# Patient Record
Sex: MALE | Race: WHITE | NOT HISPANIC OR LATINO | Employment: OTHER | URBAN - METROPOLITAN AREA
[De-identification: names, ages, dates, MRNs, and addresses within clinical notes are randomized per-mention and may not be internally consistent; named-entity substitution may affect disease eponyms.]

---

## 2017-06-02 ENCOUNTER — TELEPHONE (OUTPATIENT)
Dept: FAMILY MEDICINE | Facility: CLINIC | Age: 72
End: 2017-06-02

## 2017-06-02 NOTE — TELEPHONE ENCOUNTER
----- Message from Beatriz Tee sent at 6/2/2017 10:32 AM CDT -----  Contact: Tono Saez  Please call patient with lab results.

## 2017-06-27 RX ORDER — METOPROLOL TARTRATE 50 MG/1
TABLET ORAL
Refills: 11 | COMMUNITY
Start: 2017-04-13 | End: 2017-07-08 | Stop reason: SDUPTHER

## 2017-06-27 RX ORDER — PIOGLITAZONEHYDROCHLORIDE 15 MG/1
TABLET ORAL
Refills: 11 | COMMUNITY
Start: 2017-05-22 | End: 2017-06-28

## 2017-06-27 RX ORDER — METFORMIN HYDROCHLORIDE 750 MG/1
TABLET, EXTENDED RELEASE ORAL
Refills: 4 | COMMUNITY
Start: 2017-05-22 | End: 2017-07-08 | Stop reason: SDUPTHER

## 2017-06-27 RX ORDER — PIOGLITAZONEHYDROCHLORIDE 30 MG/1
TABLET ORAL
Refills: 3 | COMMUNITY
Start: 2017-05-10 | End: 2018-05-15 | Stop reason: SDUPTHER

## 2017-06-27 RX ORDER — SERTRALINE HYDROCHLORIDE 50 MG/1
TABLET, FILM COATED ORAL
Refills: 11 | COMMUNITY
Start: 2017-05-08 | End: 2018-04-26 | Stop reason: SDUPTHER

## 2017-06-27 RX ORDER — CLOPIDOGREL BISULFATE 75 MG/1
TABLET ORAL
Refills: 3 | COMMUNITY
Start: 2017-04-13 | End: 2018-01-18 | Stop reason: ALTCHOICE

## 2017-06-28 ENCOUNTER — OFFICE VISIT (OUTPATIENT)
Dept: FAMILY MEDICINE | Facility: CLINIC | Age: 72
End: 2017-06-28
Payer: MEDICARE

## 2017-06-28 VITALS
RESPIRATION RATE: 16 BRPM | HEIGHT: 70 IN | SYSTOLIC BLOOD PRESSURE: 120 MMHG | DIASTOLIC BLOOD PRESSURE: 68 MMHG | TEMPERATURE: 99 F | HEART RATE: 72 BPM | WEIGHT: 189 LBS | BODY MASS INDEX: 27.06 KG/M2

## 2017-06-28 DIAGNOSIS — R19.7 DIARRHEA OF PRESUMED INFECTIOUS ORIGIN: ICD-10-CM

## 2017-06-28 DIAGNOSIS — I10 ESSENTIAL HYPERTENSION: ICD-10-CM

## 2017-06-28 DIAGNOSIS — E11.9 TYPE 2 DIABETES MELLITUS WITHOUT COMPLICATION, WITHOUT LONG-TERM CURRENT USE OF INSULIN: Primary | ICD-10-CM

## 2017-06-28 PROCEDURE — 4010F ACE/ARB THERAPY RXD/TAKEN: CPT | Mod: ,,, | Performed by: FAMILY MEDICINE

## 2017-06-28 PROCEDURE — 1159F MED LIST DOCD IN RCRD: CPT | Mod: ,,, | Performed by: FAMILY MEDICINE

## 2017-06-28 PROCEDURE — 99213 OFFICE O/P EST LOW 20 MIN: CPT | Mod: ,,, | Performed by: FAMILY MEDICINE

## 2017-06-28 RX ORDER — RIVAROXABAN 20 MG/1
20 TABLET, FILM COATED ORAL DAILY
COMMUNITY
Start: 2017-04-21 | End: 2018-07-05 | Stop reason: DRUGHIGH

## 2017-06-28 RX ORDER — METRONIDAZOLE 500 MG/1
500 TABLET ORAL 3 TIMES DAILY
Qty: 30 TABLET | Refills: 0 | Status: SHIPPED | OUTPATIENT
Start: 2017-06-28 | End: 2017-07-08

## 2017-06-28 RX ORDER — GENTAMICIN SULFATE 1 MG/G
OINTMENT TOPICAL
COMMUNITY
Start: 2017-06-01 | End: 2017-08-07 | Stop reason: ALTCHOICE

## 2017-06-28 RX ORDER — HYDROCODONE BITARTRATE AND ACETAMINOPHEN 5; 325 MG/1; MG/1
TABLET ORAL
COMMUNITY
Start: 2017-06-01 | End: 2018-01-29 | Stop reason: ALTCHOICE

## 2017-06-28 NOTE — PATIENT INSTRUCTIONS
Bacterial Gastroenteritis (Adult)    You have gastroenteritis. It is an infection in your intestinal tract caused by bacteria (dysentery). Viruses, parasites, and toxins may also cause gastroenteritis. This infection may cause fever, vomiting, stomach cramping, and diarrhea. You may have blood or mucus in your stool. Some of the more common causes of bacterial gastroenteritis include E. coli, salmonella, shigella, campylobacter, and clostridium difficile.  Antibiotics are often used to treat this type of infection. Sometimes you may need to wait until a stool culture is done before your healthcare provider gives you an antibiotic. In the meantime, follow the advice below. Do not take antibiotics without your provider's recommendation. This can lead to other complications in certain types of bacterial gastroenteritis.  Home care  Medications  · If your healthcare provider prescribed antibiotics, be sure you take them until they are finished.  · You may use acetaminophen or NSAID medicines like ibuprofen or naproxen to control fever unless another medicine was prescribed. If you have chronic liver or kidney disease, talk with your provider before using these medicines. Also talk with your provider if you've had a stomach ulcer or gastrointestinal bleeding. Don't give aspirin to anyone under 18 years of age who is ill with a fever. It may cause severe liver damage. Don't use NSAID medicines if you are already taking one for another condition (like arthritis) or are on aspirin such as for heart disease or after a stroke.  · Don't take or give over-the-counter antidiarrhea medicines, unless your healthcare provider prescribed them. Antidiarrhea medicine may make your symptoms last longer if the cause is an infectious diarrhea.  · You may be given medicine for nausea and vomiting to help you keep down fluids. Take these medicines as prescribed.  · Gastroenteritis is transmitted by contact with the stool or vomit of an  infected person. This can occur directly from person to person or indirectly from contact with a contaminated surface.  General care  · If symptoms are severe, rest at home for the next 24 hours, or until you feel better.  · Washing your hands with soap and water and using alcohol-bases  is the best way to stop the spread of infection. Wash your hands after touching anyone who is sick.  · Wash your hands after using the toilet and before meals. Clean the toilet after each use.  · Avoid tobacco, caffeine, and alcohol. These can make your symptoms worse.  Diet  Liquids are the first step:  · Water and clear liquids are important so you don't get dehydrated. Drink a small amount at a time or suck on ice chips.  Food  · People with diarrhea should not make or serve food for others. When making food for yourself, wash your hands before and after.  · Wash your hands after using cutting boards, countertops, and knives that have touched raw food.  · Keep uncooked meats away from cooked and ready-to-eat foods.  During the first 24 hours, follow the diet below:  · Beverages: sport drinks, soft drinks without caffeine, mineral water (plain or flavored), decaffeinated tea and coffee. If you are very dehydrated, sports drinks are not a good choice. They have too much sugar and not enough electrolytes. In this case, commercially available products called oral rehydration solutions, are best.  · Soups: clear broth, consommé, and bouillon  · Desserts: plain gelatin, popsicles, and fruit juice bars  During the next 24 hours (the second day), you may add these foods to the above list if you are feeling better. If not, continue what you did the first day:  · Hot cereal, plain toast, bread, rolls, crackers  · Plain noodles, rice, mashed potatoes, chicken noodle or rice soup  · Unsweetened canned fruit (avoid pineapple), bananas  · Limit fat to less than 15 grams per day. Avoid margarine, butter, oils, mayonnaise, sauces,  gravies, fried foods, peanut butter, meat, poultry, and fish.  · Limit fiber. Avoid raw or cooked vegetables, fresh fruits (except bananas), and bran cereals.  · Limit dairy  · Continue to avoid alcohol  · Limit caffeine and chocolate. No spices or seasonings except salt.  During the next 24 hours:  · Gradually resume a normal diet, as you feel better and your symptoms improve.  · If at any time you start feeling worse again, go back to clear liquids until you feel better.  Follow-up care  Follow up with your healthcare provider, or as advised. If you don't get better in 24 hours, or if your diarrhea lasts more than 1 week. Also follow up if you are unable to keep liquids down and stay hydrated.  If a stool (diarrhea) sample was taken, you can call for the results as directed.  Call 911  Call 911 if any of these occur:  · Trouble breathing  · Confused  · Very drowsy or trouble awakening  · Fainting or loss of consciousness  · Rapid heart rate  · Chest pain  · Seizure  · Stiff neck  When to seek medical advice  Call your healthcare provider right away if any of these occur:  · Increasing abdominal pain or constant lower right abdominal pain  · Continued vomiting (unable to keep liquids down)  · Diarrhea for more than 2 days in adults and 24 hours in children  · Stools containing blood or pus or black tarry stools  · Dark urine, reduced urine output  · Weakness, dizziness  · Drowsiness  · Fever of 100.4°F (38.0°C), oral, or higher; or not better with fever medicine  · New rash  · If you are experiencing muscle weakness or arthritis symptoms during or after your gastroenteritis is gone  Date Last Reviewed: 1/3/2016  © 1364-2101 [employer]. [address] [place] [address], [place] [address]. All rights reserved. This information is not intended as a substitute for professional medical care. Always follow your healthcare professional's instructions.

## 2017-06-28 NOTE — PROGRESS NOTES
Subjective:       Patient ID: Tono Seaz is a 71 y.o. male.    Chief Complaint: Diarrhea (x 4 weeks  )    Diarrhea    This is a new (Diarrhea off and on x 4 weeks) problem. The current episode started more than 1 month ago. The problem occurs 2 to 4 times per day. The problem has been unchanged. The stool consistency is described as watery and mucous. The patient states that diarrhea does not awaken him from sleep. Associated symptoms include weight loss (7 lb). Pertinent negatives include no abdominal pain, bloating or fever. Nothing aggravates the symptoms. There are no known risk factors. The treatment provided no relief (Told by PA avoid imodium ). There is no history of bowel resection, inflammatory bowel disease, irritable bowel syndrome, malabsorption, a recent abdominal surgery or short gut syndrome.     Review of Systems   Constitutional: Positive for weight loss (7 lb). Negative for fever.   Gastrointestinal: Positive for diarrhea. Negative for abdominal pain and bloating.       Objective:      Physical Exam   Constitutional: He appears well-developed and well-nourished.   HENT:   Head: Normocephalic and atraumatic.   Cardiovascular: Normal rate, regular rhythm, normal heart sounds and intact distal pulses.  Exam reveals no gallop and no friction rub.    No murmur heard.  Pulmonary/Chest: Effort normal and breath sounds normal. He has no wheezes. He has no rales. He exhibits no tenderness.   Abdominal: Soft. Bowel sounds are normal. He exhibits no distension (increased bs) and no mass. There is no tenderness. There is no rebound and no guarding.       Assessment:       1. Type 2 diabetes mellitus without complication, without long-term current use of insulin    2. Diarrhea of presumed infectious origin    3. Essential hypertension        Plan:       Tono was seen today for diarrhea.    Diagnoses and all orders for this visit:    Type 2 diabetes mellitus without complication, without long-term  current use of insulin    Diarrhea of presumed infectious origin  -     WBC, Stool; Future  -     Calprotectin; Future  -     Giardia / Cryptosporidum, EIA; Future  -     Stool Exam-Ova,Cysts,Parasites; Future  -     Clostridium difficile EIA; Future  -     Stool culture; Future  -     metronidazole (FLAGYL) 500 MG tablet; Take 1 tablet (500 mg total) by mouth 3 (three) times daily.  -     WBC, Stool  -     Calprotectin  -     Giardia / Cryptosporidum, EIA  -     Stool Exam-Ova,Cysts,Parasites  -     Clostridium difficile EIA  -     Stool culture    Essential hypertension         Return in about 2 weeks (around 7/12/2017).

## 2017-07-03 ENCOUNTER — TELEPHONE (OUTPATIENT)
Dept: FAMILY MEDICINE | Facility: CLINIC | Age: 72
End: 2017-07-03

## 2017-07-03 NOTE — TELEPHONE ENCOUNTER
----- Message from Scott Staley MD sent at 7/3/2017  1:21 PM CDT -----  Stool test negative for culture, C. difficile

## 2017-07-07 LAB
C DIFF GDH STL QL: NORMAL
CALPROTECTIN STL-MCNT: <15.6 MCG/G
CAMPYLOBACTER STL CULT: NORMAL
E COLI SXT STL QL IA: NORMAL
G LAMBLIA AG STL QL IA: NORMAL
O+P STL TRI STN: NORMAL
SALM + SHIG STL CULT: NORMAL
WBC STL QL MICRO: NORMAL

## 2017-07-10 RX ORDER — METFORMIN HYDROCHLORIDE 750 MG/1
TABLET, EXTENDED RELEASE ORAL
Qty: 180 TABLET | Refills: 4 | Status: SHIPPED | OUTPATIENT
Start: 2017-07-10 | End: 2017-08-25

## 2017-07-10 RX ORDER — AMLODIPINE BESYLATE 5 MG/1
TABLET ORAL
Qty: 90 TABLET | Refills: 11 | Status: SHIPPED | OUTPATIENT
Start: 2017-07-10 | End: 2018-09-26 | Stop reason: SDUPTHER

## 2017-07-10 RX ORDER — SIMVASTATIN 40 MG/1
TABLET, FILM COATED ORAL
Qty: 90 TABLET | Refills: 11 | Status: SHIPPED | OUTPATIENT
Start: 2017-07-10 | End: 2018-09-26 | Stop reason: SDUPTHER

## 2017-07-10 RX ORDER — METOPROLOL TARTRATE 50 MG/1
TABLET ORAL
Qty: 180 TABLET | Refills: 11 | Status: SHIPPED | OUTPATIENT
Start: 2017-07-10 | End: 2018-09-26 | Stop reason: SDUPTHER

## 2017-07-10 RX ORDER — QUINAPRIL 20 MG/1
TABLET ORAL
Qty: 90 TABLET | Refills: 11 | Status: SHIPPED | OUTPATIENT
Start: 2017-07-10 | End: 2018-01-18 | Stop reason: ALTCHOICE

## 2017-07-24 ENCOUNTER — OFFICE VISIT (OUTPATIENT)
Dept: FAMILY MEDICINE | Facility: CLINIC | Age: 72
End: 2017-07-24
Payer: MEDICARE

## 2017-07-24 VITALS
HEIGHT: 70 IN | HEART RATE: 72 BPM | DIASTOLIC BLOOD PRESSURE: 74 MMHG | BODY MASS INDEX: 26.63 KG/M2 | WEIGHT: 186 LBS | SYSTOLIC BLOOD PRESSURE: 140 MMHG | RESPIRATION RATE: 16 BRPM | TEMPERATURE: 98 F

## 2017-07-24 DIAGNOSIS — Z11.4 ENCOUNTER FOR SCREENING FOR HIV: ICD-10-CM

## 2017-07-24 DIAGNOSIS — E11.9 TYPE 2 DIABETES MELLITUS WITHOUT COMPLICATION, WITHOUT LONG-TERM CURRENT USE OF INSULIN: Primary | ICD-10-CM

## 2017-07-24 DIAGNOSIS — Z87.898: ICD-10-CM

## 2017-07-24 DIAGNOSIS — K52.9 CHRONIC DIARRHEA: ICD-10-CM

## 2017-07-24 DIAGNOSIS — R63.4 ABNORMAL WEIGHT LOSS: ICD-10-CM

## 2017-07-24 DIAGNOSIS — R63.4 WEIGHT LOSS, ABNORMAL: ICD-10-CM

## 2017-07-24 PROCEDURE — 4010F ACE/ARB THERAPY RXD/TAKEN: CPT | Mod: ,,, | Performed by: FAMILY MEDICINE

## 2017-07-24 PROCEDURE — 99214 OFFICE O/P EST MOD 30 MIN: CPT | Mod: 25,,, | Performed by: FAMILY MEDICINE

## 2017-07-24 PROCEDURE — 1159F MED LIST DOCD IN RCRD: CPT | Mod: ,,, | Performed by: FAMILY MEDICINE

## 2017-07-24 RX ORDER — KETOCONAZOLE 20 MG/ML
SHAMPOO, SUSPENSION TOPICAL
COMMUNITY
Start: 2017-07-06 | End: 2019-03-26

## 2017-07-24 NOTE — PROGRESS NOTES
Subjective:       Patient ID: Tono Saez is a 72 y.o. male.    Chief Complaint: Night Sweats; Fatigue (weight loss); and Diarrhea    Patient reports continues to not feel well sluggish fatigue and continuing to have weight loss has lost 10 pounds since May. Complaining of very weak legs and fell last Thursday.      Fatigue   This is a chronic problem. The problem occurs constantly. The problem has been gradually worsening. Associated symptoms include anorexia, congestion, coughing, diaphoresis (night sweats), fatigue, headaches (pressure on occiput), joint swelling and myalgias. Pertinent negatives include no abdominal pain, arthralgias, chest pain, chills, fever, nausea, neck pain, numbness, rash, sore throat, swollen glands, vomiting or weakness. Associated symptoms comments: Diarrhea 3-5/ day on average. Nothing aggravates the symptoms. He has tried nothing for the symptoms. The treatment provided no relief.   Diarrhea    Associated symptoms include coughing, headaches (pressure on occiput) and myalgias. Pertinent negatives include no abdominal pain, arthralgias, chills, fever or vomiting.     Review of Systems   Constitutional: Positive for appetite change (decrease), diaphoresis (night sweats) and fatigue. Negative for activity change, chills, fever and unexpected weight change.   HENT: Positive for congestion. Negative for dental problem, drooling, ear discharge, ear pain, facial swelling, hearing loss, mouth sores, nosebleeds, postnasal drip, rhinorrhea, sinus pressure, sneezing, sore throat, tinnitus, trouble swallowing and voice change.    Eyes: Negative for photophobia, pain, discharge, redness, itching and visual disturbance.   Respiratory: Positive for cough. Negative for apnea, choking, chest tightness, shortness of breath, wheezing and stridor.    Cardiovascular: Negative for chest pain, palpitations and leg swelling.   Gastrointestinal: Positive for anorexia and diarrhea. Negative for abdominal  distention, abdominal pain, anal bleeding, blood in stool, constipation, nausea, rectal pain and vomiting.   Endocrine: Negative for cold intolerance, heat intolerance, polydipsia, polyphagia and polyuria.   Genitourinary: Negative for decreased urine volume, difficulty urinating, discharge, dysuria, enuresis, flank pain, frequency, genital sores, hematuria, penile pain, penile swelling, scrotal swelling, testicular pain and urgency.   Musculoskeletal: Positive for joint swelling and myalgias. Negative for arthralgias, back pain, gait problem, neck pain and neck stiffness.   Skin: Negative for color change, pallor, rash and wound.   Allergic/Immunologic: Negative for environmental allergies, food allergies and immunocompromised state.   Neurological: Positive for headaches (pressure on occiput). Negative for dizziness, tremors, seizures, syncope, facial asymmetry, speech difficulty, weakness, light-headedness and numbness.   Hematological: Negative for adenopathy. Does not bruise/bleed easily.   Psychiatric/Behavioral: Positive for dysphoric mood and sleep disturbance (can't fall back to sleep). Negative for agitation, behavioral problems, confusion, decreased concentration, hallucinations, self-injury and suicidal ideas. The patient is not nervous/anxious and is not hyperactive.        Objective:      Physical Exam   Constitutional: He is oriented to person, place, and time. He appears well-developed and well-nourished.  Non-toxic appearance. He does not have a sickly appearance. He does not appear ill. No distress.   HENT:   Head: Normocephalic and atraumatic.   Right Ear: External ear normal.   Left Ear: External ear normal.   Nose: Nose normal.   Mouth/Throat: Oropharynx is clear and moist. No oropharyngeal exudate.   Eyes: Conjunctivae and EOM are normal. Pupils are equal, round, and reactive to light. Right eye exhibits no discharge. Left eye exhibits no discharge. No scleral icterus.   Neck: Normal range of  motion. Neck supple. No JVD present. No tracheal deviation present. No thyromegaly present.   Cardiovascular: Normal rate, regular rhythm, normal heart sounds and intact distal pulses.  Exam reveals no gallop and no friction rub.    No murmur heard.  Pulmonary/Chest: Effort normal and breath sounds normal. No stridor. No respiratory distress. He has no wheezes. He has no rales. He exhibits no tenderness.   Abdominal: Soft. Bowel sounds are normal. He exhibits no distension and no mass. There is no tenderness. There is no rebound and no guarding. No hernia.   Genitourinary: Testes normal and penis normal. Rectal exam shows guaiac negative stool. Cremasteric reflex is present. Right testis shows no mass, no swelling and no tenderness. Right testis is descended. Cremasteric reflex is not absent on the right side. Left testis shows no mass, no swelling and no tenderness. Left testis is descended. Cremasteric reflex is not absent on the left side. Circumcised. No phimosis, paraphimosis, hypospadias, penile erythema or penile tenderness. No discharge found.   Musculoskeletal: Normal range of motion. He exhibits no edema, tenderness or deformity.   Lymphadenopathy:     He has no cervical adenopathy.   Neurological: He is alert and oriented to person, place, and time. He has normal reflexes. He displays normal reflexes. No cranial nerve deficit. He exhibits normal muscle tone. Coordination normal.   Skin: Skin is warm and dry. Capillary refill takes less than 2 seconds. No rash noted. He is not diaphoretic. No erythema. No pallor.   Psychiatric: He has a normal mood and affect. His behavior is normal. Judgment and thought content normal.   Nursing note and vitals reviewed.      Assessment:       1. Type 2 diabetes mellitus without complication, without long-term current use of insulin    2. H/O cachexia    3. Weight loss, abnormal    4. Chronic diarrhea    5. Encounter for screening for HIV     6. Abnormal weight loss          Plan:       Tono was seen today for night sweats, fatigue and diarrhea.    Diagnoses and all orders for this visit:    Type 2 diabetes mellitus without complication, without long-term current use of insulin  -     Hemoglobin A1c; Future  -     HIV-1 and HIV-2 antibodies; Future  -     Hemoglobin A1c  -     HIV-1 and HIV-2 antibodies    H/O cachexia  -     Hemoglobin A1c; Future  -     Hepatitis C antibody; Future  -     HIV-1 and HIV-2 antibodies; Future  -     CBC auto differential; Future  -     Rheumatoid factor; Future  -     RPR; Future  -     TSH; Future  -     Comprehensive metabolic panel; Future  -     DAYANARA; Future  -     tuberculin injection 5 Units; Inject 0.1 mLs (5 Units total) into the skin one time.  -     CT Abdomen Pelvis W Wo Contrast; Future  -     Ambulatory referral to Gastroenterology  -     POCT TB Skin Test Read; Future  -     Hemoglobin A1c  -     Hepatitis C antibody  -     HIV-1 and HIV-2 antibodies  -     CBC auto differential  -     Rheumatoid factor  -     RPR  -     TSH  -     Comprehensive metabolic panel  -     DAYANARA    Weight loss, abnormal  -     HIV-1 and HIV-2 antibodies; Future  -     DAYANARA; Future  -     CT Abdomen Pelvis W Wo Contrast; Future  -     Ambulatory referral to Gastroenterology  -     POCT TB Skin Test Read; Future  -     HIV-1 and HIV-2 antibodies  -     DAYANARA    Chronic diarrhea  -     CT Abdomen Pelvis W Wo Contrast; Future  -     Ambulatory referral to Gastroenterology    Encounter for screening for HIV   -     HIV-1 and HIV-2 antibodies; Future  -     HIV-1 and HIV-2 antibodies    Abnormal weight loss   -     TSH; Future  -     TSH         Return in about 2 weeks (around 8/7/2017).

## 2017-07-31 ENCOUNTER — TELEPHONE (OUTPATIENT)
Dept: FAMILY MEDICINE | Facility: CLINIC | Age: 72
End: 2017-07-31

## 2017-08-07 ENCOUNTER — OFFICE VISIT (OUTPATIENT)
Dept: FAMILY MEDICINE | Facility: CLINIC | Age: 72
End: 2017-08-07
Payer: MEDICARE

## 2017-08-07 VITALS
RESPIRATION RATE: 16 BRPM | SYSTOLIC BLOOD PRESSURE: 132 MMHG | HEIGHT: 70 IN | HEART RATE: 76 BPM | WEIGHT: 184 LBS | TEMPERATURE: 98 F | DIASTOLIC BLOOD PRESSURE: 74 MMHG | BODY MASS INDEX: 26.34 KG/M2

## 2017-08-07 DIAGNOSIS — Z87.898: Primary | ICD-10-CM

## 2017-08-07 DIAGNOSIS — R19.7 DIARRHEA OF PRESUMED INFECTIOUS ORIGIN: ICD-10-CM

## 2017-08-07 PROCEDURE — 99212 OFFICE O/P EST SF 10 MIN: CPT | Mod: ,,, | Performed by: FAMILY MEDICINE

## 2017-08-07 PROCEDURE — 3008F BODY MASS INDEX DOCD: CPT | Mod: ,,, | Performed by: FAMILY MEDICINE

## 2017-08-07 PROCEDURE — 1159F MED LIST DOCD IN RCRD: CPT | Mod: ,,, | Performed by: FAMILY MEDICINE

## 2017-08-07 NOTE — PATIENT INSTRUCTIONS
Call Dr. Mayer's office discuss with his clinic and have moved his appointment forward to Friday 8/11/17 at 10:30 AM.

## 2017-08-07 NOTE — PROGRESS NOTES
Subjective:       Patient ID: Tono Saez is a 72 y.o. male.    Chief Complaint: Follow-up (2 week follow up, ) and Diarrhea    Patient is here as a follow-up from last visit has had weight loss fatigue and chronic diarrhea.  Wt Readings from Last 3 Encounters:  08/07/17 : 83.5 kg (184 lb)  07/24/17 : 84.4 kg (186 lb)  06/28/17 : 85.7 kg (189 lb)  Has lost 20 pounds total since this onset of symptoms in May of this year.  Temp Readings from Last 3 Encounters:  08/07/17 : 97.9 °F (36.6 °C) (Tympanic)  07/24/17 : 98.3 °F (36.8 °C) (Tympanic)  06/28/17 : 98.7 °F (37.1 °C) (Tympanic)    BP Readings from Last 3 Encounters:  08/07/17 : 132/74  07/24/17 : (!) 140/74  06/28/17 : 120/68    Pulse Readings from Last 3 Encounters:  08/07/17 : 76  07/24/17 : 72  06/28/17 : 72      Diarrhea    This is a chronic problem. Pertinent negatives include no arthralgias or myalgias.     Review of Systems   Constitutional: Positive for fatigue.   Gastrointestinal: Positive for diarrhea.   Musculoskeletal: Negative for arthralgias, back pain, gait problem, joint swelling, myalgias, neck pain and neck stiffness.   Neurological: Negative for tremors.   Hematological: Negative for adenopathy. Does not bruise/bleed easily.       Objective:      Physical Exam   Neck: No thyromegaly present.   Abdominal: Soft. Bowel sounds are normal. He exhibits no distension and no mass. There is no tenderness. There is no rebound and no guarding. No hernia.   Musculoskeletal: He exhibits no edema, tenderness or deformity.   Lymphadenopathy:     He has no cervical adenopathy.     He has no axillary adenopathy.        Right: No inguinal and no supraclavicular adenopathy present.        Left: No inguinal and no supraclavicular adenopathy present.       Assessment:       1. H/O cachexia    2. Diarrhea of presumed infectious origin -I doubt this is viral at this point in may indicate more of a chronic colitis or inflammatory picture mild leukopenia suggested is  not ainfectious etiology.       Plan:       Tono was seen today for follow-up and diarrhea.    Diagnoses and all orders for this visit:    H/O cachexia    Diarrhea of presumed infectious origin         Case was discussed with his clinic nurse in his appointment has been moved up to this Friday, 08/11/2017 at 10:30 AM

## 2017-08-18 ENCOUNTER — TELEPHONE (OUTPATIENT)
Dept: FAMILY MEDICINE | Facility: CLINIC | Age: 72
End: 2017-08-18

## 2017-08-18 NOTE — TELEPHONE ENCOUNTER
----- Message from Stephenie Valencia sent at 8/18/2017  9:27 AM CDT -----  Contact: patient  Went to [facility] is fine he suggested that Steven should try getting off the Metformin.  He has not taken Metformin since 08/11 his blood sugar has not been over 100 since.  No ruddy  Made appt for next Friday what do you want to do in the meantime?

## 2017-08-25 ENCOUNTER — OFFICE VISIT (OUTPATIENT)
Dept: FAMILY MEDICINE | Facility: CLINIC | Age: 72
End: 2017-08-25
Payer: MEDICARE

## 2017-08-25 VITALS
RESPIRATION RATE: 16 BRPM | WEIGHT: 185 LBS | BODY MASS INDEX: 26.48 KG/M2 | SYSTOLIC BLOOD PRESSURE: 150 MMHG | DIASTOLIC BLOOD PRESSURE: 78 MMHG | TEMPERATURE: 97 F | HEIGHT: 70 IN | HEART RATE: 64 BPM

## 2017-08-25 DIAGNOSIS — R19.7 DIARRHEA OF PRESUMED INFECTIOUS ORIGIN: Primary | ICD-10-CM

## 2017-08-25 DIAGNOSIS — E11.9 TYPE 2 DIABETES MELLITUS WITHOUT COMPLICATION, WITHOUT LONG-TERM CURRENT USE OF INSULIN: ICD-10-CM

## 2017-08-25 PROCEDURE — 4010F ACE/ARB THERAPY RXD/TAKEN: CPT | Mod: ,,, | Performed by: FAMILY MEDICINE

## 2017-08-25 PROCEDURE — 3077F SYST BP >= 140 MM HG: CPT | Mod: ,,, | Performed by: FAMILY MEDICINE

## 2017-08-25 PROCEDURE — 1159F MED LIST DOCD IN RCRD: CPT | Mod: ,,, | Performed by: FAMILY MEDICINE

## 2017-08-25 PROCEDURE — 99213 OFFICE O/P EST LOW 20 MIN: CPT | Mod: ,,, | Performed by: FAMILY MEDICINE

## 2017-08-25 PROCEDURE — 3078F DIAST BP <80 MM HG: CPT | Mod: ,,, | Performed by: FAMILY MEDICINE

## 2017-08-25 NOTE — PROGRESS NOTES
Follow-up from last visit patient was seen for weight loss cachexia and anorexia since that time he has stopped metformin and is doing much better weight loss is stabilized. Diarrhea resolved.

## 2017-08-25 NOTE — PROGRESS NOTES
Subjective:       Patient ID: Tono Saez is a 72 y.o. male.    Chief Complaint: Follow-up (diabetes)    Follow-up from last visit patient was seen for weight loss cachexia and anorexia since that time he has stopped metformin and is doing much better weight loss is stabilized. Diarrhea resolved.      Review of Systems    Objective:      Physical Exam   Pulmonary/Chest: Effort normal and breath sounds normal. No respiratory distress. He has no wheezes. He has no rales. He exhibits no tenderness.   Abdominal: Soft. Bowel sounds are normal. He exhibits no distension. There is no tenderness. There is no guarding.       Assessment:       1. Diarrhea of presumed infectious origin    2. Type 2 diabetes mellitus without complication, without long-term current use of insulin        Plan:       Tono was seen today for follow-up.    Diagnoses and all orders for this visit:    Diarrhea of presumed infectious origin  -     Basic metabolic panel; Future  -     Magnesium; Future  -     Hepatic function panel; Future  -     Basic metabolic panel  -     Magnesium  -     Hepatic function panel    Type 2 diabetes mellitus without complication, without long-term current use of insulin  -     Hepatic function panel; Future  -     Hepatic function panel         Return in about 3 months (around 11/25/2017).

## 2017-09-01 LAB
ALBUMIN SERPL-MCNC: 4.2 G/DL (ref 3.1–4.7)
ALP SERPL-CCNC: 36 IU/L (ref 40–104)
ALT (SGPT): 16 IU/L (ref 3–33)
AST SERPL-CCNC: 21 IU/L (ref 10–40)
BILIRUB SERPL-MCNC: 0.9 MG/DL (ref 0.3–1)
BILIRUBIN DIRECT+TOT PNL SERPL-MCNC: 0.2 MG/DL (ref 0–0.2)
BUN SERPL-MCNC: 30 MG/DL (ref 8–20)
CALCIUM SERPL-MCNC: 9.4 MG/DL (ref 7.7–10.4)
CHLORIDE: 106 MMOL/L (ref 98–110)
CO2 SERPL-SCNC: 27.5 MMOL/L (ref 22.8–31.6)
CREATININE: 1.49 MG/DL (ref 0.6–1.4)
GLUCOSE: 127 MG/DL (ref 70–99)
MAGNESIUM SERPL-MCNC: 1.5 MG/DL (ref 1.5–2.6)
POTASSIUM SERPL-SCNC: 4 MMOL/L (ref 3.5–5)
PROT SERPL-MCNC: 6.6 G/DL (ref 6–8.2)
SODIUM: 140 MMOL/L (ref 134–144)

## 2018-01-18 ENCOUNTER — TELEPHONE (OUTPATIENT)
Dept: FAMILY MEDICINE | Facility: CLINIC | Age: 73
End: 2018-01-18

## 2018-01-18 ENCOUNTER — OFFICE VISIT (OUTPATIENT)
Dept: FAMILY MEDICINE | Facility: CLINIC | Age: 73
End: 2018-01-18
Payer: MEDICARE

## 2018-01-18 VITALS
DIASTOLIC BLOOD PRESSURE: 60 MMHG | HEIGHT: 71 IN | WEIGHT: 200 LBS | RESPIRATION RATE: 20 BRPM | OXYGEN SATURATION: 100 % | TEMPERATURE: 99 F | BODY MASS INDEX: 28 KG/M2 | HEART RATE: 64 BPM | SYSTOLIC BLOOD PRESSURE: 100 MMHG

## 2018-01-18 DIAGNOSIS — R09.1 PLEURISY: ICD-10-CM

## 2018-01-18 DIAGNOSIS — I10 ESSENTIAL HYPERTENSION: Primary | ICD-10-CM

## 2018-01-18 DIAGNOSIS — J40 BRONCHITIS: ICD-10-CM

## 2018-01-18 PROCEDURE — 99214 OFFICE O/P EST MOD 30 MIN: CPT | Mod: 25,,, | Performed by: FAMILY MEDICINE

## 2018-01-18 RX ORDER — AZITHROMYCIN 250 MG/1
250 TABLET, FILM COATED ORAL DAILY
Qty: 6 TABLET | Refills: 0 | Status: SHIPPED | OUTPATIENT
Start: 2018-01-18 | End: 2018-01-22 | Stop reason: ALTCHOICE

## 2018-01-18 RX ORDER — TRAMADOL HYDROCHLORIDE 50 MG/1
50 TABLET ORAL EVERY 6 HOURS PRN
Qty: 30 TABLET | Refills: 0 | Status: SHIPPED | OUTPATIENT
Start: 2018-01-18 | End: 2019-03-26 | Stop reason: ALTCHOICE

## 2018-01-18 RX ORDER — PROMETHAZINE HYDROCHLORIDE AND DEXTROMETHORPHAN HYDROBROMIDE 6.25; 15 MG/5ML; MG/5ML
5 SYRUP ORAL 4 TIMES DAILY
Qty: 180 ML | Refills: 2 | Status: SHIPPED | OUTPATIENT
Start: 2018-01-18 | End: 2018-01-22

## 2018-01-18 RX ORDER — QUINAPRIL 40 MG/1
20 TABLET ORAL DAILY
Refills: 4 | COMMUNITY
Start: 2017-12-26 | End: 2021-02-08

## 2018-01-18 RX ORDER — LANSOPRAZOLE 30 MG/1
CAPSULE, DELAYED RELEASE ORAL
COMMUNITY
Start: 2017-11-25 | End: 2018-02-05 | Stop reason: SDUPTHER

## 2018-01-18 NOTE — PROGRESS NOTES
Subjective:       Patient ID: Tono Saez is a 72 y.o. male.    Chief Complaint: Cough; URI; and Nasal Congestion      Cough   This is a new problem. The current episode started yesterday. The problem has been waxing and waning. The problem occurs constantly. The cough is productive of sputum (clear). Associated symptoms include chest pain, chills, nasal congestion and rhinorrhea. Pertinent negatives include no ear congestion, ear pain, fever or shortness of breath. Nothing aggravates the symptoms. He has tried nothing for the symptoms. The treatment provided mild relief.   URI    Associated symptoms include chest pain, coughing and rhinorrhea. Pertinent negatives include no ear pain.       Allergies and Medications:   Review of patient's allergies indicates:   Allergen Reactions    Adhesive Other (See Comments)     SILK TAPE PULL SKIN OFF    Metformin Other (See Comments)     Weight loss cachexia anorexia,diarrhea.    Pcn [penicillins]      Current Outpatient Prescriptions   Medication Sig Dispense Refill    amlodipine (NORVASC) 5 MG tablet TAKE ONE TABLET EVERY DAY 90 tablet 11    fenofibrate micronized (LOFIBRA) 134 MG capsule Take 134 mg by mouth before breakfast.       fish oil-omega-3 fatty acids 300-1,000 mg capsule Take 2 g by mouth 2 (two) times daily.       hydrocodone-acetaminophen 5-325mg (NORCO) 5-325 mg per tablet       ketoconazole (NIZORAL) 2 % shampoo       lansoprazole (PREVACID) 30 MG capsule       metoprolol tartrate (LOPRESSOR) 50 MG tablet TAKE ONE TABLET BY MOUTH TWO TIMES A  tablet 11    multivitamin capsule Take 1 capsule by mouth once daily.       NITROSTAT 0.4 mg SL tablet       pioglitazone (ACTOS) 30 MG tablet   3    quinapril (ACCUPRIL) 40 MG tablet   4    sertraline (ZOLOFT) 50 MG tablet   11    simvastatin (ZOCOR) 40 MG tablet TAKE ONE TABLET BY MOUTH EVERY EVENING FOR CHOLESTEROL 90 tablet 11    traMADol (ULTRAM) 50 mg tablet Take 1 tablet (50 mg total) by  mouth every 6 (six) hours as needed. 30 tablet 0    XARELTO 20 mg Tab       azithromycin (Z-GABRIELA) 250 MG tablet Take 1 tablet (250 mg total) by mouth once daily. po on day 1 then 1 tab po on days 2-5 6 tablet 0    promethazine-dextromethorphan (PROMETHAZINE-DM) 6.25-15 mg/5 mL Syrp Take 5 mLs by mouth 4 (four) times daily. 180 mL 2     No current facility-administered medications for this visit.        Family History:   Family History   Problem Relation Age of Onset    Heart disease Mother     Heart disease Father     Hyperlipidemia Sister     Hypertension Sister     Heart disease Brother     Heart disease Brother     Heart disease Brother     Heart disease Brother     Heart disease Brother        Social History:   Social History     Social History    Marital status:      Spouse name: N/A    Number of children: N/A    Years of education: N/A     Occupational History    Not on file.     Social History Main Topics    Smoking status: Never Smoker    Smokeless tobacco: Never Used    Alcohol use No    Drug use: No    Sexual activity: Not on file     Other Topics Concern    Not on file     Social History Narrative    No narrative on file       Review of Systems   Constitutional: Positive for chills. Negative for fever.   HENT: Positive for rhinorrhea. Negative for ear pain.    Respiratory: Positive for cough. Negative for shortness of breath.    Cardiovascular: Positive for chest pain.       Objective:     Vitals:    01/18/18 1501   BP: 100/60   Pulse: 64   Resp: 20   Temp: 98.5 °F (36.9 °C)        Physical Exam   HENT:   Head: Normocephalic.   Eyes: Conjunctivae and EOM are normal. Pupils are equal, round, and reactive to light.   Cardiovascular: Normal rate, regular rhythm, normal heart sounds and intact distal pulses.  Exam reveals no gallop and no friction rub.    No murmur heard.  Pulmonary/Chest: Effort normal and breath sounds normal. No respiratory distress. He has no wheezes. He has  no rales. He exhibits no tenderness.       Assessment:       1. Essential hypertension    2. Bronchitis    3. Pleurisy        Plan:       Tono was seen today for cough, uri and nasal congestion.    Diagnoses and all orders for this visit:    Essential hypertension    Bronchitis  -     Pulse Oximetry  -     X-Ray Chest PA And Lateral; Future    Pleurisy  -     traMADol (ULTRAM) 50 mg tablet; Take 1 tablet (50 mg total) by mouth every 6 (six) hours as needed.    Other orders  -     azithromycin (Z-GABRIELA) 250 MG tablet; Take 1 tablet (250 mg total) by mouth once daily. po on day 1 then 1 tab po on days 2-5  -     promethazine-dextromethorphan (PROMETHAZINE-DM) 6.25-15 mg/5 mL Syrp; Take 5 mLs by mouth 4 (four) times daily.         Follow-up if symptoms worsen or fail to improve.

## 2018-01-18 NOTE — PATIENT INSTRUCTIONS
Pocket Rx for Antibiotics:    Start Antibiotics  if :  1) infection lasting longer than 5 days  2) unilateral symptoms in the sinuses or unilateral nosebleed  3) fever greater than 100.4  4) cough or nasal discharge productive of blood

## 2018-01-19 ENCOUNTER — TELEPHONE (OUTPATIENT)
Dept: FAMILY MEDICINE | Facility: CLINIC | Age: 73
End: 2018-01-19

## 2018-01-19 NOTE — TELEPHONE ENCOUNTER
----- Message from Beatriz Tee sent at 1/19/2018  1:55 PM CST -----  Contact: Pili Saez: [phone]  Asking if Dr Staley called in new cough sryup?

## 2018-01-19 NOTE — TELEPHONE ENCOUNTER
"----- Message from Beatriz Tee sent at 1/19/2018 10:38 AM CST -----  Contact: Wife Pili  Pt says cough medicine was too strong - he was talking "out of his head" in his sleep all night and when he went to get up to use the bathroom he fell and bumped his head & long story short he had to have 8 stiches.  So please change cough medicine.  "

## 2018-01-22 ENCOUNTER — OFFICE VISIT (OUTPATIENT)
Dept: FAMILY MEDICINE | Facility: CLINIC | Age: 73
End: 2018-01-22
Payer: MEDICARE

## 2018-01-22 VITALS
HEART RATE: 68 BPM | WEIGHT: 198 LBS | DIASTOLIC BLOOD PRESSURE: 64 MMHG | HEIGHT: 71 IN | TEMPERATURE: 99 F | BODY MASS INDEX: 27.72 KG/M2 | SYSTOLIC BLOOD PRESSURE: 138 MMHG

## 2018-01-22 DIAGNOSIS — S01.01XD LACERATION OF OCCIPITAL REGION OF SCALP, SUBSEQUENT ENCOUNTER: ICD-10-CM

## 2018-01-22 PROBLEM — S01.01XA LACERATION OF OCCIPITAL REGION OF SCALP: Status: ACTIVE | Noted: 2018-01-22

## 2018-01-22 PROCEDURE — 99212 OFFICE O/P EST SF 10 MIN: CPT | Mod: 25,,, | Performed by: FAMILY MEDICINE

## 2018-01-22 PROCEDURE — 16030 DRESS/DEBRID P-THICK BURN L: CPT | Mod: ,,, | Performed by: FAMILY MEDICINE

## 2018-01-22 NOTE — PROGRESS NOTES
Subjective:       Patient ID: Tono Saez is a 72 y.o. male.    Chief Complaint: Facial Swelling and Follow-up (er laceration to forehead)      Patient was being treated for sinus infection with some Zithromax and prescription cough syrup had taken a teaspoon the night before got up at 5:30 Friday a.m. January 19 and got up to use the bathroom on his way back he became dizzy, lost balance and fell landing on head. No loss of consciousness. Blood sugars have been good he was instructed to follow-up with me this morning.      Head Injury    The incident occurred 3 to 5 days ago. The injury mechanism was a fall. There was no loss of consciousness. The pain is at a severity of 0/10. The patient is experiencing no pain. Pertinent negatives include no blurred vision, disorientation, headaches, memory loss, numbness, tinnitus, vomiting or weakness. Associated symptoms comments: Sl nausea. He has tried nothing for the symptoms. The treatment provided mild relief.     Tetanus booster was given in the ED 01/19/2018   Allergies and Medications:   Review of patient's allergies indicates:   Allergen Reactions    Adhesive Other (See Comments)     SILK TAPE PULL SKIN OFF    Metformin Other (See Comments)     Weight loss cachexia anorexia,diarrhea.    Pcn [penicillins]      Current Outpatient Prescriptions   Medication Sig Dispense Refill    amlodipine (NORVASC) 5 MG tablet TAKE ONE TABLET EVERY DAY 90 tablet 11    fenofibrate micronized (LOFIBRA) 134 MG capsule Take 134 mg by mouth before breakfast.       fish oil-omega-3 fatty acids 300-1,000 mg capsule Take 2 g by mouth 2 (two) times daily.       hydrocodone-acetaminophen 5-325mg (NORCO) 5-325 mg per tablet       ketoconazole (NIZORAL) 2 % shampoo       lansoprazole (PREVACID) 30 MG capsule       metoprolol tartrate (LOPRESSOR) 50 MG tablet TAKE ONE TABLET BY MOUTH TWO TIMES A  tablet 11    multivitamin capsule Take 1 capsule by mouth once daily.        NITROSTAT 0.4 mg SL tablet       pioglitazone (ACTOS) 30 MG tablet   3    quinapril (ACCUPRIL) 40 MG tablet   4    sertraline (ZOLOFT) 50 MG tablet   11    simvastatin (ZOCOR) 40 MG tablet TAKE ONE TABLET BY MOUTH EVERY EVENING FOR CHOLESTEROL 90 tablet 11    traMADol (ULTRAM) 50 mg tablet Take 1 tablet (50 mg total) by mouth every 6 (six) hours as needed. 30 tablet 0    XARELTO 20 mg Tab        No current facility-administered medications for this visit.        Family History:   Family History   Problem Relation Age of Onset    Heart disease Mother     Heart disease Father     Hyperlipidemia Sister     Hypertension Sister     Heart disease Brother     Heart disease Brother     Heart disease Brother     Heart disease Brother     Heart disease Brother        Social History:   Social History     Social History    Marital status:      Spouse name: N/A    Number of children: N/A    Years of education: N/A     Occupational History    Not on file.     Social History Main Topics    Smoking status: Never Smoker    Smokeless tobacco: Never Used    Alcohol use No    Drug use: No    Sexual activity: Not on file     Other Topics Concern    Not on file     Social History Narrative    No narrative on file       Review of Systems   HENT: Negative for tinnitus.    Eyes: Negative for blurred vision.   Gastrointestinal: Negative for vomiting.   Neurological: Negative for weakness, numbness and headaches.   Psychiatric/Behavioral: Negative for memory loss.       Objective:     Vitals:    01/22/18 1318   BP: 138/64   Pulse: 68   Temp: 99 °F (37.2 °C)        Physical Exam   HENT:   Head:       Eyes:   Tubular equal and round reactive to light and accommodation bilaterally bilateral lens implants evident extraocular movements are intact.       Assessment:       1. Laceration of occipital region of scalp, subsequent encounter        Plan:       Tono was seen today for facial swelling and  follow-up.    Diagnoses and all orders for this visit:    Laceration of occipital region of scalp, subsequent encounter         Follow-up in about 1 week (around 1/29/2018).

## 2018-01-29 ENCOUNTER — OFFICE VISIT (OUTPATIENT)
Dept: FAMILY MEDICINE | Facility: CLINIC | Age: 73
End: 2018-01-29
Payer: MEDICARE

## 2018-01-29 VITALS
HEIGHT: 71 IN | BODY MASS INDEX: 26.46 KG/M2 | TEMPERATURE: 98 F | WEIGHT: 189 LBS | DIASTOLIC BLOOD PRESSURE: 78 MMHG | SYSTOLIC BLOOD PRESSURE: 124 MMHG

## 2018-01-29 DIAGNOSIS — S01.81XD: ICD-10-CM

## 2018-01-29 DIAGNOSIS — G44.309 POST-CONCUSSION HEADACHE: Primary | ICD-10-CM

## 2018-01-29 PROBLEM — S01.81XA: Status: ACTIVE | Noted: 2018-01-29

## 2018-01-29 PROCEDURE — 99024 POSTOP FOLLOW-UP VISIT: CPT | Mod: POP,,, | Performed by: FAMILY MEDICINE

## 2018-01-29 RX ORDER — LEVOFLOXACIN 250 MG/1
250 TABLET ORAL DAILY
Qty: 10 TABLET | Refills: 0 | Status: SHIPPED | OUTPATIENT
Start: 2018-01-29 | End: 2018-02-09 | Stop reason: ALTCHOICE

## 2018-01-29 NOTE — PROGRESS NOTES
Subjective:       Patient ID: Tono Saez is a 72 y.o. male.    Chief Complaint: Bronchitis and Suture / Staple Removal      Patient is here today because his cough has persisted he was seen for bronchitis on January 18 was prescribed a antibiotic and cough medicine cough medicine which made him have a fall causing a a scalp hematoma laceration. He is here for follow-up for that as well      Suture / Staple Removal   The sutures were placed 11 to 14 days ago. He tried nothing since the wound repair. His temperature was unmeasured prior to arrival. There has been no drainage from the wound. The swelling has worsened (slightly larger).   Cough   This is a chronic problem. Episode onset: 2 weeks. The problem has been unchanged. The problem occurs constantly. Cough characteristics: white sputum. Associated symptoms include postnasal drip and rhinorrhea. Pertinent negatives include no chest pain or chills. Nothing aggravates the symptoms. He has tried nothing for the symptoms. The treatment provided no relief.       Allergies and Medications:   Review of patient's allergies indicates:   Allergen Reactions    Adhesive Other (See Comments)     SILK TAPE PULL SKIN OFF    Metformin Other (See Comments)     Weight loss cachexia anorexia,diarrhea.    Pcn [penicillins]      Current Outpatient Prescriptions   Medication Sig Dispense Refill    amlodipine (NORVASC) 5 MG tablet TAKE ONE TABLET EVERY DAY 90 tablet 11    fenofibrate micronized (LOFIBRA) 134 MG capsule Take 134 mg by mouth before breakfast.       fish oil-omega-3 fatty acids 300-1,000 mg capsule Take 2 g by mouth 2 (two) times daily.       ketoconazole (NIZORAL) 2 % shampoo       lansoprazole (PREVACID) 30 MG capsule       metoprolol tartrate (LOPRESSOR) 50 MG tablet TAKE ONE TABLET BY MOUTH TWO TIMES A  tablet 11    multivitamin capsule Take 1 capsule by mouth once daily.       NITROSTAT 0.4 mg SL tablet       pioglitazone (ACTOS) 30 MG tablet    3    quinapril (ACCUPRIL) 40 MG tablet   4    sertraline (ZOLOFT) 50 MG tablet   11    simvastatin (ZOCOR) 40 MG tablet TAKE ONE TABLET BY MOUTH EVERY EVENING FOR CHOLESTEROL 90 tablet 11    traMADol (ULTRAM) 50 mg tablet Take 1 tablet (50 mg total) by mouth every 6 (six) hours as needed. 30 tablet 0    XARELTO 20 mg Tab       levoFLOXacin (LEVAQUIN) 250 MG tablet Take 1 tablet (250 mg total) by mouth once daily. 10 tablet 0     No current facility-administered medications for this visit.        Family History:   Family History   Problem Relation Age of Onset    Heart disease Mother     Heart disease Father     Hyperlipidemia Sister     Hypertension Sister     Heart disease Brother     Heart disease Brother     Heart disease Brother     Heart disease Brother     Heart disease Brother        Social History:   Social History     Social History    Marital status:      Spouse name: N/A    Number of children: N/A    Years of education: N/A     Occupational History    Not on file.     Social History Main Topics    Smoking status: Never Smoker    Smokeless tobacco: Never Used    Alcohol use No    Drug use: No    Sexual activity: Not on file     Other Topics Concern    Not on file     Social History Narrative    No narrative on file       Review of Systems   Constitutional: Negative for chills.   HENT: Positive for postnasal drip and rhinorrhea.    Respiratory: Positive for cough.    Cardiovascular: Negative for chest pain.       Objective:     Vitals:    01/29/18 1320   BP: 124/78   Temp: 97.9 °F (36.6 °C)        Physical Exam   Constitutional: He appears well-developed and well-nourished. No distress.   HENT:   Head: Normocephalic and atraumatic.       Right Ear: Hearing, tympanic membrane, external ear and ear canal normal. No drainage, swelling or tenderness. No foreign bodies. Tympanic membrane is not injected, not scarred, not perforated, not erythematous, not retracted and not  bulging. Tympanic membrane mobility is normal. No middle ear effusion. No hemotympanum. No decreased hearing is noted.   Left Ear: Hearing, tympanic membrane, external ear and ear canal normal. No drainage, swelling or tenderness. No foreign bodies. Tympanic membrane is not injected, not scarred, not perforated, not erythematous, not retracted and not bulging. Tympanic membrane mobility is normal.  No middle ear effusion. No hemotympanum. No decreased hearing is noted.   Nose: Nose normal. No mucosal edema, rhinorrhea, nose lacerations, sinus tenderness, nasal deformity or septal deviation. Right sinus exhibits no maxillary sinus tenderness and no frontal sinus tenderness. Left sinus exhibits no maxillary sinus tenderness and no frontal sinus tenderness.   Mouth/Throat: Uvula is midline and oropharynx is clear and moist. Normal dentition. No oropharyngeal exudate, posterior oropharyngeal edema, posterior oropharyngeal erythema or tonsillar abscesses.   Eyes: Conjunctivae and EOM are normal. Pupils are equal, round, and reactive to light. Right eye exhibits no discharge. Left eye exhibits no discharge. No scleral icterus.   Neck: Normal range of motion. Neck supple. No thyromegaly present.   Cardiovascular: Normal rate, regular rhythm, normal heart sounds and intact distal pulses.  Exam reveals no gallop and no friction rub.    No murmur heard.  Pulmonary/Chest: Effort normal and breath sounds normal. No stridor. No respiratory distress. He has no wheezes. He has no rales. He exhibits no tenderness.   Lymphadenopathy:     He has no cervical adenopathy.   Skin: He is not diaphoretic.   Nursing note and vitals reviewed.      Assessment:       1. Post-concussion headache    2. Laceration of left side of forehead with complication, subsequent encounter        Plan:       Tono was seen today for bronchitis and suture / staple removal.    Diagnoses and all orders for this visit:    Post-concussion headache  -     CT  Head Without Contrast; Future    Laceration of left side of forehead with complication, subsequent encounter  -     SUTURE REMOVAL  -     levoFLOXacin (LEVAQUIN) 250 MG tablet; Take 1 tablet (250 mg total) by mouth once daily.         Follow-up in about 2 weeks (around 2/12/2018).

## 2018-01-29 NOTE — PROCEDURES
Suture Removal  Date/Time: 1/29/2018 1:39 PM  Performed by: ZACH NERI  Authorized by: ZACH NERI   Pre-operative diagnosis: hematoma  Post-operative diagnosis: hematoma  Description of findings: hematoma aspiration under local lidocaine. Aspirated approximately 3 cc of echo 5 blood from hematoma the respite is organized thrombus.   Wound Appearance: clean, well healed, nontender, no drainage, nonpurulent and erythematous  Sutures Removed: 3  Post-removal: dressing applied and Steri-Strips applied  Technical procedures used: Aspiration of hematoma, suture removal  Complications: No  Estimated blood loss (mL): 0  Specimens: No  Patient tolerance: Patient tolerated the procedure well with no immediate complications

## 2018-02-05 RX ORDER — LANSOPRAZOLE 30 MG/1
30 CAPSULE, DELAYED RELEASE ORAL DAILY
Qty: 90 CAPSULE | Refills: 3 | Status: SHIPPED | OUTPATIENT
Start: 2018-02-05 | End: 2019-02-14 | Stop reason: SDUPTHER

## 2018-02-05 RX ORDER — FENOFIBRATE 134 MG/1
134 CAPSULE ORAL
Qty: 90 CAPSULE | Refills: 3 | Status: SHIPPED | OUTPATIENT
Start: 2018-02-05 | End: 2018-12-21 | Stop reason: SDUPTHER

## 2018-02-09 ENCOUNTER — OFFICE VISIT (OUTPATIENT)
Dept: FAMILY MEDICINE | Facility: CLINIC | Age: 73
End: 2018-02-09
Payer: MEDICARE

## 2018-02-09 VITALS
RESPIRATION RATE: 16 BRPM | HEIGHT: 71 IN | BODY MASS INDEX: 28.14 KG/M2 | DIASTOLIC BLOOD PRESSURE: 74 MMHG | SYSTOLIC BLOOD PRESSURE: 128 MMHG | WEIGHT: 201 LBS | TEMPERATURE: 98 F | HEART RATE: 72 BPM

## 2018-02-09 DIAGNOSIS — S00.03XD HEMATOMA OF FRONTAL SCALP, SUBSEQUENT ENCOUNTER: ICD-10-CM

## 2018-02-09 DIAGNOSIS — H02.849: ICD-10-CM

## 2018-02-09 PROBLEM — S00.03XA HEMATOMA OF FRONTAL SCALP: Status: ACTIVE | Noted: 2018-02-09

## 2018-02-09 PROBLEM — T14.8XXA HEMATOMA: Status: ACTIVE | Noted: 2018-02-09

## 2018-02-09 PROCEDURE — 99213 OFFICE O/P EST LOW 20 MIN: CPT | Mod: ,,, | Performed by: FAMILY MEDICINE

## 2018-02-09 PROCEDURE — 1159F MED LIST DOCD IN RCRD: CPT | Mod: ,,, | Performed by: FAMILY MEDICINE

## 2018-02-09 NOTE — PROGRESS NOTES
Subjective:       Patient ID: Tono Saez is a 72 y.o. male.    Chief Complaint: Follow-up (hematoma )      This is a follow-up on his previous facial trauma lash head trauma. The discoloration is resolved he still has some of edematous fluid acute lesions under both lids and the crusade on the 4 head is still quite prominent. There's been no further draining mental status has returned to normal.        Allergies and Medications:   Review of patient's allergies indicates:   Allergen Reactions    Adhesive Other (See Comments)     SILK TAPE PULL SKIN OFF    Metformin Other (See Comments)     Weight loss cachexia anorexia,diarrhea.    Pcn [penicillins]      Current Outpatient Prescriptions   Medication Sig Dispense Refill    amlodipine (NORVASC) 5 MG tablet TAKE ONE TABLET EVERY DAY 90 tablet 11    fenofibrate micronized (LOFIBRA) 134 MG Cap Take 1 capsule (134 mg total) by mouth before breakfast. 90 capsule 3    fish oil-omega-3 fatty acids 300-1,000 mg capsule Take 2 g by mouth 2 (two) times daily.       ketoconazole (NIZORAL) 2 % shampoo       lansoprazole (PREVACID) 30 MG capsule Take 1 capsule (30 mg total) by mouth once daily. 90 capsule 3    metoprolol tartrate (LOPRESSOR) 50 MG tablet TAKE ONE TABLET BY MOUTH TWO TIMES A  tablet 11    multivitamin capsule Take 1 capsule by mouth once daily.       NITROSTAT 0.4 mg SL tablet       pioglitazone (ACTOS) 30 MG tablet   3    quinapril (ACCUPRIL) 40 MG tablet   4    sertraline (ZOLOFT) 50 MG tablet   11    simvastatin (ZOCOR) 40 MG tablet TAKE ONE TABLET BY MOUTH EVERY EVENING FOR CHOLESTEROL 90 tablet 11    traMADol (ULTRAM) 50 mg tablet Take 1 tablet (50 mg total) by mouth every 6 (six) hours as needed. 30 tablet 0    XARELTO 20 mg Tab        No current facility-administered medications for this visit.        Family History:   Family History   Problem Relation Age of Onset    Heart disease Mother     Heart disease Father      Hyperlipidemia Sister     Hypertension Sister     Heart disease Brother     Heart disease Brother     Heart disease Brother     Heart disease Brother     Heart disease Brother        Social History:   Social History     Social History    Marital status:      Spouse name: N/A    Number of children: N/A    Years of education: N/A     Occupational History    Not on file.     Social History Main Topics    Smoking status: Never Smoker    Smokeless tobacco: Never Used    Alcohol use No    Drug use: No    Sexual activity: Not on file     Other Topics Concern    Not on file     Social History Narrative    No narrative on file       Review of Systems    Objective:     Vitals:    02/09/18 1326   BP: 128/74   Pulse: 72   Resp: 16   Temp: 97.9 °F (36.6 °C)        Physical Exam   HENT:   Head:           Assessment:       1. Hematoma of frontal scalp, subsequent encounter    2. Edema eyelid, unspecified laterality        Plan:       Tono was seen today for follow-up.    Diagnoses and all orders for this visit:    Hematoma of frontal scalp, subsequent encounter    Edema eyelid, unspecified laterality         No Follow-up on file.

## 2018-04-18 NOTE — TELEPHONE ENCOUNTER
----- Message from Scott Staley MD sent at 1/18/2018  4:39 PM CST -----  hest x-ray is clear no infection.   Scribe Attestation (For Scribes USE Only)... Attending Attestation (For Attendings USE Only).../Scribe Attestation (For Scribes USE Only)...

## 2018-04-26 RX ORDER — SERTRALINE HYDROCHLORIDE 50 MG/1
TABLET, FILM COATED ORAL
Qty: 30 TABLET | Refills: 11 | Status: SHIPPED | OUTPATIENT
Start: 2018-04-26 | End: 2019-04-30 | Stop reason: SDUPTHER

## 2018-05-16 RX ORDER — PIOGLITAZONEHYDROCHLORIDE 30 MG/1
TABLET ORAL
Qty: 90 TABLET | Refills: 3 | Status: SHIPPED | OUTPATIENT
Start: 2018-05-16 | End: 2019-05-16 | Stop reason: SDUPTHER

## 2018-07-05 ENCOUNTER — OFFICE VISIT (OUTPATIENT)
Dept: FAMILY MEDICINE | Facility: CLINIC | Age: 73
End: 2018-07-05
Payer: MEDICARE

## 2018-07-05 VITALS
HEIGHT: 71 IN | WEIGHT: 202.5 LBS | OXYGEN SATURATION: 98 % | RESPIRATION RATE: 16 BRPM | DIASTOLIC BLOOD PRESSURE: 68 MMHG | SYSTOLIC BLOOD PRESSURE: 132 MMHG | HEART RATE: 60 BPM | BODY MASS INDEX: 28.35 KG/M2 | TEMPERATURE: 98 F

## 2018-07-05 DIAGNOSIS — E08.622 DIABETES MELLITUS DUE TO UNDERLYING CONDITION WITH OTHER SKIN ULCER (CODE): ICD-10-CM

## 2018-07-05 DIAGNOSIS — L57.0 ACTINIC KERATOSES: ICD-10-CM

## 2018-07-05 DIAGNOSIS — D71: ICD-10-CM

## 2018-07-05 DIAGNOSIS — E78.2 MIXED HYPERLIPIDEMIA: ICD-10-CM

## 2018-07-05 PROCEDURE — 99214 OFFICE O/P EST MOD 30 MIN: CPT | Mod: ,,, | Performed by: FAMILY MEDICINE

## 2018-07-05 RX ORDER — FINASTERIDE 5 MG/1
5 TABLET, FILM COATED ORAL DAILY
COMMUNITY
End: 2022-08-30 | Stop reason: SINTOL

## 2018-07-05 RX ORDER — FLUTICASONE PROPIONATE 50 MCG
1 SPRAY, SUSPENSION (ML) NASAL NIGHTLY
Qty: 1 BOTTLE | Refills: 3 | Status: SHIPPED | OUTPATIENT
Start: 2018-07-05 | End: 2019-03-14 | Stop reason: SDUPTHER

## 2018-07-05 RX ORDER — CHOLECALCIFEROL (VITAMIN D3) 25 MCG
1000 TABLET ORAL DAILY
COMMUNITY
End: 2023-09-21

## 2018-07-05 NOTE — PROGRESS NOTES
Subjective:       Patient ID: Tono Saez is a 72 y.o. male.    Chief Complaint: Sinusitis      Sinusitis   This is a chronic problem. The current episode started more than 1 year ago. The problem has been gradually worsening since onset. There has been no fever. The fever has been present for less than 1 day. He is experiencing no pain. Associated symptoms include congestion. Pertinent negatives include no chills, coughing, diaphoresis, ear pain, headaches, hoarse voice, neck pain, shortness of breath, sinus pressure, sneezing, sore throat or swollen glands. Past treatments include nothing (loratadine). The treatment provided no relief.       Allergies and Medications:   Review of patient's allergies indicates:   Allergen Reactions    Adhesive Other (See Comments)     SILK TAPE PULL SKIN OFF    Metformin Other (See Comments)     Weight loss cachexia anorexia,diarrhea.    Pcn [penicillins]      Current Outpatient Prescriptions   Medication Sig Dispense Refill    amlodipine (NORVASC) 5 MG tablet TAKE ONE TABLET EVERY DAY 90 tablet 11    fenofibrate micronized (LOFIBRA) 134 MG Cap Take 1 capsule (134 mg total) by mouth before breakfast. 90 capsule 3    finasteride (PROSCAR) 5 mg tablet Take 5 mg by mouth once daily.      ketoconazole (NIZORAL) 2 % shampoo       lansoprazole (PREVACID) 30 MG capsule Take 1 capsule (30 mg total) by mouth once daily. 90 capsule 3    magnesium chloride (SLOW-MAG ORAL) Take by mouth once daily.      metoprolol tartrate (LOPRESSOR) 50 MG tablet TAKE ONE TABLET BY MOUTH TWO TIMES A  tablet 11    multivitamin capsule Take 1 capsule by mouth once daily.       NITROSTAT 0.4 mg SL tablet       pioglitazone (ACTOS) 30 MG tablet TAKE ONE TABLET BY MOUTH ONCE DAILY 90 tablet 3    quinapril (ACCUPRIL) 40 MG tablet Take 40 mg by mouth once daily.   4    rivaroxaban (XARELTO) 15 mg Tab Take 15 mg by mouth once daily.      sertraline (ZOLOFT) 50 MG tablet TAKE ONE TABLET BY  MOUTH ONCE DAILY 30 tablet 11    simvastatin (ZOCOR) 40 MG tablet TAKE ONE TABLET BY MOUTH EVERY EVENING FOR CHOLESTEROL 90 tablet 11    traMADol (ULTRAM) 50 mg tablet Take 1 tablet (50 mg total) by mouth every 6 (six) hours as needed. 30 tablet 0    vitamin D 1000 units Tab Take 1,000 Units by mouth once daily.      fluticasone (FLONASE) 50 mcg/actuation nasal spray 1 spray (50 mcg total) by Each Nare route every evening. in each nostril 1 Bottle 3     No current facility-administered medications for this visit.        Family History:   Family History   Problem Relation Age of Onset    Heart disease Mother     Heart disease Father     Hyperlipidemia Sister     Hypertension Sister     Heart disease Brother     Heart disease Brother     Heart disease Brother     Heart disease Brother     Heart disease Brother        Social History:   Social History     Social History    Marital status:      Spouse name: N/A    Number of children: N/A    Years of education: N/A     Occupational History    Not on file.     Social History Main Topics    Smoking status: Never Smoker    Smokeless tobacco: Never Used    Alcohol use No    Drug use: No    Sexual activity: Not on file     Other Topics Concern    Not on file     Social History Narrative    No narrative on file       Review of Systems   Constitutional: Negative for chills and diaphoresis.   HENT: Positive for congestion. Negative for ear pain, hoarse voice, sinus pressure, sneezing and sore throat.    Respiratory: Negative for cough and shortness of breath.    Musculoskeletal: Negative for neck pain.   Neurological: Negative for headaches.       Objective:     Vitals:    07/05/18 1109   BP: 132/68   Pulse: 60   Resp: 16   Temp: 97.5 °F (36.4 °C)        Physical Exam   Constitutional: He appears well-developed and well-nourished. No distress.   HENT:   Head: Normocephalic and atraumatic.   Right Ear: Hearing, tympanic membrane, external ear and ear  canal normal. No lacerations. No drainage, swelling or tenderness. No foreign bodies. No mastoid tenderness. Tympanic membrane is not injected, not scarred, not perforated, not erythematous, not retracted and not bulging. Tympanic membrane mobility is normal. No middle ear effusion. No hemotympanum. No decreased hearing is noted.   Left Ear: Hearing, tympanic membrane, external ear and ear canal normal. No lacerations. No drainage, swelling or tenderness. No foreign bodies. No mastoid tenderness. Tympanic membrane is not injected, not scarred, not perforated, not erythematous, not retracted and not bulging. Tympanic membrane mobility is normal.  No middle ear effusion. No hemotympanum. No decreased hearing is noted.   Ears:    Nose: Rhinorrhea present. No mucosal edema, nose lacerations, sinus tenderness, nasal deformity, septal deviation or nasal septal hematoma. No epistaxis.  No foreign bodies. Right sinus exhibits no maxillary sinus tenderness and no frontal sinus tenderness. Left sinus exhibits no maxillary sinus tenderness and no frontal sinus tenderness.   Mouth/Throat: Uvula is midline and oropharynx is clear and moist. Normal dentition. No oropharyngeal exudate, posterior oropharyngeal edema, posterior oropharyngeal erythema or tonsillar abscesses.   Eyes: Conjunctivae and EOM are normal. Pupils are equal, round, and reactive to light. Right eye exhibits no discharge. Left eye exhibits no discharge. No scleral icterus.   Neck: Normal range of motion. Neck supple. No thyromegaly present.   Cardiovascular: Normal rate, regular rhythm, normal heart sounds and intact distal pulses.  Exam reveals no gallop and no friction rub.    No murmur heard.  Pulmonary/Chest: Effort normal and breath sounds normal. No stridor. No respiratory distress. He has no wheezes. He has no rales. He exhibits no tenderness.   Lymphadenopathy:     He has no cervical adenopathy.   Skin: He is not diaphoretic.   Nursing note and vitals  reviewed.      Assessment:       1. Chronic (childhood) granulomatous disease    2. Actinic keratoses    3. Mixed hyperlipidemia    4. Diabetes mellitus due to underlying condition with other skin ulcer (CODE)         Plan:       Tono was seen today for sinusitis.    Diagnoses and all orders for this visit:    Chronic (childhood) granulomatous disease  -     fluticasone (FLONASE) 50 mcg/actuation nasal spray; 1 spray (50 mcg total) by Each Nare route every evening. in each nostril  -     Ambulatory consult to ENT    Actinic keratoses  -     Ambulatory consult to ENT    Mixed hyperlipidemia  -     Hemoglobin A1c; Future  -     Hemoglobin A1c    Diabetes mellitus due to underlying condition with other skin ulcer (CODE)   -     Hemoglobin A1c; Future  -     Hemoglobin A1c         Follow-up in about 1 month (around 8/5/2018), or if symptoms worsen or fail to improve.

## 2018-07-06 LAB — HBA1C MFR BLD: 6.9 % (ref 3.1–6.5)

## 2018-08-09 ENCOUNTER — OFFICE VISIT (OUTPATIENT)
Dept: FAMILY MEDICINE | Facility: CLINIC | Age: 73
End: 2018-08-09
Payer: MEDICARE

## 2018-08-09 VITALS
WEIGHT: 202 LBS | HEART RATE: 64 BPM | RESPIRATION RATE: 20 BRPM | SYSTOLIC BLOOD PRESSURE: 132 MMHG | TEMPERATURE: 98 F | HEIGHT: 71 IN | BODY MASS INDEX: 28.28 KG/M2 | OXYGEN SATURATION: 97 % | DIASTOLIC BLOOD PRESSURE: 70 MMHG

## 2018-08-09 DIAGNOSIS — E11.9 TYPE 2 DIABETES MELLITUS WITHOUT COMPLICATION, WITHOUT LONG-TERM CURRENT USE OF INSULIN: Primary | ICD-10-CM

## 2018-08-09 DIAGNOSIS — J30.1 SEASONAL ALLERGIC RHINITIS DUE TO POLLEN: ICD-10-CM

## 2018-08-09 PROCEDURE — 99213 OFFICE O/P EST LOW 20 MIN: CPT | Mod: ,,, | Performed by: FAMILY MEDICINE

## 2018-08-09 RX ORDER — IPRATROPIUM BROMIDE 42 UG/1
SPRAY, METERED NASAL
Refills: 3 | COMMUNITY
Start: 2018-07-17 | End: 2019-03-26 | Stop reason: ALTCHOICE

## 2018-08-09 NOTE — PATIENT INSTRUCTIONS
Blood sugar testing:  Test BS on Monday 7am and 4pm, and Thursday 7am and 4pm. Keep in a blood sugar log or calendar book.

## 2018-08-09 NOTE — PROGRESS NOTES
Subjective:       Patient ID: Tono Saez is a 73 y.o. male.    Chief Complaint: Sinus Problem (congestion is not improving )      Here for follow-up on his sinus problems he did see Dr. harvey it was prescribed an Atrovent nasal inhaler which does help in the mornings.       Sinus Problem   This is a chronic problem. The current episode started more than 1 year ago. There has been no fever. Associated symptoms include congestion, coughing and shortness of breath. Pertinent negatives include no chills, diaphoresis, ear pain, headaches, hoarse voice, neck pain, sinus pressure, sneezing, sore throat or swollen glands. Past treatments include spray decongestants and saline sprays (steroid sprays). The treatment provided mild relief.   Diabetes   He presents for his follow-up diabetic visit. He has type 2 diabetes mellitus. Pertinent negatives for hypoglycemia include no headaches. Symptoms are stable. He is following a diabetic diet. He has not had a previous visit with a dietitian. He participates in exercise daily. His breakfast blood glucose range is generally 110-130 mg/dl. His lunch blood glucose range is generally 130-140 mg/dl. His overall blood glucose range is 130-140 mg/dl. An ACE inhibitor/angiotensin II receptor blocker is being taken. Eye exam is current.       Allergies and Medications:   Review of patient's allergies indicates:   Allergen Reactions    Adhesive Other (See Comments)     SILK TAPE PULL SKIN OFF    Metformin Other (See Comments)     Weight loss cachexia anorexia,diarrhea.    Pcn [penicillins]      Current Outpatient Prescriptions   Medication Sig Dispense Refill    amlodipine (NORVASC) 5 MG tablet TAKE ONE TABLET EVERY DAY 90 tablet 11    fenofibrate micronized (LOFIBRA) 134 MG Cap Take 1 capsule (134 mg total) by mouth before breakfast. 90 capsule 3    finasteride (PROSCAR) 5 mg tablet Take 5 mg by mouth once daily.      fluticasone (FLONASE) 50 mcg/actuation nasal spray 1 spray (50  mcg total) by Each Nare route every evening. in each nostril 1 Bottle 3    ipratropium (ATROVENT) 42 mcg (0.06 %) nasal spray   3    ketoconazole (NIZORAL) 2 % shampoo       lansoprazole (PREVACID) 30 MG capsule Take 1 capsule (30 mg total) by mouth once daily. 90 capsule 3    magnesium chloride (SLOW-MAG ORAL) Take by mouth once daily.      metoprolol tartrate (LOPRESSOR) 50 MG tablet TAKE ONE TABLET BY MOUTH TWO TIMES A  tablet 11    multivitamin capsule Take 1 capsule by mouth once daily.       NITROSTAT 0.4 mg SL tablet       pioglitazone (ACTOS) 30 MG tablet TAKE ONE TABLET BY MOUTH ONCE DAILY 90 tablet 3    quinapril (ACCUPRIL) 40 MG tablet Take 40 mg by mouth once daily.   4    rivaroxaban (XARELTO) 15 mg Tab Take 15 mg by mouth once daily.      sertraline (ZOLOFT) 50 MG tablet TAKE ONE TABLET BY MOUTH ONCE DAILY 30 tablet 11    simvastatin (ZOCOR) 40 MG tablet TAKE ONE TABLET BY MOUTH EVERY EVENING FOR CHOLESTEROL 90 tablet 11    traMADol (ULTRAM) 50 mg tablet Take 1 tablet (50 mg total) by mouth every 6 (six) hours as needed. 30 tablet 0    vitamin D 1000 units Tab Take 1,000 Units by mouth once daily.      blood sugar diagnostic Strp To check BG bid times daily, to use with insurance preferred meter 200 strip 3     No current facility-administered medications for this visit.        Family History:   Family History   Problem Relation Age of Onset    Heart disease Mother     Heart disease Father     Hyperlipidemia Sister     Hypertension Sister     Heart disease Brother     Heart disease Brother     Heart disease Brother     Heart disease Brother     Heart disease Brother        Social History:   Social History     Social History    Marital status:      Spouse name: N/A    Number of children: N/A    Years of education: N/A     Occupational History    Not on file.     Social History Main Topics    Smoking status: Never Smoker    Smokeless tobacco: Never Used     Alcohol use No    Drug use: No    Sexual activity: Not on file     Other Topics Concern    Not on file     Social History Narrative    No narrative on file       Review of Systems   Constitutional: Negative for chills and diaphoresis.   HENT: Positive for congestion. Negative for ear pain, hoarse voice, sinus pressure, sneezing and sore throat.    Respiratory: Positive for cough and shortness of breath.    Musculoskeletal: Negative for neck pain.   Neurological: Negative for headaches.       Objective:     Vitals:    08/09/18 1017   BP: 132/70   Pulse: 64   Resp: 20   Temp: 97.5 °F (36.4 °C)        Physical Exam   Constitutional: He appears well-developed and well-nourished. No distress.   HENT:   Head: Normocephalic and atraumatic.   Right Ear: Hearing, tympanic membrane, external ear and ear canal normal. No drainage, swelling or tenderness. No foreign bodies. Tympanic membrane is not injected, not scarred, not perforated, not erythematous, not retracted and not bulging. Tympanic membrane mobility is normal. No middle ear effusion. No hemotympanum. No decreased hearing is noted.   Left Ear: Hearing, tympanic membrane, external ear and ear canal normal. No drainage, swelling or tenderness. No foreign bodies. Tympanic membrane is not injected, not scarred, not perforated, not erythematous, not retracted and not bulging. Tympanic membrane mobility is normal.  No middle ear effusion. No hemotympanum. No decreased hearing is noted.   Nose: Mucosal edema and rhinorrhea present. No nose lacerations, sinus tenderness, nasal deformity or septal deviation. Right sinus exhibits no maxillary sinus tenderness and no frontal sinus tenderness. Left sinus exhibits no maxillary sinus tenderness and no frontal sinus tenderness.   Mouth/Throat: Uvula is midline and oropharynx is clear and moist. Normal dentition. No oropharyngeal exudate, posterior oropharyngeal edema, posterior oropharyngeal erythema or tonsillar abscesses.    Eyes: Conjunctivae and EOM are normal. Pupils are equal, round, and reactive to light. Right eye exhibits no discharge. Left eye exhibits no discharge. No scleral icterus.   Neck: Normal range of motion. Neck supple. No thyromegaly present.   Cardiovascular: Normal rate, regular rhythm, normal heart sounds and intact distal pulses.  Exam reveals no gallop and no friction rub.    No murmur heard.  Pulmonary/Chest: Effort normal and breath sounds normal. No stridor. No respiratory distress. He has no wheezes. He has no rales. He exhibits no tenderness.   Lymphadenopathy:     He has no cervical adenopathy.   Skin: He is not diaphoretic.   Nursing note and vitals reviewed.      Assessment:       1. Type 2 diabetes mellitus without complication, without long-term current use of insulin    2. Seasonal allergic rhinitis due to pollen        Plan:       Tono was seen today for sinus problem.    Diagnoses and all orders for this visit:    Type 2 diabetes mellitus without complication, without long-term current use of insulin  -     blood sugar diagnostic Strp; To check BG bid times daily, to use with insurance preferred meter    Seasonal allergic rhinitis due to pollen  -     Ambulatory referral/consult to Allergy         Follow-up in about 6 months (around 2/9/2019), or if symptoms worsen or fail to improve.

## 2018-08-28 ENCOUNTER — OFFICE VISIT (OUTPATIENT)
Dept: ALLERGY | Facility: CLINIC | Age: 73
End: 2018-08-28
Payer: MEDICARE

## 2018-08-28 VITALS
HEIGHT: 72 IN | BODY MASS INDEX: 27.36 KG/M2 | WEIGHT: 202 LBS | DIASTOLIC BLOOD PRESSURE: 70 MMHG | SYSTOLIC BLOOD PRESSURE: 128 MMHG

## 2018-08-28 DIAGNOSIS — I10 ESSENTIAL HYPERTENSION: ICD-10-CM

## 2018-08-28 DIAGNOSIS — J31.0 CHRONIC RHINITIS: Primary | ICD-10-CM

## 2018-08-28 PROCEDURE — 99203 OFFICE O/P NEW LOW 30 MIN: CPT | Mod: ,,, | Performed by: ALLERGY & IMMUNOLOGY

## 2018-08-28 NOTE — LETTER
August 29, 2018      Scott Staley MD  [address] [place]  [place] [address]           [facility] - Allergy  [address]  [address]  [place] [address]  Phone: [phone]  Fax: [phone]          Patient: Tono Saez   MR Number: [id number]   YOB: 1945   Date of Visit: 8/28/2018       Dear Dr. Scott Staley:    Thank you for referring Tono Saez to me for evaluation. Attached you will find relevant portions of my assessment and plan of care.    If you have questions, please do not hesitate to call me. I look forward to following Tono Saez along with you.    Sincerely,    Madison Villagomez MD    Enclosure  CC:  No Recipients    If you would like to receive this communication electronically, please contact [email or web address] or [phone] to request more information on [employer] Link access.    For providers and/or their staff who would like to refer a patient to [facility], please contact us through our one-stop-shop provider referral line, [facility] , at [phone].    If you feel you have received this communication in error or would no longer like to receive these types of communications, please e-mail [email or web address]

## 2018-08-28 NOTE — PATIENT INSTRUCTIONS
Nose:  Saline first- xlear , fish med , make your own. Arm and hammer simply saline.   Nasal sprays:after saline  atrovent- 2 sprays every 6 hours as needed.   flonase 1 spray per nare twice per day     Technique:  Head down  Aim up and out spray don't sniff    Lab work [facility]   For allergy test     Follow up in 4 weeks, sooner if needed.

## 2018-08-28 NOTE — PROGRESS NOTES
Subjective:       Patient ID: Tono Saez is a 73 y.o. male.    Chief Complaint: Allergies (post nasal drip since about March - tried nose spray and it helps for a few hours.  Zyrtec will help for about a half a day, but it never goes away completely. )    HPI     Pt presents as a consult from Dr. Staley for chronic rhinitis.     Onset: this spring  Sx: worst symptom is PND with cough, sneezing   Tx: flonase- 1 sen qam, atrovent - 2 sen bid, zyrtec prn, or any h1 prn, has not tried DEEPAK  Testing: never   Ct sinus: none. Has had ct of head due to falling in Jan. No mention of sinus status in report.      Atopic Hx    Oral allergy: none   Asthma: none   Latex: none   Eczema : none     Infection History    Pneumonia # in the past 12 months: none   Sinus infection # in the past 12 months: 1-2 courses- steroids help the most.   Otitis infection # in the past 12 months: none         Review of Systems      General: neg unexpected weight changes, fevers, chills, night sweats, malaise  HEENT: see hpi, Neg eye pain, vision changes, ear drainage, nose bleeds, throat tightness, sores in the mouth  CV: Neg chest pain, palpitations, swelling  Resp: see hpi, neg shortness of breath, hemoptysis, cough  GI: see hpi, neg dysphagia, night abdominal pain, reflux, chronic diarrhea, chronic constipation  Derm: See Hpi, neg new rash, neg flushing  Mu/sk: Neg joint pain, joint swelling   Psych: Neg anxiety  neuro: neg chronic headaches, muscle weakness  Endo: neg heat/cold intolerance, chronic fatigue    Objective:     Vitals:    08/28/18 0855   BP: 128/70   Weight: 91.6 kg (202 lb)   Height: 6' (1.829 m)        Physical Exam      General: no acute distress, well developed well nourished   HEENT:   Head:normocephalic atraumatic  Eyes: CARMELA, EOMI, Neg injection, scleral icterus, or conjunctival papillary hypertrophy.  Ears: tm clear bilaterally, normal canal  Nose: 2+ inferior turbinates pink, neg nasal polyps            Mucosa: very  dry             Septal irritation: none   OP: mucus membranes moist, - cobblestoning, - PND, neg erythema or lesions  Neck: supple, Full range of motion, neg lymphadenopathy  Chest: full respiratory excursion no abnormal chest abnormality  Resp: clear to ascultation bilaterally  CV: RRR, neg MRG, brisk capillary refill  Abdomen: BS+, non tender, non distended  Ext:  Neg clubbing, cyanosis, + pitting edema - missing first digit right hand.   Skin: xerosis   Lymph: neg supraclavicular, axillary     Assessment:       1. Chronic rhinitis    2. Essential hypertension        Plan:       Chronic rhinitis  -     Inhalant Panel; Future; Expected date: 08/28/2018    Essential hypertension    start rhinitis action plan  Continue fluticasone but increase 1 sen bid   Continue atrovent but increase q 6 prn 2 sen   Start saline   Consider ct sinus- unable to review image ct of brain due to archive.     Serum IgE inhalant at [facility]. Lab ordered today.     F/u in 4 weeks           Madison Villagomez M.D.  Allergy/Immunology  [facility]   [phone] phone  [phone] fax

## 2018-09-26 RX ORDER — SIMVASTATIN 40 MG/1
TABLET, FILM COATED ORAL
Qty: 90 TABLET | Refills: 12 | Status: SHIPPED | OUTPATIENT
Start: 2018-09-26 | End: 2020-02-20 | Stop reason: ALTCHOICE

## 2018-09-26 RX ORDER — METOPROLOL TARTRATE 50 MG/1
TABLET ORAL
Qty: 180 TABLET | Refills: 12 | Status: SHIPPED | OUTPATIENT
Start: 2018-09-26 | End: 2019-04-18

## 2018-09-26 RX ORDER — AMLODIPINE BESYLATE 5 MG/1
TABLET ORAL
Qty: 90 TABLET | Refills: 12 | Status: SHIPPED | OUTPATIENT
Start: 2018-09-26 | End: 2019-12-17 | Stop reason: SDUPTHER

## 2018-09-27 ENCOUNTER — OFFICE VISIT (OUTPATIENT)
Dept: ALLERGY | Facility: CLINIC | Age: 73
End: 2018-09-27
Payer: MEDICARE

## 2018-09-27 VITALS
DIASTOLIC BLOOD PRESSURE: 68 MMHG | WEIGHT: 203 LBS | SYSTOLIC BLOOD PRESSURE: 138 MMHG | BODY MASS INDEX: 27.5 KG/M2 | HEIGHT: 72 IN

## 2018-09-27 DIAGNOSIS — E11.9 TYPE 2 DIABETES MELLITUS WITHOUT COMPLICATION, WITHOUT LONG-TERM CURRENT USE OF INSULIN: ICD-10-CM

## 2018-09-27 DIAGNOSIS — J31.0 NON-ALLERGIC RHINITIS: Primary | ICD-10-CM

## 2018-09-27 DIAGNOSIS — I10 ESSENTIAL HYPERTENSION: ICD-10-CM

## 2018-09-27 PROCEDURE — 99213 OFFICE O/P EST LOW 20 MIN: CPT | Mod: ,,, | Performed by: ALLERGY & IMMUNOLOGY

## 2018-09-27 NOTE — PROGRESS NOTES
Subjective:       Patient ID: Tono Saez is a 73 y.o. male.    Chief Complaint: Allergic Rhinitis  (about the same - no change)    HPI     Pt presents for chronic rhinitis.     Condition:better with ipratropium, worse when not using   Onset: this spring  Sx: worst symptom is PND with cough, sneezing   Tx: flonase- 1 sen qam, atrovent - 2 sen bid, zyrtec prn, or any h1 prn, has not tried DEEPAK  Testing: serum IgE negative   Ct sinus: none. Has had ct of head due to falling in Jan. No mention of sinus status in report.      Atopic Hx    Oral allergy: none   Asthma: none   Latex: none   Eczema : none     Infection History    Pneumonia # in the past 12 months: none   Sinus infection # in the past 12 months: 1-2 courses- steroids help the most.   Otitis infection # in the past 12 months: none         Review of Systems      General: neg unexpected weight changes, fevers, chills, night sweats, malaise  HEENT: see hpi, Neg eye pain, vision changes, ear drainage, nose bleeds, throat tightness, sores in the mouth  CV: Neg chest pain, palpitations, swelling  Resp: see hpi, neg shortness of breath, hemoptysis, cough  GI: see hpi, neg dysphagia, night abdominal pain, reflux, chronic diarrhea, chronic constipation  Derm: See Hpi, neg new rash, neg flushing  Mu/sk: Neg joint pain, joint swelling   Psych: Neg anxiety  neuro: neg chronic headaches, muscle weakness  Endo: neg heat/cold intolerance, chronic fatigue    Objective:     Vitals:    09/27/18 0940   BP: 138/68   Weight: 92.1 kg (203 lb)   Height: 6' (1.829 m)        Physical Exam      General: no acute distress, well developed well nourished   HEENT:   Head:normocephalic atraumatic  Eyes: CARMELA, EOMI, Neg injection, scleral icterus, or conjunctival papillary hypertrophy.  Ears: tm clear bilaterally, normal canal  Nose: 2+ inferior turbinates pink, neg nasal polyps            Mucosa: mild dry             Septal irritation: none   OP: mucus membranes moist, - cobblestoning,  - PND, neg erythema or lesions  Neck: supple, Full range of motion, neg lymphadenopathy  Chest: full respiratory excursion no abnormal chest abnormality  Resp: clear to ascultation bilaterally  CV: RRR, neg MRG, brisk capillary refill  Abdomen: BS+, non tender, non distended  Ext:  Neg clubbing, cyanosis, + pitting edema - missing first digit right hand.   Skin: xerosis   Lymph: neg supraclavicular, axillary     Assessment:       1. Non-allergic rhinitis    2. Type 2 diabetes mellitus without complication, without long-term current use of insulin    3. Essential hypertension        Plan:       Non-allergic rhinitis    Type 2 diabetes mellitus without complication, without long-term current use of insulin    Essential hypertension    continue rhinitis action plan  Continue fluticasone but increase 1 sen bid   Continue atrovent but increase q 6 prn 2 sen   Continue saline   Consider ct sinus- unable to review image ct of brain due to archive.     Serum IgE inhalant at [facility]. Lab negative     F/u in 6 months, sooner if needed.           Madison Villagomez M.D.  Allergy/Immunology  [facility]   [phone] phone  [phone] fax

## 2018-09-27 NOTE — PATIENT INSTRUCTIONS
Nose:  Saline first  Then use ipratroium nasal spray 2 sprays per nare every 6 hours as needed for drip  May use vaseline afterwards for dryness       We will try to use the nasal spray to prevent large amounts of the drip.     Follow up in 6 months

## 2018-12-21 RX ORDER — FENOFIBRATE 134 MG/1
134 CAPSULE ORAL
Qty: 90 CAPSULE | Refills: 0 | Status: SHIPPED | OUTPATIENT
Start: 2018-12-21 | End: 2019-05-26 | Stop reason: SDUPTHER

## 2018-12-21 NOTE — TELEPHONE ENCOUNTER
Pharmacy faxed a refill request for Lofibra 134 mg capsule.    Patient was last seen in the office on 8/9/2018.

## 2019-02-13 ENCOUNTER — OFFICE VISIT (OUTPATIENT)
Dept: FAMILY MEDICINE | Facility: CLINIC | Age: 74
End: 2019-02-13
Payer: MEDICARE

## 2019-02-13 VITALS
BODY MASS INDEX: 27.63 KG/M2 | WEIGHT: 204 LBS | HEART RATE: 67 BPM | TEMPERATURE: 98 F | SYSTOLIC BLOOD PRESSURE: 110 MMHG | OXYGEN SATURATION: 95 % | DIASTOLIC BLOOD PRESSURE: 62 MMHG | HEIGHT: 72 IN

## 2019-02-13 DIAGNOSIS — Z23 IMMUNIZATION DUE: ICD-10-CM

## 2019-02-13 DIAGNOSIS — Z00.00 PREVENTATIVE HEALTH CARE: ICD-10-CM

## 2019-02-13 DIAGNOSIS — E11.65 TYPE 2 DIABETES MELLITUS WITH HYPERGLYCEMIA: ICD-10-CM

## 2019-02-13 DIAGNOSIS — E11.9 TYPE 2 DIABETES MELLITUS WITHOUT COMPLICATION, WITHOUT LONG-TERM CURRENT USE OF INSULIN: Primary | ICD-10-CM

## 2019-02-13 DIAGNOSIS — J01.40 SUBACUTE PANSINUSITIS: ICD-10-CM

## 2019-02-13 DIAGNOSIS — E78.2 MIXED HYPERLIPIDEMIA: ICD-10-CM

## 2019-02-13 PROBLEM — J32.4 PANSINUSITIS: Status: ACTIVE | Noted: 2019-02-13

## 2019-02-13 LAB — HBA1C MFR BLD: 6.4 %

## 2019-02-13 PROCEDURE — 83036 HEMOGLOBIN GLYCOSYLATED A1C: CPT | Mod: QW,,, | Performed by: FAMILY MEDICINE

## 2019-02-13 PROCEDURE — 99214 OFFICE O/P EST MOD 30 MIN: CPT | Mod: ,,, | Performed by: FAMILY MEDICINE

## 2019-02-13 PROCEDURE — 83036 POCT HEMOGLOBIN A1C: ICD-10-PCS | Mod: QW,,, | Performed by: FAMILY MEDICINE

## 2019-02-13 PROCEDURE — 99214 PR OFFICE/OUTPT VISIT, EST, LEVL IV, 30-39 MIN: ICD-10-PCS | Mod: ,,, | Performed by: FAMILY MEDICINE

## 2019-02-13 RX ORDER — TOLTERODINE 4 MG/1
4 CAPSULE, EXTENDED RELEASE ORAL DAILY
Status: ON HOLD | COMMUNITY
Start: 2019-01-17 | End: 2020-02-23 | Stop reason: HOSPADM

## 2019-02-13 RX ORDER — PROMETHAZINE HYDROCHLORIDE AND DEXTROMETHORPHAN HYDROBROMIDE 6.25; 15 MG/5ML; MG/5ML
5 SYRUP ORAL 4 TIMES DAILY
Qty: 200 ML | Refills: 0 | Status: SHIPPED | OUTPATIENT
Start: 2019-02-13 | End: 2019-02-23

## 2019-02-13 RX ORDER — TETANUS TOXOID, REDUCED DIPHTHERIA TOXOID AND ACELLULAR PERTUSSIS VACCINE, ADSORBED 5; 2.5; 8; 8; 2.5 [IU]/.5ML; [IU]/.5ML; UG/.5ML; UG/.5ML; UG/.5ML
SUSPENSION INTRAMUSCULAR
Refills: 0 | COMMUNITY
Start: 2019-01-18 | End: 2019-03-26 | Stop reason: ALTCHOICE

## 2019-02-13 RX ORDER — AZITHROMYCIN 250 MG/1
250 TABLET, FILM COATED ORAL DAILY
Qty: 6 TABLET | Refills: 0 | Status: SHIPPED | OUTPATIENT
Start: 2019-02-13 | End: 2019-02-19

## 2019-02-13 NOTE — PROGRESS NOTES
Subjective:       Patient ID: Tono Saez is a 73 y.o. male.    Chief Complaint: Diabetes and Nasal Congestion      Patient suffers with chronic recurrent rhinitis and sinusitis, 3 weeks has been yellow and discolored, previous to that was clear. Worse with humidity.      Diabetes   He presents for his follow-up diabetic visit. He has type 2 diabetes mellitus. His weight is stable. He is following a diabetic diet. His breakfast blood glucose range is generally 110-130 mg/dl. His dinner blood glucose range is generally >200 mg/dl.       Allergies and Medications:   Review of patient's allergies indicates:   Allergen Reactions    Adhesive Other (See Comments)     SILK TAPE PULL SKIN OFF    Metformin Other (See Comments)     Weight loss cachexia anorexia,diarrhea.    Pcn [penicillins]      Patient states he passed out from this medication when he was 10 years old.      Current Outpatient Medications   Medication Sig Dispense Refill    amLODIPine (NORVASC) 5 MG tablet TAKE ONE TABLET BY MOUTH ONCE DAILY 90 tablet 12    blood sugar diagnostic Strp To check BG bid times daily, to use with insurance preferred meter 200 strip 3    BOOSTRIX TDAP 2.5-8-5 Lf-mcg-Lf/0.5mL Syrg injection ADM 0.5ML IM UTD  0    fenofibrate micronized (LOFIBRA) 134 MG Cap Take 1 capsule (134 mg total) by mouth before breakfast. 90 capsule 0    finasteride (PROSCAR) 5 mg tablet Take 5 mg by mouth once daily.      flu vacc ts 2014-15, 65yr+,,PF, (FLUZONE) 180 mcg/0.5 mL Syrg Fluzone High-Dose 2014-15 (PF) 180 mcg/0.5 mL intramuscular syringe   INJECT 0.5 ML INTRAMUSCULARLY AS DIRECTED.      fluticasone (FLONASE) 50 mcg/actuation nasal spray 1 spray (50 mcg total) by Each Nare route every evening. in each nostril 1 Bottle 3    ipratropium (ATROVENT) 42 mcg (0.06 %) nasal spray   3    ketoconazole (NIZORAL) 2 % shampoo       lansoprazole (PREVACID) 30 MG capsule Take 1 capsule (30 mg total) by mouth once daily. 90 capsule 3     magnesium chloride (SLOW-MAG ORAL) Take by mouth once daily.      metoprolol tartrate (LOPRESSOR) 50 MG tablet TAKE ONE TABLET BY MOUTH TWO TIMES A  tablet 12    multivitamin capsule Take 1 capsule by mouth once daily.       NITROSTAT 0.4 mg SL tablet       pioglitazone (ACTOS) 30 MG tablet TAKE ONE TABLET BY MOUTH ONCE DAILY 90 tablet 3    pneumococcal vaccine (PNU-IMMUNE 23) 25 mcg/0.5 mL injection Pneumovax 23 25 mcg/0.5 mL injection solution   INJECT 0.5 ML INTRAMUSCULARLY AS DIRECTED.      quinapril (ACCUPRIL) 40 MG tablet Take 40 mg by mouth once daily.   4    rivaroxaban (XARELTO) 15 mg Tab Take 15 mg by mouth once daily.      sertraline (ZOLOFT) 50 MG tablet TAKE ONE TABLET BY MOUTH ONCE DAILY 30 tablet 11    simvastatin (ZOCOR) 40 MG tablet TAKE ONE TABLET BY MOUTH EVERY EVENING FOR CHOLESTEROL 90 tablet 12    tolterodine (DETROL LA) 4 MG 24 hr capsule       traMADol (ULTRAM) 50 mg tablet Take 1 tablet (50 mg total) by mouth every 6 (six) hours as needed. 30 tablet 0    vitamin D 1000 units Tab Take 1,000 Units by mouth once daily.      azithromycin (Z-GABRIELA) 250 MG tablet Take 1 tablet (250 mg total) by mouth once daily. po on day 1 then 1 tab po on days 2-5 for 6 doses 6 tablet 0    promethazine-dextromethorphan (PROMETHAZINE-DM) 6.25-15 mg/5 mL Syrp Take 5 mLs by mouth 4 (four) times daily. for 10 days 200 mL 0    varicella-zoster gE-AS01B, PF, (SHINGRIX, PF,) 50 mcg/0.5 mL injection Inject 0.5 mLs into the muscle once. for 1 dose 0.5 mL 0     No current facility-administered medications for this visit.        Family History:   Family History   Problem Relation Age of Onset    Heart disease Mother     Heart disease Father     Hyperlipidemia Sister     Hypertension Sister     Heart disease Brother     Heart disease Brother     Heart disease Brother     Heart disease Brother     Heart disease Brother        Social History:   Social History     Socioeconomic History    Marital  status:      Spouse name: Not on file    Number of children: Not on file    Years of education: Not on file    Highest education level: Not on file   Social Needs    Financial resource strain: Not on file    Food insecurity - worry: Not on file    Food insecurity - inability: Not on file    Transportation needs - medical: Not on file    Transportation needs - non-medical: Not on file   Occupational History    Not on file   Tobacco Use    Smoking status: Never Smoker    Smokeless tobacco: Never Used   Substance and Sexual Activity    Alcohol use: No    Drug use: No    Sexual activity: Not on file   Other Topics Concern    Not on file   Social History Narrative    Not on file       Review of Systems    Objective:     Vitals:    02/13/19 1345   BP: 110/62   Pulse: 67   Temp: 98.1 °F (36.7 °C)        Physical Exam   Constitutional: He is oriented to person, place, and time. He appears well-developed and well-nourished. No distress.   HENT:   Head: Normocephalic and atraumatic.   Right Ear: Hearing, tympanic membrane, external ear and ear canal normal. No drainage, swelling or tenderness. No foreign bodies. Tympanic membrane is not injected, not scarred, not perforated, not erythematous, not retracted and not bulging. Tympanic membrane mobility is normal. No middle ear effusion. No hemotympanum. No decreased hearing is noted.   Left Ear: Hearing, tympanic membrane, external ear and ear canal normal. No drainage, swelling or tenderness. No foreign bodies. Tympanic membrane is not injected, not scarred, not perforated, not erythematous, not retracted and not bulging. Tympanic membrane mobility is normal.  No middle ear effusion. No hemotympanum. No decreased hearing is noted.   Nose: Mucosal edema and rhinorrhea present. No nose lacerations, sinus tenderness, nasal deformity or septal deviation. Right sinus exhibits no maxillary sinus tenderness and no frontal sinus tenderness. Left sinus exhibits no  maxillary sinus tenderness and no frontal sinus tenderness.   Mouth/Throat: Uvula is midline and oropharynx is clear and moist. Normal dentition. No oropharyngeal exudate, posterior oropharyngeal edema, posterior oropharyngeal erythema or tonsillar abscesses.   Eyes: Conjunctivae and EOM are normal. Pupils are equal, round, and reactive to light. Right eye exhibits no discharge. Left eye exhibits no discharge. No scleral icterus.   Neck: Normal range of motion. Neck supple. No JVD present. No tracheal deviation present. No thyromegaly present.   Cardiovascular: Normal rate, regular rhythm, normal heart sounds and intact distal pulses. Exam reveals no gallop and no friction rub.   No murmur heard.  Pulses:       Dorsalis pedis pulses are 1+ on the right side, and 1+ on the left side.        Posterior tibial pulses are 1+ on the right side, and 1+ on the left side.   Pulmonary/Chest: Effort normal and breath sounds normal. No stridor. No respiratory distress. He has no wheezes. He has no rales. He exhibits no tenderness.   Abdominal: Soft. Bowel sounds are normal. He exhibits no distension and no mass. There is no tenderness. There is no rebound and no guarding. No hernia.   Genitourinary: Rectum normal, prostate normal and penis normal. Rectal exam shows guaiac negative stool. No penile tenderness.   Musculoskeletal: Normal range of motion. He exhibits no edema or tenderness.        Right foot: There is normal range of motion and no deformity.        Left foot: There is normal range of motion and no deformity.   Feet:   Right Foot:   Protective Sensation: 4 sites tested. 4 sites sensed.   Skin Integrity: Negative for ulcer, blister, skin breakdown, erythema, warmth, callus or dry skin.   Left Foot:   Protective Sensation: 4 sites tested. 4 sites sensed.   Skin Integrity: Negative for ulcer, blister, skin breakdown, erythema, warmth, callus or dry skin.   Lymphadenopathy:     He has no cervical adenopathy.    Neurological: He is alert and oriented to person, place, and time. He has normal reflexes. He displays normal reflexes. No cranial nerve deficit. He exhibits normal muscle tone. Coordination normal.   Skin: Skin is warm and dry. No rash noted. He is not diaphoretic. No erythema. No pallor.   Psychiatric: He has a normal mood and affect. His behavior is normal. Judgment and thought content normal.   Nursing note and vitals reviewed.      Assessment:       1. Type 2 diabetes mellitus without complication, without long-term current use of insulin    2. Mixed hyperlipidemia    3. Preventative health care    4. Immunization due    5. Type 2 diabetes mellitus with hyperglycemia     6. Subacute pansinusitis        Plan:       Tono was seen today for diabetes and nasal congestion.    Diagnoses and all orders for this visit:    Type 2 diabetes mellitus without complication, without long-term current use of insulin  -     POCT HEMOGLOBIN A1C    Mixed hyperlipidemia    Preventative health care  -     POCT HEMOGLOBIN A1C  -     Comprehensive metabolic panel; Future  -     Lipid panel; Future  -     Urinalysis; Future  -     CBC auto differential; Future  -     Comprehensive metabolic panel  -     Lipid panel  -     Urinalysis  -     CBC auto differential    Immunization due  -     varicella-zoster gE-AS01B, PF, (SHINGRIX, PF,) 50 mcg/0.5 mL injection; Inject 0.5 mLs into the muscle once. for 1 dose    Type 2 diabetes mellitus with hyperglycemia   -     Lipid panel; Future  -     Lipid panel    Subacute pansinusitis  -     promethazine-dextromethorphan (PROMETHAZINE-DM) 6.25-15 mg/5 mL Syrp; Take 5 mLs by mouth 4 (four) times daily. for 10 days  -     azithromycin (Z-GABRIELA) 250 MG tablet; Take 1 tablet (250 mg total) by mouth once daily. po on day 1 then 1 tab po on days 2-5 for 6 doses    Other orders  -     Cancel: POCT Lipid Panel         Follow-up in about 6 months (around 8/13/2019) for follow up diabetes.

## 2019-02-14 DIAGNOSIS — K21.9 GASTROESOPHAGEAL REFLUX DISEASE, ESOPHAGITIS PRESENCE NOT SPECIFIED: Primary | ICD-10-CM

## 2019-02-14 RX ORDER — LANSOPRAZOLE 30 MG/1
30 CAPSULE, DELAYED RELEASE ORAL DAILY
Qty: 90 CAPSULE | Refills: 3 | Status: SHIPPED | OUTPATIENT
Start: 2019-02-14 | End: 2019-02-18 | Stop reason: SDUPTHER

## 2019-02-18 DIAGNOSIS — K21.9 GASTROESOPHAGEAL REFLUX DISEASE, ESOPHAGITIS PRESENCE NOT SPECIFIED: ICD-10-CM

## 2019-02-18 RX ORDER — LANSOPRAZOLE 30 MG/1
30 CAPSULE, DELAYED RELEASE ORAL DAILY
Qty: 90 CAPSULE | Refills: 3 | Status: SHIPPED | OUTPATIENT
Start: 2019-02-18 | End: 2020-02-20 | Stop reason: ALTCHOICE

## 2019-02-18 RX ORDER — LANSOPRAZOLE 30 MG/1
30 CAPSULE, DELAYED RELEASE ORAL DAILY
Qty: 90 CAPSULE | Refills: 3 | Status: SHIPPED | OUTPATIENT
Start: 2019-02-18 | End: 2019-02-18 | Stop reason: SDUPTHER

## 2019-03-14 ENCOUNTER — CLINICAL SUPPORT (OUTPATIENT)
Dept: URGENT CARE | Facility: CLINIC | Age: 74
End: 2019-03-14
Payer: MEDICARE

## 2019-03-14 VITALS
HEART RATE: 64 BPM | DIASTOLIC BLOOD PRESSURE: 76 MMHG | WEIGHT: 202 LBS | OXYGEN SATURATION: 98 % | RESPIRATION RATE: 12 BRPM | SYSTOLIC BLOOD PRESSURE: 147 MMHG | TEMPERATURE: 98 F | BODY MASS INDEX: 27.4 KG/M2

## 2019-03-14 DIAGNOSIS — D71: ICD-10-CM

## 2019-03-14 DIAGNOSIS — H65.93 MIDDLE EAR EFFUSION, BILATERAL: ICD-10-CM

## 2019-03-14 DIAGNOSIS — J40 BRONCHITIS: Primary | ICD-10-CM

## 2019-03-14 PROCEDURE — 99204 OFFICE O/P NEW MOD 45 MIN: CPT | Mod: S$GLB,,, | Performed by: NURSE PRACTITIONER

## 2019-03-14 PROCEDURE — 99204 PR OFFICE/OUTPT VISIT, NEW, LEVL IV, 45-59 MIN: ICD-10-PCS | Mod: S$GLB,,, | Performed by: NURSE PRACTITIONER

## 2019-03-14 RX ORDER — CETIRIZINE HYDROCHLORIDE 10 MG/1
10 TABLET ORAL DAILY
Qty: 30 TABLET | Refills: 0 | Status: SHIPPED | OUTPATIENT
Start: 2019-03-14 | End: 2020-02-20 | Stop reason: DRUGHIGH

## 2019-03-14 RX ORDER — FLUTICASONE PROPIONATE 50 MCG
1 SPRAY, SUSPENSION (ML) NASAL NIGHTLY
Qty: 1 BOTTLE | Refills: 3 | Status: SHIPPED | OUTPATIENT
Start: 2019-03-14 | End: 2019-03-26 | Stop reason: ALTCHOICE

## 2019-03-14 NOTE — PROGRESS NOTES
Subjective:       Patient ID: Tono Saez is a 73 y.o. male.    Vitals:  weight is 91.6 kg (202 lb). His temperature is 97.5 °F (36.4 °C). His blood pressure is 147/76 (abnormal) and his pulse is 64. His respiration is 12 and oxygen saturation is 98%.     Chief Complaint: Cough    HPI  <OUCOOHADULT>    Objective:      Physical Exam    Assessment:       1. Bronchitis    2. Middle ear effusion, bilateral    3. Chronic (childhood) granulomatous disease        Plan:         Bronchitis    Middle ear effusion, bilateral    Chronic (childhood) granulomatous disease  -     fluticasone (FLONASE) 50 mcg/actuation nasal spray; 1 spray (50 mcg total) by Each Nare route every evening. in each nostril  Dispense: 1 Bottle; Refill: 3    Other orders  -     cetirizine (ZYRTEC) 10 MG tablet; Take 1 tablet (10 mg total) by mouth once daily.  Dispense: 30 tablet; Refill: 0  -     dexchlorphen-phenylephrine-DM (POLYTUSSIN DM) 1-5-10 mg/5 mL Syrp; Take 5 mLs by mouth every 4 (four) hours as needed.  Dispense: 120 mL; Refill: 0

## 2019-03-14 NOTE — PROGRESS NOTES
Subjective:       Patient ID: Tono Saez is a 73 y.o. male.    Vitals:  weight is 91.6 kg (202 lb). His temperature is 97.5 °F (36.4 °C). His blood pressure is 147/76 (abnormal) and his pulse is 64. His respiration is 12 and oxygen saturation is 98%.     Chief Complaint: Cough    Patient complains of dry, hacking cough for 2 days mostly at night. Robitussin resolves cough. Denies fever, shortness of breath, chest pain, sinus congestion, nausea, vomiting, diarrhea.       Cough   This is a new problem. The current episode started in the past 7 days. The problem has been unchanged. The cough is productive of brown sputum (at night). Pertinent negatives include no chest pain, chills, fever, headaches, myalgias, rash, sore throat or shortness of breath. Associated symptoms comments: On and off productive cough , worse at night . Nothing aggravates the symptoms. He has tried OTC cough suppressant for the symptoms. The treatment provided no relief.       Constitution: Negative for chills, fatigue and fever.   HENT: Negative for congestion and sore throat.    Neck: Negative for painful lymph nodes.   Cardiovascular: Negative for chest pain and leg swelling.   Eyes: Negative for double vision and blurred vision.   Respiratory: Positive for cough. Negative for sputum production and shortness of breath.    Gastrointestinal: Negative for nausea, vomiting and diarrhea.   Genitourinary: Negative for dysuria, frequency and urgency.   Musculoskeletal: Negative for joint pain, joint swelling, muscle cramps and muscle ache.   Skin: Negative for color change, pale and rash.   Allergic/Immunologic: Negative for seasonal allergies.   Neurological: Negative for dizziness, history of vertigo, light-headedness, passing out and headaches.   Hematologic/Lymphatic: Negative for swollen lymph nodes, easy bruising/bleeding and history of blood clots. Does not bruise/bleed easily.   Psychiatric/Behavioral: Negative for nervous/anxious, sleep  disturbance and depression. The patient is not nervous/anxious.        Objective:      Physical Exam   Constitutional: He is oriented to person, place, and time. He appears well-developed and well-nourished.  Non-toxic appearance. He does not have a sickly appearance. He does not appear ill.   HENT:   Head: Normocephalic.   Right Ear: External ear and ear canal normal. A middle ear effusion is present.   Left Ear: External ear and ear canal normal. A middle ear effusion is present.   Nose: Nose normal.   Mouth/Throat: Uvula is midline and oropharynx is clear and moist.   Eyes: Conjunctivae and EOM are normal. Pupils are equal, round, and reactive to light.   Neck: Normal range of motion and full passive range of motion without pain. Neck supple.   Cardiovascular: Normal rate, regular rhythm and normal heart sounds.   Pulmonary/Chest: Effort normal and breath sounds normal.   Abdominal: Soft. Normal appearance and bowel sounds are normal. There is no tenderness.   Musculoskeletal: Normal range of motion.   Lymphadenopathy:     He has no cervical adenopathy.   Neurological: He is alert and oriented to person, place, and time. GCS eye subscore is 4. GCS verbal subscore is 5. GCS motor subscore is 6.   Skin: Skin is warm, dry and intact. No rash noted.   Psychiatric: He has a normal mood and affect.       Assessment:       1. Bronchitis    2. Middle ear effusion, bilateral    3. Chronic (childhood) granulomatous disease        Plan:       Lungs clear throughout, sats 98% , and no respiratory distress.  I do not suspect pneumonia, pulmonary embolism or any other more serious condition requiring further treatment. Advised pt to return to clinic or go to ER for any worsening of symptoms. Based on my clinical evaluation, I do not appreciate any immediate, emergent, or life threatening condition or etiology that warrants additional workup today and feel that the patient can be discharged with close follow up care. Advised  patient to go to the ER for any shortness of breath, chest pain or fever.       Bronchitis    Middle ear effusion, bilateral    Chronic (childhood) granulomatous disease  -     fluticasone (FLONASE) 50 mcg/actuation nasal spray; 1 spray (50 mcg total) by Each Nare route every evening. in each nostril  Dispense: 1 Bottle; Refill: 3    Other orders  -     cetirizine (ZYRTEC) 10 MG tablet; Take 1 tablet (10 mg total) by mouth once daily.  Dispense: 30 tablet; Refill: 0  -     dexchlorphen-phenylephrine-DM (POLYTUSSIN DM) 1-5-10 mg/5 mL Syrp; Take 5 mLs by mouth every 4 (four) hours as needed.  Dispense: 120 mL; Refill: 0

## 2019-03-14 NOTE — PATIENT INSTRUCTIONS
What Is Acute Bronchitis?  Acute bronchitis is when the airways in your lungs (bronchial tubes) become red and swollen (inflamed). It is usually caused by a viral infection. But it can also occur because of a bacteria or allergen. Symptoms include a cough that produces yellow or greenish mucus and can last for days or sometimes weeks.  Inside healthy lungs    Air travels in and out of the lungs through the airways. The linings of these airways produce sticky mucus. This mucus traps particles that enter the lungs. Tiny structures called cilia then sweep the particles out of the airways.     Healthy airway: Airways are normally open. Air moves in and out easily.      Healthy cilia: Tiny, hairlike cilia sweep mucus and particles up and out of the airways.   Lungs with bronchitis  Bronchitis often occurs with a cold or the flu virus. The airways become inflamed (red and swollen). There is a deep hacking cough from the extra mucus. Other symptoms may include:  · Wheezing  · Chest discomfort  · Shortness of breath  · Mild fever  A second infection, this time due to bacteria, may then occur. And airways irritated by allergens or smoke are more likely to get infected.        Inflamed airway: Inflammation and extra mucus narrow the airway, causing shortness of breath.      Impaired cilia: Extra mucus impairs cilia, causing congestion and wheezing. Smoking makes the problem worse.   Making a diagnosis  A physical exam, health history, and certain tests help your healthcare provider make the diagnosis.  Health history  Your healthcare provider will ask you about your symptoms.  The exam  Your provider listens to your chest for signs of congestion. He or she may also check your ears, nose, and throat.  Possible tests  · A sputum test for bacteria. This requires a sample of mucus from your lungs.  · A nasal or throat swab. This tests to see if you have a bacterial infection.  · A chest X-ray. This is done if your healthcare  provider thinks you have pneumonia.  · Tests to check for an underlying condition. Other tests may be done to check for things such as allergies, asthma, or COPD (chronic obstructive pulmonary disease). You may need to see a specialist for more lung function testing.  Treating a cough  The main treatment for bronchitis is easing symptoms. Avoiding smoke, allergens, and other things that trigger coughing can often help. If the infection is bacterial, you may be given antibiotics. During the illness, it's important to get plenty of sleep. To ease symptoms:  · Dont smoke. Also avoid secondhand smoke.  · Use a humidifier. Or try breathing in steam from a hot shower. This may help loosen mucus.  · Drink a lot of water and juice. They can soothe the throat and may help thin mucus.  · Sit up or use extra pillows when in bed. This helps to lessen coughing and congestion.  · Ask your provider about using medicine. Ask about using cough medicine, pain and fever medicine, or a decongestant.  Antibiotics  Most cases of bronchitis are caused by cold or flu viruses. They dont need antibiotics to treat them, even if your mucus is thick and green or yellow. Antibiotics dont treat viral illness and antibiotics have not been shown to have any benefit in cases of acute bronchitis. Taking antibiotics when they are not needed increases your risk of getting an infection later that is antibiotic-resistant. Antibiotics can also cause severe cases of diarrhea that require other antibiotics to treat.  It is important that you accept your healthcare provider's opinion to not use antibiotics. Your provider will prescribe antibiotics if the infection is caused by bacteria. If they are prescribed:  · Take all of the medicine. Take the medicine until it is used up, even if symptoms have improved. If you dont, the bronchitis may come back.  · Take the medicines as directed. For instance, some medicines should be taken with food.  · Ask about  side effects. Ask your provider or pharmacist what side effects are common, and what to do about them.  Follow-up care  You should see your provider again in 2 to 3 weeks. By this time, symptoms should have improved. An infection that lasts longer may mean you have a more serious problem.  Prevention  · Avoid tobacco smoke. If you smoke, quit. Stay away from smoky places. Ask friends and family not to smoke around you, or in your home or car.  · Get checked for allergies.  · Ask your provider about getting a yearly flu shot. Also ask about pneumococcal or pneumonia shots.  · Wash your hands often. This helps reduce the chance of picking up viruses that cause colds and flu.  Call your healthcare provider if:  · Symptoms worsen, or you have new symptoms  · Breathing problems worsen or  become severe  · Symptoms dont get better within a week, or within 3 days of taking antibiotics   Date Last Reviewed: 2/1/2017  © 6142-3541 [employer]. [address] [place] [address]. All rights reserved. This information is not intended as a substitute for professional medical care. Always follow your healthcare professional's instructions.        Bronchitis, Viral (Adult)    You have a viral bronchitis. Bronchitis is inflammation and swelling of the lining of the lungs. This is often caused by an infection. Symptoms include a dry, hacking cough that is worse at night. The cough may bring up yellow-green mucus. You may also feel short of breath or wheeze. Other symptoms may include tiredness, chest discomfort, and chills.  Bronchitis that is caused by a virus is not treated with antibiotics. Instead, medicines may be given to help relieve symptoms. Symptoms can last up to 2 weeks, although the cough may last much longer.  This illness is contagious during the first few days and is spread through the air by coughing and sneezing, or by direct contact (touching the sick person and then touching your own eyes,  nose, or mouth).  Most viral illnesses resolve within 10 to 14 days with rest and simple home remedies, although they may sometimes last for several weeks.  Home care  · If symptoms are severe, rest at home for the first 2 to 3 days. When you go back to your usual activities, don't let yourself get too tired.  · Do not smoke. Also avoid being exposed to secondhand smoke.  · You may use over-the-counter medicine to control fever or pain, unless another pain medicine was prescribed. (Note: If you have chronic liver or kidney disease or have ever had a stomach ulcer or gastrointestinal bleeding, talk with your healthcare provider before using these medicines. Also talk to your provider if you are taking medicine to prevent blood clots.) Aspirin should never be given to anyone younger than 18 years of age who is ill with a viral infection or fever. It may cause severe liver or brain damage.  · Your appetite may be poor, so a light diet is fine. Avoid dehydration by drinking 6 to 8 glasses of fluids per day (such as water, soft drinks, sports drinks, juices, tea, or soup). Extra fluids will help loosen secretions in the nose and lungs.  · Over-the-counter cough, cold, and sore-throat medicines will not shorten the length of the illness, but they may help to reduce symptoms. (Note: Do not use decongestants if you have high blood pressure.)  Follow-up care  Follow up with your healthcare provider, or as advised. If you had an X-ray or ECG (electrocardiogram), a specialist will review it. You will be notified of any new findings that may affect your care.  Note: If you are age 65 or older, or if you have a chronic lung disease or condition that affects your immune system, or you smoke, talk to your healthcare provider about having pneumococcal vaccinations and a yearly influenza vaccination (flu shot).  When to seek medical advice  Call your healthcare provider right away if any of these occur:  · Fever of 100.4°F (38°C) or  higher  · Coughing up increased amounts of colored sputum  · Weakness, drowsiness, headache, facial pain, ear pain, or a stiff neck  Call 911, or get immediate medical care  Contact emergency services right away if any of these occur:  · Coughing up blood  · Worsening weakness, drowsiness, headache, or stiff neck  · Trouble breathing, wheezing, or pain with breathing  Date Last Reviewed: 9/13/2015  © 7457-5744 [employer]. [address] [place] [address]. All rights reserved. This information is not intended as a substitute for professional medical care. Always follow your healthcare professional's instructions.

## 2019-03-20 ENCOUNTER — CLINICAL SUPPORT (OUTPATIENT)
Dept: URGENT CARE | Facility: CLINIC | Age: 74
End: 2019-03-20
Payer: MEDICARE

## 2019-03-20 VITALS
RESPIRATION RATE: 16 BRPM | HEIGHT: 72 IN | BODY MASS INDEX: 27.14 KG/M2 | HEART RATE: 66 BPM | DIASTOLIC BLOOD PRESSURE: 77 MMHG | SYSTOLIC BLOOD PRESSURE: 133 MMHG | WEIGHT: 200.38 LBS | TEMPERATURE: 97 F | OXYGEN SATURATION: 99 %

## 2019-03-20 DIAGNOSIS — J40 BRONCHITIS: Primary | ICD-10-CM

## 2019-03-20 DIAGNOSIS — R05.9 COUGH: ICD-10-CM

## 2019-03-20 PROCEDURE — 71046 PR XRAY, CHEST, 2 VIEWS: ICD-10-PCS | Mod: S$GLB,,, | Performed by: NURSE PRACTITIONER

## 2019-03-20 PROCEDURE — 71046 X-RAY EXAM CHEST 2 VIEWS: CPT | Mod: S$GLB,,, | Performed by: NURSE PRACTITIONER

## 2019-03-20 PROCEDURE — 99214 OFFICE O/P EST MOD 30 MIN: CPT | Mod: 25,S$GLB,, | Performed by: NURSE PRACTITIONER

## 2019-03-20 PROCEDURE — 99214 PR OFFICE/OUTPT VISIT, EST, LEVL IV, 30-39 MIN: ICD-10-PCS | Mod: 25,S$GLB,, | Performed by: NURSE PRACTITIONER

## 2019-03-20 RX ORDER — DOXYCYCLINE 100 MG/1
100 CAPSULE ORAL 2 TIMES DAILY
Qty: 20 CAPSULE | Refills: 0 | Status: SHIPPED | OUTPATIENT
Start: 2019-03-20 | End: 2019-03-30

## 2019-03-20 RX ORDER — BROMPHENIRAMINE MALEATE, PSEUDOEPHEDRINE HYDROCHLORIDE, AND DEXTROMETHORPHAN HYDROBROMIDE 2; 30; 10 MG/5ML; MG/5ML; MG/5ML
5 SYRUP ORAL EVERY 4 HOURS PRN
Qty: 240 ML | Refills: 0 | Status: SHIPPED | OUTPATIENT
Start: 2019-03-20 | End: 2019-03-30

## 2019-03-20 RX ORDER — BENZONATATE 100 MG/1
100 CAPSULE ORAL 3 TIMES DAILY PRN
Qty: 30 CAPSULE | Refills: 1 | Status: SHIPPED | OUTPATIENT
Start: 2019-03-20 | End: 2019-03-26

## 2019-03-20 NOTE — PROGRESS NOTES
"Subjective:       Patient ID: Tono Saez is a 73 y.o. male.    Vitals:  height is 6' (1.829 m) and weight is 90.9 kg (200 lb 6.4 oz). His oral temperature is 97.4 °F (36.3 °C). His blood pressure is 133/77 and his pulse is 66. His respiration is 16 and oxygen saturation is 99%.     Chief Complaint: Cough    Pt states "this is just a follow-up visit from last Thursday 3-14-19. He was diagnosed with bronchitis and still not feeling any better.      Cough   This is a recurrent problem. The current episode started 1 to 4 weeks ago. The problem has been unchanged. The problem occurs every few minutes. The cough is productive of sputum. Associated symptoms include nasal congestion, postnasal drip and rhinorrhea. Pertinent negatives include no chest pain, chills, fever, headaches, myalgias, rash, sore throat or shortness of breath. Treatments tried: tried meds he was prescribed. The treatment provided no relief.       Constitution: Negative for chills, fatigue and fever.   HENT: Positive for congestion, postnasal drip and sinus pressure. Negative for sore throat.    Neck: Negative for painful lymph nodes.   Cardiovascular: Negative for chest pain and leg swelling.   Eyes: Negative for double vision and blurred vision.   Respiratory: Positive for cough. Negative for shortness of breath.    Gastrointestinal: Negative for nausea, vomiting and diarrhea.   Genitourinary: Negative for dysuria, frequency and urgency.   Musculoskeletal: Negative for joint pain, joint swelling, muscle cramps and muscle ache.   Skin: Negative for color change, pale and rash.   Allergic/Immunologic: Negative for seasonal allergies.   Neurological: Negative for dizziness, history of vertigo, light-headedness, passing out and headaches.   Hematologic/Lymphatic: Negative for swollen lymph nodes, easy bruising/bleeding and history of blood clots. Does not bruise/bleed easily.   Psychiatric/Behavioral: Negative for nervous/anxious, sleep disturbance " and depression. The patient is not nervous/anxious.        Objective:      Physical Exam   Constitutional: He is oriented to person, place, and time. He appears well-developed and well-nourished. He is cooperative.  Non-toxic appearance. He does not appear ill. No distress.   HENT:   Head: Normocephalic and atraumatic.   Right Ear: Hearing, tympanic membrane, external ear and ear canal normal.   Left Ear: Hearing, tympanic membrane, external ear and ear canal normal.   Nose: Nose normal. No mucosal edema, rhinorrhea or nasal deformity. No epistaxis. Right sinus exhibits no maxillary sinus tenderness and no frontal sinus tenderness. Left sinus exhibits no maxillary sinus tenderness and no frontal sinus tenderness.   Mouth/Throat: Uvula is midline, oropharynx is clear and moist and mucous membranes are normal. No trismus in the jaw. Normal dentition. No uvula swelling. No posterior oropharyngeal erythema.   Eyes: Conjunctivae and lids are normal. Right eye exhibits no discharge. Left eye exhibits no discharge. No scleral icterus.   Sclera clear bilat   Neck: Trachea normal, normal range of motion, full passive range of motion without pain and phonation normal. Neck supple.   Cardiovascular: Normal rate, regular rhythm, normal heart sounds, intact distal pulses and normal pulses.   Pulmonary/Chest: Effort normal and breath sounds normal. No respiratory distress.   Abdominal: Soft. Normal appearance and bowel sounds are normal. He exhibits no distension, no pulsatile midline mass and no mass. There is no tenderness.   Musculoskeletal: Normal range of motion. He exhibits no edema or deformity.   Neurological: He is alert and oriented to person, place, and time. He exhibits normal muscle tone. Coordination normal.   Skin: Skin is warm, dry and intact. He is not diaphoretic. No pallor.   Psychiatric: He has a normal mood and affect. His speech is normal and behavior is normal. Judgment and thought content normal. Cognition  and memory are normal.   Nursing note and vitals reviewed.      Assessment:       1. Bronchitis    2. Cough        Plan:         Bronchitis    Cough  -     X-Ray Chest PA And Lateral; Future; Expected date: 03/20/2019    Other orders  -     doxycycline (VIBRAMYCIN) 100 MG Cap; Take 1 capsule (100 mg total) by mouth 2 (two) times daily. for 10 days  Dispense: 20 capsule; Refill: 0  -     brompheniramine-pseudoeph-DM (BROMFED DM) 2-30-10 mg/5 mL Syrp; Take 5 mLs by mouth every 4 (four) hours as needed.  Dispense: 240 mL; Refill: 0  -     benzonatate (TESSALON PERLES) 100 MG capsule; Take 1 capsule (100 mg total) by mouth 3 (three) times daily as needed for Cough.  Dispense: 30 capsule; Refill: 1

## 2019-03-20 NOTE — PROGRESS NOTES
Subjective:       Patient ID: Tono Saez is a 73 y.o. male.    Vitals:  height is 6' (1.829 m) and weight is 90.9 kg (200 lb 6.4 oz). His oral temperature is 97.4 °F (36.3 °C). His blood pressure is 133/77 and his pulse is 66. His respiration is 16 and oxygen saturation is 99%.     Chief Complaint: Cough    HPI  <OUCOOHADULT>    Objective:      Physical Exam    Assessment:       No diagnosis found.    Plan:         There are no diagnoses linked to this encounter.

## 2019-03-26 ENCOUNTER — OFFICE VISIT (OUTPATIENT)
Dept: ALLERGY | Facility: CLINIC | Age: 74
End: 2019-03-26
Payer: MEDICARE

## 2019-03-26 VITALS
SYSTOLIC BLOOD PRESSURE: 130 MMHG | DIASTOLIC BLOOD PRESSURE: 70 MMHG | WEIGHT: 201 LBS | HEIGHT: 72 IN | BODY MASS INDEX: 27.22 KG/M2

## 2019-03-26 DIAGNOSIS — J31.0 NON-ALLERGIC RHINITIS: Primary | ICD-10-CM

## 2019-03-26 PROCEDURE — 99213 OFFICE O/P EST LOW 20 MIN: CPT | Mod: ,,, | Performed by: ALLERGY & IMMUNOLOGY

## 2019-03-26 PROCEDURE — 99213 PR OFFICE/OUTPT VISIT, EST, LEVL III, 20-29 MIN: ICD-10-PCS | Mod: ,,, | Performed by: ALLERGY & IMMUNOLOGY

## 2019-03-26 RX ORDER — CICLOPIROX 1 G/100ML
SHAMPOO TOPICAL
Refills: 11 | COMMUNITY
Start: 2019-03-22 | End: 2020-02-20

## 2019-03-26 RX ORDER — IPRATROPIUM BROMIDE 42 UG/1
2 SPRAY, METERED NASAL EVERY 6 HOURS PRN
Qty: 45 ML | Refills: 3 | Status: SHIPPED | OUTPATIENT
Start: 2019-03-26 | End: 2020-02-20

## 2019-03-26 NOTE — PROGRESS NOTES
Subjective:       Patient ID: Tono Saez is a 73 y.o. male.    Chief Complaint: Rhinitis (nose spray works)    HPI     Pt presents for chronic rhinitis.     Condition: better with ipratropium, worse when not using   Onset: this spring  Sx: rhinorrhea   Tx: not using ipratropium 2 sen q 10 hours.   Testing: serum IgE negative   Ct sinus: none. Has had ct of head due to falling in Jan. No mention of sinus status in report.      Atopic Hx    Oral allergy: none   Asthma: none   Latex: none   Eczema : none     Infection History    Pneumonia # in the past 12 months: none   Sinus infection # in the past 12 months: 1-2 courses- steroids help the most.   Otitis infection # in the past 12 months: none             General: neg unexpected weight changes, fevers, chills, night sweats, malaise  HEENT: see hpi, Neg eye pain, vision changes, ear drainage, nose bleeds, throat tightness, sores in the mouth  CV: Neg chest pain, palpitations, swelling  Resp: see hpi, neg shortness of breath, hemoptysis, cough  GI: see hpi, neg dysphagia, night abdominal pain, reflux, chronic diarrhea, chronic constipation  Derm: See Hpi, neg new rash, neg flushing  Mu/sk: Neg joint pain, joint swelling   Psych: Neg anxiety  neuro: neg chronic headaches, muscle weakness  Endo: neg heat/cold intolerance, chronic fatigue    Objective:     Vitals:    03/26/19 1010   BP: 130/70   Weight: 91.2 kg (201 lb)   Height: 6' (1.829 m)        Physical Exam      General: no acute distress, well developed well nourished   HEENT:   Head:normocephalic atraumatic  Eyes: CARMELA, EOMI, Neg injection, scleral icterus, or conjunctival papillary hypertrophy.  Ears: tm with fluid bubbles bilateral, normal canal  Nose: 2+ inferior turbinates pink, neg nasal polyps            Mucosa: mild dry             Septal irritation: none   OP: mucus membranes moist, - cobblestoning, - PND, neg erythema or lesions  Neck: supple, Full range of motion, neg lymphadenopathy  Chest: full  respiratory excursion no abnormal chest abnormality  Resp: clear to ascultation bilaterally  CV: RRR, holosystolic murmur, brisk capillary refill  Abdomen: BS+, non tender, non distended  Ext:  Neg clubbing, cyanosis   Skin: xerosis on hands   Lymph: neg supraclavicular, axillary     Assessment:       1. Non-allergic rhinitis        Plan:       Non-allergic rhinitis  -     ipratropium (ATROVENT) 42 mcg (0.06 %) nasal spray; 2 sprays by Nasal route every 6 (six) hours as needed for Rhinitis.  Dispense: 45 mL; Refill: 3    continue rhinitis action plan  Start saline bid   Ok to disContinue fluticasone and azelastine  Continue atrovent 0.06% q 6 prn 2 sen   Continue saline   Consider ct sinus      Serum IgE inhalant at [facility]. Lab negative     F/u in 12 months, sooner if needed.           Madison Villagomez M.D.  Allergy/Immunology  [facility]   [phone] phone  [phone] fax

## 2019-03-26 NOTE — PATIENT INSTRUCTIONS
Nose:  Add saline first and use twice per day   Then use 2 sprays of the ipratropium bromide nasal spray every 6 hours as needed for runny nose.     Follow up yearly.

## 2019-04-18 ENCOUNTER — OFFICE VISIT (OUTPATIENT)
Dept: FAMILY MEDICINE | Facility: CLINIC | Age: 74
End: 2019-04-18
Payer: MEDICARE

## 2019-04-18 VITALS
WEIGHT: 204.38 LBS | HEIGHT: 73 IN | DIASTOLIC BLOOD PRESSURE: 76 MMHG | HEART RATE: 69 BPM | OXYGEN SATURATION: 97 % | RESPIRATION RATE: 16 BRPM | BODY MASS INDEX: 27.09 KG/M2 | SYSTOLIC BLOOD PRESSURE: 140 MMHG | TEMPERATURE: 98 F

## 2019-04-18 DIAGNOSIS — I10 ESSENTIAL HYPERTENSION: ICD-10-CM

## 2019-04-18 DIAGNOSIS — K21.9 GASTROESOPHAGEAL REFLUX DISEASE, ESOPHAGITIS PRESENCE NOT SPECIFIED: ICD-10-CM

## 2019-04-18 DIAGNOSIS — Z23 IMMUNIZATION DUE: ICD-10-CM

## 2019-04-18 DIAGNOSIS — T17.908D CHRONIC PULMONARY ASPIRATION, SUBSEQUENT ENCOUNTER: Primary | ICD-10-CM

## 2019-04-18 PROCEDURE — 99213 PR OFFICE/OUTPT VISIT, EST, LEVL III, 20-29 MIN: ICD-10-PCS | Mod: ,,, | Performed by: FAMILY MEDICINE

## 2019-04-18 PROCEDURE — 99213 OFFICE O/P EST LOW 20 MIN: CPT | Mod: ,,, | Performed by: FAMILY MEDICINE

## 2019-04-18 RX ORDER — METOPROLOL TARTRATE 100 MG/1
100 TABLET ORAL 2 TIMES DAILY
Qty: 60 TABLET | Refills: 11 | Status: SHIPPED | OUTPATIENT
Start: 2019-04-18 | End: 2020-02-10

## 2019-04-18 RX ORDER — BENZONATATE 100 MG/1
100 CAPSULE ORAL 3 TIMES DAILY PRN
COMMUNITY
End: 2020-02-20 | Stop reason: ALTCHOICE

## 2019-04-18 NOTE — PROGRESS NOTES
Subjective:       Patient ID: Tono Saez is a 73 y.o. male.    Chief Complaint: Bronchitis      Patient has had a persistent bronchitis for 6 weeks he was seen in February with some sinus infection and congestion was given a Z-Carloz which did not have any impact on symptoms his continued cough productive.  A she was seen at urgent care 3 Sundays ago and given a course of amoxicillin.  Patient had a stroke approximate 3 years ago with a transient right and lower and upper catina-para cyst which has resolved did not have any a phase-year-old swallowing problems at that time.  She has a history of GERD but no formal diagnosis of aspiration    Cough   This is a new problem. Episode onset: 6 weeks. The problem has been unchanged. The cough is productive of purulent sputum. Associated symptoms include a sore throat. Pertinent negatives include no chest pain, chills, heartburn or sweats. Associated symptoms comments: hoarseness. Nothing aggravates the symptoms.       Allergies and Medications:   Review of patient's allergies indicates:   Allergen Reactions    Adhesive Other (See Comments)     SILK TAPE PULL SKIN OFF    Metformin Other (See Comments)     Weight loss cachexia anorexia,diarrhea.    Pcn [penicillins]      Patient states he passed out from this medication when he was 10 years old.      Current Outpatient Medications   Medication Sig Dispense Refill    amLODIPine (NORVASC) 5 MG tablet TAKE ONE TABLET BY MOUTH ONCE DAILY 90 tablet 12    benzonatate (TESSALON) 100 MG capsule Take 100 mg by mouth 3 (three) times daily as needed for Cough.      blood sugar diagnostic Strp To check BG bid times daily, to use with insurance preferred meter 200 strip 3    ciclopirox 1 % shampoo As directed  11    dexchlorphen-phenylephrine-DM (POLYTUSSIN DM) 1-5-10 mg/5 mL Syrp Take 5 mLs by mouth every 4 (four) hours as needed. 120 mL 0    fenofibrate micronized (LOFIBRA) 134 MG Cap Take 1 capsule (134 mg total) by mouth  before breakfast. 90 capsule 0    finasteride (PROSCAR) 5 mg tablet Take 5 mg by mouth once daily.      ipratropium (ATROVENT) 42 mcg (0.06 %) nasal spray 2 sprays by Nasal route every 6 (six) hours as needed for Rhinitis. 45 mL 3    lansoprazole (PREVACID) 30 MG capsule Take 1 capsule (30 mg total) by mouth once daily. 90 capsule 3    magnesium chloride (SLOW-MAG ORAL) Take by mouth once daily.      multivitamin capsule Take 1 capsule by mouth once daily.       pioglitazone (ACTOS) 30 MG tablet TAKE ONE TABLET BY MOUTH ONCE DAILY 90 tablet 3    quinapril (ACCUPRIL) 40 MG tablet Take 40 mg by mouth once daily.   4    rivaroxaban (XARELTO) 15 mg Tab Take 15 mg by mouth once daily.      sertraline (ZOLOFT) 50 MG tablet TAKE ONE TABLET BY MOUTH ONCE DAILY 30 tablet 11    simvastatin (ZOCOR) 40 MG tablet TAKE ONE TABLET BY MOUTH EVERY EVENING FOR CHOLESTEROL 90 tablet 12    tolterodine (DETROL LA) 4 MG 24 hr capsule Take 4 mg by mouth once daily.       vitamin D 1000 units Tab Take 1,000 Units by mouth once daily.      cetirizine (ZYRTEC) 10 MG tablet Take 1 tablet (10 mg total) by mouth once daily. 30 tablet 0    metoprolol tartrate (LOPRESSOR) 100 MG tablet Take 1 tablet (100 mg total) by mouth 2 (two) times daily. 60 tablet 11    varicella-zoster gE-AS01B, PF, (SHINGRIX, PF,) 50 mcg/0.5 mL injection Inject 0.5 mLs into the muscle once. for 1 dose 0.5 mL 0     No current facility-administered medications for this visit.        Family History:   Family History   Problem Relation Age of Onset    Heart disease Mother     Heart disease Father     Hyperlipidemia Sister     Hypertension Sister     Heart disease Brother     Heart disease Brother     Heart disease Brother     Heart disease Brother     Heart disease Brother        Social History:   Social History     Socioeconomic History    Marital status:      Spouse name: Not on file    Number of children: Not on file    Years of education:  Not on file    Highest education level: Not on file   Occupational History    Not on file   Social Needs    Financial resource strain: Not on file    Food insecurity:     Worry: Not on file     Inability: Not on file    Transportation needs:     Medical: Not on file     Non-medical: Not on file   Tobacco Use    Smoking status: Never Smoker    Smokeless tobacco: Never Used   Substance and Sexual Activity    Alcohol use: No    Drug use: No    Sexual activity: Not Currently   Lifestyle    Physical activity:     Days per week: Not on file     Minutes per session: Not on file    Stress: Not on file   Relationships    Social connections:     Talks on phone: Not on file     Gets together: Not on file     Attends Ep[employer] service: Not on file     Active member of club or organization: Not on file     Attends meetings of clubs or organizations: Not on file     Relationship status: Not on file   Other Topics Concern    Not on file   Social History Narrative    Not on file       Review of Systems   Constitutional: Negative for chills.   HENT: Positive for sore throat.    Respiratory: Positive for cough.    Cardiovascular: Negative for chest pain.   Gastrointestinal: Negative for heartburn.       Objective:     Vitals:    04/18/19 1018   BP: (!) 140/76   Pulse:    Resp:    Temp:         Physical Exam   Constitutional: He appears well-developed and well-nourished. No distress.   HENT:   Head: Normocephalic.   Neck: Normal range of motion. Neck supple. No tracheal deviation present. No thyromegaly present.   Cardiovascular: Normal rate, regular rhythm, normal heart sounds and intact distal pulses. Exam reveals no friction rub.   No murmur heard.  Pulmonary/Chest: Effort normal and breath sounds normal. No stridor. No respiratory distress. He has no wheezes. He has no rales. He exhibits no tenderness.   Skin: He is not diaphoretic.   Nursing note and vitals reviewed.      Assessment:       1. Chronic pulmonary  aspiration, subsequent encounter    2. Gastroesophageal reflux disease, esophagitis presence not specified    3. Essential hypertension poor control with elevated systolics will be changing his Norvasc to a higher dose of Lopressor to eliminate its aspect on the reflux        Plan:       Tono was seen today for bronchitis.    Diagnoses and all orders for this visit:    Chronic pulmonary aspiration, subsequent encounter  -     X-Ray Chest PA And Lateral; Future  -     Ambulatory referral to Gastroenterology    Gastroesophageal reflux disease, esophagitis presence not specified    Essential hypertension  -     metoprolol tartrate (LOPRESSOR) 100 MG tablet; Take 1 tablet (100 mg total) by mouth 2 (two) times daily.    Immunization due  -     varicella-zoster gE-AS01B, PF, (SHINGRIX, PF,) 50 mcg/0.5 mL injection; Inject 0.5 mLs into the muscle once. for 1 dose         Follow up in about 1 month (around 5/18/2019) for follow up HTN, tonic aspiration.

## 2019-04-30 RX ORDER — SERTRALINE HYDROCHLORIDE 50 MG/1
TABLET, FILM COATED ORAL
Qty: 30 TABLET | Refills: 11 | Status: SHIPPED | OUTPATIENT
Start: 2019-04-30 | End: 2020-02-20 | Stop reason: DRUGHIGH

## 2019-05-16 RX ORDER — PIOGLITAZONEHYDROCHLORIDE 30 MG/1
TABLET ORAL
Qty: 90 TABLET | Refills: 3 | Status: SHIPPED | OUTPATIENT
Start: 2019-05-16 | End: 2020-02-20 | Stop reason: DRUGHIGH

## 2019-05-20 PROBLEM — Z00.00 PREVENTATIVE HEALTH CARE: Status: RESOLVED | Noted: 2019-02-13 | Resolved: 2019-05-20

## 2019-05-26 DIAGNOSIS — E78.2 MIXED HYPERLIPIDEMIA: Primary | ICD-10-CM

## 2019-05-26 DIAGNOSIS — I10 ESSENTIAL HYPERTENSION: ICD-10-CM

## 2019-05-27 RX ORDER — FENOFIBRATE 134 MG/1
CAPSULE ORAL
Qty: 90 CAPSULE | Refills: 0 | Status: SHIPPED | OUTPATIENT
Start: 2019-05-27 | End: 2019-08-30 | Stop reason: SDUPTHER

## 2019-08-21 ENCOUNTER — TELEPHONE (OUTPATIENT)
Dept: FAMILY MEDICINE | Facility: CLINIC | Age: 74
End: 2019-08-21

## 2019-08-21 ENCOUNTER — LAB VISIT (OUTPATIENT)
Dept: LAB | Facility: HOSPITAL | Age: 74
End: 2019-08-21
Attending: FAMILY MEDICINE
Payer: MEDICARE

## 2019-08-21 DIAGNOSIS — Z00.00 ROUTINE GENERAL MEDICAL EXAMINATION AT A HEALTH CARE FACILITY: Primary | ICD-10-CM

## 2019-08-21 LAB
ALBUMIN SERPL BCP-MCNC: 3.8 G/DL (ref 3.5–5.2)
ALP SERPL-CCNC: 31 U/L (ref 55–135)
ALT SERPL W/O P-5'-P-CCNC: 16 U/L (ref 10–44)
ANION GAP SERPL CALC-SCNC: 6 MMOL/L (ref 8–16)
AST SERPL-CCNC: 24 U/L (ref 10–40)
BASOPHILS # BLD AUTO: 0.03 K/UL (ref 0–0.2)
BASOPHILS NFR BLD: 0.7 % (ref 0–1.9)
BILIRUB SERPL-MCNC: 0.8 MG/DL (ref 0.1–1)
BILIRUB UR QL STRIP: NEGATIVE
BUN SERPL-MCNC: 33 MG/DL (ref 8–23)
CALCIUM SERPL-MCNC: 9.9 MG/DL (ref 8.7–10.5)
CHLORIDE SERPL-SCNC: 105 MMOL/L (ref 95–110)
CHOLEST SERPL-MCNC: 122 MG/DL (ref 120–199)
CHOLEST/HDLC SERPL: 3.5 {RATIO} (ref 2–5)
CLARITY UR: CLEAR
CO2 SERPL-SCNC: 27 MMOL/L (ref 23–29)
COLOR UR: YELLOW
CREAT SERPL-MCNC: 1.4 MG/DL (ref 0.5–1.4)
DIFFERENTIAL METHOD: ABNORMAL
EOSINOPHIL # BLD AUTO: 0.1 K/UL (ref 0–0.5)
EOSINOPHIL NFR BLD: 2.1 % (ref 0–8)
ERYTHROCYTE [DISTWIDTH] IN BLOOD BY AUTOMATED COUNT: 13 % (ref 11.5–14.5)
EST. GFR  (AFRICAN AMERICAN): 56 ML/MIN/1.73 M^2
EST. GFR  (NON AFRICAN AMERICAN): 48.5 ML/MIN/1.73 M^2
GLUCOSE SERPL-MCNC: 111 MG/DL (ref 70–110)
GLUCOSE UR QL STRIP: NEGATIVE
HCT VFR BLD AUTO: 40.1 % (ref 40–54)
HDLC SERPL-MCNC: 35 MG/DL (ref 40–75)
HDLC SERPL: 28.7 % (ref 20–50)
HGB BLD-MCNC: 13 G/DL (ref 14–18)
HGB UR QL STRIP: NEGATIVE
IMM GRANULOCYTES # BLD AUTO: 0.01 K/UL (ref 0–0.04)
IMM GRANULOCYTES NFR BLD AUTO: 0.2 % (ref 0–0.5)
KETONES UR QL STRIP: NEGATIVE
LDLC SERPL CALC-MCNC: 69 MG/DL (ref 63–159)
LEUKOCYTE ESTERASE UR QL STRIP: NEGATIVE
LYMPHOCYTES # BLD AUTO: 1.2 K/UL (ref 1–4.8)
LYMPHOCYTES NFR BLD: 27.3 % (ref 18–48)
MCH RBC QN AUTO: 31.4 PG (ref 27–31)
MCHC RBC AUTO-ENTMCNC: 32.4 G/DL (ref 32–36)
MCV RBC AUTO: 97 FL (ref 82–98)
MONOCYTES # BLD AUTO: 0.5 K/UL (ref 0.3–1)
MONOCYTES NFR BLD: 11.2 % (ref 4–15)
NEUTROPHILS # BLD AUTO: 2.5 K/UL (ref 1.8–7.7)
NEUTROPHILS NFR BLD: 58.5 % (ref 38–73)
NITRITE UR QL STRIP: NEGATIVE
NONHDLC SERPL-MCNC: 87 MG/DL
NRBC BLD-RTO: 0 /100 WBC
PH UR STRIP: 7 [PH] (ref 5–8)
PLATELET # BLD AUTO: 141 K/UL (ref 150–350)
PMV BLD AUTO: 10.8 FL (ref 9.2–12.9)
POTASSIUM SERPL-SCNC: 4.1 MMOL/L (ref 3.5–5.1)
PROT SERPL-MCNC: 6.7 G/DL (ref 6–8.4)
PROT UR QL STRIP: NEGATIVE
RBC # BLD AUTO: 4.14 M/UL (ref 4.6–6.2)
SODIUM SERPL-SCNC: 138 MMOL/L (ref 136–145)
SP GR UR STRIP: 1.01 (ref 1–1.03)
TRIGL SERPL-MCNC: 90 MG/DL (ref 30–150)
URN SPEC COLLECT METH UR: NORMAL
UROBILINOGEN UR STRIP-ACNC: NEGATIVE EU/DL
WBC # BLD AUTO: 4.29 K/UL (ref 3.9–12.7)

## 2019-08-21 PROCEDURE — 85025 COMPLETE CBC W/AUTO DIFF WBC: CPT

## 2019-08-21 PROCEDURE — 80061 LIPID PANEL: CPT

## 2019-08-21 PROCEDURE — 80053 COMPREHEN METABOLIC PANEL: CPT

## 2019-08-21 PROCEDURE — 36415 COLL VENOUS BLD VENIPUNCTURE: CPT

## 2019-08-21 PROCEDURE — 81003 URINALYSIS AUTO W/O SCOPE: CPT

## 2019-08-21 NOTE — TELEPHONE ENCOUNTER
----- Message from PANKAJ Berrios sent at 8/21/2019  3:53 PM CDT -----  Please notify pt glucose a little elevated, slightly anemic, and kidney function a little decreased- follow up as planned with Dr. Staley

## 2019-08-21 NOTE — PROGRESS NOTES
Please notify pt glucose a little elevated, slightly anemic, and kidney function a little decreased- follow up as planned with Dr. Staley

## 2019-08-27 ENCOUNTER — HOSPITAL ENCOUNTER (OUTPATIENT)
Dept: RADIOLOGY | Facility: HOSPITAL | Age: 74
Discharge: HOME | End: 2019-08-27
Attending: FAMILY MEDICINE
Payer: MEDICARE

## 2019-08-27 ENCOUNTER — OFFICE VISIT (OUTPATIENT)
Dept: FAMILY MEDICINE | Facility: CLINIC | Age: 74
End: 2019-08-27
Payer: MEDICARE

## 2019-08-27 VITALS
HEIGHT: 73 IN | HEART RATE: 68 BPM | DIASTOLIC BLOOD PRESSURE: 80 MMHG | WEIGHT: 201.63 LBS | BODY MASS INDEX: 26.72 KG/M2 | OXYGEN SATURATION: 99 % | SYSTOLIC BLOOD PRESSURE: 140 MMHG | TEMPERATURE: 98 F

## 2019-08-27 DIAGNOSIS — M54.50 LOW BACK PAIN, NON-SPECIFIC: ICD-10-CM

## 2019-08-27 DIAGNOSIS — R54 AGE-RELATED PHYSICAL DEBILITY: ICD-10-CM

## 2019-08-27 DIAGNOSIS — E11.9 TYPE 2 DIABETES MELLITUS WITHOUT COMPLICATION, WITHOUT LONG-TERM CURRENT USE OF INSULIN: Primary | ICD-10-CM

## 2019-08-27 DIAGNOSIS — D64.9 ANEMIA, UNSPECIFIED TYPE: ICD-10-CM

## 2019-08-27 DIAGNOSIS — J31.0 NON-ALLERGIC RHINITIS: ICD-10-CM

## 2019-08-27 DIAGNOSIS — N18.1 CRI (CHRONIC RENAL INSUFFICIENCY), STAGE 1: ICD-10-CM

## 2019-08-27 PROCEDURE — 99999 PR PBB SHADOW E&M-EST. PATIENT-LVL V: ICD-10-PCS | Mod: PBBFAC,,, | Performed by: FAMILY MEDICINE

## 2019-08-27 PROCEDURE — 72100 X-RAY EXAM L-S SPINE 2/3 VWS: CPT | Mod: TC,PO

## 2019-08-27 PROCEDURE — 99999 PR PBB SHADOW E&M-EST. PATIENT-LVL V: CPT | Mod: PBBFAC,,, | Performed by: FAMILY MEDICINE

## 2019-08-27 PROCEDURE — 99212 OFFICE O/P EST SF 10 MIN: CPT | Mod: S$PBB,,, | Performed by: FAMILY MEDICINE

## 2019-08-27 PROCEDURE — 99215 OFFICE O/P EST HI 40 MIN: CPT | Mod: PBBFAC,25 | Performed by: FAMILY MEDICINE

## 2019-08-27 PROCEDURE — 99212 PR OFFICE/OUTPT VISIT, EST, LEVL II, 10-19 MIN: ICD-10-PCS | Mod: S$PBB,,, | Performed by: FAMILY MEDICINE

## 2019-08-27 NOTE — PROGRESS NOTES
Subjective:       Patient ID: Tono Saez is a 74 y.o. male.    Chief Complaint: Diabetes (6 months follow up) and Anemia (discuss labs )      Patient comes in for follow-up on diabetes.  He reports that he is having fatigue and also is concerned about low grade anemia his hemoglobin was 13.0 previously 14.0.   Lab Results       Component                Value               Date                       HGBA1C                   6.4                 02/13/2019             expressed concerns to him about his BUN and creatinine creeping up they have stabilized and if not improved slightly .    Diabetes   He presents for his follow-up diabetic visit. He has type 2 diabetes mellitus. There is no change in his home blood glucose trend. His breakfast blood glucose range is generally  mg/dl.   Back Pain   This is a chronic (3 months) problem. The problem occurs constantly. The problem has been gradually worsening since onset. The pain is at a severity of 7/10. The pain is moderate. The pain is worse during the day.       Allergies and Medications:   Review of patient's allergies indicates:   Allergen Reactions    Adhesive Other (See Comments)     SILK TAPE PULL SKIN OFF    Metformin Other (See Comments)     Weight loss cachexia anorexia,diarrhea.    Pcn [penicillins]      Patient states he passed out from this medication when he was 10 years old.      Current Outpatient Medications   Medication Sig Dispense Refill    amLODIPine (NORVASC) 5 MG tablet TAKE ONE TABLET BY MOUTH ONCE DAILY 90 tablet 12    benzonatate (TESSALON) 100 MG capsule Take 100 mg by mouth 3 (three) times daily as needed for Cough.      blood sugar diagnostic Strp To check BG bid times daily, to use with insurance preferred meter 200 strip 3    cetirizine (ZYRTEC) 10 MG tablet Take 1 tablet (10 mg total) by mouth once daily. 30 tablet 0    ciclopirox 1 % shampoo As directed  11    dexchlorphen-phenylephrine-DM (POLYTUSSIN DM)  1-5-10 mg/5 mL Syrp Take 5 mLs by mouth every 4 (four) hours as needed. 120 mL 0    fenofibrate micronized (LOFIBRA) 134 MG Cap TAKE 1 CAPSULE BEFORE      BREAKFAST (RETURN TO       CLINIC FOR FOLLOW-UP IN    FEBRUARY) 90 capsule 0    finasteride (PROSCAR) 5 mg tablet Take 5 mg by mouth once daily.      ipratropium (ATROVENT) 42 mcg (0.06 %) nasal spray 2 sprays by Nasal route every 6 (six) hours as needed for Rhinitis. 45 mL 3    lansoprazole (PREVACID) 30 MG capsule Take 1 capsule (30 mg total) by mouth once daily. 90 capsule 3    magnesium chloride (SLOW-MAG ORAL) Take by mouth once daily.      metoprolol tartrate (LOPRESSOR) 100 MG tablet Take 1 tablet (100 mg total) by mouth 2 (two) times daily. 60 tablet 11    multivitamin capsule Take 1 capsule by mouth once daily.       pioglitazone (ACTOS) 30 MG tablet TAKE ONE TABLET BY MOUTH ONCE DAILY 90 tablet 3    quinapril (ACCUPRIL) 40 MG tablet Take 40 mg by mouth once daily.   4    rivaroxaban (XARELTO) 15 mg Tab Take 15 mg by mouth once daily.      sertraline (ZOLOFT) 50 MG tablet TAKE ONE TABLET BY MOUTH ONCE DAILY 30 tablet 11    simvastatin (ZOCOR) 40 MG tablet TAKE ONE TABLET BY MOUTH EVERY EVENING FOR CHOLESTEROL 90 tablet 12    tolterodine (DETROL LA) 4 MG 24 hr capsule Take 4 mg by mouth once daily.       vitamin D 1000 units Tab Take 1,000 Units by mouth once daily.       No current facility-administered medications for this visit.        Family History:   Family History   Problem Relation Age of Onset    Heart disease Mother     Heart disease Father     Hyperlipidemia Sister     Hypertension Sister     Heart disease Brother     Heart disease Brother     Heart disease Brother     Heart disease Brother     Heart disease Brother        Social History:   Social History     Socioeconomic History    Marital status:      Spouse name: Not on file    Number of children: Not on file    Years of education: Not on file    Highest  education level: Not on file   Occupational History    Not on file   Social Needs    Financial resource strain: Not on file    Food insecurity:     Worry: Not on file     Inability: Not on file    Transportation needs:     Medical: Not on file     Non-medical: Not on file   Tobacco Use    Smoking status: Never Smoker    Smokeless tobacco: Never Used   Substance and Sexual Activity    Alcohol use: No    Drug use: No    Sexual activity: Not Currently   Lifestyle    Physical activity:     Days per week: Not on file     Minutes per session: Not on file    Stress: Not at all   Relationships    Social connections:     Talks on phone: Not on file     Gets together: Not on file     Attends Yazdanism service: Not on file     Active member of club or organization: Not on file     Attends meetings of clubs or organizations: Not on file     Relationship status: Not on file   Other Topics Concern    Not on file   Social History Narrative    Not on file       Review of Systems   Musculoskeletal: Positive for back pain.       Objective:     Vitals:    08/27/19 1009   BP: (!) 140/80   Pulse: 68   Temp: 97.9 °F (36.6 °C)        Physical Exam   Constitutional: He appears well-developed and well-nourished. No distress.   HENT:   Head: Normocephalic and atraumatic.   Right Ear: Hearing, tympanic membrane, external ear and ear canal normal. No drainage, swelling or tenderness. No foreign bodies. Tympanic membrane is not injected, not scarred, not perforated, not erythematous, not retracted and not bulging. Tympanic membrane mobility is normal. No middle ear effusion. No hemotympanum. No decreased hearing is noted.   Left Ear: Hearing, tympanic membrane, external ear and ear canal normal. No drainage, swelling or tenderness. No foreign bodies. Tympanic membrane is not injected, not scarred, not perforated, not erythematous, not retracted and not bulging. Tympanic membrane mobility is normal.  No middle ear effusion. No  hemotympanum. No decreased hearing is noted.   Nose: Nose normal. No mucosal edema, rhinorrhea, nose lacerations, sinus tenderness, nasal deformity or septal deviation. Right sinus exhibits no maxillary sinus tenderness and no frontal sinus tenderness. Left sinus exhibits no maxillary sinus tenderness and no frontal sinus tenderness.   Mouth/Throat: Uvula is midline and oropharynx is clear and moist. Normal dentition. No oropharyngeal exudate, posterior oropharyngeal edema, posterior oropharyngeal erythema or tonsillar abscesses.   Eyes: Pupils are equal, round, and reactive to light. Conjunctivae and EOM are normal. Right eye exhibits no discharge. Left eye exhibits no discharge. No scleral icterus.   Neck: Normal range of motion. Neck supple. No thyromegaly present.   Cardiovascular: Normal rate, regular rhythm, normal heart sounds and intact distal pulses. Exam reveals no gallop and no friction rub.   No murmur heard.  Pulmonary/Chest: Effort normal and breath sounds normal. No stridor. No respiratory distress. He has no wheezes. He has no rales. He exhibits no tenderness.   Lymphadenopathy:     He has no cervical adenopathy.   Skin: He is not diaphoretic.   Nursing note and vitals reviewed.      Assessment:       1. Type 2 diabetes mellitus without complication, without long-term current use of insulin    2. CRI (chronic renal insufficiency), stage 1    3. Non-allergic rhinitis    4. Anemia, unspecified type        Plan:       Tono was seen today for diabetes and anemia.    Diagnoses and all orders for this visit:    Type 2 diabetes mellitus without complication, without long-term current use of insulin  -     Basic metabolic panel; Future  -     Hemoglobin A1c; Future    CRI (chronic renal insufficiency), stage 1    Non-allergic rhinitis    Anemia, unspecified type  -     CBC auto differential; Future         Follow up in about 6 months (around 2/27/2020).

## 2019-08-28 ENCOUNTER — TELEPHONE (OUTPATIENT)
Dept: FAMILY MEDICINE | Facility: CLINIC | Age: 74
End: 2019-08-28

## 2019-08-28 NOTE — TELEPHONE ENCOUNTER
----- Message from Scott Staley MD sent at 8/28/2019  8:15 AM CDT -----  X-rays of the lumbar spine show diffuse osteoarthritis and some disc degeneration.  No evidence of fracture or metastatic disease.

## 2019-08-29 ENCOUNTER — CLINICAL SUPPORT (OUTPATIENT)
Dept: REHABILITATION | Facility: HOSPITAL | Age: 74
End: 2019-08-29
Attending: FAMILY MEDICINE
Payer: MEDICARE

## 2019-08-29 DIAGNOSIS — M53.86 DECREASED ROM OF LUMBAR SPINE: ICD-10-CM

## 2019-08-29 DIAGNOSIS — M53.2X6 LUMBAR SPINE INSTABILITY: Primary | ICD-10-CM

## 2019-08-29 DIAGNOSIS — M62.89 TIGHTNESS OF FASCIA OF LOWER EXTREMITY: ICD-10-CM

## 2019-08-29 PROCEDURE — 97162 PT EVAL MOD COMPLEX 30 MIN: CPT

## 2019-08-29 NOTE — PLAN OF CARE
[facility]  Physical Therapy Initial Evaluation    Name: Tono Saez  Clinic Number: [id number]    Therapy Diagnosis:   Encounter Diagnoses   Name Primary?    Decreased ROM of lumbar spine     Lumbar spine instability Yes    Tightness of fascia of lower extremity      Physician: Scott Staley MD    Physician Orders: PT Eval and Treat   Medical Diagnosis from Referral: low back pain; age-related physical disability   Evaluation Date: 8/29/2019  Authorization Period Expiration: 08/26/2020  Plan of Care Expiration: 11/29/2019   Visit # / Visits authorized: 1/1    Time In: 1430  Time Out: 1515  Total Billable Time: 45 minutes    Precautions: Standard    Subjective   Date of onset: 8/27/2019  History of current condition - Steven presents to therapy stating that he has been experiencing increases in low back pain for a while now. He notes a history of always having really tight muscles in his legs and low back which may be contributing to his low back pain. He states that his low back pain is limiting his ability to put his shoes and socks on, lift groceries from the car, and bend over to pick things up from the floor.  He also states he is caregiver for his wife who has PMH of CVA.     Medical History:   Past Medical History:   Diagnosis Date    Anemia     Anticoagulant long-term use     Arthritis     CAD (coronary artery disease)     CABG, STENTS '97,'99,'01,'05    Cancer     SKIN    Cataract     Diabetes mellitus     Diabetes mellitus type II     GERD (gastroesophageal reflux disease)     Hypertension     Myocardial infarction     Wears glasses        Surgical History:   Tono Saez  has a past surgical history that includes stint; Colon surgery; Coronary artery bypass graft (1995); Cardiac surgery; Cardiac pacemaker placement; Eye surgery; Coronary stent placement; Cardiac pacemaker placement; Hemorrhoid surgery; Cholecystectomy; head laceration  (01/19/2018); and  Cardiac pacemaker placement (04/26/2018).    Medications:   Tono has a current medication list which includes the following prescription(s): amlodipine, benzonatate, blood sugar diagnostic, cetirizine, ciclopirox, dexchlorphen-phenylephrine-dm, fenofibrate micronized, finasteride, ipratropium, lansoprazole, magnesium chloride, metoprolol tartrate, multivitamin, pioglitazone, quinapril, rivaroxaban, sertraline, simvastatin, tolterodine, and vitamin d.    Allergies:   Review of patient's allergies indicates:   Allergen Reactions    Adhesive Other (See Comments)     SILK TAPE PULL SKIN OFF    Metformin Other (See Comments)     Weight loss cachexia anorexia,diarrhea.    Pcn [penicillins]      Patient states he passed out from this medication when he was 10 years old.         Imaging, : X-rays performed on lumbar spine    Prior Therapy: No prior therapy for low back  Social History:  lives with their spouse  Occupation: retired  Prior Level of Function: Pt was independent with all ADLs, IADLs, mobility, and communication  Current Level of Function: Modified independence when donning and doffing foot wear, performing home chores, carrying for wife     Pain:  Current 5/10, worst 7/10, best 1/10   Location: bilateral back   Description: Aching and Tight  Aggravating Factors: Laying, Bending, Walking, Extension and Lifting, carrying for wife  Easing Factors: massage and heating pad    Pts goals: pt goal is to decrease low back pain and improve range of motion in order to bend over to  items from the floor, care for wife, and don and doff shoes without restrictions.    Objective     Observation/Posture: Pt demonstrates decrease in lumbar lordosis    Lumbar Range of Motion:    Degrees Pain   Flexion 10    increase   Extension 3 increase        Left Side Bending 5  increase   Right Side Bending 5  increase   Left rotation   3 increase   Right Rotation   3 increase           Lumbar Strength:  Unilateral hip bridge  endurance test 3/5 bilateral. SLR 3/5    Lower Extremity Strength  Right LE  Left LE    Knee extension: 5/5 Knee extension: 5/5   Knee flexion: 4/5 Knee flexion: 4/5   Hip flexion: 4/5 Hip flexion: 4/5   Hip extension:  3+/5 Hip extension: 3+/5   Hip abduction: 3+/5 Hip abduction: 3+/5   Hip adduction: 4-/5 Hip adduction 4-/5   Ankle dorsiflexion: 4+/5 Ankle dorsiflexion: 4+/5   Ankle plantarflexion: 4+/5 Ankle plantarflexion: 4+/5         Special Tests:  -Repeated Flexion: noted increase in muscle tightness and discomfort  -Repeated Ext: in standing increase in pain. Unable to assume prone position  -Piriformis Test: tightness noted  -Prone Instability Test: negative   -Bridge Test: positive    DTR:   Right Left Comment   Patellar (L3-4) 1+ 1+    Achilles (S1) 1+ 1+        Neuro Dynamic Testing:    Sciatic nerve:      SLR: R = negative     L = negative       Femoral Nerve:    Femoral nerve test: negative      Joint Mobility: noted hypomobility of femoral acetabular posterior and anterior glides along with distraction and no pain changes.    Palpation: TTP to bilateral lumbar paraspinals and noted hypertonicity    Sensation: intact    Flexibility:    Alisha's test: R = 7 degrees from neutral ; L = 10 degrees from neutral   Britton test: 15 degrees bilateral      TREATMENT   Treatment Time In: 1500  Treatment Time Out: 1515  Total Treatment time separate from Evaluation: 15 minutes    Steven received therapeutic exercises to develop strength, endurance, ROM, flexibility, posture and core stabilization for 5 minutes including:  LTR x10 each direction  DKTC x10 with 3 sec holds each direction  Hamstring stretch each leg 3x30 sec each side    Steven received hot pack for 10 minutes to lumbar.    Home Exercises and Patient Education Provided    Education provided:   - HEP     Written Home Exercises Provided: yes.  Exercises were reviewed and Steven was able to demonstrate them prior to the end of the session.  Steven  demonstrated good  understanding of the education provided.     See EMR under Patient Instructions for exercises provided 8/29/2019.    Assessment   Tono is a 74 y.o. male referred to outpatient Physical Therapy with a medical diagnosis of low back pain and age-related physical disability. Pt presents with significant decreased lumbar ROM and increased tightness of soft tissue fascia in bilateral LE limiting his ability to perform bending activities for home management, caregiver duties, and ADLs. No radicular symptoms noted with all pain provocation localized to lumbar paraspinals.     Pt prognosis is Fair.   Pt will benefit from skilled outpatient Physical Therapy to address the deficits stated above and in the chart below, provide pt/family education, and to maximize pt's level of independence.     Plan of care discussed with patient: Yes  Pt's spiritual, cultural and educational needs considered and patient is agreeable to the plan of care and goals as stated below:     Anticipated Barriers for therapy: age, significant PMH    Medical Necessity is demonstrated by the following  History  Co-morbidities and personal factors that may impact the plan of care Co-morbidities:   advanced age and diabetes    Personal Factors:   age     moderate   Examination  Body Structures and Functions, activity limitations and participation restrictions that may impact the plan of care Body Regions:   back  lower extremities    Body Systems:    ROM  strength    Participation Restrictions:   Prone or prolonged standing     Activity limitations:   Learning and applying knowledge  no deficits    General Tasks and Commands  undertaking multiple tasks  no deficits    Communication  no deficits    Mobility  no deficits    Self care  dressing  looking after one's health    Domestic Life  doing house work (cleaning house, washing dishes, laundry)  assisting others    Interactions/Relationships  no deficits    Life Areas  no  deficits    Community and Social Life  no deficits         moderate   Clinical Presentation stable and uncomplicated moderate   Decision Making/ Complexity Score: moderate     Goals:  Short Term Goals:  In 2 weeks the pt will,   Demonstrate independence with initial HEP to facilitate therex progression  Improve lumbar ROM to >5 degrees for flexion in order to don and doff shoes and socks with decreased limitations    Long Term Goals:  In 4 weeks the pt will,  Demonstrate independence with advanced HEP  Improve Modified Oswestry to <20% in order to improve caregiver duties  Improve lumbar ROM to >10 degrees for all planes in order for pt to perform all home management with decreased limitations.   Improve lumbar strength to >=1/3 grade in order for pt to perform caregiver duties with decreased restrictions.    Plan   Plan of care Certification: 8/29/2019 to 11/29/2019.    Outpatient Physical Therapy 2 times weekly for 4 weeks to include the following interventions: Manual Therapy, Moist Heat/ Ice, Neuromuscular Re-ed, Patient Education, Therapeutic Activites and Therapeutic Exercise. Lumbar stabilization    Gio Rowell, PT

## 2019-08-30 DIAGNOSIS — E78.2 MIXED HYPERLIPIDEMIA: ICD-10-CM

## 2019-08-30 RX ORDER — FENOFIBRATE 134 MG/1
CAPSULE ORAL
Qty: 90 CAPSULE | Refills: 3 | Status: SHIPPED | OUTPATIENT
Start: 2019-08-30 | End: 2020-02-20

## 2019-09-13 ENCOUNTER — DOCUMENTATION ONLY (OUTPATIENT)
Dept: REHABILITATION | Facility: HOSPITAL | Age: 74
End: 2019-09-13

## 2019-09-16 ENCOUNTER — CLINICAL SUPPORT (OUTPATIENT)
Dept: REHABILITATION | Facility: HOSPITAL | Age: 74
End: 2019-09-16
Payer: MEDICARE

## 2019-09-16 ENCOUNTER — DOCUMENTATION ONLY (OUTPATIENT)
Dept: REHABILITATION | Facility: HOSPITAL | Age: 74
End: 2019-09-16

## 2019-09-16 DIAGNOSIS — M53.86 DECREASED ROM OF LUMBAR SPINE: ICD-10-CM

## 2019-09-16 PROCEDURE — 97110 THERAPEUTIC EXERCISES: CPT

## 2019-09-16 NOTE — PROGRESS NOTES
"  Physical Therapy Daily Treatment Note     Name: Tono Saez  Clinic Number: [id number]    Therapy Diagnosis:   Encounter Diagnosis   Name Primary?    Decreased ROM of lumbar spine      Physician: Scott Staley MD    Visit Date: 9/16/2019    Physician Orders: PT eval and treat  Medical Diagnosis: low back pain; age-related physical disability    Evaluation Date: 8/26/19  Authorization Period Expiration: 8/26/2020  Plan of Care Certification Period:8/29/2019 to 11/29/2019   Visit #/Visits authorized: 2/12   PTA visit: 1/5    Time In: 1330  Time Out: 1430  Total Billable Time: 45 minutes    Precautions: Standard  NO E-stim pacemaker    Subjective     Pt reports: high pain levels today. "This is what I deal with."  He reported increased pain after performing HEP but could not identify which exercises caused pain.    He was compliant with home exercise program.  Response to previous treatment: fair  Functional change: n/a    Pain: 9/10  Location: bilateral back      Objective     Steven received therapeutic exercises to develop strength, endurance, ROM, flexibility, posture and core stabilization for 30 minutes including:    Nustep 5 minutes   Hamstring stretch 3 x 30 second  Piriformis stretch 3 x 30 second   QL stretch over the bolster 3 x 30 sec   Bridges 2 x 10  Hip abduction with OTB 2 x 10 in hooklying    Steven received the following manual therapy techniques:   Steven participated in neuromuscular re-education activities to improve:   Steven participated in dynamic functional therapeutic activities to improve functional performance for   Steven participated in gait training to improve functional mobility and safety for                                                                                 Steven received the following direct contact modalities after being cleared for contraindications:   Steven received the following supervised modalities after being cleared for contradictions:    Steven received hot pack " for 15 minutes to lumbar region in hooklying prior to TE.      Home Exercises Provided and Patient Education Provided     Education provided:   Attempt to perform exercises on HEP separately in order to assess which exercises are increasing pain.     Written Home Exercises Provided: Patient instructed to cont prior HEP.  Exercises were reviewed and Steven was able to demonstrate them prior to the end of the session.  Steven demonstrated fair  understanding of the education provided.     See EMR under Patient Instructions for exercises provided prior visit.    Assessment     MHP was applied to patient's back prior to performance of TE in deference to patient's pain level. All TE that were issued for HEP were reviewed with patient insuring that patient's performance of exercises were safe and correct. Pt with overall poor tolerance to activity with reports of increased pain following bridges and QL stretch. Pt declined modalities following TE but did report pain level decreasing from 9/10 to 8/10 after therapy. Pt would benefit from skilled therapy to address strength and flexibility deficits to assist patient in engaging in ADLs without pain.     Steven is progressing well towards his goals.   Pt prognosis is Guarded.     Pt will continue to benefit from skilled outpatient physical therapy to address the deficits listed in the problem list box on initial evaluation, provide pt/family education and to maximize pt's level of independence in the home and community environment.     Pt's spiritual, cultural and educational needs considered and pt agreeable to plan of care and goals.     Anticipated barriers to physical therapy: age, significant PMH    Goals:  Short Term Goals:  In 2 weeks the pt will,   Demonstrate independence with initial HEP to facilitate therex progression  Improve lumbar ROM to >5 degrees for flexion in order to don and doff shoes and socks with decreased limitations     Long Term Goals:  In 4 weeks the  pt will,  Demonstrate independence with advanced HEP  Improve Modified Oswestry to <20% in order to improve caregiver duties  Improve lumbar ROM to >10 degrees for all planes in order for pt to perform all home management with decreased limitations.   Improve lumbar strength to >=1/3 grade in order for pt to perform caregiver duties with decreased restrictions.    Plan     Continue current POC advancing as tolerated.    Venecia Sanchez, PTA

## 2019-09-18 ENCOUNTER — CLINICAL SUPPORT (OUTPATIENT)
Dept: REHABILITATION | Facility: HOSPITAL | Age: 74
End: 2019-09-18
Payer: MEDICARE

## 2019-09-18 DIAGNOSIS — M53.86 DECREASED ROM OF LUMBAR SPINE: ICD-10-CM

## 2019-09-18 PROCEDURE — 97110 THERAPEUTIC EXERCISES: CPT

## 2019-09-18 PROCEDURE — 97140 MANUAL THERAPY 1/> REGIONS: CPT

## 2019-09-18 NOTE — PROGRESS NOTES
Physical Therapy Daily Treatment Note     Name: Tono Saez  Clinic Number: [id number]    Therapy Diagnosis:   No diagnosis found.  Physician: Scott Staley MD    Visit Date: 9/18/2019    Physician Orders: PT eval and treat  Medical Diagnosis: low back pain; age-related physical disability    Evaluation Date: 8/26/19  Authorization Period Expiration: 8/26/2020  Plan of Care Certification Period:8/29/2019 to 11/29/2019   Visit #/Visits authorized: 3/12   PTA visit: 2/5    Time In: 1300  Time Out: 1400  Total Billable Time: 45 minutes    Precautions: Standard  NO E-stim pacemaker    Subjective     Pt reports: pain level 7/10 at start of treatment.     He was compliant with home exercise program.  Response to previous treatment: fair  Functional change: n/a    Pain: 7/10  Location: bilateral back      Objective     Steven received therapeutic exercises to develop strength, endurance, ROM, flexibility, posture and core stabilization for 30 minutes including:    Nustep 5 minutes   Hamstring stretch 3 x 30 second  Piriformis stretch 3 x 30 second   QL stretch over the bolster 3 x 30 sec   Bridges 2 x 10  Hip abduction with OTB 2 x 10 in hooklying    Steven received the following manual therapy techniques: Soft tissue Mobilization were applied to the: lumbar region for 10 minutes performed in sidelying.     Steven participated in neuromuscular re-education activities to improve:   Steven participated in dynamic functional therapeutic activities to improve functional performance for   Steven participated in gait training to improve functional mobility and safety for                                                                         Steven received the following direct contact modalities after being cleared for contraindications:   Steven received the following supervised modalities after being cleared for contradictions:    Steven received hot pack for 15 minutes to lumbar region in hooklying prior to TE.      Home  Exercises Provided and Patient Education Provided     Education provided:   Attempt to perform exercises on HEP separately in order to assess which exercises are increasing pain.     Written Home Exercises Provided: Patient instructed to cont prior HEP.  Exercises were reviewed and Steven was able to demonstrate them prior to the end of the session.  Steven demonstrated fair  understanding of the education provided.     See EMR under Patient Instructions for exercises provided prior visit.    Assessment     Pt was able to perform all recommended TE with greater ease today as compared to previous treatment visit. STM performed to lumbar musculature today with good results as patient reported pain level decreasing to 4/10 following therapy. Pt would benefit from continued skilled therapy to address strength and flexibility deficits in order to assist patient's ability to be primary caregiver of spouse without pain.     Steven is progressing well towards his goals.   Pt prognosis is Guarded.     Pt will continue to benefit from skilled outpatient physical therapy to address the deficits listed in the problem list box on initial evaluation, provide pt/family education and to maximize pt's level of independence in the home and community environment.     Pt's spiritual, cultural and educational needs considered and pt agreeable to plan of care and goals.     Anticipated barriers to physical therapy: age, significant PMH    Goals:  Short Term Goals:  In 2 weeks the pt will,   Demonstrate independence with initial HEP to facilitate therex progression  Improve lumbar ROM to >5 degrees for flexion in order to don and doff shoes and socks with decreased limitations     Long Term Goals:  In 4 weeks the pt will,  Demonstrate independence with advanced HEP  Improve Modified Oswestry to <20% in order to improve caregiver duties  Improve lumbar ROM to >10 degrees for all planes in order for pt to perform all home management with  decreased limitations.   Improve lumbar strength to >=1/3 grade in order for pt to perform caregiver duties with decreased restrictions.    Plan     Continue current POC advancing as tolerated.    Venecia Sanchez, PTA

## 2019-09-23 ENCOUNTER — CLINICAL SUPPORT (OUTPATIENT)
Dept: REHABILITATION | Facility: HOSPITAL | Age: 74
End: 2019-09-23
Payer: MEDICARE

## 2019-09-23 DIAGNOSIS — M53.86 DECREASED ROM OF LUMBAR SPINE: ICD-10-CM

## 2019-09-23 PROCEDURE — 97110 THERAPEUTIC EXERCISES: CPT

## 2019-09-23 NOTE — PROGRESS NOTES
Physical Therapy Daily Treatment Note     Name: Tono Saez  Clinic Number: [id number]    Therapy Diagnosis:   No diagnosis found.  Physician: Scott Staley MD    Visit Date: 9/23/2019    Physician Orders: PT eval and treat  Medical Diagnosis: low back pain; age-related physical disability    Evaluation Date: 8/26/19  Authorization Period Expiration: 8/26/2020  Plan of Care Certification Period:8/29/2019 to 11/29/2019   Visit #/Visits authorized: 4/12   PTA visit: 3/5    Time In: 1500  Time Out: 1600  Total Billable Time: 45 minutes    Precautions: Standard  NO E-stim pacemaker    Subjective     Pt reports: falling yesterday when he was attempting to stand from the sofa. He stated that he hit an end table with his hip which left an abrasion. He also had an abrasion on his R forearm which was covered with a band aid ( no weeping/ bleeding noted) He stated that his wife wanted him to go to the ER but he refused.     He was compliant with home exercise program.  Response to previous treatment: fair  Functional change: n/a    Pain: 8/10  Location: bilateral back      Objective     Steven received therapeutic exercises to develop strength, endurance, ROM, flexibility, posture and core stabilization for 30 minutes including:    Bike 10 minutes   Hamstring stretch 3 x 30 second  Piriformis stretch 3 x 30 second   QL stretch over the bolster 3 x 30 sec   Bridges 2 x 10  Hip abduction with OTB 2 x 10 in hooklying    Steven received the following manual therapy techniques: Soft tissue Mobilization were applied to the: lumbar region for 0 minutes performed in sidelying.     Steven participated in neuromuscular re-education activities to improve:   Steven participated in dynamic functional therapeutic activities to improve functional performance for   Steven participated in gait training to improve functional mobility and safety for                                                                         Steven received the  following direct contact modalities after being cleared for contraindications:   Steven received the following supervised modalities after being cleared for contradictions:    Steven received hot pack for 15 minutes to lumbar region in hooklying following to TE.      Home Exercises Provided and Patient Education Provided     Education provided:       Written Home Exercises Provided: Patient instructed to cont prior HEP.  Exercises were reviewed and Steven was able to demonstrate them prior to the end of the session.  Steven demonstrated fair  understanding of the education provided.     See EMR under Patient Instructions for exercises provided prior visit.    Assessment     Pt was able to perform all recommended TE today despite initial c/o increased pain following his recent fall. MHP applied to lulmbar region following performance of TE today.  Pt would benefit from continued skilled therapy to address strength and flexibility deficits in order to assist patient's ability to be primary caregiver of spouse without pain.     Steven is progressing well towards his goals.   Pt prognosis is Guarded.     Pt will continue to benefit from skilled outpatient physical therapy to address the deficits listed in the problem list box on initial evaluation, provide pt/family education and to maximize pt's level of independence in the home and community environment.     Pt's spiritual, cultural and educational needs considered and pt agreeable to plan of care and goals.     Anticipated barriers to physical therapy: age, significant PMH    Goals:  Short Term Goals:  In 2 weeks the pt will,   Demonstrate independence with initial HEP to facilitate therex progression  Improve lumbar ROM to >5 degrees for flexion in order to don and doff shoes and socks with decreased limitations     Long Term Goals:  In 4 weeks the pt will,  Demonstrate independence with advanced HEP  Improve Modified Oswestry to <20% in order to improve caregiver  duties  Improve lumbar ROM to >10 degrees for all planes in order for pt to perform all home management with decreased limitations.   Improve lumbar strength to >=1/3 grade in order for pt to perform caregiver duties with decreased restrictions.    Plan     Continue current POC advancing as tolerated.    Venecia Sanchez, PTA

## 2019-09-25 ENCOUNTER — CLINICAL SUPPORT (OUTPATIENT)
Dept: REHABILITATION | Facility: HOSPITAL | Age: 74
End: 2019-09-25
Payer: MEDICARE

## 2019-09-25 DIAGNOSIS — M53.86 DECREASED ROM OF LUMBAR SPINE: ICD-10-CM

## 2019-09-25 PROCEDURE — 97110 THERAPEUTIC EXERCISES: CPT

## 2019-09-25 NOTE — PROGRESS NOTES
Physical Therapy Daily Treatment Note   Update POC/Progress Note  Name: Tono Saez  Clinic Number: [id number]    Therapy Diagnosis:   Encounter Diagnosis   Name Primary?    Decreased ROM of lumbar spine      Physician: Scott Staley MD    Visit Date: 9/25/2019    Physician Orders: PT eval and treat  Medical Diagnosis: low back pain; age-related physical disability    Evaluation Date: 8/26/19  Authorization Period Expiration: 8/26/2020  Plan of Care Certification Period:8/29/2019 to 11/29/2019   Visit #/Visits authorized: 5/12   PTA visit: 0/5    Time In: 1400  Time Out: 1500  Total Billable Time: 45 minutes    Precautions: Standard  NO E-stim pacemaker    Subjective     Pt reports increase in low back symptoms which reached a 10/10. His pain has decreased a little to 7/10 pre session but still increases with movements. The worst movement is reaching for his seatbelt. Since beginning therapy he denies any significant changes.      He was compliant with home exercise program.  Response to previous treatment: fair  Functional change: n/a    Pain: 7/10  Location: bilateral back      Objective     Observation/Posture: Pt demonstrates decrease in lumbar lordosis     Lumbar Range of Motion:     Degrees Pain   Flexion 10     increase   Extension 3 increase         Left Side Bending 5  increase   Right Side Bending 5  increase   Left rotation    5 increase   Right Rotation    5 increase            Lumbar Strength:  Unilateral hip bridge endurance test 3/5 bilateral. SLR 3/5     Lower Extremity Strength  Right LE   Left LE     Knee extension: 5/5 Knee extension: 5/5   Knee flexion: 4/5 Knee flexion: 4/5   Hip flexion: 4/5 Hip flexion: 4/5   Hip extension:  3+/5 Hip extension: 3+/5   Hip abduction: 3+/5 Hip abduction: 3+/5   Hip adduction: 4-/5 Hip adduction 4-/5   Ankle dorsiflexion: 4+/5 Ankle dorsiflexion: 4+/5   Ankle plantarflexion: 4+/5 Ankle plantarflexion: 4+/5         Flexibility:               Alisha's test: R =  7 degrees from neutral ; L = 10 degrees from neutral              Britton test: 15 degrees bilateral    Oswestry: 24% Disability    Steven received therapeutic exercises to develop strength, endurance, ROM, flexibility, posture and core stabilization for 45 minutes including:    Bike 10 minutes DNP  Hamstring stretch 3 x 30 second DNP  Piriformis stretch 3 x 30 second DNP  DKTC x20  QL stretch over the bolster 3 x 30 sec right side up  Positional distraction in left side lying right side up for 5 minutes  TrA activation for 10 min with 5 sec holds. Verbal and tactile cues to improve isolation  TrA activation in hooklying with LE fall out x 10 each leg  Bridges 3 x 10  Hip abduction with GTB 3 x 10 in hooklying    Steven received the following manual therapy techniques: Soft tissue Mobilization were applied to the: lumbar region for 0 minutes performed in sidelying.   Steven participated in neuromuscular re-education activities to improve:   Steven participated in dynamic functional therapeutic activities to improve functional performance for   Steven participated in gait training to improve functional mobility and safety for                                                                      Steven received the following direct contact modalities after being cleared for contraindications:   Steven received the following supervised modalities after being cleared for contradictions:    Steven received hot pack for 15 minutes to lumbar region in left side lying following to TE.      Home Exercises Provided and Patient Education Provided     Education provided:     Written Home Exercises Provided: Patient instructed to cont prior HEP.  Exercises were reviewed and Steven was able to demonstrate them prior to the end of the session.  Steven demonstrated fair  understanding of the education provided.     See EMR under Patient Instructions for exercises provided prior visit.    Assessment     Pt has not demonstrated significant  increases in impairments to allow for pain free activities of daily living. He continues to demonstrate poor tolerance to therex with decreased lumbar stabilization and TrA motor control. MHP applied to lumbar region while in left side lying which improved pt's symptoms. Progress limited due to injury from fall. Pt would benefit from continued skilled therapy to address strength and flexibility deficits in order to assist patient's ability to be primary caregiver of spouse without pain and perform IADLs without limitations.     Steven is progressing well towards his goals.   Pt prognosis is Guarded.     Pt will continue to benefit from skilled outpatient physical therapy to address the deficits listed in the problem list box on initial evaluation, provide pt/family education and to maximize pt's level of independence in the home and community environment.     Pt's spiritual, cultural and educational needs considered and pt agreeable to plan of care and goals.     Anticipated barriers to physical therapy: age, significant PMH    Goals:  Short Term Goals:  In 2 weeks the pt will,   Demonstrate independence with initial HEP to facilitate therex progression Progressing 9/25/2019  Improve lumbar ROM to >5 degrees for flexion in order to don and doff shoes and socks with decreased limitations Progressing 9/25/2019     Long Term Goals:  In 4 weeks the pt will,  Demonstrate independence with advanced HEP Progressing 9/25/2019  Improve Modified Oswestry to <20% in order to improve caregiver duties Progressing 9/25/2019  Improve lumbar ROM to >10 degrees for all planes in order for pt to perform all home management with decreased limitations. Progressing 9/25/2019  Improve lumbar strength to >=1/3 grade in order for pt to perform caregiver duties with decreased restrictions. Progressing 9/25/2019    Plan   Update Plan of care Certification: 9/25/2019 to 11/29/2019.     Update Outpatient Physical Therapy POC to additional 2  times weekly for 4 weeks to include the following interventions: Manual Therapy, Moist Heat/ Ice, Neuromuscular Re-ed, Patient Education, Therapeutic Activites and Therapeutic Exercise. Lumbar stabilization    Gio Rowell, PT

## 2019-09-30 ENCOUNTER — DOCUMENTATION ONLY (OUTPATIENT)
Dept: REHABILITATION | Facility: HOSPITAL | Age: 74
End: 2019-09-30

## 2019-09-30 ENCOUNTER — OFFICE VISIT (OUTPATIENT)
Dept: FAMILY MEDICINE | Facility: CLINIC | Age: 74
End: 2019-09-30
Payer: MEDICARE

## 2019-09-30 VITALS
WEIGHT: 202 LBS | HEIGHT: 73 IN | DIASTOLIC BLOOD PRESSURE: 77 MMHG | HEART RATE: 63 BPM | BODY MASS INDEX: 26.77 KG/M2 | SYSTOLIC BLOOD PRESSURE: 133 MMHG

## 2019-09-30 DIAGNOSIS — R26.81 GAIT INSTABILITY: ICD-10-CM
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
## 2019-09-30 DIAGNOSIS — S30.0XXA CONTUSION OF BUTTOCK, INITIAL ENCOUNTER: ICD-10-CM

## 2019-09-30 DIAGNOSIS — Z79.01 CHRONIC ANTICOAGULATION: ICD-10-CM

## 2019-09-30 PROCEDURE — 99214 OFFICE O/P EST MOD 30 MIN: CPT | Mod: S$PBB,,, | Performed by: INTERNAL MEDICINE

## 2019-09-30 PROCEDURE — 99214 PR OFFICE/OUTPT VISIT, EST, LEVL IV, 30-39 MIN: ICD-10-PCS | Mod: S$PBB,,, | Performed by: INTERNAL MEDICINE

## 2019-09-30 PROCEDURE — 99214 OFFICE O/P EST MOD 30 MIN: CPT | Performed by: INTERNAL MEDICINE

## 2019-09-30 NOTE — PROGRESS NOTES
Pt arrived to PT appointment on 9/30/2019 on time reporting increases in right hip pain after fall ~ 1 week ago. He states that he noticed increases in bruising. PT visualized right posterior hip significant bruising with tenderness to palpation. Educated patient that PT is not appropriate at this time and he needs to contact his PCP immediately for right hip.    Gio Rowell DPT

## 2019-09-30 NOTE — Clinical Note
Saw patient today following a history of fall.  Hematoma in the right hip region bleed evaluation and recheck in 3-4 weeks time.  Frequent falls and anticoagulation.  I have recommended physical therapy evaluation.  May need to be checked for postural hypotension or Parkinson's.

## 2019-09-30 NOTE — PATIENT INSTRUCTIONS
Fall Prevention  Falls often occur due to slipping, tripping or losing your balance. Millions of people fall every year and injure themselves. Here are ways to reduce your risk of falling again.  · Think about your fall, was there anything that caused your fall that can be fixed, removed, or replaced?  · Make your home safe by keeping walkways clear of objects you may trip over.  · Use non-slip pads under rugs. Do not use area rugs or small throw rugs.  · Use non-slip mats in bathtubs and showers.  · Install handrails and lights on staircases.  · Do not walk in poorly lit areas.  · Do not stand on chairs or wobbly ladders.  · Use caution when reaching overhead or looking upward. This position can cause a loss of balance.  · Be sure your shoes fit properly, have non-slip bottoms and are in good condition.   · Wear shoes both inside and out. Avoid going barefoot or wearing slippers.  · Be cautious when going up and down stairs, curbs, and when walking on uneven sidewalks.  · If your balance is poor, consider using a cane or walker.  · If your fall was related to alcohol use, stop or limit alcohol intake.   · If your fall was related to use of sleeping medicines, talk to your doctor about this. You may need to reduce your dosage at bedtime if you awaken during the night to go to the bathroom.    · To reduce the need for nighttime bathroom trips:  ¨ Avoid drinking fluids for several hours before going to bed  ¨ Empty your bladder before going to bed  ¨ Men can keep a urinal at the bedside  · Stay as active as you can. Balance, flexibility, strength, and endurance all come from exercise. They all play a role in preventing falls. Ask your healthcare provider which types of activity are right for you.  · Get your vision checked on a regular basis.  · If you have pets, know where they are before you stand up or walk so you don't trip over them.  · Use night lights.  Date Last Reviewed: 11/5/2015  © 8368-7998 [employer]. [address] [place] [address], [place] [address]. All rights reserved. This information is not intended as a substitute for professional medical care. Always follow your healthcare professional's instructions.        Uncertain Causes of Fall  You have had a fall today. But the cause of your fall is not certain. Falls can occur due to slipping, tripping or losing your balance. A fall can also occur from a fainting spell or seizure.  While a fall can happen for a simple reason (tripping over something), falls in elderly people are often caused by a combination of things:  · Age-related decline in function with worsening balance, stability, vision, and muscle strength  · Chronic illness such as heart arrhythmias, heart valve disease, vascular disease, COPD, diabetes, strokes, arthritis  · Effects or side effects of medicines  · Dehydration.  · Environmental hazards such as uneven or slippery ground, unfamiliar place, obstacles, uneven surfaces, or slippery ground  · Situational factors (related to the activity being done, e.g., rushing to the bathroom)  Because the cause of your fall today is not certain, it is possible that a fainting spell or seizure was the cause. This means that it could happen again, without warning. If you fall again, without a cause, then you should return to this facility promptly to have further tests. Otherwise, follow up with your doctor as explained below.  It is normal to feel sore and tight in your muscles and back the next day, and not just the muscles you initially injured. Remember, all the parts of your body are connected, so while initially one area hurts, the next day another may hurt. Also, when you injure yourself, it causes inflammation, which then causes the muscles to tighten up and hurt more. After the initial worsening, it should gradually improve over the next few days. However, more severe pain should be reported.  Even without a definite head injury, you can still  get a concussion. Concussions and even bleeding can still occur, especially if you have had a recent injury or take blood thinner medicine. It is not unusual to have a mild headache and feel tired and even nauseous or dizzy.  Home care  · Rest today and resume your normal activities as soon as you are feeling back to normal. It is best to remain with someone who can check on you for the next 24 hours to watch for another episode of falling.  · If you were injured during the fall, follow the advice from your doctor regarding care of your injury.  ·  If you become light-headed or dizzy, lie down immediately or sit and lean forward with your head down.  · As a precaution, do not drive a car or operate dangerous equipment, do not take a bath alone (use a shower instead) and do not swim alone until you see your doctor. A condition causing fainting or seizures must be ruled out before resuming these activities.  · You may use acetaminophen or ibuprofen to control pain, unless another pain medicine was prescribed. If you have chronic liver or kidney disease or ever had a stomach ulcer or gastrointestinal bleeding, talk with your doctor before using these medicines.  · Keep your appointments for any further testing that may have been scheduled for you.  Follow-up care  Follow up with your healthcare provider, or as advised.  If X-rays or CT scan were done, you will be notified if there is a change in the reading, especially if it affects treatment.  Call 911  Call 911 if any of these occur:  · Trouble breathing  · Confused or difficulty arousing  · Fainting or loss of consciousness  · Rapid or very slow heart rate  · Seizure  · Difficulty with speech or vision, weakness of an arm or leg  · Difficulty walking or talking, loss of balance, numbness or weakness in one side of your body, facial droop  When to seek medical advice  Call your healthcare provider right away if any of these occur:  · Another unexplained  fall  · Dizziness  · Severe headache  · Nausea and vomiting  · Blood in vomit, stools (black or red color)  Date Last Reviewed: 11/5/2015 © 2000-2017 [employer]. [address] [place] [address], [place] [address]. All rights reserved. This information is not intended as a substitute for professional medical care. Always follow your healthcare professional's instructions.        Treatment for Bone Bruise (Bone Contusion)  A bone bruise is an injury to a bone that is less severe than a bone fracture. Bone bruises are fairly common. They can happen to people of all ages. Any type of bone in your body can get a bone bruise. Other injuries often happen along with a bone bruise, such as damage to nearby ligaments.  Types of treatment  Treatment for a bone bruise may include:  · Resting the bone or joint  · Putting an ice pack on the area several times a day  · Raising the injury above the level of your heart to reduce swelling  · Taking medicine to reduce pain and swelling  · Wearing a brace or other device to limit movement, if needed  Your doctor may give you advice about your diet. This is because eating a diet that is rich in calcium, vitamin D, and protein can help you heal. Your doctor may ask you to not use certain over-the-counter medicines for pain. Some of these may delay normal bone healing. If you smoke, your doctor will advise you to stop smoking. Smoking can also delay bone healing.  Your health care provider will tell you how long you should avoid putting weight on your bone. Most bone bruises slowly heal over 2 to 4 months. A larger bone bruise may take longer to heal. You may not be able to return to sports activities for weeks or months. If your symptoms dont go away, your health care provider may give you an MRI.  Possible complications of a bone bruise  Most bone bruises heal without any problems. If your bone bruise is very large, your body may have trouble getting blood flow back to the area.  This can cause avascular necrosis of the bone. This leads to death of that part of the bone.     When to call the health care provider  Call your health care provider if your symptoms dont start to get better in a few days. Call him or her right away if you have any severe symptoms, such as a high fever.      Date Last Reviewed: 7/21/2015  © 1623-8584 [employer]. [address] [place] [address], [place] [address]. All rights reserved. This information is not intended as a substitute for professional medical care. Always follow your healthcare professional's instructions.        Bruises (Contusions)    A contusion is a bruise. A bruise happens when a blow to your body doesn't break the skin but does break blood vessels beneath the skin. Blood leaking from the broken vessels causes redness and swelling. As it heals, your bruise is likely to turn colors like purple, green, and yellow. This is normal. The bruise should fade in 2 or 3 weeks.  Factors that make you more likely to bruise  Almost everyone bruises now and then. Certain people do bruise more easily than others. You're more prone to bruising as you get older. That's because blood vessels become more fragile with age. You're also more likely to bruise if you have a clotting disorder such as hemophilia or take medications that reduce clotting, including aspirin, coumadin, newer agents.  When to go to the emergency room (ER)  Bruises almost always heal on their own without special treatment. But for some people, a bad bruise can be serious. Seek medical care if you:  · Have a clotting disorder such as hemophilia.  · Have cirrhosis or other serious liver disease.  · Take blood-thinning medications such as warfarin (Coumadin).  What to expect in the ER  A doctor will examine your bruise and ask about any health conditions you have. In some cases, you may have a test to check how well your blood clots. Other treatment will depend on your needs.  Follow-up  care  Sometimes a bruise gets worse instead of better. It may become larger and more swollen. This can occur when your body walls off a small pool of blood under the skin (hematoma). In very rare cases, your doctor may need to drain excess blood from the area.  Tip:  Apply an ice pack or bag of frozen peas to a bruise (keep a thin cloth between the cold source and your skin). This can help reduce redness and swelling.   Date Last Reviewed: 12/1/2016  © 5583-7289 [employer]. [address] [place] [address], [place] [address]. All rights reserved. This information is not intended as a substitute for professional medical care. Always follow your healthcare professional's instructions.

## 2019-09-30 NOTE — PROGRESS NOTES
Subjective:       Patient ID: Tono Saez is a 74 y.o. male.    Chief Complaint: Hip Injury (fell on hip); Fall; and Gait Instability    Patient is a 74-year-old  gentleman who comes for follow-up and is accompanied with his wife.    In the last 7 days on 1 occasion when he was standing up from so far, he lost control over his legs and fell on the right side of hip and buttocks.  As to why he exactly loss control is unclear.  He denies any true loss of consciousness, dizziness or significant lightheadedness.    After this he did experience a bruise on the right buttock which he could not see properly.  This was observed by perhaps a home health or some other provider and he was advised to see the primary care physician.     Patient does experience some soreness in the right buttock region.  He is able to ambulate.  He does not have any bowel or bladder disturbance.    He is also on anticoagulation with Xarelto.  Other medical conditions include dyslipidemia, hypertension, type 2 diabetes mellitus.    He has had multiple falls in past also.  None of them seem to be detrimental at this point.  There is no history of epilepsy.  There is no history of cardiac arrhythmia.  Thus far he has not been diagnosed with conditions like dysautonomia or postural hypotension.    Hip Injury   This is a new problem. The current episode started in the past 7 days. The problem occurs intermittently. Associated symptoms include fatigue. Pertinent negatives include no abdominal pain, chest pain, chills, congestion, coughing, fever, headaches, neck pain, rash or sore throat.   Fall   The accident occurred 5 to 7 days ago. The fall occurred while standing. He fell from a height of 1 to 2 ft. He landed on concrete. The point of impact was the right hip and right elbow. Pertinent negatives include no abdominal pain, fever or headaches.   Injury   This is a new problem. The current episode started in the past 7 days. The problem  occurs constantly. The problem has been unchanged. Associated symptoms include fatigue. Pertinent negatives include no abdominal pain, chest pain, chills, congestion, coughing, fever, headaches, neck pain, rash or sore throat. The treatment provided mild relief.       Past Medical History:   Diagnosis Date    Anemia     Anticoagulant long-term use     Arthritis     CAD (coronary artery disease)     CABG, STENTS '97,'99,'01,'05    Cancer     SKIN    Cataract     Diabetes mellitus     Diabetes mellitus type II     GERD (gastroesophageal reflux disease)     Hypertension     Myocardial infarction     Wears glasses      Social History     Socioeconomic History    Marital status:      Spouse name: Not on file    Number of children: Not on file    Years of education: Not on file    Highest education level: Not on file   Occupational History    Not on file   Social Needs    Financial resource strain: Not on file    Food insecurity:     Worry: Not on file     Inability: Not on file    Transportation needs:     Medical: Not on file     Non-medical: Not on file   Tobacco Use    Smoking status: Never Smoker    Smokeless tobacco: Never Used   Substance and Sexual Activity    Alcohol use: No    Drug use: No    Sexual activity: Not Currently   Lifestyle    Physical activity:     Days per week: Not on file     Minutes per session: Not on file    Stress: Not at all   Relationships    Social connections:     Talks on phone: Not on file     Gets together: Not on file     Attends Congregational service: Not on file     Active member of club or organization: Not on file     Attends meetings of clubs or organizations: Not on file     Relationship status: Not on file   Other Topics Concern    Not on file   Social History Narrative    Not on file     Past Surgical History:   Procedure Laterality Date    CARDIAC PACEMAKER PLACEMENT      CARDIAC PACEMAKER PLACEMENT      CARDIAC PACEMAKER PLACEMENT   "04/26/2018    replaced    CARDIAC SURGERY      1995   CABG X 5    CHOLECYSTECTOMY      COLON SURGERY      CORONARY ARTERY BYPASS GRAFT  1995    CORONARY STENT PLACEMENT      stent x 5    EYE SURGERY      BILAT CATARACT    head laceration   01/19/2018    HEMORRHOID SURGERY      stint       Family History   Problem Relation Age of Onset    Heart disease Mother     Heart disease Father     Hyperlipidemia Sister     Hypertension Sister     Heart disease Brother     Heart disease Brother     Heart disease Brother     Heart disease Brother     Heart disease Brother        Review of Systems   Constitutional: Positive for activity change and fatigue. Negative for chills, fever and unexpected weight change.   HENT: Negative for congestion, drooling, nosebleeds, rhinorrhea, sore throat and voice change.    Eyes: Negative for redness and visual disturbance.   Respiratory: Negative for apnea, cough, choking and wheezing.    Cardiovascular: Negative for chest pain, palpitations and leg swelling.        Hypertension/dyslipidemia.  Patient is also on anticoagulation and I presume because of either blood clots or atrial fibrillation.   Gastrointestinal: Negative for abdominal distention, abdominal pain, blood in stool and constipation.   Endocrine: Negative for heat intolerance, polydipsia and polyphagia.        Type 2 diabetes mellitus on Actos.   Genitourinary: Negative for dysuria and flank pain.   Musculoskeletal: Positive for back pain and gait problem. Negative for neck pain.   Skin: Negative for color change and rash.   Neurological: Positive for light-headedness. Negative for dizziness and headaches.        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   Hematological: Bruises/bleeds easily.        Patient is on chronic anticoagulation   Psychiatric/Behavioral: Negative for agitation and behavioral problems.         Objective:      Blood pressure 133/77, pulse 63, height 6' 1" (1.854 m), weight 91.6 kg (202 lb). " Body mass index is 26.65 kg/m².  Physical Exam   Constitutional: He appears well-developed and well-nourished. No distress.   HENT:   Head: Normocephalic and atraumatic.   Nose: Nose normal.   Mouth/Throat: Oropharynx is clear and moist. No oropharyngeal exudate.   Eyes: Conjunctivae and EOM are normal. No scleral icterus.   Neck: Normal range of motion. Neck supple. No JVD present. No tracheal deviation present. No thyromegaly present.   Cardiovascular: Normal rate, regular rhythm and normal heart sounds. Exam reveals no gallop and no friction rub.   No murmur heard.  Pulmonary/Chest: Effort normal and breath sounds normal. No respiratory distress. He has no wheezes. He has no rales.   Abdominal: Soft. Bowel sounds are normal. He exhibits no distension. There is no tenderness.   Musculoskeletal: Normal range of motion. He exhibits tenderness. He exhibits no edema or deformity.        Lumbar back: He exhibits tenderness. He exhibits normal range of motion, no laceration and no spasm.        Back:         Arms:  Lymphadenopathy:     He has no cervical adenopathy.   Neurological: He is alert. He displays no tremor. He exhibits normal muscle tone. He displays no seizure activity. Gait abnormal. Coordination normal.   Slightly un expressionless face.  Patient has a cautious gait.  He had trouble standing up on 1 step stool.   Skin: Skin is warm and dry. Bruising and ecchymosis noted. He is not diaphoretic.   The picture and graphic in the musculoskeletal section.  Hematoma and bruising in the right buttock posteriorly.  No breakdown in the skin.  No discharge.    Also another area of bruising in the right elbow medially.   Psychiatric: He has a normal mood and affect.   Nursing note and vitals reviewed.            Assessment:       1. Fall, initial encounter    2. Gait instability    3. Chronic anticoagulation    4. Contusion of buttock, initial encounter           Lab Visit on 08/21/2019   Component Date Value Ref  Range Status    Sodium 08/21/2019 138  136 - 145 mmol/L Final    Potassium 08/21/2019 4.1  3.5 - 5.1 mmol/L Final    Chloride 08/21/2019 105  95 - 110 mmol/L Final    CO2 08/21/2019 27  23 - 29 mmol/L Final    Glucose 08/21/2019 111* 70 - 110 mg/dL Final    BUN, Bld 08/21/2019 33* 8 - 23 mg/dL Final    Creatinine 08/21/2019 1.4  0.5 - 1.4 mg/dL Final    Calcium 08/21/2019 9.9  8.7 - 10.5 mg/dL Final    Total Protein 08/21/2019 6.7  6.0 - 8.4 g/dL Final    Albumin 08/21/2019 3.8  3.5 - 5.2 g/dL Final    Total Bilirubin 08/21/2019 0.8  0.1 - 1.0 mg/dL Final    Alkaline Phosphatase 08/21/2019 31* 55 - 135 U/L Final    AST 08/21/2019 24  10 - 40 U/L Final    ALT 08/21/2019 16  10 - 44 U/L Final    Anion Gap 08/21/2019 6* 8 - 16 mmol/L Final    eGFR if  08/21/2019 56.0* >60 mL/min/1.73 m^2 Final    eGFR if non African American 08/21/2019 48.5* >60 mL/min/1.73 m^2 Final    Cholesterol 08/21/2019 122  120 - 199 mg/dL Final    Triglycerides 08/21/2019 90  30 - 150 mg/dL Final    HDL 08/21/2019 35* 40 - 75 mg/dL Final    LDL Cholesterol 08/21/2019 69.0  63.0 - 159.0 mg/dL Final    Hdl/Cholesterol Ratio 08/21/2019 28.7  20.0 - 50.0 % Final    Total Cholesterol/HDL Ratio 08/21/2019 3.5  2.0 - 5.0 Final    Non-HDL Cholesterol 08/21/2019 87  mg/dL Final    Specimen UA 08/21/2019 Urine, Clean Catch   Final    Color, UA 08/21/2019 Yellow  Yellow, Straw, Oriana Final    Appearance, UA 08/21/2019 Clear  Clear Final    pH, UA 08/21/2019 7.0  5.0 - 8.0 Final    Specific Gravity, UA 08/21/2019 1.010  1.005 - 1.030 Final    Protein, UA 08/21/2019 Negative  Negative Final    Glucose, UA 08/21/2019 Negative  Negative Final    Ketones, UA 08/21/2019 Negative  Negative Final    Bilirubin (UA) 08/21/2019 Negative  Negative Final    Occult Blood UA 08/21/2019 Negative  Negative Final    Nitrite, UA 08/21/2019 Negative  Negative Final    Urobilinogen, UA 08/21/2019 Negative  Negative EU/dL  Final    Leukocytes, UA 08/21/2019 Negative  Negative Final    WBC 08/21/2019 4.29  3.90 - 12.70 K/uL Final    RBC 08/21/2019 4.14* 4.60 - 6.20 M/uL Final    Hemoglobin 08/21/2019 13.0* 14.0 - 18.0 g/dL Final    Hematocrit 08/21/2019 40.1  40.0 - 54.0 % Final    Mean Corpuscular Volume 08/21/2019 97  82 - 98 fL Final    Mean Corpuscular Hemoglobin 08/21/2019 31.4* 27.0 - 31.0 pg Final    Mean Corpuscular Hemoglobin Conc 08/21/2019 32.4  32.0 - 36.0 g/dL Final    RDW 08/21/2019 13.0  11.5 - 14.5 % Final    Platelets 08/21/2019 141* 150 - 350 K/uL Final    MPV 08/21/2019 10.8  9.2 - 12.9 fL Final    Immature Granulocytes 08/21/2019 0.2  0.0 - 0.5 % Final    Gran # (ANC) 08/21/2019 2.5  1.8 - 7.7 K/uL Final    Immature Grans (Abs) 08/21/2019 0.01  0.00 - 0.04 K/uL Final    Lymph # 08/21/2019 1.2  1.0 - 4.8 K/uL Final    Mono # 08/21/2019 0.5  0.3 - 1.0 K/uL Final    Eos # 08/21/2019 0.1  0.0 - 0.5 K/uL Final    Baso # 08/21/2019 0.03  0.00 - 0.20 K/uL Final    nRBC 08/21/2019 0  0 /100 WBC Final    Gran% 08/21/2019 58.5  38.0 - 73.0 % Final    Lymph% 08/21/2019 27.3  18.0 - 48.0 % Final    Mono% 08/21/2019 11.2  4.0 - 15.0 % Final    Eosinophil% 08/21/2019 2.1  0.0 - 8.0 % Final    Basophil% 08/21/2019 0.7  0.0 - 1.9 % Final    Differential Method 08/21/2019 Automated   Final         Plan:           Fall, initial encounter  -     Ambulatory Referral to Physical/Occupational Therapy    Gait instability  -     Ambulatory Referral to Physical/Occupational Therapy    Chronic anticoagulation    Contusion of buttock, initial encounter      Patient was examined and evaluated with his history.  This is the 1st time I am seeing the patient and I do not have a complete idea about his baseline status.    Most importantly, he has a bruise and hematoma in the right buttock.    Range of motion of hip joint is complete without any discomfort.  There is no breakdown in the skin.  Patient is able to ambulate  and I doubt possibility of any fracture or injury.    Secondly, I am not sure about circumstances leading to fall but I do see that patient is on multiple antihypertensive medications and I do suspect that he might have some form of orthostasis intermittently.  These may need to be checked up again at follow-up.  Certainly he can monitor his blood pressures at home also.    Thereafter, conditions like dysautonomia, Shy-Drager syndrome or subtle Parkinson's may need to be considered.    He did have a slightly in mobile facial expression thus raising the Specter of Parkinson's.  Though I could not see any tremors or increased tone in the muscles.    If he continues to be frequently falling, question arises if he should be on anticoagulation.    Cardiac monitoring may be a consideration if there becomes a suspicion of arrhythmia.    I have advised him to follow up in 4 weeks with his primary care physician Dr. yin.  Spent becky 25 minutes with patient which involved review of pts medical conditions, labs, medications and with 50% of time face-to-face discussion about medical problems, management and any applicable changes.    Current Outpatient Medications:     amLODIPine (NORVASC) 5 MG tablet, TAKE ONE TABLET BY MOUTH ONCE DAILY, Disp: 90 tablet, Rfl: 12    blood sugar diagnostic Strp, To check BG bid times daily, to use with insurance preferred meter, Disp: 200 strip, Rfl: 3    ciclopirox 1 % shampoo, As directed, Disp: , Rfl: 11    fenofibrate micronized (LOFIBRA) 134 MG Cap, TAKE 1 CAPSULE BEFORE      BREAKFAST (RETURN TO       CLINIC FOR FOLLOW-UP IN    FEBRUARY), Disp: 90 capsule, Rfl: 3    finasteride (PROSCAR) 5 mg tablet, Take 5 mg by mouth once daily., Disp: , Rfl:     ipratropium (ATROVENT) 42 mcg (0.06 %) nasal spray, 2 sprays by Nasal route every 6 (six) hours as needed for Rhinitis., Disp: 45 mL, Rfl: 3    lansoprazole (PREVACID) 30 MG capsule, Take 1 capsule (30 mg total) by mouth once daily.,  Disp: 90 capsule, Rfl: 3    magnesium chloride (SLOW-MAG ORAL), Take by mouth once daily., Disp: , Rfl:     metoprolol tartrate (LOPRESSOR) 100 MG tablet, Take 1 tablet (100 mg total) by mouth 2 (two) times daily., Disp: 60 tablet, Rfl: 11    multivitamin capsule, Take 1 capsule by mouth once daily. , Disp: , Rfl:     pioglitazone (ACTOS) 30 MG tablet, TAKE ONE TABLET BY MOUTH ONCE DAILY, Disp: 90 tablet, Rfl: 3    quinapril (ACCUPRIL) 40 MG tablet, Take 40 mg by mouth once daily. , Disp: , Rfl: 4    rivaroxaban (XARELTO) 15 mg Tab, Take 15 mg by mouth once daily., Disp: , Rfl:     sertraline (ZOLOFT) 50 MG tablet, TAKE ONE TABLET BY MOUTH ONCE DAILY, Disp: 30 tablet, Rfl: 11    simvastatin (ZOCOR) 40 MG tablet, TAKE ONE TABLET BY MOUTH EVERY EVENING FOR CHOLESTEROL, Disp: 90 tablet, Rfl: 12    tolterodine (DETROL LA) 4 MG 24 hr capsule, Take 4 mg by mouth once daily. , Disp: , Rfl:     vitamin D 1000 units Tab, Take 1,000 Units by mouth once daily., Disp: , Rfl:     benzonatate (TESSALON) 100 MG capsule, Take 100 mg by mouth 3 (three) times daily as needed for Cough., Disp: , Rfl:     cetirizine (ZYRTEC) 10 MG tablet, Take 1 tablet (10 mg total) by mouth once daily., Disp: 30 tablet, Rfl: 0    dexchlorphen-phenylephrine-DM (POLYTUSSIN DM) 1-5-10 mg/5 mL Syrp, Take 5 mLs by mouth every 4 (four) hours as needed., Disp: 120 mL, Rfl: 0

## 2019-10-02 ENCOUNTER — CLINICAL SUPPORT (OUTPATIENT)
Dept: REHABILITATION | Facility: HOSPITAL | Age: 74
End: 2019-10-02
Payer: MEDICARE

## 2019-10-02 DIAGNOSIS — M53.86 DECREASED ROM OF LUMBAR SPINE: ICD-10-CM

## 2019-10-02 PROCEDURE — 97110 THERAPEUTIC EXERCISES: CPT

## 2019-10-02 NOTE — PROGRESS NOTES
Physical Therapy Daily Treatment Note   Progress Note  Name: Tono Saez  Clinic Number: [id number]    Therapy Diagnosis:   Encounter Diagnosis   Name Primary?    Decreased ROM of lumbar spine      Physician: Scott Staley MD    Visit Date: 10/2/2019    Physician Orders: PT eval and treat  Medical Diagnosis: low back pain; age-related physical disability    Evaluation Date: 8/26/19  Authorization Period Expiration: 8/26/2020  Plan of Care Certification Period:8/29/2019 to 11/29/2019   Visit #/Visits authorized: 6/12   PTA visit: 1/5    Monthly note due 10/25/19    Time In: 1400  Time Out: 1500  Total Billable Time: 40 minutes    Precautions: Standard  NO E-stim pacemaker    Subjective     Pt reports continued pain in R hip and lumbar region which is elevated from his recent fall. Pain level still reaching 10/10 with movement. He did see his MD who cleared him of fractures and told him he was ok to return to therapy.        He was compliant with home exercise program.  Response to previous treatment: fair  Functional change: n/a    Pain: 8/10  Location: bilateral back      Objective       Steven received therapeutic exercises to develop strength, endurance, ROM, flexibility, posture and core stabilization for 45 minutes including:    Bike 10 minutes  Hamstring stretch 3 x 30 second   Piriformis stretch 3 x 30 second   DKTC x20 DNP  QL stretch over the bolster 3 x 30 sec right side up  Positional distraction in left side lying right side up for 5 minutes  TrA activation for 10 x 2  with 5 sec holds.   TrA activation in hooklying with LE fall out x 10 each leg DNP  Bridges 3 x 10 DNP  Hip abduction with GTB 3 x 10 in sitting    Steven received the following manual therapy techniques: Soft tissue Mobilization were applied to the: lumbar region for 0 minutes performed in sidelying.   Steven participated in neuromuscular re-education activities to improve:   Steven participated in dynamic functional therapeutic  activities to improve functional performance for   Steven participated in gait training to improve functional mobility and safety for                                                                      Steven received the following direct contact modalities after being cleared for contraindications:   Steven received the following supervised modalities after being cleared for contradictions:    Steven received cold pack for 10 minutes to lumbar region seated following to TE.      Home Exercises Provided and Patient Education Provided     Education provided:     Written Home Exercises Provided: Patient instructed to cont prior HEP.  Exercises were reviewed and Steven was able to demonstrate them prior to the end of the session.  Steven demonstrated fair  understanding of the education provided.     See EMR under Patient Instructions for exercises provided prior visit.    Assessment     Pt was able to perform all TE in seated position without significant provocation of pain. All supine or sidelying exercises held secondary to patient's inability to achieve these positions without pain. Cold pack applied to lumbar region following TE for pain control.  Pt would benefit from continued skilled therapy to address strength and flexibility deficits in order to assist patient's ability to be primary caregiver of spouse without pain and perform IADLs without limitations.     Steven is progressing well towards his goals.   Pt prognosis is Guarded.     Pt will continue to benefit from skilled outpatient physical therapy to address the deficits listed in the problem list box on initial evaluation, provide pt/family education and to maximize pt's level of independence in the home and community environment.     Pt's spiritual, cultural and educational needs considered and pt agreeable to plan of care and goals.     Anticipated barriers to physical therapy: age, significant PMH    Goals:  Short Term Goals:  In 2 weeks the pt will,    Demonstrate independence with initial HEP to facilitate therex progression Progressing 9/25/2019  Improve lumbar ROM to >5 degrees for flexion in order to don and doff shoes and socks with decreased limitations Progressing 9/25/2019     Long Term Goals:  In 4 weeks the pt will,  Demonstrate independence with advanced HEP Progressing 9/25/2019  Improve Modified Oswestry to <20% in order to improve caregiver duties Progressing 9/25/2019  Improve lumbar ROM to >10 degrees for all planes in order for pt to perform all home management with decreased limitations. Progressing 9/25/2019  Improve lumbar strength to >=1/3 grade in order for pt to perform caregiver duties with decreased restrictions. Progressing 9/25/2019    Plan     Continue current POC advancing as tolerated.    Venecia Sanchez, PTA

## 2019-10-07 ENCOUNTER — CLINICAL SUPPORT (OUTPATIENT)
Dept: REHABILITATION | Facility: HOSPITAL | Age: 74
End: 2019-10-07
Payer: MEDICARE

## 2019-10-07 DIAGNOSIS — M53.86 DECREASED ROM OF LUMBAR SPINE: ICD-10-CM

## 2019-10-07 PROCEDURE — 97110 THERAPEUTIC EXERCISES: CPT

## 2019-10-07 NOTE — PROGRESS NOTES
Physical Therapy Daily Treatment Note     Name: Tono Saez  Clinic Number: [id number]    Therapy Diagnosis:   Encounter Diagnosis   Name Primary?    Decreased ROM of lumbar spine      Physician: Scott Staley MD    Visit Date: 10/7/2019    Physician Orders: PT eval and treat  Medical Diagnosis: low back pain; age-related physical disability    Evaluation Date: 8/26/19  Authorization Period Expiration: 8/26/2020  Plan of Care Certification Period:8/29/2019 to 11/29/2019   Visit #/Visits authorized: 7/12   PTA visit: 0/5    Monthly note due 10/25/19    Time In: 1400  Time Out: 1500  Total Billable Time: 30 minutes    Precautions: Standard  NO E-stim pacemaker    Subjective     Pt reports improved right posterior hip pain. He still has pain in his right low back which increases with reaching for his seatbelt and during supine <> sit.        He was compliant with home exercise program.  Response to previous treatment: fair  Functional change: n/a    Pain: 8/10  Location: bilateral back      Objective     TTP right lower ribs ~8-12. Hypomobility of thoracic spine and pain mimicking symptoms with right ribs ~6-10 P/A grades III.     Steven received therapeutic exercises to develop strength, endurance, ROM, flexibility, posture and core stabilization for 30 minutes including:    Bike 10 minutes  Hamstring stretch 3 x 30 second   Piriformis stretch 3 x 30 second   DKTC x20   QL stretch over the bolster 3 x 30 sec right side up with intercostal stretching using manual overpressure during diaphragmatic breathing.   Positional distraction in left side lying right side up for 5 minutes  TrA activation for 10 x 2  with 5 sec holds.   TrA activation in hooklying with LE fall out x 10 each leg DNP  Bridges 3 x 10 DNP due to pain increases  Hip abduction with GTB 3 x 10 in sitting DNP  Side lying hip ext rot 3 x 10  Supine diaphragmatic breathing into PTB x15    Steven received the following manual therapy techniques: Soft  tissue Mobilization were applied to the: lumbar region for 0 minutes performed in sidelying.   Steven participated in neuromuscular re-education activities to improve:   Steven participated in dynamic functional therapeutic activities to improve functional performance for   Steven participated in gait training to improve functional mobility and safety for                                                                      Steven received the following direct contact modalities after being cleared for contraindications:   Steven received the following supervised modalities after being cleared for contradictions:    Steven received cold pack for 10 minutes to lumbar region seated following to TE.      Home Exercises Provided and Patient Education Provided     Education provided:     Written Home Exercises Provided: Patient instructed to cont prior HEP.  Exercises were reviewed and Steven was able to demonstrate them prior to the end of the session.  Steven demonstrated fair  understanding of the education provided.     See EMR under Patient Instructions for exercises provided prior visit.    Assessment     Pt's symptoms were provoked with lower thoracic and right rib mobilizations. Increase in mobility and improved rotational symptoms following therapy. Redirected therapy to incorporate lumbar and thoracic stabilization to improve functional mobility.    Steven is progressing well towards his goals.   Pt prognosis is Guarded.     Pt will continue to benefit from skilled outpatient physical therapy to address the deficits listed in the problem list box on initial evaluation, provide pt/family education and to maximize pt's level of independence in the home and community environment.     Pt's spiritual, cultural and educational needs considered and pt agreeable to plan of care and goals.     Anticipated barriers to physical therapy: age, significant PMH    Goals:  Short Term Goals:  In 2 weeks the pt will,   Demonstrate  independence with initial HEP to facilitate therex progression Progressing 9/25/2019  Improve lumbar ROM to >5 degrees for flexion in order to don and doff shoes and socks with decreased limitations Progressing 9/25/2019     Long Term Goals:  In 4 weeks the pt will,  Demonstrate independence with advanced HEP Progressing 9/25/2019  Improve Modified Oswestry to <20% in order to improve caregiver duties Progressing 9/25/2019  Improve lumbar ROM to >10 degrees for all planes in order for pt to perform all home management with decreased limitations. Progressing 9/25/2019  Improve lumbar strength to >=1/3 grade in order for pt to perform caregiver duties with decreased restrictions. Progressing 9/25/2019    Plan     Continue current POC advancing as tolerated.    Gio Rowell, PT

## 2019-10-09 ENCOUNTER — CLINICAL SUPPORT (OUTPATIENT)
Dept: REHABILITATION | Facility: HOSPITAL | Age: 74
End: 2019-10-09
Payer: MEDICARE

## 2019-10-09 DIAGNOSIS — M53.86 DECREASED ROM OF LUMBAR SPINE: ICD-10-CM

## 2019-10-09 PROCEDURE — 97110 THERAPEUTIC EXERCISES: CPT

## 2019-10-09 NOTE — PROGRESS NOTES
Physical Therapy Daily Treatment Note     Name: Tono Saez  Clinic Number: [id number]    Therapy Diagnosis:   Encounter Diagnosis   Name Primary?    Decreased ROM of lumbar spine      Physician: Scott Staley MD    Visit Date: 10/9/2019    Physician Orders: PT eval and treat  Medical Diagnosis: low back pain; age-related physical disability    Evaluation Date: 8/26/19  Authorization Period Expiration: 8/26/2020  Plan of Care Certification Period:8/29/2019 to 11/29/2019   Visit #/Visits authorized: 8/12   PTA visit: 0/5    Monthly note due 10/25/19    Time In: 1500  Time Out: 1600  Total Billable Time: 45 minutes    Precautions: Standard  NO E-stim pacemaker    Subjective     Pt reports increase in right posterior rib pain and left anterior rib pain. States soreness and achy. Pt states when he left therapy last session he was feeling great and was able to reach for his seat belt but soreness increased that night and until today. Heat pad did not help   He was compliant with home exercise program.  Response to previous treatment: increase in intercostal soreness  Functional change: n/a    Pain: 8/10  Location: bilateral back      Objective     Steven received therapeutic exercises to develop strength, endurance, ROM, flexibility, posture and core stabilization for 45 minutes including:    UBE 5 minutes  Door way stretch 3 x 30 sec  Supine lying on tandem foam for 5 minutes  Upper trunk rotation x 20 each side  Lower trunk rotation x 20 each side  Right QL stretch with right sided intercostal stretching, modified due to pain with leaning over bolster. 3 x 30s  Seated diaphragmatic breathing x20 into PTB    Ice massage to left anterior rib cage for 8 minutes.     Not Performed Today  Bike 10 minutes   Hamstring stretch 3 x 30 second   Piriformis stretch 3 x 30 second   DKTC x20   QL stretch over the bolster 3 x 30 sec right side up with intercostal stretching using manual overpressure during diaphragmatic  breathing.   Positional distraction in left side lying right side up for 5 minutes  TrA activation for 10 x 2  with 5 sec holds.   TrA activation in hooklying with LE fall out x 10 each leg DNP  Bridges 3 x 10 DNP due to pain increases  Hip abduction with GTB 3 x 10 in sitting DNP  Side lying hip ext rot 3 x 10  Supine diaphragmatic breathing into PTB x15    Steven received the following manual therapy techniques: Soft tissue Mobilization were applied to the: lumbar region for 0 minutes performed in sidelying.   Steven participated in neuromuscular re-education activities to improve:   Steven participated in dynamic functional therapeutic activities to improve functional performance for   Steven participated in gait training to improve functional mobility and safety for                                                                      Steven received the following direct contact modalities after being cleared for contraindications:   Steven received the following supervised modalities after being cleared for contradictions:    Steven received cold pack for 10 minutes to lumbar region seated following to TE. DNP      Home Exercises Provided and Patient Education Provided     Education provided:     Written Home Exercises Provided: Patient instructed to cont prior HEP.  Exercises were reviewed and Steven was able to demonstrate them prior to the end of the session.  Steven demonstrated fair  understanding of the education provided.     See EMR under Patient Instructions for exercises provided prior visit.    Assessment     Pt increase in rib soreness limiting strengthening activities today. Pt performed thoracic and lumbar stretches to improve spinal mobility in order for pt to perform daily activities with less restrictions. Pain decreased following ice massage to left rib cage and right QL/intercostal stretching along with positional distraction. Pain determined to be due to left intercostal muscle soreness. Pt left  clinic 3/10. Continue with addressing thoracic and lumbar joint and soft tissue restrictions to restore mobility and to improve function.     Steven is progressing well towards his goals.   Pt prognosis is Guarded.     Pt will continue to benefit from skilled outpatient physical therapy to address the deficits listed in the problem list box on initial evaluation, provide pt/family education and to maximize pt's level of independence in the home and community environment.     Pt's spiritual, cultural and educational needs considered and pt agreeable to plan of care and goals.     Anticipated barriers to physical therapy: age, significant PMH    Goals:  Short Term Goals:  In 2 weeks the pt will,   Demonstrate independence with initial HEP to facilitate therex progression Progressing 9/25/2019  Improve lumbar ROM to >5 degrees for flexion in order to don and doff shoes and socks with decreased limitations Progressing 9/25/2019     Long Term Goals:  In 4 weeks the pt will,  Demonstrate independence with advanced HEP Progressing 9/25/2019  Improve Modified Oswestry to <20% in order to improve caregiver duties Progressing 9/25/2019  Improve lumbar ROM to >10 degrees for all planes in order for pt to perform all home management with decreased limitations. Progressing 9/25/2019  Improve lumbar strength to >=1/3 grade in order for pt to perform caregiver duties with decreased restrictions. Progressing 9/25/2019    Plan     Continue current POC advancing as tolerated.    Gio Rowell, PT

## 2019-10-14 ENCOUNTER — CLINICAL SUPPORT (OUTPATIENT)
Dept: REHABILITATION | Facility: HOSPITAL | Age: 74
End: 2019-10-14
Payer: MEDICARE

## 2019-10-14 DIAGNOSIS — M53.86 DECREASED ROM OF LUMBAR SPINE: ICD-10-CM

## 2019-10-14 PROCEDURE — 97110 THERAPEUTIC EXERCISES: CPT

## 2019-10-14 NOTE — PROGRESS NOTES
Physical Therapy Daily Treatment Note     Name: Tono Saez  Clinic Number: [id number]    Therapy Diagnosis:   Encounter Diagnosis   Name Primary?    Decreased ROM of lumbar spine      Physician: Scott Staley MD    Visit Date: 10/14/2019    Physician Orders: PT eval and treat  Medical Diagnosis: low back pain; age-related physical disability    Evaluation Date: 8/26/19  Authorization Period Expiration: 8/26/2020  Plan of Care Certification Period:8/29/2019 to 11/29/2019   Visit #/Visits authorized: 9/12   PTA visit: 0/5    Monthly note due 10/25/19    Time In: 1300  Time Out: 1400  Total Billable Time: 55 minutes    Precautions: Standard  NO E-stim pacemaker    Subjective     Pt reports improvements in left sided rib pain and right sided low back pain. He states his left sided rib pain is pretty much gone and his right low back pain has improved increasing his functional tolerance.   He was compliant with home exercise program.  Response to previous treatment: decrease in symptoms  Functional change: improved ability to rotate    Pain: 2/10  Location: bilateral back      Objective     Steven received 1:1 therapeutic exercises to develop strength, endurance, ROM, flexibility, posture and core stabilization for 55 minutes including:    Nustep 10 minutes  Door way stretch 3 x 30 sec  Hamstring stretch 3 x 30s  Supine lying on tandem foam for 5 minutes  Upper trunk rotation x 20 each side  Lower trunk rotation x 20 each side  Right QL stretch with right sided intercostal stretching, modified due to pain with leaning over bolster. 3 x 30s  Seated diaphragmatic breathing x20 into PTB  Scapular retraction with ext rot 3 x 10 PTB  Positional distraction in left side lying right side up for 5 minutes  Cybex seated row 3 x 10 #4    Not Performed Today  Bike 10 minutes    Piriformis stretch 3 x 30 second   DKTC x20   QL stretch over the bolster 3 x 30 sec right side up with intercostal stretching using manual  overpressure during diaphragmatic breathing.   Positional distraction in left side lying right side up for 5 minutes  TrA activation for 10 x 2  with 5 sec holds.   TrA activation in hooklying with LE fall out x 10 each leg DNP  Bridges 3 x 10 DNP due to pain increases  Hip abduction with GTB 3 x 10 in sitting DNP  Side lying hip ext rot 3 x 10  Supine diaphragmatic breathing into PTB x15    Steven received the following manual therapy techniques: Soft tissue Mobilization were applied to the: lumbar region for 0 minutes performed in sidelying.   Steven participated in neuromuscular re-education activities to improve:   Steven participated in dynamic functional therapeutic activities to improve functional performance for   Steven participated in gait training to improve functional mobility and safety for                                                           Steven received the following direct contact modalities after being cleared for contraindications:   Steven received the following supervised modalities after being cleared for contradictions:    Steven received cold pack for 10 minutes to lumbar region seated following to TE. DNP    Home Exercises Provided and Patient Education Provided     Education provided:     Written Home Exercises Provided: Patient instructed to cont prior HEP.  Exercises were reviewed and Steven was able to demonstrate them prior to the end of the session.  Steven demonstrated fair  understanding of the education provided.     See EMR under Patient Instructions for exercises provided prior visit.    Assessment     Pt improved tolerance to therex today due to improvements of symptoms. Pt also demonstrated improved thoracic mobility with therex. Incorporated thoracic strengthening to improve spinal mobility and restore function.     Steven is progressing well towards his goals.   Pt prognosis is Guarded.     Pt will continue to benefit from skilled outpatient physical therapy to address the  deficits listed in the problem list box on initial evaluation, provide pt/family education and to maximize pt's level of independence in the home and community environment.     Pt's spiritual, cultural and educational needs considered and pt agreeable to plan of care and goals.     Anticipated barriers to physical therapy: age, significant PMH    Goals:  Short Term Goals:  In 2 weeks the pt will,   Demonstrate independence with initial HEP to facilitate therex progression Progressing 9/25/2019  Improve lumbar ROM to >5 degrees for flexion in order to don and doff shoes and socks with decreased limitations Progressing 9/25/2019     Long Term Goals:  In 4 weeks the pt will,  Demonstrate independence with advanced HEP Progressing 9/25/2019  Improve Modified Oswestry to <20% in order to improve caregiver duties Progressing 9/25/2019  Improve lumbar ROM to >10 degrees for all planes in order for pt to perform all home management with decreased limitations. Progressing 9/25/2019  Improve lumbar strength to >=1/3 grade in order for pt to perform caregiver duties with decreased restrictions. Progressing 9/25/2019    Plan     Continue current POC advancing as tolerated.    Gio Rowell, PT

## 2019-10-16 ENCOUNTER — CLINICAL SUPPORT (OUTPATIENT)
Dept: REHABILITATION | Facility: HOSPITAL | Age: 74
End: 2019-10-16
Payer: MEDICARE

## 2019-10-16 DIAGNOSIS — M53.86 DECREASED ROM OF LUMBAR SPINE: ICD-10-CM

## 2019-10-16 PROCEDURE — 97110 THERAPEUTIC EXERCISES: CPT

## 2019-10-16 NOTE — PROGRESS NOTES
Physical Therapy Daily Treatment Note     Name: Tono Saez  Clinic Number: [id number]    Therapy Diagnosis:   Encounter Diagnosis   Name Primary?    Decreased ROM of lumbar spine      Physician: Scott Staley MD    Visit Date: 10/16/2019    Physician Orders: PT eval and treat  Medical Diagnosis: low back pain; age-related physical disability    Evaluation Date: 8/26/19  Authorization Period Expiration: 8/26/2020  Plan of Care Certification Period:8/29/2019 to 11/29/2019   Visit #/Visits authorized: 10/12   PTA visit: 1/5    Monthly note due 10/25/19    Time In: 1300  Time Out: 1400  Total Billable Time: 55 minutes    Precautions: Standard  NO E-stim pacemaker    Subjective     Pt reports: He had difficulty sleeping last night but unsure if it was pain related. Verbalizes minimal pain this afternoon.  He was compliant with home exercise program.  Response to previous treatment: decrease in symptoms  Functional change: improved ability to rotate    Pain: 1/10  Location: bilateral back      Objective     Steven received 1:1 therapeutic exercises to develop strength, endurance, ROM, flexibility, posture and core stabilization for 55 minutes including:    Nustep 10 minutes  Door way stretch 3 x 30 sec  Hamstring stretch 3 x 1 minute  Supine lying on tandem foam for 5 minutes  Upper trunk rotation x 20 each side  Lower trunk rotation x 20 each side  Right QL stretch with right sided intercostal stretching, modified due to pain with leaning over bolster. 3 x 30s  Seated diaphragmatic breathing x20 into PTB  Scapular retraction with ext rot 3 x 10 PTB  Positional distraction in left side lying right side up for 5 minutes  Cybex seated row 3 x 10 #4    Not Performed Today  Bike 10 minutes    Piriformis stretch 3 x 30 second   DKTC x20   QL stretch over the bolster 3 x 30 sec right side up with intercostal stretching using manual overpressure during diaphragmatic breathing.   Positional distraction in left side lying  right side up for 5 minutes  TrA activation for 10 x 2  with 5 sec holds.   TrA activation in hooklying with LE fall out x 10 each leg DNP  Bridges 3 x 10 DNP due to pain increases  Hip abduction with GTB 3 x 10 in sitting DNP  Side lying hip ext rot 3 x 10  Supine diaphragmatic breathing into PTB x15    Steven received the following manual therapy techniques: Soft tissue Mobilization were applied to the: lumbar region for 0 minutes performed in sidelying.   Steven participated in neuromuscular re-education activities to improve:   Steven participated in dynamic functional therapeutic activities to improve functional performance for   Steven participated in gait training to improve functional mobility and safety for                                                           Steven received the following direct contact modalities after being cleared for contraindications:   Steven received the following supervised modalities after being cleared for contradictions:    Steven received cold pack for 10 minutes to lumbar region seated following to TE. DNP    Home Exercises Provided and Patient Education Provided     Education provided:     Written Home Exercises Provided: Patient instructed to cont prior HEP.  Exercises were reviewed and Steven was able to demonstrate them prior to the end of the session.  Steven demonstrated fair  understanding of the education provided.     See EMR under Patient Instructions for exercises provided prior visit.    Assessment     Pt tolerated tx well without onset of incident. Verbalized improvement in mobility post tx session. Pt req's cueing to prevent UT compensation during periscapular there-ex. He will continue to benefit from skilled PT to improve lumbar flexion to allow pt to don and doff shoes and socks with decreased limitations.     Steven is progressing well towards his goals.   Pt prognosis is Guarded.     Pt will continue to benefit from skilled outpatient physical therapy to address  the deficits listed in the problem list box on initial evaluation, provide pt/family education and to maximize pt's level of independence in the home and community environment.     Pt's spiritual, cultural and educational needs considered and pt agreeable to plan of care and goals.     Anticipated barriers to physical therapy: age, significant PMH    Goals:  Short Term Goals:  In 2 weeks the pt will,   Demonstrate independence with initial HEP to facilitate therex progression Progressing 9/25/2019  Improve lumbar ROM to >5 degrees for flexion in order to don and doff shoes and socks with decreased limitations Progressing 9/25/2019     Long Term Goals:  In 4 weeks the pt will,  Demonstrate independence with advanced HEP Progressing 9/25/2019  Improve Modified Oswestry to <20% in order to improve caregiver duties Progressing 9/25/2019  Improve lumbar ROM to >10 degrees for all planes in order for pt to perform all home management with decreased limitations. Progressing 9/25/2019  Improve lumbar strength to >=1/3 grade in order for pt to perform caregiver duties with decreased restrictions. Progressing 9/25/2019    Plan     Continue current POC advancing as tolerated.    Madison Hahn, PTA

## 2019-10-18 ENCOUNTER — DOCUMENTATION ONLY (OUTPATIENT)
Dept: REHABILITATION | Facility: HOSPITAL | Age: 74
End: 2019-10-18

## 2019-10-18 NOTE — PROGRESS NOTES
PT/PTA face to face conference completed regarding patient treatment plan and progress towards established goals. Treatment will be continued as described in initial report/ evaluation and treatment/progress notes. Patient will be seen by physical therapist every sixth visit or minimally once per month.       Madison Hahn, PTA

## 2019-10-21 ENCOUNTER — CLINICAL SUPPORT (OUTPATIENT)
Dept: REHABILITATION | Facility: HOSPITAL | Age: 74
End: 2019-10-21
Payer: MEDICARE

## 2019-10-21 ENCOUNTER — LAB VISIT (OUTPATIENT)
Dept: LAB | Facility: HOSPITAL | Age: 74
End: 2019-10-21
Attending: INTERNAL MEDICINE
Payer: MEDICARE

## 2019-10-21 DIAGNOSIS — I50.9 HEART FAILURE, UNSPECIFIED: ICD-10-CM

## 2019-10-21 DIAGNOSIS — E78.5 HYPERLIPEMIA: ICD-10-CM

## 2019-10-21 DIAGNOSIS — I48.0 PAROXYSMAL ATRIAL FIBRILLATION: ICD-10-CM

## 2019-10-21 DIAGNOSIS — M53.86 DECREASED ROM OF LUMBAR SPINE: ICD-10-CM

## 2019-10-21 DIAGNOSIS — D64.9 ANEMIA, UNSPECIFIED: Primary | ICD-10-CM

## 2019-10-21 LAB
ANION GAP SERPL CALC-SCNC: 10 MMOL/L (ref 8–16)
BASOPHILS # BLD AUTO: 0.04 K/UL (ref 0–0.2)
BASOPHILS NFR BLD: 0.7 % (ref 0–1.9)
BUN SERPL-MCNC: 29 MG/DL (ref 8–23)
CALCIUM SERPL-MCNC: 9.7 MG/DL (ref 8.7–10.5)
CHLORIDE SERPL-SCNC: 103 MMOL/L (ref 95–110)
CO2 SERPL-SCNC: 28 MMOL/L (ref 23–29)
CREAT SERPL-MCNC: 1.4 MG/DL (ref 0.5–1.4)
DIFFERENTIAL METHOD: ABNORMAL
EOSINOPHIL # BLD AUTO: 0.2 K/UL (ref 0–0.5)
EOSINOPHIL NFR BLD: 2.7 % (ref 0–8)
ERYTHROCYTE [DISTWIDTH] IN BLOOD BY AUTOMATED COUNT: 13.1 % (ref 11.5–14.5)
EST. GFR  (AFRICAN AMERICAN): 56 ML/MIN/1.73 M^2
EST. GFR  (NON AFRICAN AMERICAN): 48.5 ML/MIN/1.73 M^2
GLUCOSE SERPL-MCNC: 118 MG/DL (ref 70–110)
HCT VFR BLD AUTO: 36.3 % (ref 40–54)
HGB BLD-MCNC: 12.1 G/DL (ref 14–18)
IMM GRANULOCYTES # BLD AUTO: 0.01 K/UL (ref 0–0.04)
IMM GRANULOCYTES NFR BLD AUTO: 0.2 % (ref 0–0.5)
LYMPHOCYTES # BLD AUTO: 1.3 K/UL (ref 1–4.8)
LYMPHOCYTES NFR BLD: 22.2 % (ref 18–48)
MAGNESIUM SERPL-MCNC: 1.5 MG/DL (ref 1.6–2.6)
MCH RBC QN AUTO: 32.3 PG (ref 27–31)
MCHC RBC AUTO-ENTMCNC: 33.3 G/DL (ref 32–36)
MCV RBC AUTO: 97 FL (ref 82–98)
MONOCYTES # BLD AUTO: 0.6 K/UL (ref 0.3–1)
MONOCYTES NFR BLD: 10.1 % (ref 4–15)
NEUTROPHILS # BLD AUTO: 3.6 K/UL (ref 1.8–7.7)
NEUTROPHILS NFR BLD: 64.1 % (ref 38–73)
NRBC BLD-RTO: 0 /100 WBC
PLATELET # BLD AUTO: 133 K/UL (ref 150–350)
PMV BLD AUTO: 10.9 FL (ref 9.2–12.9)
POTASSIUM SERPL-SCNC: 3.8 MMOL/L (ref 3.5–5.1)
RBC # BLD AUTO: 3.75 M/UL (ref 4.6–6.2)
SODIUM SERPL-SCNC: 141 MMOL/L (ref 136–145)
WBC # BLD AUTO: 5.62 K/UL (ref 3.9–12.7)

## 2019-10-21 PROCEDURE — 85025 COMPLETE CBC W/AUTO DIFF WBC: CPT

## 2019-10-21 PROCEDURE — 83735 ASSAY OF MAGNESIUM: CPT

## 2019-10-21 PROCEDURE — 36415 COLL VENOUS BLD VENIPUNCTURE: CPT

## 2019-10-21 PROCEDURE — 97110 THERAPEUTIC EXERCISES: CPT

## 2019-10-21 PROCEDURE — 80048 BASIC METABOLIC PNL TOTAL CA: CPT

## 2019-10-21 NOTE — PROGRESS NOTES
Physical Therapy Daily Treatment Note     Name: Tono Saez  Clinic Number: [id number]    Therapy Diagnosis:   Encounter Diagnosis   Name Primary?    Decreased ROM of lumbar spine      Physician: Scott Staley MD    Visit Date: 10/21/2019    Physician Orders: PT eval and treat  Medical Diagnosis: low back pain; age-related physical disability    Evaluation Date: 8/26/19  Authorization Period Expiration: 8/26/2020  Plan of Care Certification Period:8/29/2019 to 11/29/2019   Visit #/Visits authorized: 11/12   PTA visit: 0/5    Monthly note due 10/25/19    Time In: 1300  Time Out: 1400  Total Billable Time: 55 minutes    Precautions: Standard  NO E-stim pacemaker    Subjective     Pt reports: Pt states he rested the last few days and his pain as been gone. He is now able to perform a lot of different activities that used to cause pain. He still feels a little stiff but overall feels more flexible and has less pain with activity.   Response to previous treatment: decrease in symptoms  Functional change: improved ability to rotate    Pain: 1/10  Location: bilateral back      Objective     Steven received 1:1 therapeutic exercises to develop strength, endurance, ROM, flexibility, posture and core stabilization for 55 minutes including:    Nustep 10 minutes  Door way stretch 3 x 30 sec  Hamstring stretch 3 x 1 minute  Supine lying on tandem foam for 5 minutes  Upper trunk rotation x 20 each side  Lower trunk rotation x 20 each side  Right QL stretch with right sided intercostal stretching, modified due to pain with leaning over bolster. 3 x 30s  Seated diaphragmatic breathing x20 into PTB DNP  Scapular retraction with ext rot 3 x 10 PTB  Positional distraction in left side lying right side up for 5 minutes  Cybex seated row 3 x 10 #4  Bridge 3 x 10  Side-lying crunch with hip int rot x20 each side    Not Performed Today  Bike 10 minutes    Piriformis stretch 3 x 30 second   DKTC x20   QL stretch over the bolster 3 x  30 sec right side up with intercostal stretching using manual overpressure during diaphragmatic breathing.   Positional distraction in left side lying right side up for 5 minutes  TrA activation for 10 x 2  with 5 sec holds.   TrA activation in hooklying with LE fall out x 10 each leg DNP  Bridges 3 x 10 DNP due to pain increases  Hip abduction with GTB 3 x 10 in sitting DNP  Side lying hip ext rot 3 x 10  Supine diaphragmatic breathing into PTB x15    Steven received the following manual therapy techniques: Soft tissue Mobilization were applied to the: lumbar region for 0 minutes performed in sidelying.   Steven participated in neuromuscular re-education activities to improve:   Steven participated in dynamic functional therapeutic activities to improve functional performance for   Steven participated in gait training to improve functional mobility and safety for                                                           Steven received the following direct contact modalities after being cleared for contraindications:   Steven received the following supervised modalities after being cleared for contradictions:    Steven received cold pack for 10 minutes to lumbar region seated following to TE. DNP    Home Exercises Provided and Patient Education Provided     Education provided:     Written Home Exercises Provided: Patient instructed to cont prior HEP.  Exercises were reviewed and Steven was able to demonstrate them prior to the end of the session.  Steven demonstrated fair  understanding of the education provided.     See EMR under Patient Instructions for exercises provided prior visit.    Assessment     Pt able to perform all therex with decrease in symptoms provocation. Pt educated on additional therex to continue with to facilitate recovery and ability to perform daily routines without provocation.     Steven is progressing well towards his goals.   Pt prognosis is Guarded.     Pt will continue to benefit from skilled  outpatient physical therapy to address the deficits listed in the problem list box on initial evaluation, provide pt/family education and to maximize pt's level of independence in the home and community environment.     Pt's spiritual, cultural and educational needs considered and pt agreeable to plan of care and goals.     Anticipated barriers to physical therapy: age, significant PMH    Goals:  Short Term Goals:  In 2 weeks the pt will,   Demonstrate independence with initial HEP to facilitate therex progression Progressing 9/25/2019  Improve lumbar ROM to >5 degrees for flexion in order to don and doff shoes and socks with decreased limitations Progressing 9/25/2019     Long Term Goals:  In 4 weeks the pt will,  Demonstrate independence with advanced HEP Progressing 9/25/2019  Improve Modified Oswestry to <20% in order to improve caregiver duties Progressing 9/25/2019  Improve lumbar ROM to >10 degrees for all planes in order for pt to perform all home management with decreased limitations. Progressing 9/25/2019  Improve lumbar strength to >=1/3 grade in order for pt to perform caregiver duties with decreased restrictions. Progressing 9/25/2019    Plan     Continue current POC advancing as tolerated.    Gio Rowell, PT

## 2019-10-23 ENCOUNTER — CLINICAL SUPPORT (OUTPATIENT)
Dept: REHABILITATION | Facility: HOSPITAL | Age: 74
End: 2019-10-23
Payer: MEDICARE

## 2019-10-23 DIAGNOSIS — M53.86 DECREASED ROM OF LUMBAR SPINE: ICD-10-CM

## 2019-10-23 PROCEDURE — 97140 MANUAL THERAPY 1/> REGIONS: CPT

## 2019-10-23 PROCEDURE — 97110 THERAPEUTIC EXERCISES: CPT

## 2019-10-23 NOTE — PLAN OF CARE
Physical Therapy Updated POC/Daily Treatment Note     Name: Tono Saez  Clinic Number: [id number]    Therapy Diagnosis:   Encounter Diagnosis   Name Primary?    Decreased ROM of lumbar spine      Physician: Scott Staley MD    Visit Date: 10/23/2019    Physician Orders: PT eval and treat  Medical Diagnosis: low back pain; age-related physical disability    Evaluation Date: 8/26/19  Authorization Period Expiration: 8/26/2020  Plan of Care Certification Period:10/23/2019 - 12/23/2019  Visit #/Visits authorized: 12/12   PTA visit: 0/5    Time In: 1400  Time Out: 1500  Total Billable Time: 45 minutes    Precautions: Standard  NO E-stim pacemaker    Subjective     Pt reports improved right low back symptoms since beginning therapy. His pain as noted last visit was gone after 4 days of rest, however, following last visit his pain returned and has been constant. His pain occurs when reaching for his seatbelt in the drivers seat. He states he is able to bend and lift with decreased pain but his only limitation and pain is when reaching for his seat belt. This action causes constant stabbing/burning pain for > 1 hour in his right low back affecting his ability to walk and perform daily activities.        He was compliant with home exercise program.  Response to previous treatment: increase in right sided symptoms.   Functional change: improved ability to bend and lift items    Pain: 5/10  Location: right low back     Objective      Lumbar Range of Motion:     Degrees Pain   Flexion 35        Extension 5          Left Side Bending 15    Right Side Bending 15    Left rotation    5 Thoracic restriction noted   Right Rotation    5 Thoracic restriction noted            Lumbar Strength:  Unilateral hip bridge endurance test 4/5 bilateral. SLR 4/5. No pain provocation     Lower Extremity Strength  Right LE   Left LE     Knee extension: 5/5 Knee extension: 5/5   Knee flexion: 4/5 Knee flexion: 4/5   Hip flexion: 4/5 Hip  flexion: 4/5   Hip extension:  4-/5 Hip extension: 4-/5   Hip abduction: 4-/5 Hip abduction: 4-/5   Hip adduction: 4-/5 Hip adduction 4-/5   Ankle dorsiflexion: 4+/5 Ankle dorsiflexion: 4+/5   Ankle plantarflexion: 4+/5 Ankle plantarflexion: 4+/5     BUE strength WFL for all planes and no symptoms provocation.  Weakness of lower trap and middle trap both 2+/5    No symptoms provocation during BLE MMT      Flexibility:               WFL    Joint Play: Thoracic spine T2-T12 hypomobility, bilateral ribs 4-10 hypomobile. Pain in right low back provoked with right ribs ~5/6 P/A.    Movement Analysis: NO pain with any lumbar or BLE or BUE active movements.   Severe stabbing pain in right low back while demonstrating reaching for a seat belt with right arm    Pt demonstrates no TTP     Oswestry: 28% Disability    Steven received therapeutic exercises to develop strength, endurance, ROM, flexibility, posture and core stabilization for 25 minutes including:    Door way stretch 3 x 30 sec  Supine lying on tandem foam for 5 minutes  Upper trunk rotation x 20 each side  Lower trunk rotation x 20 each side  Right QL stretch with right sided intercostal stretching, modified due to pain with leaning over bolster. 3 x 30s  Scapular retraction with ext rot 3 x 10 PTB  Positional distraction in left side lying right side up for 5 minutes  Cybex seated row 3 x 10 #4  Side-lying crunch with hip int rot x20 each side    Steven received the following manual therapy techniques: Soft tissue Mobilization were applied to the: lumbar region for 15 minutes performed in prone:   STM to thoracic paraspinals, T3-T10 P/A grades III, right ribs 4-10 P/A grades II - III.    Steven participated in neuromuscular re-education activities to improve:   Steven participated in dynamic functional therapeutic activities to improve functional performance for   Steven participated in gait training to improve functional mobility and safety for                                                                        Steven received the following direct contact modalities after being cleared for contraindications:   Steven received the following supervised modalities after being cleared for contradictions:    Home Exercises Provided and Patient Education Provided     Education provided:   Updated HEP, self thoracic mobilizations for pain management.    Written Home Exercises Provided: Patient instructed to perform updated HEP  Exercises were reviewed and Steven was able to demonstrate them prior to the end of the session.  Steven demonstrated good understanding of the education provided.     See EMR under Patient Instructions for exercises provided prior visit.    Assessment     Pt's right low back pain has improved to allow for complete BLE and lumbar active movements without symptoms. His pain continues to persist with thoracic rotation to the left while reaching for his seatbelt. His pain was not reproduced with RUE movement but with addition of left thoracic rotation. Following gentle thoracic and rib mobilizations pt was able to perform previous movement of reaching for seat belt with no pain. By improving thoracic and rib mobility pt was able to have reduced right low back pain with functional activity. Pt will continue to benefit from skilled PT to follow up with self management of reducing pain as well as to continue to strengthen thoracic spine to prevent reoccurance of symptoms and allow pt to reach for seat belt with no pain.     Steven is progressing well towards his goals.   Pt prognosis is Guarded.     Pt will continue to benefit from skilled outpatient physical therapy to address the deficits listed in the problem list box on initial evaluation, provide pt/family education and to maximize pt's level of independence in the home and community environment.     Pt's spiritual, cultural and educational needs considered and pt agreeable to plan of care and goals.     Anticipated  barriers to physical therapy: age, significant PMH    Goals:  Short Term Goals:  In 2 weeks the pt will,   Demonstrate independence with initial HEP to facilitate therex progression Met 10/23/2019  Improve lumbar ROM to >5 degrees for flexion in order to don and doff shoes and socks with decreased limitations Met 10/23/2019     Long Term Goals:  In 4 weeks the pt will,  Demonstrate independence with advanced HEP Progressing 10/23/2019  Improve Modified Oswestry to <20% in order to improve caregiver duties Progressing 10/23/2019  Improve lumbar ROM to >10 degrees for all planes in order for pt to perform all home management with decreased limitations. Progressing 10/23/2019  Improve lumbar strength to >=1/3 grade in order for pt to perform caregiver duties with decreased restrictions. Met 10/23/2019  Improve lumbar/thoracic active rotation to >10 degrees in order for pt to don and doff seat belt with no limitations. Initial 10/23/2019    Plan   Update Plan of care Certification: 10/23/2019 - 12/23/2019     Update Outpatient Physical Therapy POC to additional 2 times weekly for 1 week followed with 1x/wk for 2 weeks (total 4 visits) to include the following interventions: Manual Therapy, Moist Heat/ Ice, Neuromuscular Re-ed, Patient Education, Therapeutic Activites and Therapeutic Exercise. Lumbar stabilization    Gio Rowell, PT

## 2019-10-28 ENCOUNTER — CLINICAL SUPPORT (OUTPATIENT)
Dept: REHABILITATION | Facility: HOSPITAL | Age: 74
End: 2019-10-28
Payer: MEDICARE

## 2019-10-28 DIAGNOSIS — M53.86 DECREASED ROM OF LUMBAR SPINE: ICD-10-CM

## 2019-10-29 PROBLEM — M53.86 DECREASED ROM OF LUMBAR SPINE: Status: RESOLVED | Noted: 2019-08-29 | Resolved: 2019-10-29

## 2019-10-29 RX ORDER — ROSUVASTATIN CALCIUM 20 MG/1
20 TABLET, COATED ORAL NIGHTLY
Refills: 3 | COMMUNITY
Start: 2019-10-01 | End: 2021-01-29 | Stop reason: SDUPTHER

## 2019-10-29 NOTE — PROGRESS NOTES
[facility]  Outpatient Physical Therapy Discharge Note    Name: Tono Saez  Clinic Number: [id number]    Therapy Diagnosis:   Encounter Diagnosis   Name Primary?    Decreased ROM of lumbar spine        Physician: Scott Staley MD  Physician Orders: Eval and Treat  Medical Diagnosis: low back pain  Evaluation Date: 8/26/2019    Date of Last visit: 10/23/2019  Total Visits Received: 12  Cancelled Visits: 0  No Show Visits: 0    Subjective/Assessment    Pt presented to therapy stating that he would like to be discharged because he has had no pain since last session and has been able to perform all activities with no provocation. Pt educated on continuance of HEP.    Goals: n/a    Discharge reason: Patient is now asymptomatic and requests discharge    Plan   This patient is discharged from Physical Therapy

## 2019-10-30 ENCOUNTER — OFFICE VISIT (OUTPATIENT)
Dept: FAMILY MEDICINE | Facility: CLINIC | Age: 74
End: 2019-10-30
Payer: MEDICARE

## 2019-10-30 VITALS
TEMPERATURE: 98 F | BODY MASS INDEX: 27.13 KG/M2 | WEIGHT: 204.69 LBS | OXYGEN SATURATION: 99 % | HEIGHT: 73 IN | SYSTOLIC BLOOD PRESSURE: 122 MMHG | DIASTOLIC BLOOD PRESSURE: 64 MMHG | HEART RATE: 62 BPM

## 2019-10-30 DIAGNOSIS — N18.2 CRF (CHRONIC RENAL FAILURE), STAGE 2 (MILD): ICD-10-CM

## 2019-10-30 DIAGNOSIS — E11.9 TYPE 2 DIABETES MELLITUS WITHOUT COMPLICATION, WITHOUT LONG-TERM CURRENT USE OF INSULIN: ICD-10-CM

## 2019-10-30 DIAGNOSIS — Z23 IMMUNIZATION DUE: Primary | ICD-10-CM

## 2019-10-30 PROCEDURE — 99215 OFFICE O/P EST HI 40 MIN: CPT | Mod: 25 | Performed by: FAMILY MEDICINE

## 2019-10-30 PROCEDURE — 99213 PR OFFICE/OUTPT VISIT, EST, LEVL III, 20-29 MIN: ICD-10-PCS | Mod: S$PBB,,, | Performed by: FAMILY MEDICINE

## 2019-10-30 PROCEDURE — 99213 OFFICE O/P EST LOW 20 MIN: CPT | Mod: S$PBB,,, | Performed by: FAMILY MEDICINE

## 2019-10-30 PROCEDURE — 90662 IIV NO PRSV INCREASED AG IM: CPT | Mod: PBBFAC | Performed by: FAMILY MEDICINE

## 2019-10-30 NOTE — PROGRESS NOTES
Patient had a fall 4 weeks ago was seen by Dr. Us, he had a fall onto the right buttock and had some bruising at the time but he says the pain has resolved.  Subjective:       Patient ID: Tono Saez is a 74 y.o. male.    Chief Complaint: Fall (1 month follow up with fall bruised buttock  patient saw Dr. Us )      Patient is here for follow-up from fall 4 weeks ago was seen by Dr. Us with some bruising of the right buttock but no internal injuries he comes in today for follow-up reporting that the pain has completely resolved as has the bruising he is a diabetic and reports that he did see the ophthalmologist Dr. Peoples within last 6 months denies retinopathy.  Lab Results       Component                Value               Date                       WBC                      5.62                10/21/2019                 HGB                      12.1 (L)            10/21/2019                 HCT                      36.3 (L)            10/21/2019                 PLT                      133 (L)             10/21/2019                 CHOL                     122                 08/21/2019                 TRIG                     90                  08/21/2019                 HDL                      35 (L)              08/21/2019                 ALT                      16                  08/21/2019                 AST                      24                  08/21/2019                 NA                       141                 10/21/2019                 K                        3.8                 10/21/2019                 CL                       103                 10/21/2019                 CREATININE               1.4                 10/21/2019                 BUN                      29 (H)              10/21/2019                 CO2                      28                  10/21/2019                 PSA                      0.77                01/09/2012                 HGBA1C                    6.4                 02/13/2019                Fall   The accident occurred more than 1 week ago. Pertinent negatives include no bowel incontinence. The treatment provided significant relief.       Allergies and Medications:   Review of patient's allergies indicates:   Allergen Reactions    Adhesive Other (See Comments)     SILK TAPE PULL SKIN OFF    Metformin Other (See Comments)     Weight loss cachexia anorexia,diarrhea.    Pcn [penicillins]      Patient states he passed out from this medication when he was 10 years old.      Current Outpatient Medications   Medication Sig Dispense Refill    amLODIPine (NORVASC) 5 MG tablet TAKE ONE TABLET BY MOUTH ONCE DAILY 90 tablet 12    benzonatate (TESSALON) 100 MG capsule Take 100 mg by mouth 3 (three) times daily as needed for Cough.      blood sugar diagnostic Strp To check BG bid times daily, to use with insurance preferred meter 200 strip 3    cetirizine (ZYRTEC) 10 MG tablet Take 1 tablet (10 mg total) by mouth once daily. (Patient taking differently: Take 10 mg by mouth daily as needed. ) 30 tablet 0    ciclopirox 1 % shampoo As directed  11    dexchlorphen-phenylephrine-DM (POLYTUSSIN DM) 1-5-10 mg/5 mL Syrp Take 5 mLs by mouth every 4 (four) hours as needed. 120 mL 0    finasteride (PROSCAR) 5 mg tablet Take 5 mg by mouth once daily.      ipratropium (ATROVENT) 42 mcg (0.06 %) nasal spray 2 sprays by Nasal route every 6 (six) hours as needed for Rhinitis. 45 mL 3    lansoprazole (PREVACID) 30 MG capsule Take 1 capsule (30 mg total) by mouth once daily. 90 capsule 3    magnesium chloride (SLOW-MAG ORAL) Take by mouth once daily.      metoprolol tartrate (LOPRESSOR) 100 MG tablet Take 1 tablet (100 mg total) by mouth 2 (two) times daily. 60 tablet 11    multivitamin capsule Take 1 capsule by mouth once daily.       pioglitazone (ACTOS) 30 MG tablet TAKE ONE TABLET BY MOUTH ONCE DAILY 90 tablet 3    quinapril (ACCUPRIL) 40 MG tablet Take 40 mg by mouth  once daily.   4    rivaroxaban (XARELTO) 15 mg Tab Take 15 mg by mouth once daily.      rosuvastatin (CRESTOR) 20 MG tablet   3    sertraline (ZOLOFT) 50 MG tablet TAKE ONE TABLET BY MOUTH ONCE DAILY 30 tablet 11    tolterodine (DETROL LA) 4 MG 24 hr capsule Take 4 mg by mouth once daily.       vitamin D 1000 units Tab Take 1,000 Units by mouth once daily.      fenofibrate micronized (LOFIBRA) 134 MG Cap TAKE 1 CAPSULE BEFORE      BREAKFAST (RETURN TO       CLINIC FOR FOLLOW-UP IN    FEBRUARY) (Patient not taking: Reported on 10/30/2019) 90 capsule 3    simvastatin (ZOCOR) 40 MG tablet TAKE ONE TABLET BY MOUTH EVERY EVENING FOR CHOLESTEROL (Patient not taking: Reported on 10/30/2019) 90 tablet 12    varicella-zoster gE-AS01B, PF, (SHINGRIX, PF,) 50 mcg/0.5 mL injection Inject 0.5 mLs into the muscle once. for 1 dose 0.5 mL 0     No current facility-administered medications for this visit.        Family History:   Family History   Problem Relation Age of Onset    Heart disease Mother     Heart disease Father     Hyperlipidemia Sister     Hypertension Sister     Heart disease Brother     Heart disease Brother     Heart disease Brother     Heart disease Brother     Heart disease Brother        Social History:   Social History     Socioeconomic History    Marital status:      Spouse name: Not on file    Number of children: Not on file    Years of education: Not on file    Highest education level: Not on file   Occupational History    Not on file   Social Needs    Financial resource strain: Not on file    Food insecurity:     Worry: Not on file     Inability: Not on file    Transportation needs:     Medical: Not on file     Non-medical: Not on file   Tobacco Use    Smoking status: Never Smoker    Smokeless tobacco: Never Used   Substance and Sexual Activity    Alcohol use: No    Drug use: No    Sexual activity: Not Currently   Lifestyle    Physical activity:     Days per week: Not on  file     Minutes per session: Not on file    Stress: Not at all   Relationships    Social connections:     Talks on phone: Not on file     Gets together: Not on file     Attends Christian service: Not on file     Active member of club or organization: Not on file     Attends meetings of clubs or organizations: Not on file     Relationship status: Not on file   Other Topics Concern    Not on file   Social History Narrative    Not on file       Review of Systems   Gastrointestinal: Negative for bowel incontinence.       Objective:     Vitals:    10/30/19 1500   BP: 122/64   Pulse: 62   Temp: 97.7 °F (36.5 °C)        Physical Exam    Assessment:       1. Immunization due    2. Type 2 diabetes mellitus without complication, without long-term current use of insulin    3. CRF (chronic renal failure), stage 2 (mild)        Plan:       Tono was seen today for fall.    Diagnoses and all orders for this visit:    Immunization due  -     Influenza - High Dose (65+) (PF) (IM)  -     varicella-zoster gE-AS01B, PF, (SHINGRIX, PF,) 50 mcg/0.5 mL injection; Inject 0.5 mLs into the muscle once. for 1 dose    Type 2 diabetes mellitus without complication, without long-term current use of insulin  -     Lipid panel; Future  -     Comprehensive metabolic panel; Future    CRF (chronic renal failure), stage 2 (mild)  -     CBC auto differential; Future         No follow-ups on file.

## 2019-12-02 ENCOUNTER — LAB VISIT (OUTPATIENT)
Dept: LAB | Facility: HOSPITAL | Age: 74
End: 2019-12-02
Attending: INTERNAL MEDICINE
Payer: MEDICARE

## 2019-12-02 DIAGNOSIS — Z79.01 LONG TERM (CURRENT) USE OF ANTICOAGULANTS: ICD-10-CM

## 2019-12-02 DIAGNOSIS — I25.10 CORONARY ATHEROSCLEROSIS OF NATIVE CORONARY ARTERY: ICD-10-CM

## 2019-12-02 DIAGNOSIS — D64.9 ANEMIA, UNSPECIFIED TYPE: ICD-10-CM

## 2019-12-02 DIAGNOSIS — E78.5 HYPERLIPEMIA: Primary | ICD-10-CM

## 2019-12-02 DIAGNOSIS — I49.3 VENTRICULAR PREMATURE BEATS: ICD-10-CM

## 2019-12-02 DIAGNOSIS — I50.9 HEART FAILURE, UNSPECIFIED: ICD-10-CM

## 2019-12-02 DIAGNOSIS — I48.0 PAROXYSMAL ATRIAL FIBRILLATION: ICD-10-CM

## 2019-12-02 LAB
ALBUMIN SERPL BCP-MCNC: 3.9 G/DL (ref 3.5–5.2)
ALP SERPL-CCNC: 43 U/L (ref 55–135)
ALT SERPL W/O P-5'-P-CCNC: 14 U/L (ref 10–44)
ANION GAP SERPL CALC-SCNC: 8 MMOL/L (ref 8–16)
AST SERPL-CCNC: 21 U/L (ref 10–40)
BASOPHILS # BLD AUTO: 0.05 K/UL (ref 0–0.2)
BASOPHILS # BLD AUTO: 0.05 K/UL (ref 0–0.2)
BASOPHILS NFR BLD: 1.2 % (ref 0–1.9)
BASOPHILS NFR BLD: 1.2 % (ref 0–1.9)
BILIRUB SERPL-MCNC: 0.9 MG/DL (ref 0.1–1)
BUN SERPL-MCNC: 25 MG/DL (ref 8–23)
CALCIUM SERPL-MCNC: 9.6 MG/DL (ref 8.7–10.5)
CHLORIDE SERPL-SCNC: 105 MMOL/L (ref 95–110)
CHOLEST SERPL-MCNC: 119 MG/DL (ref 120–199)
CHOLEST/HDLC SERPL: 2.8 {RATIO} (ref 2–5)
CO2 SERPL-SCNC: 29 MMOL/L (ref 23–29)
CREAT SERPL-MCNC: 1.2 MG/DL (ref 0.5–1.4)
DIFFERENTIAL METHOD: ABNORMAL
DIFFERENTIAL METHOD: ABNORMAL
EOSINOPHIL # BLD AUTO: 0.2 K/UL (ref 0–0.5)
EOSINOPHIL # BLD AUTO: 0.2 K/UL (ref 0–0.5)
EOSINOPHIL NFR BLD: 3.8 % (ref 0–8)
EOSINOPHIL NFR BLD: 3.8 % (ref 0–8)
ERYTHROCYTE [DISTWIDTH] IN BLOOD BY AUTOMATED COUNT: 12.3 % (ref 11.5–14.5)
ERYTHROCYTE [DISTWIDTH] IN BLOOD BY AUTOMATED COUNT: 12.3 % (ref 11.5–14.5)
EST. GFR  (AFRICAN AMERICAN): >60 ML/MIN/1.73 M^2
EST. GFR  (NON AFRICAN AMERICAN): 58.4 ML/MIN/1.73 M^2
GLUCOSE SERPL-MCNC: 126 MG/DL (ref 70–110)
HCT VFR BLD AUTO: 35.7 % (ref 40–54)
HCT VFR BLD AUTO: 35.7 % (ref 40–54)
HDLC SERPL-MCNC: 42 MG/DL (ref 40–75)
HDLC SERPL: 35.3 % (ref 20–50)
HGB BLD-MCNC: 11.4 G/DL (ref 14–18)
HGB BLD-MCNC: 11.4 G/DL (ref 14–18)
IMM GRANULOCYTES # BLD AUTO: 0.02 K/UL (ref 0–0.04)
IMM GRANULOCYTES # BLD AUTO: 0.02 K/UL (ref 0–0.04)
IMM GRANULOCYTES NFR BLD AUTO: 0.5 % (ref 0–0.5)
IMM GRANULOCYTES NFR BLD AUTO: 0.5 % (ref 0–0.5)
LDLC SERPL CALC-MCNC: 59.6 MG/DL (ref 63–159)
LYMPHOCYTES # BLD AUTO: 1 K/UL (ref 1–4.8)
LYMPHOCYTES # BLD AUTO: 1 K/UL (ref 1–4.8)
LYMPHOCYTES NFR BLD: 24.4 % (ref 18–48)
LYMPHOCYTES NFR BLD: 24.4 % (ref 18–48)
MCH RBC QN AUTO: 30.3 PG (ref 27–31)
MCH RBC QN AUTO: 30.3 PG (ref 27–31)
MCHC RBC AUTO-ENTMCNC: 31.9 G/DL (ref 32–36)
MCHC RBC AUTO-ENTMCNC: 31.9 G/DL (ref 32–36)
MCV RBC AUTO: 95 FL (ref 82–98)
MCV RBC AUTO: 95 FL (ref 82–98)
MONOCYTES # BLD AUTO: 0.4 K/UL (ref 0.3–1)
MONOCYTES # BLD AUTO: 0.4 K/UL (ref 0.3–1)
MONOCYTES NFR BLD: 10.3 % (ref 4–15)
MONOCYTES NFR BLD: 10.3 % (ref 4–15)
NEUTROPHILS # BLD AUTO: 2.6 K/UL (ref 1.8–7.7)
NEUTROPHILS # BLD AUTO: 2.6 K/UL (ref 1.8–7.7)
NEUTROPHILS NFR BLD: 59.8 % (ref 38–73)
NEUTROPHILS NFR BLD: 59.8 % (ref 38–73)
NONHDLC SERPL-MCNC: 77 MG/DL
NRBC BLD-RTO: 0 /100 WBC
NRBC BLD-RTO: 0 /100 WBC
PLATELET # BLD AUTO: 145 K/UL (ref 150–350)
PLATELET # BLD AUTO: 145 K/UL (ref 150–350)
PMV BLD AUTO: 11.2 FL (ref 9.2–12.9)
PMV BLD AUTO: 11.2 FL (ref 9.2–12.9)
POTASSIUM SERPL-SCNC: 4 MMOL/L (ref 3.5–5.1)
PROT SERPL-MCNC: 6.7 G/DL (ref 6–8.4)
RBC # BLD AUTO: 3.76 M/UL (ref 4.6–6.2)
RBC # BLD AUTO: 3.76 M/UL (ref 4.6–6.2)
SODIUM SERPL-SCNC: 142 MMOL/L (ref 136–145)
TRIGL SERPL-MCNC: 87 MG/DL (ref 30–150)
WBC # BLD AUTO: 4.26 K/UL (ref 3.9–12.7)
WBC # BLD AUTO: 4.26 K/UL (ref 3.9–12.7)

## 2019-12-02 PROCEDURE — 85025 COMPLETE CBC W/AUTO DIFF WBC: CPT

## 2019-12-02 PROCEDURE — 80061 LIPID PANEL: CPT

## 2019-12-02 PROCEDURE — 80053 COMPREHEN METABOLIC PANEL: CPT

## 2019-12-02 PROCEDURE — 36415 COLL VENOUS BLD VENIPUNCTURE: CPT

## 2019-12-05 ENCOUNTER — TELEPHONE (OUTPATIENT)
Dept: FAMILY MEDICINE | Facility: CLINIC | Age: 74
End: 2019-12-05

## 2019-12-05 NOTE — TELEPHONE ENCOUNTER
----- Message from Scott Staley MD sent at 12/2/2019 12:03 PM CST -----  Anemia is stable and chronic other labs unchanged

## 2019-12-17 RX ORDER — AMLODIPINE BESYLATE 5 MG/1
TABLET ORAL
Qty: 90 TABLET | Refills: 11 | Status: SHIPPED | OUTPATIENT
Start: 2019-12-17 | End: 2020-02-20 | Stop reason: DRUGHIGH

## 2020-01-30 ENCOUNTER — OFFICE VISIT (OUTPATIENT)
Dept: FAMILY MEDICINE | Facility: CLINIC | Age: 75
End: 2020-01-30
Payer: MEDICARE

## 2020-01-30 VITALS
TEMPERATURE: 98 F | SYSTOLIC BLOOD PRESSURE: 120 MMHG | BODY MASS INDEX: 27.06 KG/M2 | HEIGHT: 73 IN | OXYGEN SATURATION: 97 % | DIASTOLIC BLOOD PRESSURE: 76 MMHG | WEIGHT: 204.19 LBS | HEART RATE: 62 BPM

## 2020-01-30 DIAGNOSIS — Z23 IMMUNIZATION DUE: ICD-10-CM

## 2020-01-30 DIAGNOSIS — J30.89 NON-SEASONAL ALLERGIC RHINITIS, UNSPECIFIED TRIGGER: ICD-10-CM

## 2020-01-30 DIAGNOSIS — D64.9 ANEMIA, UNSPECIFIED TYPE: ICD-10-CM

## 2020-01-30 DIAGNOSIS — E11.9 TYPE 2 DIABETES MELLITUS WITHOUT COMPLICATION, WITHOUT LONG-TERM CURRENT USE OF INSULIN: Primary | ICD-10-CM

## 2020-01-30 DIAGNOSIS — N18.1 CRI (CHRONIC RENAL INSUFFICIENCY), STAGE 1: ICD-10-CM

## 2020-01-30 DIAGNOSIS — I10 ESSENTIAL HYPERTENSION: ICD-10-CM

## 2020-01-30 DIAGNOSIS — I20.0 UNSTABLE ANGINA: ICD-10-CM

## 2020-01-30 DIAGNOSIS — J30.1 SEASONAL ALLERGIC RHINITIS DUE TO POLLEN: ICD-10-CM

## 2020-01-30 PROCEDURE — 99214 OFFICE O/P EST MOD 30 MIN: CPT | Mod: S$PBB,,, | Performed by: FAMILY MEDICINE

## 2020-01-30 PROCEDURE — 99215 OFFICE O/P EST HI 40 MIN: CPT | Performed by: FAMILY MEDICINE

## 2020-01-30 PROCEDURE — 99214 PR OFFICE/OUTPT VISIT, EST, LEVL IV, 30-39 MIN: ICD-10-PCS | Mod: S$PBB,,, | Performed by: FAMILY MEDICINE

## 2020-01-30 RX ORDER — FLUTICASONE PROPIONATE 50 MCG
1 SPRAY, SUSPENSION (ML) NASAL DAILY
Qty: 10 G | Refills: 11 | Status: SHIPPED | OUTPATIENT
Start: 2020-01-30 | End: 2020-02-20 | Stop reason: DRUGHIGH

## 2020-01-30 NOTE — PROGRESS NOTES
Subjective:       Patient ID: Tono Saez is a 74 y.o. male.    Chief Complaint: Diabetes (3 month follow up); Hyperlipidemia (3 month follow up); and Dizziness      Patient is here for diabetes follow-up.  Also follow-up on cholesterol.  Lab Results       Component                Value               Date                       WBC                      4.26                12/02/2019                 WBC                      4.26                12/02/2019                 HGB                      11.4 (L)            12/02/2019                 HGB                      11.4 (L)            12/02/2019                 HCT                      35.7 (L)            12/02/2019                 HCT                      35.7 (L)            12/02/2019                 PLT                      145 (L)             12/02/2019                 PLT                      145 (L)             12/02/2019                 CHOL                     119 (L)             12/02/2019                 TRIG                     87                  12/02/2019                 HDL                      42                  12/02/2019                 ALT                      14                  12/02/2019                 AST                      21                  12/02/2019                 NA                       142                 12/02/2019                 K                        4.0                 12/02/2019                 CL                       105                 12/02/2019                 CREATININE               1.2                 12/02/2019                 BUN                      25 (H)              12/02/2019                 CO2                      29                  12/02/2019                 PSA                      0.77                01/09/2012                 HGBA1C                   6.4                 02/13/2019            Wt Readings from Last 3 Encounters:  01/30/20 : 92.6 kg (204 lb 3.2 oz)  10/30/19 : 92.9 kg (204 lb 11.2  oz)  09/30/19 : 91.6 kg (202 lb)  Has a long history of unstable angina followed by Dr. DOTY in at [facility].      Diabetes   He presents for his follow-up diabetic visit. He has type 2 diabetes mellitus. Hypoglycemia symptoms include dizziness. Pertinent negatives for hypoglycemia include no headaches, hunger, mood changes, nervousness/anxiousness, sleepiness, speech difficulty, sweats or tremors. Pertinent negatives for diabetes include no blurred vision, no chest pain, no fatigue, no foot paresthesias, no foot ulcerations, no polydipsia, no polyphagia, no visual change and no weakness. Pertinent negatives for hypoglycemia complications include no blackouts and no hospitalization. Risk factors for coronary artery disease include diabetes mellitus, hypertension, male sex and dyslipidemia. He is following a generally healthy and diabetic diet. His breakfast blood glucose range is generally 140-180 mg/dl. (Highest in 2 weeks has been 163.)   Hyperlipidemia   This is a new problem. The problem is controlled. Pertinent negatives include no chest pain. Current antihyperlipidemic treatment includes statins. There are no compliance problems.  Risk factors for coronary artery disease include diabetes mellitus, dyslipidemia, hypertension and male sex.   Dizziness: no headaches, no weakness and no chest pain.  Sinus Problem   This is a chronic (10 years) problem. The problem has been gradually worsening since onset. There has been no fever. Associated symptoms include chills, congestion, coughing ( yellow phlegm, no blood) and a hoarse voice. Pertinent negatives include no headaches. Past treatments include spray decongestants and oral decongestants (After event nasal spray antihistamine cough syrups.). The treatment provided mild relief.       Allergies and Medications:   Review of patient's allergies indicates:   Allergen Reactions    Adhesive Other (See Comments)     SILK TAPE PULL SKIN OFF    Metformin Other (See  Comments)     Weight loss cachexia anorexia,diarrhea.    Pcn [penicillins]      Patient states he passed out from this medication when he was 10 years old.      Current Outpatient Medications   Medication Sig Dispense Refill    amLODIPine (NORVASC) 5 MG tablet TAKE ONE TABLET BY MOUTH ONCE DAILY 90 tablet 11    benzonatate (TESSALON) 100 MG capsule Take 100 mg by mouth 3 (three) times daily as needed for Cough.      blood sugar diagnostic Strp To check BG bid times daily, to use with insurance preferred meter 200 strip 3    cetirizine (ZYRTEC) 10 MG tablet Take 1 tablet (10 mg total) by mouth once daily. (Patient taking differently: Take 10 mg by mouth daily as needed. ) 30 tablet 0    ciclopirox 1 % shampoo As directed  11    dexchlorphen-phenylephrine-DM (POLYTUSSIN DM) 1-5-10 mg/5 mL Syrp Take 5 mLs by mouth every 4 (four) hours as needed. 120 mL 0    ipratropium (ATROVENT) 42 mcg (0.06 %) nasal spray 2 sprays by Nasal route every 6 (six) hours as needed for Rhinitis. 45 mL 3    lansoprazole (PREVACID) 30 MG capsule Take 1 capsule (30 mg total) by mouth once daily. 90 capsule 3    magnesium chloride (SLOW-MAG ORAL) Take by mouth once daily.      metoprolol tartrate (LOPRESSOR) 100 MG tablet Take 1 tablet (100 mg total) by mouth 2 (two) times daily. 60 tablet 11    multivitamin capsule Take 1 capsule by mouth once daily.       pioglitazone (ACTOS) 30 MG tablet TAKE ONE TABLET BY MOUTH ONCE DAILY 90 tablet 3    rivaroxaban (XARELTO) 15 mg Tab Take 15 mg by mouth once daily.      rosuvastatin (CRESTOR) 20 MG tablet Take 20 mg by mouth every evening.   3    sertraline (ZOLOFT) 50 MG tablet TAKE ONE TABLET BY MOUTH ONCE DAILY 30 tablet 11    tolterodine (DETROL LA) 4 MG 24 hr capsule Take 4 mg by mouth once daily.       vitamin D 1000 units Tab Take 1,000 Units by mouth once daily.      fenofibrate micronized (LOFIBRA) 134 MG Cap TAKE 1 CAPSULE BEFORE      BREAKFAST (RETURN TO       CLINIC FOR  FOLLOW-UP IN    FEBRUARY) (Patient not taking: Reported on 10/30/2019) 90 capsule 3    finasteride (PROSCAR) 5 mg tablet Take 5 mg by mouth once daily.      fluticasone propionate (FLONASE) 50 mcg/actuation nasal spray 1 spray (50 mcg total) by Each Nostril route once daily. 10 g 11    quinapril (ACCUPRIL) 40 MG tablet Take 40 mg by mouth once daily.   4    simvastatin (ZOCOR) 40 MG tablet TAKE ONE TABLET BY MOUTH EVERY EVENING FOR CHOLESTEROL (Patient not taking: Reported on 10/30/2019) 90 tablet 12    varicella-zoster gE-AS01B, PF, (SHINGRIX, PF,) 50 mcg/0.5 mL injection Inject 0.5 mLs into the muscle once. for 1 dose 0.5 mL 0     No current facility-administered medications for this visit.        Family History:   Family History   Problem Relation Age of Onset    Heart disease Mother     Heart disease Father     Hyperlipidemia Sister     Hypertension Sister     Heart disease Brother     Heart disease Brother     Heart disease Brother     Heart disease Brother     Heart disease Brother        Social History:   Social History     Socioeconomic History    Marital status:      Spouse name: Not on file    Number of children: Not on file    Years of education: Not on file    Highest education level: Not on file   Occupational History    Not on file   Social Needs    Financial resource strain: Not on file    Food insecurity:     Worry: Not on file     Inability: Not on file    Transportation needs:     Medical: Not on file     Non-medical: Not on file   Tobacco Use    Smoking status: Never Smoker    Smokeless tobacco: Never Used   Substance and Sexual Activity    Alcohol use: No    Drug use: No    Sexual activity: Not Currently   Lifestyle    Physical activity:     Days per week: Not on file     Minutes per session: Not on file    Stress: Not at all   Relationships    Social connections:     Talks on phone: Not on file     Gets together: Not on file     Attends Rastafari service: Not  on file     Active member of club or organization: Not on file     Attends meetings of clubs or organizations: Not on file     Relationship status: Not on file   Other Topics Concern    Not on file   Social History Narrative    Not on file       Review of Systems   Constitutional: Positive for chills. Negative for fatigue.   HENT: Positive for congestion and hoarse voice.    Eyes: Negative for blurred vision.   Respiratory: Positive for cough ( yellow phlegm, no blood).    Cardiovascular: Negative for chest pain.   Endocrine: Negative for polydipsia and polyphagia.   Neurological: Positive for dizziness. Negative for tremors, speech difficulty, weakness and headaches.   Psychiatric/Behavioral: The patient is not nervous/anxious.        Objective:     Vitals:    01/30/20 1454   BP: 120/76   Pulse: 62   Temp: 97.6 °F (36.4 °C)        Physical Exam   Constitutional: He appears well-developed and well-nourished. No distress.   HENT:   Head: Normocephalic and atraumatic.   Right Ear: Hearing, tympanic membrane, external ear and ear canal normal. No drainage, swelling or tenderness. No foreign bodies. Tympanic membrane is not injected, not scarred, not perforated, not erythematous, not retracted and not bulging. Tympanic membrane mobility is normal. No middle ear effusion. No hemotympanum. No decreased hearing is noted.   Left Ear: Hearing, tympanic membrane, external ear and ear canal normal. No drainage, swelling or tenderness. No foreign bodies. Tympanic membrane is not injected, not scarred, not perforated, not erythematous, not retracted and not bulging. Tympanic membrane mobility is normal.  No middle ear effusion. No hemotympanum. No decreased hearing is noted.   Nose: Nose normal. No mucosal edema, rhinorrhea, nose lacerations, sinus tenderness, nasal deformity or septal deviation. Right sinus exhibits no maxillary sinus tenderness and no frontal sinus tenderness. Left sinus exhibits no maxillary sinus  tenderness and no frontal sinus tenderness.   Mouth/Throat: Uvula is midline and oropharynx is clear and moist. Normal dentition. No oropharyngeal exudate, posterior oropharyngeal edema, posterior oropharyngeal erythema or tonsillar abscesses.   Eyes: Pupils are equal, round, and reactive to light. Conjunctivae and EOM are normal. Right eye exhibits no discharge. Left eye exhibits no discharge. No scleral icterus.   Neck: Normal range of motion. Neck supple. No thyromegaly present.   Cardiovascular: Normal rate, regular rhythm, normal heart sounds and intact distal pulses. Exam reveals no gallop and no friction rub.   No murmur heard.  Pulmonary/Chest: Effort normal and breath sounds normal. No stridor. No respiratory distress. He has no wheezes. He has no rales. He exhibits no tenderness.   Lymphadenopathy:     He has no cervical adenopathy.   Skin: Skin is warm and dry. Capillary refill takes less than 2 seconds. He is not diaphoretic.   Psychiatric: He has a normal mood and affect. His behavior is normal. Judgment and thought content normal.   Nursing note and vitals reviewed.      Assessment:       1. Type 2 diabetes mellitus without complication, without long-term current use of insulin    2. Anemia, unspecified type    3. Essential hypertension    4. CRI (chronic renal insufficiency), stage 1    5. Immunization due    6. Unstable angina    7. Seasonal allergic rhinitis due to pollen    8. Non-seasonal allergic rhinitis, unspecified trigger        Plan:       Tono was seen today for diabetes, hyperlipidemia and dizziness.    Diagnoses and all orders for this visit:    Type 2 diabetes mellitus without complication, without long-term current use of insulin  -     Hemoglobin A1c; Future  -     Lipid panel; Future  -     Microalbumin/creatinine urine ratio; Future  -     varicella-zoster gE-AS01B, PF, (SHINGRIX, PF,) 50 mcg/0.5 mL injection; Inject 0.5 mLs into the muscle once. for 1 dose  -     Ambulatory  referral to Ophthalmology    Anemia, unspecified type  -     CBC auto differential; Future    Essential hypertension  -     Comprehensive metabolic panel; Future    CRI (chronic renal insufficiency), stage 1    Immunization due  -     varicella-zoster gE-AS01B, PF, (SHINGRIX, PF,) 50 mcg/0.5 mL injection; Inject 0.5 mLs into the muscle once. for 1 dose    Unstable angina    Seasonal allergic rhinitis due to pollen    Non-seasonal allergic rhinitis, unspecified trigger  -     fluticasone propionate (FLONASE) 50 mcg/actuation nasal spray; 1 spray (50 mcg total) by Each Nostril route once daily.    Other orders  -     Cancel: Ambulatory referral to Ophthalmology         Follow up in about 3 months (around 4/30/2020).

## 2020-02-10 DIAGNOSIS — I10 ESSENTIAL HYPERTENSION: ICD-10-CM

## 2020-02-10 RX ORDER — METOPROLOL TARTRATE 100 MG/1
TABLET ORAL
Qty: 60 TABLET | Refills: 11 | Status: SHIPPED | OUTPATIENT
Start: 2020-02-10 | End: 2020-02-20 | Stop reason: DRUGHIGH

## 2020-02-20 ENCOUNTER — CLINICAL SUPPORT (OUTPATIENT)
Dept: CARDIOLOGY | Facility: HOSPITAL | Age: 75
DRG: 378 | End: 2020-02-20
Attending: INTERNAL MEDICINE
Payer: COMMERCIAL

## 2020-02-20 ENCOUNTER — HOSPITAL ENCOUNTER (INPATIENT)
Facility: HOSPITAL | Age: 75
LOS: 2 days | Discharge: HOME | DRG: 378 | End: 2020-02-23
Attending: EMERGENCY MEDICINE | Admitting: HOSPITALIST
Payer: MEDICARE

## 2020-02-20 DIAGNOSIS — D64.9 SYMPTOMATIC ANEMIA: ICD-10-CM

## 2020-02-20 DIAGNOSIS — R07.9 CHEST PAIN: ICD-10-CM

## 2020-02-20 DIAGNOSIS — K92.2 UPPER GI BLEED: Primary | ICD-10-CM

## 2020-02-20 DIAGNOSIS — R19.5 OCCULT GI BLEEDING: ICD-10-CM

## 2020-02-20 DIAGNOSIS — R07.9 CHEST PAIN, UNSPECIFIED TYPE: ICD-10-CM

## 2020-02-20 LAB
ABO + RH BLD: NORMAL
ALBUMIN SERPL BCP-MCNC: 3.7 G/DL (ref 3.5–5.2)
ALP SERPL-CCNC: 47 U/L (ref 55–135)
ALT SERPL W/O P-5'-P-CCNC: 15 U/L (ref 10–44)
ANION GAP SERPL CALC-SCNC: 11 MMOL/L (ref 8–16)
AORTIC ROOT ANNULUS: 3.5 CM
AORTIC VALVE CUSP SEPERATION: 1.44 CM
AST SERPL-CCNC: 22 U/L (ref 10–40)
AV INDEX (PROSTH): 0.52
AV MEAN GRADIENT: 8 MMHG
AV PEAK GRADIENT: 15 MMHG
AV VALVE AREA: 1.66 CM2
AV VELOCITY RATIO: 53.24
BASOPHILS # BLD AUTO: 0.05 K/UL (ref 0–0.2)
BASOPHILS NFR BLD: 1 % (ref 0–1.9)
BILIRUB SERPL-MCNC: 0.9 MG/DL (ref 0.1–1)
BLD GP AB SCN CELLS X3 SERPL QL: NORMAL
BLD PROD TYP BPU: NORMAL
BLD PROD TYP BPU: NORMAL
BLOOD UNIT EXPIRATION DATE: NORMAL
BLOOD UNIT EXPIRATION DATE: NORMAL
BLOOD UNIT TYPE CODE: 5100
BLOOD UNIT TYPE CODE: 5100
BLOOD UNIT TYPE: NORMAL
BLOOD UNIT TYPE: NORMAL
BNP SERPL-MCNC: 535 PG/ML (ref 0–99)
BSA FOR ECHO PROCEDURE: 2.17 M2
BUN SERPL-MCNC: 36 MG/DL (ref 8–23)
CALCIUM SERPL-MCNC: 9.7 MG/DL (ref 8.7–10.5)
CHLORIDE SERPL-SCNC: 102 MMOL/L (ref 95–110)
CO2 SERPL-SCNC: 23 MMOL/L (ref 23–29)
CODING SYSTEM: NORMAL
CODING SYSTEM: NORMAL
CREAT SERPL-MCNC: 1.4 MG/DL (ref 0.5–1.4)
CV ECHO LV RWT: 0.56 CM
DIFFERENTIAL METHOD: ABNORMAL
DISPENSE STATUS: NORMAL
DISPENSE STATUS: NORMAL
DOP CALC AO PEAK VEL: 1.91 M/S
DOP CALC AO VTI: 36.98 CM
DOP CALC LVOT AREA: 3.2 CM2
DOP CALC LVOT DIAMETER: 2.02 CM
DOP CALC LVOT PEAK VEL: 101.69 M/S
DOP CALC LVOT STROKE VOLUME: 61.31 CM3
DOP CALCLVOT PEAK VEL VTI: 19.14 CM
E WAVE DECELERATION TIME: 149.18 MSEC
E/A RATIO: 1.07
E/E' RATIO: 24 M/S
ECHO LV POSTERIOR WALL: 1.42 CM (ref 0.6–1.1)
EOSINOPHIL # BLD AUTO: 0.1 K/UL (ref 0–0.5)
EOSINOPHIL NFR BLD: 1.8 % (ref 0–8)
ERYTHROCYTE [DISTWIDTH] IN BLOOD BY AUTOMATED COUNT: 14.3 % (ref 11.5–14.5)
EST. GFR  (AFRICAN AMERICAN): 56 ML/MIN/1.73 M^2
EST. GFR  (NON AFRICAN AMERICAN): 48.5 ML/MIN/1.73 M^2
FOLATE SERPL-MCNC: >24.8 NG/ML (ref 4–24)
FRACTIONAL SHORTENING: 33 % (ref 28–44)
GLUCOSE SERPL-MCNC: 195 MG/DL (ref 70–110)
HCT VFR BLD AUTO: 21.4 % (ref 40–54)
HCT VFR BLD AUTO: 21.6 % (ref 40–54)
HGB BLD-MCNC: 6.2 G/DL (ref 14–18)
HGB BLD-MCNC: 6.2 G/DL (ref 14–18)
IMM GRANULOCYTES # BLD AUTO: 0.03 K/UL (ref 0–0.04)
IMM GRANULOCYTES NFR BLD AUTO: 0.6 % (ref 0–0.5)
INR PPP: 2.2
INTERVENTRICULAR SEPTUM: 1.46 CM (ref 0.6–1.1)
IRON SERPL-MCNC: 11 UG/DL (ref 45–160)
LEFT ATRIUM SIZE: 4.7 CM
LEFT INTERNAL DIMENSION IN SYSTOLE: 3.41 CM (ref 2.1–4)
LEFT VENTRICLE DIASTOLIC VOLUME INDEX: 44.6 ML/M2
LEFT VENTRICLE DIASTOLIC VOLUME: 94.7 ML
LEFT VENTRICLE MASS INDEX: 147 G/M2
LEFT VENTRICLE SYSTOLIC VOLUME INDEX: 22.7 ML/M2
LEFT VENTRICLE SYSTOLIC VOLUME: 48.29 ML
LEFT VENTRICULAR INTERNAL DIMENSION IN DIASTOLE: 5.09 CM (ref 3.5–6)
LEFT VENTRICULAR MASS: 312.09 G
LV LATERAL E/E' RATIO: 21.6 M/S
LV SEPTAL E/E' RATIO: 27 M/S
LYMPHOCYTES # BLD AUTO: 0.9 K/UL (ref 1–4.8)
LYMPHOCYTES NFR BLD: 18.4 % (ref 18–48)
MCH RBC QN AUTO: 24.4 PG (ref 27–31)
MCHC RBC AUTO-ENTMCNC: 28.7 G/DL (ref 32–36)
MCV RBC AUTO: 85 FL (ref 82–98)
MONOCYTES # BLD AUTO: 0.5 K/UL (ref 0.3–1)
MONOCYTES NFR BLD: 10.8 % (ref 4–15)
MV PEAK A VEL: 1.01 M/S
MV PEAK E VEL: 1.08 M/S
NEUTROPHILS # BLD AUTO: 3.4 K/UL (ref 1.8–7.7)
NEUTROPHILS NFR BLD: 67.4 % (ref 38–73)
NRBC BLD-RTO: 0 /100 WBC
NUM UNITS TRANS PACKED RBC: NORMAL
NUM UNITS TRANS PACKED RBC: NORMAL
OB PNL STL: POSITIVE
PISA TR MAX VEL: 3.19 M/S
PLATELET # BLD AUTO: 160 K/UL (ref 150–350)
PMV BLD AUTO: 11.6 FL (ref 9.2–12.9)
POTASSIUM SERPL-SCNC: 3.5 MMOL/L (ref 3.5–5.1)
PROT SERPL-MCNC: 6.2 G/DL (ref 6–8.4)
PROTHROMBIN TIME: 23.5 SEC (ref 10.6–14.8)
PV PEAK VELOCITY: 88 CM/S
RA PRESSURE: 3 MMHG
RBC # BLD AUTO: 2.54 M/UL (ref 4.6–6.2)
RIGHT VENTRICULAR END-DIASTOLIC DIMENSION: 258 CM
SATURATED IRON: 2 % (ref 20–50)
SODIUM SERPL-SCNC: 136 MMOL/L (ref 136–145)
TDI LATERAL: 0.05 M/S
TDI SEPTAL: 0.04 M/S
TDI: 0.05 M/S
TOTAL IRON BINDING CAPACITY: 490 UG/DL (ref 250–450)
TR MAX PG: 41 MMHG
TRANSFERRIN SERPL-MCNC: 350 MG/DL (ref 200–375)
TROPONIN I SERPL DL<=0.01 NG/ML-MCNC: 0.04 NG/ML
TROPONIN I SERPL DL<=0.01 NG/ML-MCNC: 0.06 NG/ML
TV REST PULMONARY ARTERY PRESSURE: 44 MMHG
VIT B12 SERPL-MCNC: 425 PG/ML (ref 210–950)
WBC # BLD AUTO: 5 K/UL (ref 3.9–12.7)

## 2020-02-20 PROCEDURE — 82746 ASSAY OF FOLIC ACID SERUM: CPT

## 2020-02-20 PROCEDURE — 83880 ASSAY OF NATRIURETIC PEPTIDE: CPT

## 2020-02-20 PROCEDURE — 85014 HEMATOCRIT: CPT

## 2020-02-20 PROCEDURE — 85610 PROTHROMBIN TIME: CPT

## 2020-02-20 PROCEDURE — 25000003 PHARM REV CODE 250: Performed by: EMERGENCY MEDICINE

## 2020-02-20 PROCEDURE — 86850 RBC ANTIBODY SCREEN: CPT

## 2020-02-20 PROCEDURE — 93005 ELECTROCARDIOGRAM TRACING: CPT

## 2020-02-20 PROCEDURE — 63600175 PHARM REV CODE 636 W HCPCS: Performed by: EMERGENCY MEDICINE

## 2020-02-20 PROCEDURE — C9113 INJ PANTOPRAZOLE SODIUM, VIA: HCPCS | Performed by: EMERGENCY MEDICINE

## 2020-02-20 PROCEDURE — 25000003 PHARM REV CODE 250: Performed by: INTERNAL MEDICINE

## 2020-02-20 PROCEDURE — P9016 RBC LEUKOCYTES REDUCED: HCPCS

## 2020-02-20 PROCEDURE — 82272 OCCULT BLD FECES 1-3 TESTS: CPT

## 2020-02-20 PROCEDURE — G0378 HOSPITAL OBSERVATION PER HR: HCPCS

## 2020-02-20 PROCEDURE — 93306 TTE W/DOPPLER COMPLETE: CPT

## 2020-02-20 PROCEDURE — 36415 COLL VENOUS BLD VENIPUNCTURE: CPT

## 2020-02-20 PROCEDURE — 83540 ASSAY OF IRON: CPT

## 2020-02-20 PROCEDURE — 85018 HEMOGLOBIN: CPT

## 2020-02-20 PROCEDURE — 85025 COMPLETE CBC W/AUTO DIFF WBC: CPT

## 2020-02-20 PROCEDURE — 99285 EMERGENCY DEPT VISIT HI MDM: CPT | Mod: 25

## 2020-02-20 PROCEDURE — 82607 VITAMIN B-12: CPT

## 2020-02-20 PROCEDURE — 80053 COMPREHEN METABOLIC PANEL: CPT

## 2020-02-20 PROCEDURE — 84484 ASSAY OF TROPONIN QUANT: CPT

## 2020-02-20 PROCEDURE — 96374 THER/PROPH/DIAG INJ IV PUSH: CPT

## 2020-02-20 PROCEDURE — 86920 COMPATIBILITY TEST SPIN: CPT

## 2020-02-20 PROCEDURE — 84484 ASSAY OF TROPONIN QUANT: CPT | Mod: 91

## 2020-02-20 RX ORDER — AMLODIPINE BESYLATE 5 MG/1
5 TABLET ORAL DAILY
COMMUNITY
End: 2020-09-10

## 2020-02-20 RX ORDER — ONDANSETRON 4 MG/1
8 TABLET, ORALLY DISINTEGRATING ORAL EVERY 8 HOURS PRN
Status: DISCONTINUED | OUTPATIENT
Start: 2020-02-20 | End: 2020-02-23 | Stop reason: HOSPADM

## 2020-02-20 RX ORDER — POLYETHYLENE GLYCOL 3350 17 G/17G
340 POWDER, FOR SOLUTION ORAL ONCE
Status: COMPLETED | OUTPATIENT
Start: 2020-02-20 | End: 2020-02-20

## 2020-02-20 RX ORDER — SUCRALFATE 1 G/1
1 TABLET ORAL 3 TIMES DAILY
COMMUNITY
Start: 2020-02-19 | End: 2020-03-02

## 2020-02-20 RX ORDER — FLUTICASONE PROPIONATE 50 MCG
1 SPRAY, SUSPENSION (ML) NASAL DAILY PRN
COMMUNITY
End: 2021-08-25

## 2020-02-20 RX ORDER — TALC
9 POWDER (GRAM) TOPICAL NIGHTLY PRN
Status: DISCONTINUED | OUTPATIENT
Start: 2020-02-20 | End: 2020-02-23 | Stop reason: HOSPADM

## 2020-02-20 RX ORDER — IBUPROFEN 200 MG
16 TABLET ORAL
Status: DISCONTINUED | OUTPATIENT
Start: 2020-02-20 | End: 2020-02-23 | Stop reason: HOSPADM

## 2020-02-20 RX ORDER — SUCRALFATE 1 G/10ML
1 SUSPENSION ORAL
Status: COMPLETED | OUTPATIENT
Start: 2020-02-20 | End: 2020-02-20

## 2020-02-20 RX ORDER — METOPROLOL TARTRATE 50 MG/1
50 TABLET ORAL 2 TIMES DAILY
Status: DISCONTINUED | OUTPATIENT
Start: 2020-02-20 | End: 2020-02-23 | Stop reason: HOSPADM

## 2020-02-20 RX ORDER — OMEPRAZOLE 40 MG/1
40 CAPSULE, DELAYED RELEASE ORAL DAILY
Status: ON HOLD | COMMUNITY
Start: 2020-02-19 | End: 2020-02-23 | Stop reason: HOSPADM

## 2020-02-20 RX ORDER — IBUPROFEN 200 MG
24 TABLET ORAL
Status: DISCONTINUED | OUTPATIENT
Start: 2020-02-20 | End: 2020-02-23 | Stop reason: HOSPADM

## 2020-02-20 RX ORDER — SERTRALINE HYDROCHLORIDE 50 MG/1
50 TABLET, FILM COATED ORAL DAILY
COMMUNITY
End: 2020-04-20

## 2020-02-20 RX ORDER — PANTOPRAZOLE SODIUM 40 MG/10ML
40 INJECTION, POWDER, LYOPHILIZED, FOR SOLUTION INTRAVENOUS
Status: COMPLETED | OUTPATIENT
Start: 2020-02-20 | End: 2020-02-20

## 2020-02-20 RX ORDER — METOPROLOL TARTRATE 50 MG/1
25 TABLET ORAL 2 TIMES DAILY
COMMUNITY
End: 2022-08-25

## 2020-02-20 RX ORDER — AMLODIPINE BESYLATE 5 MG/1
5 TABLET ORAL DAILY
Status: DISCONTINUED | OUTPATIENT
Start: 2020-02-20 | End: 2020-02-23 | Stop reason: HOSPADM

## 2020-02-20 RX ORDER — SODIUM CHLORIDE 0.9 % (FLUSH) 0.9 %
3 SYRINGE (ML) INJECTION EVERY 6 HOURS PRN
Status: DISCONTINUED | OUTPATIENT
Start: 2020-02-20 | End: 2020-02-23 | Stop reason: HOSPADM

## 2020-02-20 RX ORDER — SERTRALINE HYDROCHLORIDE 50 MG/1
50 TABLET, FILM COATED ORAL DAILY
Status: DISCONTINUED | OUTPATIENT
Start: 2020-02-20 | End: 2020-02-23 | Stop reason: HOSPADM

## 2020-02-20 RX ORDER — ACETAMINOPHEN 325 MG/1
650 TABLET ORAL EVERY 4 HOURS PRN
Status: DISCONTINUED | OUTPATIENT
Start: 2020-02-20 | End: 2020-02-23 | Stop reason: HOSPADM

## 2020-02-20 RX ORDER — PANTOPRAZOLE SODIUM 40 MG/10ML
40 INJECTION, POWDER, LYOPHILIZED, FOR SOLUTION INTRAVENOUS DAILY
Status: DISCONTINUED | OUTPATIENT
Start: 2020-02-21 | End: 2020-02-21

## 2020-02-20 RX ORDER — PIOGLITAZONEHYDROCHLORIDE 30 MG/1
30 TABLET ORAL DAILY
COMMUNITY
End: 2020-04-20

## 2020-02-20 RX ORDER — ROSUVASTATIN CALCIUM 20 MG/1
20 TABLET, COATED ORAL NIGHTLY
Status: DISCONTINUED | OUTPATIENT
Start: 2020-02-20 | End: 2020-02-23 | Stop reason: HOSPADM

## 2020-02-20 RX ORDER — FUROSEMIDE 10 MG/ML
20 INJECTION INTRAMUSCULAR; INTRAVENOUS ONCE
Status: COMPLETED | OUTPATIENT
Start: 2020-02-21 | End: 2020-02-21

## 2020-02-20 RX ORDER — GLUCAGON 1 MG
1 KIT INJECTION
Status: DISCONTINUED | OUTPATIENT
Start: 2020-02-20 | End: 2020-02-23 | Stop reason: HOSPADM

## 2020-02-20 RX ORDER — HYDROCODONE BITARTRATE AND ACETAMINOPHEN 5; 325 MG/1; MG/1
1 TABLET ORAL EVERY 6 HOURS PRN
Status: DISCONTINUED | OUTPATIENT
Start: 2020-02-20 | End: 2020-02-23 | Stop reason: HOSPADM

## 2020-02-20 RX ORDER — HYDROCODONE BITARTRATE AND ACETAMINOPHEN 500; 5 MG/1; MG/1
TABLET ORAL
Status: DISCONTINUED | OUTPATIENT
Start: 2020-02-20 | End: 2020-02-23 | Stop reason: HOSPADM

## 2020-02-20 RX ORDER — NAPROXEN SODIUM 220 MG/1
324 TABLET, FILM COATED ORAL ONCE
Status: COMPLETED | OUTPATIENT
Start: 2020-02-20 | End: 2020-02-20

## 2020-02-20 RX ADMIN — SERTRALINE HYDROCHLORIDE 50 MG: 50 TABLET ORAL at 04:02

## 2020-02-20 RX ADMIN — AMLODIPINE BESYLATE 5 MG: 5 TABLET ORAL at 04:02

## 2020-02-20 RX ADMIN — METOPROLOL TARTRATE 50 MG: 50 TABLET, FILM COATED ORAL at 08:02

## 2020-02-20 RX ADMIN — SUCRALFATE 1 G: 1 SUSPENSION ORAL at 03:02

## 2020-02-20 RX ADMIN — PANTOPRAZOLE SODIUM 40 MG: 40 INJECTION, POWDER, LYOPHILIZED, FOR SOLUTION INTRAVENOUS at 02:02

## 2020-02-20 RX ADMIN — ROSUVASTATIN CALCIUM 20 MG: 20 TABLET, FILM COATED ORAL at 08:02

## 2020-02-20 RX ADMIN — POLYETHYLENE GLYCOL 3350 340 G: 17 POWDER, FOR SOLUTION ORAL at 04:02

## 2020-02-20 RX ADMIN — ASPIRIN 81 MG 324 MG: 81 TABLET ORAL at 11:02

## 2020-02-20 NOTE — CONSULTS
GASTROENTEROLOGY INPATIENT CONSULT NOTE  Patient Name: Tono Saez  Patient MRN: [id number]  Patient : 1945    Admit Date: 2020  Service date: 2020    Reason for Consult: anemia    PCP: Scott Staley MD    Chief Complaint   Patient presents with    Chest Pain       HPI: Patient is a 74 y.o. male with PMHx CAD / PCI (ASA/plavix), Afib (Xarlto), DM, HTN, GERD, cholecystectomy, CKD presents for evaluation of weakness / chest pain. Acute onset, constant, progressive prior to presenation but now better w/ adjustments to his pacemaker. Questionable rare melena and occasional blood w/ wiping. Definitely taking ASA / Xarelto and thinks he is taking plavix although not listed on home med list that he brought from home. No abd pain or f/c and currently CP free w/o SOB.     CHART REVIEW:   Hg 6.2 ( Baseline 12 )  Colon '17 - Normal colon w/ negative bx  CT Abd '17 - stable 2.5 cm pancreatic head cyst; Normal liver/ biliary s/p darius; Normal bowels    Past Medical History:  Past Medical History:   Diagnosis Date    Anemia     Anticoagulant long-term use     Arthritis     CAD (coronary artery disease)     CABG, STENTS '97,'99,'01,'05    Cancer     SKIN    Cataract     Diabetes mellitus     Diabetes mellitus type II     GERD (gastroesophageal reflux disease)     Hypertension     Myocardial infarction     Wears glasses         Past Surgical History:  Past Surgical History:   Procedure Laterality Date    CARDIAC PACEMAKER PLACEMENT      CARDIAC PACEMAKER PLACEMENT      CARDIAC PACEMAKER PLACEMENT  2018    replaced    CARDIAC SURGERY         CABG X 5    CHOLECYSTECTOMY      COLON SURGERY      CORONARY ARTERY BYPASS GRAFT      CORONARY STENT PLACEMENT      stent x 5    EYE SURGERY      BILAT CATARACT    head laceration   2018    HEMORRHOID SURGERY      stint          Home Medications:    (Not in a hospital admission)    Inpatient Medications:    sodium chloride    Review  of patient's allergies indicates:   Allergen Reactions    Adhesive Other (See Comments)     SILK TAPE PULL SKIN OFF    Metformin Other (See Comments)     Weight loss cachexia anorexia,diarrhea.    Pcn [penicillins]      Patient states he passed out from this medication when he was 10 years old.        Social History:   Social History     Occupational History    Not on file   Tobacco Use    Smoking status: Never Smoker    Smokeless tobacco: Never Used   Substance and Sexual Activity    Alcohol use: No    Drug use: No    Sexual activity: Not Currently       Family History:   Family History   Problem Relation Age of Onset    Heart disease Mother     Heart disease Father     Hyperlipidemia Sister     Hypertension Sister     Heart disease Brother     Heart disease Brother     Heart disease Brother     Heart disease Brother     Heart disease Brother        Review of Systems:  A 10 point review of systems was performed and was normal, except as mentioned in the HPI, including constitutional, HEENT, heme, lymph, cardiovascular, respiratory, gastrointestinal, genitourinary, neurologic, endocrine, psychiatric and musculoskeletal.      OBJECTIVE:    Physical Exam:  24 Hour Vital Sign Ranges: Temp:  [98.1 °F (36.7 °C)] 98.1 °F (36.7 °C)  Pulse:  [64-74] 67  Resp:  [13-24] 21  SpO2:  [96 %-100 %] 100 %  BP: (108-173)/(57-78) 173/77  Most recent vitals: BP (!) 173/77   Pulse 67   Temp 98.1 °F (36.7 °C) (Oral)   Resp (!) 21   Ht 6' (1.829 m)   Wt 92.5 kg (204 lb)   SpO2 100%   BMI 27.67 kg/m²    GEN: well-developed, well-nourished, awake and alert, non-toxic appearing adult  HEENT: PERRL, sclera anicteric, oral mucosa pink and moist without lesion  NECK: trachea midline; Good ROM  CV: regular rate and rhythm, no murmurs or gallops; sternal scar from remote h/o CABG  RESP: clear to auscultation bilaterally, no wheezes, rhonci or rales  ABD: soft, non-tender, non-distended, normal bowel sounds  EXT: no  swelling or edema, 2+ pulses distally  SKIN: no rashes or jaundice  PSYCH: normal affect    Labs:   Recent Labs     02/20/20  1026   WBC 5.00   MCV 85        Recent Labs     02/20/20  1026      K 3.5      CO2 23   BUN 36*   *     No results for input(s): ALB in the last 72 hours.    Invalid input(s): ALKP, SGOT, SGPT, TBIL, DBIL, TPRO  Recent Labs     02/20/20  1026   INR 2.2         Radiology Review:  X-Ray Chest AP Portable   Final Result      1. No acute radiographic abnormalities.   2. Mild cardiomegaly.         Electronically signed by: Gordy Ozuna MD   Date:    02/20/2020   Time:    11:10            IMPRESSION / RECOMMENDATIONS:   74 y.o. male with PMHx CAD / PCI (ASA/plavix), Afib (Xarlto), DM, HTN, GERD, cholecystectomy, CKD presents for evaluation of weakness / chest pain in setting of progressive anemia. Risks, benefits, alternative discussed in detail with patient / family regarding anticipated procedure and possible complications.     -Push / colon w/ limited interventions tomorrow due to anticoagulation  -In regards to pancreatic cyst, d/w'd family and no surveillance desired due to co-morbidities and risks w/ surveillance and whipple    Thank you for this consult.    Abe Barragan III  2/20/2020  3:13 PM

## 2020-02-20 NOTE — H&P (VIEW-ONLY)
GASTROENTEROLOGY INPATIENT CONSULT NOTE  Patient Name: Tono Saez  Patient MRN: [id number]  Patient : 1945    Admit Date: 2020  Service date: 2020    Reason for Consult: anemia    PCP: Scott Staley MD    Chief Complaint   Patient presents with    Chest Pain       HPI: Patient is a 74 y.o. male with PMHx CAD / PCI (ASA/plavix), Afib (Xarlto), DM, HTN, GERD, cholecystectomy, CKD presents for evaluation of weakness / chest pain. Acute onset, constant, progressive prior to presenation but now better w/ adjustments to his pacemaker. Questionable rare melena and occasional blood w/ wiping. Definitely taking ASA / Xarelto and thinks he is taking plavix although not listed on home med list that he brought from home. No abd pain or f/c and currently CP free w/o SOB.     CHART REVIEW:   Hg 6.2 ( Baseline 12 )  Colon '17 - Normal colon w/ negative bx  CT Abd '17 - stable 2.5 cm pancreatic head cyst; Normal liver/ biliary s/p darius; Normal bowels    Past Medical History:  Past Medical History:   Diagnosis Date    Anemia     Anticoagulant long-term use     Arthritis     CAD (coronary artery disease)     CABG, STENTS '97,'99,'01,'05    Cancer     SKIN    Cataract     Diabetes mellitus     Diabetes mellitus type II     GERD (gastroesophageal reflux disease)     Hypertension     Myocardial infarction     Wears glasses         Past Surgical History:  Past Surgical History:   Procedure Laterality Date    CARDIAC PACEMAKER PLACEMENT      CARDIAC PACEMAKER PLACEMENT      CARDIAC PACEMAKER PLACEMENT  2018    replaced    CARDIAC SURGERY         CABG X 5    CHOLECYSTECTOMY      COLON SURGERY      CORONARY ARTERY BYPASS GRAFT      CORONARY STENT PLACEMENT      stent x 5    EYE SURGERY      BILAT CATARACT    head laceration   2018    HEMORRHOID SURGERY      stint          Home Medications:    (Not in a hospital admission)    Inpatient Medications:    sodium chloride    Review  of patient's allergies indicates:   Allergen Reactions    Adhesive Other (See Comments)     SILK TAPE PULL SKIN OFF    Metformin Other (See Comments)     Weight loss cachexia anorexia,diarrhea.    Pcn [penicillins]      Patient states he passed out from this medication when he was 10 years old.        Social History:   Social History     Occupational History    Not on file   Tobacco Use    Smoking status: Never Smoker    Smokeless tobacco: Never Used   Substance and Sexual Activity    Alcohol use: No    Drug use: No    Sexual activity: Not Currently       Family History:   Family History   Problem Relation Age of Onset    Heart disease Mother     Heart disease Father     Hyperlipidemia Sister     Hypertension Sister     Heart disease Brother     Heart disease Brother     Heart disease Brother     Heart disease Brother     Heart disease Brother        Review of Systems:  A 10 point review of systems was performed and was normal, except as mentioned in the HPI, including constitutional, HEENT, heme, lymph, cardiovascular, respiratory, gastrointestinal, genitourinary, neurologic, endocrine, psychiatric and musculoskeletal.      OBJECTIVE:    Physical Exam:  24 Hour Vital Sign Ranges: Temp:  [98.1 °F (36.7 °C)] 98.1 °F (36.7 °C)  Pulse:  [64-74] 67  Resp:  [13-24] 21  SpO2:  [96 %-100 %] 100 %  BP: (108-173)/(57-78) 173/77  Most recent vitals: BP (!) 173/77   Pulse 67   Temp 98.1 °F (36.7 °C) (Oral)   Resp (!) 21   Ht 6' (1.829 m)   Wt 92.5 kg (204 lb)   SpO2 100%   BMI 27.67 kg/m²    GEN: well-developed, well-nourished, awake and alert, non-toxic appearing adult  HEENT: PERRL, sclera anicteric, oral mucosa pink and moist without lesion  NECK: trachea midline; Good ROM  CV: regular rate and rhythm, no murmurs or gallops; sternal scar from remote h/o CABG  RESP: clear to auscultation bilaterally, no wheezes, rhonci or rales  ABD: soft, non-tender, non-distended, normal bowel sounds  EXT: no  swelling or edema, 2+ pulses distally  SKIN: no rashes or jaundice  PSYCH: normal affect    Labs:   Recent Labs     02/20/20  1026   WBC 5.00   MCV 85        Recent Labs     02/20/20  1026      K 3.5      CO2 23   BUN 36*   *     No results for input(s): ALB in the last 72 hours.    Invalid input(s): ALKP, SGOT, SGPT, TBIL, DBIL, TPRO  Recent Labs     02/20/20  1026   INR 2.2         Radiology Review:  X-Ray Chest AP Portable   Final Result      1. No acute radiographic abnormalities.   2. Mild cardiomegaly.         Electronically signed by: Gordy Ozuna MD   Date:    02/20/2020   Time:    11:10            IMPRESSION / RECOMMENDATIONS:   74 y.o. male with PMHx CAD / PCI (ASA/plavix), Afib (Xarlto), DM, HTN, GERD, cholecystectomy, CKD presents for evaluation of weakness / chest pain in setting of progressive anemia. Risks, benefits, alternative discussed in detail with patient / family regarding anticipated procedure and possible complications.     -Push / colon w/ limited interventions tomorrow due to anticoagulation  -In regards to pancreatic cyst, d/w'd family and no surveillance desired due to co-morbidities and risks w/ surveillance and whipple    Thank you for this consult.    Abe Barragan III  2/20/2020  3:13 PM

## 2020-02-20 NOTE — H&P
[facility] History & Physical Examination   Patient Name: Tono Saez  MRN: [id number]  Patient Class: OP- Observation   Admission Date: 2/20/2020 10:05 AM  Length of Stay: 0  Attending Physician: Papi Frost MD  Primary Care Provider: Scott Staley MD  Face-to-Face encounter date: 02/20/2020  Code Status: full code  Chief Complaint: Chest Pain        Patient information was obtained from patient, past medical records and ER records.   HISTORY OF PRESENT ILLNESS:   Tono Saez is a 74 y.o. White male who  has a past medical history of Anemia, Anticoagulant long-term use, Arthritis, CAD (coronary artery disease), Cancer, Cataract, Diabetes mellitus, Diabetes mellitus type II, GERD (gastroesophageal reflux disease), Hypertension, Myocardial infarction, and Wears glasses.. The patient presented to [facility] on 2/20/2020 with a primary complaint of Chest Pain    History was obtained from the patient and the family present at the bedside and ER physician Sign-out.  Patient reports having mid sternal chest pain similar to his heart attack for the last several weeks not relieved with nitroglycerin that he is taking on daily basis.  Yesterday he went to his cardiologist.  His pacemaker settings were changed, after that he developed continuous chest pain not relieved with several nitroglycerins (I was told that Nitroglycerine dates back to 2015).  Today he presented to the emergency room where "[employer] came back to readjust the settings of the pacemaker after which patient chest pain resolved.     In the emergency room, patient CBC was showed hemoglobin of 6.2 and hematocrit of 21.6 down from 11.4 in 35.7 in December 2, 2019 . CMP was unremarkable. BNP was 535 and troponin was 0.045. Reviewed EKG and does not show new ischemic changes. No concerning finding on chest x ray.     Decision to admit was taken and patient was informed about the plan of care.    REVIEW OF SYSTEMS:   10 Point Review of System was performed and was found to be negative except for that mentioned already in the HPI above.     PAST MEDICAL HISTORY:     Past Medical History:   Diagnosis Date    Anemia     Anticoagulant long-term use     Arthritis     CAD (coronary artery disease)     CABG, STENTS '97,'99,'01,'05    Cancer     SKIN    Cataract     Diabetes mellitus     Diabetes mellitus type II     GERD (gastroesophageal reflux disease)     Hypertension     Myocardial infarction     Wears glasses        PAST SURGICAL HISTORY:     Past Surgical History:   Procedure Laterality Date    CARDIAC PACEMAKER PLACEMENT      CARDIAC PACEMAKER PLACEMENT      CARDIAC PACEMAKER PLACEMENT  04/26/2018    replaced    CARDIAC SURGERY      1995   CABG X 5    CHOLECYSTECTOMY      COLON SURGERY      CORONARY ARTERY BYPASS GRAFT  1995    CORONARY STENT PLACEMENT      stent x 5    EYE SURGERY      BILAT CATARACT    head laceration   01/19/2018    HEMORRHOID SURGERY      stint         ALLERGIES:   Adhesive; Metformin; and Pcn [penicillins]    FAMILY HISTORY:     Family History   Problem Relation Age of Onset    Heart disease Mother     Heart disease Father     Hyperlipidemia Sister     Hypertension Sister     Heart disease Brother     Heart disease Brother     Heart disease Brother     Heart disease Brother     Heart disease Brother        SOCIAL HISTORY:     Social History     Tobacco Use    Smoking status: Never Smoker    Smokeless tobacco: Never Used   Substance Use Topics    Alcohol use: No        Social History     Substance and Sexual Activity   Sexual Activity Not Currently        HOME MEDICATIONS:     Prior to Admission medications    Medication Sig Start Date End Date Taking? Authorizing Provider   amLODIPine (NORVASC) 5 MG tablet Take 5 mg by mouth once daily.   Yes Historical Provider, MD   finasteride (PROSCAR) 5 mg tablet Take 5 mg by mouth once daily.   Yes Historical  Provider, MD   magnesium chloride (SLOW-MAG) 71.5 mg TbEC Take 71.5 mg by mouth once daily.    Yes Historical Provider, MD   metoprolol tartrate (LOPRESSOR) 100 MG tablet Take 50 mg by mouth 2 (two) times daily.   Yes Historical Provider, MD   multivitamin capsule Take 1 capsule by mouth once daily.    Yes Historical Provider, MD   pioglitazone (ACTOS) 30 MG tablet Take 30 mg by mouth once daily.   Yes Historical Provider, MD   quinapril (ACCUPRIL) 40 MG tablet Take 20 mg by mouth once daily.  12/26/17  Yes Historical Provider, MD   rivaroxaban (XARELTO) 15 mg Tab Take 15 mg by mouth every evening.    Yes Historical Provider, MD   rosuvastatin (CRESTOR) 20 MG tablet Take 20 mg by mouth every evening.  10/1/19  Yes Historical Provider, MD   sertraline (ZOLOFT) 50 MG tablet Take 50 mg by mouth once daily.   Yes Historical Provider, MD   tolterodine (DETROL LA) 4 MG 24 hr capsule Take 4 mg by mouth once daily.  1/17/19  Yes Historical Provider, MD   vitamin D 1000 units Tab Take 1,000 Units by mouth once daily.   Yes Historical Provider, MD   fluticasone propionate (FLONASE) 50 mcg/actuation nasal spray 1 spray by Each Nostril route daily as needed for Rhinitis.    Historical Provider, MD   omeprazole (PRILOSEC) 40 MG capsule Take 40 mg by mouth once daily.  2/19/20   Historical Provider, MD   sucralfate (CARAFATE) 1 gram tablet Take 1 g by mouth 3 (three) times daily.  2/19/20   Historical Provider, MD   amLODIPine (NORVASC) 5 MG tablet TAKE ONE TABLET BY MOUTH ONCE DAILY 12/17/19 2/20/20  Scott Staley MD   benzonatate (TESSALON) 100 MG capsule Take 100 mg by mouth 3 (three) times daily as needed for Cough.  2/20/20  Historical Provider, MD   blood sugar diagnostic Strp To check BG bid times daily, to use with insurance preferred meter 8/9/18 2/20/20  Scott Staley MD   cetirizine (ZYRTEC) 10 MG tablet Take 1 tablet (10 mg total) by mouth once daily.  Patient taking differently: Take 10 mg by mouth daily as  needed.  3/14/19 2/20/20  Sarai Quintana NP   ciclopirox 1 % shampoo As directed 3/22/19 2/20/20  Historical Provider, MD   dexchlorphen-phenylephrine-DM (POLYTUSSIN DM) 1-5-10 mg/5 mL Syrp Take 5 mLs by mouth every 4 (four) hours as needed. 3/14/19 2/20/20  Sarai Quintana NP   fenofibrate micronized (LOFIBRA) 134 MG Cap TAKE 1 CAPSULE BEFORE      BREAKFAST (RETURN TO       CLINIC FOR FOLLOW-UP IN    FEBRUARY)  Patient not taking: Reported on 10/30/2019 8/30/19 2/20/20  Scott Staley MD   fluticasone propionate (FLONASE) 50 mcg/actuation nasal spray 1 spray (50 mcg total) by Each Nostril route once daily. 1/30/20 2/20/20  Scott Staley MD   ipratropium (ATROVENT) 42 mcg (0.06 %) nasal spray 2 sprays by Nasal route every 6 (six) hours as needed for Rhinitis. 3/26/19 2/20/20  Madison Villagomez MD   lansoprazole (PREVACID) 30 MG capsule Take 1 capsule (30 mg total) by mouth once daily. 2/18/19 2/20/20  Scott Staley MD   metoprolol tartrate (LOPRESSOR) 100 MG tablet TAKE ONE TABLET BY MOUTH TWO TIMES A DAY 2/10/20 2/20/20  Scott Staley MD   pioglitazone (ACTOS) 30 MG tablet TAKE ONE TABLET BY MOUTH ONCE DAILY 5/16/19 2/20/20  Scott Staley MD   sertraline (ZOLOFT) 50 MG tablet TAKE ONE TABLET BY MOUTH ONCE DAILY 4/30/19 2/20/20  Scott Staley MD   simvastatin (ZOCOR) 40 MG tablet TAKE ONE TABLET BY MOUTH EVERY EVENING FOR CHOLESTEROL  Patient not taking: Reported on 10/30/2019 9/26/18 2/20/20  Scott Staley MD         PHYSICAL EXAM:   BP (!) 173/77   Pulse 67   Temp 98.1 °F (36.7 °C) (Oral)   Resp (!) 21   Ht 6' (1.829 m)   Wt 92.5 kg (204 lb)   SpO2 100%   BMI 27.67 kg/m²   Vitals Reviewed  General appearance: Well-developed, well-nourished White male in no apparent distress.  Skin: No Rash.   Neuro: Motor and sensory exams grossly intact. Good tone. Power in all 4 extremities 5/5.   HENT: Atraumatic head. Moist mucous membranes of oral cavity.  Eyes: Normal extraocular movements.    Neck: Supple. No evidence of lymphadenopathy. No thyroidomegaly.  Lungs: Clear to auscultation bilaterally. No wheezing present.   Heart: Regular rate and rhythm. S1 and S2 present with no murmurs/gallop/rub. No pedal edema. No JVD present.   Abdomen: Soft, non-distended, non-tender. No rebound tenderness/guarding. No masses or organomegaly. Bowel sounds are normal. Bladder is not palpable.   Extremities: No cyanosis, clubbing.  Psych/mental status: Alert and oriented. Cooperative. Responds appropriately to questions.   EMERGENCY DEPARTMENT LABS AND IMAGING:     Labs Reviewed   CBC W/ AUTO DIFFERENTIAL - Abnormal; Notable for the following components:       Result Value    RBC 2.54 (*)     Hemoglobin 6.2 (*)     Hematocrit 21.6 (*)     Mean Corpuscular Hemoglobin 24.4 (*)     Mean Corpuscular Hemoglobin Conc 28.7 (*)     Immature Granulocytes 0.6 (*)     Lymph # 0.9 (*)     All other components within normal limits    Narrative:      Hgb critical result(s) repeated. Called and verbal readback obtained   from Dr. Archer/ED by CHING 02/20/2020 10:52   COMPREHENSIVE METABOLIC PANEL - Abnormal; Notable for the following components:    Glucose 195 (*)     BUN, Bld 36 (*)     Alkaline Phosphatase 47 (*)     eGFR if  56.0 (*)     eGFR if non  48.5 (*)     All other components within normal limits   B-TYPE NATRIURETIC PEPTIDE - Abnormal; Notable for the following components:     (*)     All other components within normal limits   TROPONIN I - Abnormal; Notable for the following components:    Troponin I 0.045 (*)     All other components within normal limits   PROTIME-INR - Abnormal; Notable for the following components:    PT 23.5 (*)     All other components within normal limits   OCCULT BLOOD X 1, STOOL - Abnormal; Notable for the following components:    Occult Blood Positive (*)     All other components within normal limits   FOLATE   VITAMIN B12   IRON AND TIBC   HEMOGLOBIN    HEMATOCRIT   TYPE & SCREEN   PREPARE RBC SOFT       X-Ray Chest AP Portable   Final Result      1. No acute radiographic abnormalities.   2. Mild cardiomegaly.         Electronically signed by: Gordy Ozuna MD   Date:    02/20/2020   Time:    11:10          ASSESSMENT & PLAN:   Tono Saez is a 74 y.o. male admitted for    1. GI bleed: Likely upper. Brown stools mixed with blood. Heme occult positive. Drop in Hgb in 2-3 months. Needs upper and lower Scope. Consulted GI and discussed case with Dr. Barragan. Awaiting final recommendations. IV Protonix daily. Transfuse 2U. Follow h/h q6 hours    2. Acute blood loss anemia: See plan above    3. Chest pain: currently chest pain free. Pacemaker settings were readjusted. Will trend troponin. Cardiology consulted. Holding Xarelto for atrial fibrillation due to GI bleed. Restart after endoscopy.    4. Atrial fibrillation: Rate controlled. Pacemaker. Continue lopressor 100mg bid    5. Mood disorder: Zoloft    6. Hypertension: holding accupril.        DVT Prophylaxis: will be placed on SCDs for DVT prophylaxis and will be advised to be as mobile as possible and sit in a chair as tolerated.   ________________________________________________________________________________    Discharge Planning and Disposition: No mobility needs. Ambulating well.   Face-to-Face encounter date: 02/20/2020  Encounter included review of the medical records, interviewing and examining the patient face-to-face, discussion with family and other health care providers including emergency medicine physician, admission orders, interpreting lab/test results and formulating a plan of care.   Medical Decision Making during this encounter was  [_] Low Complexity  [_] Moderate Complexity  [x] High Complexity  _________________________________________________________________________________    INPATIENT LIST OF MEDICATIONS     Current Facility-Administered Medications:     0.9%  NaCl infusion (for  blood administration), , Intravenous, Q24H PRN, Gavino Archer Jr., MD    Current Outpatient Medications:     amLODIPine (NORVASC) 5 MG tablet, Take 5 mg by mouth once daily., Disp: , Rfl:     finasteride (PROSCAR) 5 mg tablet, Take 5 mg by mouth once daily., Disp: , Rfl:     magnesium chloride (SLOW-MAG) 71.5 mg TbEC, Take 71.5 mg by mouth once daily. , Disp: , Rfl:     metoprolol tartrate (LOPRESSOR) 100 MG tablet, Take 50 mg by mouth 2 (two) times daily., Disp: , Rfl:     multivitamin capsule, Take 1 capsule by mouth once daily. , Disp: , Rfl:     pioglitazone (ACTOS) 30 MG tablet, Take 30 mg by mouth once daily., Disp: , Rfl:     quinapril (ACCUPRIL) 40 MG tablet, Take 20 mg by mouth once daily. , Disp: , Rfl: 4    rivaroxaban (XARELTO) 15 mg Tab, Take 15 mg by mouth every evening. , Disp: , Rfl:     rosuvastatin (CRESTOR) 20 MG tablet, Take 20 mg by mouth every evening. , Disp: , Rfl: 3    sertraline (ZOLOFT) 50 MG tablet, Take 50 mg by mouth once daily., Disp: , Rfl:     tolterodine (DETROL LA) 4 MG 24 hr capsule, Take 4 mg by mouth once daily. , Disp: , Rfl:     vitamin D 1000 units Tab, Take 1,000 Units by mouth once daily., Disp: , Rfl:     fluticasone propionate (FLONASE) 50 mcg/actuation nasal spray, 1 spray by Each Nostril route daily as needed for Rhinitis., Disp: , Rfl:     omeprazole (PRILOSEC) 40 MG capsule, Take 40 mg by mouth once daily. , Disp: , Rfl:     sucralfate (CARAFATE) 1 gram tablet, Take 1 g by mouth 3 (three) times daily. , Disp: , Rfl:       Scheduled Meds:  Continuous Infusions:  PRN Meds:.sodium chloride      Papi Frost  [facility] Hospitalist  02/20/2020

## 2020-02-20 NOTE — ED PROVIDER NOTES
74-year-old male who has a history of coronary artery disease, skin cancer Encounter Date: 2/20/2020       History     Chief Complaint   Patient presents with    Chest Pain     74-year-old male who has a history of coronary artery disease, skin cancer, hypertension, diabetes, anemia, presents emergency room with a head she has been having chest pain off and on for the last several weeks which she has been taking old nitroglycerin tablets.  Patient states he start Dr. DOTY yesterday who adjusted his pacemaker after which time he stated his chest pain became much worse.  Patient states he had taken at least 15 nitroglycerin tablets since that time.  No aspirin had been taken.  He admits that the nitroglycerin dates back to 2015.  His chest pain is midsternal but is described as somewhat sharp in nature with no current radiation.  Was no nausea vomiting. No palpitations or diaphoresis.  He did have associated shortness of breath and dizziness.  The patient reports that apparently his cardiologist's office contacted "[employer]berto fulton who presented to the emergency room and readjusted the pacemaker in the ED unknown  to me and since that time has had no further chest pain. The patient additionally admits that he has had some black stools recently and is currently on Plavix, aspirin and Xarelto.  He is not aware of any prior history of anemia although his medical record does suggest this.  No recent cough fever or chills.        Review of patient's allergies indicates:   Allergen Reactions    Adhesive Other (See Comments)     SILK TAPE PULL SKIN OFF    Metformin Other (See Comments)     Weight loss cachexia anorexia,diarrhea.    Pcn [penicillins]      Patient states he passed out from this medication when he was 10 years old.      Past Medical History:   Diagnosis Date    Anemia     Anticoagulant long-term use     Arthritis     CAD (coronary artery disease)     CABG, STENTS '97,'99,'01,'05    Cancer     SKIN     Cataract     Diabetes mellitus     Diabetes mellitus type II     GERD (gastroesophageal reflux disease)     Hypertension     Myocardial infarction     Wears glasses      Past Surgical History:   Procedure Laterality Date    CARDIAC PACEMAKER PLACEMENT      CARDIAC PACEMAKER PLACEMENT      CARDIAC PACEMAKER PLACEMENT  04/26/2018    replaced    CARDIAC SURGERY      1995   CABG X 5    CHOLECYSTECTOMY      COLON SURGERY      CORONARY ARTERY BYPASS GRAFT  1995    CORONARY STENT PLACEMENT      stent x 5    EYE SURGERY      BILAT CATARACT    head laceration   01/19/2018    HEMORRHOID SURGERY      stint       Family History   Problem Relation Age of Onset    Heart disease Mother     Heart disease Father     Hyperlipidemia Sister     Hypertension Sister     Heart disease Brother     Heart disease Brother     Heart disease Brother     Heart disease Brother     Heart disease Brother      Social History     Tobacco Use    Smoking status: Never Smoker    Smokeless tobacco: Never Used   Substance Use Topics    Alcohol use: No    Drug use: No     Review of Systems   Constitutional: Negative for appetite change, chills, diaphoresis and fever.   HENT: Negative for congestion, ear pain, rhinorrhea, sinus pain and sore throat.    Eyes: Negative for pain.   Respiratory: Negative for cough and shortness of breath.    Cardiovascular: Positive for chest pain and leg swelling. Negative for palpitations.   Gastrointestinal: Positive for blood in stool. Negative for abdominal pain, constipation, diarrhea, nausea and vomiting.   Genitourinary: Negative for difficulty urinating, flank pain, frequency and hematuria.   Skin: Negative for pallor, rash and wound.   Neurological: Negative for headaches.   Hematological: Does not bruise/bleed easily.   All other systems reviewed and are negative.      Physical Exam     Initial Vitals [02/20/20 1012]   BP Pulse Resp Temp SpO2   (!) 108/57 74 18 98.1 °F (36.7 °C) 98 %       MAP       --         Physical Exam    Vitals reviewed.  Constitutional: He appears well-developed and well-nourished. He is not diaphoretic. No distress.   HENT:   Head: Normocephalic and atraumatic.   Right Ear: External ear normal.   Left Ear: External ear normal.   Nose: Nose normal.   Mouth/Throat: Oropharynx is clear and moist.   Eyes: Conjunctivae and EOM are normal. Pupils are equal, round, and reactive to light. Right eye exhibits no discharge. Left eye exhibits no discharge. No scleral icterus.   Neck: Normal range of motion. Neck supple. No tracheal deviation present. No JVD present.   Cardiovascular: Normal rate, regular rhythm, normal heart sounds and intact distal pulses. Exam reveals no gallop and no friction rub.    No murmur heard.  Pulmonary/Chest: Breath sounds normal. No respiratory distress. He has no wheezes. He has no rhonchi. He has no rales. He exhibits no tenderness.   Abdominal: Soft. Bowel sounds are normal. He exhibits no distension and no mass. There is no tenderness. There is no rebound and no guarding.   Genitourinary: Penis normal.   Musculoskeletal: Normal range of motion. He exhibits no tenderness.        Right lower leg: He exhibits edema.        Left lower leg: He exhibits edema.   Lymphadenopathy:     He has no cervical adenopathy.   Neurological: He is alert and oriented to person, place, and time. He has normal strength and normal reflexes. No cranial nerve deficit or sensory deficit. GCS score is 15. GCS eye subscore is 4. GCS verbal subscore is 5. GCS motor subscore is 6.   Skin: Skin is warm and dry. Capillary refill takes less than 2 seconds. No rash noted. No erythema. No pallor.   Psychiatric: He has a normal mood and affect. His behavior is normal. Judgment and thought content normal.         ED Course   Procedures  Labs Reviewed   CBC W/ AUTO DIFFERENTIAL - Abnormal; Notable for the following components:       Result Value    RBC 2.54 (*)     Hemoglobin 6.2 (*)      Hematocrit 21.6 (*)     Mean Corpuscular Hemoglobin 24.4 (*)     Mean Corpuscular Hemoglobin Conc 28.7 (*)     Immature Granulocytes 0.6 (*)     Lymph # 0.9 (*)     All other components within normal limits    Narrative:      Hgb critical result(s) repeated. Called and verbal readback obtained   from Dr. Archer/ED by JB8 02/20/2020 10:52   COMPREHENSIVE METABOLIC PANEL - Abnormal; Notable for the following components:    Glucose 195 (*)     BUN, Bld 36 (*)     Alkaline Phosphatase 47 (*)     eGFR if  56.0 (*)     eGFR if non  48.5 (*)     All other components within normal limits   B-TYPE NATRIURETIC PEPTIDE - Abnormal; Notable for the following components:     (*)     All other components within normal limits   TROPONIN I - Abnormal; Notable for the following components:    Troponin I 0.045 (*)     All other components within normal limits   PROTIME-INR - Abnormal; Notable for the following components:    PT 23.5 (*)     All other components within normal limits   OCCULT BLOOD X 1, STOOL        ECG Results          EKG 12-lead (In process)  Result time 02/20/20 10:18:20    In process by Interface, Lab In [facility] (02/20/20 10:18:20)                 Narrative:    Test Reason : R07.9,    Vent. Rate : 071 BPM     Atrial Rate : 071 BPM     P-R Int : 206 ms          QRS Dur : 202 ms      QT Int : 480 ms       P-R-T Axes : 021 -78 091 degrees     QTc Int : 521 ms    Atrial-sensed ventricular-paced rhythm  Abnormal ECG  No previous ECGs available    Referred By: [email or web address]   SELF           Confirmed By:                             Imaging Results          X-Ray Chest AP Portable (Final result)  Result time 02/20/20 11:10:13    Final result by Gordy Ozuna MD (02/20/20 11:10:13)                 Impression:      1. No acute radiographic abnormalities.  2. Mild cardiomegaly.      Electronically signed by: Gordy Ozuna MD  Date:    02/20/2020  Time:    11:10              Narrative:    EXAMINATION:  XR CHEST AP PORTABLE    CLINICAL HISTORY:  Chest pain    COMPARISON:  April 2019    FINDINGS:  The heart is mildly enlarged.  There has been previous median sternotomy.  Dual lead pacemaker device is noted.    There are no infiltrates or significant pleural effusions.  No acute osseous abnormalities are identified.                                              Attending Attestation:             Attending ED Notes:   This 74-year-old male who has a known history of coronary artery disease, diabetes and hypertension, presented with complaints of sharp chest pain had been going on off for the last several weeks.  The patient had some worsening pain since his pacemaker was adjusted yesterday and was advised presents emergency room for further evaluation.  The patient apparently had his pacemaker again adjusted as requested by Dr. DOTY prior to my assessment after which time he stated chest pain and resolved.  During the ED course he also had related that he had noticed some dark stools and is heme positive although dark brown in appearance.  Pertinent labs included a low H&H of 6.2 and 21.6.  His troponin is mildly elevated 0.045.  His BNP is 535.  Blood sugar is 195 and BUN 36.  During the ED course I did speak to Dr. DOTY who requested consult be placed to him as well as GI.  Patient is to be transfused 2 units of blood and given Carafate along with being started on either omeprazole or Protonix.  The patient was a given these recommendations and is in agreement with the plan course of management.  Dr. Frost, [facility] medicine will be the admitting physician.                        Clinical Impression:       ICD-10-CM ICD-9-CM   1. Chest pain, unspecified type R07.9 786.50   2. Chest pain R07.9 786.50   3. Symptomatic anemia D64.9 285.9   4. Occult GI bleeding R19.5 792.1                             Gavino Archer Jr., MD  02/20/20 [id number]

## 2020-02-21 LAB
ALBUMIN SERPL BCP-MCNC: 4 G/DL (ref 3.5–5.2)
ALP SERPL-CCNC: 51 U/L (ref 55–135)
ALT SERPL W/O P-5'-P-CCNC: 16 U/L (ref 10–44)
ANION GAP SERPL CALC-SCNC: 8 MMOL/L (ref 8–16)
AST SERPL-CCNC: 21 U/L (ref 10–40)
BASOPHILS # BLD AUTO: 0.05 K/UL (ref 0–0.2)
BASOPHILS NFR BLD: 0.7 % (ref 0–1.9)
BILIRUB SERPL-MCNC: 1.2 MG/DL (ref 0.1–1)
BUN SERPL-MCNC: 31 MG/DL (ref 8–23)
CALCIUM SERPL-MCNC: 9.4 MG/DL (ref 8.7–10.5)
CHLORIDE SERPL-SCNC: 104 MMOL/L (ref 95–110)
CHOLEST SERPL-MCNC: 108 MG/DL (ref 120–199)
CHOLEST/HDLC SERPL: 3.1 {RATIO} (ref 2–5)
CO2 SERPL-SCNC: 25 MMOL/L (ref 23–29)
CREAT SERPL-MCNC: 1.2 MG/DL (ref 0.5–1.4)
DIFFERENTIAL METHOD: ABNORMAL
EOSINOPHIL # BLD AUTO: 0.1 K/UL (ref 0–0.5)
EOSINOPHIL NFR BLD: 2 % (ref 0–8)
ERYTHROCYTE [DISTWIDTH] IN BLOOD BY AUTOMATED COUNT: 14.1 % (ref 11.5–14.5)
EST. GFR  (AFRICAN AMERICAN): >60 ML/MIN/1.73 M^2
EST. GFR  (NON AFRICAN AMERICAN): 58.4 ML/MIN/1.73 M^2
ESTIMATED AVG GLUCOSE: 148 MG/DL (ref 68–131)
GLUCOSE SERPL-MCNC: 134 MG/DL (ref 70–110)
HBA1C MFR BLD HPLC: 6.8 % (ref 4.5–6.2)
HCT VFR BLD AUTO: 26.9 % (ref 40–54)
HCT VFR BLD AUTO: 29.1 % (ref 40–54)
HCT VFR BLD AUTO: 29.2 % (ref 40–54)
HDLC SERPL-MCNC: 35 MG/DL (ref 40–75)
HDLC SERPL: 32.4 % (ref 20–50)
HGB BLD-MCNC: 7.9 G/DL (ref 14–18)
HGB BLD-MCNC: 8.6 G/DL (ref 14–18)
HGB BLD-MCNC: 8.8 G/DL (ref 14–18)
IMM GRANULOCYTES # BLD AUTO: 0.02 K/UL (ref 0–0.04)
IMM GRANULOCYTES NFR BLD AUTO: 0.3 % (ref 0–0.5)
LDLC SERPL CALC-MCNC: 50.6 MG/DL (ref 63–159)
LYMPHOCYTES # BLD AUTO: 0.9 K/UL (ref 1–4.8)
LYMPHOCYTES NFR BLD: 13.1 % (ref 18–48)
MAGNESIUM SERPL-MCNC: 1.5 MG/DL (ref 1.6–2.6)
MCH RBC QN AUTO: 25.1 PG (ref 27–31)
MCHC RBC AUTO-ENTMCNC: 29.5 G/DL (ref 32–36)
MCV RBC AUTO: 85 FL (ref 82–98)
MONOCYTES # BLD AUTO: 0.9 K/UL (ref 0.3–1)
MONOCYTES NFR BLD: 12 % (ref 4–15)
NEUTROPHILS # BLD AUTO: 5.1 K/UL (ref 1.8–7.7)
NEUTROPHILS NFR BLD: 71.9 % (ref 38–73)
NONHDLC SERPL-MCNC: 73 MG/DL
NRBC BLD-RTO: 0 /100 WBC
PLATELET # BLD AUTO: 149 K/UL (ref 150–350)
PMV BLD AUTO: 11.2 FL (ref 9.2–12.9)
POTASSIUM SERPL-SCNC: 3.6 MMOL/L (ref 3.5–5.1)
PROT SERPL-MCNC: 6.7 G/DL (ref 6–8.4)
RBC # BLD AUTO: 3.42 M/UL (ref 4.6–6.2)
SODIUM SERPL-SCNC: 137 MMOL/L (ref 136–145)
TRIGL SERPL-MCNC: 112 MG/DL (ref 30–150)
TROPONIN I SERPL DL<=0.01 NG/ML-MCNC: 0.06 NG/ML
TROPONIN I SERPL DL<=0.01 NG/ML-MCNC: 0.07 NG/ML
TROPONIN I SERPL DL<=0.01 NG/ML-MCNC: 0.07 NG/ML
WBC # BLD AUTO: 7.09 K/UL (ref 3.9–12.7)

## 2020-02-21 PROCEDURE — 99900035 HC TECH TIME PER 15 MIN (STAT)

## 2020-02-21 PROCEDURE — 85025 COMPLETE CBC W/AUTO DIFF WBC: CPT

## 2020-02-21 PROCEDURE — 85014 HEMATOCRIT: CPT

## 2020-02-21 PROCEDURE — 21000000 HC CCU ICU ROOM CHARGE

## 2020-02-21 PROCEDURE — 80061 LIPID PANEL: CPT

## 2020-02-21 PROCEDURE — 84484 ASSAY OF TROPONIN QUANT: CPT | Mod: 91

## 2020-02-21 PROCEDURE — 80053 COMPREHEN METABOLIC PANEL: CPT

## 2020-02-21 PROCEDURE — 45378 DIAGNOSTIC COLONOSCOPY: CPT | Performed by: INTERNAL MEDICINE

## 2020-02-21 PROCEDURE — 36430 TRANSFUSION BLD/BLD COMPNT: CPT

## 2020-02-21 PROCEDURE — 36415 COLL VENOUS BLD VENIPUNCTURE: CPT

## 2020-02-21 PROCEDURE — 63600175 PHARM REV CODE 636 W HCPCS: Performed by: INTERNAL MEDICINE

## 2020-02-21 PROCEDURE — 25000003 PHARM REV CODE 250: Performed by: INTERNAL MEDICINE

## 2020-02-21 PROCEDURE — 44360 SMALL BOWEL ENDOSCOPY: CPT | Performed by: INTERNAL MEDICINE

## 2020-02-21 PROCEDURE — 63600175 PHARM REV CODE 636 W HCPCS: Performed by: SPECIALIST

## 2020-02-21 PROCEDURE — 84484 ASSAY OF TROPONIN QUANT: CPT

## 2020-02-21 PROCEDURE — 99153 MOD SED SAME PHYS/QHP EA: CPT | Performed by: INTERNAL MEDICINE

## 2020-02-21 PROCEDURE — 85018 HEMOGLOBIN: CPT

## 2020-02-21 PROCEDURE — 83036 HEMOGLOBIN GLYCOSYLATED A1C: CPT

## 2020-02-21 PROCEDURE — 83735 ASSAY OF MAGNESIUM: CPT

## 2020-02-21 PROCEDURE — 94761 N-INVAS EAR/PLS OXIMETRY MLT: CPT

## 2020-02-21 PROCEDURE — C9113 INJ PANTOPRAZOLE SODIUM, VIA: HCPCS | Performed by: INTERNAL MEDICINE

## 2020-02-21 PROCEDURE — 99152 MOD SED SAME PHYS/QHP 5/>YRS: CPT | Performed by: INTERNAL MEDICINE

## 2020-02-21 PROCEDURE — 25000003 PHARM REV CODE 250: Performed by: HOSPITALIST

## 2020-02-21 RX ORDER — PANTOPRAZOLE SODIUM 40 MG/10ML
80 INJECTION, POWDER, LYOPHILIZED, FOR SOLUTION INTRAVENOUS DAILY
Status: DISCONTINUED | OUTPATIENT
Start: 2020-02-22 | End: 2020-02-23 | Stop reason: HOSPADM

## 2020-02-21 RX ORDER — MIDAZOLAM HYDROCHLORIDE 1 MG/ML
INJECTION INTRAMUSCULAR; INTRAVENOUS
Status: COMPLETED | OUTPATIENT
Start: 2020-02-21 | End: 2020-02-21

## 2020-02-21 RX ORDER — POTASSIUM CHLORIDE 20 MEQ/1
40 TABLET, EXTENDED RELEASE ORAL
Status: DISCONTINUED | OUTPATIENT
Start: 2020-02-21 | End: 2020-02-23 | Stop reason: HOSPADM

## 2020-02-21 RX ORDER — POTASSIUM CHLORIDE 7.45 MG/ML
40 INJECTION INTRAVENOUS
Status: DISCONTINUED | OUTPATIENT
Start: 2020-02-21 | End: 2020-02-23 | Stop reason: HOSPADM

## 2020-02-21 RX ORDER — DIAZEPAM 10 MG/2ML
INJECTION INTRAMUSCULAR
Status: COMPLETED | OUTPATIENT
Start: 2020-02-21 | End: 2020-02-21

## 2020-02-21 RX ORDER — POTASSIUM CHLORIDE 7.45 MG/ML
20 INJECTION INTRAVENOUS
Status: DISCONTINUED | OUTPATIENT
Start: 2020-02-21 | End: 2020-02-23 | Stop reason: HOSPADM

## 2020-02-21 RX ORDER — FENTANYL CITRATE 50 UG/ML
INJECTION, SOLUTION INTRAMUSCULAR; INTRAVENOUS
Status: COMPLETED | OUTPATIENT
Start: 2020-02-21 | End: 2020-02-21

## 2020-02-21 RX ORDER — MAGNESIUM SULFATE HEPTAHYDRATE 40 MG/ML
4 INJECTION, SOLUTION INTRAVENOUS
Status: DISCONTINUED | OUTPATIENT
Start: 2020-02-21 | End: 2020-02-23 | Stop reason: HOSPADM

## 2020-02-21 RX ORDER — SYRING-NEEDL,DISP,INSUL,0.3 ML 29 G X1/2"
296 SYRINGE, EMPTY DISPOSABLE MISCELLANEOUS ONCE
Status: COMPLETED | OUTPATIENT
Start: 2020-02-22 | End: 2020-02-22

## 2020-02-21 RX ORDER — POTASSIUM CHLORIDE 20 MEQ/1
20 TABLET, EXTENDED RELEASE ORAL
Status: DISCONTINUED | OUTPATIENT
Start: 2020-02-21 | End: 2020-02-23 | Stop reason: HOSPADM

## 2020-02-21 RX ORDER — LANOLIN ALCOHOL/MO/W.PET/CERES
800 CREAM (GRAM) TOPICAL
Status: DISCONTINUED | OUTPATIENT
Start: 2020-02-21 | End: 2020-02-23 | Stop reason: HOSPADM

## 2020-02-21 RX ORDER — MAGNESIUM SULFATE HEPTAHYDRATE 40 MG/ML
2 INJECTION, SOLUTION INTRAVENOUS
Status: DISCONTINUED | OUTPATIENT
Start: 2020-02-21 | End: 2020-02-23 | Stop reason: HOSPADM

## 2020-02-21 RX ORDER — MAGNESIUM SULFATE 1 G/100ML
1 INJECTION INTRAVENOUS
Status: DISCONTINUED | OUTPATIENT
Start: 2020-02-21 | End: 2020-02-23 | Stop reason: HOSPADM

## 2020-02-21 RX ADMIN — DIAZEPAM 2.5 MG: 5 INJECTION, SOLUTION INTRAMUSCULAR; INTRAVENOUS at 08:02

## 2020-02-21 RX ADMIN — MAGNESIUM OXIDE 800 MG: 400 TABLET ORAL at 11:02

## 2020-02-21 RX ADMIN — MIDAZOLAM HYDROCHLORIDE 1 MG: 1 INJECTION, SOLUTION INTRAMUSCULAR; INTRAVENOUS at 07:02

## 2020-02-21 RX ADMIN — ROSUVASTATIN CALCIUM 20 MG: 20 TABLET, FILM COATED ORAL at 08:02

## 2020-02-21 RX ADMIN — AMLODIPINE BESYLATE 5 MG: 5 TABLET ORAL at 11:02

## 2020-02-21 RX ADMIN — MIDAZOLAM HYDROCHLORIDE 2 MG: 1 INJECTION, SOLUTION INTRAMUSCULAR; INTRAVENOUS at 07:02

## 2020-02-21 RX ADMIN — METOPROLOL TARTRATE 50 MG: 50 TABLET, FILM COATED ORAL at 08:02

## 2020-02-21 RX ADMIN — FENTANYL CITRATE 50 MCG: 50 INJECTION INTRAMUSCULAR; INTRAVENOUS at 07:02

## 2020-02-21 RX ADMIN — METOPROLOL TARTRATE 50 MG: 50 TABLET, FILM COATED ORAL at 11:02

## 2020-02-21 RX ADMIN — PANTOPRAZOLE SODIUM 40 MG: 40 INJECTION, POWDER, FOR SOLUTION INTRAVENOUS at 11:02

## 2020-02-21 RX ADMIN — FUROSEMIDE 20 MG: 10 INJECTION, SOLUTION INTRAMUSCULAR; INTRAVENOUS at 02:02

## 2020-02-21 RX ADMIN — SERTRALINE HYDROCHLORIDE 50 MG: 50 TABLET ORAL at 11:02

## 2020-02-21 RX ADMIN — FENTANYL CITRATE 25 MCG: 50 INJECTION INTRAMUSCULAR; INTRAVENOUS at 07:02

## 2020-02-21 RX ADMIN — MIDAZOLAM HYDROCHLORIDE 1 MG: 1 INJECTION, SOLUTION INTRAMUSCULAR; INTRAVENOUS at 08:02

## 2020-02-21 NOTE — SUBJECTIVE & OBJECTIVE
Interval History:  Patient denies fever chills chest pain abdominal pain nausea vomiting  Otherwise comprehensive review of systems are without changes from H&P    Objective:     Vital Signs (Most Recent):  Temp: 98.5 °F (36.9 °C) (02/21/20 1141)  Pulse: 77 (02/21/20 1141)  Resp: 16 (02/21/20 1141)  BP: (!) 150/67 (02/21/20 1141)  SpO2: 98 % (02/21/20 1141) Vital Signs (24h Range):  Temp:  [97.5 °F (36.4 °C)-98.5 °F (36.9 °C)] 98.5 °F (36.9 °C)  Pulse:  [61-77] 77  Resp:  [15-21] 16  SpO2:  [94 %-100 %] 98 %  BP: (109-180)/(47-90) 150/67     Weight: 90 kg (198 lb 6.6 oz)  Body mass index is 26.91 kg/m².    Intake/Output Summary (Last 24 hours) at 2/21/2020 1505  Last data filed at 2/21/2020 0830  Gross per 24 hour   Intake 680 ml   Output 150 ml   Net 530 ml      Physical Exam patient appears in no distress family is at bedside  HEENT sclerae nonicteric  Neck is supple   Lungs good air movement  Heart rate controlled monitor reviewed  Abdomen is nondistended  Extremities no edema  Neuro patient is alert oriented x3  Skin no rash  Psych patient is pleasant cooperative   exam no Perez      Significant Labs:   BMP:   Recent Labs   Lab 02/21/20  0352   *      K 3.6      CO2 25   BUN 31*   CREATININE 1.2   CALCIUM 9.4   MG 1.5*     CBC:   Recent Labs   Lab 02/20/20  1026 02/20/20  1615 02/21/20  0352 02/21/20  0939   WBC 5.00  --  7.09  --    HGB 6.2* 6.2* 8.6* 7.9*   HCT 21.6* 21.4* 29.2* 26.9*     --  149*  --      CMP:   Recent Labs   Lab 02/20/20  1026 02/21/20  0352    137   K 3.5 3.6    104   CO2 23 25   * 134*   BUN 36* 31*   CREATININE 1.4 1.2   CALCIUM 9.7 9.4   PROT 6.2 6.7   ALBUMIN 3.7 4.0   BILITOT 0.9 1.2*   ALKPHOS 47* 51*   AST 22 21   ALT 15 16   ANIONGAP 11 8   EGFRNONAA 48.5* 58.4*       Significant Imaging:

## 2020-02-21 NOTE — CONSULTS
[facility]  Cardiology  Consult Note    Patient Name: Tono Saez  MRN: [id number]  Admission Date: 2/20/2020  Hospital Length of Stay: 0 days  Code Status: Full Code   Attending Provider: Papi Frost MD   Consulting Provider: Kt Lux MD  Primary Care Physician: Scott Staley MD  Principal Problem:<principal problem not specified>    Patient information was obtained from patient and ER records.     Consults  Subjective:     Chief Complaint:  Weakness and increasing chest pain     HPI: 1 patient has history of ASCVD and bypass surgery.  He also has a stent within the main body of the circumflex artery previous cardiac catheterization several years ago demonstrated patent internal mammary to the LAD patent saphenous vein to obtuse marginal patent saphenous vein to the right and patent stent in the circumflex.  He has been having angina pectoris which at times he cannot differentiate from his GI pain.  On 219-20 was seen in the office complaining of increasing pain in the past several weeks.  His blood pressure was 100/80 and pulse rate 60 when he exercise blood pressure fell to 80/60 and pulse rate went to 62.  His last lab in December was normal including hemoglobin hematocrit  He was reprogrammed with his pacer yesterday from DDD to DDDR mode lower rate is 65 and is a initially felt better within since then he has had increasing chest pain and was told to come the emergency room today.  He was being evaluated for recatheterization in the emergency room he was noted to have severe anemia positive guaiac stool  On 219-23 was changed to Carafate and omeprazole twice a day referred to Dr. perera with his evaluations can be done in the hospital at the current  Of blood thinners-he has been on Xarelto because of past history of atrial fibrillation and a PFO  He has a pacemaker because of second-degree and third-degree heart block  He has a history of chronic gastritis  2] history of  hypertension for which she is on quinapril and metoprolol 100 b.i.d.-  3) chronic kidney disease4) diabetes mellitus and is on Actos  4(heart failure reduced ejection fraction noted on echo and clinically      Past Medical History:   Diagnosis Date    Anemia     Anticoagulant long-term use     Arthritis     CAD (coronary artery disease)     CABG, STENTS '97,'99,'01,'05    Cancer     SKIN    Cataract     Diabetes mellitus     Diabetes mellitus type II     GERD (gastroesophageal reflux disease)     Hypertension     Myocardial infarction     Wears glasses        Past Surgical History:   Procedure Laterality Date    CARDIAC PACEMAKER PLACEMENT      CARDIAC PACEMAKER PLACEMENT      CARDIAC PACEMAKER PLACEMENT  04/26/2018    replaced    CARDIAC SURGERY      1995   CABG X 5    CHOLECYSTECTOMY      COLON SURGERY      CORONARY ARTERY BYPASS GRAFT  1995    CORONARY STENT PLACEMENT      stent x 5    EYE SURGERY      BILAT CATARACT    head laceration   01/19/2018    HEMORRHOID SURGERY      stint         Review of patient's allergies indicates:   Allergen Reactions    Adhesive Other (See Comments)     SILK TAPE PULL SKIN OFF    Metformin Other (See Comments)     Weight loss cachexia anorexia,diarrhea.    Pcn [penicillins]      Patient states he passed out from this medication when he was 10 years old.        No current facility-administered medications on file prior to encounter.      Current Outpatient Medications on File Prior to Encounter   Medication Sig    amLODIPine (NORVASC) 5 MG tablet Take 5 mg by mouth once daily.    finasteride (PROSCAR) 5 mg tablet Take 5 mg by mouth once daily.    magnesium chloride (SLOW-MAG) 71.5 mg TbEC Take 71.5 mg by mouth once daily.     metoprolol tartrate (LOPRESSOR) 100 MG tablet Take 50 mg by mouth 2 (two) times daily.    multivitamin capsule Take 1 capsule by mouth once daily.     pioglitazone (ACTOS) 30 MG tablet Take 30 mg by mouth once daily.     quinapril (ACCUPRIL) 40 MG tablet Take 20 mg by mouth once daily.     rivaroxaban (XARELTO) 15 mg Tab Take 15 mg by mouth every evening.     rosuvastatin (CRESTOR) 20 MG tablet Take 20 mg by mouth every evening.     sertraline (ZOLOFT) 50 MG tablet Take 50 mg by mouth once daily.    tolterodine (DETROL LA) 4 MG 24 hr capsule Take 4 mg by mouth once daily.     vitamin D 1000 units Tab Take 1,000 Units by mouth once daily.    fluticasone propionate (FLONASE) 50 mcg/actuation nasal spray 1 spray by Each Nostril route daily as needed for Rhinitis.    omeprazole (PRILOSEC) 40 MG capsule Take 40 mg by mouth once daily.     sucralfate (CARAFATE) 1 gram tablet Take 1 g by mouth 3 (three) times daily.     [DISCONTINUED] amLODIPine (NORVASC) 5 MG tablet TAKE ONE TABLET BY MOUTH ONCE DAILY    [DISCONTINUED] benzonatate (TESSALON) 100 MG capsule Take 100 mg by mouth 3 (three) times daily as needed for Cough.    [DISCONTINUED] blood sugar diagnostic Strp To check BG bid times daily, to use with insurance preferred meter    [DISCONTINUED] cetirizine (ZYRTEC) 10 MG tablet Take 1 tablet (10 mg total) by mouth once daily. (Patient taking differently: Take 10 mg by mouth daily as needed. )    [DISCONTINUED] ciclopirox 1 % shampoo As directed    [DISCONTINUED] dexchlorphen-phenylephrine-DM (POLYTUSSIN DM) 1-5-10 mg/5 mL Syrp Take 5 mLs by mouth every 4 (four) hours as needed.    [DISCONTINUED] fenofibrate micronized (LOFIBRA) 134 MG Cap TAKE 1 CAPSULE BEFORE      BREAKFAST (RETURN TO       CLINIC FOR FOLLOW-UP IN    FEBRUARY) (Patient not taking: Reported on 10/30/2019)    [DISCONTINUED] fluticasone propionate (FLONASE) 50 mcg/actuation nasal spray 1 spray (50 mcg total) by Each Nostril route once daily.    [DISCONTINUED] ipratropium (ATROVENT) 42 mcg (0.06 %) nasal spray 2 sprays by Nasal route every 6 (six) hours as needed for Rhinitis.    [DISCONTINUED] lansoprazole (PREVACID) 30 MG capsule Take 1 capsule (30 mg  total) by mouth once daily.    [DISCONTINUED] metoprolol tartrate (LOPRESSOR) 100 MG tablet TAKE ONE TABLET BY MOUTH TWO TIMES A DAY    [DISCONTINUED] pioglitazone (ACTOS) 30 MG tablet TAKE ONE TABLET BY MOUTH ONCE DAILY    [DISCONTINUED] sertraline (ZOLOFT) 50 MG tablet TAKE ONE TABLET BY MOUTH ONCE DAILY    [DISCONTINUED] simvastatin (ZOCOR) 40 MG tablet TAKE ONE TABLET BY MOUTH EVERY EVENING FOR CHOLESTEROL (Patient not taking: Reported on 10/30/2019)     Family History     Problem Relation (Age of Onset)    Heart disease Mother, Father, Brother, Brother, Brother, Brother, Brother    Hyperlipidemia Sister    Hypertension Sister        Tobacco Use    Smoking status: Never Smoker    Smokeless tobacco: Never Used   Substance and Sexual Activity    Alcohol use: No    Drug use: No    Sexual activity: Not Currently     Review of Systems   Constitution: Positive for decreased appetite and malaise/fatigue.   HENT: Negative.    Eyes: Negative.    Cardiovascular: Positive for chest pain and dyspnea on exertion.   Respiratory: Positive for shortness of breath.    Gastrointestinal: Positive for bloating, abdominal pain, change in bowel habit, heartburn, hematochezia and hemorrhoids.   Genitourinary: Negative.    Neurological: Negative.      Objective:     Vital Signs (Most Recent):  Temp: 98.5 °F (36.9 °C) (02/20/20 1630)  Pulse: 73 (02/20/20 1630)  Resp: 15 (02/20/20 1630)  BP: (!) 171/79 (02/20/20 1630)  SpO2: 100 % (02/20/20 1630) Vital Signs (24h Range):  Temp:  [98.1 °F (36.7 °C)-98.5 °F (36.9 °C)] 98.5 °F (36.9 °C)  Pulse:  [64-74] 73  Resp:  [13-24] 15  SpO2:  [95 %-100 %] 100 %  BP: (108-174)/(57-81) 171/79     Weight: 90 kg (198 lb 6.6 oz)  Body mass index is 26.91 kg/m².    SpO2: 100 %  O2 Device (Oxygen Therapy): room air      Intake/Output Summary (Last 24 hours) at 2/20/2020 5299  Last data filed at 2/20/2020 1610  Gross per 24 hour   Intake --   Output 150 ml   Net -150 ml       Lines/Drains/Airways      Peripheral Intravenous Line                 Peripheral IV - Single Lumen 02/20/20 1026 20 G Left Antecubital less than 1 day                Physical Exam blood pressure is 160/80 pulse rate is 60  Head neck no carotid bruit  Lungs clear  Cardiacs4  Abdomen slight stent  N no edema      Significant Labs:   CMP   Recent Labs   Lab 02/20/20  1026      K 3.5      CO2 23   *   BUN 36*   CREATININE 1.4   CALCIUM 9.7   PROT 6.2   ALBUMIN 3.7   BILITOT 0.9   ALKPHOS 47*   AST 22   ALT 15   ANIONGAP 11   ESTGFRAFRICA 56.0*   EGFRNONAA 48.5*   , CBC   Recent Labs   Lab 02/20/20  1026 02/20/20  1615   WBC 5.00  --    HGB 6.2* 6.2*   HCT 21.6* 21.4*     --     and Troponin   Recent Labs   Lab 02/20/20  1026 02/20/20  1615   TROPONINI 0.045* 0.055*       Significant Imaging: EKG: Sinus rhythm AV paced  Assessment and Plan:   1 peptic ulcer disease with GI bleeding and severe anemia exacerbating cardiac symptomatology-patient is for colonoscopy gastroscopy  2 ASCVD with previous cardiac catheterization demonstrating patent grafts and stent in the circumflex but continued angina pectoris possibly all related to gastritis and severe anemia-continue beta-blockers time being-patient has some degree heart failure reduced ejection fraction due to previous damage to the heart his BNP is high at the current time recommend discontinuing Actos and using other diabetic medications including Januvia  He will need cardiac catheterization the next several weeks.  His pacemaker was reprogrammed today back to DDD mode at a rate of 60 and he felt better almost immediately after that  3) anticoagulation-Xarelto has been discontinued.  He should remain off of blood thinners for the time being he has had no recent atrial fibrillation  Active Diagnoses:    Diagnosis Date Noted POA    Chest pain [R07.9] 02/20/2020 Yes      Problems Resolved During this Admission:       VTE Risk Mitigation (From admission, onward)          Ordered     IP VTE HIGH RISK PATIENT  Once      02/20/20 3832     Reason for No Pharmacological VTE Prophylaxis  Once     Question:  Reasons:  Answer:  Active Bleeding    02/20/20 3127              I personally reviewed chart and imaging studies ,examined patient, and discussed with the patient her illness and treatment including diet and follow-up care. This consult was prolonged and required approximately 50% time for review and 50% time examining patient and writing notes and orders     Thank you for your consult.     Kt Lux MD  Cardiology   [facility]

## 2020-02-21 NOTE — PROGRESS NOTES
Reviewed patient's meds w/ family / paitent. He is currently on ASA / Eliquis but not Plavix. Will plan for repeat EGD w/ polypectomy and repeat colon on Sunday once anticoagulation has worn off. Risks, benefits, alternative discussed in detail with patient / family regarding anticipated procedure and possible complications.

## 2020-02-21 NOTE — NURSING
Report received from JARRED Jarquin. Pt is currently in Endo for EGD and Colonoscopy, will resume care when pt returns.

## 2020-02-21 NOTE — PROGRESS NOTES
[facility]  Progress Note    Patient Name: Tono Saez  MRN: [id number]  Patient Class: IP- Inpatient   Admission Date: 2/20/2020  Length of Stay: 0 days  Attending Physician: Gabriele Carrillo DO  Primary Care Provider: Scott Staley MD        Subjective:     Principal Problem:<principal problem not specified>        HPI:  No notes on file    Overview/Hospital Course:  No notes on file    Interval History:  Patient denies fever chills chest pain abdominal pain nausea vomiting  Otherwise comprehensive review of systems are without changes from H&P    Objective:     Vital Signs (Most Recent):  Temp: 98.5 °F (36.9 °C) (02/21/20 1141)  Pulse: 77 (02/21/20 1141)  Resp: 16 (02/21/20 1141)  BP: (!) 150/67 (02/21/20 1141)  SpO2: 98 % (02/21/20 1141) Vital Signs (24h Range):  Temp:  [97.5 °F (36.4 °C)-98.5 °F (36.9 °C)] 98.5 °F (36.9 °C)  Pulse:  [61-77] 77  Resp:  [15-21] 16  SpO2:  [94 %-100 %] 98 %  BP: (109-180)/(47-90) 150/67     Weight: 90 kg (198 lb 6.6 oz)  Body mass index is 26.91 kg/m².    Intake/Output Summary (Last 24 hours) at 2/21/2020 1505  Last data filed at 2/21/2020 0830  Gross per 24 hour   Intake 680 ml   Output 150 ml   Net 530 ml      Physical Exam patient appears in no distress family is at bedside  HEENT sclerae nonicteric  Neck is supple   Lungs good air movement  Heart rate controlled monitor reviewed  Abdomen is nondistended  Extremities no edema  Neuro patient is alert oriented x3  Skin no rash  Psych patient is pleasant cooperative   exam no Perez      Significant Labs:   BMP:   Recent Labs   Lab 02/21/20  0352   *      K 3.6      CO2 25   BUN 31*   CREATININE 1.2   CALCIUM 9.4   MG 1.5*     CBC:   Recent Labs   Lab 02/20/20  1026 02/20/20  1615 02/21/20  0352 02/21/20  0939   WBC 5.00  --  7.09  --    HGB 6.2* 6.2* 8.6* 7.9*   HCT 21.6* 21.4* 29.2* 26.9*     --  149*  --      CMP:   Recent Labs   Lab 02/20/20  1026 02/21/20  0352     137   K 3.5 3.6    104   CO2 23 25   * 134*   BUN 36* 31*   CREATININE 1.4 1.2   CALCIUM 9.7 9.4   PROT 6.2 6.7   ALBUMIN 3.7 4.0   BILITOT 0.9 1.2*   ALKPHOS 47* 51*   AST 22 21   ALT 15 16   ANIONGAP 11 8   EGFRNONAA 48.5* 58.4*       Significant Imaging:       Assessment/Plan:   #1 Acute UGIB S/P EGD    Normal esophagus.                        - Two gastric polyps oozing.                        - Normal examined duodenum.                        - Normal stomach.                        - The examined portion of the jejunum was normal.    . GI bleed: Likely upper. Brown stools mixed with blood. Heme occult positive. Drop in Hgb in 2-3 months. Needs upper and lower Scope. Consulted GI and discussed case with Dr. Barragan. Awaiting final recommendations. IV Protonix daily. Transfuse 2U. Follow h/h q6 hours     #2 Acute blood loss anemia:  Secondary to #1.  Status post blood transfusion  Serial H&H  #3 Hx  ASCVD with previous cardiac catheterization demonstrating patent grafts and stent in the circumflex but continued angina pectoris possibly all related to gastritis and severe anemia  #3 Paroxysmal atrial fibrillation --holding anticoagulation   #4 Hx Pacemaker  #5 Essential hypertension  #6  Mood disorder:  Stable      Will continue cardiac monitoring  Patient receiving IV PPI  Transfusing as needed  Serial H&H  Patient is high high risk for decline due to age and multiple medical problems as above     VTE Risk Mitigation (From admission, onward)         Ordered     IP VTE HIGH RISK PATIENT  Once      02/20/20 8507     Reason for No Pharmacological VTE Prophylaxis  Once     Question:  Reasons:  Answer:  Active Bleeding    02/20/20 1277                Addendum will change to inpatient  Fatigue and weakness has improved after blood transfusion patient denies current chest pain  Past medical history past surgical history family history and comprehensive review of systems are a all been reviewed  in detail.  There are no changes from H&P dated yesterday        Gabriele Carrillo DO  Department of Hospital Medicine   [facility]

## 2020-02-21 NOTE — PLAN OF CARE
Problem: Fall Injury Risk  Goal: Absence of Fall and Fall-Related Injury  Outcome: Ongoing, Progressing     Problem: Adult Inpatient Plan of Care  Goal: Plan of Care Review  Outcome: Ongoing, Progressing  Goal: Patient-Specific Goal (Individualization)  Outcome: Ongoing, Progressing  Goal: Absence of Hospital-Acquired Illness or Injury  Outcome: Ongoing, Progressing  Goal: Optimal Comfort and Wellbeing  Outcome: Ongoing, Progressing  Goal: Readiness for Transition of Care  Outcome: Ongoing, Progressing  Goal: Rounds/Family Conference  Outcome: Ongoing, Progressing     Problem: Diabetes Comorbidity  Goal: Blood Glucose Level Within Desired Range  Outcome: Ongoing, Progressing     Problem: Adjustment to Illness (Gastrointestinal Bleeding)  Goal: Optimal Coping with Acute Illness  Outcome: Ongoing, Progressing     Problem: Bleeding (Gastrointestinal Bleeding)  Goal: Hemostasis  Outcome: Ongoing, Progressing   POC discussed, pt and family verbalized understanding of plan. Will continue to monitor.

## 2020-02-22 LAB
ANION GAP SERPL CALC-SCNC: 10 MMOL/L (ref 8–16)
BASOPHILS # BLD AUTO: 0.03 K/UL (ref 0–0.2)
BASOPHILS NFR BLD: 0.5 % (ref 0–1.9)
BUN SERPL-MCNC: 22 MG/DL (ref 8–23)
CALCIUM SERPL-MCNC: 9.2 MG/DL (ref 8.7–10.5)
CHLORIDE SERPL-SCNC: 101 MMOL/L (ref 95–110)
CO2 SERPL-SCNC: 27 MMOL/L (ref 23–29)
CREAT SERPL-MCNC: 1.1 MG/DL (ref 0.5–1.4)
DIFFERENTIAL METHOD: ABNORMAL
EOSINOPHIL # BLD AUTO: 0.2 K/UL (ref 0–0.5)
EOSINOPHIL NFR BLD: 3.6 % (ref 0–8)
ERYTHROCYTE [DISTWIDTH] IN BLOOD BY AUTOMATED COUNT: 14.1 % (ref 11.5–14.5)
EST. GFR  (AFRICAN AMERICAN): >60 ML/MIN/1.73 M^2
EST. GFR  (NON AFRICAN AMERICAN): >60 ML/MIN/1.73 M^2
GLUCOSE SERPL-MCNC: 103 MG/DL (ref 70–110)
HCT VFR BLD AUTO: 25.9 % (ref 40–54)
HCT VFR BLD AUTO: 26.9 % (ref 40–54)
HCT VFR BLD AUTO: 27 % (ref 40–54)
HCT VFR BLD AUTO: 33.1 % (ref 40–54)
HGB BLD-MCNC: 7.8 G/DL (ref 14–18)
HGB BLD-MCNC: 8.2 G/DL (ref 14–18)
HGB BLD-MCNC: 8.2 G/DL (ref 14–18)
HGB BLD-MCNC: 9.7 G/DL (ref 14–18)
IMM GRANULOCYTES # BLD AUTO: 0.03 K/UL (ref 0–0.04)
IMM GRANULOCYTES NFR BLD AUTO: 0.5 % (ref 0–0.5)
LYMPHOCYTES # BLD AUTO: 1.2 K/UL (ref 1–4.8)
LYMPHOCYTES NFR BLD: 22.4 % (ref 18–48)
MAGNESIUM SERPL-MCNC: 1.6 MG/DL (ref 1.6–2.6)
MCH RBC QN AUTO: 25.4 PG (ref 27–31)
MCHC RBC AUTO-ENTMCNC: 30.1 G/DL (ref 32–36)
MCV RBC AUTO: 84 FL (ref 82–98)
MONOCYTES # BLD AUTO: 0.7 K/UL (ref 0.3–1)
MONOCYTES NFR BLD: 13.2 % (ref 4–15)
NEUTROPHILS # BLD AUTO: 3.3 K/UL (ref 1.8–7.7)
NEUTROPHILS NFR BLD: 59.8 % (ref 38–73)
NRBC BLD-RTO: 0 /100 WBC
PLATELET # BLD AUTO: 136 K/UL (ref 150–350)
PMV BLD AUTO: 11.7 FL (ref 9.2–12.9)
POTASSIUM SERPL-SCNC: 3.3 MMOL/L (ref 3.5–5.1)
RBC # BLD AUTO: 3.07 M/UL (ref 4.6–6.2)
SODIUM SERPL-SCNC: 138 MMOL/L (ref 136–145)
WBC # BLD AUTO: 5.53 K/UL (ref 3.9–12.7)

## 2020-02-22 PROCEDURE — 83735 ASSAY OF MAGNESIUM: CPT

## 2020-02-22 PROCEDURE — 94761 N-INVAS EAR/PLS OXIMETRY MLT: CPT

## 2020-02-22 PROCEDURE — 85025 COMPLETE CBC W/AUTO DIFF WBC: CPT

## 2020-02-22 PROCEDURE — 85018 HEMOGLOBIN: CPT | Mod: 91

## 2020-02-22 PROCEDURE — 85014 HEMATOCRIT: CPT | Mod: 91

## 2020-02-22 PROCEDURE — 80048 BASIC METABOLIC PNL TOTAL CA: CPT

## 2020-02-22 PROCEDURE — 25000003 PHARM REV CODE 250: Performed by: INTERNAL MEDICINE

## 2020-02-22 PROCEDURE — 63600175 PHARM REV CODE 636 W HCPCS: Performed by: SPECIALIST

## 2020-02-22 PROCEDURE — 25000003 PHARM REV CODE 250: Performed by: HOSPITALIST

## 2020-02-22 PROCEDURE — C9113 INJ PANTOPRAZOLE SODIUM, VIA: HCPCS | Performed by: SPECIALIST

## 2020-02-22 PROCEDURE — 36415 COLL VENOUS BLD VENIPUNCTURE: CPT

## 2020-02-22 PROCEDURE — 85018 HEMOGLOBIN: CPT

## 2020-02-22 PROCEDURE — 21000000 HC CCU ICU ROOM CHARGE

## 2020-02-22 PROCEDURE — 85014 HEMATOCRIT: CPT

## 2020-02-22 PROCEDURE — 99900035 HC TECH TIME PER 15 MIN (STAT)

## 2020-02-22 RX ADMIN — METOPROLOL TARTRATE 50 MG: 50 TABLET, FILM COATED ORAL at 09:02

## 2020-02-22 RX ADMIN — MAGNESIUM 64 MG (MAGNESIUM CHLORIDE) TABLET,DELAYED RELEASE 64 MG: at 05:02

## 2020-02-22 RX ADMIN — ONDANSETRON 8 MG: 4 TABLET, ORALLY DISINTEGRATING ORAL at 02:02

## 2020-02-22 RX ADMIN — AMLODIPINE BESYLATE 5 MG: 5 TABLET ORAL at 09:02

## 2020-02-22 RX ADMIN — Medication 296 ML: at 09:02

## 2020-02-22 RX ADMIN — PANTOPRAZOLE SODIUM 80 MG: 40 INJECTION, POWDER, FOR SOLUTION INTRAVENOUS at 09:02

## 2020-02-22 RX ADMIN — SERTRALINE HYDROCHLORIDE 50 MG: 50 TABLET ORAL at 09:02

## 2020-02-22 RX ADMIN — POTASSIUM CHLORIDE 40 MEQ: 1500 TABLET, EXTENDED RELEASE ORAL at 08:02

## 2020-02-22 RX ADMIN — MAGNESIUM OXIDE 800 MG: 400 TABLET ORAL at 09:02

## 2020-02-22 RX ADMIN — ROSUVASTATIN CALCIUM 20 MG: 20 TABLET, FILM COATED ORAL at 08:02

## 2020-02-22 RX ADMIN — METOPROLOL TARTRATE 50 MG: 50 TABLET, FILM COATED ORAL at 08:02

## 2020-02-22 NOTE — SUBJECTIVE & OBJECTIVE
Interval History:  Patient denies fever chills chest pain shortness of breath nausea vomiting abdominal pain or bowel changes    Otherwise comprehensive review of systems are noncontributory  Objective:     Vital Signs (Most Recent):  Temp: 97.9 °F (36.6 °C) (02/22/20 0730)  Pulse: 61 (02/22/20 0730)  Resp: 18 (02/22/20 0730)  BP: (!) 163/76 (02/22/20 0730)  SpO2: (!) 94 % (02/22/20 0730) Vital Signs (24h Range):  Temp:  [97.9 °F (36.6 °C)-98.5 °F (36.9 °C)] 97.9 °F (36.6 °C)  Pulse:  [61-77] 61  Resp:  [16-18] 18  SpO2:  [94 %-100 %] 94 %  BP: (141-163)/(67-76) 163/76     Weight: 90 kg (198 lb 6.6 oz)  Body mass index is 26.91 kg/m².    Intake/Output Summary (Last 24 hours) at 2/22/2020 0913  Last data filed at 2/21/2020 2000  Gross per 24 hour   Intake 120 ml   Output --   Net 120 ml      Physical Exam  Patient was asleep appears comfortable easily woken wife is at bedside  HEENT sclerae nonicteric  Neck is supple nontender  Lungs are with good air movement  Heart regular rate rhythm  Abdomen is soft nondistended  Extremities no edema  Neuro patient is alert oriented nonfocal exam  Psych pleasant cooperative   exam no Perez  Skin no Rash  Significant Labs:   BMP:   Recent Labs   Lab 02/22/20 0447         K 3.3*      CO2 27   BUN 22   CREATININE 1.1   CALCIUM 9.2   MG 1.6     CBC:   Recent Labs   Lab 02/20/20  1026  02/21/20  0352  02/22/20  0019 02/22/20  0447 02/22/20  0855   WBC 5.00  --  7.09  --   --  5.53  --    HGB 6.2*   < > 8.6*   < > 8.2* 7.8* 8.2*   HCT 21.6*   < > 29.2*   < > 27.0* 25.9* 26.9*     --  149*  --   --  136*  --     < > = values in this interval not displayed.     CMP:   Recent Labs   Lab 02/20/20  1026 02/21/20  0352 02/22/20  0447    137 138   K 3.5 3.6 3.3*    104 101   CO2 23 25 27   * 134* 103   BUN 36* 31* 22   CREATININE 1.4 1.2 1.1   CALCIUM 9.7 9.4 9.2   PROT 6.2 6.7  --    ALBUMIN 3.7 4.0  --    BILITOT 0.9 1.2*  --    ALKPHOS 47*  51*  --    AST 22 21  --    ALT 15 16  --    ANIONGAP 11 8 10   EGFRNONAA 48.5* 58.4* >60.0

## 2020-02-22 NOTE — CONSULTS
[facility]  Cardiology  Progress Note    Patient Name: Tono Saez  MRN: [id number]  Admission Date: 2/20/2020  Hospital Length of Stay: 0 days  Code Status: Full Code   Attending Physician: Gabriele Carrillo DO   Primary Care Physician: Scott Staley MD  Expected Discharge Date:   Principal Problem:<principal problem not specified>    Subjective:     Hospital Course: feels ok  No  Cp    Stomach ok   Interval History:     ROS  Objective:     Vital Signs (Most Recent):  Temp: 98 °F (36.7 °C) (02/21/20 1535)  Pulse: 62 (02/21/20 1535)  Resp: 16 (02/21/20 1141)  BP: (!) 162/74 (02/21/20 1535)  SpO2: 98 % (02/21/20 1141) Vital Signs (24h Range):  Temp:  [97.5 °F (36.4 °C)-98.5 °F (36.9 °C)] 98 °F (36.7 °C)  Pulse:  [61-77] 62  Resp:  [16-21] 16  SpO2:  [94 %-100 %] 98 %  BP: (109-180)/(47-90) 162/74     Weight: 90 kg (198 lb 6.6 oz)  Body mass index is 26.91 kg/m².    SpO2: 98 %  O2 Device (Oxygen Therapy): room air      Intake/Output Summary (Last 24 hours) at 2/21/2020 2026  Last data filed at 2/21/2020 0830  Gross per 24 hour   Intake 680 ml   Output --   Net 680 ml       Lines/Drains/Airways     Peripheral Intravenous Line                 Peripheral IV - Single Lumen 02/20/20 1026 20 G Left Antecubital 1 day                Physical Exam      Lungs clear cor reg with pacs       Significant Labs:   CMP   Recent Labs   Lab 02/20/20  1026 02/21/20  0352    137   K 3.5 3.6    104   CO2 23 25   * 134*   BUN 36* 31*   CREATININE 1.4 1.2   CALCIUM 9.7 9.4   PROT 6.2 6.7   ALBUMIN 3.7 4.0   BILITOT 0.9 1.2*   ALKPHOS 47* 51*   AST 22 21   ALT 15 16   ANIONGAP 11 8   ESTGFRAFRICA 56.0* >60.0   EGFRNONAA 48.5* 58.4*    and CBC   Recent Labs   Lab 02/20/20  1026  02/21/20  0352 02/21/20  0939 02/21/20  1605   WBC 5.00  --  7.09  --   --    HGB 6.2*   < > 8.6* 7.9* 8.8*   HCT 21.6*   < > 29.2* 26.9* 29.1*     --  149*  --   --     < > = values in this interval not displayed.        Significant Imaging:   Assessment and Plan:   1) GI bleed, gastritis, anemia ( pasrtially corrected )   2) angina pectoris better with  Reduced pacing and blood   Plan- EGD, colon sun- depending on results may defer cath for  1-2 weeks  If pt asymptomatic   Brief HPI:    Active Diagnoses:    Diagnosis Date Noted POA    Chest pain [R07.9] 02/20/2020 Yes      Problems Resolved During this Admission:       VTE Risk Mitigation (From admission, onward)         Ordered     IP VTE HIGH RISK PATIENT  Once      02/20/20 8587     Reason for No Pharmacological VTE Prophylaxis  Once     Question:  Reasons:  Answer:  Active Bleeding    02/20/20 4601                Kt Lux MD  Cardiology  [facility] the [facility]

## 2020-02-22 NOTE — PROGRESS NOTES
[facility] Medicine  Progress Note    Patient Name: Tono Saez  MRN: [id number]  Patient Class: IP- Inpatient   Admission Date: 2/20/2020  Length of Stay: 1 days  Attending Physician: Gabriele Carrillo DO  Primary Care Provider: Scott Staley MD        Subjective:     Principal Problem:<principal problem not specified>        HPI:  No notes on file    Overview/Hospital Course:  No notes on file    Interval History:  Patient denies fever chills chest pain shortness of breath nausea vomiting abdominal pain or bowel changes    Otherwise comprehensive review of systems are noncontributory  Objective:     Vital Signs (Most Recent):  Temp: 97.9 °F (36.6 °C) (02/22/20 0730)  Pulse: 61 (02/22/20 0730)  Resp: 18 (02/22/20 0730)  BP: (!) 163/76 (02/22/20 0730)  SpO2: (!) 94 % (02/22/20 0730) Vital Signs (24h Range):  Temp:  [97.9 °F (36.6 °C)-98.5 °F (36.9 °C)] 97.9 °F (36.6 °C)  Pulse:  [61-77] 61  Resp:  [16-18] 18  SpO2:  [94 %-100 %] 94 %  BP: (141-163)/(67-76) 163/76     Weight: 90 kg (198 lb 6.6 oz)  Body mass index is 26.91 kg/m².    Intake/Output Summary (Last 24 hours) at 2/22/2020 0913  Last data filed at 2/21/2020 2000  Gross per 24 hour   Intake 120 ml   Output --   Net 120 ml      Physical Exam  Patient was asleep appears comfortable easily woken wife is at bedside  HEENT sclerae nonicteric  Neck is supple nontender  Lungs are with good air movement  Heart regular rate rhythm  Abdomen is soft nondistended  Extremities no edema  Neuro patient is alert oriented nonfocal exam  Psych pleasant cooperative   exam no Perez  Skin no Rash  Significant Labs:   BMP:   Recent Labs   Lab 02/22/20 0447         K 3.3*      CO2 27   BUN 22   CREATININE 1.1   CALCIUM 9.2   MG 1.6     CBC:   Recent Labs   Lab 02/20/20  1026  02/21/20  0352  02/22/20  0019 02/22/20  0447 02/22/20  0855   WBC 5.00  --  7.09  --   --  5.53  --    HGB 6.2*   < > 8.6*   < > 8.2* 7.8* 8.2*   HCT 21.6*   <  > 29.2*   < > 27.0* 25.9* 26.9*     --  149*  --   --  136*  --     < > = values in this interval not displayed.     CMP:   Recent Labs   Lab 02/20/20  1026 02/21/20  0352 02/22/20  0447    137 138   K 3.5 3.6 3.3*    104 101   CO2 23 25 27   * 134* 103   BUN 36* 31* 22   CREATININE 1.4 1.2 1.1   CALCIUM 9.7 9.4 9.2   PROT 6.2 6.7  --    ALBUMIN 3.7 4.0  --    BILITOT 0.9 1.2*  --    ALKPHOS 47* 51*  --    AST 22 21  --    ALT 15 16  --    ANIONGAP 11 8 10   EGFRNONAA 48.5* 58.4* >60.0             Assessment/Plan:      1 Acute UGIB S/P EGD    Normal esophagus.                        - Two gastric polyps that were oozing.                        - Normal examined duodenum.                        - Normal stomach.                        - The examined portion of the jejunum was normal.   Colonoscopy showed poor prep/incomplete exam   #2 Acute blood loss anemia:  Secondary to #1.  Status post blood transfusion  Serial H&H  #3 Hx  ASCVD  previous cardiac catheterization- patent grafts and stent in the circumflex   #4 #3 Paroxysmal atrial fibrillation --holding anticoagulation   #4 Hx Pacemaker  #5 Essential hypertension  #6  Mood disorder:  Stable      Plan is to repeat EGD and colonoscopy tomorrow-have been waiting for Eliquis to be metabolized  Hemoglobin stable  Continue cardiac monitoring  Labs in a.m.  VTE Risk Mitigation (From admission, onward)         Ordered     IP VTE HIGH RISK PATIENT  Once      02/20/20 3961     Reason for No Pharmacological VTE Prophylaxis  Once     Question:  Reasons:  Answer:  Active Bleeding    02/20/20 8936                      Gabriele Carrillo DO  Department of Hospital Medicine   [facility]

## 2020-02-23 VITALS
RESPIRATION RATE: 17 BRPM | WEIGHT: 191.13 LBS | HEART RATE: 62 BPM | OXYGEN SATURATION: 96 % | DIASTOLIC BLOOD PRESSURE: 55 MMHG | BODY MASS INDEX: 25.89 KG/M2 | TEMPERATURE: 98 F | HEIGHT: 72 IN | SYSTOLIC BLOOD PRESSURE: 127 MMHG

## 2020-02-23 PROBLEM — K92.2 UPPER GI BLEED: Status: ACTIVE | Noted: 2020-02-23

## 2020-02-23 LAB
ANION GAP SERPL CALC-SCNC: 10 MMOL/L (ref 8–16)
ANION GAP SERPL CALC-SCNC: 10 MMOL/L (ref 8–16)
BASOPHILS # BLD AUTO: 0.02 K/UL (ref 0–0.2)
BASOPHILS NFR BLD: 0.3 % (ref 0–1.9)
BUN SERPL-MCNC: 22 MG/DL (ref 8–23)
BUN SERPL-MCNC: 22 MG/DL (ref 8–23)
CALCIUM SERPL-MCNC: 9.1 MG/DL (ref 8.7–10.5)
CALCIUM SERPL-MCNC: 9.1 MG/DL (ref 8.7–10.5)
CHLORIDE SERPL-SCNC: 105 MMOL/L (ref 95–110)
CHLORIDE SERPL-SCNC: 105 MMOL/L (ref 95–110)
CO2 SERPL-SCNC: 23 MMOL/L (ref 23–29)
CO2 SERPL-SCNC: 23 MMOL/L (ref 23–29)
CREAT SERPL-MCNC: 1.1 MG/DL (ref 0.5–1.4)
CREAT SERPL-MCNC: 1.1 MG/DL (ref 0.5–1.4)
DIFFERENTIAL METHOD: ABNORMAL
EOSINOPHIL # BLD AUTO: 0.1 K/UL (ref 0–0.5)
EOSINOPHIL NFR BLD: 2 % (ref 0–8)
ERYTHROCYTE [DISTWIDTH] IN BLOOD BY AUTOMATED COUNT: 14.1 % (ref 11.5–14.5)
ERYTHROCYTE [DISTWIDTH] IN BLOOD BY AUTOMATED COUNT: 14.1 % (ref 11.5–14.5)
EST. GFR  (AFRICAN AMERICAN): >60 ML/MIN/1.73 M^2
EST. GFR  (AFRICAN AMERICAN): >60 ML/MIN/1.73 M^2
EST. GFR  (NON AFRICAN AMERICAN): >60 ML/MIN/1.73 M^2
EST. GFR  (NON AFRICAN AMERICAN): >60 ML/MIN/1.73 M^2
GLUCOSE SERPL-MCNC: 105 MG/DL (ref 70–110)
GLUCOSE SERPL-MCNC: 105 MG/DL (ref 70–110)
HCT VFR BLD AUTO: 26.2 % (ref 40–54)
HCT VFR BLD AUTO: 26.2 % (ref 40–54)
HCT VFR BLD AUTO: 27.7 % (ref 40–54)
HCT VFR BLD AUTO: 28 % (ref 40–54)
HCT VFR BLD AUTO: 28.9 % (ref 40–54)
HGB BLD-MCNC: 7.9 G/DL (ref 14–18)
HGB BLD-MCNC: 7.9 G/DL (ref 14–18)
HGB BLD-MCNC: 8.2 G/DL (ref 14–18)
HGB BLD-MCNC: 8.2 G/DL (ref 14–18)
HGB BLD-MCNC: 8.6 G/DL (ref 14–18)
IMM GRANULOCYTES # BLD AUTO: 0.01 K/UL (ref 0–0.04)
IMM GRANULOCYTES NFR BLD AUTO: 0.2 % (ref 0–0.5)
LYMPHOCYTES # BLD AUTO: 1.1 K/UL (ref 1–4.8)
LYMPHOCYTES NFR BLD: 18 % (ref 18–48)
MAGNESIUM SERPL-MCNC: 1.7 MG/DL (ref 1.6–2.6)
MCH RBC QN AUTO: 25.2 PG (ref 27–31)
MCH RBC QN AUTO: 25.2 PG (ref 27–31)
MCHC RBC AUTO-ENTMCNC: 30.2 G/DL (ref 32–36)
MCHC RBC AUTO-ENTMCNC: 30.2 G/DL (ref 32–36)
MCV RBC AUTO: 83 FL (ref 82–98)
MCV RBC AUTO: 83 FL (ref 82–98)
MONOCYTES # BLD AUTO: 0.7 K/UL (ref 0.3–1)
MONOCYTES NFR BLD: 11.6 % (ref 4–15)
NEUTROPHILS # BLD AUTO: 4 K/UL (ref 1.8–7.7)
NEUTROPHILS NFR BLD: 67.9 % (ref 38–73)
NRBC BLD-RTO: 0 /100 WBC
PLATELET # BLD AUTO: 149 K/UL (ref 150–350)
PLATELET # BLD AUTO: 149 K/UL (ref 150–350)
PMV BLD AUTO: 11.4 FL (ref 9.2–12.9)
PMV BLD AUTO: 11.4 FL (ref 9.2–12.9)
POTASSIUM SERPL-SCNC: 3.6 MMOL/L (ref 3.5–5.1)
POTASSIUM SERPL-SCNC: 3.6 MMOL/L (ref 3.5–5.1)
RBC # BLD AUTO: 3.14 M/UL (ref 4.6–6.2)
RBC # BLD AUTO: 3.14 M/UL (ref 4.6–6.2)
SODIUM SERPL-SCNC: 138 MMOL/L (ref 136–145)
SODIUM SERPL-SCNC: 138 MMOL/L (ref 136–145)
WBC # BLD AUTO: 5.88 K/UL (ref 3.9–12.7)
WBC # BLD AUTO: 5.88 K/UL (ref 3.9–12.7)

## 2020-02-23 PROCEDURE — 45378 DIAGNOSTIC COLONOSCOPY: CPT | Performed by: INTERNAL MEDICINE

## 2020-02-23 PROCEDURE — 27200997: Performed by: INTERNAL MEDICINE

## 2020-02-23 PROCEDURE — 94761 N-INVAS EAR/PLS OXIMETRY MLT: CPT

## 2020-02-23 PROCEDURE — 85018 HEMOGLOBIN: CPT

## 2020-02-23 PROCEDURE — 99153 MOD SED SAME PHYS/QHP EA: CPT | Performed by: INTERNAL MEDICINE

## 2020-02-23 PROCEDURE — 36415 COLL VENOUS BLD VENIPUNCTURE: CPT

## 2020-02-23 PROCEDURE — 85014 HEMATOCRIT: CPT | Mod: 91

## 2020-02-23 PROCEDURE — 99152 MOD SED SAME PHYS/QHP 5/>YRS: CPT | Performed by: INTERNAL MEDICINE

## 2020-02-23 PROCEDURE — 85014 HEMATOCRIT: CPT

## 2020-02-23 PROCEDURE — 43251 EGD REMOVE LESION SNARE: CPT | Performed by: INTERNAL MEDICINE

## 2020-02-23 PROCEDURE — 85025 COMPLETE CBC W/AUTO DIFF WBC: CPT

## 2020-02-23 PROCEDURE — 88305 TISSUE EXAM BY PATHOLOGIST: CPT | Mod: TC

## 2020-02-23 PROCEDURE — 27201089 HC SNARE, DISP (ANY): Performed by: INTERNAL MEDICINE

## 2020-02-23 PROCEDURE — 85018 HEMOGLOBIN: CPT | Mod: 91

## 2020-02-23 PROCEDURE — 27201114 HC TRAP (ANY): Performed by: INTERNAL MEDICINE

## 2020-02-23 PROCEDURE — 63600175 PHARM REV CODE 636 W HCPCS: Performed by: INTERNAL MEDICINE

## 2020-02-23 PROCEDURE — 83735 ASSAY OF MAGNESIUM: CPT

## 2020-02-23 PROCEDURE — 80048 BASIC METABOLIC PNL TOTAL CA: CPT

## 2020-02-23 RX ORDER — FERROUS SULFATE 325(65) MG
325 TABLET, DELAYED RELEASE (ENTERIC COATED) ORAL 2 TIMES DAILY
Qty: 28 TABLET | Refills: 0 | Status: SHIPPED | OUTPATIENT
Start: 2020-02-23 | End: 2020-04-08 | Stop reason: SDUPTHER

## 2020-02-23 RX ORDER — DIAZEPAM 10 MG/2ML
INJECTION INTRAMUSCULAR
Status: COMPLETED | OUTPATIENT
Start: 2020-02-23 | End: 2020-02-23

## 2020-02-23 RX ORDER — MIDAZOLAM HYDROCHLORIDE 1 MG/ML
INJECTION INTRAMUSCULAR; INTRAVENOUS
Status: COMPLETED | OUTPATIENT
Start: 2020-02-23 | End: 2020-02-23

## 2020-02-23 RX ORDER — FENTANYL CITRATE 50 UG/ML
INJECTION, SOLUTION INTRAMUSCULAR; INTRAVENOUS
Status: COMPLETED | OUTPATIENT
Start: 2020-02-23 | End: 2020-02-23

## 2020-02-23 RX ORDER — FERROUS SULFATE 325(65) MG
325 TABLET, DELAYED RELEASE (ENTERIC COATED) ORAL 2 TIMES DAILY
Status: DISCONTINUED | OUTPATIENT
Start: 2020-02-23 | End: 2020-02-23 | Stop reason: HOSPADM

## 2020-02-23 RX ORDER — PANTOPRAZOLE SODIUM 40 MG/1
40 TABLET, DELAYED RELEASE ORAL DAILY
Qty: 30 TABLET | Refills: 0 | Status: SHIPPED | OUTPATIENT
Start: 2020-02-23 | End: 2020-04-03 | Stop reason: SDUPTHER

## 2020-02-23 RX ADMIN — MIDAZOLAM HYDROCHLORIDE 1 MG: 1 INJECTION, SOLUTION INTRAMUSCULAR; INTRAVENOUS at 03:02

## 2020-02-23 RX ADMIN — FENTANYL CITRATE 25 MCG: 50 INJECTION INTRAMUSCULAR; INTRAVENOUS at 03:02

## 2020-02-23 RX ADMIN — FENTANYL CITRATE 25 MCG: 50 INJECTION INTRAMUSCULAR; INTRAVENOUS at 04:02

## 2020-02-23 RX ADMIN — DIAZEPAM 2.5 MG: 5 INJECTION, SOLUTION INTRAMUSCULAR; INTRAVENOUS at 04:02

## 2020-02-23 RX ADMIN — DIAZEPAM 5 MG: 5 INJECTION, SOLUTION INTRAMUSCULAR; INTRAVENOUS at 03:02

## 2020-02-23 RX ADMIN — MIDAZOLAM HYDROCHLORIDE 1 MG: 1 INJECTION, SOLUTION INTRAMUSCULAR; INTRAVENOUS at 04:02

## 2020-02-23 NOTE — NURSING
Pt returned to room from endoscopy. Transferred to bed and repositioned for comfort. Drowsy, easily aroused. AAOx4. Respirations even and unlabored. No distress noted. Dr. Carrillo aware, states to discharge pt around 2000 tonight. Discharge orders noted. Family at bedside. Ice water provided. No other needs voiced. Will continue to monitor.

## 2020-02-23 NOTE — DISCHARGE SUMMARY
[facility] Medicine  Discharge Summary      Patient Name: Tono Saez  MRN: [id number]  Admission Date: 2/20/2020  Hospital Length of Stay: 2 days  Discharge Date and Time:  02/23/2020 4:18 PM  Attending Physician: Gabriele Carrillo DO   Discharging Provider: Gabriele Carrillo DO  Primary Care Provider: Scott Staley MD      HPI:   No notes on file    Procedure(s) (LRB):  COLONOSCOPY (N/A)  EGD (ESOPHAGOGASTRODUODENOSCOPY) (N/A)      Hospital Course:    Patient is a 74 y.o. male with PMHx CAD / PCI (ASA/plavix), Afib (Xarlto), DM, HTN, GERD, cholecystectomy, CKD presents for evaluation of weakness / chest pain. Acute onset, constant, progressive prior to presenation but now better w/ adjustments to his pacemaker. Questionable rare melena and occasional blood w/ wiping. Definitely taking ASA / Xarelto and thinks he is taking plavix although not listed on home med list that he brought from home.  Patient has been followed by Cardiology and GI throughout his stay.    He underwent EGD on 02/21 which showed:  Normal esophagus.                        - Two gastric polyps that were oozing.                        - Normal examined duodenum.                        - Normal stomach.                        - The examined portion of the jejunum was normal.  Colonoscopy was attempted at that time but poor prep.  On 2/23 patient went back for EGD and had polypectomy x2 with clip at 1 site.  There was no active bleeding at that time.  I discussed case with GI physician in the endoscopy suite.  Anticipate that Patient will be discharged home later today after he has his colonoscopy.  Colonoscopy results are pending at time of dictation  Eliquis has been withheld.  Patient is not certain if he is on Plavix.  Aspirin is held at this time.  Patient received 2 units of packed red blood cells during his hospitalization.  Hemoglobin is now stable.  Plan is to do outpatient angiogram in approximately 2  weeks.  Patient is to follow up with Cardiology this week to discuss if he needs Plavix and aspirin prior to procedure     Consults:   Consults (From admission, onward)        Status Ordering Provider     Inpatient consult to Cardiology  Once     Provider:  Kt Lux MD    Acknowledged ISMAEL BANUELOS JR     Inpatient consult to Gastroenterology  Once     Provider:  Abe Barragan III, MD    Completed TIFFANY CAMPOS     Inpatient consult to Hospitalist  Once     Provider:  Tiffany Campos MD    Acknowledged ISMAEL BANUELOS JR          Discharge diagnoses  #1 Acute UGIB S/P EGD    Normal esophagus.                        - Two gastric polyps that were initially oozing.S/P repeat EGD with polypectomy x 2  and clip placed at 1 site                        - Normal examined duodenum.                        - Normal stomach.                        - The examined portion of the jejunum was normal.   Colonoscopy results are pending at time of dictation   #2 Acute blood loss anemia:  Secondary to #1.  Status post blood transfusion  #3 Hx  ASCVD  previous cardiac catheterization- patent grafts and stent in the circumflex   #4 Paroxysmal atrial fibrillation --holding anticoagulation as per Cardiology  #5 Hx Pacemaker  #6 Essential hypertension  #7  Mood disorder:  Stable    Final Active Diagnoses:    Diagnosis Date Noted POA    PRINCIPAL PROBLEM:  Upper GI bleed [K92.2] 02/23/2020 Yes    Chest pain [R07.9] 02/20/2020 Yes      Problems Resolved During this Admission:       Discharged Condition: good  See other note dated today    Disposition: Home or Self Care    Follow Up:  Follow-up Information     Scott Staley MD In 2 days.    Specialty:  Family Medicine  Contact information:  [address] [facility]  [address] [place] [id number]  [phone]             Abe Barragan III, MD In 2 weeks.    Specialty:  Gastroenterology  Contact information:  [address]  [facility]  [facility] [address]  [phone]             Kt Lux MD In 1 week.    Specialties:  Cardiovascular Disease, Cardiology  Contact information:  [address] [facility]  [address]  [place] [address]  [phone]                 Patient Instructions:      Diet Cardiac     Diet diabetic       Significant Diagnostic Studies: Labs:   CBC   Recent Labs   Lab 02/22/20  0447  02/23/20  0322 02/23/20  0802 02/23/20  1339   WBC 5.53  --  5.88  5.88  --   --    HGB 7.8*   < > 7.9*  7.9* 8.6* 8.2*   HCT 25.9*   < > 26.2*  26.2* 28.9* 28.0*   *  --  149*  149*  --   --     < > = values in this interval not displayed.       Pending Diagnostic Studies:     None         Medications:  Reconciled Home Medications:      Medication List      START taking these medications    ferrous sulfate 325 (65 FE) MG EC tablet  Take 1 tablet (325 mg total) by mouth 2 (two) times daily.     pantoprazole 40 MG tablet  Commonly known as:  PROTONIX  Take 1 tablet (40 mg total) by mouth once daily.        CONTINUE taking these medications    amLODIPine 5 MG tablet  Commonly known as:  NORVASC  Take 5 mg by mouth once daily.     finasteride 5 mg tablet  Commonly known as:  PROSCAR  Take 5 mg by mouth once daily.     fluticasone propionate 50 mcg/actuation nasal spray  Commonly known as:  FLONASE  1 spray by Each Nostril route daily as needed for Rhinitis.     metoprolol tartrate 100 MG tablet  Commonly known as:  LOPRESSOR  Take 50 mg by mouth 2 (two) times daily.     multivitamin capsule  Take 1 capsule by mouth once daily.     pioglitazone 30 MG tablet  Commonly known as:  ACTOS  Take 30 mg by mouth once daily.     quinapril 40 MG tablet  Commonly known as:  ACCUPRIL  Take 20 mg by mouth once daily.     rosuvastatin 20 MG tablet  Commonly known as:  CRESTOR  Take 20 mg by mouth every evening.     sertraline 50 MG tablet  Commonly known as:  ZOLOFT  Take 50 mg by mouth once daily.     sucralfate 1 gram tablet  Commonly known as:   CARAFATE  Take 1 g by mouth 3 (three) times daily.     vitamin D 1000 units Tab  Commonly known as:  VITAMIN D3  Take 1,000 Units by mouth once daily.        STOP taking these medications    omeprazole 40 MG capsule  Commonly known as:  PRILOSEC     Slow-Mag 71.5 mg Tbec  Generic drug:  magnesium chloride     tolterodine 4 MG 24 hr capsule  Commonly known as:  DETROL LA     Xarelto 15 mg Tab  Generic drug:  rivaroxaban            Indwelling Lines/Drains at time of discharge:   Lines/Drains/Airways     None                 Time spent on the discharge of patient: 39 minutes  Patient was seen and examined on the date of discharge and determined to be suitable for discharge.         Gabriele Carrillo DO  Department of Hospital Medicine  [facility]

## 2020-02-23 NOTE — CONSULTS
[facility]  Cardiology  Progress Note    Patient Name: Tono Saez  MRN: [id number]  Admission Date: 2/20/2020  Hospital Length of Stay: 2 days  Code Status: Full Code   Attending Physician: Gabriele Carrillo DO   Primary Care Physician: Scott Staley MD  Expected Discharge Date:   Principal Problem:<principal problem not specified>    Subjective:     Hospital Coursefeels ok   No chest pain     Interval History: *hes lost wt - colonoscopy today   ROS  Objective:     Vital Signs (Most Recent):  Temp: 98.4 °F (36.9 °C) (02/23/20 0729)  Pulse: 64 (02/23/20 0756)  Resp: 20 (02/23/20 0340)  BP: (!) 156/75 (02/23/20 0729)  SpO2: 98 % (02/23/20 0756) Vital Signs (24h Range):  Temp:  [97.4 °F (36.3 °C)-99 °F (37.2 °C)] 98.4 °F (36.9 °C)  Pulse:  [60-69] 64  Resp:  [16-20] 20  SpO2:  [94 %-100 %] 98 %  BP: (106-156)/(60-75) 156/75     Weight: 86.7 kg (191 lb 2.2 oz)  Body mass index is 25.92 kg/m².    SpO2: 98 %  O2 Device (Oxygen Therapy): room air      Intake/Output Summary (Last 24 hours) at 2/23/2020 1113  Last data filed at 2/23/2020 0859  Gross per 24 hour   Intake 240 ml   Output --   Net 240 ml       Lines/Drains/Airways     Peripheral Intravenous Line                 Peripheral IV - Single Lumen 02/20/20 1026 20 G Left Antecubital 3 days                Physical Exam clear cor reg     Significant Labs:   CMP   Recent Labs   Lab 02/22/20  0447 02/23/20  0322    138  138   K 3.3* 3.6  3.6    105  105   CO2 27 23  23    105  105   BUN 22 22  22   CREATININE 1.1 1.1  1.1   CALCIUM 9.2 9.1  9.1   ANIONGAP 10 10  10   ESTGFRAFRICA >60.0 >60.0  >60.0   EGFRNONAA >60.0 >60.0  >60.0    and CBC   Recent Labs   Lab 02/22/20  0447  02/23/20  0000 02/23/20  0322 02/23/20  0802   WBC 5.53  --   --  5.88  5.88  --    HGB 7.8*   < > 8.2* 7.9*  7.9* 8.6*   HCT 25.9*   < > 27.7* 26.2*  26.2* 28.9*   *  --   --  149*  149*  --     < > = values in this interval not displayed.        Significant Imaging:   Assessment and Plan:   1) colonoscopy- start fe - will probably defer cath for several weeks and do as outpt   Brief HPI:    Active Diagnoses:    Diagnosis Date Noted POA    Chest pain [R07.9] 02/20/2020 Yes      Problems Resolved During this Admission:       VTE Risk Mitigation (From admission, onward)         Ordered     IP VTE HIGH RISK PATIENT  Once      02/20/20 7355     Reason for No Pharmacological VTE Prophylaxis  Once     Question:  Reasons:  Answer:  Active Bleeding    02/20/20 4886                Kt Lux MD  Cardiology  [facility]

## 2020-02-23 NOTE — HOSPITAL COURSE
Patient is a 74 y.o. male with PMHx CAD / PCI (ASA/plavix), Afib (Xarlto), DM, HTN, GERD, cholecystectomy, CKD presents for evaluation of weakness / chest pain. Acute onset, constant, progressive prior to presenation but now better w/ adjustments to his pacemaker. Questionable rare melena and occasional blood w/ wiping. Definitely taking ASA / Xarelto and thinks he is taking plavix although not listed on home med list that he brought from home.  Patient has been followed by Cardiology and GI throughout his stay.    He underwent EGD on 02/21 which showed:  Normal esophagus.                        - Two gastric polyps that were oozing.                        - Normal examined duodenum.                        - Normal stomach.                        - The examined portion of the jejunum was normal.  Colonoscopy was attempted at that time but poor prep.  On 2/23 patient went back for EGD and had polypectomy x2 with clip at 1 site.  There was no active bleeding at that time.  I discussed case with GI physician in the endoscopy suite.  Anticipate that Patient will be discharged home later today after he has his colonoscopy.  Colonoscopy results are pending at time of dictation  Eliquis has been withheld.  Patient is not certain if he is on Plavix.  Aspirin is held at this time.  Patient received 2 units of packed red blood cells during his hospitalization.  Hemoglobin is now stable.  Plan is to do outpatient angiogram in approximately 2 weeks.  Patient is to follow up with Cardiology this week to discuss if he needs Plavix and aspirin prior to procedure

## 2020-02-23 NOTE — SUBJECTIVE & OBJECTIVE
Interval History:  Patient without abdominal pain no melena no bright red blood with DM no chest pain no shortness of breath  Otherwise comprehensive review of systems are without changes  Objective:     Vital Signs (Most Recent):  Temp: 98.3 °F (36.8 °C) (02/23/20 1438)  Pulse: 65 (02/23/20 1438)  Resp: 18 (02/23/20 1438)  BP: (!) 160/82 (02/23/20 1439)  SpO2: 100 % (02/23/20 1438) Vital Signs (24h Range):  Temp:  [97.4 °F (36.3 °C)-99 °F (37.2 °C)] 98.3 °F (36.8 °C)  Pulse:  [60-69] 65  Resp:  [16-20] 18  SpO2:  [94 %-100 %] 100 %  BP: (130-160)/(63-82) 160/82     Weight: 86.7 kg (191 lb 2.2 oz)  Body mass index is 25.92 kg/m².    Intake/Output Summary (Last 24 hours) at 2/23/2020 1527  Last data filed at 2/23/2020 0859  Gross per 24 hour   Intake 120 ml   Output --   Net 120 ml      Physical Exam patient appears in no distress  Family is in room  HEENT sclerae nonicteric   Neck is supple nontender  Lungs are clear to auscultation  Heart is regular rate and rhythm  Abdomen is soft nontender  Neuro patient is alert oriented x3  Extremities no edema   exam no Perez  Psych patient is pleasant cooperative        Significant Labs:   BMP:   Recent Labs   Lab 02/23/20  0322     105     138   K 3.6  3.6     105   CO2 23  23   BUN 22  22   CREATININE 1.1  1.1   CALCIUM 9.1  9.1   MG 1.7     CBC:   Recent Labs   Lab 02/22/20  0447  02/23/20  0322 02/23/20  0802 02/23/20  1339   WBC 5.53  --  5.88  5.88  --   --    HGB 7.8*   < > 7.9*  7.9* 8.6* 8.2*   HCT 25.9*   < > 26.2*  26.2* 28.9* 28.0*   *  --  149*  149*  --   --     < > = values in this interval not displayed.     CMP:   Recent Labs   Lab 02/22/20  0447 02/23/20  0322    138  138   K 3.3* 3.6  3.6    105  105   CO2 27 23  23    105  105   BUN 22 22  22   CREATININE 1.1 1.1  1.1   CALCIUM 9.2 9.1  9.1   ANIONGAP 10 10  10   EGFRNONAA >60.0 >60.0  >60.0       Significant Imaging:

## 2020-02-23 NOTE — NURSING
Spoke with House supervisor, states scheduled to follow 10 am procedure. Notified patient and family. No distress noted. No needs voiced. Fall precautions reviewed. Patient/family verbalized understanding. Call light in reach.

## 2020-02-23 NOTE — PROGRESS NOTES
[facility] Medicine  Progress Note    Patient Name: Tono Saez  MRN: [id number]  Patient Class: IP- Inpatient   Admission Date: 2/20/2020  Length of Stay: 2 days  Attending Physician: Gabriele Carrillo DO  Primary Care Provider: Scott Staley MD        Subjective:     Principal Problem:<principal problem not specified>        HPI:  No notes on file    Overview/Hospital Course:  No notes on file    Interval History:  Patient without abdominal pain no melena no bright red blood with DM no chest pain no shortness of breath  Otherwise comprehensive review of systems are without changes  Objective:     Vital Signs (Most Recent):  Temp: 98.3 °F (36.8 °C) (02/23/20 1438)  Pulse: 65 (02/23/20 1438)  Resp: 18 (02/23/20 1438)  BP: (!) 160/82 (02/23/20 1439)  SpO2: 100 % (02/23/20 1438) Vital Signs (24h Range):  Temp:  [97.4 °F (36.3 °C)-99 °F (37.2 °C)] 98.3 °F (36.8 °C)  Pulse:  [60-69] 65  Resp:  [16-20] 18  SpO2:  [94 %-100 %] 100 %  BP: (130-160)/(63-82) 160/82     Weight: 86.7 kg (191 lb 2.2 oz)  Body mass index is 25.92 kg/m².    Intake/Output Summary (Last 24 hours) at 2/23/2020 1527  Last data filed at 2/23/2020 0859  Gross per 24 hour   Intake 120 ml   Output --   Net 120 ml      Physical Exam patient appears in no distress  Family is in room  HEENT sclerae nonicteric   Neck is supple nontender  Lungs are clear to auscultation  Heart is regular rate and rhythm  Abdomen is soft nontender  Neuro patient is alert oriented x3  Extremities no edema   exam no Perez  Psych patient is pleasant cooperative        Significant Labs:   BMP:   Recent Labs   Lab 02/23/20  0322     105     138   K 3.6  3.6     105   CO2 23  23   BUN 22  22   CREATININE 1.1  1.1   CALCIUM 9.1  9.1   MG 1.7     CBC:   Recent Labs   Lab 02/22/20  0447  02/23/20  0322 02/23/20  0802 02/23/20  1339   WBC 5.53  --  5.88  5.88  --   --    HGB 7.8*   < > 7.9*  7.9* 8.6* 8.2*   HCT 25.9*   < > 26.2*   26.2* 28.9* 28.0*   *  --  149*  149*  --   --     < > = values in this interval not displayed.     CMP:   Recent Labs   Lab 02/22/20  0447 02/23/20  0322    138  138   K 3.3* 3.6  3.6    105  105   CO2 27 23  23    105  105   BUN 22 22  22   CREATININE 1.1 1.1  1.1   CALCIUM 9.2 9.1  9.1   ANIONGAP 10 10  10   EGFRNONAA >60.0 >60.0  >60.0       Significant Imaging:       Assessment/Plan:      #1 Acute UGIB S/P EGD    Normal esophagus.                        - Two gastric polyps that were oozing.                        - Normal examined duodenum.                        - Normal stomach.                        - The examined portion of the jejunum was normal.   Colonoscopy showed poor prep/incomplete exam   #2 Acute blood loss anemia:  Secondary to #1.  Status post blood transfusion  Serial H&H  #3 Hx  ASCVD  previous cardiac catheterization- patent grafts and stent in the circumflex   #4 #3 Paroxysmal atrial fibrillation --holding anticoagulation   #4 Hx Pacemaker  #5 Essential hypertension  #6  Mood disorder:  Stable    Patient to have colonoscopy and repeat EGD today with  Gastric polypectomy  VTE Risk Mitigation (From admission, onward)         Ordered     IP VTE HIGH RISK PATIENT  Once      02/20/20 9958     Reason for No Pharmacological VTE Prophylaxis  Once     Question:  Reasons:  Answer:  Active Bleeding    02/20/20 1136                      Gabriele Carrillo DO  Department of Hospital Medicine   [facility]

## 2020-02-23 NOTE — NURSING
Spoke with Endoscopy nurse, states will come to get the patient in about an hour. Pt/family informed. Verbalized understanding. No distress noted. No needs voiced. Call light in reach.

## 2020-02-23 NOTE — INTERVAL H&P NOTE
The patient has been examined and the H&P has been reviewed:    I concur with the findings and no changes have occurred since H&P was written. Will proceed with EGD and gastric polypectomy + colonoscopy    Anesthesia/Surgery risks, benefits and alternative options discussed and understood by patient/family.          Active Hospital Problems    Diagnosis  POA    Chest pain [R07.9]  Yes      Resolved Hospital Problems   No resolved problems to display.

## 2020-02-23 NOTE — NURSING
Family/ patient requests update on Endoscopy. Spoke with House supervisor. States will be to follow the noon case. Notified pt/family. Verbalized understanding. No distress noted. No needs voiced.

## 2020-02-24 ENCOUNTER — TELEPHONE (OUTPATIENT)
Dept: FAMILY MEDICINE | Facility: CLINIC | Age: 75
End: 2020-02-24

## 2020-02-24 RX ORDER — NITROGLYCERIN 0.4 MG/1
TABLET SUBLINGUAL
COMMUNITY
Start: 2020-02-20 | End: 2022-08-24 | Stop reason: SDUPTHER

## 2020-02-24 NOTE — PLAN OF CARE
Pt AAOx3 sitting up in bed visiting with family. No complaints. VSS. Tolerated jello well, advancing diet as tolerated. Discharge instructions provided. Education provided. Pt and family verbalize understanding. Plan to discharge home with family at around 8pm.

## 2020-02-27 ENCOUNTER — LAB VISIT (OUTPATIENT)
Dept: LAB | Facility: HOSPITAL | Age: 75
End: 2020-02-27
Attending: INTERNAL MEDICINE
Payer: MEDICARE

## 2020-02-27 DIAGNOSIS — D64.9 ANEMIA, UNSPECIFIED: Primary | ICD-10-CM

## 2020-02-27 LAB
BASOPHILS # BLD AUTO: 0.04 K/UL (ref 0–0.2)
BASOPHILS NFR BLD: 0.7 % (ref 0–1.9)
DIFFERENTIAL METHOD: ABNORMAL
EOSINOPHIL # BLD AUTO: 0.2 K/UL (ref 0–0.5)
EOSINOPHIL NFR BLD: 2.9 % (ref 0–8)
ERYTHROCYTE [DISTWIDTH] IN BLOOD BY AUTOMATED COUNT: 14 % (ref 11.5–14.5)
HCT VFR BLD AUTO: 29.4 % (ref 40–54)
HGB BLD-MCNC: 8.6 G/DL (ref 14–18)
IMM GRANULOCYTES # BLD AUTO: 0.02 K/UL (ref 0–0.04)
IMM GRANULOCYTES NFR BLD AUTO: 0.3 % (ref 0–0.5)
LYMPHOCYTES # BLD AUTO: 1.1 K/UL (ref 1–4.8)
LYMPHOCYTES NFR BLD: 17.9 % (ref 18–48)
MCH RBC QN AUTO: 25 PG (ref 27–31)
MCHC RBC AUTO-ENTMCNC: 29.3 G/DL (ref 32–36)
MCV RBC AUTO: 86 FL (ref 82–98)
MONOCYTES # BLD AUTO: 0.6 K/UL (ref 0.3–1)
MONOCYTES NFR BLD: 9.8 % (ref 4–15)
NEUTROPHILS # BLD AUTO: 4.2 K/UL (ref 1.8–7.7)
NEUTROPHILS NFR BLD: 68.4 % (ref 38–73)
NRBC BLD-RTO: 0 /100 WBC
PLATELET # BLD AUTO: 163 K/UL (ref 150–350)
PMV BLD AUTO: 10.9 FL (ref 9.2–12.9)
RBC # BLD AUTO: 3.44 M/UL (ref 4.6–6.2)
WBC # BLD AUTO: 6.13 K/UL (ref 3.9–12.7)

## 2020-02-27 PROCEDURE — 36415 COLL VENOUS BLD VENIPUNCTURE: CPT

## 2020-02-27 PROCEDURE — 85025 COMPLETE CBC W/AUTO DIFF WBC: CPT

## 2020-03-02 ENCOUNTER — OFFICE VISIT (OUTPATIENT)
Dept: FAMILY MEDICINE | Facility: CLINIC | Age: 75
End: 2020-03-02
Payer: MEDICARE

## 2020-03-02 VITALS
OXYGEN SATURATION: 99 % | WEIGHT: 205.31 LBS | TEMPERATURE: 97 F | DIASTOLIC BLOOD PRESSURE: 60 MMHG | HEART RATE: 66 BPM | BODY MASS INDEX: 27.81 KG/M2 | SYSTOLIC BLOOD PRESSURE: 134 MMHG | HEIGHT: 72 IN

## 2020-03-02 DIAGNOSIS — D50.0 IRON DEFICIENCY ANEMIA DUE TO CHRONIC BLOOD LOSS: Primary | ICD-10-CM

## 2020-03-02 DIAGNOSIS — J30.1 SEASONAL ALLERGIC RHINITIS DUE TO POLLEN: ICD-10-CM

## 2020-03-02 PROCEDURE — 99214 PR OFFICE/OUTPT VISIT, EST, LEVL IV, 30-39 MIN: ICD-10-PCS | Mod: S$PBB,,, | Performed by: FAMILY MEDICINE

## 2020-03-02 PROCEDURE — 99214 OFFICE O/P EST MOD 30 MIN: CPT | Performed by: FAMILY MEDICINE

## 2020-03-02 PROCEDURE — 99214 OFFICE O/P EST MOD 30 MIN: CPT | Mod: S$PBB,,, | Performed by: FAMILY MEDICINE

## 2020-03-02 RX ORDER — LORATADINE 10 MG/1
10 TABLET ORAL DAILY
Qty: 90 TABLET | Refills: 3 | Status: SHIPPED | OUTPATIENT
Start: 2020-03-02 | End: 2020-04-03 | Stop reason: ALTCHOICE

## 2020-03-06 ENCOUNTER — TELEPHONE (OUTPATIENT)
Dept: FAMILY MEDICINE | Facility: CLINIC | Age: 75
End: 2020-03-06

## 2020-03-06 DIAGNOSIS — A09 DIARRHEA OF INFECTIOUS ORIGIN: Primary | ICD-10-CM

## 2020-03-12 ENCOUNTER — LAB VISIT (OUTPATIENT)
Dept: LAB | Facility: HOSPITAL | Age: 75
End: 2020-03-12
Attending: FAMILY MEDICINE
Payer: MEDICARE

## 2020-03-12 DIAGNOSIS — A09 DIARRHEA OF INFECTIOUS ORIGIN: ICD-10-CM

## 2020-03-12 LAB
OB PNL STL: NEGATIVE
WBC #/AREA STL HPF: NORMAL /[HPF]

## 2020-03-12 PROCEDURE — 87209 SMEAR COMPLEX STAIN: CPT

## 2020-03-12 PROCEDURE — 82272 OCCULT BLD FECES 1-3 TESTS: CPT

## 2020-03-12 PROCEDURE — 87045 FECES CULTURE AEROBIC BACT: CPT

## 2020-03-12 PROCEDURE — 87015 SPECIMEN INFECT AGNT CONCNTJ: CPT

## 2020-03-12 PROCEDURE — 89055 LEUKOCYTE ASSESSMENT FECAL: CPT

## 2020-03-12 PROCEDURE — 87046 STOOL CULTR AEROBIC BACT EA: CPT

## 2020-03-14 LAB
STOOL CULTURE: NORMAL
STOOL CULTURE: NORMAL

## 2020-03-19 ENCOUNTER — LAB VISIT (OUTPATIENT)
Dept: LAB | Facility: HOSPITAL | Age: 75
End: 2020-03-19
Attending: FAMILY MEDICINE
Payer: MEDICARE

## 2020-03-19 DIAGNOSIS — D64.9 ANEMIA, UNSPECIFIED: Primary | ICD-10-CM

## 2020-03-19 DIAGNOSIS — D50.0 IRON DEFICIENCY ANEMIA DUE TO CHRONIC BLOOD LOSS: ICD-10-CM

## 2020-03-19 DIAGNOSIS — I20.89 ANGINA DECUBITUS: ICD-10-CM

## 2020-03-19 LAB
ALBUMIN SERPL BCP-MCNC: 3.6 G/DL (ref 3.5–5.2)
ALP SERPL-CCNC: 60 U/L (ref 55–135)
ALT SERPL W/O P-5'-P-CCNC: 11 U/L (ref 10–44)
ANION GAP SERPL CALC-SCNC: 7 MMOL/L (ref 8–16)
AST SERPL-CCNC: 18 U/L (ref 10–40)
BASOPHILS # BLD AUTO: 0.07 K/UL (ref 0–0.2)
BASOPHILS NFR BLD: 1.4 % (ref 0–1.9)
BILIRUB SERPL-MCNC: 0.9 MG/DL (ref 0.1–1)
BUN SERPL-MCNC: 23 MG/DL (ref 8–23)
CALCIUM SERPL-MCNC: 9.4 MG/DL (ref 8.7–10.5)
CHLORIDE SERPL-SCNC: 103 MMOL/L (ref 95–110)
CO2 SERPL-SCNC: 25 MMOL/L (ref 23–29)
CREAT SERPL-MCNC: 1.1 MG/DL (ref 0.5–1.4)
DIFFERENTIAL METHOD: ABNORMAL
EOSINOPHIL # BLD AUTO: 0.2 K/UL (ref 0–0.5)
EOSINOPHIL NFR BLD: 4.9 % (ref 0–8)
ERYTHROCYTE [DISTWIDTH] IN BLOOD BY AUTOMATED COUNT: 15.3 % (ref 11.5–14.5)
EST. GFR  (AFRICAN AMERICAN): >60 ML/MIN/1.73 M^2
EST. GFR  (NON AFRICAN AMERICAN): >60 ML/MIN/1.73 M^2
GLUCOSE SERPL-MCNC: 216 MG/DL (ref 70–110)
HCT VFR BLD AUTO: 31.1 % (ref 40–54)
HGB BLD-MCNC: 9.2 G/DL (ref 14–18)
IMM GRANULOCYTES # BLD AUTO: 0.02 K/UL (ref 0–0.04)
IMM GRANULOCYTES NFR BLD AUTO: 0.4 % (ref 0–0.5)
LYMPHOCYTES # BLD AUTO: 1 K/UL (ref 1–4.8)
LYMPHOCYTES NFR BLD: 20.2 % (ref 18–48)
MCH RBC QN AUTO: 25.1 PG (ref 27–31)
MCHC RBC AUTO-ENTMCNC: 29.6 G/DL (ref 32–36)
MCV RBC AUTO: 85 FL (ref 82–98)
MONOCYTES # BLD AUTO: 0.5 K/UL (ref 0.3–1)
MONOCYTES NFR BLD: 10.9 % (ref 4–15)
NEUTROPHILS # BLD AUTO: 3 K/UL (ref 1.8–7.7)
NEUTROPHILS NFR BLD: 62.2 % (ref 38–73)
NRBC BLD-RTO: 0 /100 WBC
O+P STL TRI STN: NORMAL
PLATELET # BLD AUTO: 175 K/UL (ref 150–350)
PMV BLD AUTO: 10.9 FL (ref 9.2–12.9)
POTASSIUM SERPL-SCNC: 3.5 MMOL/L (ref 3.5–5.1)
PROT SERPL-MCNC: 6.3 G/DL (ref 6–8.4)
RBC # BLD AUTO: 3.66 M/UL (ref 4.6–6.2)
SODIUM SERPL-SCNC: 135 MMOL/L (ref 136–145)
WBC # BLD AUTO: 4.86 K/UL (ref 3.9–12.7)

## 2020-03-19 PROCEDURE — 36415 COLL VENOUS BLD VENIPUNCTURE: CPT

## 2020-03-19 PROCEDURE — 80053 COMPREHEN METABOLIC PANEL: CPT

## 2020-03-19 PROCEDURE — 85025 COMPLETE CBC W/AUTO DIFF WBC: CPT

## 2020-03-23 ENCOUNTER — TELEPHONE (OUTPATIENT)
Dept: FAMILY MEDICINE | Facility: CLINIC | Age: 75
End: 2020-03-23

## 2020-03-23 NOTE — TELEPHONE ENCOUNTER
----- Message from Scott Staley MD sent at 3/19/2020  1:13 PM CDT -----  Patient is still somewhat anemic but slightly improved from previous continue to follow with current regimen.

## 2020-04-01 RX ORDER — PANTOPRAZOLE SODIUM 40 MG/1
TABLET, DELAYED RELEASE ORAL
Qty: 30 TABLET | Refills: 0 | OUTPATIENT
Start: 2020-04-01

## 2020-04-03 ENCOUNTER — OFFICE VISIT (OUTPATIENT)
Dept: FAMILY MEDICINE | Facility: CLINIC | Age: 75
End: 2020-04-03
Payer: MEDICARE

## 2020-04-03 DIAGNOSIS — D64.9 ANEMIA, UNSPECIFIED TYPE: ICD-10-CM

## 2020-04-03 DIAGNOSIS — J30.1 SEASONAL ALLERGIC RHINITIS DUE TO POLLEN: Primary | ICD-10-CM

## 2020-04-03 DIAGNOSIS — I48.0 PAROXYSMAL ATRIAL FIBRILLATION: ICD-10-CM

## 2020-04-03 DIAGNOSIS — K20.90 ESOPHAGITIS: ICD-10-CM

## 2020-04-03 DIAGNOSIS — N32.81 OVERACTIVE BLADDER: ICD-10-CM

## 2020-04-03 DIAGNOSIS — I25.10 CORONARY ARTERY DISEASE, ANGINA PRESENCE UNSPECIFIED, UNSPECIFIED VESSEL OR LESION TYPE, UNSPECIFIED WHETHER NATIVE OR TRANSPLANTED HEART: ICD-10-CM

## 2020-04-03 DIAGNOSIS — G45.9 TIA (TRANSIENT ISCHEMIC ATTACK): ICD-10-CM

## 2020-04-03 PROCEDURE — 99213 PR OFFICE/OUTPT VISIT, EST, LEVL III, 20-29 MIN: ICD-10-PCS | Mod: 95,,, | Performed by: FAMILY MEDICINE

## 2020-04-03 PROCEDURE — 99213 OFFICE O/P EST LOW 20 MIN: CPT | Mod: 95,,, | Performed by: FAMILY MEDICINE

## 2020-04-03 RX ORDER — PANTOPRAZOLE SODIUM 40 MG/1
40 TABLET, DELAYED RELEASE ORAL DAILY
Qty: 90 TABLET | Refills: 3 | Status: SHIPPED | OUTPATIENT
Start: 2020-04-03 | End: 2021-05-03

## 2020-04-03 RX ORDER — LEVOCETIRIZINE DIHYDROCHLORIDE 5 MG/1
5 TABLET, FILM COATED ORAL NIGHTLY
Qty: 30 TABLET | Refills: 11 | Status: SHIPPED | OUTPATIENT
Start: 2020-04-03 | End: 2021-03-10

## 2020-04-03 RX ORDER — TOLTERODINE 4 MG/1
4 CAPSULE, EXTENDED RELEASE ORAL DAILY
Qty: 30 CAPSULE | Refills: 11 | Status: SHIPPED | OUTPATIENT
Start: 2020-04-03 | End: 2021-02-01

## 2020-04-03 NOTE — PROGRESS NOTES
Patient is here as a virtual visit for     Subjective:        The chief complaint leading to consultation is:  Follow-up hypertension diabetes and anemia  The patient location is:  Home  Visit type: Virtual visit with synchronous audio/video or audio only  This was a video visit in lieu of in-person visit due to the coronavirus emergency. Patient acknowledged and consented to the video visit encounter.     Patient is here for follow-up on hypertension and anemia.  He was admitted I February,with chest pain which turned out to be esophageal polyps.  He was found to be anemic and had required 2 units of blood transfusion.  He is here for follow-up on that.  Lab Results       Component                Value               Date                       WBC                      4.86                03/19/2020                 HGB                      9.2 (L)             03/19/2020                 HCT                      31.1 (L)            03/19/2020                 PLT                      175                 03/19/2020                 CHOL                     108 (L)             02/21/2020                 TRIG                     112                 02/21/2020                 HDL                      35 (L)              02/21/2020                 ALT                      11                  03/19/2020                 AST                      18                  03/19/2020                 NA                       135 (L)             03/19/2020                 K                        3.5                 03/19/2020                 CL                       103                 03/19/2020                 CREATININE               1.1                 03/19/2020                 BUN                      23                  03/19/2020                 CO2                      25                  03/19/2020                 PSA                      0.77                01/09/2012                 INR                      2.2                  02/20/2020                 HGBA1C                   6.8 (H)             02/21/2020                Diabetes   He presents for his follow-up diabetic visit. He has type 2 diabetes mellitus. There are no hypoglycemic associated symptoms. Pertinent negatives for diabetes include no blurred vision, no chest pain, no fatigue, no foot paresthesias, no foot ulcerations, no polydipsia and no polyphagia. Symptoms are stable. He participates in exercise intermittently. His breakfast blood glucose range is generally 110-130 mg/dl. His lunch blood glucose range is generally 110-130 mg/dl. An ACE inhibitor/angiotensin II receptor blocker is being taken. He does not see a podiatrist.Eye exam is current.   Sinus Problem   This is a new problem. The current episode started in the past 7 days. The problem has been gradually improving since onset. There has been no fever. His pain is at a severity of 4/10. He is experiencing no pain.       Past Surgical History:   Procedure Laterality Date    CARDIAC PACEMAKER PLACEMENT      CARDIAC PACEMAKER PLACEMENT      CARDIAC PACEMAKER PLACEMENT  04/26/2018    replaced    CARDIAC SURGERY      1995   CABG X 5    CHOLECYSTECTOMY      COLON SURGERY      COLONOSCOPY N/A 2/21/2020    Procedure: COLONOSCOPY;  Surgeon: Abe Barragan III, MD;  Location: [facility];  Service: Endoscopy;  Laterality: N/A;    COLONOSCOPY N/A 2/23/2020    Procedure: COLONOSCOPY;  Surgeon: Bob Locke Jr., MD;  Location: [facility];  Service: Endoscopy;  Laterality: N/A;    CORONARY ARTERY BYPASS GRAFT  1995    CORONARY STENT PLACEMENT      stent x 5    ESOPHAGOGASTRODUODENOSCOPY N/A 2/23/2020    Procedure: EGD (ESOPHAGOGASTRODUODENOSCOPY);  Surgeon: Bob Locke Jr., MD;  Location: [facility];  Service: Endoscopy;  Laterality: N/A;    EYE SURGERY      BILAT CATARACT    head laceration   01/19/2018    HEMORRHOID SURGERY      SMALL BOWEL ENTEROSCOPY N/A 2/21/2020    Procedure: ENTEROSCOPY;  Surgeon:  Abe Barragan III, MD;  Location: [facility];  Service: Endoscopy;  Laterality: N/A;    stint       Past Medical History:   Diagnosis Date    Anemia     Anticoagulant long-term use     Arthritis     CAD (coronary artery disease)     CABG, STENTS '97,'99,'01,'05    Cancer     SKIN    Cataract     Diabetes mellitus     Diabetes mellitus type II     GERD (gastroesophageal reflux disease)     GI bleed 2020    Hypertension     Myocardial infarction     Wears glasses      Family History   Problem Relation Age of Onset    Heart disease Mother     Heart disease Father     Hyperlipidemia Sister     Hypertension Sister     Heart disease Brother     Heart disease Brother     Heart disease Brother     Heart disease Brother     Heart disease Brother         Social History:   Marital Status:   Alcohol History:  reports that he does not drink alcohol.  Tobacco History:  reports that he has never smoked. He has never used smokeless tobacco.  Drug History:  reports that he does not use drugs.    Review of patient's allergies indicates:   Allergen Reactions    Adhesive Other (See Comments)     SILK TAPE PULL SKIN OFF    Metformin Other (See Comments)     Weight loss cachexia anorexia,diarrhea.    Pcn [penicillins]      Patient states he passed out from this medication when he was 10 years old.        Current Outpatient Medications   Medication Sig Dispense Refill    amLODIPine (NORVASC) 5 MG tablet Take 5 mg by mouth once daily.      ferrous sulfate 325 (65 FE) MG EC tablet Take 1 tablet (325 mg total) by mouth 2 (two) times daily. 28 tablet 0    finasteride (PROSCAR) 5 mg tablet Take 5 mg by mouth once daily.      fluticasone propionate (FLONASE) 50 mcg/actuation nasal spray 1 spray by Each Nostril route daily as needed for Rhinitis.      levocetirizine (XYZAL) 5 MG tablet Take 1 tablet (5 mg total) by mouth every evening. 30 tablet 11    metoprolol tartrate (LOPRESSOR) 100 MG tablet Take  150 mg by mouth 2 (two) times daily.       multivitamin capsule Take 1 capsule by mouth once daily.       NITROSTAT 0.4 mg SL tablet       pantoprazole (PROTONIX) 40 MG tablet Take 1 tablet (40 mg total) by mouth once daily. 90 tablet 3    pioglitazone (ACTOS) 30 MG tablet Take 30 mg by mouth once daily.      quinapril (ACCUPRIL) 40 MG tablet Take 20 mg by mouth once daily.   4    rosuvastatin (CRESTOR) 20 MG tablet Take 20 mg by mouth every evening.   3    sertraline (ZOLOFT) 50 MG tablet Take 50 mg by mouth once daily.      tolterodine (DETROL LA) 4 MG 24 hr capsule Take 1 capsule (4 mg total) by mouth once daily. 30 capsule 11    vitamin D 1000 units Tab Take 1,000 Units by mouth once daily.       No current facility-administered medications for this visit.        Review of Systems   Constitutional: Negative for fatigue.   Eyes: Negative for blurred vision.   Cardiovascular: Negative for chest pain.   Endocrine: Negative for polydipsia and polyphagia.         Objective:        Physical Exam:   Physical Exam   Constitutional: He appears well-developed and well-nourished.   Eyes: Conjunctivae and EOM are normal.   Neck: Normal range of motion.   Pulmonary/Chest: Effort normal and breath sounds normal.   Musculoskeletal: Normal range of motion.   Skin: Skin is dry. No erythema.   Nursing note and vitals reviewed.           Assessment:       1. Seasonal allergic rhinitis due to pollen    2. Paroxysmal atrial fibrillation    3. Anemia, unspecified type    4. Coronary artery disease, angina presence unspecified, unspecified vessel or lesion type, unspecified whether native or transplanted heart    5. TIA (transient ischemic attack)    6. Overactive bladder    7. Esophagitis      Plan:   Seasonal allergic rhinitis due to pollen  -     levocetirizine (XYZAL) 5 MG tablet; Take 1 tablet (5 mg total) by mouth every evening.  Dispense: 30 tablet; Refill: 11    Paroxysmal atrial fibrillation  -     Magnesium; Future;  Expected date: 04/03/2020  -     Basic metabolic panel; Future    Anemia, unspecified type  -     pantoprazole (PROTONIX) 40 MG tablet; Take 1 tablet (40 mg total) by mouth once daily.  Dispense: 90 tablet; Refill: 3  -     CBC auto differential; Future; Expected date: 04/15/2020    Coronary artery disease, angina presence unspecified, unspecified vessel or lesion type, unspecified whether native or transplanted heart    TIA (transient ischemic attack)    Overactive bladder  -     tolterodine (DETROL LA) 4 MG 24 hr capsule; Take 1 capsule (4 mg total) by mouth once daily.  Dispense: 30 capsule; Refill: 11    Esophagitis      Follow up in about 1 month (around 5/3/2020) for follow up anemia,, video visit.    Total time spent with patient: 15'    Each patient to whom he or she provides medical services by telemedicine is:  (1) informed of the relationship between the physician and patient and the respective role of any other health care provider with respect to management of the patient; and (2) notified that he or she may decline to receive medical services by telemedicine and may withdraw from such care at any time.    This note was created using [employer] voice recognition software that occasionally misinterprets phrases or words.

## 2020-04-03 NOTE — PATIENT INSTRUCTIONS
We are changing his Claritin 2 Xyzal, continuing the told tear is seen, continue the pantoprazole.  Follow-up with Dr. DOTY or his covering physician in 2-4 weeks

## 2020-04-06 DIAGNOSIS — D64.9 ANEMIA, UNSPECIFIED TYPE: Primary | ICD-10-CM

## 2020-04-06 NOTE — TELEPHONE ENCOUNTER
I spoke wit patient about scheduling a follow up visit for Anemia. Patient stated Dr. Lux stopped the iron tablet he was taking when patient was in the hospital last month.   Patient would like to know if he should be taking the iron medication

## 2020-04-06 NOTE — TELEPHONE ENCOUNTER
Lab Results   Component Value Date    WBC 4.86 03/19/2020    HGB 9.2 (L) 03/19/2020    HCT 31.1 (L) 03/19/2020    MCV 85 03/19/2020     03/19/2020       The last CBC shows that he still anemic needs to stay on the iron repeat CBC in 1 month.  There is a CBC ordered.

## 2020-04-08 RX ORDER — FERROUS SULFATE 325(65) MG
325 TABLET, DELAYED RELEASE (ENTERIC COATED) ORAL 2 TIMES DAILY
Qty: 60 TABLET | Refills: 1 | Status: SHIPPED | OUTPATIENT
Start: 2020-04-08 | End: 2020-06-04

## 2020-04-08 NOTE — TELEPHONE ENCOUNTER
Patient notified and gave verbal understanding to restart iron tabs.  Please send refill to local pharmacy.  Prescription is pended

## 2020-04-20 RX ORDER — SERTRALINE HYDROCHLORIDE 50 MG/1
TABLET, FILM COATED ORAL
Qty: 90 TABLET | Refills: 1 | Status: SHIPPED | OUTPATIENT
Start: 2020-04-20 | End: 2020-11-27

## 2020-04-20 RX ORDER — PIOGLITAZONEHYDROCHLORIDE 30 MG/1
TABLET ORAL
Qty: 90 TABLET | Refills: 1 | Status: SHIPPED | OUTPATIENT
Start: 2020-04-20 | End: 2020-12-14

## 2020-04-21 ENCOUNTER — LAB VISIT (OUTPATIENT)
Dept: LAB | Facility: HOSPITAL | Age: 75
End: 2020-04-21
Attending: FAMILY MEDICINE
Payer: MEDICARE

## 2020-04-21 DIAGNOSIS — D64.9 ANEMIA, UNSPECIFIED TYPE: ICD-10-CM

## 2020-04-21 DIAGNOSIS — E11.9 TYPE 2 DIABETES MELLITUS WITHOUT COMPLICATION, WITHOUT LONG-TERM CURRENT USE OF INSULIN: ICD-10-CM

## 2020-04-21 DIAGNOSIS — I10 ESSENTIAL HYPERTENSION: ICD-10-CM

## 2020-04-21 DIAGNOSIS — I48.0 PAROXYSMAL ATRIAL FIBRILLATION: ICD-10-CM

## 2020-04-21 LAB
ALBUMIN SERPL BCP-MCNC: 4 G/DL (ref 3.5–5.2)
ALP SERPL-CCNC: 52 U/L (ref 55–135)
ALT SERPL W/O P-5'-P-CCNC: 12 U/L (ref 10–44)
ANION GAP SERPL CALC-SCNC: 7 MMOL/L (ref 8–16)
ANION GAP SERPL CALC-SCNC: 7 MMOL/L (ref 8–16)
AST SERPL-CCNC: 22 U/L (ref 10–40)
BASOPHILS # BLD AUTO: 0.05 K/UL (ref 0–0.2)
BASOPHILS NFR BLD: 0.9 % (ref 0–1.9)
BILIRUB SERPL-MCNC: 1.1 MG/DL (ref 0.1–1)
BUN SERPL-MCNC: 31 MG/DL (ref 8–23)
BUN SERPL-MCNC: 31 MG/DL (ref 8–23)
CALCIUM SERPL-MCNC: 9.7 MG/DL (ref 8.7–10.5)
CALCIUM SERPL-MCNC: 9.7 MG/DL (ref 8.7–10.5)
CHLORIDE SERPL-SCNC: 104 MMOL/L (ref 95–110)
CHLORIDE SERPL-SCNC: 104 MMOL/L (ref 95–110)
CHOLEST SERPL-MCNC: 131 MG/DL (ref 120–199)
CHOLEST/HDLC SERPL: 3.2 {RATIO} (ref 2–5)
CO2 SERPL-SCNC: 26 MMOL/L (ref 23–29)
CO2 SERPL-SCNC: 26 MMOL/L (ref 23–29)
CREAT SERPL-MCNC: 1.8 MG/DL (ref 0.5–1.4)
CREAT SERPL-MCNC: 1.8 MG/DL (ref 0.5–1.4)
DIFFERENTIAL METHOD: ABNORMAL
EOSINOPHIL # BLD AUTO: 0.2 K/UL (ref 0–0.5)
EOSINOPHIL NFR BLD: 3.4 % (ref 0–8)
ERYTHROCYTE [DISTWIDTH] IN BLOOD BY AUTOMATED COUNT: 19.3 % (ref 11.5–14.5)
EST. GFR  (AFRICAN AMERICAN): 41.3 ML/MIN/1.73 M^2
EST. GFR  (AFRICAN AMERICAN): 41.3 ML/MIN/1.73 M^2
EST. GFR  (NON AFRICAN AMERICAN): 35.8 ML/MIN/1.73 M^2
EST. GFR  (NON AFRICAN AMERICAN): 35.8 ML/MIN/1.73 M^2
GLUCOSE SERPL-MCNC: 174 MG/DL (ref 70–110)
GLUCOSE SERPL-MCNC: 174 MG/DL (ref 70–110)
HCT VFR BLD AUTO: 35.9 % (ref 40–54)
HDLC SERPL-MCNC: 41 MG/DL (ref 40–75)
HDLC SERPL: 31.3 % (ref 20–50)
HGB BLD-MCNC: 10.7 G/DL (ref 14–18)
IMM GRANULOCYTES # BLD AUTO: 0.01 K/UL (ref 0–0.04)
IMM GRANULOCYTES NFR BLD AUTO: 0.2 % (ref 0–0.5)
LDLC SERPL CALC-MCNC: 64.4 MG/DL (ref 63–159)
LYMPHOCYTES # BLD AUTO: 0.9 K/UL (ref 1–4.8)
LYMPHOCYTES NFR BLD: 17.8 % (ref 18–48)
MAGNESIUM SERPL-MCNC: 1.5 MG/DL (ref 1.6–2.6)
MCH RBC QN AUTO: 25.8 PG (ref 27–31)
MCHC RBC AUTO-ENTMCNC: 29.8 G/DL (ref 32–36)
MCV RBC AUTO: 87 FL (ref 82–98)
MONOCYTES # BLD AUTO: 0.6 K/UL (ref 0.3–1)
MONOCYTES NFR BLD: 10.8 % (ref 4–15)
NEUTROPHILS # BLD AUTO: 3.5 K/UL (ref 1.8–7.7)
NEUTROPHILS NFR BLD: 66.9 % (ref 38–73)
NONHDLC SERPL-MCNC: 90 MG/DL
NRBC BLD-RTO: 0 /100 WBC
PLATELET # BLD AUTO: 149 K/UL (ref 150–350)
PMV BLD AUTO: 10.8 FL (ref 9.2–12.9)
POTASSIUM SERPL-SCNC: 3.9 MMOL/L (ref 3.5–5.1)
POTASSIUM SERPL-SCNC: 3.9 MMOL/L (ref 3.5–5.1)
PROT SERPL-MCNC: 6.9 G/DL (ref 6–8.4)
RBC # BLD AUTO: 4.15 M/UL (ref 4.6–6.2)
SODIUM SERPL-SCNC: 137 MMOL/L (ref 136–145)
SODIUM SERPL-SCNC: 137 MMOL/L (ref 136–145)
TRIGL SERPL-MCNC: 128 MG/DL (ref 30–150)
WBC # BLD AUTO: 5.27 K/UL (ref 3.9–12.7)

## 2020-04-21 PROCEDURE — 83735 ASSAY OF MAGNESIUM: CPT

## 2020-04-21 PROCEDURE — 36415 COLL VENOUS BLD VENIPUNCTURE: CPT

## 2020-04-21 PROCEDURE — 85025 COMPLETE CBC W/AUTO DIFF WBC: CPT

## 2020-04-21 PROCEDURE — 80061 LIPID PANEL: CPT

## 2020-04-21 PROCEDURE — 83036 HEMOGLOBIN GLYCOSYLATED A1C: CPT

## 2020-04-21 PROCEDURE — 80053 COMPREHEN METABOLIC PANEL: CPT

## 2020-04-22 LAB
ESTIMATED AVG GLUCOSE: 140 MG/DL (ref 68–131)
HBA1C MFR BLD HPLC: 6.5 % (ref 4.5–6.2)

## 2020-04-23 ENCOUNTER — TELEPHONE (OUTPATIENT)
Dept: FAMILY MEDICINE | Facility: CLINIC | Age: 75
End: 2020-04-23

## 2020-04-23 NOTE — TELEPHONE ENCOUNTER
----- Message from Scott Staley MD sent at 4/22/2020  8:22 AM CDT -----  Still anemic but improving continue iron replacement A1c is well controlled.

## 2020-04-23 NOTE — TELEPHONE ENCOUNTER
Patient notified that iron levels are improving and to continue the iron replacement and A1C is stable. Patient gave verbal understanding of orders

## 2020-06-15 ENCOUNTER — LAB VISIT (OUTPATIENT)
Dept: LAB | Facility: HOSPITAL | Age: 75
End: 2020-06-15
Attending: FAMILY MEDICINE
Payer: MEDICARE

## 2020-06-15 DIAGNOSIS — E11.9 TYPE 2 DIABETES MELLITUS WITHOUT COMPLICATION, WITHOUT LONG-TERM CURRENT USE OF INSULIN: ICD-10-CM

## 2020-06-15 LAB
ALBUMIN SERPL BCP-MCNC: 3.9 G/DL (ref 3.5–5.2)
ALP SERPL-CCNC: 48 U/L (ref 55–135)
ALT SERPL W/O P-5'-P-CCNC: 14 U/L (ref 10–44)
ANION GAP SERPL CALC-SCNC: 10 MMOL/L (ref 8–16)
AST SERPL-CCNC: 20 U/L (ref 10–40)
BILIRUB SERPL-MCNC: 1.1 MG/DL (ref 0.1–1)
BUN SERPL-MCNC: 31 MG/DL (ref 8–23)
CALCIUM SERPL-MCNC: 9.9 MG/DL (ref 8.7–10.5)
CHLORIDE SERPL-SCNC: 101 MMOL/L (ref 95–110)
CHOLEST SERPL-MCNC: 130 MG/DL (ref 120–199)
CHOLEST/HDLC SERPL: 3 {RATIO} (ref 2–5)
CO2 SERPL-SCNC: 28 MMOL/L (ref 23–29)
CREAT SERPL-MCNC: 1.4 MG/DL (ref 0.5–1.4)
EST. GFR  (AFRICAN AMERICAN): 56 ML/MIN/1.73 M^2
EST. GFR  (NON AFRICAN AMERICAN): 48.5 ML/MIN/1.73 M^2
GLUCOSE SERPL-MCNC: 118 MG/DL (ref 70–110)
HDLC SERPL-MCNC: 44 MG/DL (ref 40–75)
HDLC SERPL: 33.8 % (ref 20–50)
LDLC SERPL CALC-MCNC: 57.2 MG/DL (ref 63–159)
NONHDLC SERPL-MCNC: 86 MG/DL
POTASSIUM SERPL-SCNC: 3.9 MMOL/L (ref 3.5–5.1)
PROT SERPL-MCNC: 6.7 G/DL (ref 6–8.4)
SODIUM SERPL-SCNC: 139 MMOL/L (ref 136–145)
TRIGL SERPL-MCNC: 144 MG/DL (ref 30–150)

## 2020-06-15 PROCEDURE — 80061 LIPID PANEL: CPT

## 2020-06-15 PROCEDURE — 36415 COLL VENOUS BLD VENIPUNCTURE: CPT

## 2020-06-15 PROCEDURE — 80053 COMPREHEN METABOLIC PANEL: CPT

## 2020-06-15 NOTE — PROGRESS NOTES
Labs look ok- cholesterol much better; kidney function a little low but stable; please follow up with Dr. Staley as planned

## 2020-06-22 ENCOUNTER — LAB VISIT (OUTPATIENT)
Dept: LAB | Facility: HOSPITAL | Age: 75
End: 2020-06-22
Attending: INTERNAL MEDICINE
Payer: MEDICARE

## 2020-06-22 DIAGNOSIS — D64.9 ANEMIA, UNSPECIFIED: Primary | ICD-10-CM

## 2020-06-22 LAB
BASOPHILS # BLD AUTO: 0.05 K/UL (ref 0–0.2)
BASOPHILS NFR BLD: 1 % (ref 0–1.9)
DIFFERENTIAL METHOD: ABNORMAL
EOSINOPHIL # BLD AUTO: 0.3 K/UL (ref 0–0.5)
EOSINOPHIL NFR BLD: 6 % (ref 0–8)
ERYTHROCYTE [DISTWIDTH] IN BLOOD BY AUTOMATED COUNT: 15.8 % (ref 11.5–14.5)
HCT VFR BLD AUTO: 39.5 % (ref 40–54)
HGB BLD-MCNC: 12.6 G/DL (ref 14–18)
IMM GRANULOCYTES # BLD AUTO: 0.01 K/UL (ref 0–0.04)
IMM GRANULOCYTES NFR BLD AUTO: 0.2 % (ref 0–0.5)
LYMPHOCYTES # BLD AUTO: 1.1 K/UL (ref 1–4.8)
LYMPHOCYTES NFR BLD: 21.7 % (ref 18–48)
MCH RBC QN AUTO: 29.6 PG (ref 27–31)
MCHC RBC AUTO-ENTMCNC: 31.9 G/DL (ref 32–36)
MCV RBC AUTO: 93 FL (ref 82–98)
MONOCYTES # BLD AUTO: 0.6 K/UL (ref 0.3–1)
MONOCYTES NFR BLD: 11.7 % (ref 4–15)
NEUTROPHILS # BLD AUTO: 3 K/UL (ref 1.8–7.7)
NEUTROPHILS NFR BLD: 59.4 % (ref 38–73)
NRBC BLD-RTO: 0 /100 WBC
PLATELET # BLD AUTO: 115 K/UL (ref 150–350)
PMV BLD AUTO: 10.4 FL (ref 9.2–12.9)
RBC # BLD AUTO: 4.26 M/UL (ref 4.6–6.2)
WBC # BLD AUTO: 4.97 K/UL (ref 3.9–12.7)

## 2020-06-22 PROCEDURE — 36415 COLL VENOUS BLD VENIPUNCTURE: CPT

## 2020-06-22 PROCEDURE — 85025 COMPLETE CBC W/AUTO DIFF WBC: CPT

## 2020-06-23 ENCOUNTER — TELEPHONE (OUTPATIENT)
Dept: FAMILY MEDICINE | Facility: CLINIC | Age: 75
End: 2020-06-23

## 2020-06-23 DIAGNOSIS — D50.0 IRON DEFICIENCY ANEMIA DUE TO CHRONIC BLOOD LOSS: Primary | ICD-10-CM

## 2020-06-23 NOTE — TELEPHONE ENCOUNTER
----- Message from Scott Staley MD sent at 6/23/2020  8:11 AM CDT -----  Anemia is improving but not resolved continue iron replacement and will recheck in 3 months.

## 2020-07-02 ENCOUNTER — LAB VISIT (OUTPATIENT)
Dept: LAB | Facility: HOSPITAL | Age: 75
End: 2020-07-02
Attending: SPECIALIST
Payer: MEDICARE

## 2020-07-02 DIAGNOSIS — Z12.5 SPECIAL SCREENING FOR MALIGNANT NEOPLASM OF PROSTATE: Primary | ICD-10-CM

## 2020-07-02 LAB — COMPLEXED PSA SERPL-MCNC: 0.79 NG/ML (ref 0–4)

## 2020-07-02 PROCEDURE — 84153 ASSAY OF PSA TOTAL: CPT

## 2020-07-02 PROCEDURE — 36415 COLL VENOUS BLD VENIPUNCTURE: CPT

## 2020-09-10 ENCOUNTER — OFFICE VISIT (OUTPATIENT)
Dept: FAMILY MEDICINE | Facility: CLINIC | Age: 75
End: 2020-09-10
Payer: MEDICARE

## 2020-09-10 VITALS
SYSTOLIC BLOOD PRESSURE: 124 MMHG | DIASTOLIC BLOOD PRESSURE: 84 MMHG | OXYGEN SATURATION: 96 % | WEIGHT: 198 LBS | BODY MASS INDEX: 26.82 KG/M2 | TEMPERATURE: 98 F | HEIGHT: 72 IN | HEART RATE: 72 BPM

## 2020-09-10 DIAGNOSIS — J32.0 CHRONIC MAXILLARY SINUSITIS: Primary | ICD-10-CM

## 2020-09-10 DIAGNOSIS — Z00.00 PREVENTATIVE HEALTH CARE: ICD-10-CM

## 2020-09-10 DIAGNOSIS — H69.93 DYSFUNCTION OF BOTH EUSTACHIAN TUBES: ICD-10-CM

## 2020-09-10 DIAGNOSIS — E11.9 TYPE 2 DIABETES MELLITUS WITHOUT COMPLICATION, WITHOUT LONG-TERM CURRENT USE OF INSULIN: ICD-10-CM

## 2020-09-10 PROCEDURE — 99215 OFFICE O/P EST HI 40 MIN: CPT | Performed by: FAMILY MEDICINE

## 2020-09-10 PROCEDURE — 99214 PR OFFICE/OUTPT VISIT, EST, LEVL IV, 30-39 MIN: ICD-10-PCS | Mod: S$PBB,,, | Performed by: FAMILY MEDICINE

## 2020-09-10 PROCEDURE — 99214 OFFICE O/P EST MOD 30 MIN: CPT | Mod: S$PBB,,, | Performed by: FAMILY MEDICINE

## 2020-09-10 RX ORDER — DICAPRYLYL CARBONATE/DIMETH
SPRAY, NON-AEROSOL (ML) TOPICAL 2 TIMES DAILY
COMMUNITY
Start: 2020-08-25 | End: 2020-09-23

## 2020-09-10 RX ORDER — ASPIRIN 81 MG/1
162 TABLET ORAL DAILY
Status: ON HOLD | COMMUNITY
End: 2022-10-28 | Stop reason: SDUPTHER

## 2020-09-10 RX ORDER — AZITHROMYCIN 250 MG/1
250 TABLET, FILM COATED ORAL DAILY
Qty: 6 TABLET | Refills: 0 | Status: SHIPPED | OUTPATIENT
Start: 2020-09-10 | End: 2020-09-16

## 2020-09-10 RX ORDER — METHYLPREDNISOLONE 4 MG/1
TABLET ORAL
Qty: 1 PACKAGE | Refills: 0 | Status: SHIPPED | OUTPATIENT
Start: 2020-09-10 | End: 2020-09-23

## 2020-09-10 NOTE — PROGRESS NOTES
Subjective:       Patient ID: Tono Saez is a 75 y.o. male.    Chief Complaint: Ear Fullness (ear blocked) and Sore Throat (film in throat)      Patient is here because of chronic sinusitis and ear fullness he has had sinus symptoms for 6-8 months which has gotten significantly worse over the last 2 months.  Discharge and current medications have been reviewed and reconciled with the chart.  Lab Results       Component                Value               Date                       WBC                      4.97                06/22/2020                 HGB                      12.6 (L)            06/22/2020                 HCT                      39.5 (L)            06/22/2020                 PLT                      115 (L)             06/22/2020                 CHOL                     130                 06/15/2020                 TRIG                     144                 06/15/2020                 HDL                      44                  06/15/2020                 ALT                      14                  06/15/2020                 AST                      20                  06/15/2020                 NA                       139                 06/15/2020                 K                        3.9                 06/15/2020                 CL                       101                 06/15/2020                 CREATININE               1.4                 06/15/2020                 BUN                      31 (H)              06/15/2020                 CO2                      28                  06/15/2020                 PSA                      0.79                07/02/2020                 INR                      2.2                 02/20/2020                 HGBA1C                   6.5 (H)             04/21/2020                Ear Fullness   There is pain in both ears. This is a recurrent problem. The current episode started more than 1 month ago (Years get clogged when he blows his  nose.). The problem occurs every few minutes. There has been no fever. The fever has been present for 1 to 2 days. The pain is at a severity of 0/10. The patient is experiencing no pain. Associated symptoms include headaches. Pertinent negatives include no neck pain or sore throat. Cough: occ phlegmy cough. He has tried NSAIDs for the symptoms. The treatment provided no relief.   Sinus Problem  This is a chronic problem. The current episode started more than 1 month ago. The problem has been gradually worsening since onset. There has been no fever. He is experiencing no pain. Associated symptoms include congestion, ear pain, headaches and sinus pressure. Pertinent negatives include no chills, neck pain, sneezing or sore throat. Cough: occ phlegmy cough.       Allergies and Medications:   Review of patient's allergies indicates:   Allergen Reactions    Adhesive Other (See Comments)     SILK TAPE PULL SKIN OFF    Metformin Other (See Comments)     Weight loss cachexia anorexia,diarrhea.    Pcn [penicillins]      Patient states he passed out from this medication when he was 10 years old.      Current Outpatient Medications   Medication Sig Dispense Refill    aspirin (ECOTRIN) 81 MG EC tablet Take 81 mg by mouth once daily.      ferrous sulfate (FEOSOL) 325 mg (65 mg iron) Tab tablet TAKE 1 TABLET (325 MG TOTAL) BY MOUTH 2 (TWO) TIMES DAILY. 60 tablet 1    finasteride (PROSCAR) 5 mg tablet Take 5 mg by mouth once daily.      fluticasone propionate (FLONASE) 50 mcg/actuation nasal spray 1 spray by Each Nostril route daily as needed for Rhinitis.      levocetirizine (XYZAL) 5 MG tablet Take 1 tablet (5 mg total) by mouth every evening. 30 tablet 11    LOYON Plum Apply topically 2 (two) times daily.      metoprolol tartrate (LOPRESSOR) 100 MG tablet Take 50 mg by mouth 2 (two) times daily.      multivitamin capsule Take 1 capsule by mouth once daily.       NITROSTAT 0.4 mg SL tablet       pantoprazole  (PROTONIX) 40 MG tablet Take 1 tablet (40 mg total) by mouth once daily. 90 tablet 3    pioglitazone (ACTOS) 30 MG tablet TAKE ONE TABLET BY MOUTH ONCE DAILY 90 tablet 1    quinapril (ACCUPRIL) 40 MG tablet Take 20 mg by mouth once daily.   4    rosuvastatin (CRESTOR) 20 MG tablet Take 20 mg by mouth every evening.   3    sertraline (ZOLOFT) 50 MG tablet TAKE ONE TABLET BY MOUTH ONCE DAILY 90 tablet 1    tolterodine (DETROL LA) 4 MG 24 hr capsule Take 1 capsule (4 mg total) by mouth once daily. 30 capsule 11    vitamin D 1000 units Tab Take 1,000 Units by mouth once daily.      azithromycin (Z-GABRIELA) 250 MG tablet Take 1 tablet (250 mg total) by mouth once daily. po on day 1 then 1 tab po on days 2-5 for 6 doses 6 tablet 0    methylPREDNISolone (MEDROL DOSEPACK) 4 mg tablet use as directed 1 Package 0     No current facility-administered medications for this visit.        Family History:   Family History   Problem Relation Age of Onset    Heart disease Mother     Heart disease Father     Hyperlipidemia Sister     Hypertension Sister     Heart disease Brother     Heart disease Brother     Heart disease Brother     Heart disease Brother     Heart disease Brother        Social History:   Social History     Socioeconomic History    Marital status:      Spouse name: Not on file    Number of children: Not on file    Years of education: Not on file    Highest education level: Not on file   Occupational History    Not on file   Social Needs    Financial resource strain: Not hard at all    Food insecurity     Worry: Never true     Inability: Never true    Transportation needs     Medical: No     Non-medical: No   Tobacco Use    Smoking status: Never Smoker    Smokeless tobacco: Never Used   Substance and Sexual Activity    Alcohol use: No     Frequency: Never    Drug use: No    Sexual activity: Not Currently   Lifestyle    Physical activity     Days per week: Patient refused     Minutes  per session: 0 min    Stress: Not at all   Relationships    Social connections     Talks on phone: Three times a week     Gets together: Never     Attends Orthodox service: Not on file     Active member of club or organization: No     Attends meetings of clubs or organizations: Never     Relationship status:    Other Topics Concern    Not on file   Social History Narrative    Not on file       Review of Systems   Constitutional: Negative for chills.   HENT: Positive for congestion, ear pain and sinus pressure. Negative for sneezing and sore throat.    Respiratory: Cough: occ phlegmy cough.    Musculoskeletal: Negative for neck pain.   Neurological: Positive for headaches.       Objective:     Vitals:    09/10/20 0959   BP: 124/84   Pulse: 72   Temp: 97.9 °F (36.6 °C)        Physical Exam  Vitals signs and nursing note reviewed.   Constitutional:       General: He is not in acute distress.     Appearance: He is well-developed. He is not diaphoretic.   HENT:      Head: Normocephalic and atraumatic.      Right Ear: Hearing, tympanic membrane, ear canal and external ear normal. No decreased hearing noted. Laceration present. No drainage, swelling or tenderness. No middle ear effusion. There is no impacted cerumen. No foreign body. No mastoid tenderness. No PE tube. No hemotympanum. Tympanic membrane is not injected, scarred, perforated, erythematous, retracted or bulging. Tympanic membrane has normal mobility.      Left Ear: Hearing, tympanic membrane, ear canal and external ear normal. No decreased hearing noted. No laceration, drainage, swelling or tenderness.  No middle ear effusion. There is no impacted cerumen. No foreign body. No mastoid tenderness. No PE tube. No hemotympanum. Tympanic membrane is not injected, scarred, perforated, erythematous, retracted or bulging. Tympanic membrane has normal mobility.      Ears:        Nose: Nose normal. No nasal deformity, septal deviation, signs of injury,  laceration, nasal tenderness, mucosal edema, congestion or rhinorrhea.      Right Nostril: No foreign body, epistaxis, septal hematoma or occlusion.      Left Nostril: No foreign body, epistaxis, septal hematoma or occlusion.      Right Turbinates: Not swollen.      Left Turbinates: Not swollen.      Right Sinus: No maxillary sinus tenderness or frontal sinus tenderness.      Left Sinus: No maxillary sinus tenderness or frontal sinus tenderness.        Mouth/Throat:      Dentition: Normal dentition.      Pharynx: Uvula midline. No oropharyngeal exudate or posterior oropharyngeal erythema.      Tonsils: No tonsillar abscesses.   Eyes:      General: No scleral icterus.        Right eye: No discharge.         Left eye: No discharge.      Conjunctiva/sclera: Conjunctivae normal.      Pupils: Pupils are equal, round, and reactive to light.   Neck:      Musculoskeletal: Normal range of motion and neck supple.      Thyroid: No thyromegaly.      Vascular: No JVD.      Trachea: No tracheal deviation.   Cardiovascular:      Rate and Rhythm: Normal rate and regular rhythm.      Pulses:           Dorsalis pedis pulses are 1+ on the right side and 1+ on the left side.        Posterior tibial pulses are 1+ on the right side and 1+ on the left side.      Heart sounds: Normal heart sounds. No murmur. No friction rub. No gallop.    Pulmonary:      Effort: Pulmonary effort is normal. No respiratory distress.      Breath sounds: Normal breath sounds. No stridor. No wheezing or rales.   Chest:      Chest wall: No tenderness.   Abdominal:      General: Bowel sounds are normal. There is no distension.      Palpations: Abdomen is soft. There is no mass.      Tenderness: There is no abdominal tenderness. There is no guarding or rebound.      Hernia: No hernia is present.   Genitourinary:     Penis: Normal. No tenderness.       Prostate: Normal.      Rectum: Normal. Guaiac result negative.   Musculoskeletal: Normal range of motion.          General: No tenderness.      Right foot: Normal range of motion. No deformity.      Left foot: Normal range of motion. No deformity.   Feet:      Right foot:      Protective Sensation: 4 sites tested. 4 sites sensed.      Skin integrity: No ulcer, blister, skin breakdown, erythema, warmth, callus or dry skin.      Left foot:      Protective Sensation: 4 sites tested. 3 sites sensed.      Skin integrity: No ulcer, blister, skin breakdown, erythema, warmth, callus or dry skin.   Lymphadenopathy:      Cervical: No cervical adenopathy.   Skin:     General: Skin is warm and dry.      Coloration: Skin is not pale.      Findings: No erythema or rash.   Neurological:      Mental Status: He is alert and oriented to person, place, and time.      Cranial Nerves: No cranial nerve deficit.      Motor: No abnormal muscle tone.      Coordination: Coordination normal.      Deep Tendon Reflexes: Reflexes are normal and symmetric. Reflexes normal.   Psychiatric:         Behavior: Behavior normal.         Thought Content: Thought content normal.         Judgment: Judgment normal.         Assessment:       1. Chronic maxillary sinusitis    2. Dysfunction of both eustachian tubes    3. Preventative health care    4. Type 2 diabetes mellitus without complication, without long-term current use of insulin        Plan:       Tono was seen today for ear fullness and sore throat.    Diagnoses and all orders for this visit:    Chronic maxillary sinusitis  -     azithromycin (Z-GABRIELA) 250 MG tablet; Take 1 tablet (250 mg total) by mouth once daily. po on day 1 then 1 tab po on days 2-5 for 6 doses  -     Ambulatory referral/consult to ENT; Future    Dysfunction of both eustachian tubes  -     methylPREDNISolone (MEDROL DOSEPACK) 4 mg tablet; use as directed    Preventative health care  -     Hepatitis C Antibody; Future    Type 2 diabetes mellitus without complication, without long-term current use of insulin  -     Hemoglobin A1C; Future          Follow up in about 6 months (around 3/10/2021) for follow up DM, follow up HTN.

## 2020-09-10 NOTE — PATIENT INSTRUCTIONS
Dry Ear Ear Drops:  For post-wash protection or itchy ears  Mix 1 oz white vinegar with 1oz baby oil and store in a dropper bottle.  Shake to mix prior to use.  Apply 3 drops, 3 times a day for 3 days. May continue use if needed.

## 2020-09-17 ENCOUNTER — TELEPHONE (OUTPATIENT)
Dept: CARDIOLOGY | Facility: CLINIC | Age: 75
End: 2020-09-17

## 2020-09-17 DIAGNOSIS — I49.5 SSS (SICK SINUS SYNDROME): Primary | ICD-10-CM

## 2020-09-17 DIAGNOSIS — J32.8 OTHER CHRONIC SINUSITIS: Primary | ICD-10-CM

## 2020-09-21 ENCOUNTER — LAB VISIT (OUTPATIENT)
Dept: LAB | Facility: HOSPITAL | Age: 75
End: 2020-09-21
Attending: FAMILY MEDICINE
Payer: MEDICARE

## 2020-09-21 DIAGNOSIS — D50.0 IRON DEFICIENCY ANEMIA DUE TO CHRONIC BLOOD LOSS: ICD-10-CM

## 2020-09-21 DIAGNOSIS — E11.9 TYPE 2 DIABETES MELLITUS WITHOUT COMPLICATION, WITHOUT LONG-TERM CURRENT USE OF INSULIN: ICD-10-CM

## 2020-09-21 DIAGNOSIS — Z00.00 PREVENTATIVE HEALTH CARE: ICD-10-CM

## 2020-09-21 LAB
BASOPHILS # BLD AUTO: 0.04 K/UL (ref 0–0.2)
BASOPHILS NFR BLD: 0.9 % (ref 0–1.9)
DIFFERENTIAL METHOD: ABNORMAL
EOSINOPHIL # BLD AUTO: 0.2 K/UL (ref 0–0.5)
EOSINOPHIL NFR BLD: 4.6 % (ref 0–8)
ERYTHROCYTE [DISTWIDTH] IN BLOOD BY AUTOMATED COUNT: 13 % (ref 11.5–14.5)
ESTIMATED AVG GLUCOSE: 148 MG/DL (ref 68–131)
HBA1C MFR BLD HPLC: 6.8 % (ref 4.5–6.2)
HCT VFR BLD AUTO: 39.9 % (ref 40–54)
HGB BLD-MCNC: 13 G/DL (ref 14–18)
IMM GRANULOCYTES # BLD AUTO: 0.01 K/UL (ref 0–0.04)
IMM GRANULOCYTES NFR BLD AUTO: 0.2 % (ref 0–0.5)
IRON SERPL-MCNC: 94 UG/DL (ref 45–160)
LYMPHOCYTES # BLD AUTO: 1.1 K/UL (ref 1–4.8)
LYMPHOCYTES NFR BLD: 25.5 % (ref 18–48)
MCH RBC QN AUTO: 31.4 PG (ref 27–31)
MCHC RBC AUTO-ENTMCNC: 32.6 G/DL (ref 32–36)
MCV RBC AUTO: 96 FL (ref 82–98)
MONOCYTES # BLD AUTO: 0.6 K/UL (ref 0.3–1)
MONOCYTES NFR BLD: 12.9 % (ref 4–15)
NEUTROPHILS # BLD AUTO: 2.4 K/UL (ref 1.8–7.7)
NEUTROPHILS NFR BLD: 55.9 % (ref 38–73)
NRBC BLD-RTO: 0 /100 WBC
PLATELET # BLD AUTO: 133 K/UL (ref 150–350)
PMV BLD AUTO: 10.8 FL (ref 9.2–12.9)
RBC # BLD AUTO: 4.14 M/UL (ref 4.6–6.2)
SATURATED IRON: 26 % (ref 20–50)
TOTAL IRON BINDING CAPACITY: 367 UG/DL (ref 250–450)
TRANSFERRIN SERPL-MCNC: 262 MG/DL (ref 200–375)
WBC # BLD AUTO: 4.35 K/UL (ref 3.9–12.7)

## 2020-09-21 PROCEDURE — 86803 HEPATITIS C AB TEST: CPT

## 2020-09-21 PROCEDURE — 83540 ASSAY OF IRON: CPT

## 2020-09-21 PROCEDURE — 85025 COMPLETE CBC W/AUTO DIFF WBC: CPT

## 2020-09-21 PROCEDURE — 83036 HEMOGLOBIN GLYCOSYLATED A1C: CPT

## 2020-09-22 ENCOUNTER — HOSPITAL ENCOUNTER (OUTPATIENT)
Dept: RADIOLOGY | Facility: HOSPITAL | Age: 75
Discharge: HOME | End: 2020-09-22
Attending: OTOLARYNGOLOGY
Payer: MEDICARE

## 2020-09-22 DIAGNOSIS — J32.8 OTHER CHRONIC SINUSITIS: ICD-10-CM

## 2020-09-22 LAB — HCV AB S/CO SERPL IA: <0.1 S/CO RATIO (ref 0–0.9)

## 2020-09-22 PROCEDURE — 70486 CT MAXILLOFACIAL W/O DYE: CPT | Mod: TC,PO

## 2020-09-23 ENCOUNTER — OFFICE VISIT (OUTPATIENT)
Dept: CARDIOLOGY | Facility: CLINIC | Age: 75
End: 2020-09-23
Payer: MEDICARE

## 2020-09-23 ENCOUNTER — TELEPHONE (OUTPATIENT)
Dept: FAMILY MEDICINE | Facility: CLINIC | Age: 75
End: 2020-09-23

## 2020-09-23 VITALS
WEIGHT: 198 LBS | HEIGHT: 72 IN | SYSTOLIC BLOOD PRESSURE: 118 MMHG | HEART RATE: 61 BPM | OXYGEN SATURATION: 94 % | BODY MASS INDEX: 26.82 KG/M2 | RESPIRATION RATE: 16 BRPM | DIASTOLIC BLOOD PRESSURE: 80 MMHG

## 2020-09-23 DIAGNOSIS — I50.22 CHRONIC SYSTOLIC HEART FAILURE: Primary | ICD-10-CM

## 2020-09-23 DIAGNOSIS — E11.59 TYPE 2 DIABETES MELLITUS WITH OTHER CIRCULATORY COMPLICATION, WITHOUT LONG-TERM CURRENT USE OF INSULIN: ICD-10-CM

## 2020-09-23 DIAGNOSIS — I25.10 ARTERIOSCLEROTIC CARDIOVASCULAR DISEASE (ASCVD): ICD-10-CM

## 2020-09-23 PROCEDURE — 99213 OFFICE O/P EST LOW 20 MIN: CPT | Mod: S$GLB,,, | Performed by: SPECIALIST

## 2020-09-23 PROCEDURE — 99213 PR OFFICE/OUTPT VISIT, EST, LEVL III, 20-29 MIN: ICD-10-PCS | Mod: S$GLB,,, | Performed by: SPECIALIST

## 2020-09-23 NOTE — PROGRESS NOTES
Subjective:    Patient ID:  Tono Saez is a 75 y.o. male who presents for   Follow-up    HPI patien has occasional palpitations t comes in for routine visit  Is feeling well no shortness of breath or chest pain  He has a cardiomyopathy  But is tolerating medicine well he is on Actos therapy          Review of patient's allergies indicates:   Allergen Reactions    Adhesive Other (See Comments)     SILK TAPE PULL SKIN OFF    Metformin Other (See Comments)     Weight loss cachexia anorexia,diarrhea.    Pcn [penicillins]      Patient states he passed out from this medication when he was 10 years old.        Past Medical History:   Diagnosis Date    Anemia     Anticoagulant long-term use     Arthritis     CAD (coronary artery disease)     CABG, STENTS '97,'99,'01,'05    Cancer     SKIN    Cataract     Diabetes mellitus     Diabetes mellitus type II     GERD (gastroesophageal reflux disease)     GI bleed 2020    Hypertension     Myocardial infarction     Wears glasses      Past Surgical History:   Procedure Laterality Date    CARDIAC PACEMAKER PLACEMENT      CARDIAC PACEMAKER PLACEMENT      CARDIAC PACEMAKER PLACEMENT  04/26/2018    replaced    CARDIAC SURGERY      1995   CABG X 5    CHOLECYSTECTOMY      COLON SURGERY      COLONOSCOPY N/A 2/21/2020    Procedure: COLONOSCOPY;  Surgeon: Abe Barragan III, MD;  Location: [facility];  Service: Endoscopy;  Laterality: N/A;    COLONOSCOPY N/A 2/23/2020    Procedure: COLONOSCOPY;  Surgeon: Bob Locke Jr., MD;  Location: [facility];  Service: Endoscopy;  Laterality: N/A;    CORONARY ARTERY BYPASS GRAFT  1995    CORONARY STENT PLACEMENT      stent x 5    ESOPHAGOGASTRODUODENOSCOPY N/A 2/23/2020    Procedure: EGD (ESOPHAGOGASTRODUODENOSCOPY);  Surgeon: Bob Locke Jr., MD;  Location: [facility];  Service: Endoscopy;  Laterality: N/A;    EYE SURGERY      BILAT CATARACT    head laceration   01/19/2018    HEMORRHOID SURGERY      SMALL  BOWEL ENTEROSCOPY N/A 2/21/2020    Procedure: ENTEROSCOPY;  Surgeon: Abe Barragan III, MD;  Location: [facility];  Service: Endoscopy;  Laterality: N/A;    stint       Social History     Tobacco Use    Smoking status: Never Smoker    Smokeless tobacco: Never Used   Substance Use Topics    Alcohol use: No     Frequency: Never    Drug use: No        Review of Systems     Review of Systems   Constitution: Positive for weight gain.   HENT: Negative.    Eyes: Negative.    Cardiovascular: Positive for palpitations. Negative for dyspnea on exertion, paroxysmal nocturnal dyspnea and syncope.   Respiratory: Negative for shortness of breath.    Skin: Negative.    Musculoskeletal: Negative.            Objective:        Vitals:    09/23/20 1528   BP: 118/80   Pulse: 61   Resp: 16       Lab Results   Component Value Date    WBC 4.35 09/21/2020    HGB 13.0 (L) 09/21/2020    HCT 39.9 (L) 09/21/2020     (L) 09/21/2020    CHOL 130 06/15/2020    TRIG 144 06/15/2020    HDL 44 06/15/2020    ALT 14 06/15/2020    AST 20 06/15/2020     06/15/2020    K 3.9 06/15/2020     06/15/2020    CREATININE 1.4 06/15/2020    BUN 31 (H) 06/15/2020    CO2 28 06/15/2020    TSH 2.100 09/21/2020    PSA 0.79 07/02/2020    INR 2.2 02/20/2020    HGBA1C 6.8 (H) 09/21/2020        ECHOCARDIOGRAM RESULTS  Results for orders placed during the hospital encounter of 02/20/20   Echo Color Flow Doppler? Yes; Bubble Contrast? No    Narrative · The estimated PA systolic pressure is 44 mmHg.  · Mild concentric left ventricular hypertrophy.  · Moderately decreased left ventricular systolic function. The estimated   ejection fraction is 37%.  · Grade I (mild) left ventricular diastolic dysfunction consistent with   impaired relaxation.  · Local segmental wall motion abnormalities.  · Normal right ventricular systolic function.  · Moderate left atrial enlargement.  · Mild aortic regurgitation.  · Mild aortic valve stenosis.  · Aortic valve area  is 1.66 cm2; peak velocity is 1.91 m/s; mean gradient   is 8 mmHg.  · Mild mitral sclerosis.  · There is mild leaflet calcification of the Mitral Valve.  · Mild tricuspid regurgitation.  · Mild pulmonic regurgitation.  · Normal central venous pressure (3 mmHg).          CURRENT/PREVIOUS VISIT EKG  Results for orders placed or performed during the hospital encounter of 02/20/20   EKG 12-lead    Collection Time: 02/20/20 10:13 AM    Narrative    Test Reason : R07.9,    Vent. Rate : 071 BPM     Atrial Rate : 071 BPM     P-R Int : 206 ms          QRS Dur : 202 ms      QT Int : 480 ms       P-R-T Axes : 021 -78 091 degrees     QTc Int : 521 ms    Atrial-sensed ventricular-paced rhythm  Abnormal ECG  No previous ECGs available  Confirmed by Marcelino Diop MD ([id number]) on 2/21/2020 9:18:17 PM    Referred By: [email or web address]   SELF           Confirmed By:Marcelino Diop MD     Results for orders placed during the hospital encounter of 02/20/20   Echo Color Flow Doppler? Yes; Bubble Contrast? No    Narrative · The estimated PA systolic pressure is 44 mmHg.  · Mild concentric left ventricular hypertrophy.  · Moderately decreased left ventricular systolic function. The estimated   ejection fraction is 37%.  · Grade I (mild) left ventricular diastolic dysfunction consistent with   impaired relaxation.  · Local segmental wall motion abnormalities.  · Normal right ventricular systolic function.  · Moderate left atrial enlargement.  · Mild aortic regurgitation.  · Mild aortic valve stenosis.  · Aortic valve area is 1.66 cm2; peak velocity is 1.91 m/s; mean gradient   is 8 mmHg.  · Mild mitral sclerosis.  · There is mild leaflet calcification of the Mitral Valve.  · Mild tricuspid regurgitation.  · Mild pulmonic regurgitation.  · Normal central venous pressure (3 mmHg).        No results found for this or any previous visit.    PHYSICAL EXAM    Physical Exam blood pressure 120/80  Head neck no bruits  Lungs are clear  2/6 systolic murmur  at the apex  S4 gallop  Legs no edema    Medication List with Changes/Refills   Current Medications    ASPIRIN (ECOTRIN) 81 MG EC TABLET    Take 81 mg by mouth once daily.    FERROUS SULFATE (FEOSOL) 325 MG (65 MG IRON) TAB TABLET    TAKE 1 TABLET (325 MG TOTAL) BY MOUTH 2 (TWO) TIMES DAILY.    FINASTERIDE (PROSCAR) 5 MG TABLET    Take 5 mg by mouth once daily.    FLUTICASONE PROPIONATE (FLONASE) 50 MCG/ACTUATION NASAL SPRAY    1 spray by Each Nostril route daily as needed for Rhinitis.    LEVOCETIRIZINE (XYZAL) 5 MG TABLET    Take 1 tablet (5 mg total) by mouth every evening.    METOPROLOL TARTRATE (LOPRESSOR) 100 MG TABLET    Take 50 mg by mouth 2 (two) times daily.    MULTIVITAMIN CAPSULE    Take 1 capsule by mouth once daily.     NITROSTAT 0.4 MG SL TABLET        PANTOPRAZOLE (PROTONIX) 40 MG TABLET    Take 1 tablet (40 mg total) by mouth once daily.    PIOGLITAZONE (ACTOS) 30 MG TABLET    TAKE ONE TABLET BY MOUTH ONCE DAILY    QUINAPRIL (ACCUPRIL) 40 MG TABLET    Take 20 mg by mouth once daily.     ROSUVASTATIN (CRESTOR) 20 MG TABLET    Take 20 mg by mouth every evening.     SERTRALINE (ZOLOFT) 50 MG TABLET    TAKE ONE TABLET BY MOUTH ONCE DAILY    TOLTERODINE (DETROL LA) 4 MG 24 HR CAPSULE    Take 1 capsule (4 mg total) by mouth once daily.    VITAMIN D 1000 UNITS TAB    Take 1,000 Units by mouth once daily.   Discontinued Medications    LOYON SPRY    Apply topically 2 (two) times daily.    METHYLPREDNISOLONE (MEDROL DOSEPACK) 4 MG TABLET    use as directed           Assessment:     ASCVD  Sick sinus syndrome being paced  Heart failure reduced ejection fraction  No diagnosis found.   The diabetes mellitus on Actos which can exacerbate heart failure     Plan:   Continue as is  Return to clinic 3 months with lab  Recommended PCP to DC Actos go on   empaglifazone     Problem List Items Addressed This Visit     None           Follow up in about 3 months (around 12/23/2020).

## 2020-09-23 NOTE — TELEPHONE ENCOUNTER
----- Message from Scott Staley MD sent at 9/23/2020  8:13 AM CDT -----  Results Ok, notify patient.

## 2020-10-15 ENCOUNTER — HOSPITAL ENCOUNTER (OUTPATIENT)
Dept: CARDIOLOGY | Facility: CLINIC | Age: 75
Discharge: HOME | End: 2020-10-15
Attending: INTERNAL MEDICINE
Payer: MEDICARE

## 2020-10-15 DIAGNOSIS — I49.5 SSS (SICK SINUS SYNDROME): ICD-10-CM

## 2020-10-15 PROCEDURE — 93280 PM DEVICE PROGR EVAL DUAL: CPT | Mod: S$GLB,,, | Performed by: INTERNAL MEDICINE

## 2020-10-15 PROCEDURE — 93280 CARDIAC DEVICE CHECK - IN CLINIC & HOSPITAL: ICD-10-PCS | Mod: S$GLB,,, | Performed by: INTERNAL MEDICINE

## 2020-10-19 DIAGNOSIS — I48.91 ATRIAL FIBRILLATION, UNSPECIFIED TYPE: Primary | ICD-10-CM

## 2020-11-19 ENCOUNTER — DOCUMENTATION ONLY (OUTPATIENT)
Dept: FAMILY MEDICINE | Facility: CLINIC | Age: 75
End: 2020-11-19

## 2020-11-19 ENCOUNTER — HOSPITAL ENCOUNTER (EMERGENCY)
Facility: HOSPITAL | Age: 75
Discharge: HOME | End: 2020-11-19
Attending: EMERGENCY MEDICINE
Payer: MEDICARE

## 2020-11-19 VITALS
RESPIRATION RATE: 20 BRPM | BODY MASS INDEX: 27.09 KG/M2 | HEIGHT: 72 IN | HEART RATE: 64 BPM | DIASTOLIC BLOOD PRESSURE: 86 MMHG | SYSTOLIC BLOOD PRESSURE: 183 MMHG | OXYGEN SATURATION: 97 % | TEMPERATURE: 98 F | WEIGHT: 200 LBS

## 2020-11-19 DIAGNOSIS — S83.91XA SPRAIN OF RIGHT KNEE, UNSPECIFIED LIGAMENT, INITIAL ENCOUNTER: Primary | ICD-10-CM

## 2020-11-19 DIAGNOSIS — T07.XXXA ABRASIONS OF MULTIPLE SITES: ICD-10-CM
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
## 2020-11-19 DIAGNOSIS — R52 PAIN: ICD-10-CM

## 2020-11-19 DIAGNOSIS — M25.561 RIGHT KNEE PAIN, UNSPECIFIED CHRONICITY: ICD-10-CM

## 2020-11-19 PROCEDURE — 99284 EMERGENCY DEPT VISIT MOD MDM: CPT | Mod: 25

## 2020-11-19 RX ORDER — PNEUMOCOCCAL VACCINE POLYVALENT 25; 25; 25; 25; 25; 25; 25; 25; 25; 25; 25; 25; 25; 25; 25; 25; 25; 25; 25; 25; 25; 25; 25 UG/.5ML; UG/.5ML; UG/.5ML; UG/.5ML; UG/.5ML; UG/.5ML; UG/.5ML; UG/.5ML; UG/.5ML; UG/.5ML; UG/.5ML; UG/.5ML; UG/.5ML; UG/.5ML; UG/.5ML; UG/.5ML; UG/.5ML; UG/.5ML; UG/.5ML; UG/.5ML; UG/.5ML; UG/.5ML; UG/.5ML
INJECTION, SOLUTION INTRAMUSCULAR; SUBCUTANEOUS
COMMUNITY
End: 2021-10-18 | Stop reason: CLARIF

## 2020-11-19 RX ORDER — IPRATROPIUM BROMIDE 42 UG/1
SPRAY, METERED NASAL
COMMUNITY
Start: 2020-09-30 | End: 2021-02-24

## 2020-11-19 RX ORDER — INFLUENZA A VIRUS A/MICHIGAN/45/2015 X-275 (H1N1) ANTIGEN (FORMALDEHYDE INACTIVATED), INFLUENZA A VIRUS A/SINGAPORE/INFIMH-16-0019/2016 IVR-186 (H3N2) ANTIGEN (FORMALDEHYDE INACTIVATED), INFLUENZA B VIRUS B/PHUKET/3073/2013 ANTIGEN (FORMALDEHYDE INACTIVATED), AND INFLUENZA B VIRUS B/MARYLAND/15/2016 BX-69A ANTIGEN (FORMALDEHYDE INACTIVATED) 60; 60; 60; 60 UG/.7ML; UG/.7ML; UG/.7ML; UG/.7ML
INJECTION, SUSPENSION INTRAMUSCULAR
COMMUNITY
Start: 2020-09-09 | End: 2021-02-24

## 2020-11-19 RX ORDER — BACITRACIN ZINC AND POLYMYXIN B SULFATE 500; 1000 [USP'U]/G; [USP'U]/G
OINTMENT TOPICAL
Status: DISCONTINUED | OUTPATIENT
Start: 2020-11-19 | End: 2020-11-19 | Stop reason: HOSPADM

## 2020-11-19 RX ORDER — METHOCARBAMOL 750 MG/1
750 TABLET, FILM COATED ORAL 3 TIMES DAILY PRN
Qty: 15 TABLET | Refills: 0 | Status: SHIPPED | OUTPATIENT
Start: 2020-11-19 | End: 2020-11-23

## 2020-11-19 NOTE — FIRST PROVIDER EVALUATION
Medical screening exam completed.  I have conducted a focused provider triage encounter, findings are as follows:    Brief history of present illness:  75-year-old male presents to the ER with complaints of right posterior knee pain and swelling, right elbow pain, left hand pain with skin tears to his right elbow and left hand after sustaining a ground level fall yesterday while in his kitchen.  He states he was standing in his right knee gave out on him.  Causing him to fall to the ground.  He states he struck his frontal head on the nearby kitchen cabinets, also hit the ground injuring his right elbow and left hand and right knee.  He states since this injury he has been unable to bear weight on his right leg because of the right knee pain.  He denies neck pain back pain or hip pain.  He states he is on Plavix and aspirin.  He denies loss of consciousness.  He denies headache.  He states he is unsure exactly when his last tetanus injection was.    Vitals:    11/19/20 1556   BP: (!) 183/86   BP Location: Left arm   Patient Position: Sitting   Pulse: 64   Resp: 20   Temp: 98 °F (36.7 °C)   TempSrc: Oral   SpO2: 97%   Weight: 90.7 kg (200 lb)   Height: 6' (1.829 m)       Pertinent physical exam:  Right knee swelling, pain with extension.  Right elbow skin tears and pain to the olecranon process.  Right lateral hand pain and skin tears.  Small abrasion to his right frontal head as well.    Brief workup plan:  CT head and C-spine without contrast, x-ray of the right knee, right elbow, and left hand.  Tetanus injection    Preliminary workup initiated; this workup will be continued and followed by the physician or advanced practice provider that is assigned to the patient when roomed.

## 2020-11-19 NOTE — PROGRESS NOTES
Patient family called this morning said he fell at home has been unable to put any weight on his hip on the affected side.  Strong suspicious of spontaneous hip fracture.  This was relayed to me by my MA Tami Garcia.  Instructed her to have them bring the patient to the ER or call 911 if he is unable to get to the car.

## 2020-11-20 NOTE — ED PROVIDER NOTES
"Encounter Date: 11/19/2020       History     Chief Complaint   Patient presents with    Fall     fell yesterday injuring his head, L hand , R arm, R leg     HPI     Pt is a 74 YO M presenting w/ c/o fall from standing yesterday afternoon, no LOC, + head trauma. C/o R knee pain and abrasions of L hand and R elbow. Pt pressure dressed areas and applied "clotting salve" Takes plavix. R knee pain posterior, non radiating, exacerbated by walking, 5/10.       Review of patient's allergies indicates:   Allergen Reactions    Adhesive Other (See Comments)     SILK TAPE PULL SKIN OFF    Metformin Other (See Comments)     Weight loss cachexia anorexia,diarrhea.    Pcn [penicillins]      Patient states he passed out from this medication when he was 10 years old.      Past Medical History:   Diagnosis Date    Anemia     Anticoagulant long-term use     Arthritis     CAD (coronary artery disease)     CABG, STENTS '97,'99,'01,'05    Cancer     SKIN    Cataract     Diabetes mellitus     Diabetes mellitus type II     GERD (gastroesophageal reflux disease)     GI bleed 2020    Hypertension     Myocardial infarction     Wears glasses      Past Surgical History:   Procedure Laterality Date    CARDIAC PACEMAKER PLACEMENT      CARDIAC PACEMAKER PLACEMENT      CARDIAC PACEMAKER PLACEMENT  04/26/2018    replaced    CARDIAC SURGERY      1995   CABG X 5    CHOLECYSTECTOMY      COLON SURGERY      COLONOSCOPY N/A 2/21/2020    Procedure: COLONOSCOPY;  Surgeon: Abe Barragan III, MD;  Location: [facility];  Service: Endoscopy;  Laterality: N/A;    COLONOSCOPY N/A 2/23/2020    Procedure: COLONOSCOPY;  Surgeon: Bob Locke Jr., MD;  Location: [facility];  Service: Endoscopy;  Laterality: N/A;    CORONARY ARTERY BYPASS GRAFT  1995    CORONARY STENT PLACEMENT      stent x 5    ESOPHAGOGASTRODUODENOSCOPY N/A 2/23/2020    Procedure: EGD (ESOPHAGOGASTRODUODENOSCOPY);  Surgeon: Bob Locke Jr., MD;  Location: [facility];  Service: Endoscopy;  Laterality: N/A;    EYE SURGERY      BILAT CATARACT    head laceration   01/19/2018    HEMORRHOID SURGERY      SMALL BOWEL ENTEROSCOPY N/A 2/21/2020    Procedure: ENTEROSCOPY;  Surgeon: Abe Barragan III, MD;  Location: [facility];  Service: Endoscopy;  Laterality: N/A;    stint       Family History   Problem Relation Age of Onset    Heart disease Mother     Heart disease Father     Hyperlipidemia Sister     Hypertension Sister     Heart disease Brother     Heart disease Brother     Heart disease Brother     Heart disease Brother     Heart disease Brother      Social History     Tobacco Use    Smoking status: Never Smoker    Smokeless tobacco: Never Used   Substance Use Topics    Alcohol use: No     Frequency: Never    Drug use: No     Review of Systems   Constitutional: Negative for chills and fever.   HENT: Negative for congestion and sore throat.    Eyes: Negative for photophobia and visual disturbance.   Respiratory: Negative for cough and shortness of breath.    Gastrointestinal: Negative for abdominal distention, diarrhea and nausea.   Genitourinary: Negative for dysuria and hematuria.   Musculoskeletal: Negative for gait problem and neck pain.   Skin: Positive for wound. Negative for rash.   Neurological: Negative for syncope and headaches.   Psychiatric/Behavioral: Negative for agitation and confusion.       Physical Exam     Initial Vitals [11/19/20 1556]   BP Pulse Resp Temp SpO2   (!) 183/86 64 20 98 °F (36.7 °C) 97 %      MAP       --         Physical Exam    Nursing note and vitals reviewed.  Constitutional: He is not diaphoretic. No distress.   HENT:   Head: Normocephalic and atraumatic.   Eyes: EOM are normal. Pupils are equal, round, and reactive to light.   Neck: Normal range of motion. Neck supple.   Cardiovascular: Normal rate and regular rhythm.   Pulmonary/Chest: Breath sounds normal. No respiratory distress. He has no rhonchi.   Abdominal:  Soft. He exhibits no distension. There is no abdominal tenderness. There is no guarding.   Musculoskeletal: Normal range of motion. No edema.      Comments: R elbow w/ superficial laceration 2 cm into dermis w/ granulation tissue overlying no continuity into the joint  Abrasion L hand lateral aspect- superficial w/ skin sloughing  R knee w/o erythema, swelling or bogginess, limited ROM 2/2 pain   Neurological: He is alert and oriented to person, place, and time. He has normal strength. No cranial nerve deficit or sensory deficit. Coordination and gait normal. GCS eye subscore is 4. GCS verbal subscore is 5. GCS motor subscore is 6.   Ambulatory w/ assistance 2/2 pain   Skin: Skin is warm and dry. Capillary refill takes less than 2 seconds.         ED Course   Procedures  Labs Reviewed - No data to display       Imaging Results          X-Ray Hand 3 view Left (Final result)  Result time 11/19/20 16:49:01    Final result by Sade Brewster MD (11/19/20 16:49:01)                 Narrative:    PROCEDURE:    XR HAND COMPLETE 3 VIEW LEFT  dated  11/19/2020 4:23 PM    CLINICAL HISTORY:   Male 75 years of age.   Pain after falling    TECHNIQUE:  3 views left hand    PREVIOUS STUDIES:  None Available    FINDINGS:    Bones are mildly demineralized. There is no acute fracture. Alignment  is normal. There is been prior amputation of the second digit at the  proximal interphalangeal joint. The stump is normal.    Bandage overlies the medial aspect of the hand adjacent to the fifth  metacarpal. There is no radiopaque foreign body.    IMPRESSION:    No acute fracture or dislocation.    Electronically Signed by Sade Brewster on 11/19/2020 5:33 PM                             X-Ray Knee 3 View Right (Final result)  Result time 11/19/20 16:49:01    Final result by Avery Fan MD (11/19/20 16:49:01)                 Narrative:    REASON: Pain status post fall.    TECHNIQUE: 4 radiographic views of the right knee.    COMPARISON:  None.    FINDINGS:    No acute fracture or dislocation. No destructive osseous lesions.  There is mild tricompartmental osteophytosis. Quadriceps patellar  enthesopathy noted. No gross soft tissue abnormality. No definite knee  joint effusion.    IMPRESSION:    1.  No acute osseous abnormality.  2.  Degenerative changes as described.    Electronically Signed by Avery Fan on 11/19/2020 4:55 PM                             X-Ray Elbow Complete Right (Final result)  Result time 11/19/20 16:49:01    Final result by Avery Fan MD (11/19/20 16:49:01)                 Narrative:    REASON: Pain status post fall.    TECHNIQUE: 4 radiographic views of the right elbow.    COMPARISON: None.    FINDINGS:    No acute fracture or dislocation. No destructive osseous lesions. No  elbow joint effusion identified. Triceps enthesopathy is noted. The  elbow joint spaces are preserved. No gross soft tissue abnormality.    IMPRESSION:    1.  No acute osseous abnormality.  2.  Triceps enthesopathy.    Electronically Signed by Avery Fan on 11/19/2020 4:56 PM                             CT Cervical Spine Without Contrast (Final result)  Result time 11/19/20 16:33:00    Final result by Avery Fan MD (11/19/20 16:33:00)                 Narrative:    CMS MANDATED QUALITY DATA - CT RADIATION - 436    All CT scans at this facility utilize dose modulation, iterative  reconstruction, and/or weight based dosing when appropriate to reduce  radiation dose to as low as reasonably achievable.        Reason: Neck pain, recent trauma  Neck pain, initial exam    TECHNIQUE: Cervical spine CT without IV contrast obtained with coronal  and sagittal reformations.  COMPARISON: None    FINDINGS:    Negative for fracture. No evidence of epidural hematoma or  prevertebral soft tissue swelling.Severe multilevel discogenic and  uncovertebral facet joint degenerative changes noted with spinal canal  neural foraminal stenotic sequela. C1 and C2 are  properly aligned. The  dens is intact.    There is atherosclerosis of the carotid bulbs and CCA. No other gross  soft tissue abnormality identified. Visualized lung apices are clear.    Coronal and sagittal reformations show normal alignment without  abnormal facet widening.    IMPRESSION:    1.  No acute osseous abnormality.  2.  Severe discogenic, uncovertebral and facet joint degenerative  changes of the cervical spine.    Electronically Signed by Avery aFn on 11/19/2020 4:50 PM                             CT Head Without Contrast (Final result)  Result time 11/19/20 16:31:00    Final result by Avery Fan MD (11/19/20 16:31:00)                 Narrative:    CMS MANDATED QUALITY DATA - CT RADIATION - 436    All CT scans at this facility utilize dose modulation, iterative  reconstruction, and/or weight based dosing when appropriate to reduce  radiation dose to as low as reasonably achievable.        Reason: Head trauma, minor (Age => 63y)    TECHNIQUE: Head CT without IV contrast.    COMPARISON: None    FINDINGS:    Gray-white differentiation is maintained without hemorrhage, midline  shift, or mass effect.    There are involutional changes of the brain parenchyma with expected  dilatation the ventricles. Periventricular and deep white matter  hypoattenuation noted, consistent with changes of chronic small vessel  ischemic disease. Punctate calcifications in the basal ganglia noted.    Calvarium is intact. Visualized sinuses are clear.    IMPRESSION:    1.  No acute intracranial abnormality.  2.  Involutional changes of the brain parenchyma as described.        Electronically Signed by Avery Fan on 11/19/2020 4:46 PM                                            Attending Attestation:   Physician Attestation Statement for Resident:  As the supervising MD   Physician Attestation Statement: I have personally seen and examined this patient.   I agree with the above history. -:   As the supervising MD I  agree with the above PE.    As the supervising MD I agree with the above treatment, course, plan, and disposition.             Resident MDM PGY-4  Tono Saez is a 75 y.o. YO male presenting w/ c/o multiple abrasions and Knee pain s/p mechanical fall yesterday  VSS, Afebrile, non toxic appearing.   Differentials include fracture, ICH, sprain, contusions, abrasions  XR imaging w/o acute fractures or dislocations.  TDAP x2 yrs ago.  R knee ace wrapped, wounds dressed w/ bacitracin.   Pt ambulatory w assistance, will use cane at home and do RICE therapy  Discharged w/ robaxin. F/u w/ PCP. Return precautions given.      Diana Britton MD MPH  [facility] PGY-4  8:19 PM 11/19/2020                    Clinical Impression:       ICD-10-CM ICD-9-CM   1. Sprain of right knee, unspecified ligament, initial encounter  S83.91XA 844.9   2. Pain  R52 780.96   3. Fall  W19.XXXA E888.9   4. Abrasions of multiple sites  T07.XXXA 919.0   5. Right knee pain, unspecified chronicity  M25.561 719.46                          ED Disposition Condition    Discharge Stable        ED Prescriptions     Medication Sig Dispense Start Date End Date Auth. Provider    methocarbamoL (ROBAXIN) 750 MG Tab Take 1 tablet (750 mg total) by mouth 3 (three) times daily as needed (muscle stiffness). 15 tablet 11/19/2020 11/23/2020 Diana Britton MD        Follow-up Information     Follow up With Specialties Details Why Contact Info Additional Information    Scott Staley MD Family Medicine Go to  As needed for follow up with your primary care doctor [address]  [address]  [place] [address]  [phone]       [facility] Emergency Medicine Go to  If symptoms worsen [address] [address]  [phone] 1st floor                                       Diana Britton MD  Resident  11/19/20 1112       Russ Rios Jr., MD  11/20/20 0684

## 2020-11-20 NOTE — DISCHARGE INSTRUCTIONS
Keep scrapes clean and dry, use mild soap for cleaning and pat dry    Place antibiotic ointment on areas twice per day and dress with gauze    Use a cane to walk for the next week so you can rest your knee, elevate your knee and apply ice to the area. Use an ace wrap for compression.

## 2020-12-15 DIAGNOSIS — I48.91 ATRIAL FIBRILLATION, UNSPECIFIED TYPE: ICD-10-CM

## 2020-12-15 DIAGNOSIS — I49.5 SSS (SICK SINUS SYNDROME): Primary | ICD-10-CM

## 2020-12-15 LAB
BATTERY VOLTAGE (V): 80 V
IMPEDANCE RA LEAD (NATIVE): 624 OHMS
IMPEDANCE RA LEAD: 1228 OHMS
THRESHOLD MS RA LEAD (NATIVE): 0.4 MS
THRESHOLD MS RA LEAD: 0.4 MS
THRESHOLD V RA LEAD (NATIVE): 0.6 V
THRESHOLD V RA LEAD: 3.1 V

## 2020-12-21 ENCOUNTER — TELEPHONE (OUTPATIENT)
Dept: FAMILY MEDICINE | Facility: CLINIC | Age: 75
End: 2020-12-21

## 2020-12-21 ENCOUNTER — HOSPITAL ENCOUNTER (EMERGENCY)
Facility: HOSPITAL | Age: 75
Discharge: HOME | End: 2020-12-21
Attending: EMERGENCY MEDICINE
Payer: MEDICARE

## 2020-12-21 VITALS
TEMPERATURE: 98 F | OXYGEN SATURATION: 98 % | HEIGHT: 72 IN | WEIGHT: 200 LBS | SYSTOLIC BLOOD PRESSURE: 140 MMHG | BODY MASS INDEX: 27.09 KG/M2 | DIASTOLIC BLOOD PRESSURE: 65 MMHG | RESPIRATION RATE: 18 BRPM | HEART RATE: 64 BPM

## 2020-12-21 DIAGNOSIS — M54.18 RADICULAR PAIN OF SACRUM: Primary | ICD-10-CM

## 2020-12-21 PROCEDURE — 63600175 PHARM REV CODE 636 W HCPCS: Performed by: NURSE PRACTITIONER

## 2020-12-21 PROCEDURE — 99284 EMERGENCY DEPT VISIT MOD MDM: CPT | Mod: 25

## 2020-12-21 PROCEDURE — 96372 THER/PROPH/DIAG INJ SC/IM: CPT | Mod: 59

## 2020-12-21 PROCEDURE — 25000003 PHARM REV CODE 250: Performed by: NURSE PRACTITIONER

## 2020-12-21 RX ORDER — IBUPROFEN 600 MG/1
600 TABLET ORAL EVERY 6 HOURS PRN
Qty: 20 TABLET | Refills: 0 | Status: SHIPPED | OUTPATIENT
Start: 2020-12-21 | End: 2020-12-26

## 2020-12-21 RX ORDER — METHOCARBAMOL 500 MG/1
500 TABLET, FILM COATED ORAL
Status: COMPLETED | OUTPATIENT
Start: 2020-12-21 | End: 2020-12-21

## 2020-12-21 RX ORDER — DEXAMETHASONE SODIUM PHOSPHATE 4 MG/ML
8 INJECTION, SOLUTION INTRA-ARTICULAR; INTRALESIONAL; INTRAMUSCULAR; INTRAVENOUS; SOFT TISSUE
Status: COMPLETED | OUTPATIENT
Start: 2020-12-21 | End: 2020-12-21

## 2020-12-21 RX ORDER — METHOCARBAMOL 750 MG/1
750 TABLET, FILM COATED ORAL 3 TIMES DAILY PRN
Qty: 15 TABLET | Refills: 0 | Status: SHIPPED | OUTPATIENT
Start: 2020-12-21 | End: 2020-12-26

## 2020-12-21 RX ADMIN — METHOCARBAMOL 500 MG: 500 TABLET ORAL at 03:12

## 2020-12-21 RX ADMIN — DEXAMETHASONE SODIUM PHOSPHATE 8 MG: 4 INJECTION, SOLUTION INTRA-ARTICULAR; INTRALESIONAL; INTRAMUSCULAR; INTRAVENOUS; SOFT TISSUE at 03:12

## 2020-12-21 RX ADMIN — IBUPROFEN 600 MG: 200 TABLET, FILM COATED ORAL at 03:12

## 2020-12-21 NOTE — TELEPHONE ENCOUNTER
Patient called stating he fell on his tailbone this am and is now having back pain and tailbone pain.  Patient has fell several time in the last 3 months.  Advised patient to go to [facility] ER for evaluation and possible x-ray or ct   Patient gave verbal understanding of recommendations

## 2021-01-15 ENCOUNTER — TELEPHONE (OUTPATIENT)
Dept: CARDIOLOGY | Facility: CLINIC | Age: 76
End: 2021-01-15

## 2021-01-22 PROBLEM — Z48.02 VISIT FOR SUTURE REMOVAL: Status: RESOLVED | Noted: 2021-01-22 | Resolved: 2021-01-22

## 2021-01-22 PROBLEM — Z48.02 VISIT FOR SUTURE REMOVAL: Status: ACTIVE | Noted: 2021-01-22

## 2021-02-24 ENCOUNTER — OFFICE VISIT (OUTPATIENT)
Dept: CARDIOLOGY | Facility: CLINIC | Age: 76
End: 2021-02-24
Payer: MEDICARE

## 2021-02-24 VITALS
BODY MASS INDEX: 26.82 KG/M2 | OXYGEN SATURATION: 98 % | DIASTOLIC BLOOD PRESSURE: 80 MMHG | HEIGHT: 72 IN | HEART RATE: 60 BPM | WEIGHT: 198 LBS | SYSTOLIC BLOOD PRESSURE: 120 MMHG

## 2021-02-24 DIAGNOSIS — D64.9 ANEMIA, UNSPECIFIED TYPE: ICD-10-CM

## 2021-02-24 DIAGNOSIS — E11.9 TYPE 2 DIABETES MELLITUS WITHOUT COMPLICATION, WITHOUT LONG-TERM CURRENT USE OF INSULIN: Primary | ICD-10-CM

## 2021-02-24 DIAGNOSIS — R07.9 CHEST PAIN, UNSPECIFIED TYPE: ICD-10-CM

## 2021-02-24 DIAGNOSIS — I25.10 ASCVD (ARTERIOSCLEROTIC CARDIOVASCULAR DISEASE): ICD-10-CM

## 2021-02-24 PROCEDURE — 99214 OFFICE O/P EST MOD 30 MIN: CPT | Mod: S$GLB,,, | Performed by: SPECIALIST

## 2021-02-24 PROCEDURE — 99214 PR OFFICE/OUTPT VISIT, EST, LEVL IV, 30-39 MIN: ICD-10-PCS | Mod: S$GLB,,, | Performed by: SPECIALIST

## 2021-02-24 RX ORDER — AMLODIPINE BESYLATE 5 MG/1
TABLET ORAL DAILY
COMMUNITY
Start: 2020-11-23 | End: 2021-09-23

## 2021-05-06 ENCOUNTER — OFFICE VISIT (OUTPATIENT)
Dept: FAMILY MEDICINE | Facility: CLINIC | Age: 76
End: 2021-05-06
Payer: MEDICARE

## 2021-05-06 VITALS
DIASTOLIC BLOOD PRESSURE: 76 MMHG | HEIGHT: 72 IN | BODY MASS INDEX: 26.95 KG/M2 | OXYGEN SATURATION: 99 % | WEIGHT: 199 LBS | TEMPERATURE: 99 F | HEART RATE: 64 BPM | RESPIRATION RATE: 16 BRPM | SYSTOLIC BLOOD PRESSURE: 118 MMHG

## 2021-05-06 DIAGNOSIS — J30.1 SEASONAL ALLERGIC RHINITIS DUE TO POLLEN: Primary | ICD-10-CM

## 2021-05-06 DIAGNOSIS — H69.93 DYSFUNCTION OF BOTH EUSTACHIAN TUBES: ICD-10-CM

## 2021-05-06 PROCEDURE — 99215 OFFICE O/P EST HI 40 MIN: CPT | Performed by: FAMILY MEDICINE

## 2021-05-06 PROCEDURE — 99214 OFFICE O/P EST MOD 30 MIN: CPT | Mod: S$PBB,,, | Performed by: FAMILY MEDICINE

## 2021-05-06 PROCEDURE — 99214 PR OFFICE/OUTPT VISIT, EST, LEVL IV, 30-39 MIN: ICD-10-PCS | Mod: S$PBB,,, | Performed by: FAMILY MEDICINE

## 2021-05-06 RX ORDER — MONTELUKAST SODIUM 10 MG/1
10 TABLET ORAL NIGHTLY
Qty: 30 TABLET | Refills: 0 | Status: SHIPPED | OUTPATIENT
Start: 2021-05-06 | End: 2021-06-02

## 2021-05-10 ENCOUNTER — PATIENT MESSAGE (OUTPATIENT)
Dept: RESEARCH | Facility: HOSPITAL | Age: 76
End: 2021-05-10

## 2021-05-17 ENCOUNTER — HOSPITAL ENCOUNTER (OUTPATIENT)
Dept: CARDIOLOGY | Facility: CLINIC | Age: 76
Discharge: HOME | End: 2021-05-17
Attending: SPECIALIST
Payer: MEDICARE

## 2021-05-17 DIAGNOSIS — I48.91 ATRIAL FIBRILLATION, UNSPECIFIED TYPE: ICD-10-CM

## 2021-05-17 DIAGNOSIS — I49.5 SSS (SICK SINUS SYNDROME): ICD-10-CM

## 2021-05-17 PROCEDURE — 93294 CARDIAC DEVICE CHECK - REMOTE: ICD-10-PCS | Mod: S$GLB,,, | Performed by: SPECIALIST

## 2021-05-17 PROCEDURE — 93294 REM INTERROG EVL PM/LDLS PM: CPT | Mod: S$GLB,,, | Performed by: SPECIALIST

## 2021-05-17 PROCEDURE — 93296 CARDIAC DEVICE CHECK - REMOTE: ICD-10-PCS | Mod: S$GLB,,, | Performed by: SPECIALIST

## 2021-05-17 PROCEDURE — 93296 REM INTERROG EVL PM/IDS: CPT | Mod: S$GLB,,, | Performed by: SPECIALIST

## 2021-05-21 DIAGNOSIS — I49.5 SSS (SICK SINUS SYNDROME): Primary | ICD-10-CM

## 2021-05-21 DIAGNOSIS — I48.91 ATRIAL FIBRILLATION, UNSPECIFIED TYPE: ICD-10-CM

## 2021-05-25 LAB
IMPEDANCE RA LEAD (NATIVE): 585 OHMS
IMPEDANCE RA LEAD: 1131 OHMS

## 2021-06-30 ENCOUNTER — TELEPHONE (OUTPATIENT)
Dept: FAMILY MEDICINE | Facility: CLINIC | Age: 76
End: 2021-06-30

## 2021-06-30 RX ORDER — PIOGLITAZONEHYDROCHLORIDE 30 MG/1
30 TABLET ORAL DAILY
Qty: 90 TABLET | Refills: 3 | Status: SHIPPED | OUTPATIENT
Start: 2021-06-30 | End: 2022-05-16 | Stop reason: SINTOL

## 2021-07-13 ENCOUNTER — LAB VISIT (OUTPATIENT)
Dept: LAB | Facility: HOSPITAL | Age: 76
End: 2021-07-13
Attending: SPECIALIST
Payer: MEDICARE

## 2021-07-13 DIAGNOSIS — Z12.5 SCREENING FOR PROSTATE CANCER: Primary | ICD-10-CM

## 2021-07-13 LAB — COMPLEXED PSA SERPL-MCNC: 0.93 NG/ML (ref 0–4)

## 2021-07-13 PROCEDURE — 36415 COLL VENOUS BLD VENIPUNCTURE: CPT | Performed by: SPECIALIST

## 2021-07-13 PROCEDURE — 84153 ASSAY OF PSA TOTAL: CPT | Performed by: SPECIALIST

## 2021-07-15 ENCOUNTER — OFFICE VISIT (OUTPATIENT)
Dept: FAMILY MEDICINE | Facility: CLINIC | Age: 76
End: 2021-07-15
Payer: MEDICARE

## 2021-07-15 VITALS
DIASTOLIC BLOOD PRESSURE: 52 MMHG | RESPIRATION RATE: 16 BRPM | TEMPERATURE: 98 F | BODY MASS INDEX: 26.99 KG/M2 | HEART RATE: 61 BPM | OXYGEN SATURATION: 98 % | HEIGHT: 72 IN | SYSTOLIC BLOOD PRESSURE: 109 MMHG

## 2021-07-15 DIAGNOSIS — E11.9 TYPE 2 DIABETES MELLITUS WITHOUT COMPLICATION, WITHOUT LONG-TERM CURRENT USE OF INSULIN: ICD-10-CM

## 2021-07-15 DIAGNOSIS — R05.3 CHRONIC COUGH: Primary | ICD-10-CM

## 2021-07-15 PROCEDURE — 99215 OFFICE O/P EST HI 40 MIN: CPT | Performed by: FAMILY MEDICINE

## 2021-07-15 PROCEDURE — 99214 OFFICE O/P EST MOD 30 MIN: CPT | Mod: S$PBB,,, | Performed by: FAMILY MEDICINE

## 2021-07-15 PROCEDURE — 99214 PR OFFICE/OUTPT VISIT, EST, LEVL IV, 30-39 MIN: ICD-10-PCS | Mod: S$PBB,,, | Performed by: FAMILY MEDICINE

## 2021-07-22 ENCOUNTER — HOSPITAL ENCOUNTER (OUTPATIENT)
Dept: RADIOLOGY | Facility: HOSPITAL | Age: 76
Discharge: HOME | End: 2021-07-22
Attending: FAMILY MEDICINE
Payer: MEDICARE

## 2021-07-22 DIAGNOSIS — R05.3 CHRONIC COUGH: ICD-10-CM

## 2021-07-22 PROCEDURE — 71046 X-RAY EXAM CHEST 2 VIEWS: CPT | Mod: TC

## 2021-08-25 ENCOUNTER — OFFICE VISIT (OUTPATIENT)
Dept: CARDIOLOGY | Facility: CLINIC | Age: 76
End: 2021-08-25
Payer: MEDICARE

## 2021-08-25 VITALS
DIASTOLIC BLOOD PRESSURE: 78 MMHG | BODY MASS INDEX: 26.14 KG/M2 | WEIGHT: 193 LBS | OXYGEN SATURATION: 97 % | HEART RATE: 61 BPM | HEIGHT: 72 IN | SYSTOLIC BLOOD PRESSURE: 122 MMHG

## 2021-08-25 DIAGNOSIS — N18.32 STAGE 3B CHRONIC KIDNEY DISEASE: ICD-10-CM

## 2021-08-25 DIAGNOSIS — E11.65 TYPE 2 DIABETES MELLITUS WITH HYPERGLYCEMIA, WITHOUT LONG-TERM CURRENT USE OF INSULIN: ICD-10-CM

## 2021-08-25 DIAGNOSIS — I25.10 CORONARY ARTERY DISEASE INVOLVING NATIVE CORONARY ARTERY OF NATIVE HEART WITHOUT ANGINA PECTORIS: Primary | ICD-10-CM

## 2021-08-25 DIAGNOSIS — I10 ESSENTIAL HYPERTENSION: ICD-10-CM

## 2021-08-25 DIAGNOSIS — D50.8 OTHER IRON DEFICIENCY ANEMIA: ICD-10-CM

## 2021-08-25 DIAGNOSIS — I49.5 SSS (SICK SINUS SYNDROME): ICD-10-CM

## 2021-08-25 PROCEDURE — 99214 PR OFFICE/OUTPT VISIT, EST, LEVL IV, 30-39 MIN: ICD-10-PCS | Mod: S$GLB,,, | Performed by: SPECIALIST

## 2021-08-25 PROCEDURE — 99214 OFFICE O/P EST MOD 30 MIN: CPT | Mod: S$GLB,,, | Performed by: SPECIALIST

## 2021-10-18 ENCOUNTER — HOSPITAL ENCOUNTER (OUTPATIENT)
Dept: RADIOLOGY | Facility: HOSPITAL | Age: 76
Discharge: HOME | End: 2021-10-18
Attending: FAMILY MEDICINE
Payer: MEDICARE

## 2021-10-18 ENCOUNTER — OFFICE VISIT (OUTPATIENT)
Dept: FAMILY MEDICINE | Facility: CLINIC | Age: 76
End: 2021-10-18
Payer: MEDICARE

## 2021-10-18 VITALS
BODY MASS INDEX: 25.93 KG/M2 | DIASTOLIC BLOOD PRESSURE: 68 MMHG | HEART RATE: 60 BPM | OXYGEN SATURATION: 98 % | SYSTOLIC BLOOD PRESSURE: 118 MMHG | RESPIRATION RATE: 16 BRPM | WEIGHT: 191.19 LBS | TEMPERATURE: 98 F

## 2021-10-18 DIAGNOSIS — R07.89 COSTOCHONDRAL CHEST PAIN: ICD-10-CM

## 2021-10-18 DIAGNOSIS — I20.89 OTHER FORMS OF ANGINA PECTORIS: ICD-10-CM

## 2021-10-18 DIAGNOSIS — D71: ICD-10-CM

## 2021-10-18 DIAGNOSIS — Z23 NEED FOR IMMUNIZATION AGAINST INFLUENZA: Primary | ICD-10-CM

## 2021-10-18 DIAGNOSIS — R05.3 CHRONIC COUGH: ICD-10-CM

## 2021-10-18 PROCEDURE — 71100 X-RAY EXAM RIBS UNI 2 VIEWS: CPT | Mod: TC,PO,RT

## 2021-10-18 PROCEDURE — G0008 ADMIN INFLUENZA VIRUS VAC: HCPCS | Mod: PBBFAC | Performed by: FAMILY MEDICINE

## 2021-10-18 PROCEDURE — 90662 IIV NO PRSV INCREASED AG IM: CPT | Mod: PBBFAC | Performed by: FAMILY MEDICINE

## 2021-10-18 PROCEDURE — 99213 PR OFFICE/OUTPT VISIT, EST, LEVL III, 20-29 MIN: ICD-10-PCS | Mod: S$PBB,,, | Performed by: FAMILY MEDICINE

## 2021-10-18 PROCEDURE — 99215 OFFICE O/P EST HI 40 MIN: CPT | Performed by: FAMILY MEDICINE

## 2021-10-18 PROCEDURE — 99213 OFFICE O/P EST LOW 20 MIN: CPT | Mod: S$PBB,,, | Performed by: FAMILY MEDICINE

## 2021-10-18 RX ORDER — BENZONATATE 100 MG/1
100 CAPSULE ORAL 3 TIMES DAILY PRN
Qty: 40 CAPSULE | Refills: 3 | Status: SHIPPED | OUTPATIENT
Start: 2021-10-18 | End: 2021-10-28

## 2021-10-21 ENCOUNTER — HOSPITAL ENCOUNTER (OUTPATIENT)
Dept: CARDIOLOGY | Facility: CLINIC | Age: 76
Discharge: HOME | End: 2021-10-21
Attending: INTERNAL MEDICINE
Payer: MEDICARE

## 2021-10-21 DIAGNOSIS — I49.5 SSS (SICK SINUS SYNDROME): Primary | ICD-10-CM

## 2021-10-21 DIAGNOSIS — I49.5 SSS (SICK SINUS SYNDROME): ICD-10-CM

## 2021-10-21 DIAGNOSIS — I48.91 ATRIAL FIBRILLATION, UNSPECIFIED TYPE: ICD-10-CM

## 2021-10-21 PROCEDURE — 93280 PM DEVICE PROGR EVAL DUAL: CPT | Mod: S$GLB,,, | Performed by: INTERNAL MEDICINE

## 2021-10-21 PROCEDURE — 93280 CARDIAC DEVICE CHECK - IN CLINIC & HOSPITAL: ICD-10-PCS | Mod: S$GLB,,, | Performed by: INTERNAL MEDICINE

## 2021-11-17 LAB
IMPEDANCE RA LEAD (NATIVE): 585 OHMS
IMPEDANCE RA LEAD: 1150 OHMS
P/R-WAVE RA LEAD (NATIVE): 19.9 MV
P/R-WAVE RA LEAD: 2.9 MV
THRESHOLD MS RA LEAD (NATIVE): 0.4 MS
THRESHOLD V RA LEAD (NATIVE): 0.6 V

## 2022-02-28 ENCOUNTER — OFFICE VISIT (OUTPATIENT)
Dept: CARDIOLOGY | Facility: CLINIC | Age: 77
End: 2022-02-28
Payer: MEDICARE

## 2022-02-28 ENCOUNTER — LAB VISIT (OUTPATIENT)
Dept: LAB | Facility: HOSPITAL | Age: 77
End: 2022-02-28
Attending: NURSE PRACTITIONER
Payer: MEDICARE

## 2022-02-28 ENCOUNTER — TELEPHONE (OUTPATIENT)
Dept: CARDIOLOGY | Facility: CLINIC | Age: 77
End: 2022-02-28

## 2022-02-28 VITALS
BODY MASS INDEX: 26.82 KG/M2 | SYSTOLIC BLOOD PRESSURE: 140 MMHG | DIASTOLIC BLOOD PRESSURE: 82 MMHG | HEART RATE: 70 BPM | WEIGHT: 198 LBS | OXYGEN SATURATION: 99 % | HEIGHT: 72 IN

## 2022-02-28 DIAGNOSIS — E11.69 TYPE 2 DIABETES MELLITUS WITH OTHER SPECIFIED COMPLICATION, WITHOUT LONG-TERM CURRENT USE OF INSULIN: ICD-10-CM

## 2022-02-28 DIAGNOSIS — R07.9 CHEST PAIN, UNSPECIFIED TYPE: ICD-10-CM

## 2022-02-28 DIAGNOSIS — N18.1 CRI (CHRONIC RENAL INSUFFICIENCY), STAGE 1: ICD-10-CM

## 2022-02-28 DIAGNOSIS — I25.10 CORONARY ARTERY DISEASE INVOLVING NATIVE CORONARY ARTERY OF NATIVE HEART WITHOUT ANGINA PECTORIS: Primary | ICD-10-CM

## 2022-02-28 DIAGNOSIS — E78.2 MIXED HYPERLIPIDEMIA: ICD-10-CM

## 2022-02-28 DIAGNOSIS — I25.10 CORONARY ARTERY DISEASE INVOLVING NATIVE CORONARY ARTERY OF NATIVE HEART WITHOUT ANGINA PECTORIS: ICD-10-CM

## 2022-02-28 DIAGNOSIS — Z79.01 CHRONIC ANTICOAGULATION: ICD-10-CM

## 2022-02-28 DIAGNOSIS — I10 PRIMARY HYPERTENSION: ICD-10-CM

## 2022-02-28 DIAGNOSIS — I25.10 ATHEROSCLEROSIS OF NATIVE CORONARY ARTERY OF NATIVE HEART WITHOUT ANGINA PECTORIS: ICD-10-CM

## 2022-02-28 DIAGNOSIS — K21.9 GASTROESOPHAGEAL REFLUX DISEASE, UNSPECIFIED WHETHER ESOPHAGITIS PRESENT: ICD-10-CM

## 2022-02-28 LAB
ALBUMIN SERPL BCP-MCNC: 3.7 G/DL (ref 3.5–5.2)
ALP SERPL-CCNC: 87 U/L (ref 55–135)
ALT SERPL W/O P-5'-P-CCNC: 16 U/L (ref 10–44)
ANION GAP SERPL CALC-SCNC: 9 MMOL/L (ref 8–16)
AST SERPL-CCNC: 17 U/L (ref 10–40)
BILIRUB SERPL-MCNC: 1.1 MG/DL (ref 0.1–1)
BNP SERPL-MCNC: 199 PG/ML (ref 0–99)
BUN SERPL-MCNC: 19 MG/DL (ref 8–23)
CALCIUM SERPL-MCNC: 10 MG/DL (ref 8.7–10.5)
CHLORIDE SERPL-SCNC: 100 MMOL/L (ref 95–110)
CO2 SERPL-SCNC: 27 MMOL/L (ref 23–29)
CREAT SERPL-MCNC: 1.4 MG/DL (ref 0.5–1.4)
ERYTHROCYTE [DISTWIDTH] IN BLOOD BY AUTOMATED COUNT: 13 % (ref 11.5–14.5)
EST. GFR  (AFRICAN AMERICAN): 55.2 ML/MIN/1.73 M^2
EST. GFR  (NON AFRICAN AMERICAN): 47.8 ML/MIN/1.73 M^2
GLUCOSE SERPL-MCNC: 258 MG/DL (ref 70–110)
HCT VFR BLD AUTO: 39.5 % (ref 40–54)
HGB BLD-MCNC: 12.9 G/DL (ref 14–18)
MCH RBC QN AUTO: 30.2 PG (ref 27–31)
MCHC RBC AUTO-ENTMCNC: 32.7 G/DL (ref 32–36)
MCV RBC AUTO: 93 FL (ref 82–98)
PLATELET # BLD AUTO: 91 K/UL (ref 150–450)
PMV BLD AUTO: 10.3 FL (ref 9.2–12.9)
POTASSIUM SERPL-SCNC: 4.1 MMOL/L (ref 3.5–5.1)
PROT SERPL-MCNC: 6.7 G/DL (ref 6–8.4)
RBC # BLD AUTO: 4.27 M/UL (ref 4.6–6.2)
SODIUM SERPL-SCNC: 136 MMOL/L (ref 136–145)
WBC # BLD AUTO: 4.57 K/UL (ref 3.9–12.7)

## 2022-02-28 PROCEDURE — 99214 PR OFFICE/OUTPT VISIT, EST, LEVL IV, 30-39 MIN: ICD-10-PCS | Mod: S$GLB,,, | Performed by: NURSE PRACTITIONER

## 2022-02-28 PROCEDURE — 93000 EKG 12-LEAD: ICD-10-PCS | Mod: S$GLB,,, | Performed by: INTERNAL MEDICINE

## 2022-02-28 PROCEDURE — 36415 COLL VENOUS BLD VENIPUNCTURE: CPT | Performed by: NURSE PRACTITIONER

## 2022-02-28 PROCEDURE — 85027 COMPLETE CBC AUTOMATED: CPT | Performed by: NURSE PRACTITIONER

## 2022-02-28 PROCEDURE — 80053 COMPREHEN METABOLIC PANEL: CPT | Performed by: NURSE PRACTITIONER

## 2022-02-28 PROCEDURE — 99214 OFFICE O/P EST MOD 30 MIN: CPT | Mod: S$GLB,,, | Performed by: NURSE PRACTITIONER

## 2022-02-28 PROCEDURE — 83880 ASSAY OF NATRIURETIC PEPTIDE: CPT | Performed by: NURSE PRACTITIONER

## 2022-02-28 PROCEDURE — 93000 ELECTROCARDIOGRAM COMPLETE: CPT | Mod: S$GLB,,, | Performed by: INTERNAL MEDICINE

## 2022-02-28 RX ORDER — FUROSEMIDE 20 MG/1
20 TABLET ORAL DAILY
Qty: 90 TABLET | Refills: 3 | Status: SHIPPED | OUTPATIENT
Start: 2022-02-28 | End: 2022-10-05

## 2022-02-28 RX ORDER — ISOSORBIDE MONONITRATE 30 MG/1
30 TABLET, EXTENDED RELEASE ORAL DAILY
Qty: 90 TABLET | Refills: 3 | Status: SHIPPED | OUTPATIENT
Start: 2022-02-28 | End: 2022-05-16

## 2022-02-28 NOTE — TELEPHONE ENCOUNTER
----- Message from Jud Kirby NP sent at 2/28/2022  4:50 PM CST -----  Labs look okay overall, but your sugar is very high. Please follow up with your PCP regarding this.

## 2022-02-28 NOTE — PROGRESS NOTES
Subjective:    Patient ID:  Tono Saez is a 76 y.o. male   HPI:  Mr. Saez presents today for follow up. He recently lost his wife and has been grieving. Patient reports he has noticed some intermittent chest pain which is relieved with nitroglycerin. He has also had some intermittent lower extremity edema. He admits he has been eating more fast food.       Review of patient's allergies indicates:   Allergen Reactions    Adhesive Other (See Comments)     SILK TAPE PULL SKIN OFF    Metformin Other (See Comments)     Weight loss cachexia anorexia,diarrhea.    Pcn [penicillins]      Patient states he passed out from this medication when he was 10 years old.        Past Medical History:   Diagnosis Date    Anemia     Anticoagulant long-term use     Arthritis     CAD (coronary artery disease)     CABG, STENTS '97,'99,'01,'05    Cancer     SKIN    Cataract     Diabetes mellitus     Diabetes mellitus type II     GERD (gastroesophageal reflux disease)     GI bleed 2020    Hypertension     Myocardial infarction     Wears glasses      Past Surgical History:   Procedure Laterality Date    CARDIAC PACEMAKER PLACEMENT      CARDIAC PACEMAKER PLACEMENT      CARDIAC PACEMAKER PLACEMENT  04/26/2018    replaced    CARDIAC SURGERY      1995   CABG X 5    CHOLECYSTECTOMY      COLON SURGERY      COLONOSCOPY N/A 2/21/2020    Procedure: COLONOSCOPY;  Surgeon: Abe Barragan III, MD;  Location: [facility];  Service: Endoscopy;  Laterality: N/A;    COLONOSCOPY N/A 2/23/2020    Procedure: COLONOSCOPY;  Surgeon: Bob Locke Jr., MD;  Location: [facility];  Service: Endoscopy;  Laterality: N/A;    CORONARY ARTERY BYPASS GRAFT  1995    CORONARY STENT PLACEMENT      stent x 5    ESOPHAGOGASTRODUODENOSCOPY N/A 2/23/2020    Procedure: EGD (ESOPHAGOGASTRODUODENOSCOPY);  Surgeon: Bob Locke Jr., MD;  Location: [facility];  Service: Endoscopy;  Laterality: N/A;    EYE SURGERY      BILAT CATARACT    head  laceration   01/19/2018    HEMORRHOID SURGERY      SMALL BOWEL ENTEROSCOPY N/A 2/21/2020    Procedure: ENTEROSCOPY;  Surgeon: Abe Barragan III, MD;  Location: [facility];  Service: Endoscopy;  Laterality: N/A;    stint       Social History     Tobacco Use    Smoking status: Never Smoker    Smokeless tobacco: Never Used   Substance Use Topics    Alcohol use: No    Drug use: No     Family History   Problem Relation Age of Onset    Heart disease Mother     Heart disease Father     Hyperlipidemia Sister     Hypertension Sister     Heart disease Brother     Heart disease Brother     Heart disease Brother     Heart disease Brother     Heart disease Brother         Review of Systems:   Constitution: Negative for diaphoresis and fever.   HEENT: Negative for nosebleeds.    Cardiovascular: + for chest pain       No dyspnea on exertion       + lower extremity leg swelling        No palpitations  Respiratory: Negative for shortness of breath and wheezing.    Hematologic/Lymphatic: Negative for bleeding problem. Does not bruise/bleed easily.   Skin: Negative for color change and rash.   Musculoskeletal: Negative for falls and myalgias.   Gastrointestinal: Negative for hematemesis and hematochezia.   Genitourinary: Negative for hematuria.   Neurological: Negative for dizziness and light-headedness.   Psychiatric/Behavioral: Negative for altered mental status and memory loss.          Objective:        Vitals:    02/28/22 1526   BP: (!) 140/82   Pulse: 70       Lab Results   Component Value Date    WBC 4.35 09/21/2020    HGB 13.0 (L) 09/21/2020    HCT 39.9 (L) 09/21/2020     (L) 09/21/2020    CHOL 134 07/22/2021    TRIG 114 07/22/2021    HDL 40 07/22/2021    ALT 15 07/22/2021    AST 20 07/22/2021     07/22/2021    K 4.1 07/22/2021     07/22/2021    CREATININE 1.6 (H) 07/22/2021    BUN 36 (H) 07/22/2021    CO2 27 07/22/2021    TSH 2.100 09/21/2020    PSA 0.93 07/13/2021    INR 2.2 02/20/2020     HGBA1C 7.2 (H) 07/22/2021        ECHOCARDIOGRAM RESULTS  Results for orders placed during the hospital encounter of 02/20/20    Echo Color Flow Doppler? Yes; Bubble Contrast? No    Interpretation Summary  · The estimated PA systolic pressure is 44 mmHg.  · Mild concentric left ventricular hypertrophy.  · Moderately decreased left ventricular systolic function. The estimated ejection fraction is 37%.  · Grade I (mild) left ventricular diastolic dysfunction consistent with impaired relaxation.  · Local segmental wall motion abnormalities.  · Normal right ventricular systolic function.  · Moderate left atrial enlargement.  · Mild aortic regurgitation.  · Mild aortic valve stenosis.  · Aortic valve area is 1.66 cm2; peak velocity is 1.91 m/s; mean gradient is 8 mmHg.  · Mild mitral sclerosis.  · There is mild leaflet calcification of the Mitral Valve.  · Mild tricuspid regurgitation.  · Mild pulmonic regurgitation.  · Normal central venous pressure (3 mmHg).        CURRENT/PREVIOUS VISIT EKG  Results for orders placed or performed during the hospital encounter of 02/20/20   EKG 12-lead    Collection Time: 02/20/20 10:13 AM    Narrative    Test Reason : R07.9,    Vent. Rate : 071 BPM     Atrial Rate : 071 BPM     P-R Int : 206 ms          QRS Dur : 202 ms      QT Int : 480 ms       P-R-T Axes : 021 -78 091 degrees     QTc Int : 521 ms    Atrial-sensed ventricular-paced rhythm  Abnormal ECG  No previous ECGs available  Confirmed by Marcelino Diop MD ([id number]) on 2/21/2020 9:18:17 PM    Referred By: [email or web address]   SELF           Confirmed By:Marcelino Diop MD     No valid procedures specified.   No results found for this or any previous visit.      Physical Exam:  CONSTITUTIONAL: No fever, no chills  HEENT: Normocephalic, atraumatic,pupils reactive to light                 NECK:  No JVD no carotid bruit  CVS: S1S2+, RRR  LUNGS: Clear  ABDOMEN: Soft, NT, BS+  EXTREMITIES: No cyanosis, + BLE edema  : No anguiano  catheter  NEURO: AAO X 3  PSY: Normal affect      Medication List with Changes/Refills   New Medications    FUROSEMIDE (LASIX) 20 MG TABLET    Take 1 tablet (20 mg total) by mouth once daily.    ISOSORBIDE MONONITRATE (IMDUR) 30 MG 24 HR TABLET    Take 1 tablet (30 mg total) by mouth once daily.   Current Medications    AMLODIPINE (NORVASC) 5 MG TABLET    TAKE ONE TABLET BY MOUTH ONCE DAILY    ASPIRIN (ECOTRIN) 81 MG EC TABLET    Take 81 mg by mouth once daily.    FINASTERIDE (PROSCAR) 5 MG TABLET    Take 5 mg by mouth once daily.    LEVOCETIRIZINE (XYZAL) 5 MG TABLET    TAKE ONE TABLET BY MOUTH EVERY EVENING    METOPROLOL TARTRATE (LOPRESSOR) 50 MG TABLET    Take 50 mg by mouth 2 (two) times daily.     MONTELUKAST (SINGULAIR) 10 MG TABLET        MULTIVITAMIN CAPSULE    Take 1 capsule by mouth once daily.    NITROSTAT 0.4 MG SL TABLET        PANTOPRAZOLE (PROTONIX) 40 MG TABLET    TAKE ONE TABLET BY MOUTH ONCE DAILY    PIOGLITAZONE (ACTOS) 30 MG TABLET    Take 1 tablet (30 mg total) by mouth once daily.    QUINAPRIL (ACCUPRIL) 40 MG TABLET    TAKE ONE TABLET BY MOUTH ONCE DAILY    ROSUVASTATIN (CRESTOR) 20 MG TABLET    TAKE ONE TABLET BY MOUTH NIGHTLY AT BEDTIME    SERTRALINE (ZOLOFT) 50 MG TABLET    TAKE ONE TABLET BY MOUTH ONCE DAILY    TOLTERODINE (DETROL LA) 4 MG 24 HR CAPSULE    TAKE ONE CAPSULE BY MOUTH ONCE DAILY    VITAMIN D 1000 UNITS TAB    Take 1,000 Units by mouth once daily.   Discontinued Medications    HALCINONIDE (HALOG) 0.1 % EXTERNAL SOLUTION    Apply to scalp bid prn itching    IPRATROPIUM (ATROVENT) 42 MCG (0.06 %) NASAL SPRAY    SPRAY 2 SPRAYS IN EACH NOSTRIL THREE TIMES A DAY    PREDNISONE (DELTASONE) 20 MG TABLET    2 po qam x 14days    SARS-COV-2, COVID-19, (MODERNA COVID-19) 100 MCG/0.5 ML INJECTION                 Assessment:       1. Coronary artery disease involving native coronary artery of native heart without angina pectoris    2. Type 2 diabetes mellitus with other specified  complication, without long-term current use of insulin    3. Chronic anticoagulation    4. CRI (chronic renal insufficiency), stage 1    5. Gastroesophageal reflux disease, unspecified whether esophagitis present    6. Mixed hyperlipidemia    7. Primary hypertension    8. Atherosclerosis of native coronary artery of native heart without angina pectoris    9. Chest pain, unspecified type         Plan:     1. Will start him on furosemide 20 mg po daily.   Advised on low sodium diet.   2. Will also start him on Imdur 30 mg po daily. He has a hx of CAD with PCI in the past.   3. Obtain a 2D echocardiogram to evaluate for LV function and valves.   4. Obtain a Lexiscan stress test to evaluate for reversible ischemia.   5. Obtain a cbc, cmp, and BNP.    Will see him back post testing.     Problem List Items Addressed This Visit        Unprioritized    HTN (hypertension)    Relevant Orders    IN OFFICE EKG 12-LEAD (to Muse)    Mixed hyperlipidemia    GERD (gastroesophageal reflux disease)    CRI (chronic renal insufficiency), stage 1    Chronic anticoagulation    Type 2 diabetes mellitus, without long-term current use of insulin    Chest pain    Relevant Orders    Nuclear Stress Test    Echo    CAD (coronary artery disease) - Primary    Overview     CABG, STENTS '97,'99,'01,'05           Relevant Orders    Nuclear Stress Test    Echo      Other Visit Diagnoses     Atherosclerosis of native coronary artery of native heart without angina pectoris        Relevant Orders    NM Myocardial Perfusion Spect Multi Pharmacologic          Follow up in about 4 weeks (around 3/28/2022).

## 2022-03-09 ENCOUNTER — OFFICE VISIT (OUTPATIENT)
Dept: FAMILY MEDICINE | Facility: CLINIC | Age: 77
End: 2022-03-09
Payer: MEDICARE

## 2022-03-09 VITALS
HEIGHT: 72 IN | DIASTOLIC BLOOD PRESSURE: 70 MMHG | BODY MASS INDEX: 26.55 KG/M2 | RESPIRATION RATE: 18 BRPM | SYSTOLIC BLOOD PRESSURE: 122 MMHG | WEIGHT: 196 LBS | HEART RATE: 68 BPM | OXYGEN SATURATION: 97 %

## 2022-03-09 DIAGNOSIS — E78.2 MIXED HYPERLIPIDEMIA: ICD-10-CM

## 2022-03-09 DIAGNOSIS — E11.42 TYPE 2 DIABETES MELLITUS WITH DIABETIC POLYNEUROPATHY, WITHOUT LONG-TERM CURRENT USE OF INSULIN: ICD-10-CM

## 2022-03-09 DIAGNOSIS — E11.9 TYPE 2 DIABETES MELLITUS WITHOUT COMPLICATION, WITHOUT LONG-TERM CURRENT USE OF INSULIN: Primary | ICD-10-CM

## 2022-03-09 PROCEDURE — 99214 PR OFFICE/OUTPT VISIT, EST, LEVL IV, 30-39 MIN: ICD-10-PCS | Mod: S$PBB,AQ,, | Performed by: FAMILY MEDICINE

## 2022-03-09 PROCEDURE — 99214 OFFICE O/P EST MOD 30 MIN: CPT | Mod: S$PBB,AQ,, | Performed by: FAMILY MEDICINE

## 2022-03-09 PROCEDURE — 99215 OFFICE O/P EST HI 40 MIN: CPT | Performed by: FAMILY MEDICINE

## 2022-03-09 NOTE — PROGRESS NOTES
Subjective:       Patient ID: Tono Saez is a 76 y.o. male.    Chief Complaint: Follow-up and Diabetes      Patient is here for follow-up on diabetes she reports that recent loss of his wife 4 weeks ago has have caused a lot of stress in her sugars have been running higher.  Wt Readings from Last 3 Encounters:  03/09/22 : 88.9 kg (196 lb)  02/28/22 : 89.8 kg (198 lb)  10/18/21 : 86.7 kg (191 lb 3.2 oz)  Lab Results       Component                Value               Date                       WBC                      4.57                02/28/2022                 HGB                      12.9 (L)            02/28/2022                 HCT                      39.5 (L)            02/28/2022                 PLT                      91 (L)              02/28/2022                 CHOL                     134                 07/22/2021                 TRIG                     114                 07/22/2021                 HDL                      40                  07/22/2021                 ALT                      16                  02/28/2022                 AST                      17                  02/28/2022                 NA                       136                 02/28/2022                 K                        4.1                 02/28/2022                 CL                       100                 02/28/2022                 CREATININE               1.4                 02/28/2022                 BUN                      19                  02/28/2022                 CO2                      27                  02/28/2022                 TSH                      2.100               09/21/2020                 PSA                      0.93                07/13/2021                 INR                      2.2                 02/20/2020                 HGBA1C                   7.2 (H)             07/22/2021            BP Readings from Last 3 Encounters:  03/09/22 : 122/70  02/28/22 : (!)  140/82  10/18/21 : 118/68            Diabetes  His breakfast blood glucose range is generally 140-180 mg/dl. His lunch blood glucose range is generally 130-140 mg/dl.       Allergies and Medications:   Review of patient's allergies indicates:   Allergen Reactions    Adhesive Other (See Comments)     SILK TAPE PULL SKIN OFF    Metformin Other (See Comments)     Weight loss cachexia anorexia,diarrhea.    Pcn [penicillins]      Patient states he passed out from this medication when he was 10 years old.      Current Outpatient Medications   Medication Sig Dispense Refill    amLODIPine (NORVASC) 5 MG tablet TAKE ONE TABLET BY MOUTH ONCE DAILY 90 tablet 5    aspirin (ECOTRIN) 81 MG EC tablet Take 81 mg by mouth once daily.      finasteride (PROSCAR) 5 mg tablet Take 5 mg by mouth once daily.      furosemide (LASIX) 20 MG tablet Take 1 tablet (20 mg total) by mouth once daily. 90 tablet 3    isosorbide mononitrate (IMDUR) 30 MG 24 hr tablet Take 1 tablet (30 mg total) by mouth once daily. 90 tablet 3    levocetirizine (XYZAL) 5 MG tablet TAKE ONE TABLET BY MOUTH EVERY EVENING 60 tablet 11    metoprolol tartrate (LOPRESSOR) 50 MG tablet Take 50 mg by mouth 2 (two) times daily.       montelukast (SINGULAIR) 10 mg tablet       multivitamin capsule Take 1 capsule by mouth once daily.      mupirocin (BACTROBAN) 2 % ointment Apply topically 2 (two) times daily. 22 g 11    NITROSTAT 0.4 mg SL tablet       pantoprazole (PROTONIX) 40 MG tablet TAKE ONE TABLET BY MOUTH ONCE DAILY 90 tablet 1    pioglitazone (ACTOS) 30 MG tablet Take 1 tablet (30 mg total) by mouth once daily. 90 tablet 3    quinapriL (ACCUPRIL) 40 MG tablet TAKE ONE TABLET BY MOUTH ONCE DAILY 90 tablet 1    rosuvastatin (CRESTOR) 20 MG tablet TAKE ONE TABLET BY MOUTH NIGHTLY AT BEDTIME 30 tablet 3    sertraline (ZOLOFT) 50 MG tablet TAKE ONE TABLET BY MOUTH ONCE DAILY 90 tablet 1    tolterodine (DETROL LA) 4 MG 24 hr capsule TAKE ONE CAPSULE BY  MOUTH ONCE DAILY 90 capsule 1    vitamin D 1000 units Tab Take 1,000 Units by mouth once daily.       No current facility-administered medications for this visit.       Family History:   Family History   Problem Relation Age of Onset    Heart disease Mother     Heart disease Father     Hyperlipidemia Sister     Hypertension Sister     Heart disease Brother     Heart disease Brother     Heart disease Brother     Heart disease Brother     Heart disease Brother        Social History:   Social History     Socioeconomic History    Marital status:    Tobacco Use    Smoking status: Never Smoker    Smokeless tobacco: Never Used   Substance and Sexual Activity    Alcohol use: No    Drug use: No    Sexual activity: Not Currently       Review of Systems    Objective:     Vitals:    03/09/22 1334   BP: 122/70   Pulse: 68   Resp: 18        Physical Exam  Vitals and nursing note reviewed.   Constitutional:       General: He is not in acute distress.     Appearance: He is well-developed. He is not diaphoretic.   HENT:      Head: Normocephalic.      Right Ear: External ear normal.      Left Ear: External ear normal.      Nose: Nose normal.      Mouth/Throat:      Pharynx: No oropharyngeal exudate.   Eyes:      General: No scleral icterus.        Left eye: No discharge.      Conjunctiva/sclera: Conjunctivae normal.      Pupils: Pupils are equal, round, and reactive to light.   Neck:      Thyroid: No thyromegaly.      Vascular: No JVD.      Trachea: No tracheal deviation.   Cardiovascular:      Rate and Rhythm: Normal rate and regular rhythm.      Pulses:           Dorsalis pedis pulses are 1+ on the right side and 1+ on the left side.        Posterior tibial pulses are 1+ on the right side and 1+ on the left side.      Heart sounds: Normal heart sounds. No murmur heard.    No friction rub. No gallop.   Pulmonary:      Effort: Pulmonary effort is normal. No respiratory distress.      Breath sounds: Normal  breath sounds. No stridor. No wheezing or rales.   Chest:      Chest wall: No tenderness.   Abdominal:      General: Bowel sounds are normal. There is no distension.      Palpations: Abdomen is soft. There is no mass.      Tenderness: There is no abdominal tenderness. There is no guarding or rebound.      Hernia: No hernia is present.   Genitourinary:     Penis: Normal. No tenderness.       Prostate: Normal.      Rectum: Normal. Guaiac result negative.   Musculoskeletal:         General: No tenderness. Normal range of motion.      Cervical back: Normal range of motion and neck supple.      Right lower leg: Edema present.      Left lower leg: Edema (Peripheral edema both lower extremities.) present.      Right foot: Normal range of motion. Deformity present. No bunion, Charcot foot, foot drop or prominent metatarsal heads.      Left foot: Normal range of motion. Deformity present. No bunion, Charcot foot, foot drop or prominent metatarsal heads.      Comments: Bilateral hammertoes with some objective numbness to the 3rd toes both feet   Feet:      Right foot:      Protective Sensation: 3 sites tested. 2 sites sensed.      Skin integrity: Skin integrity normal. No ulcer, blister, skin breakdown, erythema, warmth, callus, dry skin or fissure.      Toenail Condition: Right toenails are normal.      Left foot:      Protective Sensation: 3 sites tested. 2 sites sensed.      Skin integrity: Skin integrity normal. No ulcer, blister, skin breakdown, erythema, warmth, callus, dry skin or fissure.      Toenail Condition: Left toenails are normal.   Lymphadenopathy:      Cervical: No cervical adenopathy.   Skin:     General: Skin is warm and dry.      Coloration: Skin is not pale.      Findings: No erythema or rash.   Neurological:      Mental Status: He is alert and oriented to person, place, and time.      Cranial Nerves: No cranial nerve deficit.      Motor: No abnormal muscle tone.      Coordination: Coordination normal.       Deep Tendon Reflexes: Reflexes are normal and symmetric. Reflexes normal.   Psychiatric:         Behavior: Behavior normal.         Thought Content: Thought content normal.         Judgment: Judgment normal.         Assessment:       1. Type 2 diabetes mellitus without complication, without long-term current use of insulin    2. Mixed hyperlipidemia    3. Type 2 diabetes mellitus with diabetic polyneuropathy, without long-term current use of insulin        Plan:       Tono was seen today for follow-up and diabetes.    Diagnoses and all orders for this visit:    Type 2 diabetes mellitus without complication, without long-term current use of insulin  -     Lipid Panel; Future  -     Microalbumin/Creatinine Ratio, Urine; Future  -     Hemoglobin A1C; Future  -     Comprehensive Metabolic Panel; Future    Mixed hyperlipidemia  -     Lipid Panel; Future    Type 2 diabetes mellitus with diabetic polyneuropathy, without long-term current use of insulin  -     Ambulatory referral/consult to Podiatry; Future         Follow up in about 6 months (around 9/9/2022) for follow up DM, follow up cholesterol.

## 2022-03-16 ENCOUNTER — TELEPHONE (OUTPATIENT)
Dept: CARDIOLOGY | Facility: CLINIC | Age: 77
End: 2022-03-16
Payer: MEDICARE

## 2022-03-16 NOTE — TELEPHONE ENCOUNTER
----- Message from Aurelia Pa sent at 3/16/2022  2:32 PM CDT -----  Type: Needs Medical Advice  Who Called:  Patient  Best Call Back Number:   Additional Information: Calling to speak with the nurse about his medication isosorbide mononitrate (IMDUR) 30 MG 24 hr tablet, he says it has started making his legs hurt

## 2022-03-28 ENCOUNTER — HOSPITAL ENCOUNTER (OUTPATIENT)
Dept: RADIOLOGY | Facility: HOSPITAL | Age: 77
Discharge: HOME | End: 2022-03-28
Attending: NURSE PRACTITIONER
Payer: MEDICARE

## 2022-03-28 ENCOUNTER — PATIENT MESSAGE (OUTPATIENT)
Dept: CARDIOLOGY | Facility: CLINIC | Age: 77
End: 2022-03-28

## 2022-03-28 ENCOUNTER — CLINICAL SUPPORT (OUTPATIENT)
Dept: CARDIOLOGY | Facility: HOSPITAL | Age: 77
End: 2022-03-28
Attending: NURSE PRACTITIONER
Payer: MEDICARE

## 2022-03-28 ENCOUNTER — OFFICE VISIT (OUTPATIENT)
Dept: CARDIOLOGY | Facility: CLINIC | Age: 77
End: 2022-03-28
Payer: MEDICARE

## 2022-03-28 ENCOUNTER — TELEPHONE (OUTPATIENT)
Dept: CARDIOLOGY | Facility: CLINIC | Age: 77
End: 2022-03-28

## 2022-03-28 VITALS
HEIGHT: 72 IN | WEIGHT: 195 LBS | SYSTOLIC BLOOD PRESSURE: 122 MMHG | DIASTOLIC BLOOD PRESSURE: 80 MMHG | BODY MASS INDEX: 26.41 KG/M2 | OXYGEN SATURATION: 99 % | HEART RATE: 67 BPM

## 2022-03-28 VITALS — HEIGHT: 72 IN | WEIGHT: 196 LBS | BODY MASS INDEX: 26.55 KG/M2

## 2022-03-28 DIAGNOSIS — R07.9 CHEST PAIN, UNSPECIFIED TYPE: ICD-10-CM

## 2022-03-28 DIAGNOSIS — I25.10 ATHEROSCLEROSIS OF NATIVE CORONARY ARTERY OF NATIVE HEART WITHOUT ANGINA PECTORIS: ICD-10-CM

## 2022-03-28 DIAGNOSIS — E78.2 MIXED HYPERLIPIDEMIA: ICD-10-CM

## 2022-03-28 DIAGNOSIS — I25.10 CORONARY ARTERY DISEASE INVOLVING NATIVE CORONARY ARTERY OF NATIVE HEART WITHOUT ANGINA PECTORIS: ICD-10-CM

## 2022-03-28 DIAGNOSIS — I10 PRIMARY HYPERTENSION: ICD-10-CM

## 2022-03-28 DIAGNOSIS — I25.118 CORONARY ARTERY DISEASE OF NATIVE ARTERY OF NATIVE HEART WITH STABLE ANGINA PECTORIS: ICD-10-CM

## 2022-03-28 DIAGNOSIS — I73.9 CLAUDICATION: Primary | ICD-10-CM

## 2022-03-28 DIAGNOSIS — E11.65 TYPE 2 DIABETES MELLITUS WITH HYPERGLYCEMIA, WITHOUT LONG-TERM CURRENT USE OF INSULIN: ICD-10-CM

## 2022-03-28 LAB
AORTIC ROOT ANNULUS: 3.47 CM
AORTIC VALVE CUSP SEPERATION: 0.64 CM
AV INDEX (PROSTH): 0.55
AV MEAN GRADIENT: 9 MMHG
AV PEAK GRADIENT: 15 MMHG
AV VALVE AREA: 1.77 CM2
AV VELOCITY RATIO: 50.63
BSA FOR ECHO PROCEDURE: 2.13 M2
CV ECHO LV RWT: 0.64 CM
CV STRESS BASE HR: 65 BPM
DIASTOLIC BLOOD PRESSURE: 94 MMHG
DOP CALC AO PEAK VEL: 1.96 M/S
DOP CALC AO VTI: 40.74 CM
DOP CALC LVOT AREA: 3.2 CM2
DOP CALC LVOT DIAMETER: 2.02 CM
DOP CALC LVOT PEAK VEL: 99.24 M/S
DOP CALC LVOT STROKE VOLUME: 72.04 CM3
DOP CALCLVOT PEAK VEL VTI: 22.49 CM
E WAVE DECELERATION TIME: 285.93 MSEC
E/A RATIO: 0.61
E/E' RATIO: 17 M/S
ECHO LV POSTERIOR WALL: 1.28 CM (ref 0.6–1.1)
EJECTION FRACTION: 44 %
FRACTIONAL SHORTENING: 18 % (ref 28–44)
INTERVENTRICULAR SEPTUM: 1.25 CM (ref 0.6–1.1)
LEFT ATRIUM SIZE: 4.96 CM
LEFT INTERNAL DIMENSION IN SYSTOLE: 3.26 CM (ref 2.1–4)
LEFT VENTRICLE DIASTOLIC VOLUME INDEX: 50.71 ML/M2
LEFT VENTRICLE DIASTOLIC VOLUME: 107 ML
LEFT VENTRICLE MASS INDEX: 84 G/M2
LEFT VENTRICLE SYSTOLIC VOLUME INDEX: 35.4 ML/M2
LEFT VENTRICLE SYSTOLIC VOLUME: 74.6 ML
LEFT VENTRICULAR INTERNAL DIMENSION IN DIASTOLE: 3.97 CM (ref 3.5–6)
LEFT VENTRICULAR MASS: 177.03 G
LV LATERAL E/E' RATIO: 17 M/S
LV SEPTAL E/E' RATIO: 17 M/S
MV PEAK A VEL: 1.12 M/S
MV PEAK E VEL: 0.68 M/S
OHS CV CPX 1 MINUTE RECOVERY HEART RATE: 69 BPM
OHS CV CPX 85 PERCENT MAX PREDICTED HEART RATE MALE: 121
OHS CV CPX MAX PREDICTED HEART RATE: 142
OHS CV CPX PATIENT IS FEMALE: 0
OHS CV CPX PATIENT IS MALE: 1
OHS CV CPX PEAK DIASTOLIC BLOOD PRESSURE: 94 MMHG
OHS CV CPX PEAK HEAR RATE: 76 BPM
OHS CV CPX PEAK RATE PRESSURE PRODUCT: NORMAL
OHS CV CPX PEAK SYSTOLIC BLOOD PRESSURE: 169 MMHG
OHS CV CPX PERCENT MAX PREDICTED HEART RATE ACHIEVED: 54
OHS CV CPX RATE PRESSURE PRODUCT PRESENTING: NORMAL
PISA TR MAX VEL: 1.59 M/S
PV PEAK VELOCITY: 77 CM/S
RA PRESSURE: 3 MMHG
RIGHT VENTRICULAR END-DIASTOLIC DIMENSION: 348 CM
SYSTOLIC BLOOD PRESSURE: 171 MMHG
TDI LATERAL: 0.04 M/S
TDI SEPTAL: 0.04 M/S
TDI: 0.04 M/S
TR MAX PG: 10 MMHG
TV REST PULMONARY ARTERY PRESSURE: 13 MMHG

## 2022-03-28 PROCEDURE — 93306 ECHO (CUPID ONLY): ICD-10-PCS | Mod: 26,,, | Performed by: SPECIALIST

## 2022-03-28 PROCEDURE — 93306 TTE W/DOPPLER COMPLETE: CPT | Mod: 26,,, | Performed by: SPECIALIST

## 2022-03-28 PROCEDURE — 93018 NUCLEAR STRESS TEST (CUPID ONLY): ICD-10-PCS | Mod: ,,, | Performed by: SPECIALIST

## 2022-03-28 PROCEDURE — 99214 PR OFFICE/OUTPT VISIT, EST, LEVL IV, 30-39 MIN: ICD-10-PCS | Mod: S$GLB,,, | Performed by: NURSE PRACTITIONER

## 2022-03-28 PROCEDURE — 99214 OFFICE O/P EST MOD 30 MIN: CPT | Mod: S$GLB,,, | Performed by: NURSE PRACTITIONER

## 2022-03-28 PROCEDURE — 93018 CV STRESS TEST I&R ONLY: CPT | Mod: ,,, | Performed by: SPECIALIST

## 2022-03-28 PROCEDURE — 63600175 PHARM REV CODE 636 W HCPCS: Performed by: NURSE PRACTITIONER

## 2022-03-28 PROCEDURE — 93306 TTE W/DOPPLER COMPLETE: CPT

## 2022-03-28 PROCEDURE — 93016 CV STRESS TEST SUPVJ ONLY: CPT | Mod: ,,, | Performed by: SPECIALIST

## 2022-03-28 PROCEDURE — A9502 TC99M TETROFOSMIN: HCPCS

## 2022-03-28 PROCEDURE — 93016 NUCLEAR STRESS TEST (CUPID ONLY): ICD-10-PCS | Mod: ,,, | Performed by: SPECIALIST

## 2022-03-28 PROCEDURE — 93017 CV STRESS TEST TRACING ONLY: CPT

## 2022-03-28 RX ORDER — REGADENOSON 0.08 MG/ML
0.4 INJECTION, SOLUTION INTRAVENOUS
Status: COMPLETED | OUTPATIENT
Start: 2022-03-28 | End: 2022-03-28

## 2022-03-28 RX ORDER — QUINAPRIL 20 MG/1
20 TABLET ORAL DAILY
Qty: 90 TABLET | Refills: 3 | Status: SHIPPED | OUTPATIENT
Start: 2022-03-28 | End: 2022-05-16

## 2022-03-28 RX ADMIN — REGADENOSON 0.4 MG: 0.08 INJECTION, SOLUTION INTRAVENOUS at 09:03

## 2022-03-28 NOTE — PROGRESS NOTES
Subjective:    Patient ID:  Tono Saez is a 76 y.o. male   Chief Complaint   Patient presents with    Results    Coronary Artery Disease    Hyperlipidemia       HPI:  Patient seen today for follow-up appointment.  He states that he has been feeling better without any chest discomfort since taking the isosorbide.  However he saw another provider recently who was concerned that the isosorbide may be contributing to his leg pain.  He does have some exertional leg pain.  Patient had stopped taking the medication briefly but did not get any relief of the claudication.  He does report having occasional low blood pressures and is concerned about the dose of his medications.    Review of patient's allergies indicates:   Allergen Reactions    Adhesive Other (See Comments)     SILK TAPE PULL SKIN OFF    Metformin Other (See Comments)     Weight loss cachexia anorexia,diarrhea.    Pcn [penicillins]      Patient states he passed out from this medication when he was 10 years old.        Past Medical History:   Diagnosis Date    Anemia     Anticoagulant long-term use     Arthritis     CAD (coronary artery disease)     CABG, STENTS '97,'99,'01,'05    Cancer     SKIN    Cataract     Diabetes mellitus     Diabetes mellitus type II     GERD (gastroesophageal reflux disease)     GI bleed 2020    Hypertension     Myocardial infarction     Wears glasses      Past Surgical History:   Procedure Laterality Date    CARDIAC PACEMAKER PLACEMENT      CARDIAC PACEMAKER PLACEMENT      CARDIAC PACEMAKER PLACEMENT  04/26/2018    replaced    CARDIAC SURGERY      1995   CABG X 5    CHOLECYSTECTOMY      COLON SURGERY      COLONOSCOPY N/A 2/21/2020    Procedure: COLONOSCOPY;  Surgeon: Abe Barragan III, MD;  Location: [facility];  Service: Endoscopy;  Laterality: N/A;    COLONOSCOPY N/A 2/23/2020    Procedure: COLONOSCOPY;  Surgeon: Bob Locke Jr., MD;  Location: [facility];  Service: Endoscopy;  Laterality:  N/A;    CORONARY ARTERY BYPASS GRAFT  1995    CORONARY STENT PLACEMENT      stent x 5    ESOPHAGOGASTRODUODENOSCOPY N/A 2/23/2020    Procedure: EGD (ESOPHAGOGASTRODUODENOSCOPY);  Surgeon: Bob Locke Jr., MD;  Location: [facility];  Service: Endoscopy;  Laterality: N/A;    EYE SURGERY      BILAT CATARACT    head laceration   01/19/2018    HEMORRHOID SURGERY      SMALL BOWEL ENTEROSCOPY N/A 2/21/2020    Procedure: ENTEROSCOPY;  Surgeon: Abe Barragan III, MD;  Location: [facility];  Service: Endoscopy;  Laterality: N/A;    stint       Social History     Tobacco Use    Smoking status: Never Smoker    Smokeless tobacco: Never Used   Substance Use Topics    Alcohol use: No    Drug use: No     Family History   Problem Relation Age of Onset    Heart disease Mother     Heart disease Father     Hyperlipidemia Sister     Hypertension Sister     Heart disease Brother     Heart disease Brother     Heart disease Brother     Heart disease Brother     Heart disease Brother         Review of Systems:   Constitution: Negative for diaphoresis and fever.   HEENT: Negative for nosebleeds.    Cardiovascular: Negative for chest pain       No dyspnea on exertion       No leg swelling        No palpitations  + exertional pain in legs  Respiratory: Negative for shortness of breath and wheezing.    Hematologic/Lymphatic: Negative for bleeding problem. Does not bruise/bleed easily.   Skin: Negative for color change and rash.   Musculoskeletal: Negative for falls and myalgias.   Gastrointestinal: Negative for hematemesis and hematochezia.   Genitourinary: Negative for hematuria.   Neurological: Negative for dizziness and light-headedness.   Psychiatric/Behavioral: Negative for altered mental status and memory loss.          Objective:        Vitals:    03/28/22 1315   BP: 122/80   Pulse: 67       Lab Results   Component Value Date    WBC 4.57 02/28/2022    HGB 12.9 (L) 02/28/2022    HCT 39.5 (L) 02/28/2022    PLT  91 (L) 02/28/2022    CHOL 134 07/22/2021    TRIG 114 07/22/2021    HDL 40 07/22/2021    ALT 16 02/28/2022    AST 17 02/28/2022     02/28/2022    K 4.1 02/28/2022     02/28/2022    CREATININE 1.4 02/28/2022    BUN 19 02/28/2022    CO2 27 02/28/2022    TSH 2.100 09/21/2020    PSA 0.93 07/13/2021    INR 2.2 02/20/2020    HGBA1C 7.2 (H) 07/22/2021        ECHOCARDIOGRAM RESULTS  Results for orders placed in visit on 03/28/22    Echo    Interpretation Summary  · The left ventricle is normal in size with concentric remodeling  · The estimated ejection fraction is 44%.  · Grade I left ventricular diastolic dysfunction.  · There are segmental left ventricular wall motion abnormalities. Apical hypokinesis noted  · There is abnormal septal wall motion consistent with right ventricular pacemaker.  · Normal right ventricular size with mildly reduced right ventricular systolic function.  · Mild-to-moderate aortic regurgitation.  · There is mild-to-moderate aortic valve stenosis.  · Aortic valve area is 1.77 cm2; peak velocity is 1.96 m/s; mean gradient is 9 mmHg.  · Mild mitral regurgitation.  · Normal central venous pressure (3 mmHg).  · The estimated PA systolic pressure is 13 mmHg.        CURRENT/PREVIOUS VISIT EKG  Results for orders placed or performed in visit on 02/28/22   IN OFFICE EKG 12-LEAD (to [place])    Collection Time: 02/28/22  3:36 PM    Narrative    Test Reason : I10,    Vent. Rate : 066 BPM     Atrial Rate : 066 BPM     P-R Int : 206 ms          QRS Dur : 200 ms      QT Int : 466 ms       P-R-T Axes : 028 -86 098 degrees     QTc Int : 488 ms    Atrial-sensed ventricular-paced rhythm  Abnormal ECG  When compared with ECG of 20-FEB-2020 10:13,  Vent. rate has decreased BY   5 BPM  Confirmed by Zachary Diop MD ([id number]) on 3/13/2022 6:04:47 PM    Referred By:  TC           Confirmed By:Zachary Diop MD     No valid procedures specified.   Results for orders placed in visit on 03/28/22    Nuclear Stress  Test    Interpretation Summary    The nuclear portion of this study will be reported separately.    The EKG portion of this study is uninterpretable.    The patient reported chest pain during the stress test.    There were no arrhythmias during stress.      Physical Exam:  CONSTITUTIONAL: No fever, no chills  HEENT: Normocephalic, atraumatic,pupils reactive to light                 NECK:  No JVD no carotid bruit  CVS: S1S2+, RRR  LUNGS: Clear  ABDOMEN: Soft, NT, BS+  EXTREMITIES: No cyanosis, edema  : No anguiano catheter  NEURO: AAO X 3  PSY: Normal affect      Medication List with Changes/Refills   Current Medications    AMLODIPINE (NORVASC) 5 MG TABLET    TAKE ONE TABLET BY MOUTH ONCE DAILY    ASPIRIN (ECOTRIN) 81 MG EC TABLET    Take 81 mg by mouth once daily.    BETAMETHASONE DIPROPIONATE (DIPROLENE) 0.05 % LOTION    Apply thin film to scalp bid prn itch    FINASTERIDE (PROSCAR) 5 MG TABLET    Take 5 mg by mouth once daily.    FUROSEMIDE (LASIX) 20 MG TABLET    Take 1 tablet (20 mg total) by mouth once daily.    ISOSORBIDE MONONITRATE (IMDUR) 30 MG 24 HR TABLET    Take 1 tablet (30 mg total) by mouth once daily.    KETOCONAZOLE (NIZORAL) 2 % SHAMPOO    Wash all scaling areas let sit 3 minutes then rinse 3x/wk    LEVOCETIRIZINE (XYZAL) 5 MG TABLET    TAKE ONE TABLET BY MOUTH EVERY EVENING    METOPROLOL TARTRATE (LOPRESSOR) 50 MG TABLET    Take 50 mg by mouth 2 (two) times daily.     MONTELUKAST (SINGULAIR) 10 MG TABLET        MULTIVITAMIN CAPSULE    Take 1 capsule by mouth once daily.    MUPIROCIN (BACTROBAN) 2 % OINTMENT    Apply topically 2 (two) times daily.    NITROSTAT 0.4 MG SL TABLET        PANTOPRAZOLE (PROTONIX) 40 MG TABLET    TAKE ONE TABLET BY MOUTH ONCE DAILY    PIOGLITAZONE (ACTOS) 30 MG TABLET    Take 1 tablet (30 mg total) by mouth once daily.    ROSUVASTATIN (CRESTOR) 20 MG TABLET    TAKE ONE TABLET BY MOUTH NIGHTLY AT BEDTIME    SERTRALINE (ZOLOFT) 50 MG TABLET    TAKE ONE TABLET BY MOUTH  ONCE DAILY    TOLTERODINE (DETROL LA) 4 MG 24 HR CAPSULE    TAKE ONE CAPSULE BY MOUTH ONCE DAILY    VITAMIN D 1000 UNITS TAB    Take 1,000 Units by mouth once daily.   Changed and/or Refilled Medications    Modified Medication Previous Medication    QUINAPRIL (ACCUPRIL) 20 MG TABLET quinapriL (ACCUPRIL) 40 MG tablet       Take 1 tablet (20 mg total) by mouth once daily.    TAKE ONE TABLET BY MOUTH ONCE DAILY             Assessment:       1. Claudication    2. Coronary artery disease of native artery of native heart with stable angina pectoris    3. Mixed hyperlipidemia    4. Primary hypertension    5. Type 2 diabetes mellitus with hyperglycemia, without long-term current use of insulin         Plan:     1. Nuclear stress test was negative for reversible ischemia.  The patient appears to be feeling better on the isosorbide.  He does bruise and bleed easily and prefers not to take any additional blood thinners at the present time.  Will have him continue with his aspirin.  Advised him on the option for an angiogram with possible PCI which would dedicated him to blood thinners versus medical management.  The patient prefers to proceed with conservative medical management for the time being.  2. Continue with isosorbide mononitrate 30 mg p.o. daily.  Titrate up as able.  3. Due to the low blood pressure at times will decrease quinapril to 20 mg p.o. daily.  Advised him to hold the amlodipine if his blood pressure is low.  Would like him to keep some Gatorade on hand in case he is feeling symptomatic and having low blood pressure.  4. Echocardiogram shows stable ejection fraction about 44% with no significant valve changes.  5.  Will check an MESSI on his lower extremities.  6. Patient to see Dr. Patel or Jose J in 4-6 weeks.      Problem List Items Addressed This Visit        Unprioritized    HTN (hypertension)    Mixed hyperlipidemia    Type 2 diabetes mellitus with hyperglycemia     Coronary artery disease of native  artery of native heart with stable angina pectoris    Overview     CABG, STENTS '97,'99,'01,'05             Other Visit Diagnoses     Claudication    -  Primary    Relevant Orders    Ankle Brachial Indices (MESSI)          Follow up in about 4 weeks (around 4/25/2022).

## 2022-03-28 NOTE — TELEPHONE ENCOUNTER
called pt to reschedule for tomorrow because he is having his test done today and we are not likely to have his results today

## 2022-04-06 ENCOUNTER — TELEPHONE (OUTPATIENT)
Dept: CARDIOLOGY | Facility: CLINIC | Age: 77
End: 2022-04-06
Payer: MEDICARE

## 2022-04-06 DIAGNOSIS — I73.9 CLAUDICATION: Primary | ICD-10-CM

## 2022-04-11 ENCOUNTER — TELEPHONE (OUTPATIENT)
Dept: CARDIOLOGY | Facility: HOSPITAL | Age: 77
End: 2022-04-11

## 2022-04-11 NOTE — PATIENT INSTRUCTIONS
Your Diabetes Foot Care Program    Every day you depend on your feet to keep you moving. But when you have diabetes, your feet need special care. Even a small foot problem can become very serious. So dont take your feet for granted. By working with your diabetes healthcare team, you can learn how to protect your feet and keep them healthy.  Evaluating your feet  An evaluation helps your healthcare provider check the condition of your feet. The evaluation includes a review of your diabetes history and overall health. It may also include a foot exam, X-rays, or other tests. These can help show problems beneath the skin that you cant see or feel.  Medical history  You will be asked about your overall health and any history of foot problems. Youll also discuss your diabetes history, such as whether your blood sugar level has changed over time. It also includes questions about sensations of pain, tingling, pins and needles, or numbness. Your healthcare provider will also want to know if you have high blood pressure and heart disease, or if you smoke. Be sure to mention any medicines (including over-the-counter), supplements, or herbal remedies you take.  Foot exam  A foot exam checks the condition of different parts of your foot. First, your skin and nails are examined for any signs of infection. Blood flow is checked by feeling for the pulses in each foot. You may also have tests to study the nerves in the foot. These include using a small filament (wire) to see how sensitive your feet are. In certain cases, you will be asked to walk a short distance to check for bone, joint, and muscle problems.  Diagnostic tests  If needed, your healthcare provider will suggest certain tests to learn more about your feet. These include:  Doppler tests to measure blood flow in the feet and lower leg.  X-rays, which can show bone or joint problems.  Other imaging tests, such as an MRI (magnetic resonance imaging), bone scan, and CT  (computed tomography) scan. These can help show bone infections.  Other tests, such as vascular tests, which study the blood flow in your feet and legs. You may also have nerve studies to learn how sensitive your feet are.  Creating a foot care program  Based on the evaluation, your healthcare provider will create a foot care program for you. Your program may be as simple as starting a daily self-care routine and changing the types of shoes your wear. It may also involve treating minor foot problems, such as a corn or blister. In some cases, surgery will be needed to treat an infection or mechanical problems, such as hammer toes.  Preventing problems  When you have diabetes, its easier to prevent problems than to treat them later on. So see your healthcare team for regular checkups and foot care. Your healthcare team can also help you learn more about caring for your feet at home. For example, you may be told to avoid walking barefoot. Or you may be told that special footwear is needed to protect your feet.  Have regular checkups  Foot problems can develop quickly. So be sure to follow your healthcare teams schedule for regular checkups. During office visits, take off your shoes and socks as soon as you get in the exam room. Ask your healthcare provider to examine your feet for problems. This will make it easier to find and treat small skin irritations before they get worse. Regular checkups can also help keep track of the blood flow and feeling in your feet. If you have neuropathy (lack of feeling in your feet), you will need to have checkups more often.  Learn about self-care  The more you know about diabetes and your feet, the easier it will be to prevent problems. Members of your healthcare team can teach you how to inspect your feet and teach you to look for warning signs. They can also give you other foot care tips. During office visits, be sure to ask any questions you have.  Date Last Reviewed: 7/1/2016  ©  2093-6862 [employer]. [address] [place] [address], [place] [address]. All rights reserved. This information is not intended as a substitute for professional medical care. Always follow your healthcare professional's instructions.

## 2022-04-12 ENCOUNTER — CLINICAL SUPPORT (OUTPATIENT)
Dept: CARDIOLOGY | Facility: HOSPITAL | Age: 77
End: 2022-04-12
Attending: SPECIALIST
Payer: MEDICARE

## 2022-04-12 DIAGNOSIS — I73.9 CLAUDICATION: ICD-10-CM

## 2022-04-12 PROCEDURE — 93923 SEGMENTAL PRESSURE LOWER EXTREMITY: ICD-10-PCS | Mod: 26,,, | Performed by: SPECIALIST

## 2022-04-12 PROCEDURE — 93923 UPR/LXTR ART STDY 3+ LVLS: CPT | Mod: 50

## 2022-04-12 PROCEDURE — 93923 UPR/LXTR ART STDY 3+ LVLS: CPT | Mod: 26,,, | Performed by: SPECIALIST

## 2022-04-13 ENCOUNTER — OFFICE VISIT (OUTPATIENT)
Dept: PODIATRY | Facility: CLINIC | Age: 77
End: 2022-04-13
Payer: MEDICARE

## 2022-04-13 ENCOUNTER — OFFICE VISIT (OUTPATIENT)
Dept: CARDIOLOGY | Facility: CLINIC | Age: 77
End: 2022-04-13
Payer: MEDICARE

## 2022-04-13 VITALS
WEIGHT: 194 LBS | SYSTOLIC BLOOD PRESSURE: 122 MMHG | HEART RATE: 75 BPM | BODY MASS INDEX: 26.31 KG/M2 | DIASTOLIC BLOOD PRESSURE: 76 MMHG | OXYGEN SATURATION: 97 %

## 2022-04-13 VITALS — WEIGHT: 197 LBS | HEIGHT: 72 IN | BODY MASS INDEX: 26.68 KG/M2 | HEART RATE: 67 BPM | OXYGEN SATURATION: 95 %

## 2022-04-13 DIAGNOSIS — I10 PRIMARY HYPERTENSION: ICD-10-CM

## 2022-04-13 DIAGNOSIS — M79.605 PAIN IN BOTH LOWER EXTREMITIES: ICD-10-CM

## 2022-04-13 DIAGNOSIS — I25.118 CORONARY ARTERY DISEASE OF NATIVE ARTERY OF NATIVE HEART WITH STABLE ANGINA PECTORIS: ICD-10-CM

## 2022-04-13 DIAGNOSIS — E11.9 COMPREHENSIVE DIABETIC FOOT EXAMINATION, TYPE 2 DM, ENCOUNTER FOR: ICD-10-CM

## 2022-04-13 DIAGNOSIS — E78.2 MIXED HYPERLIPIDEMIA: ICD-10-CM

## 2022-04-13 DIAGNOSIS — R26.81 GAIT INSTABILITY: ICD-10-CM

## 2022-04-13 DIAGNOSIS — M79.604 PAIN IN BOTH LOWER EXTREMITIES: ICD-10-CM

## 2022-04-13 DIAGNOSIS — I83.93 SPIDER VEINS OF BOTH LOWER EXTREMITIES: ICD-10-CM

## 2022-04-13 DIAGNOSIS — E11.42 TYPE 2 DIABETES MELLITUS WITH DIABETIC POLYNEUROPATHY, WITHOUT LONG-TERM CURRENT USE OF INSULIN: ICD-10-CM

## 2022-04-13 DIAGNOSIS — I87.8 VENOUS STASIS OF BOTH LOWER EXTREMITIES: ICD-10-CM

## 2022-04-13 DIAGNOSIS — E11.9 ENCOUNTER FOR DIABETIC FOOT EXAM: Primary | ICD-10-CM

## 2022-04-13 DIAGNOSIS — R26.2 DIFFICULTY IN WALKING, NOT ELSEWHERE CLASSIFIED: ICD-10-CM

## 2022-04-13 DIAGNOSIS — R60.0 BILATERAL LOWER EXTREMITY EDEMA: ICD-10-CM

## 2022-04-13 PROCEDURE — 99214 OFFICE O/P EST MOD 30 MIN: CPT | Mod: S$GLB,,,

## 2022-04-13 PROCEDURE — 99214 PR OFFICE/OUTPT VISIT, EST, LEVL IV, 30-39 MIN: ICD-10-PCS | Mod: S$GLB,,,

## 2022-04-13 PROCEDURE — 99204 OFFICE O/P NEW MOD 45 MIN: CPT | Mod: S$GLB,,, | Performed by: PODIATRIST

## 2022-04-13 PROCEDURE — 99204 PR OFFICE/OUTPT VISIT, NEW, LEVL IV, 45-59 MIN: ICD-10-PCS | Mod: S$GLB,,, | Performed by: PODIATRIST

## 2022-04-13 NOTE — ASSESSMENT & PLAN NOTE
Take 2 week drug holiday from crestor 2/2 leg cramping   LDL 71   Attempt zetia if crestor improves cramps

## 2022-04-13 NOTE — PROGRESS NOTES
[address] [facility] [address]  [place] [address]  Phone: [phone]   [phone]    Patient's PCP:Scott Staley MD  Referring Provider: Dr. Scott Staley    Subjective:      Chief Complaint:: Diabetic Foot Exam, Diabetes Mellitus, and Leg Swelling    HPI  Tono Saez is a 76 y.o. male who presents today for a diabetic foot exam.  Pt has seen Scott Staley MD on 3/9/2022 who treats them for their diabetes.  Pt has been a diabetic for about 20 years.  Taking pioglitazone to treat diabetes.      Additionally, patient presents with bilateral lower extremity venous stasis changes with swelling of bilateral lower extremities.  Patient states that his legs begin to hurt after he walks for a while due to the swelling.  He states he does have a history of swelling in both legs.  He does have spider veins present in bilateral lower extremities.    Blood sugar: 108  Hemoglobin A1C: 7.2 (7/22/2021)        Vitals:    04/13/22 0923   Pulse: 67   SpO2: 95%   Weight: 89.4 kg (197 lb)   Height: 6' (1.829 m)   PainSc: 0-No pain      Shoe Size: 12    Past Surgical History:   Procedure Laterality Date    CARDIAC PACEMAKER PLACEMENT      CARDIAC PACEMAKER PLACEMENT      CARDIAC PACEMAKER PLACEMENT  04/26/2018    replaced    CARDIAC SURGERY      1995   CABG X 5    CHOLECYSTECTOMY      COLON SURGERY      COLONOSCOPY N/A 2/21/2020    Procedure: COLONOSCOPY;  Surgeon: Abe Barragan III, MD;  Location: [facility];  Service: Endoscopy;  Laterality: N/A;    COLONOSCOPY N/A 2/23/2020    Procedure: COLONOSCOPY;  Surgeon: Bob Locke Jr., MD;  Location: [facility];  Service: Endoscopy;  Laterality: N/A;    CORONARY ARTERY BYPASS GRAFT  1995    CORONARY STENT PLACEMENT      stent x 5    ESOPHAGOGASTRODUODENOSCOPY N/A 2/23/2020    Procedure: EGD (ESOPHAGOGASTRODUODENOSCOPY);  Surgeon: Bob Locke Jr., MD;  Location: [facility];  Service: Endoscopy;  Laterality: N/A;    EYE SURGERY      BILAT CATARACT     head laceration   01/19/2018    HEMORRHOID SURGERY      SMALL BOWEL ENTEROSCOPY N/A 2/21/2020    Procedure: ENTEROSCOPY;  Surgeon: Abe Barragan III, MD;  Location: [facility];  Service: Endoscopy;  Laterality: N/A;    stint       Past Medical History:   Diagnosis Date    Anemia     Anticoagulant long-term use     Arthritis     CAD (coronary artery disease)     CABG, STENTS '97,'99,'01,'05    Cancer     SKIN    Cataract     Diabetes mellitus     Diabetes mellitus type II     GERD (gastroesophageal reflux disease)     GI bleed 2020    Hypertension     Myocardial infarction     Wears glasses      Family History   Problem Relation Age of Onset    Heart disease Mother     Heart disease Father     Hyperlipidemia Sister     Hypertension Sister     Heart disease Brother     Heart disease Brother     Heart disease Brother     Heart disease Brother     Heart disease Brother         Social History:   Marital Status:   Alcohol History:  reports no history of alcohol use.  Tobacco History:  reports that he has never smoked. He has never used smokeless tobacco.  Drug History:  reports no history of drug use.    Review of patient's allergies indicates:   Allergen Reactions    Adhesive Other (See Comments)     SILK TAPE PULL SKIN OFF    Metformin Other (See Comments)     Weight loss cachexia anorexia,diarrhea.    Pcn [penicillins]      Patient states he passed out from this medication when he was 10 years old.        Current Outpatient Medications   Medication Sig Dispense Refill    amLODIPine (NORVASC) 5 MG tablet TAKE ONE TABLET BY MOUTH ONCE DAILY 90 tablet 5    aspirin (ECOTRIN) 81 MG EC tablet Take 81 mg by mouth once daily.      betamethasone dipropionate (DIPROLENE) 0.05 % lotion Apply thin film to scalp bid prn itch 60 mL 2    finasteride (PROSCAR) 5 mg tablet Take 5 mg by mouth once daily.      furosemide (LASIX) 20 MG tablet Take 1 tablet (20 mg total) by mouth once daily. 90  tablet 3    isosorbide mononitrate (IMDUR) 30 MG 24 hr tablet Take 1 tablet (30 mg total) by mouth once daily. 90 tablet 3    ketoconazole (NIZORAL) 2 % shampoo Wash all scaling areas let sit 3 minutes then rinse 3x/wk 120 mL 11    levocetirizine (XYZAL) 5 MG tablet TAKE ONE TABLET BY MOUTH EVERY EVENING 60 tablet 11    metoprolol tartrate (LOPRESSOR) 50 MG tablet Take 50 mg by mouth 2 (two) times daily.       montelukast (SINGULAIR) 10 mg tablet       multivitamin capsule Take 1 capsule by mouth once daily.      mupirocin (BACTROBAN) 2 % ointment Apply topically 2 (two) times daily. 22 g 11    NITROSTAT 0.4 mg SL tablet       pantoprazole (PROTONIX) 40 MG tablet TAKE ONE TABLET BY MOUTH ONCE DAILY 90 tablet 1    pioglitazone (ACTOS) 30 MG tablet Take 1 tablet (30 mg total) by mouth once daily. 90 tablet 3    quinapriL (ACCUPRIL) 20 MG tablet Take 1 tablet (20 mg total) by mouth once daily. 90 tablet 3    rosuvastatin (CRESTOR) 20 MG tablet TAKE ONE TABLET BY MOUTH NIGHTLY AT BEDTIME 30 tablet 3    sertraline (ZOLOFT) 50 MG tablet TAKE ONE TABLET BY MOUTH ONCE DAILY 90 tablet 1    tolterodine (DETROL LA) 4 MG 24 hr capsule TAKE ONE CAPSULE BY MOUTH ONCE DAILY 90 capsule 1    vitamin D 1000 units Tab Take 1,000 Units by mouth once daily.       No current facility-administered medications for this visit.       Review of Systems   Constitutional: Negative for chills, fatigue, fever and unexpected weight change.   HENT: Negative for hearing loss and trouble swallowing.    Eyes: Negative for photophobia and visual disturbance.   Respiratory: Negative for cough, shortness of breath and wheezing.    Cardiovascular: Positive for chest pain. Negative for palpitations and leg swelling.   Gastrointestinal: Negative for abdominal pain and nausea.   Genitourinary: Negative for dysuria and frequency.   Musculoskeletal: Negative for arthralgias, back pain, gait problem, joint swelling and myalgias.   Skin: Negative  for rash and wound.   Neurological: Negative for seizures, weakness, numbness and headaches.   Hematological: Bruises/bleeds easily.         Objective:        Physical Exam:   Foot Exam  Physical Exam  Physical examination: General: Pt. is well-developed, well-nourished, appears stated age, in no acute distress, alert and oriented x 3.    Vascular: Dorsalis pedis and posterior tibial pulses are 2/4 Bilaterally. Toes are warm to touch. Feet are warm proximally.    There is decreased digital hair. Skin is atrophic, slightly hyperpigmented, and moderately edematous. Capillary refill less than 5 seconds all toes/distal feet    Bilateral lower extremity edema without pitting edema.  Spider veins present bilateral    Neurologic: [place] 5.07 monofilament is present bilateral feet. Sharp/dull sensation present Bilateral feet.    Vibratory sensation present bilateral    Musculoskeletal: adequate joint range of motion without pain, limitation, nor crepitation Bilateral feet and ankle joints. Muscle strength is 5/5 in all groups bilaterally.    Lymphatics: no lymphangitic streaking bilaterally.    Dermatologic: Elongated, thickened, dystrophic, discolored nails x 10. Xerosis Bilaterally.      Patient instructed on proper foot hygeine. We discussed wearing proper shoe gear, daily foot inspections, never walking without protective shoe gear, never putting sharp instruments to feet, routine podiatric nail visits every 2-3 months. Nails were aggressively reduced and debrided x 10 mechanically and with electric  down to the nailbed in order to thin the nail plates. Pt. tolerated well and related comfort. Pt. to RTC in 2-3 months for follow-up. Pt. to wear extra-depth shoes and custom     Imaging:            Assessment:       1. Encounter for diabetic foot exam    2. Type 2 diabetes mellitus with diabetic polyneuropathy, without long-term current use of insulin    3. Bilateral lower extremity edema    4. Difficulty  in walking, not elsewhere classified    5. Comprehensive diabetic foot examination, type 2 DM, encounter for    6. Venous stasis of both lower extremities    7. Gait instability    8. Spider veins of both lower extremities      Plan:   Encounter for diabetic foot exam  -      DIABETES FOOT EXAM    Type 2 diabetes mellitus with diabetic polyneuropathy, without long-term current use of insulin  -      DIABETES FOOT EXAM  -     Ambulatory referral/consult to Podiatry    Bilateral lower extremity edema    Difficulty in walking, not elsewhere classified    Comprehensive diabetic foot examination, type 2 DM, encounter for  -      DIABETES FOOT EXAM    Venous stasis of both lower extremities    Gait instability    Spider veins of both lower extremities      No follow-ups on file.    Procedures          Counseling:     I provided patient education verbally regarding:   Patient diagnosis, treatment options, as well as alternatives, risks, and benefits.     This note was created using Dragon voice recognition software that occasionally misinterpreted phrases or words.         Counseling/Education:  I provided patient education verbally regarding:   The aspects of diabetes and how it pertains to the feet. I explained the importance of proper diabetic foot care and how it is essential for the health of their feet.    I discussed the importance of knowing their Hemoglobin A1c and that the level needs to be as close to 6 as possible. I discussed the increase complications of high blood sugar including stroke, blindness, heart attack, kidney failure and loss of limb secondary to neuropathy and PVD.     With neuropathy, beware of any breaks in the skin or redness. These areas are not recognized early due to the numbness.    I discussed Diabetes, lower back issues, metabolic disorders, systemic causes, chemotherapy, vitamin deficiency, heavy metal exposure, as some of the causes. I also explained that as much as 40% of the  time we can not find a cause. I discussed different treatments available to control the symptoms but which may not cure the problem.       Counseled patient on the aspects of diabetes and how it pertains to the feet.  I explained the importance of proper diabetic foot care and how it is essential for the health of their feet.      Shoe inspection. Patient instructed on proper foot hygeine. We discussed wearing proper shoe gear, daily foot inspections, never walking without protective shoe gear, never putting sharp instruments to feet, routine podiatric nail visits every 2-3 months.      Additional patient instructions:     - Check feet daily for wounds and areas of irritation.     - Keep regularly scheduled appointments with primary care, ophthalmology, and podiatry.     - Maintain blood glucose control via taking medications as prescribed, diabetic diet, and exercise.     - Apply moisturizing cream to feet and ankles daily but not between toes.     - Keep feet clean and dry, especially between toes.     - Elevate feet as much as possible throughout the day.     - Wear supportive/accommodative shoes at all times when ambulating.     - Supplement with alpha lipoic acid (600 mg once daily by mouth) and vitamin B complex (as directed per package insert) to reduce and repair nerve damage.     - Wear over-the-counter compression socks at all times when ambulating.  Do not wear them while resting for prolonged periods of time such as when sleeping.     - Notify clinic immediately of any new or worsening conditions.          I counseled the patient on their conditions, their implications and medical management.     >50% of this > 60 minute visit was spent face to face educating/counseling the patient    I spent a total of 60 minutes on the day of the visit.This includes face to face time and non-face to face time preparing to see the patient (eg, review of tests), obtaining and/or reviewing separately obtained history,  documenting clinical information in the electronic or other health record, independently interpreting results and communicating results to the patient/family/caregiver, or care coordinator.       Patient should call the office immediately if any signs of infection, such as fever, chills, sweats, increased redness or pain.    Patient was instructed to call the clinic or go to the emergency department if their symptoms do not improve, worsens, or if new symptoms develop.  Patient was advised that if any increased swelling, pain, or numbness arise to go immediately to the ED. Patient knows to call any time if an emergency arises. Shared decision making occurred and patient verbalized understanding in agreement with this plan.

## 2022-04-13 NOTE — PROGRESS NOTES
Subjective:    Patient ID:  Tono Saez is a 76 y.o. male patient here for evaluation No chief complaint on file.      History of Present Illness:     Patient is here today for the results of his MESSI. Prelim read shows MESSI's both above 1. He continues to have leg pain mainly upon waking or when trying to take a nap. He has no chest pain, SOB, dizziness, lightheadedness, falls, neck/arm/jaw pain, no blood in urine or stool.         Review of patient's allergies indicates:   Allergen Reactions    Adhesive Other (See Comments)     SILK TAPE PULL SKIN OFF    Metformin Other (See Comments)     Weight loss cachexia anorexia,diarrhea.    Pcn [penicillins]      Patient states he passed out from this medication when he was 10 years old.        Past Medical History:   Diagnosis Date    Anemia     Anticoagulant long-term use     Arthritis     CAD (coronary artery disease)     CABG, STENTS '97,'99,'01,'05    Cancer     SKIN    Cataract     Diabetes mellitus     Diabetes mellitus type II     GERD (gastroesophageal reflux disease)     GI bleed 2020    Hypertension     Myocardial infarction     Wears glasses      Past Surgical History:   Procedure Laterality Date    CARDIAC PACEMAKER PLACEMENT      CARDIAC PACEMAKER PLACEMENT      CARDIAC PACEMAKER PLACEMENT  04/26/2018    replaced    CARDIAC SURGERY      1995   CABG X 5    CHOLECYSTECTOMY      COLON SURGERY      COLONOSCOPY N/A 2/21/2020    Procedure: COLONOSCOPY;  Surgeon: Abe Barragan III, MD;  Location: [facility];  Service: Endoscopy;  Laterality: N/A;    COLONOSCOPY N/A 2/23/2020    Procedure: COLONOSCOPY;  Surgeon: Bob Locke Jr., MD;  Location: [facility];  Service: Endoscopy;  Laterality: N/A;    CORONARY ARTERY BYPASS GRAFT  1995    CORONARY STENT PLACEMENT      stent x 5    ESOPHAGOGASTRODUODENOSCOPY N/A 2/23/2020    Procedure: EGD (ESOPHAGOGASTRODUODENOSCOPY);  Surgeon: Bob Locke Jr., MD;  Location: [facility];  Service:  Endoscopy;  Laterality: N/A;    EYE SURGERY      BILAT CATARACT    head laceration   01/19/2018    HEMORRHOID SURGERY      SMALL BOWEL ENTEROSCOPY N/A 2/21/2020    Procedure: ENTEROSCOPY;  Surgeon: Abe Barragan III, MD;  Location: [facility];  Service: Endoscopy;  Laterality: N/A;    stint       Social History     Tobacco Use    Smoking status: Never Smoker    Smokeless tobacco: Never Used   Substance Use Topics    Alcohol use: No    Drug use: No        Review of Systems:    As noted in HPI in addition      REVIEW OF SYSTEMS  CARDIOVASCULAR: No recent chest pain, palpitations, arm, neck, or jaw pain  RESPIRATORY: No recent fever, cough chills, SOB or congestion  : No blood in the urine  GI: No Nausea, vomiting, constipation, diarrhea, blood, or reflux.  MUSCULOSKELETAL: No myalgias  NEURO: No lightheadedness or dizziness  EYES: No Double vision, blurry, vision or headache              Objective        Vitals:    04/13/22 1532   BP: 122/76   Pulse: 75       LIPIDS - LAST 2   Lab Results   Component Value Date    CHOL 134 07/22/2021    CHOL 130 06/15/2020    HDL 40 07/22/2021    HDL 44 06/15/2020    LDLCALC 71.2 07/22/2021    LDLCALC 57.2 (L) 06/15/2020    TRIG 114 07/22/2021    TRIG 144 06/15/2020    CHOLHDL 29.9 07/22/2021    CHOLHDL 33.8 06/15/2020       CBC - LAST 2  Lab Results   Component Value Date    WBC 4.57 02/28/2022    WBC 4.35 09/21/2020    RBC 4.27 (L) 02/28/2022    RBC 4.14 (L) 09/21/2020    HGB 12.9 (L) 02/28/2022    HGB 13.0 (L) 09/21/2020    HCT 39.5 (L) 02/28/2022    HCT 39.9 (L) 09/21/2020    MCV 93 02/28/2022    MCV 96 09/21/2020    MCH 30.2 02/28/2022    MCH 31.4 (H) 09/21/2020    MCHC 32.7 02/28/2022    MCHC 32.6 09/21/2020    RDW 13.0 02/28/2022    RDW 13.0 09/21/2020    PLT 91 (L) 02/28/2022     (L) 09/21/2020    MPV 10.3 02/28/2022    MPV 10.8 09/21/2020    GRAN 2.4 09/21/2020    GRAN 55.9 09/21/2020    LYMPH 1.1 09/21/2020    LYMPH 25.5 09/21/2020    MONO 0.6  09/21/2020    MONO 12.9 09/21/2020    BASO 0.04 09/21/2020    BASO 0.05 06/22/2020    NRBC 0 09/21/2020    NRBC 0 06/22/2020       CHEMISTRY & LIVER FUNCTION - LAST 2  Lab Results   Component Value Date     02/28/2022     07/22/2021    K 4.1 02/28/2022    K 4.1 07/22/2021     02/28/2022     07/22/2021    CO2 27 02/28/2022    CO2 27 07/22/2021    ANIONGAP 9 02/28/2022    ANIONGAP 11 07/22/2021    BUN 19 02/28/2022    BUN 36 (H) 07/22/2021    CREATININE 1.4 02/28/2022    CREATININE 1.6 (H) 07/22/2021     (H) 02/28/2022     (H) 07/22/2021    CALCIUM 10.0 02/28/2022    CALCIUM 10.1 07/22/2021    MG 1.5 (L) 04/21/2020    MG 1.7 02/23/2020    ALBUMIN 3.7 02/28/2022    ALBUMIN 4.1 07/22/2021    PROT 6.7 02/28/2022    PROT 6.9 07/22/2021    ALKPHOS 87 02/28/2022    ALKPHOS 48 (L) 07/22/2021    ALT 16 02/28/2022    ALT 15 07/22/2021    AST 17 02/28/2022    AST 20 07/22/2021    BILITOT 1.1 (H) 02/28/2022    BILITOT 1.5 (H) 07/22/2021        CARDIAC PROFILE - LAST 2  Lab Results   Component Value Date     (H) 02/28/2022     (H) 09/21/2020    TROPONINI 0.057 (HH) 02/21/2020    TROPONINI 0.070 (HH) 02/21/2020        COAGULATION - LAST 2  Lab Results   Component Value Date    LABPT 23.5 (H) 02/20/2020    INR 2.2 02/20/2020       ENDOCRINE & PSA - LAST 2  Lab Results   Component Value Date    HGBA1C 7.2 (H) 07/22/2021    HGBA1C 6.8 (H) 09/21/2020    TSH 2.100 09/21/2020    PSA 0.93 07/13/2021    PSA 0.79 07/02/2020        ECHOCARDIOGRAM RESULTS  Results for orders placed in visit on 03/28/22    Echo    Interpretation Summary  · The left ventricle is normal in size with concentric remodeling  · The estimated ejection fraction is 44%.  · Grade I left ventricular diastolic dysfunction.  · There are segmental left ventricular wall motion abnormalities. Apical hypokinesis noted  · There is abnormal septal wall motion consistent with right ventricular pacemaker.  · Normal right  ventricular size with mildly reduced right ventricular systolic function.  · Mild-to-moderate aortic regurgitation.  · There is mild-to-moderate aortic valve stenosis.  · Aortic valve area is 1.77 cm2; peak velocity is 1.96 m/s; mean gradient is 9 mmHg.  · Mild mitral regurgitation.  · Normal central venous pressure (3 mmHg).  · The estimated PA systolic pressure is 13 mmHg.      CURRENT/PREVIOUS VISIT EKG  Results for orders placed or performed in visit on 02/28/22   IN OFFICE EKG 12-LEAD (to Gary)    Collection Time: 02/28/22  3:36 PM    Narrative    Test Reason : I10,    Vent. Rate : 066 BPM     Atrial Rate : 066 BPM     P-R Int : 206 ms          QRS Dur : 200 ms      QT Int : 466 ms       P-R-T Axes : 028 -86 098 degrees     QTc Int : 488 ms    Atrial-sensed ventricular-paced rhythm  Abnormal ECG  When compared with ECG of 20-FEB-2020 10:13,  Vent. rate has decreased BY   5 BPM  Confirmed by Zachary Diop MD ([id number]) on 3/13/2022 6:04:47 PM    Referred By:  TC           Confirmed By:Zachary Diop MD     No valid procedures specified.   Results for orders placed in visit on 03/28/22    Nuclear Stress Test    Interpretation Summary    The nuclear portion of this study will be reported separately.    The EKG portion of this study is uninterpretable.    The patient reported chest pain during the stress test.    There were no arrhythmias during stress.    No valid procedures specified.    PHYSICAL EXAM  CONSTITUTIONAL: Well built, well nourished in no apparent distress  NECK: no carotid bruit, no JVD  LUNGS: CTA  CHEST WALL: no tenderness  HEART: regular rate and rhythm, S1, S2 normal, no murmur, click, rub or gallop   ABDOMEN: soft, non-tender; bowel sounds normal; no masses,  no organomegaly  EXTREMITIES: varicose veins presetn, xtremities normal, no edema, no calf tenderness noted  NEURO: AAO X 3    I HAVE REVIEWED :    The vital signs, nurses notes, and all the pertinent radiology and labs.        Current  Outpatient Medications   Medication Instructions    amLODIPine (NORVASC) 5 MG tablet TAKE ONE TABLET BY MOUTH ONCE DAILY    aspirin (ECOTRIN) 81 mg, Oral, Daily    betamethasone dipropionate (DIPROLENE) 0.05 % lotion Apply thin film to scalp bid prn itch    finasteride (PROSCAR) 5 mg, Oral, Daily    furosemide (LASIX) 20 mg, Oral, Daily    isosorbide mononitrate (IMDUR) 30 mg, Oral, Daily    ketoconazole (NIZORAL) 2 % shampoo Wash all scaling areas let sit 3 minutes then rinse 3x/wk    levocetirizine (XYZAL) 5 MG tablet TAKE ONE TABLET BY MOUTH EVERY EVENING    metoprolol tartrate (LOPRESSOR) 25 mg, Oral, 2 times daily    montelukast (SINGULAIR) 10 mg tablet No dose, route, or frequency recorded.    multivitamin capsule 1 capsule, Oral, Daily    mupirocin (BACTROBAN) 2 % ointment Topical (Top), 2 times daily    NITROSTAT 0.4 mg SL tablet No dose, route, or frequency recorded.    pantoprazole (PROTONIX) 40 MG tablet TAKE ONE TABLET BY MOUTH ONCE DAILY    pioglitazone (ACTOS) 30 mg, Oral, Daily    quinapriL (ACCUPRIL) 20 mg, Oral, Daily    rosuvastatin (CRESTOR) 20 MG tablet TAKE ONE TABLET BY MOUTH NIGHTLY AT BEDTIME    sertraline (ZOLOFT) 50 MG tablet TAKE ONE TABLET BY MOUTH ONCE DAILY    tolterodine (DETROL LA) 4 MG 24 hr capsule TAKE ONE CAPSULE BY MOUTH ONCE DAILY    vitamin D (VITAMIN D3) 1,000 Units, Oral, Daily          Assessment & Plan     HTN (hypertension)  /76   He reports he has been seeing really high pressures randomly up to 220   I will give him small dose of clonidine PRN for SBP over 170   Continue quinapril, lorepssor, imdur, amlodipine   Low Na diet     Mixed hyperlipidemia  Take 2 week drug holiday from crestor 2/2 leg cramping   LDL 71   Attempt zetia if crestor improves cramps     Coronary artery disease of native artery of native heart with stable angina pectoris  Stable with no angial equivalents   Cont asa   Break from statin       Pain in both lower  extremities  MESSI >1 BL   Clear from podiatry standpoint   Attempt break from crestor to see if improvement   Wear compression socks           No follow-ups on file.

## 2022-04-13 NOTE — ASSESSMENT & PLAN NOTE
MESSI >1 BL   Clear from podiatry standpoint   Attempt break from crestor to see if improvement   Wear compression socks

## 2022-04-13 NOTE — ASSESSMENT & PLAN NOTE
/76   He reports he has been seeing really high pressures randomly up to 220   I will give him small dose of clonidine PRN for SBP over 170   Continue quinapril, lorepssor, imdur, amlodipine   Low Na diet

## 2022-04-17 LAB
LEFT ABI: 1.18
LEFT ARM BP: 139 MMHG
LEFT CALF BP: 191 MMHG
LEFT LOWER LEG BP: 204 MMHG
LEFT POSTERIOR TIBIAL: 167 MMHG
LEFT UPPER LEG BP: 212 MMHG
RIGHT ABI: 1.35
RIGHT ARM BP: 141 MMHG
RIGHT CALF BP: 176 MMHG
RIGHT LOWER LEG BP: 180 MMHG
RIGHT POSTERIOR TIBIAL: 191 MMHG
RIGHT UPPER LEG BP: 253 MMHG

## 2022-04-19 ENCOUNTER — TELEPHONE (OUTPATIENT)
Dept: CARDIOLOGY | Facility: CLINIC | Age: 77
End: 2022-04-19
Payer: MEDICARE

## 2022-04-19 RX ORDER — CLONIDINE HYDROCHLORIDE 0.1 MG/1
0.1 TABLET ORAL EVERY 6 HOURS PRN
Qty: 60 TABLET | Refills: 11 | Status: SHIPPED | OUTPATIENT
Start: 2022-04-19 | End: 2022-08-25

## 2022-04-19 NOTE — TELEPHONE ENCOUNTER
----- Message from Aiyana Dozier sent at 4/19/2022 11:03 AM CDT -----  Type: Needs Medical Advice  Who Called:  Pt  Pharmacy name and phone #:    [id number] - [place] - [address] [facility]  [address] [place] [address]  Phone: [phone] Fax: [phone]  Best Call Back Number: [phone]  Additional Information: Pt was seen on 04/13 and requesting rx for High Bp that was advised and needed to be sent on to pharmacy--please advise--thank you

## 2022-05-16 ENCOUNTER — OFFICE VISIT (OUTPATIENT)
Dept: CARDIOLOGY | Facility: CLINIC | Age: 77
End: 2022-05-16
Payer: MEDICARE

## 2022-05-16 VITALS
HEART RATE: 74 BPM | SYSTOLIC BLOOD PRESSURE: 122 MMHG | DIASTOLIC BLOOD PRESSURE: 78 MMHG | OXYGEN SATURATION: 96 % | BODY MASS INDEX: 26.28 KG/M2 | HEIGHT: 72 IN | WEIGHT: 194 LBS

## 2022-05-16 DIAGNOSIS — M79.605 LEG PAIN, BILATERAL: ICD-10-CM

## 2022-05-16 DIAGNOSIS — I25.10 ASCVD (ARTERIOSCLEROTIC CARDIOVASCULAR DISEASE): ICD-10-CM

## 2022-05-16 DIAGNOSIS — E11.43 DIABETIC AUTONOMIC NEUROPATHY ASSOCIATED WITH TYPE 2 DIABETES MELLITUS: Primary | ICD-10-CM

## 2022-05-16 DIAGNOSIS — M79.604 LEG PAIN, BILATERAL: ICD-10-CM

## 2022-05-16 DIAGNOSIS — G63 B12 NEUROPATHY: ICD-10-CM

## 2022-05-16 DIAGNOSIS — I25.118 CORONARY ARTERY DISEASE OF NATIVE ARTERY OF NATIVE HEART WITH STABLE ANGINA PECTORIS: ICD-10-CM

## 2022-05-16 DIAGNOSIS — I10 PRIMARY HYPERTENSION: ICD-10-CM

## 2022-05-16 DIAGNOSIS — E53.8 B12 NEUROPATHY: ICD-10-CM

## 2022-05-16 DIAGNOSIS — E11.65 TYPE 2 DIABETES MELLITUS WITH HYPERGLYCEMIA, WITHOUT LONG-TERM CURRENT USE OF INSULIN: ICD-10-CM

## 2022-05-16 DIAGNOSIS — R07.9 CHEST PAIN, UNSPECIFIED TYPE: ICD-10-CM

## 2022-05-16 PROCEDURE — 99215 PR OFFICE/OUTPT VISIT, EST, LEVL V, 40-54 MIN: ICD-10-PCS | Mod: S$GLB,,, | Performed by: SPECIALIST

## 2022-05-16 PROCEDURE — 99215 OFFICE O/P EST HI 40 MIN: CPT | Mod: S$GLB,,, | Performed by: SPECIALIST

## 2022-05-16 RX ORDER — GABAPENTIN 300 MG/1
300 CAPSULE ORAL 2 TIMES DAILY
Qty: 60 CAPSULE | Refills: 11 | Status: SHIPPED | OUTPATIENT
Start: 2022-05-16 | End: 2022-12-21

## 2022-05-16 NOTE — PROGRESS NOTES
Subjective:    Patient ID:  Tono Saez is a 76 y.o. male who presents for   Coronary Artery Disease, Transient Ischemic Attack, and Results (MESSI)    1) hurting in  Calves feels full  In  Calves  Pressure      Had this before in 1964   Walking bothers him -- ankles swelling     Stopping  chrestor x 1 mo  no  Difference     2)  On  actos -x 3 years        No  Numb  Some tingly   3)   bp  Up and down so stopped quinapril and isordil   4) fbs 110-118  a1c           Review of patient's allergies indicates:   Allergen Reactions    Adhesive Other (See Comments)     SILK TAPE PULL SKIN OFF    Metformin Other (See Comments)     Weight loss cachexia anorexia,diarrhea.    Pcn [penicillins]      Patient states he passed out from this medication when he was 10 years old.        Past Medical History:   Diagnosis Date    Anemia     Anticoagulant long-term use     Arthritis     CAD (coronary artery disease)     CABG, STENTS '97,'99,'01,'05    Cancer     SKIN    Cataract     Diabetes mellitus     Diabetes mellitus type II     GERD (gastroesophageal reflux disease)     GI bleed 2020    Hypertension     Myocardial infarction     Wears glasses      Past Surgical History:   Procedure Laterality Date    CARDIAC PACEMAKER PLACEMENT      CARDIAC PACEMAKER PLACEMENT      CARDIAC PACEMAKER PLACEMENT  04/26/2018    replaced    CARDIAC SURGERY      1995   CABG X 5    CHOLECYSTECTOMY      COLON SURGERY      COLONOSCOPY N/A 2/21/2020    Procedure: COLONOSCOPY;  Surgeon: Abe Barragan III, MD;  Location: [facility] ENDO;  Service: Endoscopy;  Laterality: N/A;    COLONOSCOPY N/A 2/23/2020    Procedure: COLONOSCOPY;  Surgeon: Bob Locke Jr., MD;  Location: [facility] ENDO;  Service: Endoscopy;  Laterality: N/A;    CORONARY ARTERY BYPASS GRAFT  1995    CORONARY STENT PLACEMENT      stent x 5    ESOPHAGOGASTRODUODENOSCOPY N/A 2/23/2020    Procedure: EGD (ESOPHAGOGASTRODUODENOSCOPY);  Surgeon: Bob Locke Jr., MD;   Location: [facility];  Service: Endoscopy;  Laterality: N/A;    EYE SURGERY      BILAT CATARACT    head laceration   01/19/2018    HEMORRHOID SURGERY      SMALL BOWEL ENTEROSCOPY N/A 2/21/2020    Procedure: ENTEROSCOPY;  Surgeon: Abe Barragan III, MD;  Location: [facility];  Service: Endoscopy;  Laterality: N/A;    stint       Social History     Tobacco Use    Smoking status: Never Smoker    Smokeless tobacco: Never Used   Substance Use Topics    Alcohol use: No    Drug use: No        Review of Systems     Review of Systems   Constitutional: Positive for malaise/fatigue.   HENT: Negative.    Cardiovascular: Positive for claudication and leg swelling.   Musculoskeletal: Positive for arthritis.           Objective:        Vitals:    05/16/22 1622   BP: 122/78   Pulse: 74       Lab Results   Component Value Date    WBC 4.57 02/28/2022    HGB 12.9 (L) 02/28/2022    HCT 39.5 (L) 02/28/2022    PLT 91 (L) 02/28/2022    CHOL 134 07/22/2021    TRIG 114 07/22/2021    HDL 40 07/22/2021    ALT 16 02/28/2022    AST 17 02/28/2022     02/28/2022    K 4.1 02/28/2022     02/28/2022    CREATININE 1.4 02/28/2022    BUN 19 02/28/2022    CO2 27 02/28/2022    TSH 2.100 09/21/2020    PSA 0.93 07/13/2021    INR 2.2 02/20/2020    HGBA1C 7.2 (H) 07/22/2021        ECHOCARDIOGRAM RESULTS  Results for orders placed in visit on 03/28/22    Echo    Interpretation Summary  · The left ventricle is normal in size with concentric remodeling  · The estimated ejection fraction is 44%.  · Grade I left ventricular diastolic dysfunction.  · There are segmental left ventricular wall motion abnormalities. Apical hypokinesis noted  · There is abnormal septal wall motion consistent with right ventricular pacemaker.  · Normal right ventricular size with mildly reduced right ventricular systolic function.  · Mild-to-moderate aortic regurgitation.  · There is mild-to-moderate aortic valve stenosis.  · Aortic valve area is 1.77 cm2; peak  velocity is 1.96 m/s; mean gradient is 9 mmHg.  · Mild mitral regurgitation.  · Normal central venous pressure (3 mmHg).  · The estimated PA systolic pressure is 13 mmHg.      CURRENT/PREVIOUS VISIT EKG  Results for orders placed or performed in visit on 02/28/22   IN OFFICE EKG 12-LEAD (to [place])    Collection Time: 02/28/22  3:36 PM    Narrative    Test Reason : I10,    Vent. Rate : 066 BPM     Atrial Rate : 066 BPM     P-R Int : 206 ms          QRS Dur : 200 ms      QT Int : 466 ms       P-R-T Axes : 028 -86 098 degrees     QTc Int : 488 ms    Atrial-sensed ventricular-paced rhythm  Abnormal ECG  When compared with ECG of 20-FEB-2020 10:13,  Vent. rate has decreased BY   5 BPM  Confirmed by Zachary Diop MD ([id number]) on 3/13/2022 6:04:47 PM    Referred By:  TC           Confirmed By:Zachary Diop MD     Results for orders placed in visit on 03/28/22    Echo    Interpretation Summary  · The left ventricle is normal in size with concentric remodeling  · The estimated ejection fraction is 44%.  · Grade I left ventricular diastolic dysfunction.  · There are segmental left ventricular wall motion abnormalities. Apical hypokinesis noted  · There is abnormal septal wall motion consistent with right ventricular pacemaker.  · Normal right ventricular size with mildly reduced right ventricular systolic function.  · Mild-to-moderate aortic regurgitation.  · There is mild-to-moderate aortic valve stenosis.  · Aortic valve area is 1.77 cm2; peak velocity is 1.96 m/s; mean gradient is 9 mmHg.  · Mild mitral regurgitation.  · Normal central venous pressure (3 mmHg).  · The estimated PA systolic pressure is 13 mmHg.    Results for orders placed in visit on 03/28/22    Nuclear Stress Test    Interpretation Summary    The nuclear portion of this study will be reported separately.    The EKG portion of this study is uninterpretable.    The patient reported chest pain during the stress test.    There were no arrhythmias during  stress.      PHYSICAL EXAM    Physical Exam   Blood pressure I  s 120/80 in the left arm   lungs are misha clear   Cardiac regular   abdomen no masses   extremities he has good dorsalis pedis posterior tibial pulses bilaterally   mild edema   Neurologic is hyperreflexic in th  E knees    Medication List with Changes/Refills   Current Medications    AMLODIPINE (NORVASC) 5 MG TABLET    TAKE ONE TABLET BY MOUTH ONCE DAILY    ASPIRIN (ECOTRIN) 81 MG EC TABLET    Take 81 mg by mouth once daily.    BETAMETHASONE DIPROPIONATE (DIPROLENE) 0.05 % LOTION    Apply thin film to scalp bid prn itch    CLONIDINE (CATAPRES) 0.1 MG TABLET    Take 1 tablet (0.1 mg total) by mouth every 6 (six) hours as needed (SBP >170).    FINASTERIDE (PROSCAR) 5 MG TABLET    Take 5 mg by mouth once daily.    FUROSEMIDE (LASIX) 20 MG TABLET    Take 1 tablet (20 mg total) by mouth once daily.    ISOSORBIDE MONONITRATE (IMDUR) 30 MG 24 HR TABLET    Take 1 tablet (30 mg total) by mouth once daily.    KETOCONAZOLE (NIZORAL) 2 % SHAMPOO    Wash all scaling areas let sit 3 minutes then rinse 3x/wk    LEVOCETIRIZINE (XYZAL) 5 MG TABLET    TAKE ONE TABLET BY MOUTH EVERY EVENING    METOPROLOL TARTRATE (LOPRESSOR) 50 MG TABLET    Take 25 mg by mouth 2 (two) times daily.    MONTELUKAST (SINGULAIR) 10 MG TABLET        MULTIVITAMIN CAPSULE    Take 1 capsule by mouth once daily.    MUPIROCIN (BACTROBAN) 2 % OINTMENT    Apply topically 2 (two) times daily.    NITROSTAT 0.4 MG SL TABLET        PANTOPRAZOLE (PROTONIX) 40 MG TABLET    TAKE ONE TABLET BY MOUTH ONCE DAILY    PIOGLITAZONE (ACTOS) 30 MG TABLET    Take 1 tablet (30 mg total) by mouth once daily.    QUINAPRIL (ACCUPRIL) 20 MG TABLET    Take 1 tablet (20 mg total) by mouth once daily.    ROSUVASTATIN (CRESTOR) 20 MG TABLET    TAKE ONE TABLET BY MOUTH NIGHTLY AT BEDTIME    SERTRALINE (ZOLOFT) 50 MG TABLET    TAKE ONE TABLET BY MOUTH ONCE DAILY    SULFACETAMIDE SODIUM (OVACE PLUS SHAMPOO) 10 % SHAM    Wash  scalp nightly    TOLTERODINE (DETROL LA) 4 MG 24 HR CAPSULE    TAKE ONE CAPSULE BY MOUTH ONCE DAILY    VITAMIN D 1000 UNITS TAB    Take 1,000 Units by mouth once daily.           Assessment:      Persistent leg pain possibly secondary to diabetic neuropathy without evidence of vascular insufficiency of the lower legs on MESSI study   hyper a flexi and the knees possibly related to the back   diabetes mellitus   swelling in the legs secondary to Actos and amlodipine   hypo tension for which he has discontinued Isordil and quinapril; possibility of the leg pain coming from the back   he did discontinue rosuvastatin for a month and a main due to difference in his symptoms.    I personally reviewed old and new ecg's, lab reports,, xray reports  and  other cardiovascular studies including  echo's, stress tests, angiogram reports, holters,and vascular studies .  In addition I evaluated original cardiac cath  ___echo  ____cxr ______ct ____scan on [employer] or [employer] or other viewing platforms .  I reviewed  office and hospital notes Yes ____ of  referring providers.    Plan:     S stop Actos and stay off of quinapril   go on empagliflozin zone 25 mg a day   go on gabapentin 300 b.i.d.  .  Check B12 and folic acid levels  .  Return to clinic in approximately 5 weeks    Problem List Items Addressed This Visit    None          No follow-ups on file.

## 2022-05-30 ENCOUNTER — HOSPITAL ENCOUNTER (OUTPATIENT)
Dept: RADIOLOGY | Facility: HOSPITAL | Age: 77
Discharge: HOME | End: 2022-05-30
Attending: SPECIALIST
Payer: MEDICARE

## 2022-05-30 DIAGNOSIS — M79.604 LEG PAIN, BILATERAL: ICD-10-CM

## 2022-05-30 DIAGNOSIS — M79.605 LEG PAIN, BILATERAL: ICD-10-CM

## 2022-05-30 PROCEDURE — 93970 EXTREMITY STUDY: CPT | Mod: TC,PO

## 2022-06-23 ENCOUNTER — TELEPHONE (OUTPATIENT)
Dept: CARDIOLOGY | Facility: CLINIC | Age: 77
End: 2022-06-23
Payer: MEDICARE

## 2022-06-23 ENCOUNTER — NURSE TRIAGE (OUTPATIENT)
Dept: ADMINISTRATIVE | Facility: CLINIC | Age: 77
End: 2022-06-23
Payer: MEDICARE

## 2022-06-23 NOTE — TELEPHONE ENCOUNTER
----- Message from Sobia Crystal sent at 6/23/2022 12:27 PM CDT -----  Regarding: blood pressure  Contact: patient  Patient want to speak with a nurse regarding blood pressure 84/57, tx to on call nurse, please call back at [phone] (home)     Case number [id number]

## 2022-06-23 NOTE — TELEPHONE ENCOUNTER
Spoke with Npeters regarding sx and she stated take less gabapentin and jardiance should not cause that. Pt was advised to drink plenty of fluids. Pt was advised to give it time. Pt said he will cut Gabapentin in half and give it time.

## 2022-06-23 NOTE — TELEPHONE ENCOUNTER
Pt transferred to me from . Pt states his BP was low- 82/57 this morning and had an episode of diizziness when he was up and walking. States held all meds this morning. States for the past hour he is no longer dizzy.      While on phone 116/58- Pt got up and walked to get BP cuff and states he had no dizziness at this time. Denies CP.    Baseline 140s /80    Taking new meds- gabapentin and Jardiance- having BP issues since then-   Wants to speak with Dr. Lux. Thinks his new medications may be effecting his BP and wants to know if he should change or hold meds.     Care advice per protocol. Advised would route high priority to Dr. Lux staff. Warm transferred to scheduling to assist with making appointment. Pt advised to call back with concerns or worsening symptoms.       Reason for Disposition   Fall in systolic BP > 20 mm Hg from normal and NOT dizzy, lightheaded, or weak    Additional Information   Negative: Systolic BP < 90 and feeling dizzy, lightheaded, or weak   Negative: Started suddenly after an allergic medicine, an allergic food, or bee sting   Negative: Shock suspected (e.g., cold/pale/clammy skin, too weak to stand, low BP, rapid pulse)   Negative: Difficult to awaken or acting confused (e.g., disoriented, slurred speech)   Negative: Fainted   Negative: Chest pain   Negative: Bleeding (e.g., vomiting blood, rectal bleeding or tarry stools, severe vaginal bleeding)   Negative: Extra heart beats or heart is beating fast (i.e., 'palpitations')   Negative: Sounds like a life-threatening emergency to the triager   Negative: Systolic BP < 80 and NOT dizzy, lightheaded or weak (feels normal)   Negative: Abdominal pain   Negative: Major surgery in the past month   Negative: Fever > 100.4 F (38.0 C)   Negative: Drinking very little and has signs of dehydration (e.g., no urine > 12 hours, very dry mouth, very lightheaded)   Negative: Fall in systolic BP > 20 mm Hg from normal and feeling  dizzy, lightheaded, or weak   Negative: Patient sounds very sick or weak to the triager   Negative: Systolic BP < 90 and NOT dizzy, lightheaded or weak   Negative: Systolic BP  while taking blood pressure medications and NOT dizzy, lightheaded or weak    Protocols used: BLOOD PRESSURE - LOW-A-OH

## 2022-06-28 ENCOUNTER — OFFICE VISIT (OUTPATIENT)
Dept: FAMILY MEDICINE | Facility: CLINIC | Age: 77
End: 2022-06-28
Payer: MEDICARE

## 2022-06-28 ENCOUNTER — HOSPITAL ENCOUNTER (OUTPATIENT)
Dept: RADIOLOGY | Facility: HOSPITAL | Age: 77
Discharge: HOME | End: 2022-06-28
Attending: NURSE PRACTITIONER
Payer: MEDICARE

## 2022-06-28 VITALS
HEART RATE: 96 BPM | DIASTOLIC BLOOD PRESSURE: 64 MMHG | SYSTOLIC BLOOD PRESSURE: 118 MMHG | TEMPERATURE: 99 F | OXYGEN SATURATION: 96 % | BODY MASS INDEX: 26.19 KG/M2 | WEIGHT: 193.38 LBS | HEIGHT: 72 IN

## 2022-06-28 DIAGNOSIS — M25.562 PAIN AND SWELLING OF KNEE, LEFT: ICD-10-CM

## 2022-06-28 DIAGNOSIS — M25.462 PAIN AND SWELLING OF KNEE, LEFT: ICD-10-CM

## 2022-06-28 DIAGNOSIS — M25.562 ACUTE PAIN OF LEFT KNEE: Primary | ICD-10-CM

## 2022-06-28 DIAGNOSIS — M25.562 ACUTE PAIN OF LEFT KNEE: ICD-10-CM

## 2022-06-28 PROCEDURE — 99214 OFFICE O/P EST MOD 30 MIN: CPT | Mod: S$PBB,,, | Performed by: NURSE PRACTITIONER

## 2022-06-28 PROCEDURE — 73564 X-RAY EXAM KNEE 4 OR MORE: CPT | Mod: TC,LT

## 2022-06-28 PROCEDURE — 99214 PR OFFICE/OUTPT VISIT, EST, LEVL IV, 30-39 MIN: ICD-10-PCS | Mod: S$PBB,,, | Performed by: NURSE PRACTITIONER

## 2022-06-28 PROCEDURE — 99215 OFFICE O/P EST HI 40 MIN: CPT | Performed by: NURSE PRACTITIONER

## 2022-06-28 RX ORDER — TRAMADOL HYDROCHLORIDE 50 MG/1
50 TABLET ORAL EVERY 8 HOURS PRN
Qty: 15 TABLET | Refills: 0 | Status: SHIPPED | OUTPATIENT
Start: 2022-06-28 | End: 2022-07-03

## 2022-06-28 RX ORDER — FLUTICASONE PROPIONATE 50 MCG
1 SPRAY, SUSPENSION (ML) NASAL
COMMUNITY

## 2022-06-28 NOTE — PROGRESS NOTES
SUBJECTIVE:      Patient ID: Tono Saez is a 76 y.o. male.    Chief Complaint: Knee Pain (Left x 3 days. Patient woke up Sunday morning unable to bear weight on )    76-year-old male presents to the clinic with complaints of left knee pain.  He has a history of BLE pain.  He had normal ABIs in April and venous US to BLE which was negative for DVT last month.  His current symptoms started 3 days ago. Patient reports waking up Sunday morning unable to bear weight on the LLE.  The pain is to his anterior knee.  The knee is tender to touch, swelling is noted, and reports decreased ROM due to pain.  He has no history of gout.  Denies injury or trauma to knee.    Knee Pain   The incident occurred 3 to 5 days ago. The incident occurred at home. There was no injury mechanism. The pain is present in the right knee. The quality of the pain is described as aching and stabbing. The pain is at a severity of 5/10. The pain is moderate. The pain has been fluctuating since onset. Associated symptoms include an inability to bear weight and a loss of motion. Pertinent negatives include no loss of sensation, muscle weakness, numbness or tingling. He reports no foreign bodies present. The symptoms are aggravated by weight bearing, palpation and movement. He has tried non-weight bearing for the symptoms. The treatment provided no relief.       Family History   Problem Relation Age of Onset    Heart disease Mother     Heart disease Father     Hyperlipidemia Sister     Hypertension Sister     Heart disease Brother     Heart disease Brother     Heart disease Brother     Heart disease Brother     Heart disease Brother       Social History     Socioeconomic History    Marital status:    Tobacco Use    Smoking status: Never Smoker    Smokeless tobacco: Never Used   Substance and Sexual Activity    Alcohol use: No    Drug use: No    Sexual activity: Not Currently     Social Determinants of Health     Financial  Resource Strain: Low Risk     Difficulty of Paying Living Expenses: Not hard at all   Food Insecurity: No Food Insecurity    Worried About Running Out of Food in the Last Year: Never true    Ran Out of Food in the Last Year: Never true   Transportation Needs: No Transportation Needs    Lack of Transportation (Medical): No    Lack of Transportation (Non-Medical): No   Physical Activity: Inactive    Days of Exercise per Week: 0 days    Minutes of Exercise per Session: 0 min   Stress: No Stress Concern Present    Feeling of Stress : Not at all   Social Connections: Unknown    Frequency of Communication with Friends and Family: More than three times a week    Frequency of Social Gatherings with Friends and Family: Twice a week    Active Member of Clubs or Organizations: Yes    Attends Club or Organization Meetings: More than 4 times per year    Marital Status:    Housing Stability: Low Risk     Unable to Pay for Housing in the Last Year: No    Number of Places Lived in the Last Year: 1    Unstable Housing in the Last Year: No     Current Outpatient Medications   Medication Sig Dispense Refill    amLODIPine (NORVASC) 5 MG tablet TAKE ONE TABLET BY MOUTH ONCE DAILY 90 tablet 5    aspirin (ECOTRIN) 81 MG EC tablet Take 81 mg by mouth once daily.      betamethasone dipropionate (DIPROLENE) 0.05 % lotion Apply thin film to scalp bid prn itch 60 mL 6    cloNIDine (CATAPRES) 0.1 MG tablet Take 1 tablet (0.1 mg total) by mouth every 6 (six) hours as needed (SBP >170). 60 tablet 11    empagliflozin (JARDIANCE) 25 mg tablet Take 1 tablet (25 mg total) by mouth once daily. 30 tablet 6    finasteride (PROSCAR) 5 mg tablet Take 5 mg by mouth once daily.      fluticasone propionate (FLONASE) 50 mcg/actuation nasal spray Use nasally as directed as needed      furosemide (LASIX) 20 MG tablet Take 1 tablet (20 mg total) by mouth once daily. 90 tablet 3    gabapentin (NEURONTIN) 300 MG capsule Take 1  capsule (300 mg total) by mouth 2 (two) times daily. 60 capsule 11    ketoconazole (NIZORAL) 2 % shampoo Wash all scaling areas let sit 3 minutes then rinse 3x/wk 120 mL 11    levocetirizine (XYZAL) 5 MG tablet TAKE ONE TABLET BY MOUTH EVERY EVENING 60 tablet 11    metoprolol tartrate (LOPRESSOR) 50 MG tablet Take 25 mg by mouth 2 (two) times daily.      montelukast (SINGULAIR) 10 mg tablet TAKE 1 TABLET (10 MG TOTAL) BY MOUTH EVERY EVENING. 30 tablet 11    multivitamin capsule Take 1 capsule by mouth once daily.      mupirocin (BACTROBAN) 2 % ointment Apply topically 2 (two) times daily. 22 g 11    NITROSTAT 0.4 mg SL tablet       pantoprazole (PROTONIX) 40 MG tablet TAKE ONE TABLET BY MOUTH ONCE DAILY 90 tablet 1    rosuvastatin (CRESTOR) 20 MG tablet TAKE ONE TABLET BY MOUTH NIGHTLY AT BEDTIME 30 tablet 3    sertraline (ZOLOFT) 50 MG tablet TAKE ONE TABLET BY MOUTH ONCE DAILY 90 tablet 1    sulfacetamide sodium (OVACE PLUS SHAMPOO) 10 % Sham Wash scalp nightly 1 each 11    tolterodine (DETROL LA) 4 MG 24 hr capsule TAKE ONE CAPSULE BY MOUTH ONCE DAILY 90 capsule 1    vitamin D 1000 units Tab Take 1,000 Units by mouth once daily.      mv-min-folic acid-lutein 500-250 mcg Chew Take one every day      traMADoL (ULTRAM) 50 mg tablet Take 1 tablet (50 mg total) by mouth every 8 (eight) hours as needed for Pain. 15 tablet 0     No current facility-administered medications for this visit.     Review of patient's allergies indicates:   Allergen Reactions    Adhesive Other (See Comments)     SILK TAPE PULL SKIN OFF    Metformin Other (See Comments)     Weight loss cachexia anorexia,diarrhea.    Pcn [penicillins]      Patient states he passed out from this medication when he was 10 years old.       Past Medical History:   Diagnosis Date    Anemia     Anticoagulant long-term use     Arthritis     CAD (coronary artery disease)     CABG, STENTS '97,'99,'01,'05    Cancer     SKIN    Cataract      Diabetes mellitus     Diabetes mellitus type II     GERD (gastroesophageal reflux disease)     GI bleed 2020    Hypertension     Myocardial infarction     Wears glasses      Past Surgical History:   Procedure Laterality Date    CARDIAC PACEMAKER PLACEMENT      CARDIAC PACEMAKER PLACEMENT      CARDIAC PACEMAKER PLACEMENT  04/26/2018    replaced    CARDIAC SURGERY      1995   CABG X 5    CHOLECYSTECTOMY      COLON SURGERY      COLONOSCOPY N/A 2/21/2020    Procedure: COLONOSCOPY;  Surgeon: Abe Barragan III, MD;  Location: [facility];  Service: Endoscopy;  Laterality: N/A;    COLONOSCOPY N/A 2/23/2020    Procedure: COLONOSCOPY;  Surgeon: Bob Locke Jr., MD;  Location: [facility] ENDO;  Service: Endoscopy;  Laterality: N/A;    CORONARY ARTERY BYPASS GRAFT  1995    CORONARY STENT PLACEMENT      stent x 5    ESOPHAGOGASTRODUODENOSCOPY N/A 2/23/2020    Procedure: EGD (ESOPHAGOGASTRODUODENOSCOPY);  Surgeon: Bob Locke Jr., MD;  Location: [facility] ENDO;  Service: Endoscopy;  Laterality: N/A;    EYE SURGERY      BILAT CATARACT    head laceration   01/19/2018    HEMORRHOID SURGERY      SMALL BOWEL ENTEROSCOPY N/A 2/21/2020    Procedure: ENTEROSCOPY;  Surgeon: Abe Barragan III, MD;  Location: [facility];  Service: Endoscopy;  Laterality: N/A;    stint         Review of Systems   Constitutional: Negative for activity change, appetite change, chills, diaphoresis, fatigue, fever and unexpected weight change.   HENT: Negative for congestion, ear pain, sinus pressure, sore throat, trouble swallowing and voice change.    Eyes: Negative for pain, discharge and visual disturbance.   Respiratory: Negative for cough, chest tightness, shortness of breath and wheezing.    Cardiovascular: Negative for chest pain and palpitations.        CAD   Gastrointestinal: Negative for abdominal pain, constipation, diarrhea, nausea and vomiting.   Endocrine:        T2DM   Genitourinary: Negative for difficulty urinating,  flank pain, frequency and urgency.   Musculoskeletal: Positive for arthralgias, gait problem and joint swelling. Negative for back pain.        Left knee pain   Skin: Negative for color change and rash.   Neurological: Negative for dizziness, tingling, seizures, syncope, weakness, numbness and headaches.   Hematological: Negative for adenopathy.   Psychiatric/Behavioral: Negative for dysphoric mood and sleep disturbance. The patient is not nervous/anxious.       OBJECTIVE:      Vitals:    06/28/22 1602   BP: 118/64   BP Location: Left arm   Patient Position: Sitting   BP Method: Medium (Manual)   Pulse: 96   Temp: 98.5 °F (36.9 °C)   TempSrc: Oral   SpO2: 96%   Weight: 87.7 kg (193 lb 6.4 oz)   Height: 6' (1.829 m)     Physical Exam  Vitals and nursing note reviewed.   Constitutional:       General: He is awake. He is not in acute distress.     Appearance: Normal appearance. He is overweight. He is not ill-appearing, toxic-appearing or diaphoretic.   HENT:      Head: Normocephalic and atraumatic.      Nose: Nose normal.   Eyes:      General: Lids are normal. Gaze aligned appropriately.      Conjunctiva/sclera: Conjunctivae normal.      Right eye: Right conjunctiva is not injected.      Left eye: Left conjunctiva is not injected.      Pupils: Pupils are equal, round, and reactive to light.   Cardiovascular:      Rate and Rhythm: Normal rate and regular rhythm.      Pulses:           Dorsalis pedis pulses are 1+ on the left side.        Posterior tibial pulses are 1+ on the left side.      Heart sounds: Normal heart sounds, S1 normal and S2 normal. No murmur heard.    No friction rub. No gallop.   Pulmonary:      Effort: Pulmonary effort is normal. No respiratory distress.      Breath sounds: Normal breath sounds. No stridor. No decreased breath sounds, wheezing, rhonchi or rales.   Chest:      Chest wall: No tenderness.   Musculoskeletal:      Cervical back: Neck supple.      Left knee: Swelling, effusion and bony  tenderness present. Decreased range of motion. Tenderness present over the patellar tendon.      Right lower leg: No edema.      Left lower leg: No edema.      Comments: Anterior knee pain with palpation. Limited exam due to decreased ROM due to pain.     Lymphadenopathy:      Cervical: No cervical adenopathy.   Skin:     General: Skin is warm and dry.      Capillary Refill: Capillary refill takes less than 2 seconds.      Findings: No erythema or rash.   Neurological:      Mental Status: He is alert and oriented to person, place, and time. Mental status is at baseline.   Psychiatric:         Attention and Perception: Attention normal.         Mood and Affect: Mood normal.         Speech: Speech normal.         Behavior: Behavior normal. Behavior is cooperative.         Thought Content: Thought content normal.         Judgment: Judgment normal.        Assessment:       1. Acute pain of left knee    2. Pain and swelling of knee, left        Plan:       Acute pain of left knee  X-rays ordered.  Suspect left knee effusion.  Patient to see ortho tomorrow.  Tylenol prn pain due to decreased kidney function.  Ultram for break through pain.  RICE discussed.    -     X-Ray Knee Complete 4 or More Views Left; Future; Expected date: 06/28/2022  -     traMADoL (ULTRAM) 50 mg tablet; Take 1 tablet (50 mg total) by mouth every 8 (eight) hours as needed for Pain.  Dispense: 15 tablet; Refill: 0  -     Ambulatory referral/consult to Orthopedics; Future; Expected date: 07/05/2022    Pain and swelling of knee, left  -     X-Ray Knee Complete 4 or More Views Left; Future; Expected date: 06/28/2022  -     traMADoL (ULTRAM) 50 mg tablet; Take 1 tablet (50 mg total) by mouth every 8 (eight) hours as needed for Pain.  Dispense: 15 tablet; Refill: 0  -     Ambulatory referral/consult to Orthopedics; Future; Expected date: 07/05/2022    This note was created using [employer] voice recognition software that occasionally misinterprets phrases or  words.     Follow up if symptoms worsen or fail to improve.      6/28/2022 RAMSEY Coon, FNP

## 2022-06-29 ENCOUNTER — TELEPHONE (OUTPATIENT)
Dept: FAMILY MEDICINE | Facility: CLINIC | Age: 77
End: 2022-06-29

## 2022-06-29 ENCOUNTER — OFFICE VISIT (OUTPATIENT)
Dept: ORTHOPEDICS | Facility: CLINIC | Age: 77
End: 2022-06-29
Payer: MEDICARE

## 2022-06-29 VITALS — WEIGHT: 193 LBS | HEIGHT: 72 IN | BODY MASS INDEX: 26.14 KG/M2

## 2022-06-29 DIAGNOSIS — M17.12 PRIMARY OSTEOARTHRITIS OF LEFT KNEE: ICD-10-CM

## 2022-06-29 DIAGNOSIS — M25.562 ACUTE PAIN OF LEFT KNEE: ICD-10-CM

## 2022-06-29 DIAGNOSIS — M23.204 DEGENERATIVE TEAR OF MEDIAL MENISCUS OF LEFT KNEE: Primary | ICD-10-CM

## 2022-06-29 DIAGNOSIS — M25.562 PAIN AND SWELLING OF KNEE, LEFT: ICD-10-CM

## 2022-06-29 DIAGNOSIS — M25.462 PAIN AND SWELLING OF KNEE, LEFT: ICD-10-CM

## 2022-06-29 PROCEDURE — 20610 DRAIN/INJ JOINT/BURSA W/O US: CPT | Mod: S$GLB,,, | Performed by: PHYSICIAN ASSISTANT

## 2022-06-29 PROCEDURE — 99203 PR OFFICE/OUTPT VISIT, NEW, LEVL III, 30-44 MIN: ICD-10-PCS | Mod: 25,S$GLB,, | Performed by: PHYSICIAN ASSISTANT

## 2022-06-29 PROCEDURE — 20610 LARGE JOINT ASPIRATION/INJECTION: L KNEE: ICD-10-PCS | Mod: S$GLB,,, | Performed by: PHYSICIAN ASSISTANT

## 2022-06-29 PROCEDURE — 99203 OFFICE O/P NEW LOW 30 MIN: CPT | Mod: 25,S$GLB,, | Performed by: PHYSICIAN ASSISTANT

## 2022-06-29 RX ORDER — TRIAMCINOLONE ACETONIDE 40 MG/ML
40 INJECTION, SUSPENSION INTRA-ARTICULAR; INTRAMUSCULAR
Status: DISCONTINUED | OUTPATIENT
Start: 2022-06-29 | End: 2022-06-29 | Stop reason: HOSPADM

## 2022-06-29 RX ADMIN — TRIAMCINOLONE ACETONIDE 40 MG: 40 INJECTION, SUSPENSION INTRA-ARTICULAR; INTRAMUSCULAR at 01:06

## 2022-06-29 NOTE — PROCEDURES
Large Joint Aspiration/Injection: L knee    Date/Time: 6/29/2022 1:15 PM  Performed by: GRANT Tam  Authorized by: GRANT Tam     Consent Done?:  Yes (Verbal)  Indications:  Pain  Site marked: the procedure site was marked    Timeout: prior to procedure the correct patient, procedure, and site was verified    Prep: patient was prepped and draped in usual sterile fashion      Local anesthesia used?: Yes    Local anesthetic:  Lidocaine 1% without epinephrine    Details:  Needle Size:  25 G  Ultrasonic Guidance for needle placement?: No    Location:  Knee  Site:  L knee  Medications:  40 mg triamcinolone acetonide 40 mg/mL  Patient tolerance:  Patient tolerated the procedure well with no immediate complications

## 2022-06-29 NOTE — TELEPHONE ENCOUNTER
----- Message from Sixto Castanon NP sent at 6/29/2022  8:42 AM CDT -----  Small effusion to knee, no fracture or malalignment of knee.  He should be contacted to set up an ortho appointment today if they haven't already called him yet.

## 2022-06-29 NOTE — PROGRESS NOTES
Small effusion to knee, no fracture or malalignment of knee.  He should be contacted to set up an ortho appointment today if they haven't already called him yet.

## 2022-06-29 NOTE — PROGRESS NOTES
[facility]  [address]  [place] [address]  Phone: [phone]   [phone]    Patient's PCP: Scott Staley MD  Referring Provider: Sixto Bull*    Subjective:      Chief Complaint:   Chief Complaint   Patient presents with    Left Knee - Pain     Left knee pain x 4 days, had XR done at Dr Staley's office,c/o pain, swelling, no popping, but giving way       Past Medical History:   Diagnosis Date    Anemia     Anticoagulant long-term use     Arthritis     CAD (coronary artery disease)     CABG, STENTS '97,'99,'01,'05    Cancer     SKIN    Cataract     Diabetes mellitus     Diabetes mellitus type II     GERD (gastroesophageal reflux disease)     GI bleed 2020    Hypertension     Myocardial infarction     Wears glasses        Past Surgical History:   Procedure Laterality Date    CARDIAC PACEMAKER PLACEMENT      CARDIAC PACEMAKER PLACEMENT      CARDIAC PACEMAKER PLACEMENT  04/26/2018    replaced    CARDIAC SURGERY      1995   CABG X 5    CHOLECYSTECTOMY      COLON SURGERY      COLONOSCOPY N/A 2/21/2020    Procedure: COLONOSCOPY;  Surgeon: Abe Barragan III, MD;  Location: [facility];  Service: Endoscopy;  Laterality: N/A;    COLONOSCOPY N/A 2/23/2020    Procedure: COLONOSCOPY;  Surgeon: Bob Locke Jr., MD;  Location: [facility];  Service: Endoscopy;  Laterality: N/A;    CORONARY ARTERY BYPASS GRAFT  1995    CORONARY STENT PLACEMENT      stent x 5    ESOPHAGOGASTRODUODENOSCOPY N/A 2/23/2020    Procedure: EGD (ESOPHAGOGASTRODUODENOSCOPY);  Surgeon: Bob Locke Jr., MD;  Location: [facility];  Service: Endoscopy;  Laterality: N/A;    EYE SURGERY      BILAT CATARACT    head laceration   01/19/2018    HEMORRHOID SURGERY      SMALL BOWEL ENTEROSCOPY N/A 2/21/2020    Procedure: ENTEROSCOPY;  Surgeon: Abe Barragan III, MD;  Location: [facility];  Service: Endoscopy;  Laterality: N/A;    stint         Current Outpatient Medications    Medication Sig    amLODIPine (NORVASC) 5 MG tablet TAKE ONE TABLET BY MOUTH ONCE DAILY    aspirin (ECOTRIN) 81 MG EC tablet Take 81 mg by mouth once daily.    betamethasone dipropionate (DIPROLENE) 0.05 % lotion Apply thin film to scalp bid prn itch    cloNIDine (CATAPRES) 0.1 MG tablet Take 1 tablet (0.1 mg total) by mouth every 6 (six) hours as needed (SBP >170).    empagliflozin (JARDIANCE) 25 mg tablet Take 1 tablet (25 mg total) by mouth once daily.    finasteride (PROSCAR) 5 mg tablet Take 5 mg by mouth once daily.    fluticasone propionate (FLONASE) 50 mcg/actuation nasal spray Use nasally as directed as needed    furosemide (LASIX) 20 MG tablet Take 1 tablet (20 mg total) by mouth once daily.    gabapentin (NEURONTIN) 300 MG capsule Take 1 capsule (300 mg total) by mouth 2 (two) times daily.    ketoconazole (NIZORAL) 2 % shampoo Wash all scaling areas let sit 3 minutes then rinse 3x/wk    levocetirizine (XYZAL) 5 MG tablet TAKE ONE TABLET BY MOUTH EVERY EVENING    metoprolol tartrate (LOPRESSOR) 50 MG tablet Take 25 mg by mouth 2 (two) times daily.    montelukast (SINGULAIR) 10 mg tablet TAKE 1 TABLET (10 MG TOTAL) BY MOUTH EVERY EVENING.    multivitamin capsule Take 1 capsule by mouth once daily.    mupirocin (BACTROBAN) 2 % ointment Apply topically 2 (two) times daily.    mv-min-folic acid-lutein 500-250 mcg Chew Take one every day    NITROSTAT 0.4 mg SL tablet     pantoprazole (PROTONIX) 40 MG tablet TAKE ONE TABLET BY MOUTH ONCE DAILY    rosuvastatin (CRESTOR) 20 MG tablet TAKE ONE TABLET BY MOUTH NIGHTLY AT BEDTIME    sertraline (ZOLOFT) 50 MG tablet TAKE ONE TABLET BY MOUTH ONCE DAILY    sulfacetamide sodium (OVACE PLUS SHAMPOO) 10 % Sham Wash scalp nightly    tolterodine (DETROL LA) 4 MG 24 hr capsule TAKE ONE CAPSULE BY MOUTH ONCE DAILY    traMADoL (ULTRAM) 50 mg tablet Take 1 tablet (50 mg total) by mouth every 8 (eight) hours as needed for Pain.    vitamin D 1000 units Tab  Take 1,000 Units by mouth once daily.     No current facility-administered medications for this visit.       Review of patient's allergies indicates:   Allergen Reactions    Adhesive Other (See Comments)     SILK TAPE PULL SKIN OFF    Metformin Other (See Comments)     Weight loss cachexia anorexia,diarrhea.    Pcn [penicillins]      Patient states he passed out from this medication when he was 10 years old.        Family History   Problem Relation Age of Onset    Heart disease Mother     Heart disease Father     Hyperlipidemia Sister     Hypertension Sister     Heart disease Brother     Heart disease Brother     Heart disease Brother     Heart disease Brother     Heart disease Brother        Social History     Socioeconomic History    Marital status:    Tobacco Use    Smoking status: Never Smoker    Smokeless tobacco: Never Used   Substance and Sexual Activity    Alcohol use: No    Drug use: No    Sexual activity: Not Currently     Social Determinants of Health     Financial Resource Strain: Low Risk     Difficulty of Paying Living Expenses: Not hard at all   Food Insecurity: No Food Insecurity    Worried About Running Out of Food in the Last Year: Never true    Ran Out of Food in the Last Year: Never true   Transportation Needs: No Transportation Needs    Lack of Transportation (Medical): No    Lack of Transportation (Non-Medical): No   Physical Activity: Inactive    Days of Exercise per Week: 0 days    Minutes of Exercise per Session: 0 min   Stress: No Stress Concern Present    Feeling of Stress : Not at all   Social Connections: Unknown    Frequency of Communication with Friends and Family: More than three times a week    Frequency of Social Gatherings with Friends and Family: Twice a week    Active Member of Clubs or Organizations: Yes    Attends Club or Organization Meetings: More than 4 times per year    Marital Status:    Housing Stability: Low Risk     Unable  to Pay for Housing in the Last Year: No    Number of Places Lived in the Last Year: 1    Unstable Housing in the Last Year: No       History of present illness:  76-year-old male who presents to clinic today for an acute appointment. He had a acute onset of left knee pain Sunday morning upon awakening.  He denies any trauma missteps or other acute injuries to the left knee. He denies any swelling of the left knee.  Pain primarily over the anterior medial aspect of the left knee. He saw primary care yesterday, they did some x-rays as an outpatient and asked us to see him this morning.    He does not endorse any hip pain, he does not endorse any back pain. He does not endorse any radicular symptoms.  But he does states that he has difficulty bearing weight due to pain in his left knee.  No right knee symptoms.    Review of Systems:    Constitutional: Negative for chills, fever and weight loss.   HENT: Negative for congestion.    Eyes: Negative for discharge and redness.   Respiratory: Negative for cough and shortness of breath.    Cardiovascular: Negative for chest pain.   Gastrointestinal: Negative for nausea and vomiting.   Musculoskeletal: See HPI.   Skin: Negative for rash.   Neurological: Negative for headaches.   Endo/Heme/Allergies: Does not bruise/bleed easily.   Psychiatric/Behavioral: The patient is not nervous/anxious.    All other systems reviewed and are negative.       Objective:      Physical Examination:    Vital Signs:  There were no vitals filed for this visit.    Body mass index is 26.18 kg/m².    This a well-developed, well nourished patient in no acute distress.  They are alert and oriented and cooperative to examination.     Examination of the left knee, patient has some patellofemoral crepitus, subjectively he complains of pain which does limit my exam of range of motion however I am able to passively extend him to 0°, I can flex him to 90° fairly easily.  Past that he becomes too  uncomfortable.  He does have some tenderness over the anterior medial joint line with palpation but also over the posterior medial joint line. I can reproduce medial joint line pain with Eva's testing medially but not laterally. I do not appreciate any definitive clicking or popping.  Knee is other wisestable to anterior-posterior and varus and valgus stress      Pertinent New Results:        XRAY Report / Interpretation:   AP lateral tunnel and sunrise views of the left knee taken yesterday are reviewed.  Patient has some mild arthritic changes no large joint effusion is appreciated.  Visualized soft tissues appear normal.          Assessment:       1. Degenerative tear of medial meniscus of left knee    2. Primary osteoarthritis of left knee    3. Acute pain of left knee    4. Pain and swelling of knee, left      Plan:     Degenerative tear of medial meniscus of left knee  -     Large Joint Aspiration/Injection: L knee    Primary osteoarthritis of left knee  -     Large Joint Aspiration/Injection: L knee    Acute pain of left knee  -     Ambulatory referral/consult to Orthopedics    Pain and swelling of knee, left  -     Ambulatory referral/consult to Orthopedics        Follow up in about 2 weeks (around 7/13/2022) for left knee injection 06/29/22.    There are some mild changes on the x-ray of the knee suggestive of arthritis.  Some squaring of the medial compartment, some patellar facet changes.  Joint space however appears to be fairly well maintained.  We did not do a standing AP view.  I do not suspect any internal derangement.  Degenerative medial meniscal tear is most likely.  We injected the left knee today with lidocaine and triamcinolone.  Will see him back in 2 weeks see how he responds to the injection. He unfortunately can't have an MRI because of a pacemaker.    [unfilled]  NADEEN Charlton, PANaliniC    This note was created using [employer] voice recognition software that occasionally  misinterprets words or phrases.

## 2022-06-30 ENCOUNTER — OFFICE VISIT (OUTPATIENT)
Dept: CARDIOLOGY | Facility: CLINIC | Age: 77
End: 2022-06-30
Payer: MEDICARE

## 2022-06-30 VITALS
OXYGEN SATURATION: 95 % | WEIGHT: 195 LBS | DIASTOLIC BLOOD PRESSURE: 70 MMHG | HEIGHT: 72 IN | HEART RATE: 78 BPM | SYSTOLIC BLOOD PRESSURE: 122 MMHG | BODY MASS INDEX: 26.41 KG/M2

## 2022-06-30 DIAGNOSIS — I25.118 CORONARY ARTERY DISEASE OF NATIVE ARTERY OF NATIVE HEART WITH STABLE ANGINA PECTORIS: ICD-10-CM

## 2022-06-30 DIAGNOSIS — E78.2 MIXED HYPERLIPIDEMIA: ICD-10-CM

## 2022-06-30 DIAGNOSIS — R07.9 CHEST PAIN, UNSPECIFIED TYPE: Primary | ICD-10-CM

## 2022-06-30 DIAGNOSIS — I10 PRIMARY HYPERTENSION: ICD-10-CM

## 2022-06-30 PROCEDURE — 99214 PR OFFICE/OUTPT VISIT, EST, LEVL IV, 30-39 MIN: ICD-10-PCS | Mod: S$GLB,,, | Performed by: SPECIALIST

## 2022-06-30 PROCEDURE — 99214 OFFICE O/P EST MOD 30 MIN: CPT | Mod: S$GLB,,, | Performed by: SPECIALIST

## 2022-06-30 NOTE — PROGRESS NOTES
Subjective:    Patient ID:  Tono Saez is a 76 y.o. male who presents for   Coronary Artery Disease, Transient Ischemic Attack, and Results (antonella)    1)  Left injected  Knee and now much better   2) bp  Better   On  Quinapril   3) on  jardiance    Breathes  Ok   4)   Feels beeter  On  jarsdiance   5. Still having a little bit of angina uses 2 nitros a month       Review of patient's allergies indicates:   Allergen Reactions    Adhesive Other (See Comments)     SILK TAPE PULL SKIN OFF    Metformin Other (See Comments)     Weight loss cachexia anorexia,diarrhea.    Pcn [penicillins]      Patient states he passed out from this medication when he was 10 years old.        Past Medical History:   Diagnosis Date    Anemia     Anticoagulant long-term use     Arthritis     CAD (coronary artery disease)     CABG, STENTS '97,'99,'01,'05    Cancer     SKIN    Cataract     Diabetes mellitus     Diabetes mellitus type II     GERD (gastroesophageal reflux disease)     GI bleed 2020    Hypertension     Myocardial infarction     Wears glasses      Past Surgical History:   Procedure Laterality Date    CARDIAC PACEMAKER PLACEMENT      CARDIAC PACEMAKER PLACEMENT      CARDIAC PACEMAKER PLACEMENT  04/26/2018    replaced    CARDIAC SURGERY      1995   CABG X 5    CHOLECYSTECTOMY      COLON SURGERY      COLONOSCOPY N/A 2/21/2020    Procedure: COLONOSCOPY;  Surgeon: Abe Barragan III, MD;  Location: [facility];  Service: Endoscopy;  Laterality: N/A;    COLONOSCOPY N/A 2/23/2020    Procedure: COLONOSCOPY;  Surgeon: Bob Locke Jr., MD;  Location: [facility];  Service: Endoscopy;  Laterality: N/A;    CORONARY ARTERY BYPASS GRAFT  1995    CORONARY STENT PLACEMENT      stent x 5    ESOPHAGOGASTRODUODENOSCOPY N/A 2/23/2020    Procedure: EGD (ESOPHAGOGASTRODUODENOSCOPY);  Surgeon: Bob Locke Jr., MD;  Location: [facility];  Service: Endoscopy;  Laterality: N/A;    EYE SURGERY      BILAT CATARACT     head laceration   01/19/2018    HEMORRHOID SURGERY      SMALL BOWEL ENTEROSCOPY N/A 2/21/2020    Procedure: ENTEROSCOPY;  Surgeon: Abe Barragan III, MD;  Location: [facility];  Service: Endoscopy;  Laterality: N/A;    stint       Social History     Tobacco Use    Smoking status: Never Smoker    Smokeless tobacco: Never Used   Substance Use Topics    Alcohol use: No    Drug use: No        Review of Systems     ROS        Objective:        Vitals:    06/30/22 1721   BP: 122/70   Pulse: 78       Lab Results   Component Value Date    WBC 4.57 02/28/2022    HGB 12.9 (L) 02/28/2022    HCT 39.5 (L) 02/28/2022    PLT 91 (L) 02/28/2022    CHOL 134 07/22/2021    TRIG 114 07/22/2021    HDL 40 07/22/2021    ALT 16 02/28/2022    AST 17 02/28/2022     02/28/2022    K 4.1 02/28/2022     02/28/2022    CREATININE 1.4 02/28/2022    BUN 19 02/28/2022    CO2 27 02/28/2022    TSH 2.100 09/21/2020    PSA 0.93 07/13/2021    INR 2.2 02/20/2020    HGBA1C 7.2 (H) 07/22/2021        ECHOCARDIOGRAM RESULTS  Results for orders placed in visit on 03/28/22    Echo    Interpretation Summary  · The left ventricle is normal in size with concentric remodeling  · The estimated ejection fraction is 44%.  · Grade I left ventricular diastolic dysfunction.  · There are segmental left ventricular wall motion abnormalities. Apical hypokinesis noted  · There is abnormal septal wall motion consistent with right ventricular pacemaker.  · Normal right ventricular size with mildly reduced right ventricular systolic function.  · Mild-to-moderate aortic regurgitation.  · There is mild-to-moderate aortic valve stenosis.  · Aortic valve area is 1.77 cm2; peak velocity is 1.96 m/s; mean gradient is 9 mmHg.  · Mild mitral regurgitation.  · Normal central venous pressure (3 mmHg).  · The estimated PA systolic pressure is 13 mmHg.      CURRENT/PREVIOUS VISIT EKG  Results for orders placed or performed in visit on 02/28/22   IN OFFICE EKG 12-LEAD  (to Muse)    Collection Time: 02/28/22  3:36 PM    Narrative    Test Reason : I10,    Vent. Rate : 066 BPM     Atrial Rate : 066 BPM     P-R Int : 206 ms          QRS Dur : 200 ms      QT Int : 466 ms       P-R-T Axes : 028 -86 098 degrees     QTc Int : 488 ms    Atrial-sensed ventricular-paced rhythm  Abnormal ECG  When compared with ECG of 20-FEB-2020 10:13,  Vent. rate has decreased BY   5 BPM  Confirmed by Zachary Diop MD ([id number]) on 3/13/2022 6:04:47 PM    Referred By:  TC           Confirmed By:Zachary Diop MD     Results for orders placed in visit on 03/28/22    Echo    Interpretation Summary  · The left ventricle is normal in size with concentric remodeling  · The estimated ejection fraction is 44%.  · Grade I left ventricular diastolic dysfunction.  · There are segmental left ventricular wall motion abnormalities. Apical hypokinesis noted  · There is abnormal septal wall motion consistent with right ventricular pacemaker.  · Normal right ventricular size with mildly reduced right ventricular systolic function.  · Mild-to-moderate aortic regurgitation.  · There is mild-to-moderate aortic valve stenosis.  · Aortic valve area is 1.77 cm2; peak velocity is 1.96 m/s; mean gradient is 9 mmHg.  · Mild mitral regurgitation.  · Normal central venous pressure (3 mmHg).  · The estimated PA systolic pressure is 13 mmHg.    Results for orders placed in visit on 03/28/22    Nuclear Stress Test    Interpretation Summary    The nuclear portion of this study will be reported separately.    The EKG portion of this study is uninterpretable.    The patient reported chest pain during the stress test.    There were no arrhythmias during stress.      PHYSICAL EXAM    Physical Exam 110/80  Lungs are clear  Cardiac S4 decreased S1    Medication List with Changes/Refills   Current Medications    AMLODIPINE (NORVASC) 5 MG TABLET    TAKE ONE TABLET BY MOUTH ONCE DAILY    ASPIRIN (ECOTRIN) 81 MG EC TABLET    Take 81 mg by mouth  once daily.    BETAMETHASONE DIPROPIONATE (DIPROLENE) 0.05 % LOTION    Apply thin film to scalp bid prn itch    CLONIDINE (CATAPRES) 0.1 MG TABLET    Take 1 tablet (0.1 mg total) by mouth every 6 (six) hours as needed (SBP >170).    EMPAGLIFLOZIN (JARDIANCE) 25 MG TABLET    Take 1 tablet (25 mg total) by mouth once daily.    FINASTERIDE (PROSCAR) 5 MG TABLET    Take 5 mg by mouth once daily.    FLUTICASONE PROPIONATE (FLONASE) 50 MCG/ACTUATION NASAL SPRAY    Use nasally as directed as needed    FUROSEMIDE (LASIX) 20 MG TABLET    Take 1 tablet (20 mg total) by mouth once daily.    GABAPENTIN (NEURONTIN) 300 MG CAPSULE    Take 1 capsule (300 mg total) by mouth 2 (two) times daily.    KETOCONAZOLE (NIZORAL) 2 % SHAMPOO    Wash all scaling areas let sit 3 minutes then rinse 3x/wk    LEVOCETIRIZINE (XYZAL) 5 MG TABLET    TAKE ONE TABLET BY MOUTH EVERY EVENING    METOPROLOL TARTRATE (LOPRESSOR) 50 MG TABLET    Take 25 mg by mouth 2 (two) times daily.    MONTELUKAST (SINGULAIR) 10 MG TABLET    TAKE 1 TABLET (10 MG TOTAL) BY MOUTH EVERY EVENING.    MULTIVITAMIN CAPSULE    Take 1 capsule by mouth once daily.    MUPIROCIN (BACTROBAN) 2 % OINTMENT    Apply topically 2 (two) times daily.    MV-MIN-FOLIC ACID-LUTEIN 500-250 MCG CHEW    Take one every day    NITROSTAT 0.4 MG SL TABLET        PANTOPRAZOLE (PROTONIX) 40 MG TABLET    TAKE ONE TABLET BY MOUTH ONCE DAILY    ROSUVASTATIN (CRESTOR) 20 MG TABLET    TAKE ONE TABLET BY MOUTH NIGHTLY AT BEDTIME    SERTRALINE (ZOLOFT) 50 MG TABLET    TAKE ONE TABLET BY MOUTH ONCE DAILY    SULFACETAMIDE SODIUM (OVACE PLUS SHAMPOO) 10 % SHAM    Wash scalp nightly    TOLTERODINE (DETROL LA) 4 MG 24 HR CAPSULE    TAKE ONE CAPSULE BY MOUTH ONCE DAILY    TRAMADOL (ULTRAM) 50 MG TABLET    Take 1 tablet (50 mg total) by mouth every 8 (eight) hours as needed for Pain.    VITAMIN D 1000 UNITS TAB    Take 1,000 Units by mouth once daily.           Assessment:     ascvd -  Uses tng 2x/mo   Feels better    Need lab   Arthritis of the right knee injected  Diabetes mellitus  Heart failure reduced ejection fraction     Plan:     Lab in several months; continue current med  Problem List Items Addressed This Visit    None          No follow-ups on file.

## 2022-07-11 ENCOUNTER — TELEPHONE (OUTPATIENT)
Dept: CARDIOLOGY | Facility: CLINIC | Age: 77
End: 2022-07-11
Payer: MEDICARE

## 2022-07-11 NOTE — TELEPHONE ENCOUNTER
----- Message from Luly Luis Carlos sent at 7/11/2022  1:35 PM CDT -----  Contact: pt  Type: Needs Medical Advice         Who Called: pt  Best Call Back Number:[phone]  Additional Information: Requesting a call back regarding  b.p is really low and he is asking for office to call him to see what can be done to raise his b.p       93/64- 12:21pm    86/59- 10:20 am    99/54- 4:52 pm - yesterday afternoon         Please Advise- Thank you

## 2022-07-11 NOTE — TELEPHONE ENCOUNTER
Only take half of a metoprolol twice a day and see how this helps.   He can also skip two days of the lasix.

## 2022-07-11 NOTE — TELEPHONE ENCOUNTER
S/w pt & he already took the amlodipine and metoprolol today. Heart rate is 60. Advised will speak with PA and call back

## 2022-07-14 ENCOUNTER — OFFICE VISIT (OUTPATIENT)
Dept: ORTHOPEDICS | Facility: CLINIC | Age: 77
End: 2022-07-14
Payer: MEDICARE

## 2022-07-14 ENCOUNTER — LAB VISIT (OUTPATIENT)
Dept: LAB | Facility: HOSPITAL | Age: 77
End: 2022-07-14
Attending: SPECIALIST
Payer: MEDICARE

## 2022-07-14 VITALS — HEIGHT: 72 IN | WEIGHT: 193 LBS | BODY MASS INDEX: 26.14 KG/M2

## 2022-07-14 DIAGNOSIS — I25.118 CORONARY ARTERY DISEASE OF NATIVE ARTERY OF NATIVE HEART WITH STABLE ANGINA PECTORIS: ICD-10-CM

## 2022-07-14 DIAGNOSIS — R07.9 CHEST PAIN, UNSPECIFIED TYPE: ICD-10-CM

## 2022-07-14 DIAGNOSIS — M17.12 PRIMARY OSTEOARTHRITIS OF LEFT KNEE: Primary | ICD-10-CM

## 2022-07-14 LAB
BASOPHILS # BLD AUTO: 0.03 K/UL (ref 0–0.2)
BASOPHILS NFR BLD: 0.4 % (ref 0–1.9)
DIFFERENTIAL METHOD: ABNORMAL
EOSINOPHIL # BLD AUTO: 0.2 K/UL (ref 0–0.5)
EOSINOPHIL NFR BLD: 2 % (ref 0–8)
ERYTHROCYTE [DISTWIDTH] IN BLOOD BY AUTOMATED COUNT: 12.7 % (ref 11.5–14.5)
HCT VFR BLD AUTO: 40.2 % (ref 40–54)
HGB BLD-MCNC: 13.1 G/DL (ref 14–18)
IMM GRANULOCYTES # BLD AUTO: 0.03 K/UL (ref 0–0.04)
IMM GRANULOCYTES NFR BLD AUTO: 0.4 % (ref 0–0.5)
LYMPHOCYTES # BLD AUTO: 1.4 K/UL (ref 1–4.8)
LYMPHOCYTES NFR BLD: 16.8 % (ref 18–48)
MCH RBC QN AUTO: 29.7 PG (ref 27–31)
MCHC RBC AUTO-ENTMCNC: 32.6 G/DL (ref 32–36)
MCV RBC AUTO: 91 FL (ref 82–98)
MONOCYTES # BLD AUTO: 0.8 K/UL (ref 0.3–1)
MONOCYTES NFR BLD: 9 % (ref 4–15)
NEUTROPHILS # BLD AUTO: 6.1 K/UL (ref 1.8–7.7)
NEUTROPHILS NFR BLD: 71.4 % (ref 38–73)
NRBC BLD-RTO: 0 /100 WBC
PLATELET # BLD AUTO: 155 K/UL (ref 150–450)
PMV BLD AUTO: 10.7 FL (ref 9.2–12.9)
RBC # BLD AUTO: 4.41 M/UL (ref 4.6–6.2)
WBC # BLD AUTO: 8.47 K/UL (ref 3.9–12.7)

## 2022-07-14 PROCEDURE — 36415 COLL VENOUS BLD VENIPUNCTURE: CPT | Performed by: SPECIALIST

## 2022-07-14 PROCEDURE — 99213 OFFICE O/P EST LOW 20 MIN: CPT | Mod: S$GLB,,, | Performed by: PHYSICIAN ASSISTANT

## 2022-07-14 PROCEDURE — 99213 PR OFFICE/OUTPT VISIT, EST, LEVL III, 20-29 MIN: ICD-10-PCS | Mod: S$GLB,,, | Performed by: PHYSICIAN ASSISTANT

## 2022-07-14 PROCEDURE — 85025 COMPLETE CBC W/AUTO DIFF WBC: CPT | Performed by: SPECIALIST

## 2022-07-14 NOTE — PROGRESS NOTES
[facility] ORTHOPEDICS  [address]  [place] [address]  Phone: [phone]   [phone]    Patient's PCP: Scott Staley MD  Referring Provider: No ref. provider found    Subjective:      Chief Complaint:   Chief Complaint   Patient presents with    Left Knee - Pain     Left knee injected on 6/29/22, helped a lot, no pain at this time       Past Medical History:   Diagnosis Date    Anemia     Anticoagulant long-term use     Arthritis     CAD (coronary artery disease)     CABG, STENTS '97,'99,'01,'05    Cancer     SKIN    Cataract     Diabetes mellitus     Diabetes mellitus type II     GERD (gastroesophageal reflux disease)     GI bleed 2020    Hypertension     Myocardial infarction     Wears glasses        Past Surgical History:   Procedure Laterality Date    CARDIAC PACEMAKER PLACEMENT      CARDIAC PACEMAKER PLACEMENT      CARDIAC PACEMAKER PLACEMENT  04/26/2018    replaced    CARDIAC SURGERY      1995   CABG X 5    CHOLECYSTECTOMY      COLON SURGERY      COLONOSCOPY N/A 2/21/2020    Procedure: COLONOSCOPY;  Surgeon: Abe Barragan III, MD;  Location: [facility];  Service: Endoscopy;  Laterality: N/A;    COLONOSCOPY N/A 2/23/2020    Procedure: COLONOSCOPY;  Surgeon: Bob Locke Jr., MD;  Location: [facility];  Service: Endoscopy;  Laterality: N/A;    CORONARY ARTERY BYPASS GRAFT  1995    CORONARY STENT PLACEMENT      stent x 5    ESOPHAGOGASTRODUODENOSCOPY N/A 2/23/2020    Procedure: EGD (ESOPHAGOGASTRODUODENOSCOPY);  Surgeon: Bob Locke Jr., MD;  Location: [facility];  Service: Endoscopy;  Laterality: N/A;    EYE SURGERY      BILAT CATARACT    head laceration   01/19/2018    HEMORRHOID SURGERY      SMALL BOWEL ENTEROSCOPY N/A 2/21/2020    Procedure: ENTEROSCOPY;  Surgeon: Abe Barragan III, MD;  Location: [facility];  Service: Endoscopy;  Laterality: N/A;    stint         Current Outpatient Medications   Medication Sig    amLODIPine (NORVASC) 5  MG tablet TAKE ONE TABLET BY MOUTH ONCE DAILY    aspirin (ECOTRIN) 81 MG EC tablet Take 81 mg by mouth once daily.    betamethasone dipropionate (DIPROLENE) 0.05 % lotion Apply thin film to scalp bid prn itch    cloNIDine (CATAPRES) 0.1 MG tablet Take 1 tablet (0.1 mg total) by mouth every 6 (six) hours as needed (SBP >170).    empagliflozin (JARDIANCE) 25 mg tablet Take 1 tablet (25 mg total) by mouth once daily.    finasteride (PROSCAR) 5 mg tablet Take 5 mg by mouth once daily.    fluticasone propionate (FLONASE) 50 mcg/actuation nasal spray Use nasally as directed as needed    furosemide (LASIX) 20 MG tablet Take 1 tablet (20 mg total) by mouth once daily.    gabapentin (NEURONTIN) 300 MG capsule Take 1 capsule (300 mg total) by mouth 2 (two) times daily.    ketoconazole (NIZORAL) 2 % shampoo Wash all scaling areas let sit 3 minutes then rinse 3x/wk    levocetirizine (XYZAL) 5 MG tablet TAKE ONE TABLET BY MOUTH EVERY EVENING    metoprolol tartrate (LOPRESSOR) 50 MG tablet Take 25 mg by mouth 2 (two) times daily.    montelukast (SINGULAIR) 10 mg tablet TAKE 1 TABLET (10 MG TOTAL) BY MOUTH EVERY EVENING.    multivitamin capsule Take 1 capsule by mouth once daily.    mupirocin (BACTROBAN) 2 % ointment Apply topically 2 (two) times daily.    mv-min-folic acid-lutein 500-250 mcg Chew Take one every day    NITROSTAT 0.4 mg SL tablet     pantoprazole (PROTONIX) 40 MG tablet TAKE ONE TABLET BY MOUTH ONCE DAILY    rosuvastatin (CRESTOR) 20 MG tablet TAKE ONE TABLET BY MOUTH NIGHTLY AT BEDTIME    sertraline (ZOLOFT) 50 MG tablet TAKE ONE TABLET BY MOUTH ONCE DAILY    sulfacetamide sodium (OVACE PLUS SHAMPOO) 10 % Sham Wash scalp nightly    tolterodine (DETROL LA) 4 MG 24 hr capsule TAKE ONE CAPSULE BY MOUTH ONCE DAILY    vitamin D 1000 units Tab Take 1,000 Units by mouth once daily.     No current facility-administered medications for this visit.       Review of patient's allergies indicates:    Allergen Reactions    Adhesive Other (See Comments)     SILK TAPE PULL SKIN OFF    Metformin Other (See Comments)     Weight loss cachexia anorexia,diarrhea.    Pcn [penicillins]      Patient states he passed out from this medication when he was 10 years old.        Family History   Problem Relation Age of Onset    Heart disease Mother     Heart disease Father     Hyperlipidemia Sister     Hypertension Sister     Heart disease Brother     Heart disease Brother     Heart disease Brother     Heart disease Brother     Heart disease Brother        Social History     Socioeconomic History    Marital status:    Tobacco Use    Smoking status: Never Smoker    Smokeless tobacco: Never Used   Substance and Sexual Activity    Alcohol use: No    Drug use: No    Sexual activity: Not Currently     Social Determinants of Health     Financial Resource Strain: Low Risk     Difficulty of Paying Living Expenses: Not hard at all   Food Insecurity: No Food Insecurity    Worried About Running Out of Food in the Last Year: Never true    Ran Out of Food in the Last Year: Never true   Transportation Needs: No Transportation Needs    Lack of Transportation (Medical): No    Lack of Transportation (Non-Medical): No   Physical Activity: Inactive    Days of Exercise per Week: 0 days    Minutes of Exercise per Session: 0 min   Stress: No Stress Concern Present    Feeling of Stress : Not at all   Social Connections: Unknown    Frequency of Communication with Friends and Family: More than three times a week    Frequency of Social Gatherings with Friends and Family: Twice a week    Active Member of Clubs or Organizations: Yes    Attends Club or Organization Meetings: More than 4 times per year    Marital Status:    Housing Stability: Low Risk     Unable to Pay for Housing in the Last Year: No    Number of Places Lived in the Last Year: 1    Unstable Housing in the Last Year: No       History of  present illness: 76-year-old male who presents to clinic today for follow-up or on and acute appointment a few weeks ago.    He had a acute onset of left knee pain Sunday morning upon awakening.  He denies any trauma missteps or other acute injuries to the left knee. He denies any swelling of the left knee.  Pain primarily over the anterior medial aspect of the left knee. He saw primary care, they did some x-rays as an outpatient and asked us to see him that morning.     He does not endorse any hip pain, he does not endorse any back pain. He does not endorse any radicular symptoms.  But he does states that he has difficulty bearing weight due to pain in his left knee.  No right knee symptoms.    Review of Systems:    Constitutional: Negative for chills, fever and weight loss.   HENT: Negative for congestion.    Eyes: Negative for discharge and redness.   Respiratory: Negative for cough and shortness of breath.    Cardiovascular: Negative for chest pain.   Gastrointestinal: Negative for nausea and vomiting.   Musculoskeletal: See HPI.   Skin: Negative for rash.   Neurological: Negative for headaches.   Endo/Heme/Allergies: Does not bruise/bleed easily.   Psychiatric/Behavioral: The patient is not nervous/anxious.    All other systems reviewed and are negative.       Objective:      Physical Examination:    Vital Signs:  There were no vitals filed for this visit.    Body mass index is 26.18 kg/m².    This a well-developed, well nourished patient in no acute distress.  They are alert and oriented and cooperative to examination.     Left knee demonstrates normal range of motion, extension 0° flex to 120°, no joint line pain or tenderness.  Stable anterior-posterior varus and valgus stresses.      Pertinent New Results:        XRAY Report / Interpretation:             Assessment:       1. Primary osteoarthritis of left knee      Plan:     Primary osteoarthritis of left knee        No follow-ups on file.    Arthritis of  the left knee, responded well to intra-articular injection at his last visit. Follow up p.r.n..  There are some mild changes on the x-ray of the knee suggestive of arthritis.  Some squaring of the medial compartment, some patellar facet changes.  Joint space however appears to be fairly well maintained.  We did not do a standing AP view.  I do not suspect any internal derangement.  Degenerative medial meniscal tear is most likely.    [unfilled]  NADEEN Charlton, PANaliniC    This note was created using ProPlan voice recognition software that occasionally misinterprets words or phrases.

## 2022-07-15 ENCOUNTER — LAB VISIT (OUTPATIENT)
Dept: LAB | Facility: HOSPITAL | Age: 77
End: 2022-07-15
Attending: SPECIALIST
Payer: MEDICARE

## 2022-07-15 DIAGNOSIS — Z12.5 SPECIAL SCREENING FOR MALIGNANT NEOPLASM OF PROSTATE: Primary | ICD-10-CM

## 2022-07-15 LAB — COMPLEXED PSA SERPL-MCNC: 1.3 NG/ML (ref 0–4)

## 2022-07-15 PROCEDURE — 36415 COLL VENOUS BLD VENIPUNCTURE: CPT | Performed by: SPECIALIST

## 2022-07-15 PROCEDURE — 84153 ASSAY OF PSA TOTAL: CPT | Performed by: SPECIALIST

## 2022-07-26 ENCOUNTER — TELEPHONE (OUTPATIENT)
Dept: CARDIOLOGY | Facility: CLINIC | Age: 77
End: 2022-07-26
Payer: MEDICARE

## 2022-07-26 NOTE — TELEPHONE ENCOUNTER
S/w pt & advised per dr velazquez to stop amlodipine and take furosemide qod. Also if he starts to get swelling or SOB he can go back to qd of the furosemide

## 2022-07-26 NOTE — TELEPHONE ENCOUNTER
S/w pt 78/54 is the lowest it has gotten. Its  88 /55 right now. 144/88 when he wakes up and then he takes his bp meds and it bottoms out. He is drinking plenty of water he says. Advised dr velazquez will be here soon & I will show him the message

## 2022-07-26 NOTE — TELEPHONE ENCOUNTER
----- Message from Luly Aldridge sent at 7/26/2022 11:54 AM CDT -----  Contact: pt  Type: Needs Medical Advice         Who Called: pt  Best Call Back Number: .phone  Additional Information: Requesting a call back regarding b.p is fine in the morning but after he takes his rx it drops his b.p to low to where he cant even walk.       Please Advise- Thank you

## 2022-08-12 DIAGNOSIS — I63.9 CEREBRAL INFARCTION, UNSPECIFIED: Primary | ICD-10-CM

## 2022-08-25 ENCOUNTER — OFFICE VISIT (OUTPATIENT)
Dept: FAMILY MEDICINE | Facility: CLINIC | Age: 77
End: 2022-08-25
Payer: MEDICARE

## 2022-08-25 VITALS
HEART RATE: 88 BPM | DIASTOLIC BLOOD PRESSURE: 60 MMHG | TEMPERATURE: 98 F | SYSTOLIC BLOOD PRESSURE: 102 MMHG | HEIGHT: 72 IN | WEIGHT: 189.38 LBS | BODY MASS INDEX: 25.65 KG/M2 | OXYGEN SATURATION: 97 % | RESPIRATION RATE: 18 BRPM

## 2022-08-25 DIAGNOSIS — I10 PRIMARY HYPERTENSION: ICD-10-CM

## 2022-08-25 DIAGNOSIS — Z23 IMMUNIZATION DUE: ICD-10-CM

## 2022-08-25 DIAGNOSIS — I63.59 CEREBRAL INFARCTION DUE TO UNSPECIFIED OCCLUSION OR STENOSIS OF OTHER CEREBRAL ARTERY: Primary | ICD-10-CM

## 2022-08-25 DIAGNOSIS — I73.9 CLAUDICATION OF GLUTEAL REGION: Primary | ICD-10-CM

## 2022-08-25 PROCEDURE — 99214 PR OFFICE/OUTPT VISIT, EST, LEVL IV, 30-39 MIN: ICD-10-PCS | Mod: S$PBB,AQ,, | Performed by: FAMILY MEDICINE

## 2022-08-25 PROCEDURE — 99215 OFFICE O/P EST HI 40 MIN: CPT | Performed by: FAMILY MEDICINE

## 2022-08-25 PROCEDURE — 99214 OFFICE O/P EST MOD 30 MIN: CPT | Mod: S$PBB,AQ,, | Performed by: FAMILY MEDICINE

## 2022-08-25 RX ORDER — ZOSTER VACCINE RECOMBINANT, ADJUVANTED 50 MCG/0.5
0.5 KIT INTRAMUSCULAR ONCE
Qty: 1 EACH | Refills: 0 | Status: SHIPPED | OUTPATIENT
Start: 2022-08-25 | End: 2022-08-25

## 2022-08-25 RX ORDER — LANOLIN ALCOHOL/MO/W.PET/CERES
400 CREAM (GRAM) TOPICAL DAILY
Qty: 90 TABLET | Refills: 1 | Status: SHIPPED | OUTPATIENT
Start: 2022-08-25 | End: 2023-02-16 | Stop reason: SDUPTHER

## 2022-08-25 RX ORDER — QUINAPRIL 20 MG/1
TABLET ORAL
COMMUNITY
End: 2022-08-25

## 2022-08-25 RX ORDER — QUINAPRIL 10 MG/1
10 TABLET ORAL NIGHTLY
Qty: 90 TABLET | Refills: 3 | Status: SHIPPED | OUTPATIENT
Start: 2022-08-25 | End: 2022-08-30 | Stop reason: SINTOL

## 2022-08-25 NOTE — PROGRESS NOTES
"Subjective:       Patient ID: Tono Saez is a 77 y.o. male.    Chief Complaint: Leg Pain (Bilateral leg pain x2 months- pain is mainly anterior around the shin, "feels like he has compression socks on")      Patient comes in because of ongoing leg cramps both legs, has been followed by his cardiologist and has had vascular studies which did not reveal blockage.  He had MESSI studies which showed some stiff Ng in the arteries but no blockages.  His claudication seems to be worsened by exertion or walking he does have some pain on standing but worsens with activity.  Lab Results       Component                Value               Date                       WBC                      8.47                07/14/2022                 HGB                      13.1 (L)            07/14/2022                 HCT                      40.2                07/14/2022                 PLT                      155                 07/14/2022                 CHOL                     134                 07/22/2021                 TRIG                     114                 07/22/2021                 HDL                      40                  07/22/2021                 ALT                      16                  02/28/2022                 AST                      17                  02/28/2022                 NA                       136                 02/28/2022                 K                        4.1                 02/28/2022                 CL                       100                 02/28/2022                 CREATININE               1.4                 02/28/2022                 BUN                      19                  02/28/2022                 CO2                      27                  02/28/2022                 TSH                      2.100               09/21/2020                 PSA                      1.3                 07/15/2022                 INR                      2.2                 02/20/2020       "           HGBA1C                   7.2 (H)             07/22/2021                Leg Pain   Incident onset: years. The incident occurred at home. There was no injury mechanism. Pertinent negatives include no inability to bear weight, loss of motion, loss of sensation, muscle weakness, numbness or tingling.       Allergies and Medications:   Review of patient's allergies indicates:   Allergen Reactions    Adhesive Other (See Comments)     SILK TAPE PULL SKIN OFF    Metformin Other (See Comments)     Weight loss cachexia anorexia,diarrhea.    Pcn [penicillins]      Patient states he passed out from this medication when he was 10 years old.      Current Outpatient Medications   Medication Sig Dispense Refill    aspirin (ECOTRIN) 81 MG EC tablet Take 81 mg by mouth once daily.      betamethasone dipropionate (DIPROLENE) 0.05 % lotion Apply thin film to scalp bid prn itch 60 mL 6    empagliflozin (JARDIANCE) 25 mg tablet Take 1 tablet (25 mg total) by mouth once daily. 30 tablet 6    finasteride (PROSCAR) 5 mg tablet Take 5 mg by mouth once daily.      fluticasone propionate (FLONASE) 50 mcg/actuation nasal spray Use nasally as directed as needed      furosemide (LASIX) 20 MG tablet Take 1 tablet (20 mg total) by mouth once daily. (Patient taking differently: Take 20 mg by mouth every other day.) 90 tablet 3    gabapentin (NEURONTIN) 300 MG capsule Take 1 capsule (300 mg total) by mouth 2 (two) times daily. 60 capsule 11    isosorbide mononitrate (IMDUR) 30 MG 24 hr tablet TAKE 1 TABLET (30 MG TOTAL) BY MOUTH ONCE DAILY. 90 tablet 3    ketoconazole (NIZORAL) 2 % shampoo Wash all scaling areas let sit 3 minutes then rinse 3x/wk 120 mL 11    levocetirizine (XYZAL) 5 MG tablet TAKE ONE TABLET BY MOUTH EVERY EVENING 60 tablet 11    montelukast (SINGULAIR) 10 mg tablet TAKE 1 TABLET (10 MG TOTAL) BY MOUTH EVERY EVENING. 30 tablet 11    multivitamin capsule Take 1 capsule by mouth once daily.      mupirocin  (BACTROBAN) 2 % ointment Apply topically 2 (two) times daily. 22 g 11    nitroGLYCERIN (NITROSTAT) 0.4 MG SL tablet DISSOLVE 1 TABLET UNDER THE TONGUE AS NEEDED FOR CHEST PAIN 100 tablet 3    pantoprazole (PROTONIX) 40 MG tablet TAKE ONE TABLET BY MOUTH ONCE DAILY 90 tablet 1    rosuvastatin (CRESTOR) 20 MG tablet TAKE ONE TABLET BY MOUTH NIGHTLY AT BEDTIME 30 tablet 3    sertraline (ZOLOFT) 50 MG tablet TAKE ONE TABLET BY MOUTH ONCE DAILY 90 tablet 1    sulfacetamide sodium (OVACE PLUS SHAMPOO) 10 % Sham Wash scalp nightly 1 each 11    tolterodine (DETROL LA) 4 MG 24 hr capsule TAKE ONE CAPSULE BY MOUTH ONCE DAILY 90 capsule 1    vitamin D 1000 units Tab Take 1,000 Units by mouth once daily.      magnesium oxide (MAG-OX) 400 mg (241.3 mg magnesium) tablet Take 1 tablet (400 mg total) by mouth once daily. 90 tablet 1    quinapriL (ACCUPRIL) 10 MG tablet Take 1 tablet (10 mg total) by mouth every evening. 90 tablet 3    varicella-zoster gE-AS01B, PF, (SHINGRIX, PF,) 50 mcg/0.5 mL injection Inject 0.5 mLs into the muscle once. for 1 dose 1 each 0     No current facility-administered medications for this visit.       Family History:   Family History   Problem Relation Age of Onset    Heart disease Mother     Heart disease Father     Hyperlipidemia Sister     Hypertension Sister     Heart disease Brother     Heart disease Brother     Heart disease Brother     Heart disease Brother     Heart disease Brother        Social History:   Social History     Socioeconomic History    Marital status:    Tobacco Use    Smoking status: Never Smoker    Smokeless tobacco: Never Used   Substance and Sexual Activity    Alcohol use: No    Drug use: No    Sexual activity: Not Currently     Social Determinants of Health     Financial Resource Strain: Low Risk     Difficulty of Paying Living Expenses: Not hard at all   Food Insecurity: No Food Insecurity    Worried About Running Out of Food in the Last  Year: Never true    Ran Out of Food in the Last Year: Never true   Transportation Needs: No Transportation Needs    Lack of Transportation (Medical): No    Lack of Transportation (Non-Medical): No   Physical Activity: Inactive    Days of Exercise per Week: 0 days    Minutes of Exercise per Session: 0 min   Stress: No Stress Concern Present    Feeling of Stress : Not at all   Social Connections: Unknown    Frequency of Communication with Friends and Family: More than three times a week    Frequency of Social Gatherings with Friends and Family: Twice a week    Active Member of Clubs or Organizations: Yes    Attends Club or Organization Meetings: More than 4 times per year    Marital Status:    Housing Stability: Low Risk     Unable to Pay for Housing in the Last Year: No    Number of Places Lived in the Last Year: 1    Unstable Housing in the Last Year: No       Review of Systems   Neurological: Negative for tingling and numbness.       Objective:     Vitals:    08/25/22 1506   BP: 102/60   Pulse: 88   Resp: 18   Temp: 97.8 °F (36.6 °C)        Physical Exam    Assessment:       1. Claudication of gluteal region    2. Primary hypertension    3. Immunization due        Plan:       Tono was seen today for leg pain.    Diagnoses and all orders for this visit:    Claudication of gluteal region  -     magnesium oxide (MAG-OX) 400 mg (241.3 mg magnesium) tablet; Take 1 tablet (400 mg total) by mouth once daily.  -     Ambulatory referral/consult to Physical/Occupational Therapy; Future    Primary hypertension  -     quinapriL (ACCUPRIL) 10 MG tablet; Take 1 tablet (10 mg total) by mouth every evening.    Immunization due  -     varicella-zoster gE-AS01B, PF, (SHINGRIX, PF,) 50 mcg/0.5 mL injection; Inject 0.5 mLs into the muscle once. for 1 dose         Follow up in about 3 months (around 11/25/2022) for follow up HTN, follow up peripheral edema claudication.

## 2022-08-29 ENCOUNTER — TELEPHONE (OUTPATIENT)
Dept: CARDIOLOGY | Facility: CLINIC | Age: 77
End: 2022-08-29
Payer: MEDICARE

## 2022-08-29 NOTE — TELEPHONE ENCOUNTER
----- Message from Steven Bagley sent at 8/29/2022  1:19 PM CDT -----  Type:  Sooner Appointment Request    Caller is requesting a sooner appointment.  Caller declined first available appointment listed below.  Caller will not accept being placed on the waitlist and is requesting a message be sent to doctor.    Name of Caller:  Pt  When is the first available appointment?     Symptoms:  Angina  Best Call Back Number:  [phone]     Additional Information:  Please advise -- Thank you

## 2022-08-30 ENCOUNTER — OFFICE VISIT (OUTPATIENT)
Dept: CARDIOLOGY | Facility: CLINIC | Age: 77
End: 2022-08-30
Payer: MEDICARE

## 2022-08-30 VITALS
DIASTOLIC BLOOD PRESSURE: 70 MMHG | BODY MASS INDEX: 25.73 KG/M2 | HEART RATE: 79 BPM | HEIGHT: 72 IN | OXYGEN SATURATION: 93 % | WEIGHT: 189.94 LBS | SYSTOLIC BLOOD PRESSURE: 130 MMHG

## 2022-08-30 DIAGNOSIS — I10 PRIMARY HYPERTENSION: ICD-10-CM

## 2022-08-30 DIAGNOSIS — R07.9 CHEST PAIN, UNSPECIFIED TYPE: Primary | ICD-10-CM

## 2022-08-30 PROCEDURE — 93000 EKG 12-LEAD: ICD-10-PCS | Mod: S$GLB,,, | Performed by: SPECIALIST

## 2022-08-30 PROCEDURE — 99215 OFFICE O/P EST HI 40 MIN: CPT | Mod: 25,S$GLB,, | Performed by: SPECIALIST

## 2022-08-30 PROCEDURE — 99215 PR OFFICE/OUTPT VISIT, EST, LEVL V, 40-54 MIN: ICD-10-PCS | Mod: 25,S$GLB,, | Performed by: SPECIALIST

## 2022-08-30 PROCEDURE — 93000 ELECTROCARDIOGRAM COMPLETE: CPT | Mod: S$GLB,,, | Performed by: SPECIALIST

## 2022-08-30 RX ORDER — ISOSORBIDE DINITRATE 10 MG/1
10 TABLET ORAL 2 TIMES DAILY
Qty: 60 TABLET | Refills: 11 | Status: SHIPPED | OUTPATIENT
Start: 2022-08-30 | End: 2023-06-26 | Stop reason: SINTOL

## 2022-08-30 RX ORDER — CLOPIDOGREL BISULFATE 75 MG/1
75 TABLET ORAL DAILY
Qty: 90 TABLET | Refills: 3 | Status: ON HOLD | OUTPATIENT
Start: 2022-08-30 | End: 2022-10-28 | Stop reason: SDUPTHER

## 2022-08-30 NOTE — PROGRESS NOTES
Subjective:    Patient ID:  Tono Saez is a 77 y.o. male who presents for   Coronary Artery Disease, Hypertension (Up & down), and Chest Pain (After walking)    HPI1)  bp   lo  asnd  gets dizzy     2) gets angina and gets cp -  last cath  2016        6 mos of cheast pain   Patient is having significant orthostatic changes on his prostate medicine as well as isosorbide quinapril  It is angiogram 2016 1. Reg 80 saphenous vein graft to right posterior descending artery patent.  Patent internal mammary lad.  Patent main left and had circumflex giving rise 3rd marginal'' patent saphenous vein graft to obtuse marginals with some narrowing at the junction origin of these margin'/question graft to the diagonal was opened  He is having significant chest pain with exertion of them wall mailbox  Street demonstrated fixed defects no reversible ischemia  Review of patient's allergies indicates:   Allergen Reactions    Adhesive Other (See Comments)     SILK TAPE PULL SKIN OFF    Metformin Other (See Comments)     Weight loss cachexia anorexia,diarrhea.    Pcn [penicillins]      Patient states he passed out from this medication when he was 10 years old.        Past Medical History:   Diagnosis Date    Anemia     Anticoagulant long-term use     Arthritis     CAD (coronary artery disease)     CABG, STENTS '97,'99,'01,'05    Cancer     SKIN    Cataract     Diabetes mellitus     Diabetes mellitus type II     GERD (gastroesophageal reflux disease)     GI bleed 2020    Hypertension     Myocardial infarction     Wears glasses      Past Surgical History:   Procedure Laterality Date    CARDIAC PACEMAKER PLACEMENT      CARDIAC PACEMAKER PLACEMENT      CARDIAC PACEMAKER PLACEMENT  04/26/2018    replaced    CARDIAC SURGERY      1995   CABG X 5    CHOLECYSTECTOMY      COLON SURGERY      COLONOSCOPY N/A 2/21/2020    Procedure: COLONOSCOPY;  Surgeon: Abe Barragan III, MD;  Location: [facility];  Service: Endoscopy;  Laterality: N/A;     COLONOSCOPY N/A 2/23/2020    Procedure: COLONOSCOPY;  Surgeon: Bob Locke Jr., MD;  Location: [facility] ENDO;  Service: Endoscopy;  Laterality: N/A;    CORONARY ARTERY BYPASS GRAFT  1995    CORONARY STENT PLACEMENT      stent x 5    ESOPHAGOGASTRODUODENOSCOPY N/A 2/23/2020    Procedure: EGD (ESOPHAGOGASTRODUODENOSCOPY);  Surgeon: Bob Locke Jr., MD;  Location: [facility] ENDO;  Service: Endoscopy;  Laterality: N/A;    EYE SURGERY      BILAT CATARACT    head laceration   01/19/2018    HEMORRHOID SURGERY      SMALL BOWEL ENTEROSCOPY N/A 2/21/2020    Procedure: ENTEROSCOPY;  Surgeon: Abe Barragan III, MD;  Location: [facility] ENDO;  Service: Endoscopy;  Laterality: N/A;    stint       Social History     Tobacco Use    Smoking status: Never    Smokeless tobacco: Never   Substance Use Topics    Alcohol use: No    Drug use: No        Review of Systems     Review of Systems   Constitutional: Negative for decreased appetite, malaise/fatigue, weight gain and weight loss.   HENT:  Negative for congestion, hearing loss and tinnitus.    Eyes:  Negative for blurred vision, vision loss in left eye, vision loss in right eye, visual disturbance and visual halos.   Cardiovascular:  Positive for chest pain, dyspnea on exertion, near-syncope and syncope. Negative for claudication, irregular heartbeat, leg swelling, orthopnea, palpitations and paroxysmal nocturnal dyspnea.   Respiratory:  Positive for shortness of breath. Negative for cough, hemoptysis, sleep disturbances due to breathing, snoring, sputum production and wheezing.    Endocrine: Negative for polydipsia, polyphagia and polyuria.   Hematologic/Lymphatic: Negative for adenopathy. Does not bruise/bleed easily.   Skin: Negative.  Negative for dry skin, itching, poor wound healing, rash, skin cancer and suspicious lesions.   Musculoskeletal:  Negative for arthritis, back pain, falls, gout, joint pain, joint swelling, muscle cramps, muscle weakness, neck pain and stiffness.    Gastrointestinal: Negative.  Negative for abdominal pain, change in bowel habit, constipation, diarrhea, heartburn, hematemesis, hemorrhoids, melena, nausea and vomiting.   Genitourinary:  Positive for frequency. Negative for bladder incontinence, dysuria, flank pain, hematuria, nocturia and non-menstrual bleeding.   Neurological:  Positive for dizziness. Negative for brief paralysis, difficulty with concentration, disturbances in coordination, focal weakness, headaches, loss of balance, numbness, paresthesias and tremors.   Psychiatric/Behavioral:  Negative for altered mental status, depression, hallucinations, memory loss, substance abuse, suicidal ideas and thoughts of violence. The patient does not have insomnia and is not nervous/anxious.    Allergic/Immunologic: Negative for environmental allergies and hives.         Objective:        Vitals:    08/30/22 1601   BP: 130/70   Pulse: 79       Lab Results   Component Value Date    WBC 8.47 07/14/2022    HGB 13.1 (L) 07/14/2022    HCT 40.2 07/14/2022     07/14/2022    CHOL 134 07/22/2021    TRIG 114 07/22/2021    HDL 40 07/22/2021    ALT 16 02/28/2022    AST 17 02/28/2022     02/28/2022    K 4.1 02/28/2022     02/28/2022    CREATININE 1.4 02/28/2022    BUN 19 02/28/2022    CO2 27 02/28/2022    TSH 2.100 09/21/2020    PSA 1.3 07/15/2022    INR 2.2 02/20/2020    HGBA1C 7.2 (H) 07/22/2021        ECHOCARDIOGRAM RESULTS  Results for orders placed in visit on 03/28/22    Echo    Interpretation Summary  · The left ventricle is normal in size with concentric remodeling  · The estimated ejection fraction is 44%.  · Grade I left ventricular diastolic dysfunction.  · There are segmental left ventricular wall motion abnormalities. Apical hypokinesis noted  · There is abnormal septal wall motion consistent with right ventricular pacemaker.  · Normal right ventricular size with mildly reduced right ventricular systolic function.  · Mild-to-moderate aortic  regurgitation.  · There is mild-to-moderate aortic valve stenosis.  · Aortic valve area is 1.77 cm2; peak velocity is 1.96 m/s; mean gradient is 9 mmHg.  · Mild mitral regurgitation.  · Normal central venous pressure (3 mmHg).  · The estimated PA systolic pressure is 13 mmHg.      CURRENT/PREVIOUS VISIT EKG  Results for orders placed or performed in visit on 02/28/22   IN OFFICE EKG 12-LEAD (to [place])    Collection Time: 02/28/22  3:36 PM    Narrative    Test Reason : I10,    Vent. Rate : 066 BPM     Atrial Rate : 066 BPM     P-R Int : 206 ms          QRS Dur : 200 ms      QT Int : 466 ms       P-R-T Axes : 028 -86 098 degrees     QTc Int : 488 ms    Atrial-sensed ventricular-paced rhythm  Abnormal ECG  When compared with ECG of 20-FEB-2020 10:13,  Vent. rate has decreased BY   5 BPM  Confirmed by Zachary Diop MD ([id number]) on 3/13/2022 6:04:47 PM    Referred By:  TC           Confirmed By:Zachary Diop MD     Results for orders placed in visit on 03/28/22    Echo    Interpretation Summary  · The left ventricle is normal in size with concentric remodeling  · The estimated ejection fraction is 44%.  · Grade I left ventricular diastolic dysfunction.  · There are segmental left ventricular wall motion abnormalities. Apical hypokinesis noted  · There is abnormal septal wall motion consistent with right ventricular pacemaker.  · Normal right ventricular size with mildly reduced right ventricular systolic function.  · Mild-to-moderate aortic regurgitation.  · There is mild-to-moderate aortic valve stenosis.  · Aortic valve area is 1.77 cm2; peak velocity is 1.96 m/s; mean gradient is 9 mmHg.  · Mild mitral regurgitation.  · Normal central venous pressure (3 mmHg).  · The estimated PA systolic pressure is 13 mmHg.    Results for orders placed in visit on 03/28/22    Nuclear Stress Test    Interpretation Summary    The nuclear portion of this study will be reported separately.    The EKG portion of this study is  uninterpretable.    The patient reported chest pain during the stress test.    There were no arrhythmias during stress.      PHYSICAL EXAM    Physical Exam blood pressure standing both arms 60 systolic palpitation and palpation  Lungs are clear  Cardiac was 60 and paced with AV paced  Legs no edema    Medication List with Changes/Refills   Current Medications    ASPIRIN (ECOTRIN) 81 MG EC TABLET    Take 162 mg by mouth once daily.    BETAMETHASONE DIPROPIONATE (DIPROLENE) 0.05 % LOTION    Apply thin film to scalp bid prn itch    EMPAGLIFLOZIN (JARDIANCE) 25 MG TABLET    Take 1 tablet (25 mg total) by mouth once daily.    FINASTERIDE (PROSCAR) 5 MG TABLET    Take 5 mg by mouth once daily.    FLUTICASONE PROPIONATE (FLONASE) 50 MCG/ACTUATION NASAL SPRAY    Use nasally as directed as needed    FUROSEMIDE (LASIX) 20 MG TABLET    Take 1 tablet (20 mg total) by mouth once daily.    GABAPENTIN (NEURONTIN) 300 MG CAPSULE    Take 1 capsule (300 mg total) by mouth 2 (two) times daily.    ISOSORBIDE MONONITRATE (IMDUR) 30 MG 24 HR TABLET    TAKE 1 TABLET (30 MG TOTAL) BY MOUTH ONCE DAILY.    KETOCONAZOLE (NIZORAL) 2 % SHAMPOO    Wash all scaling areas let sit 3 minutes then rinse 3x/wk    LEVOCETIRIZINE (XYZAL) 5 MG TABLET    TAKE ONE TABLET BY MOUTH EVERY EVENING    MAGNESIUM OXIDE (MAG-OX) 400 MG (241.3 MG MAGNESIUM) TABLET    Take 1 tablet (400 mg total) by mouth once daily.    MONTELUKAST (SINGULAIR) 10 MG TABLET    TAKE 1 TABLET (10 MG TOTAL) BY MOUTH EVERY EVENING.    MULTIVITAMIN CAPSULE    Take 1 capsule by mouth once daily.    MUPIROCIN (BACTROBAN) 2 % OINTMENT    Apply topically 2 (two) times daily.    NITROGLYCERIN (NITROSTAT) 0.4 MG SL TABLET    DISSOLVE 1 TABLET UNDER THE TONGUE AS NEEDED FOR CHEST PAIN    PANTOPRAZOLE (PROTONIX) 40 MG TABLET    TAKE ONE TABLET BY MOUTH ONCE DAILY    QUINAPRIL (ACCUPRIL) 10 MG TABLET    Take 1 tablet (10 mg total) by mouth every evening.    ROSUVASTATIN (CRESTOR) 20 MG TABLET     TAKE ONE TABLET BY MOUTH NIGHTLY AT BEDTIME    SERTRALINE (ZOLOFT) 50 MG TABLET    TAKE ONE TABLET BY MOUTH ONCE DAILY    SULFACETAMIDE SODIUM (OVACE PLUS SHAMPOO) 10 % SHAM    Wash scalp nightly    TOLTERODINE (DETROL LA) 4 MG 24 HR CAPSULE    TAKE ONE CAPSULE BY MOUTH ONCE DAILY    VITAMIN D 1000 UNITS TAB    Take 1,000 Units by mouth once daily.           Assessment:     ASCVD, detailed review of 2016 catheterization, significant orthostatic hypotension exacerbated by prostate medicine and quinapril he even low-dose and isosorbide  1. Chest pain, unspecified type       I personally reviewed old and new ecg's, lab reports,, xray reports  and  other cardiovascular studies including  echo's, stress tests, angiogram reports, holters,and vascular studies .  In addition I evaluated original cardiac cath  _x__echo  ____cxr ______ct ____scan on [employer] or [employer] or other viewing platforms .  I reviewed  office and hospital notes Yes __x__ of  referring providers.    Plan:   DC quina  pril isosorbide Imdur 30, DC Proscar iand detrol   Go on isosorbide dinitrate 10 b.i.d.  Add Plavix 75 a day  We can use the chest pain may need cardiac catheterization  Under clinic in 4 weeks Check blood pressures at home      Problem List Items Addressed This Visit          Cardiac/Vascular    Chest pain - Primary    Relevant Orders    IN OFFICE EKG 12-LEAD (to [place])        No follow-ups on file.

## 2022-09-12 ENCOUNTER — HOSPITAL ENCOUNTER (OUTPATIENT)
Dept: RADIOLOGY | Facility: HOSPITAL | Age: 77
Discharge: HOME | End: 2022-09-12
Attending: PSYCHIATRY & NEUROLOGY
Payer: MEDICARE

## 2022-09-12 DIAGNOSIS — I63.9 CEREBRAL INFARCTION, UNSPECIFIED: ICD-10-CM

## 2022-09-12 DIAGNOSIS — I63.59 CEREBRAL INFARCTION DUE TO UNSPECIFIED OCCLUSION OR STENOSIS OF OTHER CEREBRAL ARTERY: ICD-10-CM

## 2022-09-12 PROCEDURE — 70450 CT HEAD/BRAIN W/O DYE: CPT | Mod: TC,PO

## 2022-09-12 PROCEDURE — 93880 EXTRACRANIAL BILAT STUDY: CPT | Mod: TC,PO

## 2022-09-15 ENCOUNTER — CLINICAL SUPPORT (OUTPATIENT)
Dept: REHABILITATION | Facility: HOSPITAL | Age: 77
End: 2022-09-15
Attending: FAMILY MEDICINE
Payer: MEDICARE

## 2022-09-15 DIAGNOSIS — R26.81 GAIT INSTABILITY: Primary | ICD-10-CM

## 2022-09-15 DIAGNOSIS — I73.9 CLAUDICATION OF GLUTEAL REGION: ICD-10-CM

## 2022-09-15 DIAGNOSIS — R52 PAIN: ICD-10-CM

## 2022-09-15 PROCEDURE — 97162 PT EVAL MOD COMPLEX 30 MIN: CPT | Mod: PN

## 2022-09-15 NOTE — PLAN OF CARE
"[facility]   Physical Therapy Initial Evaluation   Lower Extremity Lymphedema    Date: 9/15/2022   Name: Tono Saez  Clinic Number: [id number]    Therapy Diagnosis:   Encounter Diagnoses   Name Primary?    Gait instability Yes    Pain     Claudication of gluteal region      Physician: Scott Staley MD    Physician Orders: PT Eval and Treat   Medical Diagnosis from Referral: I73.9 (ICD-10-CM) - Claudication of gluteal region  Evaluation Date: 9/15/2022  Authorization Period Expiration: 9/12/22-10/1/22  Plan of Care Expiration: 12/31/22  Progress Note Due: 11/4/22  Visit # / Visits authorized: 1/1   FOTO: 1 (47)    Precautions: Standard, Diabetes, and Fall    Time In: 1030  Time Out: 1130  Total Appointment Time (timed & untimed codes): 60 minutes    SUBJECTIVE   Date of onset: 6/2022  History of current condition - Steven reports: he reports having pain of both legs since being a child with time periods of ~1-2yrs without pain. He has physical therapy as a young adult in the , but no sustainable relief. He has pain of both legs, described as tight compression feeling like. Pain is constant throughout the day. He reports  he has minimal times without pain. Legs ache about the same no matter activity. Patient has been to multiple doctors without an answer for his pain, imaging is all negative.    Previous Lymphedema Treatment: no  Wears Compression: no - has worn in the past, did not help pain  Prior Therapy:  no    Social History: lives alone 1SH  Occupation: retired,   Hobbies: play music  Prior Level of Function: independent-slow  Current Level of Function: independent  Nutrition:  Normal  Exercise routine: no  Hand dominance: right  DME owned: [employer], [employer] -- uses walker when legs hurt alot  Recent Falls: 11/2021     Patient states: "I've had pain in my legs since I was 10. I've had so many tests, nothing has showed up."  Pain: 9/10 on VAS currently.   Best: " 0/10 Worst: 10/10   Pain location: bilateral lower legs (knees to ankles)            Patient denies chronic heart failure, chronic kidney disease. Patient does have diabetes-medicine controlled  Active Problem List:  Active Problem List with Overview Notes    Diagnosis Date Noted    Pain 09/16/2022    Pain in both lower extremities 04/13/2022    Dysfunction of both eustachian tubes 09/10/2020    Coronary artery disease of native artery of native heart with stable angina pectoris 04/03/2020     CABG, STENTS '97,'99,'01,'05      TIA (transient ischemic attack) 04/03/2020    Upper GI bleed 02/23/2020    Chest pain 02/20/2020    Type 2 diabetes mellitus, without long-term current use of insulin 10/30/2019    Fall 09/30/2019    Gait instability 09/30/2019    Chronic anticoagulation 09/30/2019    Contusion of buttock 09/30/2019    CRI (chronic renal insufficiency), stage 1 08/27/2019    Anemia 08/27/2019    GERD (gastroesophageal reflux disease) 04/18/2019    Pansinusitis 02/13/2019    Type 2 diabetes mellitus with hyperglycemia  02/13/2019    Immunization due 02/13/2019    Non-allergic rhinitis 09/27/2018    Seasonal allergic rhinitis due to pollen 08/09/2018    Chronic (childhood) granulomatous disease 07/05/2018    Actinic keratoses 07/05/2018    Mixed hyperlipidemia 07/05/2018    Hematoma of frontal scalp 02/09/2018    Edema eyelid, unspecified laterality 02/09/2018    Laceration of forehead, left, complicated 01/29/2018    Laceration of occipital region of scalp 01/22/2018    Bronchitis 01/18/2018    Type 2 diabetes mellitus without complication, without long-term current use of insulin 08/25/2017    H/O cachexia 07/24/2017    Weight loss, abnormal 07/24/2017    Chronic diarrhea 07/24/2017    Diarrhea of presumed infectious origin 06/28/2017    Type 2 diabetes mellitus without complication 06/28/2017    Anal fissure 08/06/2012    Tinea cruris 05/21/2012    Rectal bleeding 05/21/2012    Anal fissure 05/21/2012    HTN  (hypertension) 2012        Medical History:   Past Medical History:   Diagnosis Date    Anemia     Anticoagulant long-term use     Arthritis     CAD (coronary artery disease)     CABG, STENTS ',,','05    Cancer     SKIN    Cataract     Diabetes mellitus     Diabetes mellitus type II     GERD (gastroesophageal reflux disease)     GI bleed     Hypertension     Myocardial infarction     Wears glasses        Surgical History:   Tono Saez  has a past surgical history that includes stint; Colon surgery; Coronary artery bypass graft (); Cardiac surgery; Cardiac pacemaker placement; Eye surgery; Coronary stent placement; Cardiac pacemaker placement; Hemorrhoid surgery; Cholecystectomy; head laceration  (2018); Cardiac pacemaker placement (2018); Small bowel enteroscopy (N/A, 2020); Colonoscopy (N/A, 2020); Colonoscopy (N/A, 2020); and Esophagogastroduodenoscopy (N/A, 2020).    Medications:   Tono has a current medication list which includes the following prescription(s): aspirin, betamethasone dipropionate, clopidogrel, empagliflozin, fluticasone propionate, furosemide, gabapentin, isosorbide dinitrate, ketoconazole, levocetirizine, magnesium oxide, montelukast, multivitamin, mupirocin, nitroglycerin, pantoprazole, rosuvastatin, sertraline, sulfacetamide sodium, and vitamin d.    Allergies:   Review of patient's allergies indicates:   Allergen Reactions    Adhesive Other (See Comments)     SILK TAPE PULL SKIN OFF    Metformin Other (See Comments)     Weight loss cachexia anorexia,diarrhea.    Pcn [penicillins]      Patient states he passed out from this medication when he was 10 years old.         Imagin/2022 Ultrasound: No DVT of the bilateral lower extremity veins.  MESSI negative for blockages per chart review    Steven's goals: pain go away.    OBJECTIVE   Patient received from waiting room. Patient independently ambulating to treatment room, antalgic gait  pattern with left/right veering.  Mental status: alert, oriented to person, place, and time, normal mood, behavior, speech, dress, motor activity, and thought processes, affect appropriate to mood  Appearance: Casually dressed and Well groomed  Behavior:  calm, cooperative, and adequate rapport can be established  Attention Span and Concentration:  Normal    Amount of Swelling: Mild  Location of Swelling: minimal in lower legs  Skin Integrity: Visible skin intact  Palpation/Texture: normal  Circulation: warm  Nails: normal appearing nails bilaterally  Posture: forward flexed posture     Left lower extremity  Right lower extremity    Barrett's Sign - -   Stemmer's Sign - -   Varicose Veins + +   Hyperpigmentation - -   Wounds - -   Lymphorrhea  - -   Absent hair growth - -   Cellulitis - -   Papillomas  - -   Fibrosis - -   Hyperplasia - -   Keloids - -   Impaired range of motion - -   Impaired strength - -   Neuropathy - -     Girth Measurements (in centimeters): taken in supine  LANDMARK Left lower extremity   9/15/22 Right lower extremity   9/15/22   SBP 39 cm 41 cm   10 below SBP 33.5 cm 35 cm   20 below SBP 35 cm 36.5 cm   30 below SBP 27 cm 28 cm   35 below SBP 23 cm 24 cm   Ankle 26 cm 24 cm   Forefoot 23 cm 22.5 cm   Total girth measurement 206.5 211.0   Total girth difference  +4.5   Total girth reduction       Single Limb Involvement  Mild: <10cm - <5cm of GGD  Mild/Mod: 10cm - 20cm TGD or 5.1cm -10cm GCD  Moderate:  20-40cm TGD or 10.1cm -15cm GGD  Moderate/Severe: 40.1cm TGD or 15.1cm -20.0cm GCD    Bilateral Limb Involvement  Moderate: 10cm - 15cm TGD or <5cm GGD  Moderate/Severe: 15.1-20cm GGD  Severe: > 20cm TGD or > 10cm GGD    TGD - total girth difference  GGD - greatest girth difference   SBP - Superior Border of Patella     SANDS BALANCE SCALE     1.SITTING TO STANDING:  (4) Pt able to stand without using hands and stabilize independently     2. STANDING UNSUPPORTED: (4) Pt able to stand safely 2  minutes     3. SITTING WITH BACK UNSUPPORTED BUT FEET SUPPORTED ON FLOOR: (4) Pt able to sit safely and securely 2 minutes     4. STANDING TO SITTING:(4) Pt sits safely with minimal use of hands   5. TRANSFERS:(4) Pt  able to transfer safely with minor use of hands    6. STANDING UNSUPPORTED WITH EYES CLOSED:(4) Pt  able to stand 10 seconds safely    7. STANDING UNSUPPORTED WITH FEET TOGETHER:(4) Pt   able to place feet together independently and stand 1 minute safely      8. REACHING FORWARD WITH OUTSTRETCHED ARM WHILE STANDING:(4) Pt  4 can reach forward confidently >25 cm (10 inches)    9.  OBJECT FROM THE FLOOR FROM A STANDING POSITION:(4) Pt  able to  slipper safely and easily    10. TURNING TO LOOK BEHIND OVER LEFT AND RIGHT SHOULDERS WHILE STANDING:(4) Pt looks behind from both sides and weight shifts well      11. TURN 360 DEGREES:(4) Pt able to turn 360 degrees safely in 4 seconds or less    12. PLACING ALTERNATE FOOT ON STEP OR STOOL WHILE STANDING UNSUPPORTED:(4) Pt able to stand independently and safely and complete 8 steps in 20 seconds    13. STANDING UNSUPPORTED ONE FOOT IN FRONT: (0) Pt loses balance while stepping or standing     14. STANDING ON ONE LEG:(2)  Pt able to lift leg independently and hold = or >3 seconds    Total Score 50/56    41-56 = low fall risk  21-40 = medium fall risk  0 -20 = high fall risk    CMS Impairment/Limitation/Restriction for FOTO Survey    Therapist reviewed FOTO scores for Tono Saez on 9/15/2022.   FOTO documents entered into [employer] - see Media section.     TREATMENT   Total Treatment time separate from Evaluation: 10 minutes    therapeutic activities to improve functional performance for 10  minutes, including:  The following education    PATIENT EDUCATION AND HOME EXERCISES   Education provided:   - Patient provided with verbal education regarding lymphedema etiology and management plans.   - Patient and family member verbalized understanding of  education and are in agreement with treatment plan.    Written Home Exercises Provided:  no .  Exercises were reviewed and Steven was able to demonstrate them prior to the end of the session.  Steven demonstrated good  understanding of the education provided.     ASSESSMENT   Tono is a 77 y.o. male referred to outpatient Physical Therapy with a medical diagnosis of claudication of gluteal region. This patient presents with minimal visible swelling of lower legs however significant reports of pain. Patient's pain is present despite the amount of activity performed. Pain affects gait pattern and sleep. Plan to provide complete decongestive therapy and functional therex with stretching to aide in pain relief.  Tono would benefit from complete decongestive therapy in order to acquire the correct compression garments.    Pt prognosis is Fair.   Pt will benefit from skilled outpatient Physical Therapy to address the deficits stated above and in the chart below, provide pt/family education, and to maximize pt's level of independence.     Plan of care discussed with patient: Yes  Pt's spiritual, cultural and educational needs considered and patient is agreeable to the plan of care and goals as stated below:     Anticipated Barriers for therapy: none    History  Co-morbidities and personal factors that may impact the plan of care Co-morbidities:   advanced age, diabetes, difficulty sleeping, and HTN      Personal Factors:   no deficits     moderate   Examination  Body Structures and Functions, activity limitations and participation restrictions that may impact the plan of care Body Regions:   lower extremities    Body Systems:    balance  gait  transfers    Participation Restrictions:   none    Activity limitations:   Learning and applying knowledge  no deficits    General Tasks and Commands  no deficits    Communication  no deficits    Mobility  walking    Self care  no deficits    Domestic Life  no  deficits    Interactions/Relationships  no deficits    Life Areas  no deficits    Community and Social Life  no deficits         moderate   Clinical Presentation evolving clinical presentation with changing clinical characteristics moderate   Decision Making/ Complexity Score: moderate      Goals:  The following goals were discussed with the patient and patient is in agreement with them as to be addressed in the treatment plan.     Short Term Goals: 2 weeks  Patient to independent with ambulation x300ft with no LOB or complaints of pain. NOT MET  Patient to be independent with home exercise program. NOT MET  Patient to increase FOTO score by 10 points. NOT MET    Long Term Goals: 4 weeks  Patient with reports of decreased pain by 3 points.  Patient to increase SANDS balance score by 5 points.    PLAN   Plan of care Certification: 9/15/2022 to 11/30/22.    Outpatient Physical Therapy 2 times weekly for 4 weeks for Complete Decongestive Therapy:  Manual lymphatic drainage, Multilayered short stretch bandaging, Pneumatic compression, Therapeutic exercises, and Patient education as deemed necessary to achieve stated goals. (interventions: Gait Training, Manual Therapy, Patient Education, Therapeutic Activities, and Therapeutic Exercise.)    Bertha Vieira PT, DPT, CLT  9/16/2022    I CERTIFY THE NEED FOR THESE SERVICES FURNISHED UNDER THIS PLAN OF TREATMENT AND WHILE UNDER MY CARE   Physician's comments:     Physician's Signature: ___________________________________________________

## 2022-09-16 PROBLEM — R52 PAIN: Status: ACTIVE | Noted: 2022-09-16

## 2022-09-20 ENCOUNTER — CLINICAL SUPPORT (OUTPATIENT)
Dept: REHABILITATION | Facility: HOSPITAL | Age: 77
End: 2022-09-20
Payer: MEDICARE

## 2022-09-20 DIAGNOSIS — R26.81 GAIT INSTABILITY: ICD-10-CM

## 2022-09-20 DIAGNOSIS — R52 PAIN: Primary | ICD-10-CM

## 2022-09-20 PROCEDURE — 97140 MANUAL THERAPY 1/> REGIONS: CPT | Mod: PN

## 2022-09-20 NOTE — PROGRESS NOTES
"[facility]   Physical Therapy Treatment Note   Lymphedema Lower Extremity     Name: Tono Saez  Clinic Number: [id number]    Therapy Diagnosis:   Encounter Diagnoses   Name Primary?    Pain Yes    Gait instability      Physician: Scott Staley MD    Visit Date: 9/20/2022    Physician Orders: PT Eval and Treat   Medical Diagnosis from Referral: I73.9 (ICD-10-CM) - Claudication of gluteal region  Evaluation Date: 9/15/2022  Authorization Period Expiration: 9/12/22-12/31/22  Plan of Care Expiration: 12/31/22  Progress Note Due: 11/4/22  Visit # / Visits authorized: 1/40   FOTO: 1 (47)     Precautions: Standard, Diabetes, and Fall    Time In: 1030  Time Out: 1115  Total Billable Time: 45 minutes    SUBJECTIVE     Steven reports: "I'm having my normal pain in my legs."  Steven reported wearing compression outside of therapy visits: No  Steven was not compliant with home exercise program.  Response to previous treatment: ongoing  Functional change: ongoing    Pain: 8/10; reports 6/10 with legs elevated/pumps on; reports 0/10 pain with short stretch compression on.  Location: bilateral lower legs     OBJECTIVE   Girth Measurements (in centimeters): taken in supine  LANDMARK Left lower extremity   9/15/22 Right lower extremity   9/15/22   SBP 39 cm 41 cm   10 below SBP 33.5 cm 35 cm   20 below SBP 35 cm 36.5 cm   30 below SBP 27 cm 28 cm   35 below SBP 23 cm 24 cm   Ankle 26 cm 24 cm   Forefoot 23 cm 22.5 cm   Total girth measurement 206.5 211.0   Total girth difference   +4.5   Total girth reduction          Single Limb Involvement  Mild: <10cm - <5cm of GGD  Mild/Mod: 10cm - 20cm TGD or 5.1cm -10cm GCD  Moderate:  20-40cm TGD or 10.1cm -15cm GGD  Moderate/Severe: 40.1cm TGD or 15.1cm -20.0cm GCD     Bilateral Limb Involvement  Moderate: 10cm - 15cm TGD or <5cm GGD  Moderate/Severe: 15.1-20cm GGD  Severe: > 20cm TGD or > 10cm GGD     TGD - total girth difference  GGD - greatest girth " "difference   SBP - Superior Border of Patella     Treatment     Steven received the treatments listed below:      Patient received from waiting room. Patient wearing shorts and velcro tennis shoes. Able to independently take off socks and shoes. Noted to have bandaid on right posterior leg    manual therapy techniques: Complete Decongestive Therapy was applied to the: bilateral lower extremities for 45 minutes, including: manual lymphatic drainage and short stretch compression bandaging     MANUAL LYMPHATIC DRAINAGE: trunk   Supine with lower extremities elevated:  Short Neck  Axillary lymph nodes on bilateral  Activate and work over the INGUINAL-AXILLARY ANASTOMOSES on bilateral  Deep abdominal techniques  Rework both INGUINAL-AXILLARY ANASTOMOSES  Activate "bulk flow" on lateral thigh    SEQUENTIAL COMPRESSION PUMP:   Full leg sleeve applied to BLE  Lympha Press with Sequential Cycle, with distal pressures starting at 45mmHg entire LE   -Simultaneous with Manual Lymphatic Drainage of trunk    MULTILAYERED BANDAGING:    Issued supplies and bandaged bilateral lower extremities   Use of Aquaphor for dry skin  Cotton stockinet: 4"  Rosidal Soft or Arteflex padding from dorsum of foot to knee,   1-6cm 1-8cm Comprilan rolls foot to distal knee    therapeutic exercises to develop strength, endurance, flexibility, and posture for 0 minutes including:   Not performed today    Patient Education and Home Exercises     therapeutic activities to improve functional performance for 0 minutes, including:    Compression Needed:  bilateral lower extremities inelastic compression garments, size (TBD)  3 additional pairs class 1 elastic knee high garments, size (TBD)  Home Sequential Compression pump, minimum 4 chambers each extremity  bilateral lower extremities sleeves for SCD, thigh high, minimum 4 chambers each extremity    Garments Ordered: No    Home Exercises and Patient Education Provided     Education provided:   - Educated " in self massage to abdominal areas, B inguinal areas, thigh, and remaining LE within reach.  - Patient to leave short-stretch compression bandages intact for 12-24 hours as tolerated, discontinue with any problems, return rolled bandages next session. Wash and wear schedules confirmed.   - Continue HEP of AROM, stretching, and postural correction.   - Educated on self or assisted bandaging, compression options, and risk reduction    Written Home Exercises Provided: Patient instructed to cont prior HEP. Exercises were reviewed and Steven was able to demonstrate them prior to the end of the session.  Steven demonstrated good  understanding of the education provided. See EMR under Patient Instructions for exercises provided during therapy sessions    ASSESSMENT     Mr. Saez tolerated manual lymphatic drainage and bandaging well. Patient with continued intense pain of bilateral anterior lower legs. Attempting compression to aide in pain relief by fluid reduction which may affect nerve entrapment. Patient continues to benefit from complete decongestive therapy in pain management. Anticipate adding therex next visit to include stretching.    Steven Is progressing well towards his goals.   Steven prognosis is Good.     Patient will continue to benefit from skilled outpatient physical therapy to address the deficits listed in the problem list box on initial evaluation, provide patient/family education and to maximize patient's level of independence in the home and community environment.     Patient's spiritual, cultural and educational needs considered and patient agreeable to plan of care and goals.     Anticipated barriers to physical therapy: nonoe    Goals:  The following goals were discussed with the patient and patient is in agreement with them as to be addressed in the treatment plan.      Short Term Goals: 2 weeks  Patient to independent with ambulation x300ft with no LOB or complaints of pain. NOT MET  Patient to be  independent with home exercise program. NOT MET  Patient to increase FOTO score by 10 points. NOT MET     Long Term Goals: 4 weeks  Patient with reports of decreased pain by 3 points.  Patient to increase SANDS balance score by 5 points.    PLAN   Plan of care Certification: 9/15/2022 to 11/30/22.     Outpatient Physical Therapy 2 times weekly for 4 weeks for Complete Decongestive Therapy:  Manual lymphatic drainage, Multilayered short stretch bandaging, Pneumatic compression, Therapeutic exercises, and Patient education as deemed necessary to achieve stated goals. (interventions: Gait Training, Manual Therapy, Patient Education, Therapeutic Activities, and Therapeutic Exercise.)    Bertha Vieira PT, DPT, CLT  9/20/2022

## 2022-09-21 ENCOUNTER — CLINICAL SUPPORT (OUTPATIENT)
Dept: REHABILITATION | Facility: HOSPITAL | Age: 77
End: 2022-09-21
Payer: MEDICARE

## 2022-09-21 DIAGNOSIS — R26.81 GAIT INSTABILITY: ICD-10-CM

## 2022-09-21 DIAGNOSIS — R52 PAIN: Primary | ICD-10-CM

## 2022-09-21 PROCEDURE — 97110 THERAPEUTIC EXERCISES: CPT | Mod: PN

## 2022-09-21 NOTE — PROGRESS NOTES
"[facility]   Physical Therapy Treatment Note     Name: Tono Saez  Clinic Number: [id number]    Therapy Diagnosis:   Encounter Diagnoses   Name Primary?    Pain Yes    Gait instability      Physician: Scott Staley MD    Visit Date: 9/21/2022    Physician Orders: PT Eval and Treat   Medical Diagnosis from Referral: I73.9 (ICD-10-CM) - Claudication of gluteal region  Evaluation Date: 9/15/2022  Authorization Period Expiration: 9/12/22-12/31/22  Plan of Care Expiration: 12/31/22  Progress Note Due: 11/4/22  Visit # / Visits authorized: 2/40   FOTO: 1 (47)     Precautions: Standard, Diabetes, and Fall    Time In: 1030  Time Out: 1130  Total Billable Time: 60 minutes    SUBJECTIVE     Steven reports: "I'm worse." Patient reports the pain returned about 12 o'clock yesterday, and remained a 10+/10. Describes pain as a clamping around his legs, primarily anteriorly.   Steven reported wearing compression outside of therapy visits: No  Steven was not compliant with home exercise program.  Response to previous treatment: ongoing  Functional change: ongoing    Pain: 10+/10 initially, once compression removed 5/10.  Location: bilateral lower legs     OBJECTIVE     Treatment     Steven received the treatments listed below:      Patient received from waiting room. Patient returned with compression bandages intact. Reports intense pain the rest of the day with no relief. Therapist removed compression bandaging in which he said he had immediate relief. Therapist attempted to apply tubigrip size D to legs, however complained of immediate pain again.     therapeutic exercises to develop strength, endurance, flexibility, and posture for 60 minutes including:   Scifit 10' bilateral upper extremities and bilateral lower extremities (pain: 4/10 (left > right))  Seated hamstring stretch 3x30" bilaterally (pain: 4/10)  Seated gastroc stretch 3x30" bilaterally (pain: 4/10)  Ankle 4-way yellow theraband " bilaterally (no pain reported)  Heel raises x10(no pain reported)  Toe raises x10 (no pain reported)    Patient Education and Home Exercises     therapeutic activities to improve functional performance for 0 minutes, including:    Home Exercises and Patient Education Provided     Education provided:   - Continue HEP of AROM, stretching, and postural correction.   -home exercise program: seated hamstring stretch, gastroc stretch, heel raises/toe raises, ankle ABCs    Written Home Exercises Provided: Patient instructed to cont prior HEP; provided home exercise program 9/21/22Exercises were reviewed and Steven was able to demonstrate them prior to the end of the session.  Steven demonstrated good  understanding of the education provided. See EMR under Patient Instructions for exercises provided during therapy sessions    ASSESSMENT     Mr. Saez with very poor tolerance to compression, complained of increased pain. Patient ambulating with antalgic gait pattern. Patient's symptoms cause patient to walk with increased instability. Provided stretching exercises as symptoms appear to be related to shin splints. Encouraged patient to do provided stretches daily. Will plan to progress exercises based on patient reports.     Steven Is progressing well towards his goals.   Stveen prognosis is Good.     Patient will continue to benefit from skilled outpatient physical therapy to address the deficits listed in the problem list box on initial evaluation, provide patient/family education and to maximize patient's level of independence in the home and community environment.     Patient's spiritual, cultural and educational needs considered and patient agreeable to plan of care and goals.     Anticipated barriers to physical therapy: nonoe    Goals:  The following goals were discussed with the patient and patient is in agreement with them as to be addressed in the treatment plan.      Short Term Goals: 2 weeks  Patient to independent  with ambulation x300ft with no LOB or complaints of pain. NOT MET  Patient to be independent with home exercise program. NOT MET  Patient to increase FOTO score by 10 points. NOT MET     Long Term Goals: 4 weeks  Patient with reports of decreased pain by 3 points.  Patient to increase SANDS balance score by 5 points.    PLAN   Plan of care Certification: 9/15/2022 to 11/30/22.     Outpatient Physical Therapy 2 times weekly for 4 weeks for Complete Decongestive Therapy:  Manual lymphatic drainage, Multilayered short stretch bandaging, Pneumatic compression, Therapeutic exercises, and Patient education as deemed necessary to achieve stated goals. (interventions: Gait Training, Manual Therapy, Patient Education, Therapeutic Activities, and Therapeutic Exercise.)    Bertha Vieira PT, DPT, CLT  9/21/2022

## 2022-09-27 ENCOUNTER — CLINICAL SUPPORT (OUTPATIENT)
Dept: REHABILITATION | Facility: HOSPITAL | Age: 77
End: 2022-09-27
Payer: MEDICARE

## 2022-09-27 DIAGNOSIS — R52 PAIN: Primary | ICD-10-CM

## 2022-09-27 DIAGNOSIS — R26.81 GAIT INSTABILITY: ICD-10-CM

## 2022-09-27 PROCEDURE — 97110 THERAPEUTIC EXERCISES: CPT | Mod: PN

## 2022-09-27 NOTE — PROGRESS NOTES
"[facility]   Physical Therapy Treatment Note     Name: Tono Saez  Clinic Number: [id number]    Therapy Diagnosis:   Encounter Diagnoses   Name Primary?    Pain Yes    Gait instability      Physician: Scott Staley MD    Visit Date: 9/27/2022    Physician Orders: PT Eval and Treat   Medical Diagnosis from Referral: I73.9 (ICD-10-CM) - Claudication of gluteal region  Evaluation Date: 9/15/2022  Authorization Period Expiration: 9/12/22-12/31/22  Plan of Care Expiration: 12/31/22  Progress Note Due: 11/4/22  Visit # / Visits authorized: 3/40   FOTO: 1 (47)     Precautions: Standard, Diabetes, and Fall    Time In: 1030  Time Out: 1110  Total Billable Time: 40 minutes    SUBJECTIVE     Steven reports: "Thursday I was really hurting, I couldn't stand up to go to the bathroom." Patient reports increased pain in calf area after exercises. Attempted to do home exercise program over weekend with increased pain afterwards again.   Steven reported wearing compression outside of therapy visits: Yes  Tseven was not compliant with home exercise program.  Response to previous treatment: ongoing  Functional change: ongoing    Pain: 0/10 initial  Location: bilateral lower legs     OBJECTIVE     Treatment     Steven received the treatments listed below:      therapeutic exercises to develop strength, endurance, flexibility, and posture for 40 minutes including:   Recumbent bike 8' bilateral lower extremities (pain: 2/10 (left > right))  Seated hamstring stretch 3x10" bilaterally (pain: 2/10)  Seated gastroc stretch 3x10" bilaterally (pain: 2/10)  Ankle 4-way yellow theraband bilaterally (no pain reported)  Seated quad sets on stool bilaterally 5" hold x10  Heel raises x10(no pain reported)  Toe raises x10 (no pain reported)    Patient Education and Home Exercises     therapeutic activities to improve functional performance for 0 minutes, including:    Home Exercises and Patient Education Provided " "    Education provided:   - Continue HEP of AROM, stretching, and postural correction.   -home exercise program: seated hamstring stretch, gastroc stretch, heel raises/toe raises, ankle ABCs    Written Home Exercises Provided: Patient instructed to cont prior HEP; provided home exercise program 9/21/22Exercises were reviewed and Steven was able to demonstrate them prior to the end of the session.  Steven demonstrated good  understanding of the education provided. See EMR under Patient Instructions for exercises provided during therapy sessions    ASSESSMENT     Mr. Saez with very poor tolerance to compression, complained of increased pain. Patient ambulating with antalgic gait pattern. Patient with increased complaints of pain after last session and completeing home exercise program. Patient describes posterior leg pain, appears to be consistent with stretching activities of hamstrings, gastroc and soleus. During session, patient desribed current pain as "tight compression sock" of anterior leg. Encouraged patient to do provided stretches daily. Will plan to progress exercises based on patient reports.     Steven Is progressing well towards his goals.   Steven prognosis is Good.     Patient will continue to benefit from skilled outpatient physical therapy to address the deficits listed in the problem list box on initial evaluation, provide patient/family education and to maximize patient's level of independence in the home and community environment.     Patient's spiritual, cultural and educational needs considered and patient agreeable to plan of care and goals.     Anticipated barriers to physical therapy: nonoe    Goals:  The following goals were discussed with the patient and patient is in agreement with them as to be addressed in the treatment plan.      Short Term Goals: 2 weeks  Patient to independent with ambulation x300ft with no LOB or complaints of pain. NOT MET  Patient to be independent with home exercise " program. NOT MET  Patient to increase FOTO score by 10 points. NOT MET     Long Term Goals: 4 weeks  Patient with reports of decreased pain by 3 points.  Patient to increase SANDS balance score by 5 points.    PLAN   Plan of care Certification: 9/15/2022 to 11/30/22.     Outpatient Physical Therapy 2 times weekly for 4 weeks for Complete Decongestive Therapy:  Manual lymphatic drainage, Multilayered short stretch bandaging, Pneumatic compression, Therapeutic exercises, and Patient education as deemed necessary to achieve stated goals. (interventions: Gait Training, Manual Therapy, Patient Education, Therapeutic Activities, and Therapeutic Exercise.)    Bertha Vieira PT, DPT, CLT  9/27/2022

## 2022-09-28 ENCOUNTER — CLINICAL SUPPORT (OUTPATIENT)
Dept: REHABILITATION | Facility: HOSPITAL | Age: 77
End: 2022-09-28
Payer: MEDICARE

## 2022-09-28 DIAGNOSIS — R26.81 GAIT INSTABILITY: ICD-10-CM

## 2022-09-28 DIAGNOSIS — R52 PAIN: Primary | ICD-10-CM

## 2022-09-28 PROCEDURE — 97110 THERAPEUTIC EXERCISES: CPT | Mod: PN

## 2022-09-28 NOTE — PROGRESS NOTES
"[facility]   Physical Therapy Treatment Note     Name: Tono Saez  Clinic Number: [id number]    Therapy Diagnosis:   Encounter Diagnoses   Name Primary?    Pain Yes    Gait instability      Physician: Scott Staley MD    Visit Date: 9/28/2022    Physician Orders: PT Eval and Treat   Medical Diagnosis from Referral: I73.9 (ICD-10-CM) - Claudication of gluteal region  Evaluation Date: 9/15/2022  Authorization Period Expiration: 9/12/22-12/31/22  Plan of Care Expiration: 12/31/22  Progress Note Due: 11/4/22  Visit # / Visits authorized: 4/40   FOTO: 1 (47)     Precautions: Standard, Diabetes, and Fall    Time In: 1045  Time Out: 1130  Total Billable Time: 45 minutes    SUBJECTIVE     Steven reports: "I'm feeling better. It just feels like a pulling of my muscles."  Steven reported wearing compression outside of therapy visits: Yes  Steven was not compliant with home exercise program.  Response to previous treatment: ongoing  Functional change: ongoing    Pain: 0/10 initial  Location: bilateral lower legs     OBJECTIVE     Treatment     Steven received the treatments listed below:      therapeutic exercises to develop strength, endurance, flexibility, and posture for 45 minutes including:   Recumbent bike 8' bilateral lower extremities (pain: 0/10 )  Seated hamstring stretch 2x15" bilaterally (pain: 2/10)  Seated around the [place] on gold balance board  (focus on ankle AROM)  Seated hip abduction with red theraband 2x10  Standing gastroc stretch on foam roll 3x30"  Standing Heel raises x10(no pain reported)  Standing Toe raises x10 (no pain reported)  Standing heel raises on blue foam x10  Standing toe raises on blue foam x10  Bilateral lunge onto small bosu x5  Shuttle bilateral lower extremities 50lbs 2x15  Shuttle bilateral heel raises 25lbs 2x15    Patient Education and Home Exercises     therapeutic activities to improve functional performance for 0 minutes, including:    Home Exercises " and Patient Education Provided     Education provided:   - Continue HEP of AROM, stretching, and postural correction.   -home exercise program: seated hamstring stretch, gastroc stretch, heel raises/toe raises, ankle ABCs    Written Home Exercises Provided: Patient instructed to cont prior HEP; provided home exercise program 9/21/22Exercises were reviewed and Steven was able to demonstrate them prior to the end of the session.  Steven demonstrated good  understanding of the education provided. See EMR under Patient Instructions for exercises provided during therapy sessions    ASSESSMENT     Mr. Saez with good tolerance to therex with no complaints of pain. Patient with much improved symptoms, describes only stretch pain. Patient ambulating veering to right occasionally bumping wall. Plan to increase stretching exercises and balance therex to increase mobility.    Steven Is progressing well towards his goals.   Steven prognosis is Good.     Patient will continue to benefit from skilled outpatient physical therapy to address the deficits listed in the problem list box on initial evaluation, provide patient/family education and to maximize patient's level of independence in the home and community environment.     Patient's spiritual, cultural and educational needs considered and patient agreeable to plan of care and goals.     Anticipated barriers to physical therapy: nonoe    Goals:  The following goals were discussed with the patient and patient is in agreement with them as to be addressed in the treatment plan.      Short Term Goals: 2 weeks  Patient to independent with ambulation x300ft with no LOB or complaints of pain. NOT MET  Patient to be independent with home exercise program. NOT MET  Patient to increase FOTO score by 10 points. NOT MET     Long Term Goals: 4 weeks  Patient with reports of decreased pain by 3 points.  Patient to increase SANDS balance score by 5 points.    PLAN   Plan of care Certification:  9/15/2022 to 11/30/22.     Outpatient Physical Therapy 2 times weekly for 4 weeks for Complete Decongestive Therapy:  Manual lymphatic drainage, Multilayered short stretch bandaging, Pneumatic compression, Therapeutic exercises, and Patient education as deemed necessary to achieve stated goals. (interventions: Gait Training, Manual Therapy, Patient Education, Therapeutic Activities, and Therapeutic Exercise.)    Berhta Vieira PT, DPT, CLT  9/28/2022

## 2022-10-04 ENCOUNTER — CLINICAL SUPPORT (OUTPATIENT)
Dept: REHABILITATION | Facility: HOSPITAL | Age: 77
End: 2022-10-04
Payer: MEDICARE

## 2022-10-04 DIAGNOSIS — R26.81 GAIT INSTABILITY: ICD-10-CM

## 2022-10-04 DIAGNOSIS — R52 PAIN: Primary | ICD-10-CM

## 2022-10-04 PROCEDURE — 97110 THERAPEUTIC EXERCISES: CPT | Mod: PN

## 2022-10-04 NOTE — PROGRESS NOTES
"[facility]   Physical Therapy Treatment Note     Name: Tono Saez  Clinic Number: [id number]    Therapy Diagnosis:   Encounter Diagnoses   Name Primary?    Pain Yes    Gait instability      Physician: Scott Staley MD    Visit Date: 10/4/2022    Physician Orders: PT Eval and Treat   Medical Diagnosis from Referral: I73.9 (ICD-10-CM) - Claudication of gluteal region  Evaluation Date: 9/15/2022  Authorization Period Expiration: 9/12/22-12/31/22  Plan of Care Expiration: 12/31/22  Progress Note Due: 11/4/22  Visit # / Visits authorized: 5/40   FOTO: 1 (47)     Precautions: Standard, Diabetes, and Fall    Time In: 1030  Time Out: 1115  Total Billable Time: 45 minutes    SUBJECTIVE     Steven reports: "I'm not great. I've still having a lot of pain. I did some yard work yesterday and had to stop because of the pain."  Steven reported wearing compression outside of therapy visits: Yes  Steven was not compliant with home exercise program.  Response to previous treatment: ongoing  Functional change: ongoing    Pain: 6/10 initial  Location: bilateral lower legs (describes as compressive pain around lower legs)    OBJECTIVE     Treatment     Steven received the treatments listed below:      therapeutic exercises to develop strength, endurance, flexibility, and posture for 45 minutes including:   Recumbent bike 10' bilateral lower extremities (pain: 0/10 )  Seated hamstring stretch 3x15" bilaterally (pain: 0/10)  Seated around the [place] on gold balance board  (focus on ankle AROM)  Seated ankle pf/df/ev/inv: x10 with red theraband bilaterally    Bars:  Standing hamstring curls bilaterally 2x10 #1  Standing hip extensions bilaterally 2x10 #1  Standing hip abduction bilaterally 2x10 #1  Standing heel raises bilaterally 2x10 #1    Patient Education and Home Exercises     therapeutic activities to improve functional performance for 0 minutes, including:    Home Exercises and Patient Education Provided "     Education provided:   - Continue HEP of AROM, stretching, and postural correction.   -home exercise program: seated hamstring stretch, gastroc stretch, heel raises/toe raises, ankle ABCs    Written Home Exercises Provided: Patient instructed to cont prior HEP; provided home exercise program 9/21/22Exercises were reviewed and Steven was able to demonstrate them prior to the end of the session.  Steven demonstrated good  understanding of the education provided. See EMR under Patient Instructions for exercises provided during therapy sessions    ASSESSMENT     Mr. Saez with consistent complaints of pain since last visit. Patient describes pain as a compressive pain around his lower legs. No change noted regarding pain with the past two weeks of therapy. Patient would benefit from returning to physician due to no change in pain or function with reports of increased pain at times as well. Patient in agreement. Plan for discharge from therapy next visit.     Steven Is progressing well towards his goals.   Steven prognosis is Good.     Patient will continue to benefit from skilled outpatient physical therapy to address the deficits listed in the problem list box on initial evaluation, provide patient/family education and to maximize patient's level of independence in the home and community environment.     Patient's spiritual, cultural and educational needs considered and patient agreeable to plan of care and goals.     Anticipated barriers to physical therapy: nonoe    Goals:  The following goals were discussed with the patient and patient is in agreement with them as to be addressed in the treatment plan.      Short Term Goals: 2 weeks  Patient to independent with ambulation x300ft with no LOB or complaints of pain. NOT MET  Patient to be independent with home exercise program. NOT MET  Patient to increase FOTO score by 10 points. NOT MET     Long Term Goals: 4 weeks  Patient with reports of decreased pain by 3  points.  Patient to increase SANDS balance score by 5 points.    PLAN   Plan of care Certification: 9/15/2022 to 11/30/22.     Outpatient Physical Therapy 2 times weekly for 4 weeks for Complete Decongestive Therapy:  Manual lymphatic drainage, Multilayered short stretch bandaging, Pneumatic compression, Therapeutic exercises, and Patient education as deemed necessary to achieve stated goals. (interventions: Gait Training, Manual Therapy, Patient Education, Therapeutic Activities, and Therapeutic Exercise.)    Bertha Vieira PT, DPT, CLT  10/4/2022

## 2022-10-05 ENCOUNTER — OFFICE VISIT (OUTPATIENT)
Dept: FAMILY MEDICINE | Facility: CLINIC | Age: 77
End: 2022-10-05
Payer: MEDICARE

## 2022-10-05 ENCOUNTER — CLINICAL SUPPORT (OUTPATIENT)
Dept: REHABILITATION | Facility: HOSPITAL | Age: 77
End: 2022-10-05
Payer: MEDICARE

## 2022-10-05 VITALS
HEIGHT: 72 IN | OXYGEN SATURATION: 98 % | RESPIRATION RATE: 18 BRPM | BODY MASS INDEX: 25.44 KG/M2 | SYSTOLIC BLOOD PRESSURE: 120 MMHG | WEIGHT: 187.81 LBS | HEART RATE: 80 BPM | TEMPERATURE: 98 F | DIASTOLIC BLOOD PRESSURE: 66 MMHG

## 2022-10-05 DIAGNOSIS — R52 PAIN: Primary | ICD-10-CM

## 2022-10-05 DIAGNOSIS — Z23 IMMUNIZATION DUE: ICD-10-CM

## 2022-10-05 DIAGNOSIS — R26.81 GAIT INSTABILITY: ICD-10-CM

## 2022-10-05 DIAGNOSIS — I10 PRIMARY HYPERTENSION: ICD-10-CM

## 2022-10-05 DIAGNOSIS — E78.2 MIXED HYPERLIPIDEMIA: ICD-10-CM

## 2022-10-05 DIAGNOSIS — E11.9 TYPE 2 DIABETES MELLITUS WITHOUT COMPLICATION, WITHOUT LONG-TERM CURRENT USE OF INSULIN: Primary | ICD-10-CM

## 2022-10-05 PROCEDURE — 97110 THERAPEUTIC EXERCISES: CPT | Mod: PN

## 2022-10-05 PROCEDURE — 99214 OFFICE O/P EST MOD 30 MIN: CPT | Mod: S$PBB,AQ,, | Performed by: FAMILY MEDICINE

## 2022-10-05 PROCEDURE — G0008 ADMIN INFLUENZA VIRUS VAC: HCPCS | Mod: PBBFAC | Performed by: FAMILY MEDICINE

## 2022-10-05 PROCEDURE — 99215 OFFICE O/P EST HI 40 MIN: CPT | Mod: 25 | Performed by: FAMILY MEDICINE

## 2022-10-05 PROCEDURE — 99214 PR OFFICE/OUTPT VISIT, EST, LEVL IV, 30-39 MIN: ICD-10-PCS | Mod: S$PBB,AQ,, | Performed by: FAMILY MEDICINE

## 2022-10-05 PROCEDURE — 90662 IIV NO PRSV INCREASED AG IM: CPT | Mod: PBBFAC | Performed by: FAMILY MEDICINE

## 2022-10-05 RX ORDER — FINASTERIDE 5 MG/1
5 TABLET, FILM COATED ORAL DAILY
COMMUNITY
Start: 2022-09-28

## 2022-10-05 RX ORDER — PIOGLITAZONEHYDROCHLORIDE 15 MG/1
15 TABLET ORAL DAILY
Qty: 90 TABLET | Refills: 3 | Status: SHIPPED | OUTPATIENT
Start: 2022-10-05 | End: 2023-05-29

## 2022-10-05 NOTE — PROGRESS NOTES
"[facility]   Physical Therapy Treatment Note   Discharge Summary    Name: Tono Saez  Clinic Number: [id number]    Therapy Diagnosis:   Encounter Diagnoses   Name Primary?    Pain Yes    Gait instability      Physician: Scott Staley MD    Visit Date: 10/5/2022    Physician Orders: PT Eval and Treat   Medical Diagnosis from Referral: I73.9 (ICD-10-CM) - Claudication of gluteal region  Evaluation Date: 9/15/2022  Authorization Period Expiration: 9/12/22-12/31/22  Plan of Care Expiration: 12/31/22  Progress Note Due: 11/4/22  Visit # / Visits authorized: 6/40   FOTO: 1 (47)     Precautions: Standard, Diabetes, and Fall    Time In: 1035  Time Out: 1115  Total Billable Time: 40 minutes    SUBJECTIVE     Steven reports: "I'm hurting. I got up this morning and my legs just crumbled." Patient reports having an MD appt at 1315 this afternoon.  Steven reported wearing compression outside of therapy visits: Yes  Steven was not compliant with home exercise program.  Response to previous treatment: ongoing  Functional change: ongoing    Pain: 10/10 reduced to 5/10 at end of session  Location: bilateral lower legs (describes as compressive pain around lower legs)    OBJECTIVE     Treatment     Steven received the treatments listed below:      therapeutic exercises to develop strength, endurance, flexibility, and posture for 45 minutes including:   Recumbent bike 10' bilateral lower extremities (pain: 10/10 )  Seated hamstring stretch 3x30" bilaterally (pain: 9/10)  Seated gastroc stretch 3x30" bilaterally (pain 5/10)  Seated piriformis stretch 3x30" bilaterally   Seated heel raises/toe raises x10  Seated LAQs 2x10 bilaterally    Patient Education and Home Exercises     therapeutic activities to improve functional performance for 0 minutes, including:    Home Exercises and Patient Education Provided     Education provided:   - Continue HEP of AROM, stretching, and postural correction.   -home exercise " program: seated hamstring stretch, gastroc stretch, heel raises/toe raises, ankle ABCs  -provided home exercise program: seated piriformis stretch, glute set, LAQs    Written Home Exercises Provided: Patient instructed to cont prior HEP; provided home exercise program 9/21/22Exercises were reviewed and Steven was able to demonstrate them prior to the end of the session.  Steven demonstrated good  understanding of the education provided. See EMR under Patient Instructions for exercises provided during therapy sessions    ASSESSMENT     Patient complaining of increased pain of bilateral lower extremities on this date. No relief noted from yesterday's exercises. Patient complains pain is at its highest when he is walking. He did participate well in all therex with provided home exercise program. Patient discharged to follow up with MD due to no improvements in function or pain.     Steven Is progressing well towards his goals.   Steven prognosis is Good.     Patient will continue to benefit from skilled outpatient physical therapy to address the deficits listed in the problem list box on initial evaluation, provide patient/family education and to maximize patient's level of independence in the home and community environment.     Patient's spiritual, cultural and educational needs considered and patient agreeable to plan of care and goals.     Anticipated barriers to physical therapy: nonoe    Goals:  The following goals were discussed with the patient and patient is in agreement with them as to be addressed in the treatment plan.      Short Term Goals: 2 weeks  Patient to independent with ambulation x300ft with no LOB or complaints of pain. NOT MET  Patient to be independent with home exercise program. NOT MET  Patient to increase FOTO score by 10 points. NOT MET     Long Term Goals: 4 weeks  Patient with reports of decreased pain by 3 points.  Patient to increase SANDS balance score by 5 points.    PLAN   Plan of care  Certification: 9/15/2022 to 11/30/22.     Discharged PT  Bertha Vieira PT, DPT, CLT  10/5/2022

## 2022-10-05 NOTE — PATIENT INSTRUCTIONS
Patient advised a decrease the Lasix to every other day or limited altogether, and possibly eliminate gabapentin only when needed.

## 2022-10-05 NOTE — PROGRESS NOTES
Subjective:       Patient ID: Tono Saez is a 77 y.o. male.    Chief Complaint: Follow-up (Follow up from rehab)      Follow-up      Allergies and Medications:   Review of patient's allergies indicates:   Allergen Reactions    Adhesive Other (See Comments)     SILK TAPE PULL SKIN OFF    Metformin Other (See Comments)     Weight loss cachexia anorexia,diarrhea.    Pcn [penicillins]      Patient states he passed out from this medication when he was 10 years old.      Current Outpatient Medications   Medication Sig Dispense Refill    aspirin (ECOTRIN) 81 MG EC tablet Take 162 mg by mouth once daily.      betamethasone dipropionate (DIPROLENE) 0.05 % lotion Apply thin film to scalp bid prn itch 60 mL 6    clopidogreL (PLAVIX) 75 mg tablet Take 1 tablet (75 mg total) by mouth once daily. 90 tablet 3    empagliflozin (JARDIANCE) 25 mg tablet Take 1 tablet (25 mg total) by mouth once daily. 30 tablet 6    finasteride (PROSCAR) 5 mg tablet Take 5 mg by mouth once daily.      fluticasone propionate (FLONASE) 50 mcg/actuation nasal spray Use nasally as directed as needed      gabapentin (NEURONTIN) 300 MG capsule Take 1 capsule (300 mg total) by mouth 2 (two) times daily. 60 capsule 11    isosorbide dinitrate (ISORDIL) 10 MG tablet Take 1 tablet (10 mg total) by mouth 2 (two) times daily. 60 tablet 11    ketoconazole (NIZORAL) 2 % shampoo Wash all scaling areas let sit 3 minutes then rinse 3x/wk 120 mL 11    levocetirizine (XYZAL) 5 MG tablet TAKE ONE TABLET BY MOUTH EVERY EVENING 60 tablet 11    magnesium oxide (MAG-OX) 400 mg (241.3 mg magnesium) tablet Take 1 tablet (400 mg total) by mouth once daily. 90 tablet 1    montelukast (SINGULAIR) 10 mg tablet TAKE 1 TABLET (10 MG TOTAL) BY MOUTH EVERY EVENING. 30 tablet 11    multivitamin capsule Take 1 capsule by mouth once daily.      mupirocin (BACTROBAN) 2 % ointment Apply topically 2 (two) times daily. 22 g 11    nitroGLYCERIN (NITROSTAT) 0.4 MG SL  tablet DISSOLVE 1 TABLET UNDER THE TONGUE AS NEEDED FOR CHEST PAIN 100 tablet 3    pantoprazole (PROTONIX) 40 MG tablet TAKE ONE TABLET BY MOUTH ONCE DAILY 90 tablet 1    rosuvastatin (CRESTOR) 20 MG tablet TAKE ONE TABLET BY MOUTH NIGHTLY AT BEDTIME 30 tablet 3    sertraline (ZOLOFT) 50 MG tablet TAKE ONE TABLET BY MOUTH ONCE DAILY 90 tablet 1    sulfacetamide sodium (OVACE PLUS SHAMPOO) 10 % Sham Wash scalp nightly 1 each 11    vitamin D 1000 units Tab Take 1,000 Units by mouth once daily.      pioglitazone (ACTOS) 15 MG tablet Take 1 tablet (15 mg total) by mouth once daily. 90 tablet 3     No current facility-administered medications for this visit.       Family History:   Family History   Problem Relation Age of Onset    Heart disease Mother     Heart disease Father     Hyperlipidemia Sister     Hypertension Sister     Heart disease Brother     Heart disease Brother     Heart disease Brother     Heart disease Brother     Heart disease Brother        Social History:   Social History     Socioeconomic History    Marital status:    Tobacco Use    Smoking status: Never    Smokeless tobacco: Never   Substance and Sexual Activity    Alcohol use: No    Drug use: No    Sexual activity: Not Currently     Social Determinants of Health     Financial Resource Strain: Low Risk     Difficulty of Paying Living Expenses: Not hard at all   Food Insecurity: No Food Insecurity    Worried About Running Out of Food in the Last Year: Never true    Ran Out of Food in the Last Year: Never true   Transportation Needs: No Transportation Needs    Lack of Transportation (Medical): No    Lack of Transportation (Non-Medical): No   Physical Activity: Inactive    Days of Exercise per Week: 0 days    Minutes of Exercise per Session: 0 min   Stress: No Stress Concern Present    Feeling of Stress : Not at all   Social Connections: Unknown    Frequency of Communication with Friends and Family: More than three  times a week    Frequency of Social Gatherings with Friends and Family: Twice a week    Active Member of Clubs or Organizations: Yes    Attends Club or Organization Meetings: More than 4 times per year    Marital Status:    Housing Stability: Low Risk     Unable to Pay for Housing in the Last Year: No    Number of Places Lived in the Last Year: 1    Unstable Housing in the Last Year: No       Review of Systems    Objective:     Vitals:    10/05/22 1327   BP: 120/66   Pulse: 80   Resp: 18   Temp: 97.7 °F (36.5 °C)        Physical Exam  Vitals and nursing note reviewed.   Constitutional:       Appearance: He is well-developed. He is not diaphoretic.   HENT:      Head: Normocephalic.   Eyes:      Conjunctiva/sclera: Conjunctivae normal.      Pupils: Pupils are equal, round, and reactive to light.   Cardiovascular:      Rate and Rhythm: Normal rate and regular rhythm.      Heart sounds: Normal heart sounds. No murmur heard.    No friction rub. No gallop.   Pulmonary:      Effort: Pulmonary effort is normal. No respiratory distress.      Breath sounds: Normal breath sounds. No stridor. No wheezing, rhonchi or rales.   Chest:      Chest wall: No tenderness.   Skin:     General: Skin is warm and dry.   Psychiatric:         Behavior: Behavior normal.         Thought Content: Thought content normal.         Judgment: Judgment normal.       Assessment:       1. Type 2 diabetes mellitus without complication, without long-term current use of insulin    2. Mixed hyperlipidemia    3. Primary hypertension          Plan:       Tono was seen today for follow-up.    Diagnoses and all orders for this visit:    Type 2 diabetes mellitus without complication, without long-term current use of insulin  -     pioglitazone (ACTOS) 15 MG tablet; Take 1 tablet (15 mg total) by mouth once daily.    Mixed hyperlipidemia    Primary hypertension         Follow up in about 3 months (around 1/5/2023) for follow up DM, follow up  HTN.  Patient is here as a follow-up from outpatient rehab he was sent to physical therapy because of gait disturbance imbalance he has seen Neurology but does not have any solid answers on the etiology.  Patient had 4 weeks of intensive outpatient physical therapy with no improvement in his gait or so the symptoms.  Patient has been using a walker but it is difficult in the 10 ago she ate when out in public.  Lab Results   Component Value Date    WBC 8.47 07/14/2022    HGB 13.1 (L) 07/14/2022    HCT 40.2 07/14/2022     07/14/2022    CHOL 134 07/22/2021    TRIG 114 07/22/2021    HDL 40 07/22/2021    ALT 16 02/28/2022    AST 17 02/28/2022     02/28/2022    K 4.1 02/28/2022     02/28/2022    CREATININE 1.4 02/28/2022    BUN 19 02/28/2022    CO2 27 02/28/2022    TSH 2.100 09/21/2020    PSA 1.3 07/15/2022    INR 2.2 02/20/2020    HGBA1C 7.2 (H) 07/22/2021     On Jardiance and lately his sugars have been going up in the 160s.  He got off of metformin because of diarrhea.  Patient has been having intermittent chest pains is followed by Dr. DOTY they are contemplating an angiogram at some point.

## 2022-10-18 ENCOUNTER — OFFICE VISIT (OUTPATIENT)
Dept: CARDIOLOGY | Facility: CLINIC | Age: 77
End: 2022-10-18
Payer: MEDICARE

## 2022-10-18 VITALS
HEART RATE: 77 BPM | SYSTOLIC BLOOD PRESSURE: 120 MMHG | OXYGEN SATURATION: 98 % | WEIGHT: 187.94 LBS | HEIGHT: 72 IN | BODY MASS INDEX: 25.46 KG/M2 | DIASTOLIC BLOOD PRESSURE: 70 MMHG

## 2022-10-18 DIAGNOSIS — T79.A29A: ICD-10-CM

## 2022-10-18 DIAGNOSIS — R07.9 CHEST PAIN, UNSPECIFIED TYPE: Primary | ICD-10-CM

## 2022-10-18 DIAGNOSIS — N18.32 STAGE 3B CHRONIC KIDNEY DISEASE: ICD-10-CM

## 2022-10-18 DIAGNOSIS — M54.16 LUMBAR RADICULOPATHY, CHRONIC: ICD-10-CM

## 2022-10-18 DIAGNOSIS — I20.89 ANGINAL CHEST PAIN AT REST: ICD-10-CM

## 2022-10-18 DIAGNOSIS — I25.118 CORONARY ARTERY DISEASE OF NATIVE ARTERY OF NATIVE HEART WITH STABLE ANGINA PECTORIS: ICD-10-CM

## 2022-10-18 DIAGNOSIS — M47.26 OSTEOARTHRITIS OF SPINE WITH RADICULOPATHY, LUMBAR REGION: ICD-10-CM

## 2022-10-18 PROCEDURE — 99215 PR OFFICE/OUTPT VISIT, EST, LEVL V, 40-54 MIN: ICD-10-PCS | Mod: S$GLB,,, | Performed by: SPECIALIST

## 2022-10-18 PROCEDURE — 99215 OFFICE O/P EST HI 40 MIN: CPT | Mod: S$GLB,,, | Performed by: SPECIALIST

## 2022-10-18 NOTE — PROGRESS NOTES
Subjective:    Patient ID:  Tono Saez is a 77 y.o. male who presents for   Coronary Artery Disease, Hyperlipidemia, and Hypertension    HPI problem 1 he still having chest pain specially at night  I reviewed his angiogram from 2016 and he has patent internal mammary to the LAD, native circumflex coming off the main left has 60-70% narrowing, question of narrowing of obtuse marginals coming off the saphenous vein graft at the insertion site of the graft  Ectatic saphenous vein graft feeding or small right posterior descending artery  Totally occluded native right coronary  1 saphenous vein graft totally occluded  In light of his persistent pain he is going to get  angiogram with Dr. Mckoy and possible FFR or ultrasound of the arteries-sounds as though he has significant disease in the circ  2. He has persistent pain in the shins as soon as he starts walking  He has good pulses and ABIs are normal and he has good dorsalis pedis pulses on physical exam  Question of anterior tibial compartment syndrome verses symptoms secondary to statin therapy     Review of patient's allergies indicates:   Allergen Reactions    Adhesive Other (See Comments)     SILK TAPE PULL SKIN OFF    Metformin Other (See Comments)     Weight loss cachexia anorexia,diarrhea.    Pcn [penicillins]      Patient states he passed out from this medication when he was 10 years old.        Past Medical History:   Diagnosis Date    Anemia     Anticoagulant long-term use     Arthritis     CAD (coronary artery disease)     CABG, STENTS '97,'99,'01,'05    Cancer     SKIN    Cataract     Diabetes mellitus     Diabetes mellitus type II     GERD (gastroesophageal reflux disease)     GI bleed 2020    Hypertension     Myocardial infarction     Wears glasses      Past Surgical History:   Procedure Laterality Date    CARDIAC PACEMAKER PLACEMENT      CARDIAC PACEMAKER PLACEMENT      CARDIAC PACEMAKER PLACEMENT  04/26/2018    replaced    CARDIAC SURGERY      1995    CABG X 5    CHOLECYSTECTOMY      COLON SURGERY      COLONOSCOPY N/A 2/21/2020    Procedure: COLONOSCOPY;  Surgeon: Abe Barragan III, MD;  Location: [facility] ENDO;  Service: Endoscopy;  Laterality: N/A;    COLONOSCOPY N/A 2/23/2020    Procedure: COLONOSCOPY;  Surgeon: Bob Locke Jr., MD;  Location: [facility] ENDO;  Service: Endoscopy;  Laterality: N/A;    CORONARY ARTERY BYPASS GRAFT  1995    CORONARY STENT PLACEMENT      stent x 5    ESOPHAGOGASTRODUODENOSCOPY N/A 2/23/2020    Procedure: EGD (ESOPHAGOGASTRODUODENOSCOPY);  Surgeon: Bob Locke Jr., MD;  Location: [facility] ENDO;  Service: Endoscopy;  Laterality: N/A;    EYE SURGERY      BILAT CATARACT    head laceration   01/19/2018    HEMORRHOID SURGERY      SMALL BOWEL ENTEROSCOPY N/A 2/21/2020    Procedure: ENTEROSCOPY;  Surgeon: Abe Barragan III, MD;  Location: [facility] ENDO;  Service: Endoscopy;  Laterality: N/A;    stint       Social History     Tobacco Use    Smoking status: Never    Smokeless tobacco: Never   Substance Use Topics    Alcohol use: No    Drug use: No        Review of Systems     Review of Systems   Constitutional: Positive for malaise/fatigue and weight gain. Negative for decreased appetite and weight loss.   HENT: Negative.  Negative for congestion, hearing loss and tinnitus.    Eyes:  Negative for blurred vision, vision loss in left eye, vision loss in right eye, visual disturbance and visual halos.   Cardiovascular:  Positive for chest pain and irregular heartbeat. Negative for claudication, dyspnea on exertion, leg swelling, near-syncope, orthopnea, palpitations, paroxysmal nocturnal dyspnea and syncope.   Respiratory:  Positive for shortness of breath. Negative for cough, hemoptysis, sleep disturbances due to breathing, snoring, sputum production and wheezing.    Endocrine: Negative for polydipsia, polyphagia and polyuria.   Hematologic/Lymphatic: Negative for adenopathy. Does not bruise/bleed easily.   Skin:  Negative for dry skin,  itching, poor wound healing, rash, skin cancer and suspicious lesions.   Musculoskeletal:  Positive for arthritis, back pain and joint swelling. Negative for falls, gout, joint pain, muscle cramps, muscle weakness, neck pain and stiffness.   Gastrointestinal:  Positive for heartburn. Negative for abdominal pain, change in bowel habit, constipation, diarrhea, hematemesis, hemorrhoids, melena, nausea and vomiting.   Genitourinary: Negative.  Negative for bladder incontinence, dysuria, flank pain, frequency, hematuria, nocturia and non-menstrual bleeding.   Neurological:  Positive for loss of balance. Negative for brief paralysis, difficulty with concentration, disturbances in coordination, dizziness, focal weakness, headaches, numbness, paresthesias and tremors.   Psychiatric/Behavioral: Negative.  Negative for altered mental status, depression, hallucinations, memory loss, substance abuse, suicidal ideas and thoughts of violence. The patient does not have insomnia and is not nervous/anxious.    Allergic/Immunologic: Negative for environmental allergies and hives.         Objective:        Vitals:    10/18/22 1431   BP: 120/70   Pulse: 77       Lab Results   Component Value Date    WBC 8.47 07/14/2022    HGB 13.1 (L) 07/14/2022    HCT 40.2 07/14/2022     07/14/2022    CHOL 134 07/22/2021    TRIG 114 07/22/2021    HDL 40 07/22/2021    ALT 16 02/28/2022    AST 17 02/28/2022     02/28/2022    K 4.1 02/28/2022     02/28/2022    CREATININE 1.4 02/28/2022    BUN 19 02/28/2022    CO2 27 02/28/2022    TSH 2.100 09/21/2020    PSA 1.3 07/15/2022    INR 2.2 02/20/2020    HGBA1C 7.2 (H) 07/22/2021        ECHOCARDIOGRAM RESULTS  Results for orders placed in visit on 03/28/22    Echo    Interpretation Summary  · The left ventricle is normal in size with concentric remodeling  · The estimated ejection fraction is 44%.  · Grade I left ventricular diastolic dysfunction.  · There are segmental left ventricular wall  motion abnormalities. Apical hypokinesis noted  · There is abnormal septal wall motion consistent with right ventricular pacemaker.  · Normal right ventricular size with mildly reduced right ventricular systolic function.  · Mild-to-moderate aortic regurgitation.  · There is mild-to-moderate aortic valve stenosis.  · Aortic valve area is 1.77 cm2; peak velocity is 1.96 m/s; mean gradient is 9 mmHg.  · Mild mitral regurgitation.  · Normal central venous pressure (3 mmHg).  · The estimated PA systolic pressure is 13 mmHg.      CURRENT/PREVIOUS VISIT EKG  Results for orders placed or performed in visit on 08/30/22   IN OFFICE EKG 12-LEAD (to [place])    Collection Time: 08/30/22  4:08 PM    Narrative    Test Reason : R07.9,    Vent. Rate : 078 BPM     Atrial Rate : 078 BPM     P-R Int : 202 ms          QRS Dur : 200 ms      QT Int : 464 ms       P-R-T Axes : 043 -86 094 degrees     QTc Int : 528 ms    Atrial-sensed ventricular-paced rhythm  Abnormal ECG  When compared with ECG of 28-FEB-2022 15:36,  Vent. rate has increased BY  12 BPM  Confirmed by Soren NYE, Kt MUSTAFA ([id number]) on 8/31/2022 7:41:54 PM    Referred By:  SOREN           Confirmed By:Kt Lux MD     Results for orders placed in visit on 03/28/22    Echo    Interpretation Summary  · The left ventricle is normal in size with concentric remodeling  · The estimated ejection fraction is 44%.  · Grade I left ventricular diastolic dysfunction.  · There are segmental left ventricular wall motion abnormalities. Apical hypokinesis noted  · There is abnormal septal wall motion consistent with right ventricular pacemaker.  · Normal right ventricular size with mildly reduced right ventricular systolic function.  · Mild-to-moderate aortic regurgitation.  · There is mild-to-moderate aortic valve stenosis.  · Aortic valve area is 1.77 cm2; peak velocity is 1.96 m/s; mean gradient is 9 mmHg.  · Mild mitral regurgitation.  · Normal central venous pressure (3 mmHg).  · The  estimated PA systolic pressure is 13 mmHg.    Results for orders placed in visit on 03/28/22    Nuclear Stress Test    Interpretation Summary    The nuclear portion of this study will be reported separately.    The EKG portion of this study is uninterpretable.    The patient reported chest pain during the stress test.    There were no arrhythmias during stress.      PHYSICAL EXAM    Physical Exam blood pressure is 110/80  Pulses 70  Lungs are clear  Cardiac S4  Abdomen is okay  Extremities good dorsalis pedis pulses  Neurologic he has hyperreflexia of the patellar reflex      Medication List with Changes/Refills   Current Medications    ASPIRIN (ECOTRIN) 81 MG EC TABLET    Take 162 mg by mouth once daily.    BETAMETHASONE DIPROPIONATE (DIPROLENE) 0.05 % LOTION    Apply thin film to scalp bid prn itch    CLOPIDOGREL (PLAVIX) 75 MG TABLET    Take 1 tablet (75 mg total) by mouth once daily.    EMPAGLIFLOZIN (JARDIANCE) 25 MG TABLET    Take 1 tablet (25 mg total) by mouth once daily.    FINASTERIDE (PROSCAR) 5 MG TABLET    Take 5 mg by mouth once daily.    FLUTICASONE PROPIONATE (FLONASE) 50 MCG/ACTUATION NASAL SPRAY    Use nasally as directed as needed    GABAPENTIN (NEURONTIN) 300 MG CAPSULE    Take 1 capsule (300 mg total) by mouth 2 (two) times daily.    ISOSORBIDE DINITRATE (ISORDIL) 10 MG TABLET    Take 1 tablet (10 mg total) by mouth 2 (two) times daily.    KETOCONAZOLE (NIZORAL) 2 % SHAMPOO    Wash all scaling areas let sit 3 minutes then rinse 3x/wk    LEVOCETIRIZINE (XYZAL) 5 MG TABLET    TAKE ONE TABLET BY MOUTH EVERY EVENING    MAGNESIUM OXIDE (MAG-OX) 400 MG (241.3 MG MAGNESIUM) TABLET    Take 1 tablet (400 mg total) by mouth once daily.    MONTELUKAST (SINGULAIR) 10 MG TABLET    TAKE 1 TABLET (10 MG TOTAL) BY MOUTH EVERY EVENING.    MULTIVITAMIN CAPSULE    Take 1 capsule by mouth once daily.    MUPIROCIN (BACTROBAN) 2 % OINTMENT    Apply topically 2 (two) times daily.    NITROGLYCERIN (NITROSTAT) 0.4 MG SL  TABLET    DISSOLVE 1 TABLET UNDER THE TONGUE AS NEEDED FOR CHEST PAIN    PANTOPRAZOLE (PROTONIX) 40 MG TABLET    TAKE ONE TABLET BY MOUTH ONCE DAILY    PIOGLITAZONE (ACTOS) 15 MG TABLET    Take 1 tablet (15 mg total) by mouth once daily.    ROSUVASTATIN (CRESTOR) 20 MG TABLET    TAKE ONE TABLET BY MOUTH NIGHTLY AT BEDTIME    SERTRALINE (ZOLOFT) 50 MG TABLET    TAKE ONE TABLET BY MOUTH ONCE DAILY    SULFACETAMIDE SODIUM (OVACE PLUS SHAMPOO) 10 % SHAM    Wash scalp nightly    VITAMIN D 1000 UNITS TAB    Take 1,000 Units by mouth once daily.           Assessment:     Lumbard disease      Hyperreflexia    Angina nocturnal    1. Chest pain, unspecified type    2. Coronary artery disease of native artery of native heart with stable angina pectoris    3. Anginal chest pain at rest    4. Anterior tibial compartment syndrome, unspecified laterality, initial encounter    5. Osteoarthritis of spine with radiculopathy, lumbar region    6. Lumbar radiculopathy, chronic    7. Stage 3b chronic kidney disease       I personally reviewed old and new ecg's, lab reports,, xray reports  and  other cardiovascular studies including  echo's, stress tests, angiogram reports, holters,and vascular studies .  In addition I evaluated original cardiac cath  _x__echo  ____cxr ______ct ____scan on [employer] or [employer] or other viewing platforms .  I reviewed  office and hospital notes Yes _x___ of  referring providers.    Plan:   Carth and orthopedics   Is going to have re-cath there is a shin and possible stenting  Stop rosuvastatin see if his leg pains get better  Get CT of the back because he may have some spinal stenosis and see orthopedics or arrhythmias for compartment syndrome        Problem List Items Addressed This Visit          Cardiac/Vascular    Chest pain - Primary    Coronary artery disease of native artery of native heart with stable angina pectoris    Overview     CABG, STENTS '97,'99,'01,'05          Other Visit Diagnoses        Anginal chest pain at rest        Anterior tibial compartment syndrome, unspecified laterality, initial encounter        Relevant Orders    Ambulatory referral/consult to Orthopedics    Osteoarthritis of spine with radiculopathy, lumbar region        Relevant Orders    Ambulatory referral/consult to Orthopedics    Lumbar radiculopathy, chronic        Relevant Orders    CT Lumbar Spine Without Contrast    Stage 3b chronic kidney disease        Relevant Orders    Comprehensive Metabolic Panel    CBC Auto Differential             No follow-ups on file.

## 2022-10-19 DIAGNOSIS — I20.89 STABLE ANGINA: Primary | ICD-10-CM

## 2022-10-19 DIAGNOSIS — Z95.1 HX OF CABG: ICD-10-CM

## 2022-10-19 RX ORDER — SODIUM CHLORIDE 9 MG/ML
INJECTION, SOLUTION INTRAVENOUS ONCE
Status: CANCELLED | OUTPATIENT
Start: 2022-10-19 | End: 2022-10-19

## 2022-10-20 DIAGNOSIS — I49.5 SSS (SICK SINUS SYNDROME): Primary | ICD-10-CM

## 2022-10-24 ENCOUNTER — HOSPITAL ENCOUNTER (OUTPATIENT)
Dept: PREADMISSION TESTING | Facility: HOSPITAL | Age: 77
Discharge: HOME | DRG: 247 | End: 2022-10-24
Attending: INTERNAL MEDICINE
Payer: MEDICARE

## 2022-10-24 DIAGNOSIS — I20.89 STABLE ANGINA: ICD-10-CM

## 2022-10-24 DIAGNOSIS — R07.89 OTHER CHEST PAIN: ICD-10-CM

## 2022-10-24 DIAGNOSIS — Z95.1 HX OF CABG: ICD-10-CM

## 2022-10-24 LAB
ANION GAP SERPL CALC-SCNC: 10 MMOL/L (ref 8–16)
BASOPHILS # BLD AUTO: 0.04 K/UL (ref 0–0.2)
BASOPHILS NFR BLD: 0.8 % (ref 0–1.9)
BUN SERPL-MCNC: 35 MG/DL (ref 8–23)
CALCIUM SERPL-MCNC: 9.9 MG/DL (ref 8.7–10.5)
CHLORIDE SERPL-SCNC: 99 MMOL/L (ref 95–110)
CO2 SERPL-SCNC: 27 MMOL/L (ref 23–29)
CREAT SERPL-MCNC: 1.7 MG/DL (ref 0.5–1.4)
DIFFERENTIAL METHOD: ABNORMAL
EOSINOPHIL # BLD AUTO: 0.2 K/UL (ref 0–0.5)
EOSINOPHIL NFR BLD: 4 % (ref 0–8)
ERYTHROCYTE [DISTWIDTH] IN BLOOD BY AUTOMATED COUNT: 13.4 % (ref 11.5–14.5)
EST. GFR  (NO RACE VARIABLE): 40.5 ML/MIN/1.73 M^2
GLUCOSE SERPL-MCNC: 145 MG/DL (ref 70–110)
HCT VFR BLD AUTO: 41.9 % (ref 40–54)
HGB BLD-MCNC: 13.1 G/DL (ref 14–18)
IMM GRANULOCYTES # BLD AUTO: 0.02 K/UL (ref 0–0.04)
IMM GRANULOCYTES NFR BLD AUTO: 0.4 % (ref 0–0.5)
LYMPHOCYTES # BLD AUTO: 1 K/UL (ref 1–4.8)
LYMPHOCYTES NFR BLD: 20 % (ref 18–48)
MCH RBC QN AUTO: 27.1 PG (ref 27–31)
MCHC RBC AUTO-ENTMCNC: 31.3 G/DL (ref 32–36)
MCV RBC AUTO: 87 FL (ref 82–98)
MONOCYTES # BLD AUTO: 0.6 K/UL (ref 0.3–1)
MONOCYTES NFR BLD: 12.3 % (ref 4–15)
NEUTROPHILS # BLD AUTO: 3.1 K/UL (ref 1.8–7.7)
NEUTROPHILS NFR BLD: 62.5 % (ref 38–73)
NRBC BLD-RTO: 0 /100 WBC
PLATELET # BLD AUTO: 159 K/UL (ref 150–450)
PMV BLD AUTO: 10.6 FL (ref 9.2–12.9)
POTASSIUM SERPL-SCNC: 4.4 MMOL/L (ref 3.5–5.1)
RBC # BLD AUTO: 4.84 M/UL (ref 4.6–6.2)
SODIUM SERPL-SCNC: 136 MMOL/L (ref 136–145)
WBC # BLD AUTO: 4.96 K/UL (ref 3.9–12.7)

## 2022-10-24 PROCEDURE — 93010 EKG 12-LEAD: ICD-10-PCS | Mod: ,,, | Performed by: INTERNAL MEDICINE

## 2022-10-24 PROCEDURE — 80048 BASIC METABOLIC PNL TOTAL CA: CPT | Performed by: INTERNAL MEDICINE

## 2022-10-24 PROCEDURE — 93005 ELECTROCARDIOGRAM TRACING: CPT | Performed by: INTERNAL MEDICINE

## 2022-10-24 PROCEDURE — 93010 ELECTROCARDIOGRAM REPORT: CPT | Mod: ,,, | Performed by: INTERNAL MEDICINE

## 2022-10-24 PROCEDURE — 85025 COMPLETE CBC W/AUTO DIFF WBC: CPT | Performed by: INTERNAL MEDICINE

## 2022-10-24 PROCEDURE — 36415 COLL VENOUS BLD VENIPUNCTURE: CPT | Performed by: INTERNAL MEDICINE

## 2022-10-26 ENCOUNTER — HOSPITAL ENCOUNTER (INPATIENT)
Facility: HOSPITAL | Age: 77
LOS: 2 days | Discharge: HOME | DRG: 247 | End: 2022-10-28
Attending: INTERNAL MEDICINE | Admitting: INTERNAL MEDICINE
Payer: MEDICARE

## 2022-10-26 ENCOUNTER — CLINICAL SUPPORT (OUTPATIENT)
Dept: CARDIOLOGY | Facility: HOSPITAL | Age: 77
DRG: 247 | End: 2022-10-26
Attending: INTERNAL MEDICINE
Payer: MEDICARE

## 2022-10-26 VITALS — BODY MASS INDEX: 25.33 KG/M2 | HEIGHT: 72 IN | WEIGHT: 187 LBS

## 2022-10-26 DIAGNOSIS — I21.4 NSTEMI (NON-ST ELEVATED MYOCARDIAL INFARCTION): Primary | ICD-10-CM

## 2022-10-26 DIAGNOSIS — I20.89 STABLE ANGINA: ICD-10-CM

## 2022-10-26 DIAGNOSIS — Z95.1 HX OF CABG: ICD-10-CM

## 2022-10-26 DIAGNOSIS — I25.10 CAD (CORONARY ARTERY DISEASE): ICD-10-CM

## 2022-10-26 DIAGNOSIS — R07.9 CHEST PAIN: ICD-10-CM

## 2022-10-26 DIAGNOSIS — Z98.890 S/P CARDIAC CATH: ICD-10-CM

## 2022-10-26 LAB
ANION GAP SERPL CALC-SCNC: 5 MMOL/L (ref 8–16)
ANION GAP SERPL CALC-SCNC: 6 MMOL/L (ref 8–16)
AORTIC ROOT ANNULUS: 3.61 CM
AV INDEX (PROSTH): 0.59
AV MEAN GRADIENT: 10 MMHG
AV VALVE AREA: 2.18 CM2
BASOPHILS # BLD AUTO: 0.03 K/UL (ref 0–0.2)
BASOPHILS # BLD AUTO: 0.04 K/UL (ref 0–0.2)
BASOPHILS NFR BLD: 0.4 % (ref 0–1.9)
BASOPHILS NFR BLD: 0.7 % (ref 0–1.9)
BSA FOR ECHO PROCEDURE: 2.08 M2
BUN SERPL-MCNC: 26 MG/DL (ref 8–23)
BUN SERPL-MCNC: 32 MG/DL (ref 8–23)
CALCIUM SERPL-MCNC: 8.8 MG/DL (ref 8.7–10.5)
CALCIUM SERPL-MCNC: 9.8 MG/DL (ref 8.7–10.5)
CHLORIDE SERPL-SCNC: 105 MMOL/L (ref 95–110)
CHLORIDE SERPL-SCNC: 106 MMOL/L (ref 95–110)
CO2 SERPL-SCNC: 23 MMOL/L (ref 23–29)
CO2 SERPL-SCNC: 27 MMOL/L (ref 23–29)
CREAT SERPL-MCNC: 1.4 MG/DL (ref 0.5–1.4)
CREAT SERPL-MCNC: 1.6 MG/DL (ref 0.5–1.4)
CV ECHO LV RWT: 0.33 CM
DIFFERENTIAL METHOD: ABNORMAL
DIFFERENTIAL METHOD: ABNORMAL
DOP CALC AO VTI: 39.03 CM
DOP CALC LVOT AREA: 3.7 CM2
DOP CALC LVOT DIAMETER: 2.17 CM
DOP CALC LVOT PEAK VEL: 93.5 M/S
DOP CALC LVOT STROKE VOLUME: 85.24 CM3
DOP CALCLVOT PEAK VEL VTI: 23.06 CM
E WAVE DECELERATION TIME: 145.04 MSEC
E/A RATIO: 0.58
E/E' RATIO: 15.11 M/S
ECHO LV POSTERIOR WALL: 1.1 CM (ref 0.6–1.1)
EJECTION FRACTION: 55 %
EOSINOPHIL # BLD AUTO: 0.1 K/UL (ref 0–0.5)
EOSINOPHIL # BLD AUTO: 0.2 K/UL (ref 0–0.5)
EOSINOPHIL NFR BLD: 0.7 % (ref 0–8)
EOSINOPHIL NFR BLD: 3.1 % (ref 0–8)
ERYTHROCYTE [DISTWIDTH] IN BLOOD BY AUTOMATED COUNT: 13.2 % (ref 11.5–14.5)
ERYTHROCYTE [DISTWIDTH] IN BLOOD BY AUTOMATED COUNT: 13.4 % (ref 11.5–14.5)
EST. GFR  (NO RACE VARIABLE): 43.6 ML/MIN/1.73 M^2
EST. GFR  (NO RACE VARIABLE): 51.1 ML/MIN/1.73 M^2
FRACTIONAL SHORTENING: 25 % (ref 28–44)
GLUCOSE SERPL-MCNC: 141 MG/DL (ref 70–110)
GLUCOSE SERPL-MCNC: 144 MG/DL (ref 70–110)
GLUCOSE SERPL-MCNC: 196 MG/DL (ref 70–110)
HCT VFR BLD AUTO: 37.8 % (ref 40–54)
HCT VFR BLD AUTO: 39.1 % (ref 40–54)
HGB BLD-MCNC: 12.1 G/DL (ref 14–18)
HGB BLD-MCNC: 12.2 G/DL (ref 14–18)
IMM GRANULOCYTES # BLD AUTO: 0.01 K/UL (ref 0–0.04)
IMM GRANULOCYTES # BLD AUTO: 0.02 K/UL (ref 0–0.04)
IMM GRANULOCYTES NFR BLD AUTO: 0.2 % (ref 0–0.5)
IMM GRANULOCYTES NFR BLD AUTO: 0.2 % (ref 0–0.5)
INTERVENTRICULAR SEPTUM: 1.08 CM (ref 0.6–1.1)
LACTATE SERPL-SCNC: 1 MMOL/L (ref 0.5–1.9)
LACTATE SERPL-SCNC: 1.4 MMOL/L (ref 0.5–1.9)
LEFT ATRIUM SIZE: 5.41 CM
LEFT ATRIUM VOLUME INDEX MOD: 30.9 ML/M2
LEFT ATRIUM VOLUME MOD: 63.93 CM3
LEFT INTERNAL DIMENSION IN SYSTOLE: 4.95 CM (ref 2.1–4)
LEFT VENTRICLE DIASTOLIC VOLUME INDEX: 138.47 ML/M2
LEFT VENTRICLE DIASTOLIC VOLUME: 286.64 ML
LEFT VENTRICLE MASS INDEX: 156 G/M2
LEFT VENTRICLE SYSTOLIC VOLUME INDEX: 58.6 ML/M2
LEFT VENTRICLE SYSTOLIC VOLUME: 121.29 ML
LEFT VENTRICULAR INTERNAL DIMENSION IN DIASTOLE: 6.59 CM (ref 3.5–6)
LEFT VENTRICULAR MASS: 323.69 G
LV LATERAL E/E' RATIO: 13.6 M/S
LV SEPTAL E/E' RATIO: 17 M/S
LYMPHOCYTES # BLD AUTO: 0.8 K/UL (ref 1–4.8)
LYMPHOCYTES # BLD AUTO: 1.1 K/UL (ref 1–4.8)
LYMPHOCYTES NFR BLD: 10.1 % (ref 18–48)
LYMPHOCYTES NFR BLD: 20.7 % (ref 18–48)
MCH RBC QN AUTO: 26.9 PG (ref 27–31)
MCH RBC QN AUTO: 27.5 PG (ref 27–31)
MCHC RBC AUTO-ENTMCNC: 30.9 G/DL (ref 32–36)
MCHC RBC AUTO-ENTMCNC: 32.3 G/DL (ref 32–36)
MCV RBC AUTO: 85 FL (ref 82–98)
MCV RBC AUTO: 87 FL (ref 82–98)
MONOCYTES # BLD AUTO: 0.4 K/UL (ref 0.3–1)
MONOCYTES # BLD AUTO: 0.6 K/UL (ref 0.3–1)
MONOCYTES NFR BLD: 7.3 % (ref 4–15)
MONOCYTES NFR BLD: 8 % (ref 4–15)
MV PEAK A VEL: 1.18 M/S
MV PEAK E VEL: 0.68 M/S
NEUTROPHILS # BLD AUTO: 3.6 K/UL (ref 1.8–7.7)
NEUTROPHILS # BLD AUTO: 6.5 K/UL (ref 1.8–7.7)
NEUTROPHILS NFR BLD: 67.3 % (ref 38–73)
NEUTROPHILS NFR BLD: 81.3 % (ref 38–73)
NRBC BLD-RTO: 0 /100 WBC
NRBC BLD-RTO: 0 /100 WBC
PLATELET # BLD AUTO: 152 K/UL (ref 150–450)
PLATELET # BLD AUTO: 159 K/UL (ref 150–450)
PMV BLD AUTO: 10.4 FL (ref 9.2–12.9)
PMV BLD AUTO: 10.6 FL (ref 9.2–12.9)
POC ACTIVATED CLOTTING TIME K: 161 SEC (ref 74–137)
POTASSIUM SERPL-SCNC: 3.7 MMOL/L (ref 3.5–5.1)
POTASSIUM SERPL-SCNC: 4.3 MMOL/L (ref 3.5–5.1)
RBC # BLD AUTO: 4.44 M/UL (ref 4.6–6.2)
RBC # BLD AUTO: 4.49 M/UL (ref 4.6–6.2)
RIGHT VENTRICULAR END-DIASTOLIC DIMENSION: 2.34 CM
SAMPLE: ABNORMAL
SODIUM SERPL-SCNC: 135 MMOL/L (ref 136–145)
SODIUM SERPL-SCNC: 137 MMOL/L (ref 136–145)
TDI LATERAL: 0.05 M/S
TDI SEPTAL: 0.04 M/S
TDI: 0.05 M/S
TRICUSPID ANNULAR PLANE SYSTOLIC EXCURSION: 1.77 CM
TROPONIN I SERPL DL<=0.01 NG/ML-MCNC: 0.03 NG/ML
TROPONIN I SERPL DL<=0.01 NG/ML-MCNC: 0.21 NG/ML
WBC # BLD AUTO: 5.4 K/UL (ref 3.9–12.7)
WBC # BLD AUTO: 8.03 K/UL (ref 3.9–12.7)

## 2022-10-26 PROCEDURE — 92928 PRQ TCAT PLMT NTRAC ST 1 LES: CPT | Mod: LM,,, | Performed by: INTERNAL MEDICINE

## 2022-10-26 PROCEDURE — 25000003 PHARM REV CODE 250: Performed by: INTERNAL MEDICINE

## 2022-10-26 PROCEDURE — C1887 CATHETER, GUIDING: HCPCS | Performed by: INTERNAL MEDICINE

## 2022-10-26 PROCEDURE — 93306 TTE W/DOPPLER COMPLETE: CPT | Mod: 26,,, | Performed by: INTERNAL MEDICINE

## 2022-10-26 PROCEDURE — 93010 EKG 12-LEAD: ICD-10-PCS | Mod: ,,, | Performed by: SPECIALIST

## 2022-10-26 PROCEDURE — 99152 PR MOD CONSCIOUS SEDATION, SAME PHYS, 5+ YRS, FIRST 15 MIN: ICD-10-PCS | Mod: ,,, | Performed by: INTERNAL MEDICINE

## 2022-10-26 PROCEDURE — 92978 ENDOLUMINL IVUS OCT C 1ST: CPT | Mod: LM | Performed by: INTERNAL MEDICINE

## 2022-10-26 PROCEDURE — 93459 L HRT ART/GRFT ANGIO: CPT | Mod: 26,59,51, | Performed by: INTERNAL MEDICINE

## 2022-10-26 PROCEDURE — 27000221 HC OXYGEN, UP TO 24 HOURS

## 2022-10-26 PROCEDURE — 93306 ECHO (CUPID ONLY): ICD-10-PCS | Mod: 26,,, | Performed by: INTERNAL MEDICINE

## 2022-10-26 PROCEDURE — C1769 GUIDE WIRE: HCPCS | Performed by: INTERNAL MEDICINE

## 2022-10-26 PROCEDURE — 63600175 PHARM REV CODE 636 W HCPCS: Performed by: INTERNAL MEDICINE

## 2022-10-26 PROCEDURE — C1894 INTRO/SHEATH, NON-LASER: HCPCS | Performed by: INTERNAL MEDICINE

## 2022-10-26 PROCEDURE — 93010 ELECTROCARDIOGRAM REPORT: CPT | Mod: ,,, | Performed by: SPECIALIST

## 2022-10-26 PROCEDURE — 20000000 HC ICU ROOM

## 2022-10-26 PROCEDURE — 99153 MOD SED SAME PHYS/QHP EA: CPT | Performed by: INTERNAL MEDICINE

## 2022-10-26 PROCEDURE — 99152 MOD SED SAME PHYS/QHP 5/>YRS: CPT | Performed by: INTERNAL MEDICINE

## 2022-10-26 PROCEDURE — 93307 TTE W/O DOPPLER COMPLETE: CPT

## 2022-10-26 PROCEDURE — 94761 N-INVAS EAR/PLS OXIMETRY MLT: CPT

## 2022-10-26 PROCEDURE — C1874 STENT, COATED/COV W/DEL SYS: HCPCS | Performed by: INTERNAL MEDICINE

## 2022-10-26 PROCEDURE — 83605 ASSAY OF LACTIC ACID: CPT | Performed by: INTERNAL MEDICINE

## 2022-10-26 PROCEDURE — 82024 ASSAY OF ACTH: CPT | Performed by: INTERNAL MEDICINE

## 2022-10-26 PROCEDURE — 93459 L HRT ART/GRFT ANGIO: CPT | Mod: XU | Performed by: INTERNAL MEDICINE

## 2022-10-26 PROCEDURE — 84484 ASSAY OF TROPONIN QUANT: CPT | Performed by: INTERNAL MEDICINE

## 2022-10-26 PROCEDURE — 85025 COMPLETE CBC W/AUTO DIFF WBC: CPT | Performed by: INTERNAL MEDICINE

## 2022-10-26 PROCEDURE — 92978 ENDOLUMINL IVUS OCT C 1ST: CPT | Mod: 26,,, | Performed by: INTERNAL MEDICINE

## 2022-10-26 PROCEDURE — 27201423 OPTIME MED/SURG SUP & DEVICES STERILE SUPPLY: Performed by: INTERNAL MEDICINE

## 2022-10-26 PROCEDURE — C1760 CLOSURE DEV, VASC: HCPCS | Performed by: INTERNAL MEDICINE

## 2022-10-26 PROCEDURE — C1725 CATH, TRANSLUMIN NON-LASER: HCPCS | Performed by: INTERNAL MEDICINE

## 2022-10-26 PROCEDURE — 92928 PR STENT: ICD-10-PCS | Mod: LM,,, | Performed by: INTERNAL MEDICINE

## 2022-10-26 PROCEDURE — 80048 BASIC METABOLIC PNL TOTAL CA: CPT | Performed by: INTERNAL MEDICINE

## 2022-10-26 PROCEDURE — 93459: ICD-10-PCS | Mod: 26,59,51, | Performed by: INTERNAL MEDICINE

## 2022-10-26 PROCEDURE — C1753 CATH, INTRAVAS ULTRASOUND: HCPCS | Performed by: INTERNAL MEDICINE

## 2022-10-26 PROCEDURE — 99223 PR INITIAL HOSPITAL CARE,LEVL III: ICD-10-PCS | Mod: 25,AI,, | Performed by: INTERNAL MEDICINE

## 2022-10-26 PROCEDURE — 93005 ELECTROCARDIOGRAM TRACING: CPT | Performed by: SPECIALIST

## 2022-10-26 PROCEDURE — 99223 1ST HOSP IP/OBS HIGH 75: CPT | Mod: 25,AI,, | Performed by: INTERNAL MEDICINE

## 2022-10-26 PROCEDURE — C9600 PERC DRUG-EL COR STENT SING: HCPCS | Mod: LM | Performed by: INTERNAL MEDICINE

## 2022-10-26 PROCEDURE — 99152 MOD SED SAME PHYS/QHP 5/>YRS: CPT | Mod: ,,, | Performed by: INTERNAL MEDICINE

## 2022-10-26 PROCEDURE — 92978 PR IVUS, CORONARY, 1ST VESSEL: ICD-10-PCS | Mod: 26,,, | Performed by: INTERNAL MEDICINE

## 2022-10-26 PROCEDURE — 25500020 PHARM REV CODE 255: Performed by: INTERNAL MEDICINE

## 2022-10-26 PROCEDURE — 25000003 PHARM REV CODE 250: Performed by: NURSE PRACTITIONER

## 2022-10-26 DEVICE — STENT RONYX35026UX RESOLUTE ONYX 3.50X26
Type: IMPLANTABLE DEVICE | Site: CORONARY | Status: FUNCTIONAL
Brand: RESOLUTE ONYX™

## 2022-10-26 DEVICE — ANGIO-SEAL VIP VASCULAR CLOSURE DEVICE
Type: IMPLANTABLE DEVICE | Site: GROIN | Status: FUNCTIONAL
Brand: ANGIO-SEAL

## 2022-10-26 RX ORDER — FAMOTIDINE 10 MG/ML
20 INJECTION INTRAVENOUS ONCE
Status: DISCONTINUED | OUTPATIENT
Start: 2022-10-26 | End: 2022-10-28 | Stop reason: HOSPADM

## 2022-10-26 RX ORDER — ACETAMINOPHEN 325 MG/1
650 TABLET ORAL EVERY 4 HOURS PRN
Status: DISCONTINUED | OUTPATIENT
Start: 2022-10-26 | End: 2022-10-28 | Stop reason: HOSPADM

## 2022-10-26 RX ORDER — NITROGLYCERIN 20 MG/100ML
INJECTION INTRAVENOUS
Status: DISCONTINUED | OUTPATIENT
Start: 2022-10-26 | End: 2022-10-26

## 2022-10-26 RX ORDER — OXYBUTYNIN CHLORIDE 10 MG/1
10 TABLET, EXTENDED RELEASE ORAL DAILY
Status: DISCONTINUED | OUTPATIENT
Start: 2022-10-27 | End: 2022-10-28 | Stop reason: HOSPADM

## 2022-10-26 RX ORDER — IODIXANOL 320 MG/ML
INJECTION, SOLUTION INTRAVASCULAR
Status: DISCONTINUED | OUTPATIENT
Start: 2022-10-26 | End: 2022-10-26 | Stop reason: HOSPADM

## 2022-10-26 RX ORDER — FENTANYL CITRATE 50 UG/ML
INJECTION, SOLUTION INTRAMUSCULAR; INTRAVENOUS
Status: DISCONTINUED | OUTPATIENT
Start: 2022-10-26 | End: 2022-10-26 | Stop reason: HOSPADM

## 2022-10-26 RX ORDER — SERTRALINE HYDROCHLORIDE 50 MG/1
50 TABLET, FILM COATED ORAL DAILY
Status: DISCONTINUED | OUTPATIENT
Start: 2022-10-27 | End: 2022-10-28 | Stop reason: HOSPADM

## 2022-10-26 RX ORDER — HEPARIN SODIUM 1000 [USP'U]/ML
INJECTION, SOLUTION INTRAVENOUS; SUBCUTANEOUS
Status: DISCONTINUED | OUTPATIENT
Start: 2022-10-26 | End: 2022-10-26 | Stop reason: HOSPADM

## 2022-10-26 RX ORDER — HYDRALAZINE HYDROCHLORIDE 20 MG/ML
INJECTION INTRAMUSCULAR; INTRAVENOUS
Status: DISCONTINUED | OUTPATIENT
Start: 2022-10-26 | End: 2022-10-26 | Stop reason: HOSPADM

## 2022-10-26 RX ORDER — FINASTERIDE 5 MG/1
5 TABLET, FILM COATED ORAL DAILY
Status: DISCONTINUED | OUTPATIENT
Start: 2022-10-27 | End: 2022-10-28 | Stop reason: HOSPADM

## 2022-10-26 RX ORDER — ISOSORBIDE DINITRATE 10 MG/1
10 TABLET ORAL 2 TIMES DAILY
Status: DISCONTINUED | OUTPATIENT
Start: 2022-10-26 | End: 2022-10-28 | Stop reason: HOSPADM

## 2022-10-26 RX ORDER — SODIUM CHLORIDE 9 MG/ML
INJECTION, SOLUTION INTRAVENOUS ONCE
Status: DISCONTINUED | OUTPATIENT
Start: 2022-10-26 | End: 2022-10-26

## 2022-10-26 RX ORDER — ONDANSETRON 4 MG/1
8 TABLET, ORALLY DISINTEGRATING ORAL EVERY 8 HOURS PRN
Status: DISCONTINUED | OUTPATIENT
Start: 2022-10-26 | End: 2022-10-28 | Stop reason: HOSPADM

## 2022-10-26 RX ORDER — ASPIRIN 81 MG/1
81 TABLET ORAL DAILY
Status: DISCONTINUED | OUTPATIENT
Start: 2022-10-27 | End: 2022-10-28 | Stop reason: HOSPADM

## 2022-10-26 RX ORDER — METOPROLOL TARTRATE 25 MG/1
25 TABLET, FILM COATED ORAL 2 TIMES DAILY
Status: DISCONTINUED | OUTPATIENT
Start: 2022-10-26 | End: 2022-10-28 | Stop reason: HOSPADM

## 2022-10-26 RX ORDER — LIDOCAINE HYDROCHLORIDE 10 MG/ML
INJECTION, SOLUTION EPIDURAL; INFILTRATION; INTRACAUDAL; PERINEURAL
Status: DISCONTINUED | OUTPATIENT
Start: 2022-10-26 | End: 2022-10-26 | Stop reason: HOSPADM

## 2022-10-26 RX ORDER — MONTELUKAST SODIUM 10 MG/1
10 TABLET ORAL NIGHTLY
Status: DISCONTINUED | OUTPATIENT
Start: 2022-10-26 | End: 2022-10-28 | Stop reason: HOSPADM

## 2022-10-26 RX ORDER — MIDAZOLAM HYDROCHLORIDE 1 MG/ML
INJECTION INTRAMUSCULAR; INTRAVENOUS
Status: DISCONTINUED | OUTPATIENT
Start: 2022-10-26 | End: 2022-10-26 | Stop reason: HOSPADM

## 2022-10-26 RX ORDER — GABAPENTIN 300 MG/1
300 CAPSULE ORAL DAILY
Status: DISCONTINUED | OUTPATIENT
Start: 2022-10-27 | End: 2022-10-28 | Stop reason: HOSPADM

## 2022-10-26 RX ORDER — CLOPIDOGREL BISULFATE 75 MG/1
75 TABLET ORAL DAILY
Status: DISCONTINUED | OUTPATIENT
Start: 2022-10-27 | End: 2022-10-28 | Stop reason: HOSPADM

## 2022-10-26 RX ORDER — SODIUM CHLORIDE 9 MG/ML
INJECTION, SOLUTION INTRAVENOUS CONTINUOUS
Status: DISCONTINUED | OUTPATIENT
Start: 2022-10-26 | End: 2022-10-27

## 2022-10-26 RX ORDER — ATORVASTATIN CALCIUM 40 MG/1
40 TABLET, FILM COATED ORAL NIGHTLY
Status: DISCONTINUED | OUTPATIENT
Start: 2022-10-26 | End: 2022-10-28 | Stop reason: HOSPADM

## 2022-10-26 RX ORDER — MORPHINE SULFATE 2 MG/ML
2 INJECTION, SOLUTION INTRAMUSCULAR; INTRAVENOUS EVERY 4 HOURS PRN
Status: DISCONTINUED | OUTPATIENT
Start: 2022-10-26 | End: 2022-10-28 | Stop reason: HOSPADM

## 2022-10-26 RX ORDER — FAMOTIDINE 20 MG/50ML
20 INJECTION, SOLUTION INTRAVENOUS ONCE
Status: DISCONTINUED | OUTPATIENT
Start: 2022-10-26 | End: 2022-10-26

## 2022-10-26 RX ORDER — PANTOPRAZOLE SODIUM 40 MG/1
40 TABLET, DELAYED RELEASE ORAL
Status: DISCONTINUED | OUTPATIENT
Start: 2022-10-27 | End: 2022-10-28 | Stop reason: HOSPADM

## 2022-10-26 RX ORDER — ASPIRIN 81 MG/1
162 TABLET ORAL DAILY
Status: DISCONTINUED | OUTPATIENT
Start: 2022-10-27 | End: 2022-10-26

## 2022-10-26 RX ORDER — LABETALOL HYDROCHLORIDE 5 MG/ML
INJECTION, SOLUTION INTRAVENOUS
Status: DISCONTINUED | OUTPATIENT
Start: 2022-10-26 | End: 2022-10-26 | Stop reason: HOSPADM

## 2022-10-26 RX ORDER — SODIUM CHLORIDE 9 MG/ML
INJECTION, SOLUTION INTRAVENOUS CONTINUOUS
Status: DISCONTINUED | OUTPATIENT
Start: 2022-10-26 | End: 2022-10-26

## 2022-10-26 RX ADMIN — ONDANSETRON 8 MG: 4 TABLET, ORALLY DISINTEGRATING ORAL at 03:10

## 2022-10-26 RX ADMIN — MONTELUKAST 10 MG: 10 TABLET, FILM COATED ORAL at 09:10

## 2022-10-26 RX ADMIN — SODIUM CHLORIDE: 0.9 INJECTION, SOLUTION INTRAVENOUS at 07:10

## 2022-10-26 RX ADMIN — ISOSORBIDE DINITRATE 10 MG: 10 TABLET ORAL at 09:10

## 2022-10-26 RX ADMIN — METOPROLOL TARTRATE 25 MG: 25 TABLET, FILM COATED ORAL at 09:10

## 2022-10-26 RX ADMIN — ATORVASTATIN CALCIUM 40 MG: 40 TABLET, FILM COATED ORAL at 09:10

## 2022-10-26 RX ADMIN — MORPHINE SULFATE 2 MG: 2 INJECTION, SOLUTION INTRAMUSCULAR; INTRAVENOUS at 09:10

## 2022-10-26 RX ADMIN — NITROGLYCERIN 40 MCG/MIN: 20 INJECTION INTRAVENOUS at 03:10

## 2022-10-26 RX ADMIN — MORPHINE SULFATE 2 MG: 2 INJECTION, SOLUTION INTRAMUSCULAR; INTRAVENOUS at 05:10

## 2022-10-26 RX ADMIN — SODIUM CHLORIDE: 0.9 INJECTION, SOLUTION INTRAVENOUS at 03:10

## 2022-10-26 NOTE — Clinical Note
The catheter was repositioned into the ostial LIMA graft. An angiography was performed of the graft. Multiple views were taken. The angiography was performed via power injection. The injected amount was 8 mL contrast at 4 mL/s.

## 2022-10-26 NOTE — Clinical Note
The catheter was inserted into the ostial  left coronary artery. An angiography was performed of the left coronary arteries. The angiography was performed via power injection. The injected amount was 8 mL contrast at 4 mL/s.

## 2022-10-26 NOTE — Clinical Note
Relevant Problems   No relevant active problems       Anesthetic History               Review of Systems / Medical History      Pulmonary                   Neuro/Psych              Cardiovascular    Hypertension                   GI/Hepatic/Renal                Endo/Other    Diabetes    Obesity     Other Findings              Physical Exam    Airway  Mallampati: II  TM Distance: > 6 cm  Neck ROM: normal range of motion   Mouth opening: Normal     Cardiovascular  Regular rate and rhythm,  S1 and S2 normal,  no murmur, click, rub, or gallop             Dental  No notable dental hx       Pulmonary  Breath sounds clear to auscultation               Abdominal  GI exam deferred       Other Findings            Anesthetic Plan    ASA: 2  Anesthesia type: MAC            Anesthetic plan and risks discussed with: Patient The catheter was removed from the ostial  left coronary artery.

## 2022-10-26 NOTE — H&P
[facility]  Cardiology  History and Physical     Patient Name: Tono Saez  MRN: [id number]  Admission Date: 10/26/2022  Code Status: Prior   Attending Provider: MD Robles Muller MD   Primary Care Physician: Scott Staley MD  Principal Problem:<principal problem not specified>    Patient information was obtained from patient and ER records.     Subjective:     77-year-old male with history of coronary artery disease status post CABG and PCIs who was referred for outpatient angiogram for progressive angina with abnormal stress test.  Patient underwent angiogram which showed critical disease of left main into left circumflex for which he received 1 drug-eluting stent.  Post PCI, patient had crushing chest pain for which a repeat angiogram was done of the same PCI which did not show any stent issues.  Patient was transferred to the ICU.    Past Medical History:   Diagnosis Date    Anemia     Anticoagulant long-term use     Arthritis     CAD (coronary artery disease)     CABG, STENTS '97,'99,'01,'05    Cancer     SKIN    Cataract     Diabetes mellitus     Diabetes mellitus type II     GERD (gastroesophageal reflux disease)     GI bleed 2020    Hypertension     Myocardial infarction     Wears glasses        Past Surgical History:   Procedure Laterality Date    CARDIAC PACEMAKER PLACEMENT      CARDIAC PACEMAKER PLACEMENT      CARDIAC PACEMAKER PLACEMENT  04/26/2018    replaced    CARDIAC SURGERY      1995   CABG X 5    CHOLECYSTECTOMY      COLON SURGERY      COLONOSCOPY N/A 2/21/2020    Procedure: COLONOSCOPY;  Surgeon: Abe Barragan III, MD;  Location: [facility] ENDO;  Service: Endoscopy;  Laterality: N/A;    COLONOSCOPY N/A 2/23/2020    Procedure: COLONOSCOPY;  Surgeon: Bob Locke Jr., MD;  Location: [facility] ENDO;  Service: Endoscopy;  Laterality: N/A;    CORONARY ARTERY BYPASS GRAFT  1995    CORONARY STENT PLACEMENT      stent x 5    ESOPHAGOGASTRODUODENOSCOPY N/A 2/23/2020     Procedure: EGD (ESOPHAGOGASTRODUODENOSCOPY);  Surgeon: Bob Locke Jr., MD;  Location: [facility] ENDO;  Service: Endoscopy;  Laterality: N/A;    EYE SURGERY      BILAT CATARACT    head laceration   01/19/2018    HEMORRHOID SURGERY      SMALL BOWEL ENTEROSCOPY N/A 2/21/2020    Procedure: ENTEROSCOPY;  Surgeon: Abe Barragan III, MD;  Location: [facility] ENDO;  Service: Endoscopy;  Laterality: N/A;    stint         Review of patient's allergies indicates:   Allergen Reactions    Adhesive Other (See Comments)     SILK TAPE PULL SKIN OFF    Metformin Other (See Comments)     Weight loss cachexia anorexia,diarrhea.    Pcn [penicillins]      Patient states he passed out from this medication when he was 10 years old.        No current facility-administered medications on file prior to encounter.     Current Outpatient Medications on File Prior to Encounter   Medication Sig    aspirin (ECOTRIN) 81 MG EC tablet Take 162 mg by mouth once daily.    clopidogreL (PLAVIX) 75 mg tablet Take 1 tablet (75 mg total) by mouth once daily.    empagliflozin (JARDIANCE) 25 mg tablet Take 1 tablet (25 mg total) by mouth once daily.    finasteride (PROSCAR) 5 mg tablet Take 5 mg by mouth once daily.    gabapentin (NEURONTIN) 300 MG capsule Take 1 capsule (300 mg total) by mouth 2 (two) times daily. (Patient taking differently: Take 300 mg by mouth once daily.)    isosorbide dinitrate (ISORDIL) 10 MG tablet Take 1 tablet (10 mg total) by mouth 2 (two) times daily.    levocetirizine (XYZAL) 5 MG tablet TAKE ONE TABLET BY MOUTH EVERY EVENING    magnesium oxide (MAG-OX) 400 mg (241.3 mg magnesium) tablet Take 1 tablet (400 mg total) by mouth once daily.    montelukast (SINGULAIR) 10 mg tablet TAKE 1 TABLET (10 MG TOTAL) BY MOUTH EVERY EVENING.    multivitamin capsule Take 1 capsule by mouth once daily.    pantoprazole (PROTONIX) 40 MG tablet TAKE ONE TABLET BY MOUTH ONCE DAILY    pioglitazone (ACTOS) 15 MG tablet Take 1 tablet (15 mg total) by  mouth once daily. (Patient taking differently: Take 30 mg by mouth once daily.)    sertraline (ZOLOFT) 50 MG tablet TAKE ONE TABLET BY MOUTH ONCE DAILY    vitamin D 1000 units Tab Take 1,000 Units by mouth once daily.    betamethasone dipropionate (DIPROLENE) 0.05 % lotion Apply thin film to scalp bid prn itch    fluticasone propionate (FLONASE) 50 mcg/actuation nasal spray Use nasally as directed as needed    ketoconazole (NIZORAL) 2 % shampoo Wash all scaling areas let sit 3 minutes then rinse 3x/wk    mupirocin (BACTROBAN) 2 % ointment Apply topically 2 (two) times daily.    nitroGLYCERIN (NITROSTAT) 0.4 MG SL tablet DISSOLVE 1 TABLET UNDER THE TONGUE AS NEEDED FOR CHEST PAIN    rosuvastatin (CRESTOR) 20 MG tablet TAKE ONE TABLET BY MOUTH NIGHTLY AT BEDTIME    sulfacetamide sodium (OVACE PLUS SHAMPOO) 10 % Sham Wash scalp nightly     Family History       Problem Relation (Age of Onset)    Heart disease Mother, Father, Brother, Brother, Brother, Brother, Brother    Hyperlipidemia Sister    Hypertension Sister          Tobacco Use    Smoking status: Never    Smokeless tobacco: Never   Substance and Sexual Activity    Alcohol use: No    Drug use: No    Sexual activity: Not Currently     Review of Systems   All other systems reviewed and are negative.  Objective:     Vital Signs (Most Recent):  Temp: 97.6 °F (36.4 °C) (10/26/22 1530)  Pulse: 68 (10/26/22 1715)  Resp: 16 (10/26/22 1715)  BP: 131/71 (10/26/22 1715)  SpO2: 98 % (10/26/22 1715) Vital Signs (24h Range):  Temp:  [97.6 °F (36.4 °C)] 97.6 °F (36.4 °C)  Pulse:  [65-73] 68  Resp:  [11-20] 16  SpO2:  [93 %-100 %] 98 %  BP: (116-187)/(60-95) 131/71     Weight: 84.3 kg (185 lb 12.2 oz)  Body mass index is 25.19 kg/m².    SpO2: 98 %  O2 Device (Oxygen Therapy): nasal cannula      Intake/Output Summary (Last 24 hours) at 10/26/2022 5468  Last data filed at 10/26/2022 1731  Gross per 24 hour   Intake --   Output 250 ml   Net -250 ml       Lines/Drains/Airways        Drain  Duration                  Sheath 10/26/22 1408 Left <1 day              Peripheral Intravenous Line  Duration                  Peripheral IV - Single Lumen 10/26/22 0730 20 G Left Antecubital <1 day         Peripheral IV - Single Lumen 10/26/22 0741 20 G Anterior;Right Forearm <1 day                    Physical Exam  Vitals reviewed.   Constitutional:       Appearance: Normal appearance.   HENT:      Mouth/Throat:      Mouth: Mucous membranes are moist.   Eyes:      Extraocular Movements: Extraocular movements intact.      Pupils: Pupils are equal, round, and reactive to light.   Cardiovascular:      Rate and Rhythm: Normal rate and regular rhythm.      Heart sounds: No murmur heard.    No gallop.   Pulmonary:      Effort: Pulmonary effort is normal.      Breath sounds: Normal breath sounds.   Abdominal:      General: Abdomen is flat.      Palpations: Abdomen is soft.   Musculoskeletal:      Cervical back: Normal range of motion.   Skin:     General: Skin is warm and dry.   Neurological:      General: No focal deficit present.      Mental Status: He is alert and oriented to person, place, and time.   Psychiatric:         Mood and Affect: Mood normal.       Significant Labs: BMP:   Recent Labs   Lab 10/26/22  0730   *      K 3.7      CO2 27   BUN 32*   CREATININE 1.6*   CALCIUM 9.8   , CBC   Recent Labs   Lab 10/26/22  1514   WBC 5.40   HGB 12.2*   HCT 37.8*      , and Troponin   Recent Labs   Lab 10/26/22  1514   TROPONINI 0.033       Significant Imaging: Echocardiogram: 2D echo with color flow doppler: No results found for this or any previous visit. and Transthoracic echo (TTE) complete (Cupid Only):   Results for orders placed or performed during the hospital encounter of 10/26/22   Echo   Result Value Ref Range    BSA 2.08 m2    TDI SEPTAL 0.04 m/s    LV LATERAL E/E' RATIO 13.60 m/s    LV SEPTAL E/E' RATIO 17.00 m/s    TDI LATERAL 0.05 m/s    LVIDd 6.59 (A) 3.5 - 6.0 cm    IVS  1.08 0.6 - 1.1 cm    Posterior Wall 1.10 0.6 - 1.1 cm    Ao root annulus 3.61 cm    LVIDs 4.95 (A) 2.1 - 4.0 cm    FS 25 28 - 44 %    LV mass 323.69 g    LA size 5.41 cm    RVDD 2.34 cm    TAPSE 1.77 cm    Left Ventricle Relative Wall Thickness 0.33 cm    AV mean gradient 10 mmHg    AV valve area 2.18 cm2    AV index (prosthetic) 0.59     E/A ratio 0.58     Mean e' 0.05 m/s    E wave deceleration time 145.04 msec    LVOT diameter 2.17 cm    LVOT area 3.7 cm2    LVOT peak alphonso 93.50 m/s    LVOT peak VTI 23.06 cm    Ao VTI 39.03 cm    LVOT stroke volume 85.24 cm3    E/E' ratio 15.11 m/s    MV Peak E Alphonso 0.68 m/s    MV Peak A Alphonso 1.18 m/s    LV Systolic Volume 121.29 mL    LV Systolic Volume Index 58.6 mL/m2    LV Diastolic Volume 286.64 mL    LV Diastolic Volume Index 138.47 mL/m2    LV Mass Index 156 g/m2    LA Volume Index (Mod) 30.9 mL/m2    LA volume (mod) 63.93 cm3    EF 55 %    Narrative    · The left ventricle is normal in size with mild concentric hypertrophy   and normal systolic function.  · The estimated ejection fraction is 55%.  · Indeterminate left ventricular diastolic function.  · There is abnormal septal wall motion consistent with post-operative   status.  · Moderate to severe left atrial enlargement.  · Mild to moderate tricuspid regurgitation.  · Mild-to-moderate aortic regurgitation.  · There is mild aortic valve stenosis.        Assessment and Plan:     Coronary artery disease status post CABG and PCIs status post PCI of left main into left circumflex today   Chest pain post angiogram with patent new stent in no evidence of dissection of the left subclavian and patent LIMA to LAD, echo without any new changes and troponin negative    Plan:  - will continue to trend troponins.  Patient's chest pain improved with nitroglycerin and hypertension control along with labetalol.  Unsure of the exact etiology, could be minor embolism during PCI.  - continue with IV fluids    There are no hospital problems  to display for this patient.      VTE Risk Mitigation (From admission, onward)      None            Robles Mckoy MD  Cardiology   [facility]

## 2022-10-26 NOTE — Clinical Note
160 ml of contrast were injected throughout the case. 100 mL of contrast was the total wasted during the case. 260 mL was the total amount used during the case.

## 2022-10-26 NOTE — Clinical Note
The catheter was repositioned into the ostial SVG graft. An angiography was performed of the graft. The angiography was performed via power injection. The injected amount was 8 mL contrast at 4 mL/s.  SVG-OM

## 2022-10-26 NOTE — Clinical Note
The catheter was inserted into the ostial LIMA graft. An angiography was performed of the graft. The angiography was performed via power injection. The injected amount was 8 mL contrast at 4 mL/s.  CANDY REMOVED AND GLIDE CATH USED TO TRY AND GAIN ACCESS TO THE LIMA. COULD NOT ENGAGE. CATHETER REMOVED.

## 2022-10-26 NOTE — Clinical Note
150 ml of contrast were injected throughout the case. 100 mL of contrast was the total wasted during the case. 250 mL was the total amount used during the case.

## 2022-10-26 NOTE — Clinical Note
The groin was prepped. The site was clipped. The patient was draped. The patient was positioned supine. The patient was secured using safety straps and to an armboard.

## 2022-10-26 NOTE — Clinical Note
The catheter insertion attempt was made into the ostial LIMA graft. LIMA-LAD  COULD NOT ENGAGE- CATHETER REMOVED

## 2022-10-26 NOTE — Clinical Note
The radial band was applied to the left radial artery. 12 cc's of air were inserted into the closure device.

## 2022-10-27 LAB
ANION GAP SERPL CALC-SCNC: 5 MMOL/L (ref 8–16)
BASOPHILS # BLD AUTO: 0.04 K/UL (ref 0–0.2)
BASOPHILS NFR BLD: 0.4 % (ref 0–1.9)
BUN SERPL-MCNC: 26 MG/DL (ref 8–23)
CALCIUM SERPL-MCNC: 9.1 MG/DL (ref 8.7–10.5)
CHLORIDE SERPL-SCNC: 106 MMOL/L (ref 95–110)
CO2 SERPL-SCNC: 26 MMOL/L (ref 23–29)
CREAT SERPL-MCNC: 1.4 MG/DL (ref 0.5–1.4)
DIFFERENTIAL METHOD: ABNORMAL
EOSINOPHIL # BLD AUTO: 0.1 K/UL (ref 0–0.5)
EOSINOPHIL NFR BLD: 1.1 % (ref 0–8)
ERYTHROCYTE [DISTWIDTH] IN BLOOD BY AUTOMATED COUNT: 13.5 % (ref 11.5–14.5)
EST. GFR  (NO RACE VARIABLE): 51.1 ML/MIN/1.73 M^2
GLUCOSE SERPL-MCNC: 145 MG/DL (ref 70–110)
GLUCOSE SERPL-MCNC: 191 MG/DL (ref 70–110)
HCT VFR BLD AUTO: 38.6 % (ref 40–54)
HCT VFR BLD AUTO: 39.2 % (ref 40–54)
HGB BLD-MCNC: 12.1 G/DL (ref 14–18)
HGB BLD-MCNC: 12.3 G/DL (ref 14–18)
IMM GRANULOCYTES # BLD AUTO: 0.03 K/UL (ref 0–0.04)
IMM GRANULOCYTES NFR BLD AUTO: 0.3 % (ref 0–0.5)
INR PPP: 1.2
LYMPHOCYTES # BLD AUTO: 0.8 K/UL (ref 1–4.8)
LYMPHOCYTES NFR BLD: 9.3 % (ref 18–48)
MCH RBC QN AUTO: 27.4 PG (ref 27–31)
MCHC RBC AUTO-ENTMCNC: 31.4 G/DL (ref 32–36)
MCV RBC AUTO: 87 FL (ref 82–98)
MONOCYTES # BLD AUTO: 0.8 K/UL (ref 0.3–1)
MONOCYTES NFR BLD: 9 % (ref 4–15)
NEUTROPHILS # BLD AUTO: 7.1 K/UL (ref 1.8–7.7)
NEUTROPHILS NFR BLD: 79.9 % (ref 38–73)
NRBC BLD-RTO: 0 /100 WBC
PLATELET # BLD AUTO: 173 K/UL (ref 150–450)
PMV BLD AUTO: 10.5 FL (ref 9.2–12.9)
POC ACTIVATED CLOTTING TIME K: 208 SEC (ref 74–137)
POC ACTIVATED CLOTTING TIME K: 225 SEC (ref 74–137)
POC ACTIVATED CLOTTING TIME K: 225 SEC (ref 74–137)
POTASSIUM SERPL-SCNC: 4.5 MMOL/L (ref 3.5–5.1)
PROTHROMBIN TIME: 14.1 SEC (ref 11.4–13.7)
RBC # BLD AUTO: 4.49 M/UL (ref 4.6–6.2)
SAMPLE: ABNORMAL
SODIUM SERPL-SCNC: 137 MMOL/L (ref 136–145)
TROPONIN I SERPL DL<=0.01 NG/ML-MCNC: 1.47 NG/ML
TROPONIN I SERPL DL<=0.01 NG/ML-MCNC: 2.55 NG/ML
TROPONIN I SERPL DL<=0.01 NG/ML-MCNC: 2.93 NG/ML
WBC # BLD AUTO: 8.89 K/UL (ref 3.9–12.7)

## 2022-10-27 PROCEDURE — 99900035 HC TECH TIME PER 15 MIN (STAT)

## 2022-10-27 PROCEDURE — 99233 SBSQ HOSP IP/OBS HIGH 50: CPT | Mod: ,,, | Performed by: SPECIALIST

## 2022-10-27 PROCEDURE — 25000003 PHARM REV CODE 250

## 2022-10-27 PROCEDURE — 80048 BASIC METABOLIC PNL TOTAL CA: CPT | Performed by: INTERNAL MEDICINE

## 2022-10-27 PROCEDURE — 94761 N-INVAS EAR/PLS OXIMETRY MLT: CPT

## 2022-10-27 PROCEDURE — 63600175 PHARM REV CODE 636 W HCPCS: Performed by: INTERNAL MEDICINE

## 2022-10-27 PROCEDURE — 25000003 PHARM REV CODE 250: Performed by: INTERNAL MEDICINE

## 2022-10-27 PROCEDURE — 85018 HEMOGLOBIN: CPT | Performed by: INTERNAL MEDICINE

## 2022-10-27 PROCEDURE — 21400001 HC TELEMETRY ROOM

## 2022-10-27 PROCEDURE — 85014 HEMATOCRIT: CPT | Performed by: INTERNAL MEDICINE

## 2022-10-27 PROCEDURE — 85610 PROTHROMBIN TIME: CPT | Performed by: INTERNAL MEDICINE

## 2022-10-27 PROCEDURE — 85025 COMPLETE CBC W/AUTO DIFF WBC: CPT | Performed by: INTERNAL MEDICINE

## 2022-10-27 PROCEDURE — 99233 PR SUBSEQUENT HOSPITAL CARE,LEVL III: ICD-10-PCS | Mod: ,,, | Performed by: SPECIALIST

## 2022-10-27 PROCEDURE — 36415 COLL VENOUS BLD VENIPUNCTURE: CPT | Performed by: INTERNAL MEDICINE

## 2022-10-27 PROCEDURE — 84484 ASSAY OF TROPONIN QUANT: CPT | Performed by: INTERNAL MEDICINE

## 2022-10-27 PROCEDURE — 27000221 HC OXYGEN, UP TO 24 HOURS

## 2022-10-27 RX ORDER — CHLORHEXIDINE GLUCONATE ORAL RINSE 1.2 MG/ML
15 SOLUTION DENTAL 2 TIMES DAILY
Status: DISCONTINUED | OUTPATIENT
Start: 2022-10-27 | End: 2022-10-28 | Stop reason: HOSPADM

## 2022-10-27 RX ORDER — MUPIROCIN 20 MG/G
OINTMENT TOPICAL 2 TIMES DAILY
Status: DISCONTINUED | OUTPATIENT
Start: 2022-10-27 | End: 2022-10-28 | Stop reason: HOSPADM

## 2022-10-27 RX ORDER — SODIUM CHLORIDE 9 MG/ML
INJECTION, SOLUTION INTRAVENOUS CONTINUOUS
Status: DISCONTINUED | OUTPATIENT
Start: 2022-10-27 | End: 2022-10-28 | Stop reason: HOSPADM

## 2022-10-27 RX ADMIN — ISOSORBIDE DINITRATE 10 MG: 10 TABLET ORAL at 09:10

## 2022-10-27 RX ADMIN — ACETAMINOPHEN 650 MG: 325 TABLET ORAL at 01:10

## 2022-10-27 RX ADMIN — CHLORHEXIDINE GLUCONATE 15 ML: 1.2 RINSE ORAL at 09:10

## 2022-10-27 RX ADMIN — ASPIRIN 81 MG: 81 TABLET, COATED ORAL at 09:10

## 2022-10-27 RX ADMIN — MONTELUKAST 10 MG: 10 TABLET, FILM COATED ORAL at 09:10

## 2022-10-27 RX ADMIN — ATORVASTATIN CALCIUM 40 MG: 40 TABLET, FILM COATED ORAL at 09:10

## 2022-10-27 RX ADMIN — SODIUM CHLORIDE: 0.9 INJECTION, SOLUTION INTRAVENOUS at 09:10

## 2022-10-27 RX ADMIN — FINASTERIDE 5 MG: 5 TABLET, FILM COATED ORAL at 09:10

## 2022-10-27 RX ADMIN — OXYBUTYNIN CHLORIDE 10 MG: 10 TABLET, EXTENDED RELEASE ORAL at 09:10

## 2022-10-27 RX ADMIN — CLOPIDOGREL BISULFATE 75 MG: 75 TABLET, FILM COATED ORAL at 09:10

## 2022-10-27 RX ADMIN — MORPHINE SULFATE 2 MG: 2 INJECTION, SOLUTION INTRAMUSCULAR; INTRAVENOUS at 03:10

## 2022-10-27 RX ADMIN — GABAPENTIN 300 MG: 300 CAPSULE ORAL at 09:10

## 2022-10-27 RX ADMIN — METOPROLOL TARTRATE 25 MG: 25 TABLET, FILM COATED ORAL at 09:10

## 2022-10-27 RX ADMIN — MUPIROCIN 1 G: 20 OINTMENT TOPICAL at 09:10

## 2022-10-27 RX ADMIN — PANTOPRAZOLE SODIUM 40 MG: 40 TABLET, DELAYED RELEASE ORAL at 05:10

## 2022-10-27 RX ADMIN — SERTRALINE HYDROCHLORIDE 50 MG: 50 TABLET ORAL at 09:10

## 2022-10-27 NOTE — NURSING
Patient urinated blood. Spoke with Dr. Mckoy. Q8hr H&H ordered, PT/INR ordered, consult to urology ordered. I spoke with Dr. Love (urology) he asked me to do a bladder scan. Bladder scan showed 17ml. He said he was talking with Dr. Mckoy now and they would come up with a plan.

## 2022-10-27 NOTE — CARE UPDATE
Called by nursing 640pm due to hematuria s/p PCI/stent  Angio with pci yesterday and started asa/plavix    Today noted by nursing to have grossly blood urine by icu nursing before txfr back to cardio floor. Per floor nurse who called did not see urine but has blood at meatus and blood stain in urinal. Patient noted to be painless.   I asked nursing to check bladder scan PVR  17cc    Spoke with Dr Mckoy, noting cant hold asa/plavix post PCI as expected so with new hematuria on bloodthinners, focus on hydration for now and as long as painless and emptying well can workup per routine    Per encounter review is a pt of Dr Bower with history of kidney stones and BPH, though no records available.   While in house could urine culture urine cytology, and if hematuria persisted could consider getting CT urogram this admission (noted renal function to improve after angiogram and if remained stable dr mckoy advised contrast ok >48hrs from pci) to start evaluation in case noted  source on imaging i.e stone    In setting of painless hematuria emptying well newly on bloodthinners, and above Dr Mckoy noted consult priority could be routine and can be evaluated tomorrow. Advised would notify weekend call MD.

## 2022-10-27 NOTE — PLAN OF CARE
[facility]  Initial Discharge Assessment       Primary Care Provider: Scott Staley MD    Admission Diagnosis: CAD (coronary artery disease) [I25.10]    Admission Date: 10/26/2022  Expected Discharge Date:     Discharge Barriers Identified: None    Assessment completed at bedside with Pt and his adult daughter, Carmen. Pt lives alone and plans to return home at discharge. Pt reports using a rolling walker and bath bench at home and is not requesting any additional DME for discharge. Pt states his daughter is his POA.     Payor: MEDICARE / Plan: MEDICARE PART A & B / Product Type: Government /     Extended Emergency Contact Information  Primary Emergency Contact: Carmen Kapadia  Mobile Phone: [phone]  Relation: Daughter  Preferred language: English   needed? No    Discharge Plan A: Home  Discharge Plan B: Home      [facility] [place] - [address]  [address]  [place] [address]  Phone: [phone] Fax: [phone]    [facility] - [place], [place] - [address] AT Portal to Registered [facility] Sites  [address]  [facility] [address]  Phone: [phone] Fax: [phone]      Initial Assessment (most recent)       Adult Discharge Assessment - 10/27/22 1344          Discharge Assessment    Assessment Type Discharge Planning Assessment     Confirmed/corrected address, phone number and insurance Yes     Confirmed Demographics Correct on Facesheet     Source of Information patient;family     When was your last doctors appointment? 10/21/22   Dr. Foster    Communicated LEE with patient/caregiver Date not available/Unable to determine     Reason For Admission heart attack     Lives With alone     Facility Arrived From: home     Do you expect to return to your current living situation? Yes     Do you have help at home or someone to help you manage your care at home? No     Prior to hospitilization cognitive status: No Deficits      Current cognitive status: No Deficits     Walking or Climbing Stairs Difficulty ambulation difficulty, requires equipment     Mobility Management rolling walker     Dressing/Bathing Difficulty none     Home Layout Able to live on 1st floor     Equipment Currently Used at Home walker, rolling;bath bench     Readmission within 30 days? No     Patient currently being followed by outpatient case management? No     Do you currently have service(s) that help you manage your care at home? No     Do you take prescription medications? Yes     Do you have prescription coverage? Yes     Coverage Medicare & BCBS     Do you have any problems affording any of your prescribed medications? No     Is the patient taking medications as prescribed? yes     Who is going to help you get home at discharge? Carmen Kapadia, adult daughter, [phone]     How do you get to doctors appointments? car, drives self     Are you on dialysis? No     Do you take coumadin? No     Discharge Plan A Home     Discharge Plan B Home     DME Needed Upon Discharge  none     Discharge Plan discussed with: Patient;Adult children     Discharge Barriers Identified None        Physical Activity    On average, how many days per week do you engage in moderate to strenuous exercise (like a brisk walk)? 0 days (P)      On average, how many minutes do you engage in exercise at this level? 0 min (P)         Financial Resource Strain    How hard is it for you to pay for the very basics like food, housing, medical care, and heating? Not hard at all (P)         Housing Stability    In the last 12 months, was there a time when you were not able to pay the mortgage or rent on time? No (P)      In the last 12 months, how many places have you lived? 1 (P)      In the last 12 months, was there a time when you did not have a steady place to sleep or slept in a shelter (including now)? No (P)         Transportation Needs    In the past 12 months, has lack of transportation kept  you from medical appointments or from getting medications? No (P)      In the past 12 months, has lack of transportation kept you from meetings, work, or from getting things needed for daily living? No (P)         Stress    Do you feel stress - tense, restless, nervous, or anxious, or unable to sleep at night because your mind is troubled all the time - these days? Not at all (P)         Social Connections    In a typical week, how many times do you talk on the phone with family, friends, or neighbors? More than three times a week (P)      How often do you get together with friends or relatives? Twice a week (P)      How often do you attend Caodaism or Holiness services? More than 4 times per year (P)      Do you belong to any clubs or organizations such as Caodaism groups, unions, fraternal or athletic groups, or school groups? No (P)      How often do you attend meetings of the clubs or organizations you belong to? Never (P)      Are you , , , , never , or living with a partner?  (P)         Alcohol Use    Q1: How often do you have a drink containing alcohol? Never (P)      Q2: How many drinks containing alcohol do you have on a typical day when you are drinking? Patient does not drink (P)      Q3: How often do you have six or more drinks on one occasion? Never (P)

## 2022-10-27 NOTE — PROGRESS NOTES
[facility]  Department of Cardiology  Progress Note    PATIENT NAME: Tono Saez  MRN: [id number]  TODAY'S DATE: 10/27/2022  ADMIT DATE: 10/26/2022    SUBJECTIVE     PRINCIPLE PROBLEM: <principal problem not specified>    INTERVAL HISTORY:    10/27/2022  Patient resting comfortably in bed.  Denies chest pain or shortness of breath.  Nitroglycerin drip turned off this morning at 7:00 a.m. Patient had chest pain post PCI yesterday for which a repeat angiogram was done of the same PCI which did not show any stent issues.  Patient was transferred to the ICU..  This morning he is chest pain-free.  He states he is wanting to go home.  Awaiting final troponin results from early a.m. blood draw    Review of patient's allergies indicates:   Allergen Reactions    Adhesive Other (See Comments)     SILK TAPE PULL SKIN OFF    Metformin Other (See Comments)     Weight loss cachexia anorexia,diarrhea.    Pcn [penicillins]      Patient states he passed out from this medication when he was 10 years old.        REVIEW OF SYSTEMS  CARDIOVASCULAR: No recent chest pain, palpitations, arm, neck, or jaw pain  RESPIRATORY: No recent fever, cough chills, SOB or congestion  : No blood in the urine  GI: No Nausea, vomiting, constipation, diarrhea, blood, or reflux.  MUSCULOSKELETAL: No myalgias  NEURO: No lightheadedness or dizziness  EYES: No Double vision, blurry, vision or headache     OBJECTIVE     VITAL SIGNS (Most Recent)  Temp: 98.1 °F (36.7 °C) (10/27/22 0701)  Pulse: 72 (10/27/22 0930)  Resp: 14 (10/27/22 0930)  BP: 117/61 (10/27/22 0930)  SpO2: (!) 91 % (10/27/22 0930)    VENTILATION STATUS  Resp: 14 (10/27/22 0930)  SpO2: (!) 91 % (10/27/22 0930)       I & O (Last 24H):  Intake/Output Summary (Last 24 hours) at 10/27/2022 1113  Last data filed at 10/27/2022 0606  Gross per 24 hour   Intake 1449.3 ml   Output 900 ml   Net 549.3 ml       WEIGHTS  Wt Readings from Last 3 Encounters:   10/27/22 0918 84.3 kg (185 lb 12.2  oz)   10/26/22 1716 84.3 kg (185 lb 12.2 oz)   10/25/22 1054 85.2 kg (187 lb 14.4 oz)   10/26/22 1625 84.8 kg (187 lb)   10/18/22 1431 85.2 kg (187 lb 15.1 oz)       PHYSICAL EXAM  CONSTITUTIONAL:  Elderly male resting in bed in no apparent distress  NECK: no carotid bruit, no JVD  LUNGS: CTA  CHEST WALL: no tenderness  HEART: regular rate and rhythm, S1, S2 normal, no murmur, click, rub or gallop   ABDOMEN: soft, non-tender; bowel sounds normal; no masses,  no organomegaly  EXTREMITIES: Extremities normal, no edema  NEURO: AAO X 3    SCHEDULED MEDS:   aspirin  81 mg Oral Daily    atorvastatin  40 mg Oral QHS    chlorhexidine  15 mL Mouth/Throat BID    clopidogreL  75 mg Oral Daily    famotidine (PF)  20 mg Intravenous Once    finasteride  5 mg Oral Daily    gabapentin  300 mg Oral Daily    isosorbide dinitrate  10 mg Oral BID    metoprolol tartrate  25 mg Oral BID    montelukast  10 mg Oral QHS    mupirocin   Nasal BID    oxybutynin  10 mg Oral Daily    pantoprazole  40 mg Oral Before breakfast    sertraline  50 mg Oral Daily       CONTINUOUS INFUSIONS:   sodium chloride 0.9% Stopped (10/27/22 0700)       PRN MEDS:acetaminophen, morphine, ondansetron    LABS AND DIAGNOSTICS     CBC LAST 3 DAYS  Recent Labs   Lab 10/26/22  1514 10/26/22  2115 10/27/22  0013   WBC 5.40 8.03 8.89   RBC 4.44* 4.49* 4.49*   HGB 12.2* 12.1* 12.3*   HCT 37.8* 39.1* 39.2*   MCV 85 87 87   MCH 27.5 26.9* 27.4   MCHC 32.3 30.9* 31.4*   RDW 13.2 13.4 13.5    159 173   MPV 10.4 10.6 10.5   GRAN 67.3  3.6 81.3*  6.5 79.9*  7.1   LYMPH 20.7  1.1 10.1*  0.8* 9.3*  0.8*   MONO 8.0  0.4 7.3  0.6 9.0  0.8   BASO 0.04 0.03 0.04   NRBC 0 0 0       COAGULATION LAST 3 DAYS  No results for input(s): LABPT, INR, APTT in the last 168 hours.    CHEMISTRY LAST 3 DAYS  Recent Labs   Lab 10/26/22  0730 10/26/22  2115 10/27/22  0316    135* 137   K 3.7 4.3 4.5    106 106   CO2 27 23 26   ANIONGAP 5* 6* 5*   BUN 32* 26* 26*    CREATININE 1.6* 1.4 1.4   * 196* 145*   CALCIUM 9.8 8.8 9.1       CARDIAC PROFILE LAST 3 DAYS  Recent Labs   Lab 10/26/22  1514 10/26/22  2115 10/27/22  0547   TROPONINI 0.033 0.210* 1.471*       ENDOCRINE LAST 3 DAYS  No results for input(s): TSH, PROCAL in the last 168 hours.    LAST ARTERIAL BLOOD GAS  ABG  No results for input(s): PH, PO2, PCO2, HCO3, BE in the last 168 hours.    LAST 7 DAYS MICROBIOLOGY   Microbiology Results (last 7 days)       ** No results found for the last 168 hours. **            MOST RECENT IMAGING  Cardiac catheterization    The Dist LM lesion was 90% stenosed with 10% stenosis   post-intervention.    The pre-procedure left ventricular end diastolic pressure was 12.    A STENT LIMA YOHANA 3.50 X 26 stent was not successfully placed at 12 JASPER   for 20 sec.    The estimated blood loss was <50 mL.    Successful intravascular ultrasound-guided PCI of the critical stenosis   of left main into circumflex with 1 drug-eluting stent    Patent vein graft to OM, occluded branch of the OM as compared to   angiogram in 2016.    Patent vein graft to right coronary artery with patent stent,   moderately degenerated, distal native RCA has 80% stenosis in medium   caliber vessel.    Occluded LAD with a patent SIU to LAD    After taking the patient off the table, he started having excruciating   chest pain.  He was put back on the table and a repeat limited angiogram   was performed from the left femoral access.  His native left coronary was   started as it had been stented, it showed patent stent with VENKATESH 3 flow.    Given chest pain and aggressive maneuvers requiring cannulation of the   left subclavian due to severe tortuosity, that were studied as well with   no evidence of dissection an intact LIMA to LAD.  Patient hemodynamically   stable and chest pain improved after giving IV labetalol.  Will monitor   the patient in the ICU.    The procedure log was documented by Documenter: Jia  Vipin RT; Chyna Pugh RT and verified by Robles Mckoy MD.    Date: 10/26/2022  Time: 4:35 PM      [facility]  Results for orders placed during the hospital encounter of 10/26/22    Echo    Interpretation Summary  · The left ventricle is normal in size with mild concentric hypertrophy and normal systolic function.  · The estimated ejection fraction is 55%.  · Indeterminate left ventricular diastolic function.  · There is abnormal septal wall motion consistent with post-operative status.  · Moderate to severe left atrial enlargement.  · Mild to moderate tricuspid regurgitation.  · Mild-to-moderate aortic regurgitation.  · There is mild aortic valve stenosis.      CURRENT/PREVIOUS VISIT EKG  Results for orders placed or performed during the hospital encounter of 10/26/22   EKG 12-lead    Collection Time: 10/26/22  3:22 PM    Narrative    Test Reason : Z98.890,    Vent. Rate : 068 BPM     Atrial Rate : 068 BPM     P-R Int : 224 ms          QRS Dur : 206 ms      QT Int : 484 ms       P-R-T Axes : 000 -32 149 degrees     QTc Int : 514 ms    Atrial-sensed ventricular-paced rhythm with prolonged AV conduction  Abnormal ECG  When compared with ECG of 24-OCT-2022 11:19,  No significant change was found    Referred By:             Confirmed By:        ASSESSMENT/PLAN:     There are no active hospital problems to display for this patient.      ASSESSMENT & PLAN:   CAD  S/P PCI left main into left circumflex  Chest pain post stent  Pacemaker in-situ  Hyperlipidemia-on statin therapy    RECOMMENDATIONS:  1. Patient has remained chest pain-free off of nitroglycerin drip.  2. Await early a.m. troponin level to determine if patient is ready for discharge home  3. Blood pressure has remained stable overnight.  Continue medical management post stent who is aspirin 81 mg p.o. daily, Plavix 75 mg p.o. daily isosorbide 10 mg p.o. b.i.d., and Lopressor 25 mg p.o. b.i.d..  Troponin is trending up  Keep in the hospital in transfer of  floor 1 more day until we can see troponin trend      Ritu Chi NP  [facility]  Department of Cardiology  Date of Service: 10/27/2022

## 2022-10-27 NOTE — CARE UPDATE
10/27/22 0738   PRE-TX-O2   O2 Device (Oxygen Therapy) nasal cannula   $ Is the patient on Low Flow Oxygen? Yes   Flow (L/min) 3   SpO2 98 %   Pulse Oximetry Type Continuous   $ Pulse Oximetry - Multiple Charge Pulse Oximetry - Multiple   Pulse 75   Resp 14   Respiratory Evaluation   $ Care Plan Tech Time 15 min

## 2022-10-28 ENCOUNTER — TELEPHONE (OUTPATIENT)
Dept: CARDIOLOGY | Facility: CLINIC | Age: 77
End: 2022-10-28
Payer: MEDICARE

## 2022-10-28 VITALS
RESPIRATION RATE: 18 BRPM | DIASTOLIC BLOOD PRESSURE: 87 MMHG | SYSTOLIC BLOOD PRESSURE: 170 MMHG | TEMPERATURE: 98 F | WEIGHT: 185.75 LBS | HEIGHT: 72 IN | HEART RATE: 70 BPM | OXYGEN SATURATION: 92 % | BODY MASS INDEX: 25.16 KG/M2

## 2022-10-28 PROBLEM — R31.0 GROSS HEMATURIA: Status: ACTIVE | Noted: 2022-10-28

## 2022-10-28 PROBLEM — I21.4 NSTEMI (NON-ST ELEVATED MYOCARDIAL INFARCTION): Status: ACTIVE | Noted: 2022-10-28

## 2022-10-28 LAB
ANION GAP SERPL CALC-SCNC: 5 MMOL/L (ref 8–16)
BACTERIA #/AREA URNS HPF: NEGATIVE /HPF
BILIRUB UR QL STRIP: NEGATIVE
BUN SERPL-MCNC: 20 MG/DL (ref 8–23)
CALCIUM SERPL-MCNC: 9.1 MG/DL (ref 8.7–10.5)
CHLORIDE SERPL-SCNC: 108 MMOL/L (ref 95–110)
CLARITY UR: CLEAR
CO2 SERPL-SCNC: 23 MMOL/L (ref 23–29)
COLOR UR: YELLOW
CREAT SERPL-MCNC: 1.2 MG/DL (ref 0.5–1.4)
EST. GFR  (NO RACE VARIABLE): >60 ML/MIN/1.73 M^2
GLUCOSE SERPL-MCNC: 113 MG/DL (ref 70–110)
GLUCOSE SERPL-MCNC: 122 MG/DL (ref 70–110)
GLUCOSE SERPL-MCNC: 122 MG/DL (ref 70–110)
GLUCOSE UR QL STRIP: ABNORMAL
HCT VFR BLD AUTO: 36.9 % (ref 40–54)
HCT VFR BLD AUTO: 37.7 % (ref 40–54)
HGB BLD-MCNC: 11.4 G/DL (ref 14–18)
HGB BLD-MCNC: 11.6 G/DL (ref 14–18)
HGB UR QL STRIP: ABNORMAL
HYALINE CASTS #/AREA URNS LPF: 1 /LPF
KETONES UR QL STRIP: NEGATIVE
LEUKOCYTE ESTERASE UR QL STRIP: NEGATIVE
MICROSCOPIC COMMENT: ABNORMAL
NITRITE UR QL STRIP: NEGATIVE
PH UR STRIP: 6 [PH] (ref 5–8)
POTASSIUM SERPL-SCNC: 4 MMOL/L (ref 3.5–5.1)
PROT UR QL STRIP: NEGATIVE
RBC #/AREA URNS HPF: 28 /HPF (ref 0–4)
SODIUM SERPL-SCNC: 136 MMOL/L (ref 136–145)
SP GR UR STRIP: 1.02 (ref 1–1.03)
SQUAMOUS #/AREA URNS HPF: 0 /HPF
TROPONIN I SERPL DL<=0.01 NG/ML-MCNC: 2.5 NG/ML
TROPONIN I SERPL DL<=0.01 NG/ML-MCNC: 3.11 NG/ML
URN SPEC COLLECT METH UR: ABNORMAL
UROBILINOGEN UR STRIP-ACNC: NEGATIVE EU/DL
WBC #/AREA URNS HPF: 1 /HPF (ref 0–5)
YEAST URNS QL MICRO: ABNORMAL

## 2022-10-28 PROCEDURE — 99900035 HC TECH TIME PER 15 MIN (STAT)

## 2022-10-28 PROCEDURE — 84484 ASSAY OF TROPONIN QUANT: CPT | Performed by: INTERNAL MEDICINE

## 2022-10-28 PROCEDURE — 99900031 HC PATIENT EDUCATION (STAT)

## 2022-10-28 PROCEDURE — 36415 COLL VENOUS BLD VENIPUNCTURE: CPT | Performed by: INTERNAL MEDICINE

## 2022-10-28 PROCEDURE — 94761 N-INVAS EAR/PLS OXIMETRY MLT: CPT

## 2022-10-28 PROCEDURE — 85014 HEMATOCRIT: CPT | Performed by: INTERNAL MEDICINE

## 2022-10-28 PROCEDURE — 85014 HEMATOCRIT: CPT | Mod: 91 | Performed by: INTERNAL MEDICINE

## 2022-10-28 PROCEDURE — 99223 1ST HOSP IP/OBS HIGH 75: CPT | Mod: ,,, | Performed by: STUDENT IN AN ORGANIZED HEALTH CARE EDUCATION/TRAINING PROGRAM

## 2022-10-28 PROCEDURE — 85018 HEMOGLOBIN: CPT | Mod: 91 | Performed by: INTERNAL MEDICINE

## 2022-10-28 PROCEDURE — 25000003 PHARM REV CODE 250: Performed by: INTERNAL MEDICINE

## 2022-10-28 PROCEDURE — 99223 PR INITIAL HOSPITAL CARE,LEVL III: ICD-10-PCS | Mod: ,,, | Performed by: STUDENT IN AN ORGANIZED HEALTH CARE EDUCATION/TRAINING PROGRAM

## 2022-10-28 PROCEDURE — 80048 BASIC METABOLIC PNL TOTAL CA: CPT | Performed by: INTERNAL MEDICINE

## 2022-10-28 PROCEDURE — 81001 URINALYSIS AUTO W/SCOPE: CPT | Performed by: INTERNAL MEDICINE

## 2022-10-28 PROCEDURE — 85018 HEMOGLOBIN: CPT | Performed by: INTERNAL MEDICINE

## 2022-10-28 RX ORDER — METOPROLOL TARTRATE 25 MG/1
25 TABLET, FILM COATED ORAL 2 TIMES DAILY
Qty: 120 TABLET | Refills: 0 | Status: SHIPPED | OUTPATIENT
Start: 2022-10-28 | End: 2023-01-23 | Stop reason: SDUPTHER

## 2022-10-28 RX ORDER — ATORVASTATIN CALCIUM 40 MG/1
40 TABLET, FILM COATED ORAL NIGHTLY
Qty: 90 TABLET | Refills: 0 | Status: SHIPPED | OUTPATIENT
Start: 2022-10-28 | End: 2023-01-30 | Stop reason: SDUPTHER

## 2022-10-28 RX ORDER — CLOPIDOGREL BISULFATE 75 MG/1
75 TABLET ORAL DAILY
Qty: 90 TABLET | Refills: 0 | Status: SHIPPED | OUTPATIENT
Start: 2022-10-28 | End: 2023-06-06 | Stop reason: SDUPTHER

## 2022-10-28 RX ORDER — ASPIRIN 81 MG/1
162 TABLET ORAL DAILY
Qty: 120 TABLET | Refills: 0 | Status: ON HOLD | OUTPATIENT
Start: 2022-10-28 | End: 2023-10-09 | Stop reason: SDUPTHER

## 2022-10-28 RX ADMIN — OXYBUTYNIN CHLORIDE 10 MG: 10 TABLET, EXTENDED RELEASE ORAL at 11:10

## 2022-10-28 RX ADMIN — METOPROLOL TARTRATE 25 MG: 25 TABLET, FILM COATED ORAL at 11:10

## 2022-10-28 RX ADMIN — PANTOPRAZOLE SODIUM 40 MG: 40 TABLET, DELAYED RELEASE ORAL at 06:10

## 2022-10-28 RX ADMIN — CLOPIDOGREL BISULFATE 75 MG: 75 TABLET, FILM COATED ORAL at 11:10

## 2022-10-28 RX ADMIN — GABAPENTIN 300 MG: 300 CAPSULE ORAL at 11:10

## 2022-10-28 RX ADMIN — ISOSORBIDE DINITRATE 10 MG: 10 TABLET ORAL at 11:10

## 2022-10-28 RX ADMIN — SERTRALINE HYDROCHLORIDE 50 MG: 50 TABLET ORAL at 11:10

## 2022-10-28 RX ADMIN — FINASTERIDE 5 MG: 5 TABLET, FILM COATED ORAL at 11:10

## 2022-10-28 RX ADMIN — ASPIRIN 81 MG: 81 TABLET, COATED ORAL at 11:10

## 2022-10-28 NOTE — TELEPHONE ENCOUNTER
----- Message from Vee Espinosa RN sent at 10/28/2022  1:12 PM CDT -----  Good afternoon,     Patient admitted to [facility] for chest pain and underwent angiogram during admission. He is discharging today. Patient needs a hospital follow up appointment in one week with Dr. Mckoy. Please contact patient with appointment date/time.     Thank you,  Vee Espinosa RN CM

## 2022-10-28 NOTE — CONSULTS
[facility]  Urology  Consult Note    Patient Name: Tono Saez  MRN: [id number]  Admission Date: 10/26/2022  Hospital Length of Stay: 2   Code Status: Prior   Attending Provider: Koko Erickson MD   Consulting Provider: Ling Amaya MD  Primary Care Physician: Scott Staley MD  Principal Problem:NSTEMI (non-ST elevated myocardial infarction)    Inpatient consult to Urology  Consult performed by: Ling Amaya MD  Consult ordered by: Robles Mckoy MD          Subjective:     HPI:  Tono Saez is a 77 y.o. male admitted with MI s/p PCI on 10/26 now on aspirin and plavix.     Yesterday noted to have gross hematuria. PVR 17 cc.    Patient of Dr. Tse with hx of kidney stones and BPH. Last kidney stone was before 1995.  He has no baseline voiding complaints except frequency.     Renal function is normal.   No recent imaging.   UA today yellow, 28 RBC, 1 WBC.     Hematuria has now resolved. States he thinks he may have scratched his penis on the urinal.   No flank pain.   No smoking hx. No family hx of bladder cancer.      Past Medical History:   Diagnosis Date    Anemia     Anticoagulant long-term use     Arthritis     CAD (coronary artery disease)     CABG, STENTS '97,'99,'01,'05    Cancer     SKIN    Cataract     Diabetes mellitus     Diabetes mellitus type II     GERD (gastroesophageal reflux disease)     GI bleed 2020    Hypertension     Myocardial infarction     Wears glasses        Past Surgical History:   Procedure Laterality Date    CARDIAC PACEMAKER PLACEMENT      CARDIAC PACEMAKER PLACEMENT      CARDIAC PACEMAKER PLACEMENT  04/26/2018    replaced    CARDIAC SURGERY      1995   CABG X 5    CHOLECYSTECTOMY      COLON SURGERY      COLONOSCOPY N/A 2/21/2020    Procedure: COLONOSCOPY;  Surgeon: Abe Barragan III, MD;  Location[facility];  Service: Endoscopy;  Laterality: N/A;    COLONOSCOPY N/A 2/23/2020    Procedure: COLONOSCOPY;  Surgeon: Bob WESLEY  Chucky Sanches MD;  Location: [facility] ENDO;  Service: Endoscopy;  Laterality: N/A;    CORONARY ANGIOGRAPHY INCLUDING BYPASS GRAFTS WITH CATHETERIZATION OF LEFT HEART N/A 10/26/2022    Procedure: ANGIOGRAM, CORONARY, INCLUDING BYPASS GRAFT, WITH LEFT HEART CATHETERIZATION;  Surgeon: Robles Mckoy MD;  Location: [facility] CATH/EP LAB;  Service: Cardiology;  Laterality: N/A;    CORONARY ARTERY BYPASS GRAFT  1995    CORONARY STENT PLACEMENT      stent x 5    ESOPHAGOGASTRODUODENOSCOPY N/A 2/23/2020    Procedure: EGD (ESOPHAGOGASTRODUODENOSCOPY);  Surgeon: Bob Locke Jr., MD;  Location: [facility] ENDO;  Service: Endoscopy;  Laterality: N/A;    EYE SURGERY      BILAT CATARACT    head laceration   01/19/2018    HEMORRHOID SURGERY      SMALL BOWEL ENTEROSCOPY N/A 2/21/2020    Procedure: ENTEROSCOPY;  Surgeon: Abe Barragan III, MD;  Location: [facility] ENDO;  Service: Endoscopy;  Laterality: N/A;    stint         Review of patient's allergies indicates:   Allergen Reactions    Adhesive Other (See Comments)     SILK TAPE PULL SKIN OFF    Metformin Other (See Comments)     Weight loss cachexia anorexia,diarrhea.    Pcn [penicillins]      Patient states he passed out from this medication when he was 10 years old.        Family History       Problem Relation (Age of Onset)    Heart disease Mother, Father, Brother, Brother, Brother, Brother, Brother    Hyperlipidemia Sister    Hypertension Sister            Tobacco Use    Smoking status: Never    Smokeless tobacco: Never   Substance and Sexual Activity    Alcohol use: No    Drug use: No    Sexual activity: Not Currently       Review of Systems   Genitourinary:  Positive for frequency and hematuria. Negative for difficulty urinating, dysuria, nocturia, scrotal swelling, testicular pain and urgency.     Objective:     Temp:  [97.7 °F (36.5 °C)-98.6 °F (37 °C)] 98.2 °F (36.8 °C)  Pulse:  [63-85] 70  Resp:  [16-18] 18  SpO2:  [92 %-97 %] 92 %  BP: (129-170)/(75-87) 170/87      Body mass index is 25.19 kg/m².    Date 10/28/22 0700 - 10/29/22 0659   Shift 9791-3259 8595-9175 6939-5128 24 Hour Total   INTAKE   P.O. 0   0   Shift Total(mL/kg) 0(0)   0(0)   OUTPUT   Urine(mL/kg/hr) 100   100   Shift Total(mL/kg) 100(1.2)   100(1.2)   Weight (kg) 84.3 84.3 84.3 84.3     Post Void Cath Residual Output (mL): 17 mL (10/27/22 1853)    Drains       Drain  Duration                  Sheath 10/26/22 1408 Left 2 days                    Physical Exam  Constitutional:       General: He is not in acute distress.     Appearance: Normal appearance. He is not ill-appearing.   HENT:      Head: Normocephalic and atraumatic.   Eyes:      General: No scleral icterus.  Pulmonary:      Effort: Pulmonary effort is normal. No respiratory distress.   Abdominal:      General: There is no distension.      Palpations: Abdomen is soft.   Genitourinary:     Comments: Clear yellow urine in urinal  Skin:     Coloration: Skin is not jaundiced.   Neurological:      General: No focal deficit present.      Mental Status: He is alert and oriented to person, place, and time.   Psychiatric:         Mood and Affect: Mood normal.         Behavior: Behavior normal.         Thought Content: Thought content normal.       Significant Labs:    BMP:  Recent Labs   Lab 10/26/22  2115 10/27/22  0316 10/28/22  0513   * 137 136   K 4.3 4.5 4.0    106 108   CO2 23 26 23   BUN 26* 26* 20   CREATININE 1.4 1.4 1.2   CALCIUM 8.8 9.1 9.1       CBC:  Recent Labs   Lab 10/26/22  1514 10/26/22  2115 10/27/22  0013 10/27/22  1905 10/28/22  0017 10/28/22  0858   WBC 5.40 8.03 8.89  --   --   --    HGB 12.2* 12.1* 12.3* 12.1* 11.4* 11.6*   HCT 37.8* 39.1* 39.2* 38.6* 37.7* 36.9*    159 173  --   --   --        Urine Studies:   Recent Labs   Lab 10/28/22  0218   COLORU Yellow   APPEARANCEUA Clear   PHUR 6.0   SPECGRAV 1.025   PROTEINUA Negative   GLUCUA 4+*   KETONESU Negative   BILIRUBINUA Negative   OCCULTUA 2+*   NITRITE Negative    UROBILINOGEN Negative   LEUKOCYTESUR Negative   RBCUA 28*   WBCUA 1   BACTERIA Negative   SQUAMEPITHEL 0   HYALINECASTS 1       Significant Imaging:  All pertinent imaging results/findings from the past 24 hours have been reviewed.      Assessment and Plan:     Gross hematuria  - Hematuria has resolved  - He is currently voiding without issues   - He is established with Dr. Tse  - Discussed need to follow up with him upon discharge and resolution of more acute issues for complete workup of hematuria         VTE Risk Mitigation (From admission, onward)    None          Thank you for your consult. I will sign off. Please contact us if you have any additional questions.    Ling Amaya MD  Urology  [facility]

## 2022-10-28 NOTE — PLAN OF CARE
10/28/22 1130   Patient Assessment/Suction   Level of Consciousness (AVPU) alert   Respiratory Effort Normal;Unlabored   PRE-TX-O2   O2 Device (Oxygen Therapy) room air   SpO2 (!) 92 %   Pulse Oximetry Type Intermittent   $ Pulse Oximetry - Multiple Charge Pulse Oximetry - Multiple   Pulse 70   Resp 18   Education   $ Education 15 min   Respiratory Evaluation   $ Care Plan Tech Time 15 min

## 2022-10-28 NOTE — DISCHARGE SUMMARY
[facility] Medicine  Discharge Summary      Patient Name: Tono Saez  MRN: [id number]  Patient Class: IP- Inpatient  Admission Date: 10/26/2022  Hospital Length of Stay: 2 days  Discharge Date and Time:  10/28/2022 2:26 PM  Attending Physician: Koko Erickson MD   Discharging Provider: Koko Erickson MD  Primary Care Provider: Scott Staley MD      HPI:   No notes on file    Procedure(s) (LRB):  ANGIOGRAM, CORONARY, INCLUDING BYPASS GRAFT, WITH LEFT HEART CATHETERIZATION (N/A)  Percutaneous coronary intervention (N/A)      Hospital Course:   77-year-old male with history of coronary artery disease status post CABG and PCIs was admitted from heart [place] after outpatient [facility]   Patient underwent angiogram which showed critical disease of left main into left circumflex for which he received 1 drug-eluting stent.  Post PCI, patient had crushing chest pain for which a repeat angiogram was done of the same PCI which did not show any stent issues.  Patient was started on nitro drip and later chest pain resolved and downgraded to Cardiology.  Patient troponin rise the next day and planned  for repeat angiogram but repeat troponin become coming down and patient denied having chest pain and later angiogram was deferred and discharge in  stable condition.    Patient developed small amount of hematuria and urology consultant.  Unfortunately patient's aspirin and Plavix was not able to hold because of recent stent but fortunately patient's hematuria resolved totally.  Patient is chest pain-free and discussed with the patient and family and cardiologist Dr moreno and discharged  in stable condition.  Continued aspirin Plavix statin and nitro at discharge and 1 week appointment given with Dr. Moreno              Goals of Care Treatment Preferences:  Code Status: Full Code      Consults:   Consults (From admission, onward)        Status Ordering Provider     Inpatient consult to Urology  Once        Provider:   Kt Love MD    Completed ROBLES TSAI          No new Assessment & Plan notes have been filed under this hospital service since the last note was generated.  Service: Hospital Medicine    Final Active Diagnoses:    Diagnosis Date Noted POA    PRINCIPAL PROBLEM:  NSTEMI (non-ST elevated myocardial infarction) [I21.4] 10/28/2022 Unknown    Gross hematuria [R31.0] 10/28/2022 No      Problems Resolved During this Admission:       Discharged Condition: good    Disposition: Home or Self Care    Follow Up:   Follow-up Information     Robles Tsai MD Follow up in 1 week(s).    Specialties: Interventional Cardiology, Cardiology  Why: Idea Device message sent to Dr. Tsai's staff requesting hospital follow up appointment. If you do not hear from his office by Tuesday, please contact office for appoitnment date/time.  Contact information:  [facility]  [address]  [place] [facility] [address]  [phone]                       Patient Instructions:      Diet Cardiac     Activity as tolerated       Significant Diagnostic Studies: Labs:   CMP   Recent Labs   Lab 10/26/22  2115 10/27/22  0316 10/28/22  0513   * 137 136   K 4.3 4.5 4.0    106 108   CO2 23 26 23   * 145* 122*   BUN 26* 26* 20   CREATININE 1.4 1.4 1.2   CALCIUM 8.8 9.1 9.1   ANIONGAP 6* 5* 5*    and CBC   Recent Labs   Lab 10/26/22  1514 10/26/22  2115 10/27/22  0013 10/27/22  1905 10/28/22  0017 10/28/22  0858   WBC 5.40 8.03 8.89  --   --   --    HGB 12.2* 12.1* 12.3* 12.1* 11.4* 11.6*   HCT 37.8* 39.1* 39.2* 38.6* 37.7* 36.9*    159 173  --   --   --        Pending Diagnostic Studies:     Procedure Component Value Units Date/Time    ACTH [[id number]] Collected: 10/26/22 1544    Order Status: Sent Lab Status: In process Updated: 10/26/22 1819    Specimen: Blood     Hematocrit [[id number]]     Order Status: Sent Lab Status: No result     Specimen: Blood     Hemoglobin [[id number]]     Order Status: Sent Lab Status: No result     Specimen:  Blood     Troponin I [[id number]]     Order Status: Sent Lab Status: No result     Specimen: Blood          Medications:  Reconciled Home Medications:      Medication List      START taking these medications    atorvastatin 40 MG tablet  Commonly known as: LIPITOR  Take 1 tablet (40 mg total) by mouth every evening.  Replaces: rosuvastatin 20 MG tablet     metoprolol tartrate 25 MG tablet  Commonly known as: LOPRESSOR  Take 1 tablet (25 mg total) by mouth 2 (two) times daily.        CONTINUE taking these medications    aspirin 81 MG EC tablet  Commonly known as: ECOTRIN  Take 2 tablets (162 mg total) by mouth once daily.     betamethasone dipropionate 0.05 % lotion  Commonly known as: DIPROLENE  Apply thin film to scalp bid prn itch     clopidogreL 75 mg tablet  Commonly known as: PLAVIX  Take 1 tablet (75 mg total) by mouth once daily.     empagliflozin 25 mg tablet  Commonly known as: JARDIANCE  Take 1 tablet (25 mg total) by mouth once daily.     finasteride 5 mg tablet  Commonly known as: PROSCAR  Take 5 mg by mouth once daily.     fluticasone propionate 50 mcg/actuation nasal spray  Commonly known as: FLONASE  Use nasally as directed as needed     isosorbide dinitrate 10 MG tablet  Commonly known as: ISORDIL  Take 1 tablet (10 mg total) by mouth 2 (two) times daily.     ketoconazole 2 % shampoo  Commonly known as: NIZORAL  Wash all scaling areas let sit 3 minutes then rinse 3x/wk     levocetirizine 5 MG tablet  Commonly known as: XYZAL  TAKE ONE TABLET BY MOUTH EVERY EVENING     magnesium oxide 400 mg (241.3 mg magnesium) tablet  Commonly known as: MAG-OX  Take 1 tablet (400 mg total) by mouth once daily.     montelukast 10 mg tablet  Commonly known as: SINGULAIR  TAKE 1 TABLET (10 MG TOTAL) BY MOUTH EVERY EVENING.     multivitamin capsule  Take 1 capsule by mouth once daily.     mupirocin 2 % ointment  Commonly known as: BACTROBAN  Apply topically 2 (two) times daily.     nitroGLYCERIN 0.4 MG SL  tablet  Commonly known as: NITROSTAT  DISSOLVE 1 TABLET UNDER THE TONGUE AS NEEDED FOR CHEST PAIN     pantoprazole 40 MG tablet  Commonly known as: PROTONIX  TAKE ONE TABLET BY MOUTH ONCE DAILY     sertraline 50 MG tablet  Commonly known as: ZOLOFT  TAKE ONE TABLET BY MOUTH ONCE DAILY     sulfacetamide sodium 10 % Sham  Commonly known as: OVACE PLUS SHAMPOO  Wash scalp nightly     tolterodine 4 MG 24 hr capsule  Commonly known as: DETROL LA  TAKE ONE CAPSULE BY MOUTH ONCE DAILY     vitamin D 1000 units Tab  Commonly known as: VITAMIN D3  Take 1,000 Units by mouth once daily.        STOP taking these medications    rosuvastatin 20 MG tablet  Commonly known as: CRESTOR  Replaced by: atorvastatin 40 MG tablet        ASK your doctor about these medications    gabapentin 300 MG capsule  Commonly known as: NEURONTIN  Take 1 capsule (300 mg total) by mouth 2 (two) times daily.     pioglitazone 15 MG tablet  Commonly known as: ACTOS  Take 1 tablet (15 mg total) by mouth once daily.            Indwelling Lines/Drains at time of discharge:   Lines/Drains/Airways     Drain  Duration                Sheath 10/26/22 1408 Left 2 days            General: Patient resting comfortably in no acute distress. Appears as stated age. Calm  Eyes: EOM intact. No conjunctivae injection. No scleral icterus.  ENT: Hearing grossly intact. No discharge from ears. No nasal discharge.   CVS: RRR. No LE edema BL.  Lungs: CTA BL, no wheezing or crackles. Good breath sounds. No accessory muscle use. No acute respiratory distress  Neuro: non focal , Follows commands. Responds appropriately    Time spent on the discharge of patient: 33 minutes         Koko Erickson MD  Department of Hospital Medicine  [facility]

## 2022-10-28 NOTE — ASSESSMENT & PLAN NOTE
- Hematuria has resolved  - He is currently voiding without issues   - He is established with Dr. Tse  - Discussed need to follow up with him upon discharge and resolution of more acute issues for complete workup of hematuria

## 2022-10-28 NOTE — HPI
Tono Saez is a 77 y.o. male admitted with MI s/p PCI on 10/26 now on aspirin and plavix.     Yesterday noted to have gross hematuria. PVR 17 cc.    Patient of Dr. Tse with hx of kidney stones and BPH. Last kidney stone was before 1995.  He has no baseline voiding complaints except frequency.     Renal function is normal.   No recent imaging.   UA today yellow, 28 RBC, 1 WBC.     Hematuria has now resolved. States he thinks he may have scratched his penis on the urinal.   No flank pain.   No smoking hx. No family hx of bladder cancer.

## 2022-10-28 NOTE — HOSPITAL COURSE
77-year-old male with history of coronary artery disease status post CABG and PCIs was admitted from heart center after outpatient [facility]   Patient underwent angiogram which showed critical disease of left main into left circumflex for which he received 1 drug-eluting stent.  Post PCI, patient had crushing chest pain for which a repeat angiogram was done of the same PCI which did not show any stent issues.  Patient was started on nitro drip and later chest pain resolved and downgraded to Cardiology.  Patient troponin rise the next day and planned  for repeat angiogram but repeat troponin become coming down and patient denied having chest pain and later angiogram was deferred and discharge in  stable condition.    Patient developed small amount of hematuria and urology consultant.  Unfortunately patient's aspirin and Plavix was not able to hold because of recent stent but fortunately patient's hematuria resolved totally.  Patient is chest pain-free and discussed with the patient and family and cardiologist Dr moreno and discharged  in stable condition.  Continued aspirin Plavix statin and nitro at discharge and 1 week appointment given with Dr. Moreno

## 2022-10-28 NOTE — PLAN OF CARE
Discharge orders and chart reviewed. No other discharge needs noted at this time. Pt is clear for discharge from case management. Pt is discharging to home.    InBasket message sent to Dr. Mckoy's staff requesting hospital follow up appointment.       10/28/22 1416   Final Note   Assessment Type Final Discharge Note   Anticipated Discharge Disposition Home   What phone number can be called within the next 1-3 days to see how you are doing after discharge? [phone]   Hospital Resources/Appts/Education Provided Post-Acute resouces added to AVS;Appointments scheduled and added to AVS

## 2022-10-28 NOTE — SUBJECTIVE & OBJECTIVE
Past Medical History:   Diagnosis Date    Anemia     Anticoagulant long-term use     Arthritis     CAD (coronary artery disease)     CABG, STENTS '97,'99,'01,'05    Cancer     SKIN    Cataract     Diabetes mellitus     Diabetes mellitus type II     GERD (gastroesophageal reflux disease)     GI bleed 2020    Hypertension     Myocardial infarction     Wears glasses        Past Surgical History:   Procedure Laterality Date    CARDIAC PACEMAKER PLACEMENT      CARDIAC PACEMAKER PLACEMENT      CARDIAC PACEMAKER PLACEMENT  04/26/2018    replaced    CARDIAC SURGERY      1995   CABG X 5    CHOLECYSTECTOMY      COLON SURGERY      COLONOSCOPY N/A 2/21/2020    Procedure: COLONOSCOPY;  Surgeon: Abe Barragan III, MD;  Location: [facility] ENDO;  Service: Endoscopy;  Laterality: N/A;    COLONOSCOPY N/A 2/23/2020    Procedure: COLONOSCOPY;  Surgeon: Bob Locke Jr., MD;  Location: [facility] ENDO;  Service: Endoscopy;  Laterality: N/A;    CORONARY ANGIOGRAPHY INCLUDING BYPASS GRAFTS WITH CATHETERIZATION OF LEFT HEART N/A 10/26/2022    Procedure: ANGIOGRAM, CORONARY, INCLUDING BYPASS GRAFT, WITH LEFT HEART CATHETERIZATION;  Surgeon: Robles Mckoy MD;  Location: [facility] CATH/EP LAB;  Service: Cardiology;  Laterality: N/A;    CORONARY ARTERY BYPASS GRAFT  1995    CORONARY STENT PLACEMENT      stent x 5    ESOPHAGOGASTRODUODENOSCOPY N/A 2/23/2020    Procedure: EGD (ESOPHAGOGASTRODUODENOSCOPY);  Surgeon: Bob Locke Jr., MD;  Location: [facility] ENDO;  Service: Endoscopy;  Laterality: N/A;    EYE SURGERY      BILAT CATARACT    head laceration   01/19/2018    HEMORRHOID SURGERY      SMALL BOWEL ENTEROSCOPY N/A 2/21/2020    Procedure: ENTEROSCOPY;  Surgeon: Abe Barragan III, MD;  Location: [facility] ENDO;  Service: Endoscopy;  Laterality: N/A;    stint         Review of patient's allergies indicates:   Allergen Reactions    Adhesive Other (See Comments)     SILK TAPE PULL SKIN OFF    Metformin Other (See Comments)     Weight loss cachexia  anorexia,diarrhea.    Pcn [penicillins]      Patient states he passed out from this medication when he was 10 years old.        Family History       Problem Relation (Age of Onset)    Heart disease Mother, Father, Brother, Brother, Brother, Brother, Brother    Hyperlipidemia Sister    Hypertension Sister            Tobacco Use    Smoking status: Never    Smokeless tobacco: Never   Substance and Sexual Activity    Alcohol use: No    Drug use: No    Sexual activity: Not Currently       Review of Systems   Genitourinary:  Positive for frequency and hematuria. Negative for difficulty urinating, dysuria, nocturia, scrotal swelling, testicular pain and urgency.     Objective:     Temp:  [97.7 °F (36.5 °C)-98.6 °F (37 °C)] 98.2 °F (36.8 °C)  Pulse:  [63-85] 70  Resp:  [16-18] 18  SpO2:  [92 %-97 %] 92 %  BP: (129-170)/(75-87) 170/87     Body mass index is 25.19 kg/m².    Date 10/28/22 0700 - 10/29/22 0659   Shift 8991-5758 3746-3546 4030-7062 24 Hour Total   INTAKE   P.O. 0   0   Shift Total(mL/kg) 0(0)   0(0)   OUTPUT   Urine(mL/kg/hr) 100   100   Shift Total(mL/kg) 100(1.2)   100(1.2)   Weight (kg) 84.3 84.3 84.3 84.3     Post Void Cath Residual Output (mL): 17 mL (10/27/22 1853)    Drains       Drain  Duration                  Sheath 10/26/22 1408 Left 2 days                    Physical Exam  Constitutional:       General: He is not in acute distress.     Appearance: Normal appearance. He is not ill-appearing.   HENT:      Head: Normocephalic and atraumatic.   Eyes:      General: No scleral icterus.  Pulmonary:      Effort: Pulmonary effort is normal. No respiratory distress.   Abdominal:      General: There is no distension.      Palpations: Abdomen is soft.   Genitourinary:     Comments: Clear yellow urine in urinal  Skin:     Coloration: Skin is not jaundiced.   Neurological:      General: No focal deficit present.      Mental Status: He is alert and oriented to person, place, and time.   Psychiatric:         Mood  and Affect: Mood normal.         Behavior: Behavior normal.         Thought Content: Thought content normal.       Significant Labs:    BMP:  Recent Labs   Lab 10/26/22  2115 10/27/22  0316 10/28/22  0513   * 137 136   K 4.3 4.5 4.0    106 108   CO2 23 26 23   BUN 26* 26* 20   CREATININE 1.4 1.4 1.2   CALCIUM 8.8 9.1 9.1       CBC:  Recent Labs   Lab 10/26/22  1514 10/26/22  2115 10/27/22  0013 10/27/22  1905 10/28/22  0017 10/28/22  0858   WBC 5.40 8.03 8.89  --   --   --    HGB 12.2* 12.1* 12.3* 12.1* 11.4* 11.6*   HCT 37.8* 39.1* 39.2* 38.6* 37.7* 36.9*    159 173  --   --   --        Urine Studies:   Recent Labs   Lab 10/28/22  0218   COLORU Yellow   APPEARANCEUA Clear   PHUR 6.0   SPECGRAV 1.025   PROTEINUA Negative   GLUCUA 4+*   KETONESU Negative   BILIRUBINUA Negative   OCCULTUA 2+*   NITRITE Negative   UROBILINOGEN Negative   LEUKOCYTESUR Negative   RBCUA 28*   WBCUA 1   BACTERIA Negative   SQUAMEPITHEL 0   HYALINECASTS 1       Significant Imaging:  All pertinent imaging results/findings from the past 24 hours have been reviewed.

## 2022-11-02 NOTE — TELEPHONE ENCOUNTER
Danisha, Please attempt to reach this patient again for an appt.  He needs surgery clearance for dental extractions. I will bring you the clearance request.

## 2022-11-03 ENCOUNTER — HOSPITAL ENCOUNTER (OUTPATIENT)
Dept: RADIOLOGY | Facility: HOSPITAL | Age: 77
Discharge: HOME | End: 2022-11-03
Attending: SPECIALIST
Payer: MEDICARE

## 2022-11-03 DIAGNOSIS — M54.16 LUMBAR RADICULOPATHY, CHRONIC: ICD-10-CM

## 2022-11-03 PROCEDURE — 72131 CT LUMBAR SPINE W/O DYE: CPT | Mod: TC,PO

## 2022-11-03 NOTE — TELEPHONE ENCOUNTER
Dr Mckoy spoke to patient's daughter. We have the surgery letter to be signed. Patient had a stent not to long ago . The will check with the dentist and call us back .

## 2022-11-07 ENCOUNTER — TELEPHONE (OUTPATIENT)
Dept: CARDIOLOGY | Facility: CLINIC | Age: 77
End: 2022-11-07
Payer: MEDICARE

## 2022-11-07 NOTE — TELEPHONE ENCOUNTER
----- Message from Yarelis Garcia sent at 11/7/2022 11:58 AM CST -----  Contact: Self  Type:  Sooner Appointment Request    Caller is requesting a sooner appointment.  Caller declined first available appointment listed below.  Caller will not accept being placed on the waitlist and is requesting a message be sent to doctor.    Name of Caller:  Patient  When is the first available appointment?  N/A  Symptoms:  needs hosp f/u scheduled, had angiogram 10/28  Best Call Back Number:  [phone]  Additional Information:  Please call pt back today to schedule his appt. Stated they wanted him seen for a 1 wk f/u appt. If pt doesn't answer please leave a message. Thank You.

## 2022-11-07 NOTE — TELEPHONE ENCOUNTER
----- Message from Jonel May sent at 11/7/2022  1:46 PM CST -----  Contact: pt at   [phone]  Type:  Patient Returning Call    Who Called:  pt  Who Left Message for Patient:  Kaitlynn  Does the patient know what this is regarding?:  yes  Best Call Back Number:  [phone]  Additional Information:  pt is calling the office to return a call back to Kaitlnyn that he missed. Please call back and advise.

## 2022-11-10 ENCOUNTER — OFFICE VISIT (OUTPATIENT)
Dept: ORTHOPEDICS | Facility: CLINIC | Age: 77
End: 2022-11-10
Payer: MEDICARE

## 2022-11-10 ENCOUNTER — OFFICE VISIT (OUTPATIENT)
Dept: CARDIOLOGY | Facility: CLINIC | Age: 77
End: 2022-11-10
Payer: MEDICARE

## 2022-11-10 VITALS — HEIGHT: 72 IN | BODY MASS INDEX: 25.6 KG/M2 | WEIGHT: 189 LBS

## 2022-11-10 VITALS
WEIGHT: 189 LBS | SYSTOLIC BLOOD PRESSURE: 138 MMHG | HEIGHT: 72 IN | BODY MASS INDEX: 25.6 KG/M2 | DIASTOLIC BLOOD PRESSURE: 88 MMHG | HEART RATE: 72 BPM

## 2022-11-10 DIAGNOSIS — I21.4 NSTEMI (NON-ST ELEVATED MYOCARDIAL INFARCTION): ICD-10-CM

## 2022-11-10 DIAGNOSIS — I25.118 CORONARY ARTERY DISEASE OF NATIVE ARTERY OF NATIVE HEART WITH STABLE ANGINA PECTORIS: Primary | ICD-10-CM

## 2022-11-10 DIAGNOSIS — G57.43 TIBIAL NEUROPATHY OF BOTH LOWER EXTREMITIES: Primary | ICD-10-CM

## 2022-11-10 DIAGNOSIS — E11.65 TYPE 2 DIABETES MELLITUS WITH HYPERGLYCEMIA, WITHOUT LONG-TERM CURRENT USE OF INSULIN: ICD-10-CM

## 2022-11-10 DIAGNOSIS — I10 PRIMARY HYPERTENSION: ICD-10-CM

## 2022-11-10 DIAGNOSIS — E78.2 MIXED HYPERLIPIDEMIA: ICD-10-CM

## 2022-11-10 PROCEDURE — 99213 OFFICE O/P EST LOW 20 MIN: CPT | Mod: S$GLB,,, | Performed by: ORTHOPAEDIC SURGERY

## 2022-11-10 PROCEDURE — 99999 PR PBB SHADOW E&M-EST. PATIENT-LVL IV: ICD-10-PCS | Mod: PBBFAC,,, | Performed by: NURSE PRACTITIONER

## 2022-11-10 PROCEDURE — 99999 PR PBB SHADOW E&M-EST. PATIENT-LVL IV: CPT | Mod: PBBFAC,,, | Performed by: NURSE PRACTITIONER

## 2022-11-10 PROCEDURE — 99214 PR OFFICE/OUTPT VISIT, EST, LEVL IV, 30-39 MIN: ICD-10-PCS | Mod: S$PBB,,, | Performed by: NURSE PRACTITIONER

## 2022-11-10 PROCEDURE — 99214 OFFICE O/P EST MOD 30 MIN: CPT | Mod: PBBFAC,PN | Performed by: NURSE PRACTITIONER

## 2022-11-10 PROCEDURE — 99214 OFFICE O/P EST MOD 30 MIN: CPT | Mod: S$PBB,,, | Performed by: NURSE PRACTITIONER

## 2022-11-10 PROCEDURE — 99213 PR OFFICE/OUTPT VISIT, EST, LEVL III, 20-29 MIN: ICD-10-PCS | Mod: S$GLB,,, | Performed by: ORTHOPAEDIC SURGERY

## 2022-11-10 RX ORDER — AMLODIPINE BESYLATE 5 MG/1
5 TABLET ORAL DAILY
COMMUNITY
Start: 2022-10-22 | End: 2023-01-13

## 2022-11-10 RX ORDER — FUROSEMIDE 20 MG/1
20 TABLET ORAL DAILY
COMMUNITY
Start: 2022-11-07 | End: 2023-01-30

## 2022-11-10 NOTE — PROGRESS NOTES
Subjective:    Patient ID:  Tono Saez is a 77 y.o. male   Chief Complaint   Patient presents with    Coronary Artery Disease    Hyperlipidemia    Hypertension    Transient Ischemic Attack       HPI:  Patient seen today for follow up post hospitalization for NSTEMI. He underwent LHC with PCI to left main and tolerated procedure well. He has been taking aspirin and plavix without any bleeding issues, falls, or head injuries. He reports decreased use of nitroglycerin.     Review of patient's allergies indicates:   Allergen Reactions    Adhesive Other (See Comments)     SILK TAPE PULL SKIN OFF    Metformin Other (See Comments)     Weight loss cachexia anorexia,diarrhea.    Pcn [penicillins]      Patient states he passed out from this medication when he was 10 years old.        Past Medical History:   Diagnosis Date    Anemia     Anticoagulant long-term use     Arthritis     CAD (coronary artery disease)     CABG, STENTS '97,'99,'01,'05    Cancer     SKIN    Cataract     Diabetes mellitus     Diabetes mellitus type II     GERD (gastroesophageal reflux disease)     GI bleed 2020    Hypertension     Myocardial infarction     Wears glasses      Past Surgical History:   Procedure Laterality Date    CARDIAC PACEMAKER PLACEMENT      CARDIAC PACEMAKER PLACEMENT      CARDIAC PACEMAKER PLACEMENT  04/26/2018    replaced    CARDIAC SURGERY      1995   CABG X 5    CHOLECYSTECTOMY      COLON SURGERY      COLONOSCOPY N/A 2/21/2020    Procedure: COLONOSCOPY;  Surgeon: Abe Barragan III, MD;  Location: [facility];  Service: Endoscopy;  Laterality: N/A;    COLONOSCOPY N/A 2/23/2020    Procedure: COLONOSCOPY;  Surgeon: Bob Locke Jr., MD;  Location: [facility];  Service: Endoscopy;  Laterality: N/A;    CORONARY ANGIOGRAPHY INCLUDING BYPASS GRAFTS WITH CATHETERIZATION OF LEFT HEART N/A 10/26/2022    Procedure: ANGIOGRAM, CORONARY, INCLUDING BYPASS GRAFT, WITH LEFT HEART CATHETERIZATION;  Surgeon: Robles Mckoy MD;  Location:  [facility] CATH/EP LAB;  Service: Cardiology;  Laterality: N/A;    CORONARY ARTERY BYPASS GRAFT  1995    CORONARY STENT PLACEMENT      stent x 5    ESOPHAGOGASTRODUODENOSCOPY N/A 2/23/2020    Procedure: EGD (ESOPHAGOGASTRODUODENOSCOPY);  Surgeon: Bob Locke Jr., MD;  Location: [facility] ENDO;  Service: Endoscopy;  Laterality: N/A;    EYE SURGERY      BILAT CATARACT    head laceration   01/19/2018    HEMORRHOID SURGERY      SMALL BOWEL ENTEROSCOPY N/A 2/21/2020    Procedure: ENTEROSCOPY;  Surgeon: Abe Barragan III, MD;  Location: [facility] ENDO;  Service: Endoscopy;  Laterality: N/A;    stint       Social History     Tobacco Use    Smoking status: Never    Smokeless tobacco: Never   Substance Use Topics    Alcohol use: No    Drug use: No     Family History   Problem Relation Age of Onset    Heart disease Mother     Heart disease Father     Hyperlipidemia Sister     Hypertension Sister     Heart disease Brother     Heart disease Brother     Heart disease Brother     Heart disease Brother     Heart disease Brother         Review of Systems:   Constitution: Negative for diaphoresis and fever.   HEENT: Negative for nosebleeds.    Cardiovascular: Negative for chest pain       No dyspnea on exertion       No leg swelling        No palpitations  Respiratory: Negative for shortness of breath and wheezing.    Hematologic/Lymphatic: Negative for bleeding problem. Does not bruise/bleed easily.   Skin: Negative for color change and rash.   Musculoskeletal: Negative for falls; + intermittent claudication  Gastrointestinal: Negative for hematemesis and hematochezia.   Genitourinary: Negative for hematuria.   Neurological: Negative for dizziness and light-headedness.   Psychiatric/Behavioral: Negative for altered mental status and memory loss.          Objective:        Vitals:    11/10/22 1037   BP: 138/88   Pulse: 72       Lab Results   Component Value Date    WBC 8.89 10/27/2022    HGB 11.6 (L) 10/28/2022    HCT 36.9 (L) 10/28/2022      10/27/2022    CHOL 134 07/22/2021    TRIG 114 07/22/2021    HDL 40 07/22/2021    ALT 16 02/28/2022    AST 17 02/28/2022     10/28/2022    K 4.0 10/28/2022     10/28/2022    CREATININE 1.2 10/28/2022    BUN 20 10/28/2022    CO2 23 10/28/2022    TSH 2.100 09/21/2020    PSA 1.3 07/15/2022    INR 1.2 10/27/2022    HGBA1C 7.2 (H) 07/22/2021        ECHOCARDIOGRAM RESULTS  Results for orders placed during the hospital encounter of 10/26/22    Echo    Interpretation Summary  · The left ventricle is normal in size with mild concentric hypertrophy and normal systolic function.  · The estimated ejection fraction is 55%.  · Indeterminate left ventricular diastolic function.  · There is abnormal septal wall motion consistent with post-operative status.  · Moderate to severe left atrial enlargement.  · Mild to moderate tricuspid regurgitation.  · Mild-to-moderate aortic regurgitation.  · There is mild aortic valve stenosis.        CURRENT/PREVIOUS VISIT EKG  Results for orders placed or performed during the hospital encounter of 10/26/22   EKG 12-lead    Collection Time: 10/26/22  3:22 PM    Narrative    Test Reason : Z98.890,    Vent. Rate : 068 BPM     Atrial Rate : 068 BPM     P-R Int : 224 ms          QRS Dur : 206 ms      QT Int : 484 ms       P-R-T Axes : 000 -32 149 degrees     QTc Int : 514 ms    Atrial-sensed ventricular-paced rhythm with prolonged AV conduction  Abnormal ECG  When compared with ECG of 24-OCT-2022 11:19,  No significant change was found  Confirmed by Jose J NYE, Kt MUSTAFA ([id number]) on 10/30/2022 3:42:59 PM    Referred By:             Confirmed By:Kt Lux MD     No valid procedures specified.   Results for orders placed in visit on 03/28/22    Nuclear Stress Test    Interpretation Summary    The nuclear portion of this study will be reported separately.    The EKG portion of this study is uninterpretable.    The patient reported chest pain during the stress test.    There were no  arrhythmias during stress.      Physical Exam:  CONSTITUTIONAL: No fever, no chills  HEENT: Normocephalic, atraumatic,pupils reactive to light                 NECK:  No JVD no carotid bruit  CVS: S1S2+, RRR  LUNGS: Clear  ABDOMEN: Soft, NT, BS+  EXTREMITIES: No cyanosis, edema  : No anguiano catheter  NEURO: AAO X 3  PSY: Normal affect      Medication List with Changes/Refills   Current Medications    ASPIRIN (ECOTRIN) 81 MG EC TABLET    Take 2 tablets (162 mg total) by mouth once daily.    ATORVASTATIN (LIPITOR) 40 MG TABLET    Take 1 tablet (40 mg total) by mouth every evening.    BETAMETHASONE DIPROPIONATE (DIPROLENE) 0.05 % LOTION    Apply thin film to scalp bid prn itch    CLOPIDOGREL (PLAVIX) 75 MG TABLET    Take 1 tablet (75 mg total) by mouth once daily.    EMPAGLIFLOZIN (JARDIANCE) 25 MG TABLET    Take 1 tablet (25 mg total) by mouth once daily.    FINASTERIDE (PROSCAR) 5 MG TABLET    Take 5 mg by mouth once daily.    FLUTICASONE PROPIONATE (FLONASE) 50 MCG/ACTUATION NASAL SPRAY    Use nasally as directed as needed    GABAPENTIN (NEURONTIN) 300 MG CAPSULE    Take 1 capsule (300 mg total) by mouth 2 (two) times daily.    ISOSORBIDE DINITRATE (ISORDIL) 10 MG TABLET    Take 1 tablet (10 mg total) by mouth 2 (two) times daily.    KETOCONAZOLE (NIZORAL) 2 % SHAMPOO    Wash all scaling areas let sit 3 minutes then rinse 3x/wk    LEVOCETIRIZINE (XYZAL) 5 MG TABLET    TAKE ONE TABLET BY MOUTH EVERY EVENING    MAGNESIUM OXIDE (MAG-OX) 400 MG (241.3 MG MAGNESIUM) TABLET    Take 1 tablet (400 mg total) by mouth once daily.    METOPROLOL TARTRATE (LOPRESSOR) 25 MG TABLET    Take 1 tablet (25 mg total) by mouth 2 (two) times daily.    MONTELUKAST (SINGULAIR) 10 MG TABLET    TAKE 1 TABLET (10 MG TOTAL) BY MOUTH EVERY EVENING.    MULTIVITAMIN CAPSULE    Take 1 capsule by mouth once daily.    MUPIROCIN (BACTROBAN) 2 % OINTMENT    Apply topically 2 (two) times daily.    NITROGLYCERIN (NITROSTAT) 0.4 MG SL TABLET    DISSOLVE  1 TABLET UNDER THE TONGUE AS NEEDED FOR CHEST PAIN    PANTOPRAZOLE (PROTONIX) 40 MG TABLET    TAKE ONE TABLET BY MOUTH ONCE DAILY    PIOGLITAZONE (ACTOS) 15 MG TABLET    Take 1 tablet (15 mg total) by mouth once daily.    SERTRALINE (ZOLOFT) 50 MG TABLET    TAKE ONE TABLET BY MOUTH ONCE DAILY    SULFACETAMIDE SODIUM (OVACE PLUS SHAMPOO) 10 % SHAM    Wash scalp nightly    TOLTERODINE (DETROL LA) 4 MG 24 HR CAPSULE    TAKE ONE CAPSULE BY MOUTH ONCE DAILY    VITAMIN D 1000 UNITS TAB    Take 1,000 Units by mouth once daily.             Assessment:       1. Coronary artery disease of native artery of native heart with stable angina pectoris    2. NSTEMI (non-ST elevated myocardial infarction)    3. Mixed hyperlipidemia    4. Primary hypertension    5. Type 2 diabetes mellitus with hyperglycemia, without long-term current use of insulin         Plan:     Problem List Items Addressed This Visit          Unprioritized    HTN (hypertension)    Current Assessment & Plan     BP is stable. Continue current medications.          Mixed hyperlipidemia    Current Assessment & Plan     Goal for LDL is less than 70.   Continue atorvastatin 40 mg po daily.          Type 2 diabetes mellitus with hyperglycemia     Current Assessment & Plan     Followed by PCP.         Coronary artery disease of native artery of native heart with stable angina pectoris - Primary    Overview     CABG, STENTS '97,'99,'01,'05    10/26/22:  Summary         The Dist LM lesion was 90% stenosed with 10% stenosis post-intervention.    The pre-procedure left ventricular end diastolic pressure was 12.    A STENT LIMA YOHANA 3.50 X 26 stent was not successfully placed at 12 JASPER for 20 sec.    The estimated blood loss was <50 mL.    Successful intravascular ultrasound-guided PCI of the critical stenosis of left main into circumflex with 1 drug-eluting stent    Patent vein graft to OM, occluded branch of the OM as compared to angiogram in 2016.    Patent vein graft to  right coronary artery with patent stent, moderately degenerated, distal native RCA has 80% stenosis in medium caliber vessel.    Occluded LAD with a patent LIMA to LAD           Current Assessment & Plan     Advised patient the need to continue DAPT without interruption for minimum of 12-18 months post PCI.  Should the patient develop any bleeding issues or have any falls with head injury should report to the nearest emergency room for evaluation.           NSTEMI (non-ST elevated myocardial infarction)    Current Assessment & Plan     Doing well post PCI to left main. Continue DAPT.   Echo reviewed. EF is improved to 55%.             Follow up in about 4 months (around 3/10/2023).

## 2022-11-10 NOTE — ASSESSMENT & PLAN NOTE
Advised patient the need to continue DAPT without interruption for minimum of 12-18 months post PCI.  Should the patient develop any bleeding issues or have any falls with head injury should report to the nearest emergency room for evaluation.

## 2022-11-10 NOTE — PROGRESS NOTES
"[facility]    Subjective:     Chief Complaint:   Chief Complaint   Patient presents with    Right Lower Leg - Pain     Bl leg pain that started about 6 months ago. States that his pain is from knee to ankle. Legs feels very weak and "like he has compression socks on" Does not report any back pain. Went to PT for 4 weeks and he states it made it worse    Left Lower Leg - Pain     Bl leg pain that started about 6 months ago. States that his pain is from knee to ankle. Legs feels very weak and "like he has compression socks on" Does not report any back pain. Went to PT for 4 weeks and he states it made it worse         Past Medical History:   Diagnosis Date    Anemia     Anticoagulant long-term use     Arthritis     CAD (coronary artery disease)     CABG, STENTS '97,'99,'01,'05    Cancer     SKIN    Cataract     Diabetes mellitus     Diabetes mellitus type II     GERD (gastroesophageal reflux disease)     GI bleed 2020    Hypertension     Myocardial infarction     Wears glasses        Past Surgical History:   Procedure Laterality Date    CARDIAC PACEMAKER PLACEMENT      CARDIAC PACEMAKER PLACEMENT      CARDIAC PACEMAKER PLACEMENT  04/26/2018    replaced    CARDIAC SURGERY      1995   CABG X 5    CHOLECYSTECTOMY      COLON SURGERY      COLONOSCOPY N/A 2/21/2020    Procedure: COLONOSCOPY;  Surgeon: Abe Barragan III, MD;  Location: [facility] ENDO;  Service: Endoscopy;  Laterality: N/A;    COLONOSCOPY N/A 2/23/2020    Procedure: COLONOSCOPY;  Surgeon: Bob Locke Jr., MD;  Location: [facility] ENDO;  Service: Endoscopy;  Laterality: N/A;    CORONARY ANGIOGRAPHY INCLUDING BYPASS GRAFTS WITH CATHETERIZATION OF LEFT HEART N/A 10/26/2022    Procedure: ANGIOGRAM, CORONARY, INCLUDING BYPASS GRAFT, WITH LEFT HEART CATHETERIZATION;  Surgeon: Robles Mckoy MD;  Location: [facility] CATH/EP LAB;  Service: Cardiology;  Laterality: N/A;    CORONARY ARTERY BYPASS GRAFT  1995    CORONARY STENT PLACEMENT      stent x 5    " ESOPHAGOGASTRODUODENOSCOPY N/A 2/23/2020    Procedure: EGD (ESOPHAGOGASTRODUODENOSCOPY);  Surgeon: Bob Locke Jr., MD;  Location: [facility];  Service: Endoscopy;  Laterality: N/A;    EYE SURGERY      BILAT CATARACT    head laceration   01/19/2018    HEMORRHOID SURGERY      SMALL BOWEL ENTEROSCOPY N/A 2/21/2020    Procedure: ENTEROSCOPY;  Surgeon: Abe Barragan III, MD;  Location: [facility] ENDO;  Service: Endoscopy;  Laterality: N/A;    stint         Current Outpatient Medications   Medication Sig    amLODIPine (NORVASC) 5 MG tablet Take 5 mg by mouth once daily.    aspirin (ECOTRIN) 81 MG EC tablet Take 2 tablets (162 mg total) by mouth once daily. (Patient taking differently: Take 81 mg by mouth once daily.)    atorvastatin (LIPITOR) 40 MG tablet Take 1 tablet (40 mg total) by mouth every evening.    betamethasone dipropionate (DIPROLENE) 0.05 % lotion Apply thin film to scalp bid prn itch    clopidogreL (PLAVIX) 75 mg tablet Take 1 tablet (75 mg total) by mouth once daily.    empagliflozin (JARDIANCE) 25 mg tablet Take 1 tablet (25 mg total) by mouth once daily.    finasteride (PROSCAR) 5 mg tablet Take 5 mg by mouth once daily.    fluticasone propionate (FLONASE) 50 mcg/actuation nasal spray Use nasally as directed as needed    furosemide (LASIX) 20 MG tablet Take 20 mg by mouth once daily.    gabapentin (NEURONTIN) 300 MG capsule Take 1 capsule (300 mg total) by mouth 2 (two) times daily. (Patient taking differently: Take 300 mg by mouth once daily.)    isosorbide dinitrate (ISORDIL) 10 MG tablet Take 1 tablet (10 mg total) by mouth 2 (two) times daily.    ketoconazole (NIZORAL) 2 % shampoo Wash all scaling areas let sit 3 minutes then rinse 3x/wk    levocetirizine (XYZAL) 5 MG tablet TAKE ONE TABLET BY MOUTH EVERY EVENING    magnesium oxide (MAG-OX) 400 mg (241.3 mg magnesium) tablet Take 1 tablet (400 mg total) by mouth once daily.    metoprolol tartrate (LOPRESSOR) 25 MG tablet Take 1 tablet (25 mg  total) by mouth 2 (two) times daily.    montelukast (SINGULAIR) 10 mg tablet TAKE 1 TABLET (10 MG TOTAL) BY MOUTH EVERY EVENING.    multivitamin capsule Take 1 capsule by mouth once daily.    mupirocin (BACTROBAN) 2 % ointment Apply topically 2 (two) times daily.    nitroGLYCERIN (NITROSTAT) 0.4 MG SL tablet DISSOLVE 1 TABLET UNDER THE TONGUE AS NEEDED FOR CHEST PAIN    pantoprazole (PROTONIX) 40 MG tablet TAKE ONE TABLET BY MOUTH ONCE DAILY    pioglitazone (ACTOS) 15 MG tablet Take 1 tablet (15 mg total) by mouth once daily. (Patient taking differently: Take 30 mg by mouth once daily.)    sertraline (ZOLOFT) 50 MG tablet TAKE ONE TABLET BY MOUTH ONCE DAILY    sulfacetamide sodium (OVACE PLUS SHAMPOO) 10 % Sham Wash scalp nightly    tolterodine (DETROL LA) 4 MG 24 hr capsule TAKE ONE CAPSULE BY MOUTH ONCE DAILY    vitamin D 1000 units Tab Take 1,000 Units by mouth once daily.     No current facility-administered medications for this visit.       Review of patient's allergies indicates:   Allergen Reactions    Adhesive Other (See Comments)     SILK TAPE PULL SKIN OFF    Metformin Other (See Comments)     Weight loss cachexia anorexia,diarrhea.    Pcn [penicillins]      Patient states he passed out from this medication when he was 10 years old.        Family History   Problem Relation Age of Onset    Heart disease Mother     Heart disease Father     Hyperlipidemia Sister     Hypertension Sister     Heart disease Brother     Heart disease Brother     Heart disease Brother     Heart disease Brother     Heart disease Brother        Social History     Socioeconomic History    Marital status:    Tobacco Use    Smoking status: Never    Smokeless tobacco: Never   Substance and Sexual Activity    Alcohol use: No    Drug use: No    Sexual activity: Not Currently     Social Determinants of Health     Financial Resource Strain: Low Risk     Difficulty of Paying Living Expenses: Not hard at all   Food Insecurity: No Food  Insecurity    Worried About Running Out of Food in the Last Year: Never true    Ran Out of Food in the Last Year: Never true   Transportation Needs: No Transportation Needs    Lack of Transportation (Medical): No    Lack of Transportation (Non-Medical): No   Physical Activity: Inactive    Days of Exercise per Week: 0 days    Minutes of Exercise per Session: 0 min   Stress: No Stress Concern Present    Feeling of Stress : Not at all   Social Connections: Moderately Isolated    Frequency of Communication with Friends and Family: More than three times a week    Frequency of Social Gatherings with Friends and Family: Twice a week    Attends Sabian[employer] Services: More than 4 times per year    Active Member of Clubs or Organizations: No    Attends Club or Organization Meetings: Never    Marital Status:    Housing Stability: Low Risk     Unable to Pay for Housing in the Last Year: No    Number of Places Lived in the Last Year: 1    Unstable Housing in the Last Year: No       History of present illness:  77-year-old male who returns to clinic.  It has been about 5 months since we had seen him last.  We had seen and treated him for some left knee pain.  It was acute in nature.  We injected his left knee.  He had some arthritis.      He presents today with complaints of bilateral lower extremity pain.  He describes pain in the anterior tibia of the bilateral lower legs.  He denies numbness and tingling.  Does not go below the ankle.  It is not go above the knee.  He has no pain in his back or his hips.      Review of Systems:    Constitution: Negative for chills, fever, and sweats.  Negative for unexplained weight loss.    HENT:  Negative for headaches and blurry vision.    Cardiovascular:Negative for chest pain or irregular heart beat. Negative for hypertension.    Respiratory:  Negative for cough and shortness of breath.    Gastrointestinal: Negative for abdominal pain, heartburn, melena, nausea, and  vomitting.    Genitourinary:  Negative bladder incontinence and dysuria.    Musculoskeletal:  See HPI for details.     Neurological: Negative for numbness.    Psychiatric/Behavioral: Negative for depression.  The patient is not nervous/anxious.      Endocrine: Negative for polyuria    Hematologic/Lymphatic: Negative for bleeding problem.  Does not bruise/bleed easily.    Skin: Negative for poor would healing and rash    Objective:      Physical Examination:    Vital Signs:  There were no vitals filed for this visit.    Body mass index is 25.63 kg/m².    This a well-developed, well nourished patient in no acute distress.  They are alert and oriented and cooperative to examination.        Examination of the lumbar spine, no midline vertebral tenderness, no paraspinous muscular spasm.  No limitations in range of motion with forward flexion extension rotation or lateral bending.  Spurling sign is negative.  Bilateral straight leg raises are negative.      Examination of the bilateral hips, the bilateral hips maintained good range of motion.  Well preserved and painless internal-external rotation abduction abduction and flexion and extension.  He has good quad strength good hip flexor strength.      Terms of the bilateral knees, no effusions.  He has patellofemoral crepitus bilaterally.  No joint line pain or tenderness today.  No instability no pain reproducible with Eva's testing.      In terms of the lower legs, he has some mild thinning of the skin, it is dry, he has a surgical incision consistent with vein harvesting from prior bypass surgery in the left leg.  Skin is warm to the touch.  Not erythema, not ecchymotic.  He has 2+ dorsalis pulses as well as 2+ posterior tibial pulses.    Otherwise distally neurovascularly intact.    Pertinent New Results:    XRAY Report / Interpretation:   AP and lateral views lumbar spine taken today in the office demonstrate some mild multilevel degenerative changes.  Disc space  heights appear to be fairly well maintained at all levels except for L5-S1.  No significant spondylolisthesis is noted.  He does have some facet arthritis at L5 and S1.    AP of the pelvis taken today, bilateral hips no significant changes to the femoral acetabular joints.  Visualized soft tissues appear normal.    AP and lateral views of the bilateral lower legs, tibia and fibula.  He does have surgical clips noted in the left lower leg.  No significant soft tissue abnormalities otherwise.  The bilateral knees show some squaring and joint space collapse.  Ankle joints appear to be well preserved.    Assessment/Plan:      77-year-old male, bilateral lower extremity pain.  From an orthopedic standpoint the etiology of his pain is unclear.  He does not have real bad lumbar arthritis or degenerative changes to suggest an acute lumbar radiculopathy.  His knee arthritis and we treated earlier this year still seems to be doing well status post intra-articular left knee injection.  I suspect that this is most likely a vascular problem in the lower extremities although he has good pulses and no other significant changes.  He admits to some subjective weakness this is not reproducible on physical exam.  I did order EMG and nerve conduction studies of the bilateral lower extremities.  We will look for a peripheral cause for this neuropathy or paresthesia.  Consider having him sent for vascular studies of the bilateral lower extremities to evaluate for claudication.  But again patient denies pain with activity and resolution with rest.    ///Electronically Signed: Todd Lopez PA-C//        This note was created using Dragon voice recognition software that occasionally misinterpreted phrases or words.

## 2022-11-16 ENCOUNTER — TELEPHONE (OUTPATIENT)
Dept: CARDIOLOGY | Facility: CLINIC | Age: 77
End: 2022-11-16
Payer: MEDICARE

## 2022-11-16 NOTE — TELEPHONE ENCOUNTER
----- Message from Farzaneh Mooney sent at 11/16/2022  1:21 PM CST -----  Type: Needs Medical Advice  Who Called:  Dariel from the pharmacy listed below   Pharmacy name and phone #:    [facility] [place] - [address]  [address] [address]  Phone: [phone] Fax: [phone]        Best Call Back Number: [phone]  Additional Information:  Needs a call back to clarify some medication

## 2022-11-16 NOTE — TELEPHONE ENCOUNTER
Spoke with Dariel and let him know per Mirta it was dinitrate. He stated he would call patient and let him know.

## 2022-11-16 NOTE — TELEPHONE ENCOUNTER
Patient has been taking imdur but a new rx for dinitrate. Trying to get clarification if its mononitrate or dinitrate. Patient has been taking mononitrate even after meds was stopped.

## 2022-11-28 ENCOUNTER — OFFICE VISIT (OUTPATIENT)
Dept: FAMILY MEDICINE | Facility: CLINIC | Age: 77
End: 2022-11-28
Payer: MEDICARE

## 2022-11-28 VITALS
OXYGEN SATURATION: 97 % | DIASTOLIC BLOOD PRESSURE: 54 MMHG | WEIGHT: 189.31 LBS | HEART RATE: 62 BPM | TEMPERATURE: 98 F | HEIGHT: 72 IN | SYSTOLIC BLOOD PRESSURE: 108 MMHG | RESPIRATION RATE: 18 BRPM | BODY MASS INDEX: 25.64 KG/M2

## 2022-11-28 DIAGNOSIS — I95.9 HYPOTENSION, UNSPECIFIED HYPOTENSION TYPE: Primary | ICD-10-CM

## 2022-11-28 DIAGNOSIS — I25.118 CORONARY ARTERY DISEASE OF NATIVE ARTERY OF NATIVE HEART WITH STABLE ANGINA PECTORIS: ICD-10-CM

## 2022-11-28 DIAGNOSIS — E78.2 MIXED HYPERLIPIDEMIA: ICD-10-CM

## 2022-11-28 DIAGNOSIS — E11.9 TYPE 2 DIABETES MELLITUS WITHOUT COMPLICATION, WITHOUT LONG-TERM CURRENT USE OF INSULIN: ICD-10-CM

## 2022-11-28 DIAGNOSIS — N18.1 CRI (CHRONIC RENAL INSUFFICIENCY), STAGE 1: ICD-10-CM

## 2022-11-28 LAB
EKG 12-LEAD: NORMAL
PR INTERVAL: NORMAL
PRT AXES: NORMAL
QRS DURATION: NORMAL
QT/QTC: NORMAL
VENTRICULAR RATE: NORMAL

## 2022-11-28 PROCEDURE — 93010 POCT EKG 12-LEAD: ICD-10-PCS | Mod: S$PBB,AQ,, | Performed by: FAMILY MEDICINE

## 2022-11-28 PROCEDURE — 93005 ELECTROCARDIOGRAM TRACING: CPT | Mod: PBBFAC | Performed by: FAMILY MEDICINE

## 2022-11-28 PROCEDURE — 99214 PR OFFICE/OUTPT VISIT, EST, LEVL IV, 30-39 MIN: ICD-10-PCS | Mod: 25,S$PBB,AQ, | Performed by: FAMILY MEDICINE

## 2022-11-28 PROCEDURE — 99215 OFFICE O/P EST HI 40 MIN: CPT | Performed by: FAMILY MEDICINE

## 2022-11-28 PROCEDURE — 93010 ELECTROCARDIOGRAM REPORT: CPT | Mod: S$PBB,AQ,, | Performed by: FAMILY MEDICINE

## 2022-11-28 PROCEDURE — 99214 OFFICE O/P EST MOD 30 MIN: CPT | Mod: 25,S$PBB,AQ, | Performed by: FAMILY MEDICINE

## 2022-11-28 RX ORDER — ISOSORBIDE MONONITRATE 30 MG/1
30 TABLET, EXTENDED RELEASE ORAL
COMMUNITY
Start: 2022-11-16 | End: 2022-11-28

## 2022-11-28 NOTE — PROGRESS NOTES
Subjective:       Patient ID: Tono Saez is a 77 y.o. male.    Chief Complaint: Hypertension and Leg Pain      And she he reports that the swelling is gone down in the ankles but he does have continue claudication and pain is has an appointment to see the specialist.  He reports his blood pressure has been doing well at home but we have several low blood pressures here at the office.  Lab Results       Component                Value               Date                       WBC                      8.89                10/27/2022                 HGB                      11.6 (L)            10/28/2022                 HCT                      36.9 (L)            10/28/2022                 PLT                      173                 10/27/2022                 CHOL                     134                 07/22/2021                 TRIG                     114                 07/22/2021                 HDL                      40                  07/22/2021                 ALT                      16                  02/28/2022                 AST                      17                  02/28/2022                 NA                       136                 10/28/2022                 K                        4.0                 10/28/2022                 CL                       108                 10/28/2022                 CREATININE               1.2                 10/28/2022                 BUN                      20                  10/28/2022                 CO2                      23                  10/28/2022                 TSH                      2.100               09/21/2020                 PSA                      1.3                 07/15/2022                 INR                      1.2                 10/27/2022                 HGBA1C                   7.2 (H)             07/22/2021                Hypertension  This is a chronic problem. The problem is unchanged. Pertinent negatives include no  anxiety, blurred vision, chest pain, headaches, malaise/fatigue, neck pain, orthopnea, palpitations or peripheral edema.   Leg Pain       Allergies and Medications:   Review of patient's allergies indicates:   Allergen Reactions    Adhesive Other (See Comments)     SILK TAPE PULL SKIN OFF    Metformin Other (See Comments)     Weight loss cachexia anorexia,diarrhea.    Pcn [penicillins]      Patient states he passed out from this medication when he was 10 years old.      Current Outpatient Medications   Medication Sig Dispense Refill    amLODIPine (NORVASC) 5 MG tablet Take 5 mg by mouth once daily.      aspirin (ECOTRIN) 81 MG EC tablet Take 2 tablets (162 mg total) by mouth once daily. (Patient taking differently: Take 81 mg by mouth once daily.) 120 tablet 0    atorvastatin (LIPITOR) 40 MG tablet Take 1 tablet (40 mg total) by mouth every evening. 90 tablet 0    betamethasone dipropionate (DIPROLENE) 0.05 % lotion Apply thin film to scalp bid prn itch 60 mL 6    clopidogreL (PLAVIX) 75 mg tablet Take 1 tablet (75 mg total) by mouth once daily. 90 tablet 0    empagliflozin (JARDIANCE) 25 mg tablet Take 1 tablet (25 mg total) by mouth once daily. 30 tablet 6    finasteride (PROSCAR) 5 mg tablet Take 5 mg by mouth once daily.      fluticasone propionate (FLONASE) 50 mcg/actuation nasal spray Use nasally as directed as needed      furosemide (LASIX) 20 MG tablet Take 20 mg by mouth once daily.      gabapentin (NEURONTIN) 300 MG capsule Take 1 capsule (300 mg total) by mouth 2 (two) times daily. (Patient taking differently: Take 300 mg by mouth once daily.) 60 capsule 11    isosorbide dinitrate (ISORDIL) 10 MG tablet Take 1 tablet (10 mg total) by mouth 2 (two) times daily. 60 tablet 11    ketoconazole (NIZORAL) 2 % shampoo Wash all scaling areas let sit 3 minutes then rinse 3x/wk 120 mL 11    levocetirizine (XYZAL) 5 MG tablet TAKE ONE TABLET BY MOUTH EVERY EVENING 60 tablet 11    magnesium oxide (MAG-OX) 400 mg  (241.3 mg magnesium) tablet Take 1 tablet (400 mg total) by mouth once daily. 90 tablet 1    metoprolol tartrate (LOPRESSOR) 25 MG tablet Take 1 tablet (25 mg total) by mouth 2 (two) times daily. 120 tablet 0    montelukast (SINGULAIR) 10 mg tablet TAKE 1 TABLET (10 MG TOTAL) BY MOUTH EVERY EVENING. 30 tablet 11    multivitamin capsule Take 1 capsule by mouth once daily.      mupirocin (BACTROBAN) 2 % ointment Apply topically 2 (two) times daily. 22 g 11    nitroGLYCERIN (NITROSTAT) 0.4 MG SL tablet DISSOLVE 1 TABLET UNDER THE TONGUE AS NEEDED FOR CHEST PAIN 100 tablet 3    pantoprazole (PROTONIX) 40 MG tablet TAKE ONE TABLET BY MOUTH ONCE DAILY 90 tablet 1    pioglitazone (ACTOS) 15 MG tablet Take 1 tablet (15 mg total) by mouth once daily. (Patient taking differently: Take 30 mg by mouth once daily.) 90 tablet 3    sertraline (ZOLOFT) 50 MG tablet TAKE ONE TABLET BY MOUTH ONCE DAILY 90 tablet 1    sulfacetamide sodium (OVACE PLUS SHAMPOO) 10 % Sham Wash scalp nightly 1 each 11    tolterodine (DETROL LA) 4 MG 24 hr capsule TAKE ONE CAPSULE BY MOUTH ONCE DAILY 90 capsule 1    vitamin D 1000 units Tab Take 1,000 Units by mouth once daily.       No current facility-administered medications for this visit.       Family History:   Family History   Problem Relation Age of Onset    Heart disease Mother     Heart disease Father     Hyperlipidemia Sister     Hypertension Sister     Heart disease Brother     Heart disease Brother     Heart disease Brother     Heart disease Brother     Heart disease Brother        Social History:   Social History     Socioeconomic History    Marital status:    Tobacco Use    Smoking status: Never    Smokeless tobacco: Never   Substance and Sexual Activity    Alcohol use: No    Drug use: No    Sexual activity: Not Currently     Social Determinants of Health     Financial Resource Strain: Low Risk     Difficulty of Paying Living Expenses: Not hard at all   Food Insecurity: No Food  Insecurity    Worried About Running Out of Food in the Last Year: Never true    Ran Out of Food in the Last Year: Never true   Transportation Needs: No Transportation Needs    Lack of Transportation (Medical): No    Lack of Transportation (Non-Medical): No   Physical Activity: Inactive    Days of Exercise per Week: 0 days    Minutes of Exercise per Session: 0 min   Stress: No Stress Concern Present    Feeling of Stress : Not at all   Social Connections: Moderately Integrated    Frequency of Communication with Friends and Family: More than three times a week    Frequency of Social Gatherings with Friends and Family: Never    Attends [employer] Services: More than 4 times per year    Active Member of Clubs or Organizations: Yes    Attends Club or Organization Meetings: Never    Marital Status:    Housing Stability: Low Risk     Unable to Pay for Housing in the Last Year: No    Number of Places Lived in the Last Year: 1    Unstable Housing in the Last Year: No       Review of Systems   Constitutional:  Positive for activity change. Negative for malaise/fatigue and unexpected weight change.   HENT:  Negative for hearing loss, rhinorrhea and trouble swallowing.    Eyes:  Negative for blurred vision, discharge and visual disturbance.   Respiratory:  Negative for chest tightness and wheezing.    Cardiovascular:  Negative for chest pain, palpitations and orthopnea.   Gastrointestinal:  Negative for blood in stool, constipation, diarrhea and vomiting.   Endocrine: Negative for polydipsia and polyuria.   Genitourinary:  Negative for difficulty urinating, hematuria and urgency.   Musculoskeletal:  Negative for arthralgias, joint swelling and neck pain.   Neurological:  Negative for weakness and headaches.   Psychiatric/Behavioral:  Negative for confusion and dysphoric mood.      Objective:     Vitals:    11/28/22 1417   BP: (!) 108/54   Pulse:    Resp:    Temp:         Physical Exam  Vitals and nursing note reviewed.    Constitutional:       Appearance: He is well-developed. He is not diaphoretic.   HENT:      Head: Normocephalic.      Ears:      Comments: Pulses strong and regular bilaterally.  Eyes:      Conjunctiva/sclera: Conjunctivae normal.      Pupils: Pupils are equal, round, and reactive to light.   Cardiovascular:      Rate and Rhythm: Normal rate and regular rhythm.      Pulses: Normal pulses.      Heart sounds: Normal heart sounds. No murmur heard.    No friction rub. No gallop.   Pulmonary:      Effort: Pulmonary effort is normal. No respiratory distress.      Breath sounds: Normal breath sounds. No stridor. No wheezing, rhonchi or rales.   Chest:      Chest wall: No tenderness.   Musculoskeletal:      Right lower leg: No edema.      Left lower leg: No edema.   Skin:     General: Skin is warm and dry.   Psychiatric:         Behavior: Behavior normal.         Thought Content: Thought content normal.         Judgment: Judgment normal.     EKG shows 100% paced rhythm electronically.  Assessment:       1. Hypotension, unspecified hypotension type    2. CRI (chronic renal insufficiency), stage 1    3. Coronary artery disease of native artery of native heart with stable angina pectoris    4. Mixed hyperlipidemia        Plan:       Tono was seen today for hypertension and leg pain.    Diagnoses and all orders for this visit:    Hypotension, unspecified hypotension type  -     POCT EKG 12-LEAD (NOT FOR [facility] USE)    CRI (chronic renal insufficiency), stage 1    Coronary artery disease of native artery of native heart with stable angina pectoris    Mixed hyperlipidemia       Follow up in about 3 months (around 2/28/2023) for follow up DM, follow up HTN.

## 2022-12-05 ENCOUNTER — TELEPHONE (OUTPATIENT)
Dept: CARDIOLOGY | Facility: CLINIC | Age: 77
End: 2022-12-05
Payer: MEDICARE

## 2022-12-05 NOTE — LETTER
2022    Tono Saez  [address]  [place] [address]     [facility] [place] [facility]  [address]  [place] [address]  Phone: [phone]  Fax: [phone] Patient: Tono Saez  : 1945    Referring Doctor:Mitchell Kat MD  Procedure: Surgical extraction of #18  Date of Procedure:2023  Type of Anesthesia:Consious Sedation with Versed/Subumaze    Date of Last Stent:10/19/2022  Date of Last Office Visit:11/10/2022    Current Outpatient Medications   Medication Sig    amLODIPine (NORVASC) 5 MG tablet Take 5 mg by mouth once daily.    aspirin (ECOTRIN) 81 MG EC tablet Take 2 tablets (162 mg total) by mouth once daily. (Patient taking differently: Take 81 mg by mouth once daily.)    atorvastatin (LIPITOR) 40 MG tablet Take 1 tablet (40 mg total) by mouth every evening.    betamethasone dipropionate (DIPROLENE) 0.05 % lotion Apply thin film to scalp bid prn itch    clopidogreL (PLAVIX) 75 mg tablet Take 1 tablet (75 mg total) by mouth once daily.    empagliflozin (JARDIANCE) 25 mg tablet Take 1 tablet (25 mg total) by mouth once daily.    finasteride (PROSCAR) 5 mg tablet Take 5 mg by mouth once daily.    fluticasone propionate (FLONASE) 50 mcg/actuation nasal spray Use nasally as directed as needed    furosemide (LASIX) 20 MG tablet Take 20 mg by mouth once daily.    gabapentin (NEURONTIN) 300 MG capsule Take 1 capsule (300 mg total) by mouth 2 (two) times daily. (Patient taking differently: Take 300 mg by mouth once daily.)    isosorbide dinitrate (ISORDIL) 10 MG tablet Take 1 tablet (10 mg total) by mouth 2 (two) times daily.    ketoconazole (NIZORAL) 2 % shampoo Wash all scaling areas let sit 3 minutes then rinse 3x/wk    levocetirizine (XYZAL) 5 MG tablet TAKE ONE TABLET BY MOUTH EVERY EVENING    magnesium oxide (MAG-OX) 400 mg (241.3 mg magnesium) tablet Take 1 tablet (400 mg total) by mouth once daily.     metoprolol tartrate (LOPRESSOR) 25 MG tablet Take 1 tablet (25 mg total) by mouth 2 (two) times daily.    montelukast (SINGULAIR) 10 mg tablet TAKE 1 TABLET (10 MG TOTAL) BY MOUTH EVERY EVENING.    multivitamin capsule Take 1 capsule by mouth once daily.    mupirocin (BACTROBAN) 2 % ointment Apply topically 2 (two) times daily.    nitroGLYCERIN (NITROSTAT) 0.4 MG SL tablet DISSOLVE 1 TABLET UNDER THE TONGUE AS NEEDED FOR CHEST PAIN    pantoprazole (PROTONIX) 40 MG tablet TAKE ONE TABLET BY MOUTH ONCE DAILY    pioglitazone (ACTOS) 15 MG tablet Take 1 tablet (15 mg total) by mouth once daily. (Patient taking differently: Take 30 mg by mouth once daily.)    sertraline (ZOLOFT) 50 MG tablet TAKE ONE TABLET BY MOUTH ONCE DAILY    sulfacetamide sodium (OVACE PLUS SHAMPOO) 10 % Sham Wash scalp nightly    tolterodine (DETROL [place]) 4 MG 24 hr capsule TAKE ONE CAPSULE BY MOUTH ONCE DAILY    vitamin D 1000 units Tab Take 1,000 Units by mouth once daily.     No current facility-administered medications for this visit.     This patient has been assessed for risk factors for clearance of surgery with the following stipulations:    ___ No contraindications  _x__ Recommendations for antiplatelet/anticoagulant medications: Do not hold aspirin or plavix prior to procedure.  _x__ Cleared for surgery with the following contraindications/precautions: moderate risks. Will need to premedicate with clindamycin before procedure.   ___ Not cleared for surgery due to the following reasons:      If you have any questions regarding the above, please contact my office at [phone].    Sincerely,      LYNDA MilesC  Department of Cardiology   [facility] [place]

## 2022-12-05 NOTE — TELEPHONE ENCOUNTER
Patient to have Surgical extraction of #18 with Dr. Mitchell Kat on 2/9/2023.  Patient WILL NOT need to hold Plavix or ASA prior to procedure. Patient to premedicate with Clindamycin 150mcg x4 one hour prior to procedure.    Please send letter with recommendations.

## 2022-12-19 ENCOUNTER — OFFICE VISIT (OUTPATIENT)
Dept: PHYSICAL MEDICINE AND REHAB | Facility: CLINIC | Age: 77
End: 2022-12-19
Payer: MEDICARE

## 2022-12-19 DIAGNOSIS — G57.43 TIBIAL NEUROPATHY OF BOTH LOWER EXTREMITIES: ICD-10-CM

## 2022-12-19 PROCEDURE — 99499 UNLISTED E&M SERVICE: CPT | Mod: S$PBB,,, | Performed by: PHYSICAL MEDICINE & REHABILITATION

## 2022-12-19 PROCEDURE — 95886 MUSC TEST DONE W/N TEST COMP: CPT | Mod: PBBFAC,PN | Performed by: PHYSICAL MEDICINE & REHABILITATION

## 2022-12-19 PROCEDURE — 95910 NRV CNDJ TEST 7-8 STUDIES: CPT | Mod: 26,S$PBB,, | Performed by: PHYSICAL MEDICINE & REHABILITATION

## 2022-12-19 PROCEDURE — 95910 PR NERVE CONDUCTION STUDY; 7-8 STUDIES: ICD-10-PCS | Mod: 26,S$PBB,, | Performed by: PHYSICAL MEDICINE & REHABILITATION

## 2022-12-19 PROCEDURE — 95886 PR EMG COMPLETE, W/ NERVE CONDUCTION STUDIES, 5+ MUSCLES: ICD-10-PCS | Mod: 26,S$PBB,, | Performed by: PHYSICAL MEDICINE & REHABILITATION

## 2022-12-19 PROCEDURE — 95886 MUSC TEST DONE W/N TEST COMP: CPT | Mod: 26,S$PBB,, | Performed by: PHYSICAL MEDICINE & REHABILITATION

## 2022-12-19 PROCEDURE — 95910 NRV CNDJ TEST 7-8 STUDIES: CPT | Mod: PBBFAC,PN | Performed by: PHYSICAL MEDICINE & REHABILITATION

## 2022-12-19 PROCEDURE — 99499 NO LOS: ICD-10-PCS | Mod: S$PBB,,, | Performed by: PHYSICAL MEDICINE & REHABILITATION

## 2022-12-19 NOTE — PROGRESS NOTES
[facility]  Physical Medicine and Rehabilitation   [address] [place] [address], [address]  [place] [address]             Patient: Se Power   Patient ID: [id number]   Sex:     YOB: 1945   Age: 77 Years 4 Months   Notes:     Last visit date: 12/19/2022         Visit date and time: 12/19/2022 07:27   Patient Age on Visit Date: 77 Years 4 Months   Referring Physician: Fidencio Bravo MD   Diagnoses:         Leg numbness    Sensory NCS      Nerve / Sites Rec. Site Onset Lat Peak Lat Ref. NP Amp Ref. PP Amp Ref. Segments Distance Velocity     ms ms ms µV µV µV µV  cm m/s   R Sural - Ankle (Calf)      Calf Ankle NR NR ?4.20 NR ?5.0 NR ?5.0 Calf - Ankle 14 NR   R Superficial peroneal - Ankle      Lat leg Ankle NR NR ?4.40 NR ?5.0 NR ?5.0 Lat leg - Ankle 14 NR       Motor NCS      Nerve / Sites Muscle Latency Ref. Amplitude Ref. Amp % Duration Segments Distance Lat Diff Velocity Ref.     ms ms mV mV % ms  cm ms m/s m/s   R Peroneal - EDB      Ankle EDB 4.27 ?6.20 3.0 ?2.0 100 5.99 Ankle - EDB 8         Fib head EDB 15.42  1.1  36.6  Fib head - Ankle 35 11.15 31 ?39      Pop fossa EDB 16.93  2.1  71.6  Pop fossa - Fib head 10 1.51 66 ?39   L Peroneal - EDB      Ankle EDB 5.16 ?6.20 1.7 ?2.0 100 6.09 Ankle - EDB 8         Fib head EDB 14.53  0.9  56.6  Fib head - Ankle 33 9.38 35 ?39      Pop fossa EDB 15.68  2.5  149  Pop fossa - Fib head 10 1.15 87 ?39   R Tibial - AH      Ankle AH 5.00 ?6.00 1.9 ?3.0 100 4.79 Ankle - AH 8         Pop fossa AH 14.79  0.3  17.2  Pop fossa - Ankle  9.79  ?39   L Tibial - AH      Ankle AH 4.53 ?6.00 3.3 ?3.0 100 6.46 Ankle - AH 8         Pop fossa AH 16.20  0.2  6.67  Pop fossa - Ankle 37 11.67 32 ?39       F  Wave      Nerve Fmin Ref.    ms ms   L Tibial - AH 70.89 ?58.00       EMG Summary Table     Spontaneous MUAP Recruitment   Muscle IA Fib PSW Fasc H.F. Amp Dur. PPP Pattern   L. Rectus femoris N None None None None N N N N   R. Rectus femoris N None None None  None N N N N   L. Vastus lateralis N None None None None N N N N   R. Vastus lateralis N None None None None N N N N   L. Tibialis anterior N None None None None N N N N   R. Tibialis anterior N None None None None N N N N   L. Gastrocnemius (Medial head) N None None None None N N N N   R. Gastrocnemius (Medial head) N None None None None N N N N   L. Biceps femoris (short head) N None None None None N N N N   R. Biceps femoris (short head) N None None None None N N N N       Summary    The motor conduction test had results outside of the specified normal range in all 4 of the tested nerves:  In the R Peroneal - EDB study  the take off velocity result was reduced for Fib head - Ankle segment  In the L Peroneal - EDB study  the peak amplitude result was reduced for Ankle stimulation  the take off velocity result was reduced for Fib head - Ankle segment  In the R Tibial - AH study  the peak amplitude result was reduced for Ankle stimulation  In the L Tibial - AH study  the take off velocity result was reduced for Pop fossa - Ankle segment    The sensory conduction test was performed on 4 nerve(s). There were no results within the specified normal range. Findings were unremarkable in 2 nerve(s): L Sural - Ankle (Calf), L Superficial peroneal - Ankle. Results outside the specified normal range were found in 2 nerve(s), as follows:  In the R Sural - Ankle (Calf) study  the response was considered absent for Calf stimulation  In the R Superficial peroneal - Ankle study  the response was considered absent for Lat leg stimulation    The F wave study was performed on 2 nerve(s). There were no results within the specified normal range. Findings were unremarkable in 1 nerve(s): R Tibial - AH. Results outside the specified normal range were found in 1 nerve(s), as follows:  In the L Tibial - AH study  cursor 1 latency result was increased    The needle EMG study was normal in all 10 tested muscles: L. Rectus femoris, R. Rectus  femoris, L. Vastus lateralis, R. Vastus lateralis, L. Tibialis anterior, R. Tibialis anterior, L. Gastrocnemius (Medial head), R. Gastrocnemius (Medial head), L. Biceps femoris (short head), R. Biceps femoris (short head).        Se Power [id number] 12/19/2022 07:27     1 of 1    Conclusion:       Moderate sensorimotor predominantly axonal neuropathy        ____________________________  JOSESITO Downing [id number] 12/19/2022 07:27     1 of 1

## 2022-12-21 ENCOUNTER — OFFICE VISIT (OUTPATIENT)
Dept: ORTHOPEDICS | Facility: CLINIC | Age: 77
End: 2022-12-21
Payer: MEDICARE

## 2022-12-21 VITALS — WEIGHT: 188 LBS | HEIGHT: 72 IN | BODY MASS INDEX: 25.47 KG/M2

## 2022-12-21 DIAGNOSIS — G57.43 TIBIAL NEUROPATHY OF BOTH LOWER EXTREMITIES: Primary | ICD-10-CM

## 2022-12-21 PROCEDURE — 99213 PR OFFICE/OUTPT VISIT, EST, LEVL III, 20-29 MIN: ICD-10-PCS | Mod: S$GLB,,, | Performed by: PHYSICIAN ASSISTANT

## 2022-12-21 PROCEDURE — 99213 OFFICE O/P EST LOW 20 MIN: CPT | Mod: S$GLB,,, | Performed by: PHYSICIAN ASSISTANT

## 2022-12-21 RX ORDER — GABAPENTIN 300 MG/1
300 CAPSULE ORAL 3 TIMES DAILY
Qty: 90 CAPSULE | Refills: 11 | Status: SHIPPED | OUTPATIENT
Start: 2022-12-21 | End: 2023-04-19

## 2022-12-21 NOTE — PROGRESS NOTES
[facility]  [address]  [place] [address]  Phone: [phone]   [phone]    Patient's PCP: Scott Staley MD  Referring Provider: No ref. provider found    Subjective:      Chief Complaint:   Chief Complaint   Patient presents with    Left Leg - Pain     Here for results to B/L Lower Extremities EMG/NCV    Right Leg - Pain       Past Medical History:   Diagnosis Date    Anemia     Anticoagulant long-term use     Arthritis     CAD (coronary artery disease)     CABG, STENTS '97,'99,'01,'05    Cancer     SKIN    Cataract     Diabetes mellitus     Diabetes mellitus type II     GERD (gastroesophageal reflux disease)     GI bleed 2020    Hypertension     Myocardial infarction     Wears glasses        Past Surgical History:   Procedure Laterality Date    CARDIAC PACEMAKER PLACEMENT      CARDIAC PACEMAKER PLACEMENT      CARDIAC PACEMAKER PLACEMENT  04/26/2018    replaced    CARDIAC SURGERY      1995   CABG X 5    CHOLECYSTECTOMY      COLON SURGERY      COLONOSCOPY N/A 2/21/2020    Procedure: COLONOSCOPY;  Surgeon: Abe Barragan III, MD;  Location: [facility] ENDO;  Service: Endoscopy;  Laterality: N/A;    COLONOSCOPY N/A 2/23/2020    Procedure: COLONOSCOPY;  Surgeon: Bob Locke Jr., MD;  Location: [facility] ENDO;  Service: Endoscopy;  Laterality: N/A;    CORONARY ANGIOGRAPHY INCLUDING BYPASS GRAFTS WITH CATHETERIZATION OF LEFT HEART N/A 10/26/2022    Procedure: ANGIOGRAM, CORONARY, INCLUDING BYPASS GRAFT, WITH LEFT HEART CATHETERIZATION;  Surgeon: Robles Mckoy MD;  Location: [facility] CATH/EP LAB;  Service: Cardiology;  Laterality: N/A;    CORONARY ARTERY BYPASS GRAFT  1995    CORONARY STENT PLACEMENT      stent x 5    ESOPHAGOGASTRODUODENOSCOPY N/A 2/23/2020    Procedure: EGD (ESOPHAGOGASTRODUODENOSCOPY);  Surgeon: Bob Locke Jr., MD;  Location: [facility] ENDO;  Service: Endoscopy;  Laterality: N/A;    EYE SURGERY      BILAT CATARACT    head laceration   01/19/2018    HEMORRHOID SURGERY       SMALL BOWEL ENTEROSCOPY N/A 2/21/2020    Procedure: ENTEROSCOPY;  Surgeon: Abe Barragan III, MD;  Location: [facility];  Service: Endoscopy;  Laterality: N/A;    stint         Current Outpatient Medications   Medication Sig    amLODIPine (NORVASC) 5 MG tablet Take 5 mg by mouth once daily.    aspirin (ECOTRIN) 81 MG EC tablet Take 2 tablets (162 mg total) by mouth once daily. (Patient taking differently: Take 81 mg by mouth once daily.)    atorvastatin (LIPITOR) 40 MG tablet Take 1 tablet (40 mg total) by mouth every evening.    betamethasone dipropionate (DIPROLENE) 0.05 % lotion Apply thin film to scalp bid prn itch    clopidogreL (PLAVIX) 75 mg tablet Take 1 tablet (75 mg total) by mouth once daily.    empagliflozin (JARDIANCE) 25 mg tablet Take 1 tablet (25 mg total) by mouth once daily.    finasteride (PROSCAR) 5 mg tablet Take 5 mg by mouth once daily.    fluticasone propionate (FLONASE) 50 mcg/actuation nasal spray Use nasally as directed as needed    furosemide (LASIX) 20 MG tablet Take 20 mg by mouth once daily.    isosorbide dinitrate (ISORDIL) 10 MG tablet Take 1 tablet (10 mg total) by mouth 2 (two) times daily.    ketoconazole (NIZORAL) 2 % shampoo Wash all scaling areas let sit 3 minutes then rinse 3x/wk    levocetirizine (XYZAL) 5 MG tablet TAKE ONE TABLET BY MOUTH EVERY EVENING    magnesium oxide (MAG-OX) 400 mg (241.3 mg magnesium) tablet Take 1 tablet (400 mg total) by mouth once daily.    metoprolol tartrate (LOPRESSOR) 25 MG tablet Take 1 tablet (25 mg total) by mouth 2 (two) times daily.    montelukast (SINGULAIR) 10 mg tablet TAKE 1 TABLET (10 MG TOTAL) BY MOUTH EVERY EVENING.    multivitamin capsule Take 1 capsule by mouth once daily.    mupirocin (BACTROBAN) 2 % ointment Apply topically 2 (two) times daily.    nitroGLYCERIN (NITROSTAT) 0.4 MG SL tablet DISSOLVE 1 TABLET UNDER THE TONGUE AS NEEDED FOR CHEST PAIN    pantoprazole (PROTONIX) 40 MG tablet TAKE ONE TABLET BY MOUTH ONCE  DAILY    pioglitazone (ACTOS) 15 MG tablet Take 1 tablet (15 mg total) by mouth once daily. (Patient taking differently: Take 30 mg by mouth once daily.)    sertraline (ZOLOFT) 50 MG tablet TAKE ONE TABLET BY MOUTH ONCE DAILY    sulfacetamide sodium (OVACE PLUS SHAMPOO) 10 % Sham Wash scalp nightly    tolterodine (DETROL [place]) 4 MG 24 hr capsule TAKE ONE CAPSULE BY MOUTH ONCE DAILY    vitamin D 1000 units Tab Take 1,000 Units by mouth once daily.    gabapentin (NEURONTIN) 300 MG capsule Take 1 capsule (300 mg total) by mouth 3 (three) times daily.     No current facility-administered medications for this visit.       Review of patient's allergies indicates:   Allergen Reactions    Adhesive Other (See Comments)     SILK TAPE PULL SKIN OFF    Metformin Other (See Comments)     Weight loss cachexia anorexia,diarrhea.    Pcn [penicillins]      Patient states he passed out from this medication when he was 10 years old.        Family History   Problem Relation Age of Onset    Heart disease Mother     Heart disease Father     Hyperlipidemia Sister     Hypertension Sister     Heart disease Brother     Heart disease Brother     Heart disease Brother     Heart disease Brother     Heart disease Brother        Social History     Socioeconomic History    Marital status:    Tobacco Use    Smoking status: Never    Smokeless tobacco: Never   Substance and Sexual Activity    Alcohol use: No    Drug use: No    Sexual activity: Not Currently     Social Determinants of Health     Financial Resource Strain: Low Risk     Difficulty of Paying Living Expenses: Not hard at all   Food Insecurity: No Food Insecurity    Worried About Running Out of Food in the Last Year: Never true    Ran Out of Food in the Last Year: Never true   Transportation Needs: No Transportation Needs    Lack of Transportation (Medical): No    Lack of Transportation (Non-Medical): No   Physical Activity: Inactive    Days of Exercise per Week: 0 days    Minutes of  Exercise per Session: 0 min   Stress: No Stress Concern Present    Feeling of Stress : Not at all   Social Connections: Moderately Integrated    Frequency of Communication with Friends and Family: More than three times a week    Frequency of Social Gatherings with Friends and Family: Never    Attends [employer]ist Services: More than 4 times per year    Active Member of Clubs or Organizations: Yes    Attends Club or Organization Meetings: Never    Marital Status:    Housing Stability: Low Risk     Unable to Pay for Housing in the Last Year: No    Number of Places Lived in the Last Year: 1    Unstable Housing in the Last Year: No       Prior to meeting with the patient I reviewed the medical chart in [facility]. This included reviewing the previous progress notes from our office, review of the patient's last appointment with their primary care provider, review of any visits to the emergency room, and review of any pain management appointments or procedures.    History of present illness: 77-year-old male who returns to clinic.  She returns today to review his EMG and nerve conduction studies of the bilateral lower extremities.     He presents today with complaints of bilateral lower extremity pain.  He describes pain in the anterior tibia of the bilateral lower legs.  He denies numbness and tingling. Does not go below the ankle.  It is not go above the knee.  He has no pain in his back or his hips.      Review of Systems:    Constitutional: Negative for chills, fever and weight loss.   HENT: Negative for congestion.    Eyes: Negative for discharge and redness.   Respiratory: Negative for cough and shortness of breath.    Cardiovascular: Negative for chest pain.   Gastrointestinal: Negative for nausea and vomiting.   Musculoskeletal: See HPI.   Skin: Negative for rash.   Neurological: Negative for headaches.   Endo/Heme/Allergies: Does not bruise/bleed easily.   Psychiatric/Behavioral: The patient is not nervous/anxious.     All other systems reviewed and are negative.       Objective:      Physical Examination:    Vital Signs:  There were no vitals filed for this visit.    Body mass index is 25.5 kg/m².    This a well-developed, well nourished patient in no acute distress.  They are alert and oriented and cooperative to examination.     Examination of the bilateral lower extremities, he has some mild thinning of the skin, it is dry, he has a surgical incision consistent with vein harvesting from prior bypass surgery in the left leg.  Skin is warm to the touch.  Not erythema, not ecchymotic.  He has 2+ dorsalis pulses as well as 2+ posterior tibial pulses.      Pertinent New Results:    [facility]  Physical Medicine and Rehabilitation   [address], [address]  [place] [address]               Patient: Se Power   Patient ID: [id number]   Sex:     YOB: 1945   Age: 77 Years 4 Months   Notes:     Last visit date: 12/19/2022          Visit date and time: 12/19/2022 07:27   Patient Age on Visit Date: 77 Years 4 Months   Referring Physician: Fidencio Bravo MD   Diagnoses:          Leg numbness     Sensory NCS      Nerve / Sites Rec. Site Onset Lat Peak Lat Ref. NP Amp Ref. PP Amp Ref. Segments Distance Velocity       ms ms ms µV µV µV µV   cm m/s   R Sural - Ankle (Calf)      Calf Ankle NR NR ?4.20 NR ?5.0 NR ?5.0 Calf - Ankle 14 NR   R Superficial peroneal - Ankle      Lat leg Ankle NR NR ?4.40 NR ?5.0 NR ?5.0 Lat leg - Ankle 14 NR       Motor NCS      Nerve / Sites Muscle Latency Ref. Amplitude Ref. Amp % Duration Segments Distance Lat Diff Velocity Ref.       ms ms mV mV % ms   cm ms m/s m/s   R Peroneal - EDB      Ankle EDB 4.27 ?6.20 3.0 ?2.0 100 5.99 Ankle - EDB 8            Fib head EDB 15.42   1.1   36.6   Fib head - Ankle 35 11.15 31 ?39      Pop fossa EDB 16.93   2.1   71.6   Pop fossa - Fib head 10 1.51 66 ?39   L Peroneal - EDB      Ankle EDB 5.16 ?6.20 1.7 ?2.0 100 6.09 Ankle - EDB 8             Fib head EDB 14.53   0.9   56.6   Fib head - Ankle 33 9.38 35 ?39      Pop fossa EDB 15.68   2.5   149   Pop fossa - Fib head 10 1.15 87 ?39   R Tibial - AH      Ankle AH 5.00 ?6.00 1.9 ?3.0 100 4.79 Ankle - AH 8            Pop fossa AH 14.79   0.3   17.2   Pop fossa - Ankle   9.79   ?39   L Tibial - AH      Ankle AH 4.53 ?6.00 3.3 ?3.0 100 6.46 Ankle - AH 8            Pop fossa AH 16.20   0.2   6.67   Pop fossa - Ankle 37 11.67 32 ?39       F  Wave      Nerve Fmin Ref.     ms ms   L Tibial - AH 70.89 ?58.00                   EMG Summary Table       Spontaneous MUAP Recruitment   Muscle IA Fib PSW Fasc H.F. Amp Dur. PPP Pattern   L. Rectus femoris N None None None None N N N N   R. Rectus femoris N None None None None N N N N   L. Vastus lateralis N None None None None N N N N   R. Vastus lateralis N None None None None N N N N   L. Tibialis anterior N None None None None N N N N   R. Tibialis anterior N None None None None N N N N   L. Gastrocnemius (Medial head) N None None None None N N N N   R. Gastrocnemius (Medial head) N None None None None N N N N   L. Biceps femoris (short head) N None None None None N N N N   R. Biceps femoris (short head) N None None None None N N N N       Summary     The motor conduction test had results outside of the specified normal range in all 4 of the tested nerves:  In the R Peroneal - EDB study  the take off velocity result was reduced for Fib head - Ankle segment  In the L Peroneal - EDB study  the peak amplitude result was reduced for Ankle stimulation  the take off velocity result was reduced for Fib head - Ankle segment  In the R Tibial - AH study  the peak amplitude result was reduced for Ankle stimulation  In the L Tibial - AH study  the take off velocity result was reduced for Pop fossa - Ankle segment     The sensory conduction test was performed on 4 nerve(s). There were no results within the specified normal range. Findings were unremarkable in 2 nerve(s): L Sural  - Ankle (Calf), L Superficial peroneal - Ankle. Results outside the specified normal range were found in 2 nerve(s), as follows:  In the R Sural - Ankle (Calf) study  the response was considered absent for Calf stimulation  In the R Superficial peroneal - Ankle study  the response was considered absent for Lat leg stimulation     The F wave study was performed on 2 nerve(s). There were no results within the specified normal range. Findings were unremarkable in 1 nerve(s): R Tibial - AH. Results outside the specified normal range were found in 1 nerve(s), as follows:  In the L Tibial - AH study  cursor 1 latency result was increased     The needle EMG study was normal in all 10 tested muscles: L. Rectus femoris, R. Rectus femoris, L. Vastus lateralis, R. Vastus lateralis, L. Tibialis anterior, R. Tibialis anterior, L. Gastrocnemius (Medial head), R. Gastrocnemius (Medial head), L. Biceps femoris (short head), R. Biceps femoris (short head).      Se Tono [id number] 12/19/2022 07:27      1 of 1     Conclusion:         Moderate sensorimotor predominantly axonal neuropathy           ____________________________  Yakelin Euceda D.O.     XRAY Report / Interpretation:             Assessment:       1. Tibial neuropathy of both lower extremities      Plan:     Tibial neuropathy of both lower extremities  -     gabapentin (NEURONTIN) 300 MG capsule; Take 1 capsule (300 mg total) by mouth 3 (three) times daily.  Dispense: 90 capsule; Refill: 11        Follow up in about 3 months (around 3/21/2023) for Neuropathy B/L Lower Extremities.    77-year-old male follows up today on bilateral lower extremity pain.  I was concerned about neuropathy at my last visit.  As well as claudication.  Although his symptomatology is not really consistent with lower extremity pain.  It is primarily numbness and tingling.  His nerve conduction study show that he has primarily axonal neuropathy.  Have recommended he follow-up with his primary care  provider.  He was taking gabapentin 300 mg once daily, I prescribed to him 300 mg t.i.d. with instructions to take it at least in the morning and the evening, see how he tolerates this and he can increase the 3rd dose if he tolerates it.    [unfilled]  NADEEN Charlton PA-C    This note was created using [employer] voice recognition software that occasionally misinterprets words or phrases.

## 2023-01-03 ENCOUNTER — TELEPHONE (OUTPATIENT)
Dept: CARDIOLOGY | Facility: CLINIC | Age: 78
End: 2023-01-03
Payer: MEDICARE

## 2023-01-03 NOTE — TELEPHONE ENCOUNTER
----- Message from Levi Stevenson, Patient Care Assistant sent at 1/3/2023  2:51 PM CST -----  Contact: Pt  Type: Needs Medical Advice    Who Called: Pt  Best Call Back Number: [phone]  Inquiry/Question: Pt is calling to see when he is supposed to stop his medication prior to a dental procedure. Please advise. Thank you~

## 2023-01-06 ENCOUNTER — TELEPHONE (OUTPATIENT)
Dept: FAMILY MEDICINE | Facility: CLINIC | Age: 78
End: 2023-01-06

## 2023-01-06 NOTE — TELEPHONE ENCOUNTER
APPT.3-1-23, LOV. 11-28-22.   Called patient to make an appointment to get evaluated for handicap tag.

## 2023-01-09 ENCOUNTER — TELEPHONE (OUTPATIENT)
Dept: CARDIOLOGY | Facility: CLINIC | Age: 78
End: 2023-01-09

## 2023-01-09 NOTE — TELEPHONE ENCOUNTER
----- Message from Lam Euceda sent at 1/9/2023  2:35 PM CST -----  Type: Needs Medical Advice  Who Called:  pt  Symptoms (please be specific):  pt said he need to know what meds he need to get off of to have his tooth pulled--said he called last week and no one returned his call-please call and advise  Best Call Back Number: [phone] (home) [phone] (work)    Pt also wanted to know why he taking JARDIANCE 25 mg tablet said it cost him $33 a month and he wanted to know if there was something else cheaper that he can take if he have to take this ---  Additional Information: thank you

## 2023-01-11 ENCOUNTER — OFFICE VISIT (OUTPATIENT)
Dept: FAMILY MEDICINE | Facility: CLINIC | Age: 78
End: 2023-01-11
Payer: MEDICARE

## 2023-01-11 VITALS
TEMPERATURE: 98 F | OXYGEN SATURATION: 98 % | RESPIRATION RATE: 18 BRPM | SYSTOLIC BLOOD PRESSURE: 112 MMHG | BODY MASS INDEX: 25.85 KG/M2 | DIASTOLIC BLOOD PRESSURE: 68 MMHG | HEART RATE: 66 BPM | HEIGHT: 72 IN | WEIGHT: 190.88 LBS

## 2023-01-11 DIAGNOSIS — I25.118 CORONARY ARTERY DISEASE OF NATIVE ARTERY OF NATIVE HEART WITH STABLE ANGINA PECTORIS: Primary | ICD-10-CM

## 2023-01-11 DIAGNOSIS — I10 PRIMARY HYPERTENSION: ICD-10-CM

## 2023-01-11 DIAGNOSIS — N18.1 CRI (CHRONIC RENAL INSUFFICIENCY), STAGE 1: ICD-10-CM

## 2023-01-11 DIAGNOSIS — E11.9 TYPE 2 DIABETES MELLITUS WITHOUT COMPLICATION, WITHOUT LONG-TERM CURRENT USE OF INSULIN: ICD-10-CM

## 2023-01-11 PROCEDURE — 99213 OFFICE O/P EST LOW 20 MIN: CPT | Mod: S$PBB,AQ,, | Performed by: FAMILY MEDICINE

## 2023-01-11 PROCEDURE — 99213 PR OFFICE/OUTPT VISIT, EST, LEVL III, 20-29 MIN: ICD-10-PCS | Mod: S$PBB,AQ,, | Performed by: FAMILY MEDICINE

## 2023-01-11 PROCEDURE — 99215 OFFICE O/P EST HI 40 MIN: CPT | Performed by: FAMILY MEDICINE

## 2023-01-11 NOTE — PROGRESS NOTES
Subjective:       Patient ID: Tono Saez is a 77 y.o. male.    Chief Complaint: evaluation (Handicap sticker frna)      Is here to get a handicap license for his car he has trouble ambulating because of gait disturbance and has frequent episodes of orthostatic hypotension an unpredictable fashion.    Patient Active Problem List:     Tinea cruris     Rectal bleeding     Anal fissure     HTN (hypertension)     Anal fissure     Diarrhea of presumed infectious origin     Type 2 diabetes mellitus without complication     H/O cachexia     Weight loss, abnormal     Chronic diarrhea     Type 2 diabetes mellitus without complication, without long-term current use of insulin     Bronchitis     Laceration of occipital region of scalp     Laceration of forehead, left, complicated     Hematoma of frontal scalp     Edema eyelid, unspecified laterality     Chronic (childhood) granulomatous disease     Actinic keratoses     Mixed hyperlipidemia     Seasonal allergic rhinitis due to pollen     Non-allergic rhinitis     Pansinusitis     Type 2 diabetes mellitus with hyperglycemia      Immunization due     GERD (gastroesophageal reflux disease)     CRI (chronic renal insufficiency), stage 1     Anemia     Fall     Gait instability     Chronic anticoagulation     Contusion of buttock     Type 2 diabetes mellitus, without long-term current use of insulin     Chest pain     Upper GI bleed     Coronary artery disease of native artery of native heart with stable angina pectoris     TIA (transient ischemic attack)     Dysfunction of both eustachian tubes     Pain in both lower extremities     Pain     NSTEMI (non-ST elevated myocardial infarction)     Gross hematuria  Lab Results       Component                Value               Date                       WBC                      8.89                10/27/2022                 HGB                      11.6 (L)            10/28/2022                 HCT                      36.9 (L)             10/28/2022                 PLT                      173                 10/27/2022                 CHOL                     134                 07/22/2021                 TRIG                     114                 07/22/2021                 HDL                      40                  07/22/2021                 ALT                      16                  02/28/2022                 AST                      17                  02/28/2022                 NA                       136                 10/28/2022                 K                        4.0                 10/28/2022                 CL                       108                 10/28/2022                 CREATININE               1.2                 10/28/2022                 BUN                      20                  10/28/2022                 CO2                      23                  10/28/2022                 TSH                      2.100               09/21/2020                 PSA                      1.3                 07/15/2022                 INR                      1.2                 10/27/2022                 HGBA1C                   7.2 (H)             07/22/2021                    Allergies and Medications:   Review of patient's allergies indicates:   Allergen Reactions    Adhesive Other (See Comments)     SILK TAPE PULL SKIN OFF    Metformin Other (See Comments)     Weight loss cachexia anorexia,diarrhea.    Pcn [penicillins]      Patient states he passed out from this medication when he was 10 years old.      Current Outpatient Medications   Medication Sig Dispense Refill    amLODIPine (NORVASC) 5 MG tablet Take 5 mg by mouth once daily.      aspirin (ECOTRIN) 81 MG EC tablet Take 2 tablets (162 mg total) by mouth once daily. (Patient taking differently: Take 81 mg by mouth once daily.) 120 tablet 0    atorvastatin (LIPITOR) 40 MG tablet Take 1 tablet (40 mg total) by mouth every evening. 90 tablet 0    betamethasone  dipropionate (DIPROLENE) 0.05 % lotion Apply thin film to scalp bid prn itch 60 mL 6    clopidogreL (PLAVIX) 75 mg tablet Take 1 tablet (75 mg total) by mouth once daily. 90 tablet 0    finasteride (PROSCAR) 5 mg tablet Take 5 mg by mouth once daily.      fluticasone propionate (FLONASE) 50 mcg/actuation nasal spray Use nasally as directed as needed      furosemide (LASIX) 20 MG tablet Take 20 mg by mouth once daily.      gabapentin (NEURONTIN) 300 MG capsule Take 1 capsule (300 mg total) by mouth 3 (three) times daily. 90 capsule 11    isosorbide dinitrate (ISORDIL) 10 MG tablet Take 1 tablet (10 mg total) by mouth 2 (two) times daily. 60 tablet 11    JARDIANCE 25 mg tablet TAKE 1 TABLET (25 MG TOTAL) BY MOUTH ONCE DAILY. 30 tablet 6    ketoconazole (NIZORAL) 2 % shampoo Wash all scaling areas let sit 3 minutes then rinse 3x/wk 120 mL 11    levocetirizine (XYZAL) 5 MG tablet TAKE ONE TABLET BY MOUTH EVERY EVENING 60 tablet 11    magnesium oxide (MAG-OX) 400 mg (241.3 mg magnesium) tablet Take 1 tablet (400 mg total) by mouth once daily. 90 tablet 1    montelukast (SINGULAIR) 10 mg tablet TAKE 1 TABLET (10 MG TOTAL) BY MOUTH EVERY EVENING. 30 tablet 11    multivitamin capsule Take 1 capsule by mouth once daily.      mupirocin (BACTROBAN) 2 % ointment Apply topically 2 (two) times daily. 22 g 11    nitroGLYCERIN (NITROSTAT) 0.4 MG SL tablet DISSOLVE 1 TABLET UNDER THE TONGUE AS NEEDED FOR CHEST PAIN 100 tablet 3    pantoprazole (PROTONIX) 40 MG tablet TAKE ONE TABLET BY MOUTH ONCE DAILY 90 tablet 1    pioglitazone (ACTOS) 15 MG tablet Take 1 tablet (15 mg total) by mouth once daily. (Patient taking differently: Take 30 mg by mouth once daily.) 90 tablet 3    sertraline (ZOLOFT) 50 MG tablet TAKE ONE TABLET BY MOUTH ONCE DAILY 90 tablet 1    sulfacetamide sodium (OVACE PLUS SHAMPOO) 10 % Sham Wash scalp nightly 1 each 11    tolterodine (DETROL LA) 4 MG 24 hr capsule TAKE ONE CAPSULE BY MOUTH ONCE DAILY 90 capsule 1     vitamin D 1000 units Tab Take 1,000 Units by mouth once daily.      metoprolol tartrate (LOPRESSOR) 25 MG tablet Take 1 tablet (25 mg total) by mouth 2 (two) times daily. 120 tablet 0     No current facility-administered medications for this visit.       Family History:   Family History   Problem Relation Age of Onset    Heart disease Mother     Heart disease Father     Hyperlipidemia Sister     Hypertension Sister     Heart disease Brother     Heart disease Brother     Heart disease Brother     Heart disease Brother     Heart disease Brother        Social History:   Social History     Socioeconomic History    Marital status:    Tobacco Use    Smoking status: Never    Smokeless tobacco: Never   Substance and Sexual Activity    Alcohol use: No    Drug use: No    Sexual activity: Not Currently     Social Determinants of Health     Financial Resource Strain: Low Risk     Difficulty of Paying Living Expenses: Not hard at all   Food Insecurity: No Food Insecurity    Worried About Running Out of Food in the Last Year: Never true    Ran Out of Food in the Last Year: Never true   Transportation Needs: No Transportation Needs    Lack of Transportation (Medical): No    Lack of Transportation (Non-Medical): No   Physical Activity: Inactive    Days of Exercise per Week: 0 days    Minutes of Exercise per Session: 0 min   Stress: No Stress Concern Present    Feeling of Stress : Not at all   Social Connections: Moderately Integrated    Frequency of Communication with Friends and Family: More than three times a week    Frequency of Social Gatherings with Friends and Family: Never    Attends Religion Services: More than 4 times per year    Active Member of Clubs or Organizations: Yes    Attends Club or Organization Meetings: Never    Marital Status:    Housing Stability: Low Risk     Unable to Pay for Housing in the Last Year: No    Number of Places Lived in the Last Year: 1    Unstable Housing in the Last Year: No        Review of Systems    Objective:     Vitals:    01/11/23 1532   BP: 112/68   Pulse: 66   Resp: 18   Temp: 97.7 °F (36.5 °C)        Physical Exam  Vitals and nursing note reviewed.   Constitutional:       Appearance: He is well-developed. He is not diaphoretic.   HENT:      Head: Normocephalic.   Eyes:      Conjunctiva/sclera: Conjunctivae normal.      Pupils: Pupils are equal, round, and reactive to light.   Cardiovascular:      Rate and Rhythm: Normal rate and regular rhythm.      Heart sounds: Normal heart sounds. No murmur heard.    No friction rub. No gallop.   Pulmonary:      Effort: Pulmonary effort is normal. No respiratory distress.      Breath sounds: Normal breath sounds. No stridor. No wheezing or rales.   Chest:      Chest wall: No tenderness.   Musculoskeletal:      Comments: Very unsteady deliberate gait.  No tremor noted.   Skin:     General: Skin is warm and dry.   Psychiatric:         Behavior: Behavior normal.         Thought Content: Thought content normal.         Judgment: Judgment normal.       Assessment:       1. Coronary artery disease of native artery of native heart with stable angina pectoris    2. CRI (chronic renal insufficiency), stage 1    3. Primary hypertension    4. Type 2 diabetes mellitus without complication, without long-term current use of insulin      Patient has criteria for handicapped plate based on cardiovascular and neurologic deficits.  Plan:       Tono was seen today for evaluation.    Diagnoses and all orders for this visit:    Coronary artery disease of native artery of native heart with stable angina pectoris  -     Lipid Panel; Future  -     Comprehensive Metabolic Panel; Future    CRI (chronic renal insufficiency), stage 1    Primary hypertension    Type 2 diabetes mellitus without complication, without long-term current use of insulin  -     Hemoglobin A1C; Future  -     Microalbumin/Creatinine Ratio, Urine; Future         Follow up in about 6 months  (around 7/11/2023) for follow up DM, follow up cholesterol, follow up HTN.

## 2023-01-12 ENCOUNTER — LAB VISIT (OUTPATIENT)
Dept: LAB | Facility: HOSPITAL | Age: 78
End: 2023-01-12
Attending: FAMILY MEDICINE
Payer: MEDICARE

## 2023-01-12 DIAGNOSIS — I25.118 CORONARY ARTERY DISEASE OF NATIVE ARTERY OF NATIVE HEART WITH STABLE ANGINA PECTORIS: ICD-10-CM

## 2023-01-12 DIAGNOSIS — E11.9 TYPE 2 DIABETES MELLITUS WITHOUT COMPLICATION, WITHOUT LONG-TERM CURRENT USE OF INSULIN: ICD-10-CM

## 2023-01-12 LAB
ALBUMIN SERPL BCP-MCNC: 4 G/DL (ref 3.5–5.2)
ALBUMIN/CREAT UR: 11 UG/MG (ref 0–30)
ALP SERPL-CCNC: 81 U/L (ref 55–135)
ALT SERPL W/O P-5'-P-CCNC: 17 U/L (ref 10–44)
ANION GAP SERPL CALC-SCNC: 9 MMOL/L (ref 8–16)
AST SERPL-CCNC: 20 U/L (ref 10–40)
BILIRUB SERPL-MCNC: 0.7 MG/DL (ref 0.1–1)
BUN SERPL-MCNC: 29 MG/DL (ref 8–23)
CALCIUM SERPL-MCNC: 10.1 MG/DL (ref 8.7–10.5)
CHLORIDE SERPL-SCNC: 102 MMOL/L (ref 95–110)
CHOLEST SERPL-MCNC: 141 MG/DL (ref 120–199)
CHOLEST/HDLC SERPL: 2.9 {RATIO} (ref 2–5)
CO2 SERPL-SCNC: 30 MMOL/L (ref 23–29)
CREAT SERPL-MCNC: 1.6 MG/DL (ref 0.5–1.4)
CREAT UR-MCNC: 164 MG/DL (ref 23–375)
EST. GFR  (NO RACE VARIABLE): 43.6 ML/MIN/1.73 M^2
GLUCOSE SERPL-MCNC: 142 MG/DL (ref 70–110)
HDLC SERPL-MCNC: 48 MG/DL (ref 40–75)
HDLC SERPL: 34 % (ref 20–50)
LDLC SERPL CALC-MCNC: 68.6 MG/DL (ref 63–159)
MICROALBUMIN UR DL<=1MG/L-MCNC: 18.1 UG/ML
NONHDLC SERPL-MCNC: 93 MG/DL
POTASSIUM SERPL-SCNC: 4.4 MMOL/L (ref 3.5–5.1)
PROT SERPL-MCNC: 7.3 G/DL (ref 6–8.4)
SODIUM SERPL-SCNC: 141 MMOL/L (ref 136–145)
TRIGL SERPL-MCNC: 122 MG/DL (ref 30–150)

## 2023-01-12 PROCEDURE — 80053 COMPREHEN METABOLIC PANEL: CPT | Performed by: FAMILY MEDICINE

## 2023-01-12 PROCEDURE — 82570 ASSAY OF URINE CREATININE: CPT | Performed by: FAMILY MEDICINE

## 2023-01-12 PROCEDURE — 80061 LIPID PANEL: CPT | Performed by: FAMILY MEDICINE

## 2023-01-12 PROCEDURE — 36415 COLL VENOUS BLD VENIPUNCTURE: CPT | Performed by: FAMILY MEDICINE

## 2023-01-12 PROCEDURE — 83036 HEMOGLOBIN GLYCOSYLATED A1C: CPT | Performed by: FAMILY MEDICINE

## 2023-01-13 DIAGNOSIS — Z79.4 TYPE 2 DIABETES MELLITUS WITH HYPERGLYCEMIA, WITH LONG-TERM CURRENT USE OF INSULIN: Primary | ICD-10-CM

## 2023-01-13 DIAGNOSIS — E11.65 TYPE 2 DIABETES MELLITUS WITH HYPERGLYCEMIA, WITH LONG-TERM CURRENT USE OF INSULIN: Primary | ICD-10-CM

## 2023-01-13 LAB
ESTIMATED AVG GLUCOSE: 192 MG/DL (ref 68–131)
HBA1C MFR BLD: 8.3 % (ref 4.5–6.2)

## 2023-01-13 RX ORDER — TIRZEPATIDE 5 MG/.5ML
5 INJECTION, SOLUTION SUBCUTANEOUS
Qty: 4 PEN | Refills: 5 | Status: SHIPPED | OUTPATIENT
Start: 2023-01-13 | End: 2023-05-29

## 2023-01-13 NOTE — PROGRESS NOTES
The A1c has risen to 8.3 indicating poor control of the blood sugars.  I think her definitely a candidate for weekly injection to reduce her insulin requirements i.e. Ozempic or Trulicity monitor neuro seems to be well covered on insurance.  I will send in a prescription

## 2023-01-13 NOTE — PROGRESS NOTES
Are abnormal but stable over the last several months.  Continue present meds and follow-up in 3 months.

## 2023-01-18 ENCOUNTER — TELEPHONE (OUTPATIENT)
Dept: FAMILY MEDICINE | Facility: CLINIC | Age: 78
End: 2023-01-18

## 2023-01-18 NOTE — TELEPHONE ENCOUNTER
The following medication needs a prior authorization:     Medication Name: tirzeptide     Dosage: 5 mg / 0.5 ml    Frequency: every 7 days    Directions for use: Inject 5 mg into the skin every 7 days    Diagnosis: Type 2 diabetes mellitus with hyperglycemia, with long-term current use of insulin    Is the request for a reauthorization? no    Is the patient currently stable on therapy?     Please list all therapeutic alternatives previously used with start/end dates and outcome:  Glipizide 5 mg (5-12-12 - 8-7-17)  Jardiance 25 mg (1-4-23 - present)  Metformin 750 mg (5-22-17 - 8-25-17)  Pioglitazone 15 - 30 mg (5-10-17 - present)

## 2023-01-19 DIAGNOSIS — Z95.0 PACEMAKER: Primary | ICD-10-CM

## 2023-01-19 RX ORDER — METOPROLOL TARTRATE 25 MG/1
25 TABLET, FILM COATED ORAL 2 TIMES DAILY
Qty: 180 TABLET | Refills: 2 | Status: CANCELLED | OUTPATIENT
Start: 2023-01-19 | End: 2023-03-20

## 2023-01-23 RX ORDER — METOPROLOL TARTRATE 25 MG/1
25 TABLET, FILM COATED ORAL 2 TIMES DAILY
Qty: 120 TABLET | Refills: 5 | Status: SHIPPED | OUTPATIENT
Start: 2023-01-23 | End: 2023-06-26

## 2023-01-27 ENCOUNTER — TELEPHONE (OUTPATIENT)
Dept: FAMILY MEDICINE | Facility: CLINIC | Age: 78
End: 2023-01-27

## 2023-01-27 NOTE — TELEPHONE ENCOUNTER
Pt called and stated the Mounjaro is too expensive $250 monthly. Pt would like to know if it is something else you could prescribe that would work just as good and that is less expensive.

## 2023-01-30 PROBLEM — I21.4 NSTEMI (NON-ST ELEVATED MYOCARDIAL INFARCTION): Status: RESOLVED | Noted: 2022-10-28 | Resolved: 2023-01-30

## 2023-01-30 RX ORDER — ATORVASTATIN CALCIUM 40 MG/1
40 TABLET, FILM COATED ORAL NIGHTLY
Qty: 90 TABLET | Refills: 1 | Status: SHIPPED | OUTPATIENT
Start: 2023-01-30 | End: 2023-07-24 | Stop reason: SDUPTHER

## 2023-02-03 ENCOUNTER — HOSPITAL ENCOUNTER (EMERGENCY)
Facility: HOSPITAL | Age: 78
Discharge: HOME | End: 2023-02-03
Attending: EMERGENCY MEDICINE
Payer: MEDICARE

## 2023-02-03 VITALS
BODY MASS INDEX: 25.73 KG/M2 | RESPIRATION RATE: 15 BRPM | HEIGHT: 72 IN | TEMPERATURE: 98 F | DIASTOLIC BLOOD PRESSURE: 74 MMHG | HEART RATE: 63 BPM | WEIGHT: 190 LBS | SYSTOLIC BLOOD PRESSURE: 150 MMHG | OXYGEN SATURATION: 95 %

## 2023-02-03 DIAGNOSIS — S01.01XA LACERATION OF SCALP, INITIAL ENCOUNTER: Primary | ICD-10-CM

## 2023-02-03 DIAGNOSIS — S32.010A COMPRESSION FRACTURE OF L1 VERTEBRA, INITIAL ENCOUNTER: ICD-10-CM

## 2023-02-03 DIAGNOSIS — R55 SYNCOPE: ICD-10-CM

## 2023-02-03 LAB
ALBUMIN SERPL BCP-MCNC: 3.6 G/DL (ref 3.5–5.2)
ALP SERPL-CCNC: 81 U/L (ref 55–135)
ALT SERPL W/O P-5'-P-CCNC: 22 U/L (ref 10–44)
ANION GAP SERPL CALC-SCNC: 11 MMOL/L (ref 8–16)
APTT BLDCRRT: 23.3 SEC (ref 21–32)
AST SERPL-CCNC: 21 U/L (ref 10–40)
BACTERIA #/AREA URNS HPF: NEGATIVE /HPF
BASOPHILS # BLD AUTO: 0.03 K/UL (ref 0–0.2)
BASOPHILS NFR BLD: 0.4 % (ref 0–1.9)
BILIRUB SERPL-MCNC: 1 MG/DL (ref 0.1–1)
BILIRUB UR QL STRIP: NEGATIVE
BUN SERPL-MCNC: 33 MG/DL (ref 8–23)
CALCIUM SERPL-MCNC: 9.4 MG/DL (ref 8.7–10.5)
CHLORIDE SERPL-SCNC: 100 MMOL/L (ref 95–110)
CLARITY UR: CLEAR
CO2 SERPL-SCNC: 25 MMOL/L (ref 23–29)
COLOR UR: YELLOW
CREAT SERPL-MCNC: 1.7 MG/DL (ref 0.5–1.4)
DIFFERENTIAL METHOD: ABNORMAL
EOSINOPHIL # BLD AUTO: 0.2 K/UL (ref 0–0.5)
EOSINOPHIL NFR BLD: 3.3 % (ref 0–8)
ERYTHROCYTE [DISTWIDTH] IN BLOOD BY AUTOMATED COUNT: 14.6 % (ref 11.5–14.5)
EST. GFR  (NO RACE VARIABLE): 40.5 ML/MIN/1.73 M^2
GLUCOSE SERPL-MCNC: 165 MG/DL (ref 70–110)
GLUCOSE SERPL-MCNC: 166 MG/DL (ref 70–110)
GLUCOSE UR QL STRIP: ABNORMAL
HCT VFR BLD AUTO: 38.8 % (ref 40–54)
HGB BLD-MCNC: 11.8 G/DL (ref 14–18)
HGB UR QL STRIP: NEGATIVE
HYALINE CASTS #/AREA URNS LPF: 3 /LPF
IMM GRANULOCYTES # BLD AUTO: 0.04 K/UL (ref 0–0.04)
IMM GRANULOCYTES NFR BLD AUTO: 0.6 % (ref 0–0.5)
INR PPP: 1.1 (ref 0.8–1.2)
KETONES UR QL STRIP: NEGATIVE
LEUKOCYTE ESTERASE UR QL STRIP: NEGATIVE
LYMPHOCYTES # BLD AUTO: 0.9 K/UL (ref 1–4.8)
LYMPHOCYTES NFR BLD: 13.4 % (ref 18–48)
MAGNESIUM SERPL-MCNC: 1.7 MG/DL (ref 1.6–2.6)
MCH RBC QN AUTO: 26.6 PG (ref 27–31)
MCHC RBC AUTO-ENTMCNC: 30.4 G/DL (ref 32–36)
MCV RBC AUTO: 88 FL (ref 82–98)
MICROSCOPIC COMMENT: ABNORMAL
MONOCYTES # BLD AUTO: 0.6 K/UL (ref 0.3–1)
MONOCYTES NFR BLD: 8.7 % (ref 4–15)
NEUTROPHILS # BLD AUTO: 5.1 K/UL (ref 1.8–7.7)
NEUTROPHILS NFR BLD: 73.6 % (ref 38–73)
NITRITE UR QL STRIP: NEGATIVE
NRBC BLD-RTO: 0 /100 WBC
PH UR STRIP: 7 [PH] (ref 5–8)
PLATELET # BLD AUTO: 151 K/UL (ref 150–450)
PMV BLD AUTO: 10.8 FL (ref 9.2–12.9)
POTASSIUM SERPL-SCNC: 3.9 MMOL/L (ref 3.5–5.1)
PROT SERPL-MCNC: 6.4 G/DL (ref 6–8.4)
PROT UR QL STRIP: NEGATIVE
PROTHROMBIN TIME: 11.6 SEC (ref 9–12.5)
RBC # BLD AUTO: 4.43 M/UL (ref 4.6–6.2)
RBC #/AREA URNS HPF: 1 /HPF (ref 0–4)
SODIUM SERPL-SCNC: 136 MMOL/L (ref 136–145)
SP GR UR STRIP: 1.02 (ref 1–1.03)
SQUAMOUS #/AREA URNS HPF: 1 /HPF
TROPONIN I SERPL HS-MCNC: 28.8 PG/ML (ref 0–14.9)
URN SPEC COLLECT METH UR: ABNORMAL
UROBILINOGEN UR STRIP-ACNC: NEGATIVE EU/DL
WBC # BLD AUTO: 6.89 K/UL (ref 3.9–12.7)
WBC #/AREA URNS HPF: 1 /HPF (ref 0–5)
YEAST URNS QL MICRO: ABNORMAL

## 2023-02-03 PROCEDURE — 85025 COMPLETE CBC W/AUTO DIFF WBC: CPT | Performed by: EMERGENCY MEDICINE

## 2023-02-03 PROCEDURE — 84484 ASSAY OF TROPONIN QUANT: CPT | Performed by: EMERGENCY MEDICINE

## 2023-02-03 PROCEDURE — 25000003 PHARM REV CODE 250: Performed by: EMERGENCY MEDICINE

## 2023-02-03 PROCEDURE — 80053 COMPREHEN METABOLIC PANEL: CPT | Performed by: EMERGENCY MEDICINE

## 2023-02-03 PROCEDURE — 93010 ELECTROCARDIOGRAM REPORT: CPT | Mod: ,,, | Performed by: SPECIALIST

## 2023-02-03 PROCEDURE — 93010 EKG 12-LEAD: ICD-10-PCS | Mod: ,,, | Performed by: SPECIALIST

## 2023-02-03 PROCEDURE — 85730 THROMBOPLASTIN TIME PARTIAL: CPT | Performed by: EMERGENCY MEDICINE

## 2023-02-03 PROCEDURE — 81001 URINALYSIS AUTO W/SCOPE: CPT | Performed by: EMERGENCY MEDICINE

## 2023-02-03 PROCEDURE — 85610 PROTHROMBIN TIME: CPT | Performed by: EMERGENCY MEDICINE

## 2023-02-03 PROCEDURE — 83735 ASSAY OF MAGNESIUM: CPT | Performed by: EMERGENCY MEDICINE

## 2023-02-03 PROCEDURE — 93005 ELECTROCARDIOGRAM TRACING: CPT | Mod: 59 | Performed by: SPECIALIST

## 2023-02-03 PROCEDURE — 12002 RPR S/N/AX/GEN/TRNK2.6-7.5CM: CPT

## 2023-02-03 PROCEDURE — 99285 EMERGENCY DEPT VISIT HI MDM: CPT | Mod: 25

## 2023-02-03 PROCEDURE — 82962 GLUCOSE BLOOD TEST: CPT

## 2023-02-03 RX ORDER — LIDOCAINE HYDROCHLORIDE AND EPINEPHRINE 10; 10 MG/ML; UG/ML
10 INJECTION, SOLUTION INFILTRATION; PERINEURAL ONCE
Status: COMPLETED | OUTPATIENT
Start: 2023-02-03 | End: 2023-02-03

## 2023-02-03 RX ORDER — HYDROCODONE BITARTRATE AND ACETAMINOPHEN 5; 325 MG/1; MG/1
1 TABLET ORAL EVERY 4 HOURS PRN
Qty: 18 TABLET | Refills: 0 | Status: SHIPPED | OUTPATIENT
Start: 2023-02-03 | End: 2023-09-21

## 2023-02-03 RX ORDER — HYDROCODONE BITARTRATE AND ACETAMINOPHEN 5; 325 MG/1; MG/1
1 TABLET ORAL
Status: COMPLETED | OUTPATIENT
Start: 2023-02-03 | End: 2023-02-03

## 2023-02-03 RX ADMIN — HYDROCODONE BITARTRATE AND ACETAMINOPHEN 1 TABLET: 5; 325 TABLET ORAL at 02:02

## 2023-02-03 RX ADMIN — LIDOCAINE HYDROCHLORIDE AND EPINEPHRINE 10 ML: 10; 10 INJECTION, SOLUTION INFILTRATION; PERINEURAL at 12:02

## 2023-02-03 NOTE — ED PROVIDER NOTES
Encounter Date: 2/3/2023       History     Chief Complaint   Patient presents with    Loss of Consciousness     LOC [facility]. Pt has large laceration to back of head and forehead. Pt has been dealing with dizzy spells for the past six months and has not contacted cardiologist. +Bloodthinners     Patient with history of peripheral neuropathy.  Patient does have pacemaker.  Patient was walking and his legs became weak.  Possible syncopal episode with less than 2nd loss of consciousness according the patient.  Patient reports scalp laceration.  Since patient has been emergency department he developed some low back pain.  He does have skin tear to his right elbow.  Patient arrives with ambulance.  He denies any chest pain shortness of breath.  No trouble vision speech.  No focal weakness.    Review of patient's allergies indicates:   Allergen Reactions    Adhesive Other (See Comments)     SILK TAPE PULL SKIN OFF    Metformin Other (See Comments)     Weight loss cachexia anorexia,diarrhea.    Pcn [penicillins]      Patient states he passed out from this medication when he was 10 years old.      Past Medical History:   Diagnosis Date    Anemia     Anticoagulant long-term use     Arthritis     CAD (coronary artery disease)     CABG, STENTS '97,'99,'01,'05    Cancer     SKIN    Cataract     Diabetes mellitus     Diabetes mellitus type II     GERD (gastroesophageal reflux disease)     GI bleed 2020    Hypertension     Myocardial infarction     Wears glasses      Past Surgical History:   Procedure Laterality Date    CARDIAC PACEMAKER PLACEMENT      CARDIAC PACEMAKER PLACEMENT      CARDIAC PACEMAKER PLACEMENT  04/26/2018    replaced    CARDIAC SURGERY      1995   CABG X 5    CHOLECYSTECTOMY      COLON SURGERY      COLONOSCOPY N/A 2/21/2020    Procedure: COLONOSCOPY;  Surgeon: Abe Barragan III, MD;  Location: [facility];  Service: Endoscopy;  Laterality: N/A;    COLONOSCOPY N/A 2/23/2020    Procedure: COLONOSCOPY;   Surgeon: Bob Locke Jr., MD;  Location: [facility] ENDO;  Service: Endoscopy;  Laterality: N/A;    CORONARY ANGIOGRAPHY INCLUDING BYPASS GRAFTS WITH CATHETERIZATION OF LEFT HEART N/A 10/26/2022    Procedure: ANGIOGRAM, CORONARY, INCLUDING BYPASS GRAFT, WITH LEFT HEART CATHETERIZATION;  Surgeon: Robles Mckoy MD;  Location: [facility] CATH/EP LAB;  Service: Cardiology;  Laterality: N/A;    CORONARY ARTERY BYPASS GRAFT  1995    CORONARY STENT PLACEMENT      stent x 5    ESOPHAGOGASTRODUODENOSCOPY N/A 2/23/2020    Procedure: EGD (ESOPHAGOGASTRODUODENOSCOPY);  Surgeon: Bob Locke Jr., MD;  Location: [facility] ENDO;  Service: Endoscopy;  Laterality: N/A;    EYE SURGERY      BILAT CATARACT    head laceration   01/19/2018    HEMORRHOID SURGERY      SMALL BOWEL ENTEROSCOPY N/A 2/21/2020    Procedure: ENTEROSCOPY;  Surgeon: Abe Barragan III, MD;  Location: [facility] ENDO;  Service: Endoscopy;  Laterality: N/A;    stint       Family History   Problem Relation Age of Onset    Heart disease Mother     Heart disease Father     Hyperlipidemia Sister     Hypertension Sister     Heart disease Brother     Heart disease Brother     Heart disease Brother     Heart disease Brother     Heart disease Brother      Social History     Tobacco Use    Smoking status: Never    Smokeless tobacco: Never   Substance Use Topics    Alcohol use: No    Drug use: No     Review of Systems   Constitutional:  Negative for chills and fever.   HENT:  Negative for sore throat.    Eyes:  Negative for photophobia and visual disturbance.   Respiratory:  Negative for shortness of breath.    Cardiovascular:  Negative for chest pain.   Gastrointestinal:  Negative for abdominal pain and vomiting.   Genitourinary:  Negative for dysuria.   Musculoskeletal:  Negative for joint swelling.   Skin:  Negative for rash.   Neurological:  Negative for seizures and headaches.   Psychiatric/Behavioral:  Negative for confusion.      Physical Exam     Initial Vitals [02/03/23 1138]    BP Pulse Resp Temp SpO2   (!) 151/73 62 18 97.9 °F (36.6 °C) 95 %      MAP       --         Physical Exam    Nursing note and vitals reviewed.  Constitutional: He is not diaphoretic. No distress.   HENT:   7.5 cm laceration to posterior scalp.  No galea involvement.  There is active venous bleeding.   Eyes: Conjunctivae are normal.   Neck:   No cervical spine tenderness   Normal range of motion.  Cardiovascular:  Regular rhythm.           Pulmonary/Chest: Breath sounds normal.   Abdominal: Abdomen is soft. There is no abdominal tenderness.   Musculoskeletal:      Cervical back: Normal range of motion.      Comments: Small skin tear to right elbow.  Full range of motion right arm with no bony tenderness.  Moves all extremities equally.  There is some generalized low back pain to mid lumbar area with no midline tenderness.     Skin: No rash noted.   Psychiatric: He has a normal mood and affect.       ED Course   Lac Repair    Date/Time: 2/3/2023 2:50 PM  Performed by: Abe Lakhani MD  Authorized by: Abe Lakhani MD     Consent:     Consent obtained:  Verbal    Consent given by:  Patient    Risks, benefits, and alternatives were discussed: yes      Risks discussed:  Infection, pain, need for additional repair and poor cosmetic result  Laceration details:     Location:  Scalp    Scalp location:  Occipital    Length (cm):  7.5  Pre-procedure details:     Preparation:  Patient was prepped and draped in usual sterile fashion and imaging obtained to evaluate for foreign bodies  Exploration:     Hemostasis achieved with:  Direct pressure    Wound exploration: entire depth of wound visualized      Wound extent: no fascia violation noted    Treatment:     Area cleansed with:  Povidone-iodine    Amount of cleaning:  Extensive    Irrigation solution:  Sterile saline    Irrigation method:  Syringe    Visualized foreign bodies/material removed: no    Skin repair:     Repair method:  Staples  Approximation:      Approximation:  Close  Comments:      No galea involvement  Labs Reviewed   TROPONIN I HIGH SENSITIVITY - Abnormal; Notable for the following components:       Result Value    Troponin I High Sensitivity 28.8 (*)     All other components within normal limits   URINALYSIS, REFLEX TO URINE CULTURE - Abnormal; Notable for the following components:    Glucose, UA 4+ (*)     All other components within normal limits    Narrative:     Specimen Source->Urine   COMPREHENSIVE METABOLIC PANEL - Abnormal; Notable for the following components:    Glucose 166 (*)     BUN 33 (*)     Creatinine 1.7 (*)     eGFR 40.5 (*)     All other components within normal limits   CBC W/ AUTO DIFFERENTIAL - Abnormal; Notable for the following components:    RBC 4.43 (*)     Hemoglobin 11.8 (*)     Hematocrit 38.8 (*)     MCH 26.6 (*)     MCHC 30.4 (*)     RDW 14.6 (*)     Immature Granulocytes 0.6 (*)     Lymph # 0.9 (*)     Gran % 73.6 (*)     Lymph % 13.4 (*)     All other components within normal limits   URINALYSIS MICROSCOPIC - Abnormal; Notable for the following components:    Hyaline Casts, UA 3 (*)     All other components within normal limits    Narrative:     Specimen Source->Urine   POCT GLUCOSE - Abnormal; Notable for the following components:    POC Glucose 165 (*)     All other components within normal limits   PROTIME-INR   APTT   MAGNESIUM   POCT GLUCOSE MONITORING CONTINUOUS        ECG Results              EKG 12-lead (In process)  Result time 02/03/23 12:18:24      In process by Interface, Lab In [facility] (02/03/23 12:18:24)                   Narrative:    Test Reason : R55,    Vent. Rate : 061 BPM     Atrial Rate : 061 BPM     P-R Int : 216 ms          QRS Dur : 204 ms      QT Int : 482 ms       P-R-T Axes : 012 -88 084 degrees     QTc Int : 485 ms    Atrial-sensed ventricular-paced rhythm with prolonged AV conduction  Abnormal ECG  When compared with ECG of 26-OCT-2022 15:22,  Vent. rate has decreased BY   7 BPM    Referred  By:             Confirmed By:                                   Imaging Results              X-Ray Lumbar Spine 5 View (Final result)  Result time 02/03/23 14:22:32   Procedure changed from X-Ray Lumbar Spine Ap And Lateral     Final result by Gordy Ozuna MD (02/03/23 14:22:32)                   Narrative:    Lumbar spine 5 views    CLINICAL DATA: Fall injury, lower back pain    FINDINGS: 5 views are submitted. Comparison is made to November 2022.    There is a mild superior endplate compression fracture of L1, new when compared to November 2022, and likely acute. No other fractures are identified. There is no subluxation. Intervertebral disc height is well-preserved. No osseous destructive lesion is identified.    Changes of moderate degenerative facet disease are noted at L4-5 and L5-S1.    Dense aortic atherosclerotic calcification is noted.    IMPRESSION:  1. Mild superior endplate compression fracture of L1, new when compared to November 2022, and likely acute.  2. Moderate degenerative facet disease at L4-5 and L5-S1.    Electronically signed by:  Gordy Ozuna MD  2/3/2023 2:22 PM [facility] Workstation: [id number]                                     X-Ray Chest AP Portable (Final result)  Result time 02/03/23 13:04:01      Final result by Abiodun Finley MD (02/03/23 13:04:01)                   Narrative:    HISTORY: Syncope  fall, loss of consciousness.    FINDINGS: Portable chest radiograph at 1252 hours compared to prior exams shows triple lead left subclavian CCD with distal lead tips unchanged in position. There are median sternotomy wires and additional surgical clips, with the cardiomediastinal silhouette and pulmonary vasculature within normal limits.    The lungs are normally expanded, with no consolidation, large pleural effusion, or evidence of pulmonary edema. No confluent infiltrates or pneumothorax. There are no significant osseous abnormalities.    IMPRESSION: No evidence of active  cardiopulmonary disease.    Electronically signed by:  Abiodun Finley MD  2/3/2023 1:04 PM CST Workstation: [id number]                                     CT Head Without Contrast (Final result)  Result time 02/03/23 12:59:02      Final result by Mann Skinner MD (02/03/23 12:59:02)                   Narrative:    CMS MANDATED QUALITY DATA - CT RADIATION - 436    All CT scans at this facility utilize dose modulation, iterative reconstruction, and/or weight based dosing when appropriate to reduce radiation dose to as low as reasonably achievable.          Reason: Head trauma, minor (Age >= 65y) Fell hit back of head with LOC, also LAC to back of head and also forehead  Pt on plavix    TECHNIQUE: Head CT without IV contrast.    COMPARISON: 9/12/2022    FINDINGS:  Gray-white differentiation is maintained without hemorrhage, midline shift, or mass effect.  Periventricular and subcortical white matter low-attenuation bilaterally suggest chronic small vessel ischemic changes. Diffuse cerebral and cerebellar atrophy is evident.    Old lacunar infarcts affect bilateral caudate nuclei and bilateral lentiform nuclei.    The ventricles and cisterns are maintained.    Calvarium is intact. Visualized sinuses are clear. Superior parietal subgaleal scalp hematoma and soft tissue swelling is centered to the left of midline. Few foci of superior scalp soft tissue gas suggests associated laceration.    IMPRESSION:    1. No acute intracranial abnormality.  2. Unchanged chronic small vessel ischemic changes including old bilateral basal ganglia lacunar infarcts.  3. Superior parietal scalp soft tissue swelling, subgaleal hematoma, and soft tissue gas suggesting laceration.    Electronically signed by:  Mann Skinner MD  2/3/2023 12:59 PM CST Workstation: [id number]                                     Medications   LIDOcaine-EPINEPHrine 1%-1:100,000 injection 10 mL (10 mLs Intradermal Given 2/3/23 1244)   HYDROcodone-acetaminophen 5-325  mg per tablet 1 tablet (1 tablet Oral Given 2/3/23 7458)     Medical Decision Making:   History:   Old Medical Records: I decided to obtain old medical records.  Clinical Tests:   Lab Tests: Reviewed  The following lab test(s) were unremarkable: CBC and CMP  Radiological Study: Reviewed  Medical Tests: Reviewed  ED Management:  Patient with episode of weakness.  Possibly brief episode of syncope.  Patient likely had episode of weakness due to peripheral neuropathy.  Currently neurologically intact.  Patient has pacemaker.  Biotronik pacemaker interrogated with no bradycardic or tachycardic events.  Laboratory evaluation is stable.  Patient does have some back pain.  Patient has new L1 compression fracture.  Will refer to Physical therapy for possible brace.  Will begin hydrocodone.  Patient is stable for discharge home.                        Clinical Impression:   Final diagnoses:  [R55] Syncope  [S01.01XA] Laceration of scalp, initial encounter (Primary)  [S32.010A] Compression fracture of L1 vertebra, initial encounter        ED Disposition Condition    Discharge Stable          ED Prescriptions       Medication Sig Dispense Start Date End Date Auth. Provider    HYDROcodone-acetaminophen (NORCO) 5-325 mg per tablet Take 1 tablet by mouth every 4 (four) hours as needed for Pain. 18 tablet 2/3/2023 -- Abe Lakhani MD          Follow-up Information       Follow up With Specialties Details Why Contact Info Additional Information    [facility] [place] - Emergency Dept Emergency Medicine  If symptoms worsen [address] [id number] [phone] 1st floor    Scott Staley MD Family Medicine Schedule an appointment as soon as possible for a visit   [address]  [address]  [facility] [facility] [id number] [phone]       [facility]   Call to arrange physical therapy evaluation and possible brace for compression fracture. [facility]  [place] [facility]  [id number]  [phone]              Abe Lakhani MD  02/03/23 3156

## 2023-02-03 NOTE — ED NOTES
Bed: 08  Expected date:   Expected time:   Means of arrival:   Comments:  EMS fall with head lac

## 2023-02-16 DIAGNOSIS — I73.9 CLAUDICATION OF GLUTEAL REGION: ICD-10-CM

## 2023-02-16 RX ORDER — LANOLIN ALCOHOL/MO/W.PET/CERES
400 CREAM (GRAM) TOPICAL DAILY
Qty: 90 TABLET | Refills: 1 | Status: SHIPPED | OUTPATIENT
Start: 2023-02-16 | End: 2023-08-16

## 2023-02-17 ENCOUNTER — OFFICE VISIT (OUTPATIENT)
Dept: FAMILY MEDICINE | Facility: CLINIC | Age: 78
End: 2023-02-17
Payer: MEDICARE

## 2023-02-17 VITALS
RESPIRATION RATE: 18 BRPM | HEART RATE: 66 BPM | BODY MASS INDEX: 25.47 KG/M2 | HEIGHT: 72 IN | OXYGEN SATURATION: 99 % | SYSTOLIC BLOOD PRESSURE: 115 MMHG | TEMPERATURE: 98 F | WEIGHT: 188 LBS | DIASTOLIC BLOOD PRESSURE: 65 MMHG

## 2023-02-17 DIAGNOSIS — I25.118 CORONARY ARTERY DISEASE OF NATIVE ARTERY OF NATIVE HEART WITH STABLE ANGINA PECTORIS: ICD-10-CM

## 2023-02-17 DIAGNOSIS — E11.65 TYPE 2 DIABETES MELLITUS WITH HYPERGLYCEMIA, WITHOUT LONG-TERM CURRENT USE OF INSULIN: Primary | ICD-10-CM

## 2023-02-17 DIAGNOSIS — S32.010D COMPRESSION FRACTURE OF L1 VERTEBRA WITH ROUTINE HEALING, SUBSEQUENT ENCOUNTER: ICD-10-CM

## 2023-02-17 PROBLEM — S32.010A COMPRESSION FRACTURE OF L1 VERTEBRA: Status: ACTIVE | Noted: 2023-02-17

## 2023-02-17 PROCEDURE — 99214 OFFICE O/P EST MOD 30 MIN: CPT | Mod: 25,S$PBB,AQ, | Performed by: FAMILY MEDICINE

## 2023-02-17 PROCEDURE — 99214 PR OFFICE/OUTPT VISIT, EST, LEVL IV, 30-39 MIN: ICD-10-PCS | Mod: 25,S$PBB,AQ, | Performed by: FAMILY MEDICINE

## 2023-02-17 PROCEDURE — 99215 OFFICE O/P EST HI 40 MIN: CPT | Performed by: FAMILY MEDICINE

## 2023-02-17 NOTE — PROGRESS NOTES
Subjective:       Patient ID: Tono Saez is a 77 y.o. male.    Chief Complaint: Hypertension and Hyperlipidemia      Patient is here for follow-up after a fall Weeks ago was seen in the ER and found to have an L1 endplate compression fracture mild not candidate for vertebroplasty.  He is here for follow-up and removal of staples from the scalp.  Patient is on Tylenol the could not tolerate hydrocodone.    Hypertension  This is a chronic problem. The current episode started today. The problem is unchanged. The problem is controlled. Pertinent negatives include no anxiety, blurred vision, chest pain, headaches, malaise/fatigue, neck pain, orthopnea or palpitations.   Hyperlipidemia  Pertinent negatives include no chest pain.     Allergies and Medications:   Review of patient's allergies indicates:   Allergen Reactions    Adhesive Other (See Comments)     SILK TAPE PULL SKIN OFF    Metformin Other (See Comments)     Weight loss cachexia anorexia,diarrhea.    Pcn [penicillins]      Patient states he passed out from this medication when he was 10 years old.      Current Outpatient Medications   Medication Sig Dispense Refill    amLODIPine (NORVASC) 5 MG tablet TAKE ONE TABLET BY MOUTH ONCE DAILY (Patient taking differently: Take 5 mg by mouth once daily.) 90 tablet 0    aspirin (ECOTRIN) 81 MG EC tablet Take 2 tablets (162 mg total) by mouth once daily. (Patient taking differently: Take 81 mg by mouth once daily.) 120 tablet 0    atorvastatin (LIPITOR) 40 MG tablet Take 1 tablet (40 mg total) by mouth every evening. 90 tablet 1    betamethasone dipropionate (DIPROLENE) 0.05 % lotion Apply thin film to scalp bid prn itch 60 mL 6    clindamycin (CLEOCIN) 150 MG capsule Take by mouth.      finasteride (PROSCAR) 5 mg tablet Take 5 mg by mouth once daily.      fluorouracil 5% 5 % Soln Apply small amount to top of head 1-2x/day for 2 weeks 10 mL 1    fluticasone propionate (FLONASE) 50 mcg/actuation nasal spray Use nasally  as directed as needed      furosemide (LASIX) 20 MG tablet TAKE 1 TABLET (20 MG TOTAL) BY MOUTH ONCE DAILY. (Patient taking differently: Take 20 mg by mouth once daily.) 90 tablet 3    gabapentin (NEURONTIN) 300 MG capsule Take 1 capsule (300 mg total) by mouth 3 (three) times daily. 90 capsule 11    HYDROcodone-acetaminophen (NORCO) 5-325 mg per tablet Take 1 tablet by mouth every 4 (four) hours as needed for Pain. 18 tablet 0    isosorbide dinitrate (ISORDIL) 10 MG tablet Take 1 tablet (10 mg total) by mouth 2 (two) times daily. 60 tablet 11    JARDIANCE 25 mg tablet TAKE 1 TABLET (25 MG TOTAL) BY MOUTH ONCE DAILY. (Patient taking differently: Take 25 mg by mouth once daily.) 30 tablet 6    ketoconazole (NIZORAL) 2 % shampoo Wash all scaling areas let sit 3 minutes then rinse 3x/wk 120 mL 11    levocetirizine (XYZAL) 5 MG tablet TAKE ONE TABLET BY MOUTH EVERY EVENING (Patient taking differently: Take 5 mg by mouth every evening.) 60 tablet 11    magnesium oxide (MAG-OX) 400 mg (241.3 mg magnesium) tablet Take 1 tablet (400 mg total) by mouth once daily. 90 tablet 1    metoprolol tartrate (LOPRESSOR) 25 MG tablet Take 1 tablet (25 mg total) by mouth 2 (two) times daily. 120 tablet 5    montelukast (SINGULAIR) 10 mg tablet TAKE 1 TABLET (10 MG TOTAL) BY MOUTH EVERY EVENING. (Patient taking differently: Take 10 mg by mouth every evening.) 30 tablet 11    multivitamin capsule Take 1 capsule by mouth once daily.      mupirocin (BACTROBAN) 2 % ointment Apply topically 2 (two) times daily. 22 g 11    nitroGLYCERIN (NITROSTAT) 0.4 MG SL tablet DISSOLVE 1 TABLET UNDER THE TONGUE AS NEEDED FOR CHEST PAIN (Patient taking differently: Place 0.4 mg under the tongue every 5 (five) minutes as needed.) 100 tablet 3    pantoprazole (PROTONIX) 40 MG tablet TAKE ONE TABLET BY MOUTH ONCE DAILY (Patient taking differently: Take 40 mg by mouth once daily.) 90 tablet 1    pioglitazone (ACTOS) 15 MG tablet Take 1 tablet (15 mg total) by  mouth once daily. (Patient taking differently: Take 30 mg by mouth once daily.) 90 tablet 3    pregabalin (LYRICA) 50 MG capsule 1 po qhs x 1 wk then 1 po bid if no excess drowsiness (Patient taking differently: Take 50 mg by mouth 2 (two) times daily. 1 po qhs x 1 wk then 1 po bid if no excess drowsiness) 60 capsule 1    sertraline (ZOLOFT) 50 MG tablet TAKE ONE TABLET BY MOUTH ONCE DAILY (Patient taking differently: Take 50 mg by mouth once daily.) 90 tablet 1    sulfacetamide sodium (OVACE PLUS SHAMPOO) 10 % Sham Wash scalp nightly 1 each 11    tirzepatide (MOUNJARO) 5 mg/0.5 mL PnIj Inject 5 mg into the skin every 7 days. 4 pen 5    tolterodine (DETROL LA) 4 MG 24 hr capsule TAKE ONE CAPSULE BY MOUTH ONCE DAILY (Patient taking differently: Take 4 mg by mouth once daily.) 90 capsule 1    vitamin D 1000 units Tab Take 1,000 Units by mouth once daily.      clopidogreL (PLAVIX) 75 mg tablet Take 1 tablet (75 mg total) by mouth once daily. 90 tablet 0     No current facility-administered medications for this visit.       Family History:   Family History   Problem Relation Age of Onset    Heart disease Mother     Heart disease Father     Hyperlipidemia Sister     Hypertension Sister     Heart disease Brother     Heart disease Brother     Heart disease Brother     Heart disease Brother     Heart disease Brother        Social History:   Social History     Socioeconomic History    Marital status:    Tobacco Use    Smoking status: Never    Smokeless tobacco: Never   Substance and Sexual Activity    Alcohol use: No    Drug use: No    Sexual activity: Not Currently     Social Determinants of Health     Financial Resource Strain: Low Risk     Difficulty of Paying Living Expenses: Not hard at all   Food Insecurity: No Food Insecurity    Worried About Running Out of Food in the Last Year: Never true    Ran Out of Food in the Last Year: Never true   Transportation Needs: No Transportation Needs    Lack of Transportation  (Medical): No    Lack of Transportation (Non-Medical): No   Physical Activity: Inactive    Days of Exercise per Week: 0 days    Minutes of Exercise per Session: 0 min   Stress: No Stress Concern Present    Feeling of Stress : Not at all   Social Connections: Moderately Integrated    Frequency of Communication with Friends and Family: More than three times a week    Frequency of Social Gatherings with Friends and Family: Never    Attends Spiritism Services: More than 4 times per year    Active Member of Clubs or Organizations: Yes    Attends Club or Organization Meetings: Never    Marital Status:    Housing Stability: Low Risk     Unable to Pay for Housing in the Last Year: No    Number of Places Lived in the Last Year: 1    Unstable Housing in the Last Year: No       Review of Systems   Constitutional:  Negative for malaise/fatigue.   Eyes:  Negative for blurred vision.   Cardiovascular:  Negative for chest pain, palpitations and orthopnea.   Musculoskeletal:  Negative for neck pain.   Neurological:  Negative for headaches.     Objective:     Vitals:    02/17/23 1302   BP: 115/65   Pulse: 66   Resp: 18   Temp: 97.6 °F (36.4 °C)        Physical Exam  HENT:      Head:     Musculoskeletal:      Comments: Tenderness to range of motion lumbar left and right has some difficulty getting up out of the chair.     Sutures in his scalp removed time 16 without difficulty  Assessment:       1. Type 2 diabetes mellitus with hyperglycemia, without long-term current use of insulin    2. Compression fracture of L1 vertebra with routine healing, subsequent encounter        Plan:       Tono was seen today for hypertension and hyperlipidemia.    Diagnoses and all orders for this visit:    Type 2 diabetes mellitus with hyperglycemia, without long-term current use of insulin    Compression fracture of L1 vertebra with routine healing, subsequent encounter         Follow up if symptoms worsen or fail to improve, for annual.

## 2023-02-20 PROBLEM — E11.9 TYPE 2 DIABETES MELLITUS, WITHOUT LONG-TERM CURRENT USE OF INSULIN: Status: RESOLVED | Noted: 2019-10-30 | Resolved: 2023-02-20

## 2023-02-20 PROBLEM — E11.9 TYPE 2 DIABETES MELLITUS WITHOUT COMPLICATION: Status: RESOLVED | Noted: 2017-06-28 | Resolved: 2023-02-20

## 2023-02-20 PROBLEM — E11.9 TYPE 2 DIABETES MELLITUS WITHOUT COMPLICATION, WITHOUT LONG-TERM CURRENT USE OF INSULIN: Status: RESOLVED | Noted: 2017-08-25 | Resolved: 2023-02-20

## 2023-02-20 PROCEDURE — G0180 PR HOME HEALTH MD CERTIFICATION: ICD-10-PCS | Mod: ,,, | Performed by: FAMILY MEDICINE

## 2023-02-20 PROCEDURE — G0180 MD CERTIFICATION HHA PATIENT: HCPCS | Mod: ,,, | Performed by: FAMILY MEDICINE

## 2023-02-22 ENCOUNTER — TELEPHONE (OUTPATIENT)
Dept: FAMILY MEDICINE | Facility: CLINIC | Age: 78
End: 2023-02-22

## 2023-02-22 NOTE — TELEPHONE ENCOUNTER
Patient had stitches removed on last visit 2-17-23, from his head.  There were two left in place.  He has another appt on March 1st and wants to know if he needs to come in sooner to get these other stitches removed.  They are not bothering him but he does not want them to get buried in his head.

## 2023-02-23 ENCOUNTER — TELEPHONE (OUTPATIENT)
Dept: CARDIOLOGY | Facility: CLINIC | Age: 78
End: 2023-02-23
Payer: MEDICARE

## 2023-02-27 ENCOUNTER — OFFICE VISIT (OUTPATIENT)
Dept: FAMILY MEDICINE | Facility: CLINIC | Age: 78
End: 2023-02-27
Payer: MEDICARE

## 2023-02-27 VITALS
BODY MASS INDEX: 25.6 KG/M2 | HEIGHT: 72 IN | TEMPERATURE: 98 F | DIASTOLIC BLOOD PRESSURE: 62 MMHG | RESPIRATION RATE: 18 BRPM | OXYGEN SATURATION: 97 % | HEART RATE: 66 BPM | SYSTOLIC BLOOD PRESSURE: 117 MMHG | WEIGHT: 189 LBS

## 2023-02-27 DIAGNOSIS — E53.8 B12 NEUROPATHY: ICD-10-CM

## 2023-02-27 DIAGNOSIS — S32.010D COMPRESSION FRACTURE OF L1 VERTEBRA WITH ROUTINE HEALING, SUBSEQUENT ENCOUNTER: Primary | ICD-10-CM

## 2023-02-27 DIAGNOSIS — I50.22 CHRONIC SYSTOLIC CONGESTIVE HEART FAILURE: ICD-10-CM

## 2023-02-27 DIAGNOSIS — I49.5 SSS (SICK SINUS SYNDROME): ICD-10-CM

## 2023-02-27 DIAGNOSIS — N18.32 STAGE 3B CHRONIC KIDNEY DISEASE: ICD-10-CM

## 2023-02-27 DIAGNOSIS — S01.01XD LACERATION OF OCCIPITAL REGION OF SCALP, SUBSEQUENT ENCOUNTER: ICD-10-CM

## 2023-02-27 DIAGNOSIS — G63 B12 NEUROPATHY: ICD-10-CM

## 2023-02-27 PROCEDURE — 99215 OFFICE O/P EST HI 40 MIN: CPT | Performed by: FAMILY MEDICINE

## 2023-02-27 PROCEDURE — 99214 OFFICE O/P EST MOD 30 MIN: CPT | Mod: S$PBB,AQ,, | Performed by: FAMILY MEDICINE

## 2023-02-27 PROCEDURE — 99214 PR OFFICE/OUTPT VISIT, EST, LEVL IV, 30-39 MIN: ICD-10-PCS | Mod: S$PBB,AQ,, | Performed by: FAMILY MEDICINE

## 2023-02-27 RX ORDER — DICLOFENAC SODIUM 10 MG/G
2 GEL TOPICAL DAILY
Qty: 200 G | Refills: 3 | Status: SHIPPED | OUTPATIENT
Start: 2023-02-27 | End: 2023-06-19

## 2023-02-27 NOTE — PROGRESS NOTES
Subjective:       Patient ID: Tono Saez is a 77 y.o. male.    Chief Complaint: Hypertension and Hyperlipidemia      Patient is here as a follow-up from last visit had an extensive scalp pressor laceration with multiple sutures but found a retained suture in place.Patient Active Problem List:     Tinea cruris     Rectal bleeding     Anal fissure     HTN (hypertension)     Anal fissure     Diarrhea of presumed infectious origin     H/O cachexia     Weight loss, abnormal     Chronic diarrhea     Bronchitis     Laceration of occipital region of scalp     Laceration of forehead, left, complicated     Hematoma of frontal scalp     Edema eyelid, unspecified laterality     Chronic (childhood) granulomatous disease     Actinic keratoses     Mixed hyperlipidemia     Seasonal allergic rhinitis due to pollen     Non-allergic rhinitis     Pansinusitis     Type 2 diabetes mellitus with hyperglycemia      Immunization due     GERD (gastroesophageal reflux disease)     CRI (chronic renal insufficiency), stage 1     Anemia     Fall     Gait instability     Chronic anticoagulation     Contusion of buttock     Chest pain     Upper GI bleed     Coronary artery disease of native artery of native heart with stable angina pectoris     TIA (transient ischemic attack)     Dysfunction of both eustachian tubes     Pain in both lower extremities     Pain     Gross hematuria     Compression fracture of L1 vertebra-today as a follow-up on his previous compression fracture he is continued to have pain and is not starting physical therapy until today.        Hypertension  This is a chronic problem. The current episode started today. The problem is unchanged. The problem is controlled. Pertinent negatives include no anxiety, blurred vision, chest pain, headaches, malaise/fatigue, neck pain, orthopnea or palpitations.   Hyperlipidemia  Pertinent negatives include no chest pain.     Allergies and Medications:   Review of patient's allergies  indicates:   Allergen Reactions    Adhesive Other (See Comments)     SILK TAPE PULL SKIN OFF    Metformin Other (See Comments)     Weight loss cachexia anorexia,diarrhea.    Pcn [penicillins]      Patient states he passed out from this medication when he was 10 years old.      Current Outpatient Medications   Medication Sig Dispense Refill    amLODIPine (NORVASC) 5 MG tablet TAKE ONE TABLET BY MOUTH ONCE DAILY (Patient taking differently: Take 5 mg by mouth once daily.) 90 tablet 0    aspirin (ECOTRIN) 81 MG EC tablet Take 2 tablets (162 mg total) by mouth once daily. (Patient taking differently: Take 81 mg by mouth once daily.) 120 tablet 0    atorvastatin (LIPITOR) 40 MG tablet Take 1 tablet (40 mg total) by mouth every evening. 90 tablet 1    betamethasone dipropionate (DIPROLENE) 0.05 % lotion Apply thin film to scalp bid prn itch 60 mL 6    clindamycin (CLEOCIN) 150 MG capsule Take by mouth.      finasteride (PROSCAR) 5 mg tablet Take 5 mg by mouth once daily.      fluorouracil 5% 5 % Soln Apply small amount to top of head 1-2x/day for 2 weeks 10 mL 1    fluticasone propionate (FLONASE) 50 mcg/actuation nasal spray Use nasally as directed as needed      furosemide (LASIX) 20 MG tablet TAKE 1 TABLET (20 MG TOTAL) BY MOUTH ONCE DAILY. (Patient taking differently: Take 20 mg by mouth once daily.) 90 tablet 3    gabapentin (NEURONTIN) 300 MG capsule Take 1 capsule (300 mg total) by mouth 3 (three) times daily. 90 capsule 11    HYDROcodone-acetaminophen (NORCO) 5-325 mg per tablet Take 1 tablet by mouth every 4 (four) hours as needed for Pain. 18 tablet 0    isosorbide dinitrate (ISORDIL) 10 MG tablet Take 1 tablet (10 mg total) by mouth 2 (two) times daily. 60 tablet 11    JARDIANCE 25 mg tablet TAKE 1 TABLET (25 MG TOTAL) BY MOUTH ONCE DAILY. (Patient taking differently: Take 25 mg by mouth once daily.) 30 tablet 6    ketoconazole (NIZORAL) 2 % shampoo Wash all scaling areas let sit 3 minutes then rinse 3x/wk 120  mL 11    levocetirizine (XYZAL) 5 MG tablet TAKE ONE TABLET BY MOUTH EVERY EVENING (Patient taking differently: Take 5 mg by mouth every evening.) 60 tablet 11    magnesium oxide (MAG-OX) 400 mg (241.3 mg magnesium) tablet Take 1 tablet (400 mg total) by mouth once daily. 90 tablet 1    metoprolol tartrate (LOPRESSOR) 25 MG tablet Take 1 tablet (25 mg total) by mouth 2 (two) times daily. 120 tablet 5    montelukast (SINGULAIR) 10 mg tablet TAKE 1 TABLET (10 MG TOTAL) BY MOUTH EVERY EVENING. (Patient taking differently: Take 10 mg by mouth every evening.) 30 tablet 11    multivitamin capsule Take 1 capsule by mouth once daily.      mupirocin (BACTROBAN) 2 % ointment Apply topically 2 (two) times daily. 22 g 11    nitroGLYCERIN (NITROSTAT) 0.4 MG SL tablet DISSOLVE 1 TABLET UNDER THE TONGUE AS NEEDED FOR CHEST PAIN (Patient taking differently: Place 0.4 mg under the tongue every 5 (five) minutes as needed.) 100 tablet 3    pantoprazole (PROTONIX) 40 MG tablet TAKE ONE TABLET BY MOUTH ONCE DAILY (Patient taking differently: Take 40 mg by mouth once daily.) 90 tablet 1    pioglitazone (ACTOS) 15 MG tablet Take 1 tablet (15 mg total) by mouth once daily. (Patient taking differently: Take 30 mg by mouth once daily.) 90 tablet 3    pregabalin (LYRICA) 50 MG capsule 1 po qhs x 1 wk then 1 po bid if no excess drowsiness (Patient taking differently: Take 50 mg by mouth 2 (two) times daily. 1 po qhs x 1 wk then 1 po bid if no excess drowsiness) 60 capsule 1    sertraline (ZOLOFT) 50 MG tablet TAKE ONE TABLET BY MOUTH ONCE DAILY (Patient taking differently: Take 50 mg by mouth once daily.) 90 tablet 1    sulfacetamide sodium (OVACE PLUS SHAMPOO) 10 % Sham Wash scalp nightly 1 each 11    tirzepatide (MOUNJARO) 5 mg/0.5 mL PnIj Inject 5 mg into the skin every 7 days. 4 pen 5    tolterodine (DETROL LA) 4 MG 24 hr capsule TAKE ONE CAPSULE BY MOUTH ONCE DAILY (Patient taking differently: Take 4 mg by mouth once daily.) 90 capsule 1     vitamin D 1000 units Tab Take 1,000 Units by mouth once daily.      clopidogreL (PLAVIX) 75 mg tablet Take 1 tablet (75 mg total) by mouth once daily. 90 tablet 0    diclofenac sodium (VOLTAREN) 1 % Gel Apply 2 g topically once daily. 200 g 3     No current facility-administered medications for this visit.       Family History:   Family History   Problem Relation Age of Onset    Heart disease Mother     Heart disease Father     Hyperlipidemia Sister     Hypertension Sister     Heart disease Brother     Heart disease Brother     Heart disease Brother     Heart disease Brother     Heart disease Brother        Social History:   Social History     Socioeconomic History    Marital status:    Tobacco Use    Smoking status: Never    Smokeless tobacco: Never   Substance and Sexual Activity    Alcohol use: No    Drug use: No    Sexual activity: Not Currently     Social Determinants of Health     Financial Resource Strain: Low Risk     Difficulty of Paying Living Expenses: Not hard at all   Food Insecurity: No Food Insecurity    Worried About Running Out of Food in the Last Year: Never true    Ran Out of Food in the Last Year: Never true   Transportation Needs: No Transportation Needs    Lack of Transportation (Medical): No    Lack of Transportation (Non-Medical): No   Physical Activity: Inactive    Days of Exercise per Week: 0 days    Minutes of Exercise per Session: 0 min   Stress: No Stress Concern Present    Feeling of Stress : Not at all   Social Connections: Moderately Integrated    Frequency of Communication with Friends and Family: More than three times a week    Frequency of Social Gatherings with Friends and Family: Never    Attends Islam Services: More than 4 times per year    Active Member of Clubs or Organizations: Yes    Attends Club or Organization Meetings: Never    Marital Status:    Housing Stability: Low Risk     Unable to Pay for Housing in the Last Year: No    Number of Places Lived  in the Last Year: 1    Unstable Housing in the Last Year: No       Review of Systems   Constitutional:  Negative for malaise/fatigue.   Eyes:  Negative for blurred vision.   Cardiovascular:  Negative for chest pain, palpitations and orthopnea.   Musculoskeletal:  Negative for neck pain.   Neurological:  Negative for headaches.     Objective:     Vitals:    02/27/23 1343   BP: 117/62   Pulse: 66   Resp: 18   Temp: 97.6 °F (36.4 °C)        Physical Exam  HENT:      Head:         Assessment:       1. Compression fracture of L1 vertebra with routine healing, subsequent encounter    2. Laceration of occipital region of scalp, subsequent encounter        Plan:       Tono was seen today for hypertension and hyperlipidemia.    Diagnoses and all orders for this visit:    Compression fracture of L1 vertebra with routine healing, subsequent encounter  -     diclofenac sodium (VOLTAREN) 1 % Gel; Apply 2 g topically once daily.  -     Ambulatory referral/consult to Physical Medicine Rehab; Future    Laceration of occipital region of scalp, subsequent encounter         Follow up in about 3 months (around 5/27/2023).

## 2023-02-28 ENCOUNTER — TELEPHONE (OUTPATIENT)
Dept: FAMILY MEDICINE | Facility: CLINIC | Age: 78
End: 2023-02-28
Payer: MEDICARE

## 2023-03-10 ENCOUNTER — OFFICE VISIT (OUTPATIENT)
Dept: CARDIOLOGY | Facility: CLINIC | Age: 78
End: 2023-03-10
Payer: MEDICARE

## 2023-03-10 VITALS
OXYGEN SATURATION: 98 % | HEIGHT: 72 IN | BODY MASS INDEX: 25.1 KG/M2 | WEIGHT: 185.31 LBS | RESPIRATION RATE: 16 BRPM | HEART RATE: 75 BPM | DIASTOLIC BLOOD PRESSURE: 72 MMHG | SYSTOLIC BLOOD PRESSURE: 124 MMHG

## 2023-03-10 DIAGNOSIS — I50.22 CHRONIC SYSTOLIC CONGESTIVE HEART FAILURE: ICD-10-CM

## 2023-03-10 DIAGNOSIS — I49.5 SSS (SICK SINUS SYNDROME): ICD-10-CM

## 2023-03-10 DIAGNOSIS — Z95.0 CARDIAC PACEMAKER IN SITU: ICD-10-CM

## 2023-03-10 DIAGNOSIS — K02.9 TOOTH DECAYED: ICD-10-CM

## 2023-03-10 DIAGNOSIS — Z79.01 CHRONIC ANTICOAGULATION: ICD-10-CM

## 2023-03-10 DIAGNOSIS — I25.118 CORONARY ARTERY DISEASE OF NATIVE ARTERY OF NATIVE HEART WITH STABLE ANGINA PECTORIS: Primary | ICD-10-CM

## 2023-03-10 DIAGNOSIS — N18.32 STAGE 3B CHRONIC KIDNEY DISEASE: ICD-10-CM

## 2023-03-10 DIAGNOSIS — I10 PRIMARY HYPERTENSION: ICD-10-CM

## 2023-03-10 DIAGNOSIS — S32.010D COMPRESSION FRACTURE OF L1 VERTEBRA WITH ROUTINE HEALING, SUBSEQUENT ENCOUNTER: ICD-10-CM

## 2023-03-10 DIAGNOSIS — E11.65 TYPE 2 DIABETES MELLITUS WITH HYPERGLYCEMIA, WITHOUT LONG-TERM CURRENT USE OF INSULIN: ICD-10-CM

## 2023-03-10 PROCEDURE — 99214 OFFICE O/P EST MOD 30 MIN: CPT | Mod: S$PBB,,, | Performed by: NURSE PRACTITIONER

## 2023-03-10 PROCEDURE — 99999 PR PBB SHADOW E&M-EST. PATIENT-LVL V: ICD-10-PCS | Mod: PBBFAC,,, | Performed by: NURSE PRACTITIONER

## 2023-03-10 PROCEDURE — 99215 OFFICE O/P EST HI 40 MIN: CPT | Mod: PBBFAC,PN | Performed by: NURSE PRACTITIONER

## 2023-03-10 PROCEDURE — 99999 PR PBB SHADOW E&M-EST. PATIENT-LVL V: CPT | Mod: PBBFAC,,, | Performed by: NURSE PRACTITIONER

## 2023-03-10 PROCEDURE — 99214 PR OFFICE/OUTPT VISIT, EST, LEVL IV, 30-39 MIN: ICD-10-PCS | Mod: S$PBB,,, | Performed by: NURSE PRACTITIONER

## 2023-03-10 NOTE — ASSESSMENT & PLAN NOTE
Pacemaker in-situ, last device check was in January of 2023   1. Normal device function.   2. Chronic high RA impedance and threshold. This is a known issue; will continue to monitor.   3. RTC: One year.

## 2023-03-10 NOTE — ASSESSMENT & PLAN NOTE
Patient is identified as having systolic heart failure that is chronic. CHF is currently well controlled. Latest ECHO performed and demonstrates- Results for orders placed during the hospital encounter of 10/26/22    Echo    Interpretation Summary  · The left ventricle is normal in size with mild concentric hypertrophy and normal systolic function.  · The estimated ejection fraction is 55%.  · Indeterminate left ventricular diastolic function.  · There is abnormal septal wall motion consistent with post-operative status.  · Moderate to severe left atrial enlargement.  · Mild to moderate tricuspid regurgitation.  · Mild-to-moderate aortic regurgitation.  · There is mild aortic valve stenosis.  . Continue Lasix, metoprolol, Imdur, and monitor clinical status closely. Monitor on telemetry. Patient is on the CHF pathway.  Monitor strict Is&Os and daily weights.  Place on fluid restriction of 1.5 L Continue to stress to patient importance of self efficacy and  on diet for CHF. Last BNP reviewed- and noted below @LABRCNTIP(BNP,BNPTRIAGEBLO)@.     Latest Reference Range & Units Most Recent   BNP 0 - 99 pg/mL 199 (H)  2/28/22 16:12   (H): Data is abnormally high

## 2023-03-10 NOTE — ASSESSMENT & PLAN NOTE
Pt has seen oral surgeon and recommended tooth #8 to be extracted due to severe decay under a crown/filling.  Patient states he was initially denied by this office to have cardiac clearance due to recent stent in October of 2022.  Patient states he can not possibly wait until October to have this tooth fixed as it was causing a lot of pain and issues for him with p.o. intake.      This case was discussed with Dr. Mckoy, cardiologist who performed stent for patient.  Patient is on aspirin and Plavix antiplatelet therapies.  Dr. Mckoy advised holding aspirin 48 hours prior to to the tooth extraction and holding Plavix 5 days prior to tooth extraction.  Patient advised to resume both antiplatelet therapies as soon as possible per dentist instructions.  Patient was advised that he is at increased risk for heart attack and coronary stent obstruction due to holding anti platelet therapies.  Patient understands this risk and understands the importance of going to the nearest ER or calling 911 for any chest pain.

## 2023-03-10 NOTE — ASSESSMENT & PLAN NOTE
Creatinine has declined, 40.5.   Latest Reference Range & Units Most Recent   eGFR >60 mL/min/1.73 m^2 40.5 !  2/3/23 12:46   !: Data is abnormal    Advised avoidance of nephrotoxic agents and follow-up with Nephrology.  Patient has not established care with a nephrologist.  A referral to Dr. Monteiro has been placed and explained the rationale to the patient.

## 2023-03-10 NOTE — LETTER
March 10, 2023    Tono Saez  [address]  [place] [address]     Dr. Kat:        [facility] [place]  [address], [address]  [place] [address]  Phone: [phone]  Fax: [phone] Patient: Tono Saez  : 1945  Referring Doctor:  Telephone:  Hospital:  Type of Anesthesia:  Date of Last Stent:10/26/23  Date of Last Office Visit:3/10/23    Current Outpatient Medications   Medication Sig    amLODIPine (NORVASC) 5 MG tablet TAKE ONE TABLET BY MOUTH ONCE DAILY (Patient taking differently: Take 5 mg by mouth once daily.)    aspirin (ECOTRIN) 81 MG EC tablet Take 2 tablets (162 mg total) by mouth once daily. (Patient taking differently: Take 81 mg by mouth once daily.)    atorvastatin (LIPITOR) 40 MG tablet Take 1 tablet (40 mg total) by mouth every evening.    betamethasone dipropionate (DIPROLENE) 0.05 % lotion Apply thin film to scalp bid prn itch    clindamycin (CLEOCIN) 150 MG capsule Take by mouth.    clopidogreL (PLAVIX) 75 mg tablet Take 1 tablet (75 mg total) by mouth once daily.    diclofenac sodium (VOLTAREN) 1 % Gel Apply 2 g topically once daily.    finasteride (PROSCAR) 5 mg tablet Take 5 mg by mouth once daily.    fluorouracil 5% 5 % Soln Apply small amount to top of head 1-2x/day for 2 weeks    fluticasone propionate (FLONASE) 50 mcg/actuation nasal spray Use nasally as directed as needed    furosemide (LASIX) 20 MG tablet TAKE 1 TABLET (20 MG TOTAL) BY MOUTH ONCE DAILY. (Patient taking differently: Take 20 mg by mouth once daily.)    gabapentin (NEURONTIN) 300 MG capsule Take 1 capsule (300 mg total) by mouth 3 (three) times daily. (Patient not taking: Reported on 3/10/2023)    HYDROcodone-acetaminophen (NORCO) 5-325 mg per tablet Take 1 tablet by mouth every 4 (four) hours as needed for Pain.    isosorbide dinitrate (ISORDIL) 10 MG tablet Take 1 tablet (10 mg total) by mouth 2 (two) times daily.    JARDIANCE 25 mg  tablet TAKE 1 TABLET (25 MG TOTAL) BY MOUTH ONCE DAILY. (Patient taking differently: Take 25 mg by mouth once daily.)    ketoconazole (NIZORAL) 2 % shampoo Wash all scaling areas let sit 3 minutes then rinse 3x/wk    levocetirizine (XYZAL) 5 MG tablet TAKE ONE TABLET BY MOUTH EVERY EVENING (Patient taking differently: Take 5 mg by mouth every evening.)    magnesium oxide (MAG-OX) 400 mg (241.3 mg magnesium) tablet Take 1 tablet (400 mg total) by mouth once daily.    metoprolol tartrate (LOPRESSOR) 25 MG tablet Take 1 tablet (25 mg total) by mouth 2 (two) times daily.    montelukast (SINGULAIR) 10 mg tablet TAKE 1 TABLET (10 MG TOTAL) BY MOUTH EVERY EVENING. (Patient taking differently: Take 10 mg by mouth every evening.)    multivitamin capsule Take 1 capsule by mouth once daily.    mupirocin (BACTROBAN) 2 % ointment Apply topically 2 (two) times daily.    nitroGLYCERIN (NITROSTAT) 0.4 MG SL tablet DISSOLVE 1 TABLET UNDER THE TONGUE AS NEEDED FOR CHEST PAIN (Patient taking differently: Place 0.4 mg under the tongue every 5 (five) minutes as needed.)    pantoprazole (PROTONIX) 40 MG tablet TAKE ONE TABLET BY MOUTH ONCE DAILY (Patient taking differently: Take 40 mg by mouth once daily.)    pioglitazone (ACTOS) 15 MG tablet Take 1 tablet (15 mg total) by mouth once daily. (Patient taking differently: Take 30 mg by mouth once daily.)    pregabalin (LYRICA) 50 MG capsule 1 po qhs x 1 wk then 1 po bid if no excess drowsiness (Patient taking differently: Take 50 mg by mouth 2 (two) times daily. 1 po qhs x 1 wk then 1 po bid if no excess drowsiness)    sertraline (ZOLOFT) 50 MG tablet TAKE ONE TABLET BY MOUTH ONCE DAILY (Patient taking differently: Take 50 mg by mouth once daily.)    sulfacetamide sodium (OVACE PLUS SHAMPOO) 10 % Sham Wash scalp nightly    tirzepatide (MOUNJARO) 5 mg/0.5 mL PnIj Inject 5 mg into the skin every 7 days.    tolterodine (DETROL LA) 4 MG 24 hr capsule TAKE ONE CAPSULE BY MOUTH ONCE DAILY  (Patient taking differently: Take 4 mg by mouth once daily.)    vitamin D 1000 units Tab Take 1,000 Units by mouth once daily.     No current facility-administered medications for this visit.       This patient has been assessed for risk factors for clearance of surgery with the following stipulations:    ___ No contraindications  __x_ Recommendations for antiplatelet/anticoagulant medications: pt has been advised to hold aspirin 81 mg for 48 hrs  prior to tooth extraction and hold plavix 75 mg for 5 days prior to extraction per Dr. Mckoy's direction  _x__ Cleared for surgery with the following contraindications/precautions:Pt is at risk for heart attack while being off antiplatelet therapies. Please resume Aspirin 81 mg daily and Plavix 75 mg daily as soon as possible post extraction  ___ Not cleared for surgery due to the following reasons:      If you have any questions regarding the above, please contact my office at [phone].    Sincerely,           LOKI Valentino

## 2023-03-10 NOTE — ASSESSMENT & PLAN NOTE
Last device check 1 -2023 showed:   1. Normal device function.   2. Chronic high RA impedance and threshold. This is a known issue; will continue to monitor.   3. RTC: One year.

## 2023-03-10 NOTE — ASSESSMENT & PLAN NOTE
Patient is status post stent on 10/26/2022.  He states he has been compliant with aspirin and Plavix as directed.  He states he is had no recent chest pain and has not needed nitroglycerin use since stenting.    Pt has seen oral surgeon and recommended tooth #8 to be extracted due to severe decay under a crown/filling.  Patient states he was initially denied by this office to have cardiac clearance due to recent stent in October of 2022.  Patient states he can not possibly wait until October to have this tooth fixed as it was causing a lot of pain and issues for him with p.o. intake.      This case was discussed with Dr. Mckoy, cardiologist who performed stent for patient.  Patient is on aspirin and Plavix antiplatelet therapies.  Dr. Mckoy advised holding aspirin 48 hours prior to to the tooth extraction and holding Plavix 5 days prior to tooth extraction.  Patient advised to resume both antiplatelet therapies as soon as possible per dentist instructions.  Patient was advised that he is at increased risk for heart attack and coronary stent obstruction due to holding anti platelet therapies.  Patient understands this risk and understands the importance of going to the nearest ER or calling 911 for any chest pain.
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
## 2023-03-10 NOTE — ASSESSMENT & PLAN NOTE
Last hemoglobin A1c   Latest Reference Range & Units Most Recent   Hemoglobin A1C External 4.5 - 6.2 % 8.3 (H)  1/12/23 14:30   (H): Data is abnormally high    Advised low carb heart healthy diet.  Follow up with primary care as directed.  Patient was on multiple p.o. agents for diabetes.

## 2023-03-16 ENCOUNTER — TELEPHONE (OUTPATIENT)
Dept: FAMILY MEDICINE | Facility: CLINIC | Age: 78
End: 2023-03-16

## 2023-03-16 DIAGNOSIS — I73.9 CLAUDICATION OF GLUTEAL REGION: ICD-10-CM

## 2023-03-16 DIAGNOSIS — I49.5 SSS (SICK SINUS SYNDROME): ICD-10-CM

## 2023-03-16 DIAGNOSIS — S32.010D COMPRESSION FRACTURE OF L1 VERTEBRA WITH ROUTINE HEALING, SUBSEQUENT ENCOUNTER: Primary | ICD-10-CM

## 2023-03-16 NOTE — TELEPHONE ENCOUNTER
Patient's In Home home health is about finished.  [facility]/ [facility]   would like orders for Out patient therapy.

## 2023-03-20 ENCOUNTER — EXTERNAL HOME HEALTH (OUTPATIENT)
Dept: HOME HEALTH SERVICES | Facility: HOSPITAL | Age: 78
End: 2023-03-20
Payer: MEDICARE

## 2023-03-24 ENCOUNTER — CLINICAL SUPPORT (OUTPATIENT)
Dept: REHABILITATION | Facility: HOSPITAL | Age: 78
End: 2023-03-24
Payer: MEDICARE

## 2023-03-24 DIAGNOSIS — I73.9 CLAUDICATION OF GLUTEAL REGION: ICD-10-CM

## 2023-03-24 DIAGNOSIS — Z74.09 IMPAIRED MOBILITY AND ACTIVITIES OF DAILY LIVING: ICD-10-CM

## 2023-03-24 DIAGNOSIS — M54.50 LUMBAR SPINE PAIN: ICD-10-CM

## 2023-03-24 DIAGNOSIS — Z78.9 IMPAIRED MOBILITY AND ACTIVITIES OF DAILY LIVING: ICD-10-CM

## 2023-03-24 DIAGNOSIS — I49.5 SSS (SICK SINUS SYNDROME): ICD-10-CM

## 2023-03-24 DIAGNOSIS — S32.010D COMPRESSION FRACTURE OF L1 VERTEBRA WITH ROUTINE HEALING, SUBSEQUENT ENCOUNTER: ICD-10-CM

## 2023-03-24 PROCEDURE — 97112 NEUROMUSCULAR REEDUCATION: CPT | Mod: PN

## 2023-03-24 PROCEDURE — 97161 PT EVAL LOW COMPLEX 20 MIN: CPT | Mod: PN
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
## 2023-03-28 ENCOUNTER — CLINICAL SUPPORT (OUTPATIENT)
Dept: REHABILITATION | Facility: HOSPITAL | Age: 78
End: 2023-03-28
Payer: MEDICARE

## 2023-03-28 ENCOUNTER — HOSPITAL ENCOUNTER (OUTPATIENT)
Dept: RADIOLOGY | Facility: HOSPITAL | Age: 78
Discharge: HOME | End: 2023-03-28
Attending: PHYSICAL MEDICINE & REHABILITATION
Payer: MEDICARE

## 2023-03-28 ENCOUNTER — OFFICE VISIT (OUTPATIENT)
Dept: SPINE | Facility: CLINIC | Age: 78
End: 2023-03-28
Payer: MEDICARE

## 2023-03-28 ENCOUNTER — DOCUMENTATION ONLY (OUTPATIENT)
Dept: REHABILITATION | Facility: HOSPITAL | Age: 78
End: 2023-03-28

## 2023-03-28 ENCOUNTER — TELEPHONE (OUTPATIENT)
Dept: SPINE | Facility: CLINIC | Age: 78
End: 2023-03-28

## 2023-03-28 VITALS — BODY MASS INDEX: 25.12 KG/M2 | HEIGHT: 72 IN | WEIGHT: 185.44 LBS

## 2023-03-28 DIAGNOSIS — M54.50 ACUTE BILATERAL LOW BACK PAIN WITHOUT SCIATICA: ICD-10-CM

## 2023-03-28 DIAGNOSIS — M54.50 ACUTE BILATERAL LOW BACK PAIN WITHOUT SCIATICA: Primary | ICD-10-CM

## 2023-03-28 DIAGNOSIS — Z78.9 IMPAIRED MOBILITY AND ACTIVITIES OF DAILY LIVING: ICD-10-CM

## 2023-03-28 DIAGNOSIS — M54.50 LUMBAR SPINE PAIN: Primary | ICD-10-CM

## 2023-03-28 DIAGNOSIS — S32.010D COMPRESSION FRACTURE OF L1 VERTEBRA WITH ROUTINE HEALING, SUBSEQUENT ENCOUNTER: ICD-10-CM

## 2023-03-28 DIAGNOSIS — Z74.09 IMPAIRED MOBILITY AND ACTIVITIES OF DAILY LIVING: ICD-10-CM

## 2023-03-28 PROCEDURE — 99214 PR OFFICE/OUTPT VISIT, EST, LEVL IV, 30-39 MIN: ICD-10-PCS | Mod: S$GLB,,, | Performed by: PHYSICAL MEDICINE & REHABILITATION

## 2023-03-28 PROCEDURE — 97530 THERAPEUTIC ACTIVITIES: CPT | Mod: PN,CQ

## 2023-03-28 PROCEDURE — 99214 OFFICE O/P EST MOD 30 MIN: CPT | Mod: S$GLB,,, | Performed by: PHYSICAL MEDICINE & REHABILITATION

## 2023-03-28 PROCEDURE — 72131 CT LUMBAR SPINE W/O DYE: CPT | Mod: TC,PO

## 2023-03-28 PROCEDURE — 97112 NEUROMUSCULAR REEDUCATION: CPT | Mod: PN,CQ

## 2023-03-28 PROCEDURE — 72192 CT PELVIS W/O DYE: CPT | Mod: TC,PO

## 2023-03-28 NOTE — PROGRESS NOTES
SUBJECTIVE:    Patient ID: Tono Saez is a 77 y.o. male.    Chief Complaint: Back Pain (L1 fx due to fall)    This is a 77-year-old man who sees Dr. Staley for his primary care.  History of coronary artery disease status post stent.  He has a pacemaker which he says is not MRI safe.  He is on Plavix.  Dr. Lux is his cardiologist.  Has history of diabetes with good blood sugar control.  No cancer history.  I note that he suffered a fall on or around 02/03/2023.  He was evaluated in the emergency room where he was treated for laceration to the scalp.  He was also complaining of low back pain and x-rays revealed a mild L1 compression fracture.  He was prescribed Norco and a back brace.  He says about 3 weeks ago he had a near fall and since then he has been having low back pain at the lumbosacral junction and over the tailbone.  He is not having a lot of pain at thoracolumbar junction.  He denies radicular leg pain.  No bowel or bladder dysfunction fever chills sweats or unexpected weight loss.  Takes Tylenol as needed for pain.  He is not having any discomfort at this time but sometimes pain goes up to about a 6/10.  Aside from the L1 compression fracture on his x-rays he has rather mild to moderate degenerative disc disease and facet arthropathy of the lower lumbar spine        Past Medical History:   Diagnosis Date    Anemia     Anticoagulant long-term use     Arthritis     CAD (coronary artery disease)     CABG, STENTS '97,'99,'01,'05    Cancer     SKIN    Cataract     Diabetes mellitus     Diabetes mellitus type II     GERD (gastroesophageal reflux disease)     GI bleed 2020    Hypertension     Myocardial infarction     Wears glasses      Social History     Socioeconomic History    Marital status:    Tobacco Use    Smoking status: Never    Smokeless tobacco: Never   Substance and Sexual Activity    Alcohol use: No    Drug use: No    Sexual activity: Not Currently     Social Determinants of Health      Financial Resource Strain: Low Risk     Difficulty of Paying Living Expenses: Not hard at all   Food Insecurity: No Food Insecurity    Worried About Running Out of Food in the Last Year: Never true    Ran Out of Food in the Last Year: Never true   Transportation Needs: No Transportation Needs    Lack of Transportation (Medical): No    Lack of Transportation (Non-Medical): No   Physical Activity: Inactive    Days of Exercise per Week: 0 days    Minutes of Exercise per Session: 0 min   Stress: No Stress Concern Present    Feeling of Stress : Not at all   Social Connections: Moderately Integrated    Frequency of Communication with Friends and Family: More than three times a week    Frequency of Social Gatherings with Friends and Family: Never    Attends Jewish Services: More than 4 times per year    Active Member of Clubs or Organizations: Yes    Attends Club or Organization Meetings: Never    Marital Status:    Housing Stability: Low Risk     Unable to Pay for Housing in the Last Year: No    Number of Places Lived in the Last Year: 1    Unstable Housing in the Last Year: No     Past Surgical History:   Procedure Laterality Date    CARDIAC PACEMAKER PLACEMENT      CARDIAC PACEMAKER PLACEMENT      CARDIAC PACEMAKER PLACEMENT  04/26/2018    replaced    CARDIAC SURGERY      1995   CABG X 5    CHOLECYSTECTOMY      COLON SURGERY      COLONOSCOPY N/A 2/21/2020    Procedure: COLONOSCOPY;  Surgeon: Abe Barragan III, MD;  Location: [facility] ENDO;  Service: Endoscopy;  Laterality: N/A;    COLONOSCOPY N/A 2/23/2020    Procedure: COLONOSCOPY;  Surgeon: Bob Locke Jr., MD;  Location: [facility] ENDO;  Service: Endoscopy;  Laterality: N/A;    CORONARY ANGIOGRAPHY INCLUDING BYPASS GRAFTS WITH CATHETERIZATION OF LEFT HEART N/A 10/26/2022    Procedure: ANGIOGRAM, CORONARY, INCLUDING BYPASS GRAFT, WITH LEFT HEART CATHETERIZATION;  Surgeon: Robles Mckoy MD;  Location: [facility] CATH/EP LAB;  Service: Cardiology;  Laterality:  N/A;    CORONARY ARTERY BYPASS GRAFT  1995    CORONARY STENT PLACEMENT      stent x 5    ESOPHAGOGASTRODUODENOSCOPY N/A 2/23/2020    Procedure: EGD (ESOPHAGOGASTRODUODENOSCOPY);  Surgeon: Bob Locke Jr., MD;  Location: [facility];  Service: Endoscopy;  Laterality: N/A;    EYE SURGERY      BILAT CATARACT    head laceration   01/19/2018    HEMORRHOID SURGERY      SMALL BOWEL ENTEROSCOPY N/A 2/21/2020    Procedure: ENTEROSCOPY;  Surgeon: Abe Barragan III, MD;  Location: [facility];  Service: Endoscopy;  Laterality: N/A;    stint       Family History   Problem Relation Age of Onset    Heart disease Mother     Heart disease Father     Hyperlipidemia Sister     Hypertension Sister     Heart disease Brother     Heart disease Brother     Heart disease Brother     Heart disease Brother     Heart disease Brother      Vitals:    03/28/23 1123   Weight: 84.1 kg (185 lb 6.5 oz)   Height: 6' (1.829 m)       Review of Systems   Constitutional:  Negative for chills, diaphoresis, fatigue, fever and unexpected weight change.   HENT:  Negative for trouble swallowing.    Eyes:  Negative for visual disturbance.   Respiratory:  Negative for shortness of breath.    Cardiovascular:  Negative for chest pain.   Gastrointestinal:  Negative for abdominal pain, constipation, diarrhea, nausea and vomiting.   Genitourinary:  Negative for difficulty urinating.   Musculoskeletal:  Negative for arthralgias, back pain, gait problem, joint swelling, myalgias, neck pain and neck stiffness.   Neurological:  Negative for dizziness, speech difficulty, weakness, light-headedness, numbness and headaches.        Objective:      Physical Exam  Neurological:      Mental Status: He is alert and oriented to person, place, and time.      Comments: He is awake and in no acute distress  Mild tenderness palpation lumbar paraspinous musculature at the lumbosacral junction with no external lesions or palpable masses noted  No pain on palpation at the  thoracolumbar junction  Forward flexion of the lumbar spine is normal and painless.  Extension causes mild pain at the lumbosacral junction  Reflexes- +1-+2 reflexes at the following:   C5-Biceps   C6-Brachioradialis   C7-Triceps   L3/4-Patellar   S1-Achilles   Yanira sign negative bilaterally.  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 Acute bilateral low back pain without sciatica    2. Compression fracture of L1 vertebra with routine healing, subsequent encounter             Plan:     He has a nonfocal neurological examination and no historical red flags.  I suspect she has low back pain on basis of degenerative disc disease and facet arthropathy.  Has pain with facet loading.  Increased pain following a near fall.  I recommend CT of the lumbar spine and sacrum/coccyx.  Provided he has no acute findings he can continue physical therapy.  He is not symptomatic from the L1 compression fracture.  No additional intervention is necessary there.  I will report the CT findings over the phone unless I see something worrisome      Acute bilateral low back pain without sciatica  -     CT Lumbar Spine Without Contrast; Future; Expected date: 03/28/2023  -     CT Sacrum Without Contrast; Future; Expected date: 03/28/2023    Compression fracture of L1 vertebra with routine healing, subsequent encounter  -     Ambulatory referral/consult to Physical Medicine Rehab  -     CT Lumbar Spine Without Contrast; Future; Expected date: 03/28/2023

## 2023-03-28 NOTE — PROGRESS NOTES
30 day visit PT-PTA face-face discussion with ENOCH Palomares re: patient status, POC, and plan for progression done 3/28/23.  Harriet Sneed, PTA

## 2023-03-28 NOTE — TELEPHONE ENCOUNTER
Pt given xray results and plan per Dr. Gil Other than the known L1 compression fracture which no longer appears to be causing him any pain there are no fractures seen in the lower back or sacrum.  He has some arthritic changes as we discussed which could account for his discomfort.  I recommend continuing physical therapy. Pt verbalized understanding

## 2023-03-28 NOTE — PROGRESS NOTES
[facility]   Physical Therapy Treatment Note     Name: Tono Saez  Clinic Number: [id number]    Therapy Diagnosis:   Encounter Diagnoses   Name Primary?    Lumbar spine pain Yes    Impaired mobility and activities of daily living      Physician: Scott Staley MD    Visit Date: 3/28/2023    Physician Orders: PT Eval and Treat   Medical Diagnosis from Referral:   S32.010D (ICD-10-CM) - Compression fracture of L1 vertebra with routine healing, subsequent encounter   I49.5 (ICD-10-CM) - SSS (sick sinus syndrome)   I73.9 (ICD-10-CM) - Claudication of gluteal region   Evaluation Date: 3/24/2023  Authorization Period Expiration: 3/15/2024  Plan of Care Expiration: 6/24/2023 or 16v post eval  Visit # (episodes) / Visits authorized: 2/ eval + 16 (POC 1 / 16)  NO FOTO  Progress Note Due: 4/24/2023    Precautions:  Pacemaker; CAD; Blood Thinners; Ca history; DM; HTN; MI     Time In: 300p  Time Out: 355p  Total Billable Time: 30 minutes      SUBJECTIVE     Pt reports: he was feeling good until he had to lay on the MRI table today.  He was compliant with home exercise program.  Response to previous treatment: first visit after eval   Functional change: none today    Pain: 3/10  Location: bilateral Low Back       OBJECTIVE     Objective Measures updated at progress report unless specified.     Treatment     Tseven received the treatments listed below:    Supine is painful  therapeutic exercises to develop strength, flexibility, posture, and core stabilization for 25 minutes including:  THERAPEUTIC ACTIVITY  x 15 minutes to improve functional activities:  NMRE x 10 minutes to improve posture, proprioception:    SEATED:  Ab brace with Upright Posture: cues: Squeeze belly, lift chest, relax shoulders, head on spine, hold 5 sec, 20x  NMRE  Chin tucks, [id number], 2 sets  Gentle Thoracic extension stretch, HBH, [id number] (only extend to tolerance-hands come down b/w ea rep)  Seated Long Arc Quad 1# x 30  Bilateral  - sitting on airex-pt is tall  Seated marches 1# x 30 Bilateral   STS with ab activation  NOT PERFORMED   Red theraband Bilateral External Rotation, head on spine x 20  NMRE  Seated Physioball trunk flexion 2 x 10    STAND:   Ab brace with Upright Posture: cues: Squeeze belly, lift chest, relax shoulders, head on spine, hold 5 sec, 20x  THERAPEUTIC ACTIVITY   Standing hip extension, abduction x 30 Bilateral each    THERAPEUTIC ACTIVITY   Red Theraband:  shoulder extension, rows x 30 each    TA  Gentle thoracic and lumbar motion as tolerated   NOT PERFORMED   HR x 30  NMRE     FUTURE:  Airex Balance  Gait training      Patient Education and Home Exercises     Home Exercises Provided and Patient Education Provided     Education provided:   - cont HOME EXERCISE PROGRAM     Written Home Exercises Provided: Patient instructed to cont prior HEP. Exercises were reviewed and Steven was able to demonstrate them prior to the end of the session.  Steven demonstrated good  understanding of the education provided. See EMR under Patient Instructions for exercises provided during therapy sessions    ASSESSMENT     Steven presents with decreased core, hip, and Low Back strength.  He ambulates with a forward flexed posture, which increases his fall risk.  Verbal cues during therapy for correct midline posture.  Good tolerance for PRE's, no increase in s/s.  Pain decreased to 1/10 at conclusion of therapy.    Steven Is progressing well towards his goals.   Pt prognosis is Good.     Pt will continue to benefit from skilled outpatient physical therapy to address the deficits listed in the problem list box on initial evaluation, provide pt/family education and to maximize pt's level of independence in the home and community environment.     Pt's spiritual, cultural and educational needs considered and pt agreeable to plan of care and goals.     Anticipated barriers to physical therapy: Co-morbidities     Goals:  Short Term GOALS:  4 weeks 3/28/2023 ongoing  1. Patient is independent with HEP.   2. Patient demonstrates independence with core activation and postural awareness while sitting and standing.   3. Patient will reduce pn to 2/10 while performing daily activities.  4. Patient will improve pectoralis flexibility to moderate restriction to improve posture.      Long Term GOALS: 8 weeks.  3/28/2023 ongoing  1. Patient demonstrates increased l/s FB ROM to 20 cm from floor to improve tolerance to reaching activities.   2. Patient demonstrates increased core, hip and BLE strength by 1/2 grade or greater to improve tolerance to home chores.  3. Patient demonstrates independence with body mechanics while taking care of himself    PLAN     Cont per Plan of Care, posture, core, and hip strengthening     Harriet Sneed, PTA

## 2023-03-30 ENCOUNTER — CLINICAL SUPPORT (OUTPATIENT)
Dept: REHABILITATION | Facility: HOSPITAL | Age: 78
End: 2023-03-30
Payer: MEDICARE

## 2023-03-30 DIAGNOSIS — M54.50 LUMBAR SPINE PAIN: Primary | ICD-10-CM

## 2023-03-30 DIAGNOSIS — Z74.09 IMPAIRED MOBILITY AND ACTIVITIES OF DAILY LIVING: ICD-10-CM

## 2023-03-30 DIAGNOSIS — Z78.9 IMPAIRED MOBILITY AND ACTIVITIES OF DAILY LIVING: ICD-10-CM

## 2023-03-30 PROCEDURE — 97112 NEUROMUSCULAR REEDUCATION: CPT | Mod: PN,CQ

## 2023-03-30 PROCEDURE — 97110 THERAPEUTIC EXERCISES: CPT | Mod: PN,CQ

## 2023-03-30 NOTE — PROGRESS NOTES
[facility]   Physical Therapy Treatment Note     Name: Tono Saez  Clinic Number: [id number]    Therapy Diagnosis:   Encounter Diagnoses   Name Primary?    Lumbar spine pain Yes    Impaired mobility and activities of daily living      Physician: Scott Staley MD    Visit Date: 3/30/2023    Physician Orders: PT Eval and Treat   Medical Diagnosis from Referral:   S32.010D (ICD-10-CM) - Compression fracture of L1 vertebra with routine healing, subsequent encounter   I49.5 (ICD-10-CM) - SSS (sick sinus syndrome)   I73.9 (ICD-10-CM) - Claudication of gluteal region   Evaluation Date: 3/24/2023  Authorization Period Expiration: 3/15/2024  Plan of Care Expiration: 6/24/2023 or 16v post eval  Visit # (episodes) / Visits authorized: 3/ eval + 16 (POC 2 / 16)  NO FOTO  Progress Note Due: 4/24/2023    Precautions:  Pacemaker; CAD; Blood Thinners; Ca history; DM; HTN; MI     Time In: 316p - late arrival  Time Out: 400p  Total Billable Time: 44 minutes      SUBJECTIVE     Pt reports: he hurt all day yesterday, could not get out of bed,  better today  He was compliant with home exercise program.  Response to previous treatment: increased pain  Functional change: none today    Pain: 4/10  Location: bilateral Low Back       OBJECTIVE     Objective Measures updated at progress report unless specified.     Treatment     Steven received the treatments listed below:    Supine is painful  therapeutic exercises to develop strength, flexibility, posture, and core stabilization for 20 minutes including:  THERAPEUTIC ACTIVITY  x 24 minutes to improve functional activities:  NMRE x 0 minutes to improve posture, proprioception:    SEATED:  Ab brace with Upright Posture: cues: Squeeze belly, lift chest, relax shoulders, head on spine, hold 5 sec, 20x  NMRE   NOT PERFORMED   Chin tucks, [id number], 2 sets  Gentle Thoracic extension stretch, HBH, [id number] (only extend to tolerance-hands come down b/w ea rep)  Seated  Long Arc Quad 1# x 30 Bilateral  - sitting on airex-pt is tall  Seated marches 1# x 30 Bilateral   STS with ab activation  NOT PERFORMED   Red theraband Bilateral External Rotation, head on spine x 20  NMRE  Seated Physioball trunk flexion 2 x 10  +Seated clamshells Red Theraband x 30  +Hamstring stretch 3 x 30 sec Bilateral  +Heel slides Red Theraband x 15 Bilateral     STAND:     Ab brace with Upright Posture: cues: Squeeze belly, lift chest, relax shoulders, head on spine, hold 5 sec, 20x  THERAPEUTIC ACTIVITY   NOT PERFORMED   Standing hip extension, abduction x 30 Bilateral each    THERAPEUTIC ACTIVITY   NOT PERFORMED   Red Theraband:  shoulder extension, rows x 30 each    THERAPEUTIC ACTIVITY   -  sitting today  Gentle thoracic and lumbar motion as tolerated   NOT PERFORMED   HR x 30  NMRE  NOT PERFORMED      FUTURE:  Airex Balance  Gait training      Patient Education and Home Exercises     Home Exercises Provided and Patient Education Provided     Education provided:   - cont HOME EXERCISE PROGRAM     Written Home Exercises Provided: Patient instructed to cont prior HEP. Exercises were reviewed and Steven was able to demonstrate them prior to the end of the session.  Steven demonstrated good  understanding of the education provided. See EMR under Patient Instructions for exercises provided during therapy sessions    ASSESSMENT     Steven arrived with minimal increased pain after last visit.  He has difficulty with cervical retraction due to SCM tightness and decreased cervical extensor strength.  Performed therex sitting today due to increased pain after last visit.      Steven Is progressing well towards his goals.   Pt prognosis is Good.     Pt will continue to benefit from skilled outpatient physical therapy to address the deficits listed in the problem list box on initial evaluation, provide pt/family education and to maximize pt's level of independence in the home and community environment.     Pt's  spiritual, cultural and educational needs considered and pt agreeable to plan of care and goals.     Anticipated barriers to physical therapy: Co-morbidities     Goals:  Short Term GOALS: 4 weeks 3/30/2023 ongoing  1. Patient is independent with HEP.   2. Patient demonstrates independence with core activation and postural awareness while sitting and standing.   3. Patient will reduce pn to 2/10 while performing daily activities.  4. Patient will improve pectoralis flexibility to moderate restriction to improve posture.      Long Term GOALS: 8 weeks.  3/30/2023 ongoing  1. Patient demonstrates increased l/s FB ROM to 20 cm from floor to improve tolerance to reaching activities.   2. Patient demonstrates increased core, hip and BLE strength by 1/2 grade or greater to improve tolerance to home chores.  3. Patient demonstrates independence with body mechanics while taking care of himself    PLAN     Cont per Plan of Care, posture, core, and hip strengthening     Harriet Sneed, PTA

## 2023-04-04 ENCOUNTER — CLINICAL SUPPORT (OUTPATIENT)
Dept: REHABILITATION | Facility: HOSPITAL | Age: 78
End: 2023-04-04
Payer: MEDICARE

## 2023-04-04 DIAGNOSIS — Z74.09 IMPAIRED MOBILITY AND ACTIVITIES OF DAILY LIVING: ICD-10-CM

## 2023-04-04 DIAGNOSIS — Z78.9 IMPAIRED MOBILITY AND ACTIVITIES OF DAILY LIVING: ICD-10-CM

## 2023-04-04 DIAGNOSIS — M54.50 LUMBAR SPINE PAIN: Primary | ICD-10-CM

## 2023-04-04 PROCEDURE — 97112 NEUROMUSCULAR REEDUCATION: CPT | Mod: PN,CQ

## 2023-04-04 PROCEDURE — 97110 THERAPEUTIC EXERCISES: CPT | Mod: PN,CQ

## 2023-04-04 NOTE — PROGRESS NOTES
[facility]   Physical Therapy Treatment Note     Name: Tono Saez  Clinic Number: [id number]    Therapy Diagnosis:   Encounter Diagnoses   Name Primary?    Lumbar spine pain Yes    Impaired mobility and activities of daily living      Physician: Scott Staley MD    Visit Date: 4/4/2023    Physician Orders: PT Eval and Treat   Medical Diagnosis from Referral:   S32.010D (ICD-10-CM) - Compression fracture of L1 vertebra with routine healing, subsequent encounter   I49.5 (ICD-10-CM) - SSS (sick sinus syndrome)   I73.9 (ICD-10-CM) - Claudication of gluteal region   Evaluation Date: 3/24/2023  Authorization Period Expiration: 3/15/2024  Plan of Care Expiration: 6/24/2023 or 16v post eval  Visit # (episodes) / Visits authorized: 4/ eval + 16 (POC 3 / 16)  NO FOTO  Progress Note Due: 4/24/2023    Precautions:  Pacemaker; CAD; Blood Thinners; Ca history; DM; HTN; MI     Time In: 300p  Time Out: 354p  Total Billable Time: 30 minutes      SUBJECTIVE     Pt reports: Low back pain today, laid on his sofa a lit during the basketball games  He was compliant with home exercise program.  Response to previous treatment: increased pain  Functional change: none today    Pain: 4/10  Location: bilateral Low Back       OBJECTIVE     Objective Measures updated at progress report unless specified.     Treatment     Steven received the treatments listed below:    Supine is painful  therapeutic exercises to develop strength, flexibility, posture, and core stabilization for 230 minutes including:  THERAPEUTIC ACTIVITY  x 0 minutes to improve functional activities:  NMRE x 24 minutes to improve posture, proprioception:    SEATED:  Ab brace with Upright Posture: cues: Squeeze belly, lift chest, relax shoulders, head on spine, hold 5 sec, 20x  NMRE   NOT PERFORMED   Chin tucks, [id number], 2 sets  Gentle Thoracic extension stretch, x 30   Long Arc Quad 1# x 30 Bilateral  - sitting on airex-pt is tall  Marches 1# x 30  Bilateral      STS with ab activation  NOT PERFORMED     Red theraband Bilateral External Rotation, head on spine x 20  NMRE  Seated Physioball trunk flexion 2 x 10  +Clamshells Red Theraband x 30  +Heel slides Red Theraband x 20 Bilateral   +Hamstring stretch with stool 3 x 30 sec Bilateral  +Neural glide, df/pf 2 x 10 Bilateral   NMRE    STAND:     Ab brace with Upright Posture: cues: Squeeze belly, lift chest, relax shoulders, head on spine, hold 5 sec, 20x  THERAPEUTIC ACTIVITY   NOT PERFORMED   Standing hip extension, abduction 2 x 10 each Bilateral     THERAPEUTIC ACTIVITY     Red Theraband:  shoulder extension, rows x 30 each    THERAPEUTIC ACTIVITY   -  sitting today  Gentle thoracic and lumbar motion as tolerated   NOT PERFORMED   HR x 30  NMRE    Trunk and cervical extension against wall x 10   NMRE  Leaning forearms against wall trunk extension x 10   NMRE     FUTURE:  Airex Balance  Gait training      Patient Education and Home Exercises     Home Exercises Provided and Patient Education Provided     Education provided:   - cont HOME EXERCISE PROGRAM     Written Home Exercises Provided: Patient instructed to cont prior HEP. Exercises were reviewed and Steven was able to demonstrate them prior to the end of the session.  Steven demonstrated good  understanding of the education provided. See EMR under Patient Instructions for exercises provided during therapy sessions    ASSESSMENT     Steven continues with flexed trunk posture, forward head, decreased hip, Low Back, and core strength.  Decreased tolerance for standing activities.    Steven Is progressing well towards his goals.   Pt prognosis is Good.     Pt will continue to benefit from skilled outpatient physical therapy to address the deficits listed in the problem list box on initial evaluation, provide pt/family education and to maximize pt's level of independence in the home and community environment.     Pt's spiritual, cultural and educational needs  considered and pt agreeable to plan of care and goals.     Anticipated barriers to physical therapy: Co-morbidities     Goals:  Short Term GOALS: 4 weeks 4/4/2023 ongoing  1. Patient is independent with HEP.   2. Patient demonstrates independence with core activation and postural awareness while sitting and standing.   3. Patient will reduce pn to 2/10 while performing daily activities.  4. Patient will improve pectoralis flexibility to moderate restriction to improve posture.      Long Term GOALS: 8 weeks.  4/4/2023 ongoing  1. Patient demonstrates increased l/s FB ROM to 20 cm from floor to improve tolerance to reaching activities.   2. Patient demonstrates increased core, hip and BLE strength by 1/2 grade or greater to improve tolerance to home chores.  3. Patient demonstrates independence with body mechanics while taking care of himself    PLAN     Cont per Plan of Care, posture, core, and hip strengthening     Harriet Sneed, PTA

## 2023-04-06 ENCOUNTER — DOCUMENTATION ONLY (OUTPATIENT)
Dept: REHABILITATION | Facility: HOSPITAL | Age: 78
End: 2023-04-06

## 2023-04-06 NOTE — PROGRESS NOTES
30 day visit PT-PTA face-face discussion with ENOCH Palomares re: patient status, POC, and plan for progression done 4/4/23.  Harriet Sneed, PTA

## 2023-04-10 ENCOUNTER — DOCUMENT SCAN (OUTPATIENT)
Dept: HOME HEALTH SERVICES | Facility: HOSPITAL | Age: 78
End: 2023-04-10
Payer: MEDICARE

## 2023-04-12 ENCOUNTER — CLINICAL SUPPORT (OUTPATIENT)
Dept: REHABILITATION | Facility: HOSPITAL | Age: 78
End: 2023-04-12
Payer: MEDICARE

## 2023-04-12 DIAGNOSIS — M54.50 LUMBAR SPINE PAIN: Primary | ICD-10-CM

## 2023-04-12 DIAGNOSIS — Z74.09 IMPAIRED MOBILITY AND ACTIVITIES OF DAILY LIVING: ICD-10-CM

## 2023-04-12 DIAGNOSIS — Z78.9 IMPAIRED MOBILITY AND ACTIVITIES OF DAILY LIVING: ICD-10-CM

## 2023-04-12 PROCEDURE — 97112 NEUROMUSCULAR REEDUCATION: CPT | Mod: PN

## 2023-04-12 NOTE — PROGRESS NOTES
[facility]   Physical Therapy Treatment Note     Name: Tono Saez  Clinic Number: [id number]    Therapy Diagnosis:   Encounter Diagnoses   Name Primary?    Lumbar spine pain Yes    Impaired mobility and activities of daily living        Physician: Scott Staley MD    Visit Date: 4/12/2023    Physician Orders: PT Eval and Treat   Medical Diagnosis from Referral:   S32.010D (ICD-10-CM) - Compression fracture of L1 vertebra with routine healing, subsequent encounter   I49.5 (ICD-10-CM) - SSS (sick sinus syndrome)   I73.9 (ICD-10-CM) - Claudication of gluteal region   Evaluation Date: 3/24/2023  Authorization Period Expiration: 3/15/2024  Plan of Care Expiration: 6/24/2023 or 16v post eval  Visit # (episodes) / Visits authorized: 5 / eval + 16 (POC 4 / 16)  NO FOTO  Progress Note Due: 4/24/2023    Precautions:  Pacemaker; CAD; Blood Thinners; Ca history; DM; HTN; MI     Time In: 400  Time Out: 450  Total Billable Time: 30 minutes      SUBJECTIVE     Pt reports: had a fall last Wednesday. BP went down, reached up in closet, passed out and fell onto floor. He then woke up, blood everywhere; cuts on upper and lower extremities; bruising on right hip. Did hit his head he thinks. After fall, Called daughter, she cleaned things up. They Decided nothing was broken and he got in bed. Did not go to emergency or MD. Is not having Headache but is getting dizzy with head down; also spinning after getting up to fast. Discussed pt contacting MD about new dizziness.    He was compliant with home exercise program.  Response to previous treatment: increased pain  Functional change: none today    Pain: 3/10  Location: bilateral Low Back / today left hip      OBJECTIVE      Vitals:  BP: 122/71  97 o2  79 BPM    Concussion/CN screen:  Loss of Smell - no  HA - no  Vertigo - yes, new, agreed to contact MD  Nausea - no  Vomit - no  Light or noise sensitivity - no  Blurry vision - no  Dim vision - no  Eye mm  and pupil response - normal  Facial motion - normal  Tongue motion - normal  Bowel and bladder continence - no      Treatment     DOI: 2/03/2023 fall  FALL RISK; should be bringing walker  Supine is painful    Steven received the treatments listed below:      therapeutic exercises to develop strength and flexibility, posture, and core stabilization for 20 minutes including:  NMRE utilized to activate appropriate mm to improve core and lumbar stabilization and lumbopelvic stability: 30 minutes     STAND:     Ab brace with Upright Posture: cues: Squeeze belly, lift chest, relax shoulders, with chin tuck, hold 5 sec, 20x  Marches x 30 Bilateral  Red theraband Bilateral External Rotation, head on spine x 20  NMRE   +Stand thoracic rotation, face wall, thumbs up, 10x Bilateral   Leaning forearms against wall trunk and cervical extension x 10   NMRE  Physioball Arm flexion rollouts on wall, 10x - SBA required  Standing hip steamboats 2 x 10 each Bilateral  - NOT PERFORMED time       Red Theraband: shoulder extension / rows x 30 each  - NOT PERFORMED time    HR x 30  NMRE - NOT PERFORMED time      SEATED: on Airex     Chin tucks, [id number], 2 sets    Gentle Thoracic extension stretch, x 20  Neural glide, df/pf 2 x 10 Bilateral   NMRE  Long Arc Quad 1# x 30 Bilateral    +Pre-STS with ab activation, Double leg Weight Bearing into Airex, 20x  +Physioball Core TrSets, 20x NMRE  Clamshells Red Theraband x 30  +Core stabilization with wand, 1g58wpm NMRE  +Core stab with strap, multi-direction pull NMRE  Heel slides Red Theraband x 20 Bilateral - NOT PERFORMED   Hamstring stretch with stool 3 x 30 sec Bilateral - NOT PERFORMED     FUTURE:  Airex Balance  Gait training        Patient Education and Home Exercises     Home Exercises Provided and Patient Education Provided     Education provided:   - cont HOME EXERCISE PROGRAM     Written Home Exercises Provided: Patient instructed to cont prior HEP. Exercises were reviewed and Steven  was able to demonstrate them prior to the end of the session.  Steven demonstrated good  understanding of the education provided. See EMR under Patient Instructions for exercises provided during therapy sessions    ASSESSMENT     Steven continues with low to moderate pain levels and kyphotic and forward head posture. Pt willing and able to do more standing therex even after his fall last week; no breaks needed. Modified some sitting therex to standing. Ran concussion screen and took vitals post fall last week; normal findings except for new occasional dizziness. Instructed pt to contact MD about this new symptom and a skin tear on his right arm that continues to bleed.    Steven Is progressing well towards his goals.   Pt prognosis is Good.     Pt will continue to benefit from skilled outpatient physical therapy to address the deficits listed in the problem list box on initial evaluation, provide pt/family education and to maximize pt's level of independence in the home and community environment.     Pt's spiritual, cultural and educational needs considered and pt agreeable to plan of care and goals.     Anticipated barriers to physical therapy: Co-morbidities     Goals:  Short Term GOALS: 4 weeks 4/12/2023 ongoing  1. Patient is independent with HEP.   2. Patient demonstrates independence with core activation and postural awareness while sitting and standing.   3. Patient will reduce pn to 2/10 while performing daily activities.  4. Patient will improve pectoralis flexibility to moderate restriction to improve posture.      Long Term GOALS: 8 weeks.  4/12/2023 ongoing  1. Patient demonstrates increased l/s FB ROM to 20 cm from floor to improve tolerance to reaching activities.   2. Patient demonstrates increased core, hip and BLE strength by 1/2 grade or greater to improve tolerance to home chores.  3. Patient demonstrates independence with body mechanics while taking care of himself    PLAN     Cont per Plan of Care,  posture, core, and hip strengthening     Denice Palomares, PT

## 2023-04-14 ENCOUNTER — HOSPITAL ENCOUNTER (OUTPATIENT)
Dept: RADIOLOGY | Facility: HOSPITAL | Age: 78
Discharge: HOME | End: 2023-04-14
Attending: FAMILY MEDICINE
Payer: MEDICARE

## 2023-04-14 ENCOUNTER — OFFICE VISIT (OUTPATIENT)
Dept: FAMILY MEDICINE | Facility: CLINIC | Age: 78
End: 2023-04-14
Payer: MEDICARE

## 2023-04-14 ENCOUNTER — CLINICAL SUPPORT (OUTPATIENT)
Dept: REHABILITATION | Facility: HOSPITAL | Age: 78
End: 2023-04-14
Payer: MEDICARE

## 2023-04-14 ENCOUNTER — TELEPHONE (OUTPATIENT)
Dept: FAMILY MEDICINE | Facility: CLINIC | Age: 78
End: 2023-04-14

## 2023-04-14 ENCOUNTER — TELEPHONE (OUTPATIENT)
Dept: CARDIOLOGY | Facility: CLINIC | Age: 78
End: 2023-04-14
Payer: MEDICARE

## 2023-04-14 VITALS
RESPIRATION RATE: 18 BRPM | HEART RATE: 68 BPM | WEIGHT: 134 LBS | OXYGEN SATURATION: 94 % | TEMPERATURE: 98 F | BODY MASS INDEX: 18.15 KG/M2 | HEIGHT: 72 IN | DIASTOLIC BLOOD PRESSURE: 58 MMHG | SYSTOLIC BLOOD PRESSURE: 106 MMHG

## 2023-04-14 DIAGNOSIS — R53.82 CHRONIC FATIGUE, UNSPECIFIED: ICD-10-CM

## 2023-04-14 DIAGNOSIS — S79.812S: ICD-10-CM

## 2023-04-14 DIAGNOSIS — Z78.9 IMPAIRED MOBILITY AND ACTIVITIES OF DAILY LIVING: ICD-10-CM

## 2023-04-14 DIAGNOSIS — R55 SYNCOPE, UNSPECIFIED SYNCOPE TYPE: Primary | ICD-10-CM

## 2023-04-14 DIAGNOSIS — M54.12 CERVICAL RADICULOPATHY DUE TO TRAUMA: ICD-10-CM

## 2023-04-14 DIAGNOSIS — T07.XXXA MULTIPLE ABRASIONS: ICD-10-CM

## 2023-04-14 DIAGNOSIS — Z74.09 IMPAIRED MOBILITY AND ACTIVITIES OF DAILY LIVING: ICD-10-CM

## 2023-04-14 DIAGNOSIS — M54.50 LUMBAR SPINE PAIN: Primary | ICD-10-CM

## 2023-04-14 DIAGNOSIS — S09.90XA HEAD TRAUMA, INITIAL ENCOUNTER: ICD-10-CM

## 2023-04-14 PROCEDURE — 97110 THERAPEUTIC EXERCISES: CPT | Mod: PN

## 2023-04-14 PROCEDURE — 99214 OFFICE O/P EST MOD 30 MIN: CPT | Mod: S$PBB,AQ,, | Performed by: FAMILY MEDICINE

## 2023-04-14 PROCEDURE — 72050 X-RAY EXAM NECK SPINE 4/5VWS: CPT | Mod: TC

## 2023-04-14 PROCEDURE — 97112 NEUROMUSCULAR REEDUCATION: CPT | Mod: PN

## 2023-04-14 PROCEDURE — 99215 OFFICE O/P EST HI 40 MIN: CPT | Performed by: FAMILY MEDICINE

## 2023-04-14 PROCEDURE — 99214 PR OFFICE/OUTPT VISIT, EST, LEVL IV, 30-39 MIN: ICD-10-PCS | Mod: S$PBB,AQ,, | Performed by: FAMILY MEDICINE

## 2023-04-14 PROCEDURE — 73502 X-RAY EXAM HIP UNI 2-3 VIEWS: CPT | Mod: TC,LT

## 2023-04-14 RX ORDER — CEPHALEXIN 500 MG/1
500 CAPSULE ORAL 4 TIMES DAILY
Qty: 40 CAPSULE | Refills: 0 | Status: SHIPPED | OUTPATIENT
Start: 2023-04-14 | End: 2023-04-24

## 2023-04-14 NOTE — PROGRESS NOTES
Subjective:       Patient ID: Tono Saez is a 77 y.o. male.    Chief Complaint: Fall      Patient is here after an episode of syncope on April 4th.  He fell at home in the kitchen  BP Readings from Last 3 Encounters:  04/14/23 : (!) 106/58  03/10/23 : 124/72  02/27/23 : 117/62  Lab Results       Component                Value               Date                       WBC                      6.89                02/03/2023                 HGB                      11.8 (L)            02/03/2023                 HCT                      38.8 (L)            02/03/2023                 PLT                      151                 02/03/2023                 CHOL                     141                 01/12/2023                 TRIG                     122                 01/12/2023                 HDL                      48                  01/12/2023                 ALT                      22                  02/03/2023                 AST                      21                  02/03/2023                 NA                       136                 02/03/2023                 K                        3.9                 02/03/2023                 CL                       100                 02/03/2023                 CREATININE               1.7 (H)             02/03/2023                 BUN                      33 (H)              02/03/2023                 CO2                      25                  02/03/2023                 TSH                      2.100               09/21/2020                 PSA                      1.3                 07/15/2022                 INR                      1.1                 02/03/2023                 HGBA1C                   8.3 (H)             01/12/2023                  Fall  The accident occurred More than 1 week ago. The fall occurred while standing (At home putting things in the kitchen cabinet.). He landed on Hard floor. The volume of blood lost was moderate (From  abrasions to the hand and leg.). The point of impact was the left wrist, right knee and left knee.     Allergies and Medications:   Review of patient's allergies indicates:   Allergen Reactions    Adhesive Other (See Comments)     SILK TAPE PULL SKIN OFF    Metformin Other (See Comments)     Weight loss cachexia anorexia,diarrhea.    Pcn [penicillins]      Patient states he passed out from this medication when he was 10 years old.      Current Outpatient Medications   Medication Sig Dispense Refill    amLODIPine (NORVASC) 5 MG tablet TAKE ONE TABLET BY MOUTH ONCE DAILY (Patient taking differently: Take 5 mg by mouth once daily.) 90 tablet 0    aspirin (ECOTRIN) 81 MG EC tablet Take 2 tablets (162 mg total) by mouth once daily. (Patient taking differently: Take 81 mg by mouth once daily.) 120 tablet 0    atorvastatin (LIPITOR) 40 MG tablet Take 1 tablet (40 mg total) by mouth every evening. 90 tablet 1    clopidogreL (PLAVIX) 75 mg tablet Take 1 tablet (75 mg total) by mouth once daily. 90 tablet 0    finasteride (PROSCAR) 5 mg tablet Take 5 mg by mouth once daily.      fluorouracil 5% 5 % Soln Apply small amount to top of head 1-2x/day for 2 weeks 10 mL 1    furosemide (LASIX) 20 MG tablet TAKE 1 TABLET (20 MG TOTAL) BY MOUTH ONCE DAILY. (Patient taking differently: Take 20 mg by mouth once daily.) 90 tablet 3    isosorbide dinitrate (ISORDIL) 10 MG tablet Take 1 tablet (10 mg total) by mouth 2 (two) times daily. 60 tablet 11    JARDIANCE 25 mg tablet TAKE 1 TABLET (25 MG TOTAL) BY MOUTH ONCE DAILY. (Patient taking differently: Take 25 mg by mouth once daily.) 30 tablet 6    ketoconazole (NIZORAL) 2 % shampoo Wash all scaling areas let sit 3 minutes then rinse 3x/wk 120 mL 11    levocetirizine (XYZAL) 5 MG tablet TAKE ONE TABLET BY MOUTH EVERY EVENING (Patient taking differently: Take 5 mg by mouth every evening.) 60 tablet 11    magnesium oxide (MAG-OX) 400 mg (241.3 mg magnesium) tablet Take 1 tablet (400 mg  total) by mouth once daily. 90 tablet 1    metoprolol tartrate (LOPRESSOR) 25 MG tablet Take 1 tablet (25 mg total) by mouth 2 (two) times daily. 120 tablet 5    montelukast (SINGULAIR) 10 mg tablet TAKE 1 TABLET (10 MG TOTAL) BY MOUTH EVERY EVENING. (Patient taking differently: Take 10 mg by mouth every evening.) 30 tablet 11    multivitamin capsule Take 1 capsule by mouth once daily.      nitroGLYCERIN (NITROSTAT) 0.4 MG SL tablet DISSOLVE 1 TABLET UNDER THE TONGUE AS NEEDED FOR CHEST PAIN (Patient taking differently: Place 0.4 mg under the tongue every 5 (five) minutes as needed.) 100 tablet 3    pantoprazole (PROTONIX) 40 MG tablet TAKE ONE TABLET BY MOUTH ONCE DAILY (Patient taking differently: Take 40 mg by mouth once daily.) 90 tablet 1    pioglitazone (ACTOS) 15 MG tablet Take 1 tablet (15 mg total) by mouth once daily. (Patient taking differently: Take 30 mg by mouth once daily.) 90 tablet 3    pregabalin (LYRICA) 50 MG capsule 1 po qhs x 1 wk then 1 po bid if no excess drowsiness (Patient taking differently: Take 50 mg by mouth 2 (two) times daily. 1 po qhs x 1 wk then 1 po bid if no excess drowsiness) 60 capsule 1    sertraline (ZOLOFT) 50 MG tablet TAKE ONE TABLET BY MOUTH ONCE DAILY (Patient taking differently: Take 50 mg by mouth once daily.) 90 tablet 1    sulfacetamide sodium (OVACE PLUS SHAMPOO) 10 % Sham Wash scalp nightly 1 each 11    tolterodine (DETROL LA) 4 MG 24 hr capsule TAKE ONE CAPSULE BY MOUTH ONCE DAILY 90 capsule 1    vitamin D 1000 units Tab Take 1,000 Units by mouth once daily.      betamethasone dipropionate (DIPROLENE) 0.05 % lotion Apply thin film to scalp bid prn itch (Patient not taking: Reported on 4/14/2023) 60 mL 6    cephALEXin (KEFLEX) 500 MG capsule Take 1 capsule (500 mg total) by mouth 4 (four) times daily. for 10 days 40 capsule 0    clindamycin (CLEOCIN) 150 MG capsule Take by mouth.      diclofenac sodium (VOLTAREN) 1 % Gel Apply 2 g topically once daily. (Patient not  taking: Reported on 4/14/2023) 200 g 3    fluticasone propionate (FLONASE) 50 mcg/actuation nasal spray Use nasally as directed as needed      gabapentin (NEURONTIN) 300 MG capsule Take 1 capsule (300 mg total) by mouth 3 (three) times daily. (Patient not taking: Reported on 4/14/2023) 90 capsule 11    HYDROcodone-acetaminophen (NORCO) 5-325 mg per tablet Take 1 tablet by mouth every 4 (four) hours as needed for Pain. (Patient not taking: Reported on 4/14/2023) 18 tablet 0    mupirocin (BACTROBAN) 2 % ointment Apply topically 2 (two) times daily. (Patient not taking: Reported on 4/14/2023) 22 g 11    tirzepatide (MOUNJARO) 5 mg/0.5 mL PnIj Inject 5 mg into the skin every 7 days. (Patient not taking: Reported on 4/14/2023) 4 pen 5     No current facility-administered medications for this visit.       Family History:   Family History   Problem Relation Age of Onset    Heart disease Mother     Heart disease Father     Hyperlipidemia Sister     Hypertension Sister     Heart disease Brother     Heart disease Brother     Heart disease Brother     Heart disease Brother     Heart disease Brother        Social History:   Social History     Socioeconomic History    Marital status:    Tobacco Use    Smoking status: Never    Smokeless tobacco: Never   Substance and Sexual Activity    Alcohol use: No    Drug use: No    Sexual activity: Not Currently     Social Determinants of Health     Financial Resource Strain: Low Risk     Difficulty of Paying Living Expenses: Not hard at all   Food Insecurity: No Food Insecurity    Worried About Running Out of Food in the Last Year: Never true    Ran Out of Food in the Last Year: Never true   Transportation Needs: No Transportation Needs    Lack of Transportation (Medical): No    Lack of Transportation (Non-Medical): No   Physical Activity: Inactive    Days of Exercise per Week: 0 days    Minutes of Exercise per Session: 0 min   Stress: No Stress Concern Present    Feeling of Stress :  Not at all   Social Connections: Moderately Integrated    Frequency of Communication with Friends and Family: More than three times a week    Frequency of Social Gatherings with Friends and Family: Never    Attends [employer] Services: More than 4 times per year    Active Member of Clubs or Organizations: Yes    Attends Club or Organization Meetings: Never    Marital Status:    Housing Stability: Low Risk     Unable to Pay for Housing in the Last Year: No    Number of Places Lived in the Last Year: 1    Unstable Housing in the Last Year: No   Review of the medicine list reveals multiple meds that could be contributing to presyncope syncope and falls.    Review of Systems    Objective:     Vitals:    04/14/23 1005   BP: (!) 106/58   Pulse: 68   Resp: 18   Temp: 98.3 °F (36.8 °C)        Physical Exam  Vitals and nursing note reviewed.   Constitutional:       General: He is not in acute distress.     Appearance: Normal appearance. He is well-developed and normal weight. He is not ill-appearing, toxic-appearing or diaphoretic.   HENT:      Head: Normocephalic and atraumatic.      Right Ear: Tympanic membrane, ear canal and external ear normal. There is no impacted cerumen.      Left Ear: Tympanic membrane, ear canal and external ear normal. There is no impacted cerumen.      Nose: Nose normal. No congestion or rhinorrhea.      Mouth/Throat:      Mouth: Mucous membranes are moist.      Pharynx: Oropharynx is clear. No oropharyngeal exudate or posterior oropharyngeal erythema.   Eyes:      General: No visual field deficit or scleral icterus.        Right eye: No discharge.         Left eye: No discharge.      Extraocular Movements: Extraocular movements intact.      Conjunctiva/sclera: Conjunctivae normal.      Pupils: Pupils are equal, round, and reactive to light.   Neck:      Thyroid: No thyromegaly.      Vascular: No carotid bruit or JVD.      Trachea: No tracheal deviation.   Cardiovascular:      Rate and  Rhythm: Normal rate.      Heart sounds: Normal heart sounds. No murmur heard.    No friction rub. No gallop.   Pulmonary:      Effort: Pulmonary effort is normal. No respiratory distress.      Breath sounds: Normal breath sounds. No stridor. No wheezing, rhonchi or rales.   Chest:      Chest wall: No tenderness.   Abdominal:      General: Abdomen is flat. Bowel sounds are normal. There is no distension.      Palpations: Abdomen is soft. There is no mass.      Tenderness: There is no abdominal tenderness. There is no right CVA tenderness, left CVA tenderness, guarding or rebound.      Hernia: No hernia is present.   Genitourinary:     Penis: Normal and circumcised. No phimosis, paraphimosis, hypospadias, erythema, tenderness or discharge.       Testes: Normal. Cremasteric reflex is present.         Right: Mass, tenderness or swelling not present. Right testis is descended. Cremasteric reflex is present.          Left: Mass, tenderness or swelling not present. Left testis is descended. Cremasteric reflex is present.       Rectum: Normal.   Musculoskeletal:         General: No swelling, tenderness, deformity or signs of injury.        Arms:       Cervical back: Normal range of motion and neck supple. No rigidity. No muscular tenderness.      Right lower leg: No edema.      Left lower leg: No edema.        Legs:    Lymphadenopathy:      Cervical: No cervical adenopathy.   Skin:     General: Skin is warm and dry.      Capillary Refill: Capillary refill takes less than 2 seconds.      Coloration: Skin is not jaundiced or pale.      Findings: No bruising, erythema, lesion or rash.   Neurological:      General: No focal deficit present.      Mental Status: He is alert and oriented to person, place, and time.      GCS: GCS eye subscore is 4. GCS verbal subscore is 5. GCS motor subscore is 6.      Cranial Nerves: No cranial nerve deficit, dysarthria or facial asymmetry.      Sensory: No sensory deficit.      Motor: No  weakness or abnormal muscle tone.      Coordination: Coordination normal.      Gait: Gait normal.      Deep Tendon Reflexes: Reflexes are normal and symmetric. Reflexes normal.   Psychiatric:         Mood and Affect: Mood normal.         Behavior: Behavior normal.         Thought Content: Thought content normal.         Judgment: Judgment normal.       Assessment:       1. Syncope, unspecified syncope type    2. Blunt trauma of left hip, sequela    3. Cervical radiculopathy due to trauma    4. Head trauma, initial encounter    5. Chronic fatigue, unspecified    6. Multiple abrasions        Plan:       Tono was seen today for fall.    Diagnoses and all orders for this visit:    Syncope, unspecified syncope type  -     CBC Auto Differential; Future  -     Comprehensive Metabolic Panel; Future  -     TSH; Future  -     Ambulatory referral/consult to Cardiology; Future  -     CK; Future    Blunt trauma of left hip, sequela  -     X-Ray Hip 2 or 3 views Left (with Pelvis when performed); Future    Cervical radiculopathy due to trauma  -     X-Ray Cervical Spine AP And Lateral; Future    Head trauma, initial encounter  -     CT Head Without Contrast; Future    Chronic fatigue, unspecified  -     TSH; Future    Multiple abrasions  -     cephALEXin (KEFLEX) 500 MG capsule; Take 1 capsule (500 mg total) by mouth 4 (four) times daily. for 10 days         Follow up in about 1 month (around 5/14/2023).

## 2023-04-14 NOTE — TELEPHONE ENCOUNTER
----- Message from Emy Nevarez sent at 4/14/2023 11:14 AM CDT -----  Got an Urgent referral on this patient for syncope.  Needs a sooner appt.  Please call and work in.

## 2023-04-14 NOTE — PROGRESS NOTES
[facility]   Physical Therapy Treatment Note     Name: Tono Saez  Clinic Number: [id number]    Therapy Diagnosis:   Encounter Diagnoses   Name Primary?    Lumbar spine pain Yes    Impaired mobility and activities of daily living        Physician: Scott Staley MD    Visit Date: 4/14/2023    Physician Orders: PT Eval and Treat   Medical Diagnosis from Referral:   S32.010D (ICD-10-CM) - Compression fracture of L1 vertebra with routine healing, subsequent encounter   I49.5 (ICD-10-CM) - SSS (sick sinus syndrome)   I73.9 (ICD-10-CM) - Claudication of gluteal region   Evaluation Date: 3/24/2023  Authorization Period Expiration: 3/15/2024  Plan of Care Expiration: 6/24/2023 or 16v post eval  Visit # (episodes) / Visits authorized: 6 / eval + 16 (POC 5 / 16)  NO FOTO  Progress Note Due: 4/24/2023    Precautions:  Pacemaker; CAD; Blood Thinners; Ca history; DM; HTN; MI     Time In: 300  Time Out: 353  Total Billable Time: 53 minutes      SUBJECTIVE     Pt reports: had a fall last Wednesday, 4/5/2023. Did not go to [facility]  or MD. Reports he called MD after instructed on his last visit and had to get imaging, CT scan, and was patched up. Feeling puny but wants to do therapy. Was told not to drive and have daughter bring him.     He was compliant with home exercise program.  Response to previous treatment: increased pain  Functional change: none today    Pain: 3/10  Location: bilateral Low Back / today left hip      OBJECTIVE     Objective taken on 4/12/2023 post fall on 4/5/2023: Pt was instructed to contact MD regards fall and new dizziness sxs    Vitals:  BP: 122/71  97 o2  79 BPM    Concussion/CN screen:  Loss of Smell - no  HA - no  Vertigo - yes, new, agreed to contact MD  Nausea - no  Vomit - no  Light or noise sensitivity - no  Blurry vision - no  Dim vision - no  Eye mm and pupil response - normal  Facial motion - normal  Tongue motion - normal  Bowel and bladder continence -  no      Treatment     DOI: 2/03/2023 fall  FALL RISK; should be bringing walker  Supine is painful    Steven received the treatments listed below:      therapeutic exercises to develop strength and flexibility, posture, and core stabilization for 26 minutes including:  NMRE utilized to activate appropriate mm to improve core and lumbar stabilization and lumbopelvic stability: 27 minutes     SEATED: on Airex / pillow behind    Chin tucks, [id number], 2 sets    Gentle Thoracic extension stretch, x 20  Physioball Core TrSets, 20x NMRE  Neural glide, df/pf 2 x 10 Bilateral   NMRE  Core stabilization with wand, [id number] NMRE  Long Arc Quad 1# 2x15 Bilateral    +STS with Core activation, thigh support, 5x, 2 sets - SBA required  Clamshells Red Theraband x 30 (green next)  Core stab with strap, multi-direction pull NMRE  Hamstring curls Red Theraband x 30 - just needs RIGHT 3+/5; left is 5/5     STAND:     Ab brace with Upright Posture: cues: Squeeze belly, lift chest, relax shoulders, with chin tuck, hold 5 sec, 20x  Red theraband Bilateral External Rotation, head on spine x 20  NMRE   Stand thoracic rotation, face wall, thumbs up, 10x Bilateral   Hands against wall, trunk and cervical extension x 10   NMRE  Physioball Arm flexion rollouts on wall, 10x - SBA required  Marches x 30 Bilateral  Standing hip steamboats 2 x 10 each Bilateral  - NOT PERFORMED time       Red Theraband: shoulder extension / rows x 30 each  - NOT PERFORMED time    HR x 30  NMRE - NOT PERFORMED time        FUTURE:  Airex Balance  Gait training        Patient Education and Home Exercises     Home Exercises Provided and Patient Education Provided     Education provided:   - cont HOME EXERCISE PROGRAM     Written Home Exercises Provided: Patient instructed to cont prior HEP. Exercises were reviewed and Steven was able to demonstrate them prior to the end of the session.  Steven demonstrated good  understanding of the education provided. See EMR under Patient  Instructions for exercises provided during therapy sessions    ASSESSMENT     Steven continues with low to moderate pain levels and kyphotic and forward head posture. Pt willing and able to do more standing therex; no breaks needed. Pt able to perform all therex with minimal pn provocation; STS most painful.     Steven Is progressing well towards his goals.   Pt prognosis is Good.     Pt will continue to benefit from skilled outpatient physical therapy to address the deficits listed in the problem list box on initial evaluation, provide pt/family education and to maximize pt's level of independence in the home and community environment.     Pt's spiritual, cultural and educational needs considered and pt agreeable to plan of care and goals.     Anticipated barriers to physical therapy: Co-morbidities     Goals:  Short Term GOALS: 4 weeks 4/14/2023 ongoing  1. Patient is independent with HEP.   2. Patient demonstrates independence with core activation and postural awareness while sitting and standing.   3. Patient will reduce pn to 2/10 while performing daily activities.  4. Patient will improve pectoralis flexibility to moderate restriction to improve posture.      Long Term GOALS: 8 weeks.  4/14/2023 ongoing  1. Patient demonstrates increased l/s FB ROM to 20 cm from floor to improve tolerance to reaching activities.   2. Patient demonstrates increased core, hip and BLE strength by 1/2 grade or greater to improve tolerance to home chores.  3. Patient demonstrates independence with body mechanics while taking care of himself    PLAN     Cont per Plan of Care, posture, core, and hip strengthening     Denice Palomares, PT

## 2023-04-14 NOTE — TELEPHONE ENCOUNTER
----- Message from Scott Staley MD sent at 4/14/2023  1:48 PM CDT -----  Results Ok, notify patient.

## 2023-04-19 ENCOUNTER — OFFICE VISIT (OUTPATIENT)
Dept: CARDIOLOGY | Facility: CLINIC | Age: 78
End: 2023-04-19
Payer: MEDICARE

## 2023-04-19 VITALS
HEIGHT: 72 IN | BODY MASS INDEX: 24.38 KG/M2 | WEIGHT: 180 LBS | DIASTOLIC BLOOD PRESSURE: 70 MMHG | SYSTOLIC BLOOD PRESSURE: 120 MMHG | HEART RATE: 75 BPM | OXYGEN SATURATION: 93 %

## 2023-04-19 DIAGNOSIS — I10 PRIMARY HYPERTENSION: ICD-10-CM

## 2023-04-19 DIAGNOSIS — D64.9 ANEMIA, UNSPECIFIED TYPE: ICD-10-CM

## 2023-04-19 DIAGNOSIS — Z79.01 CHRONIC ANTICOAGULATION: ICD-10-CM

## 2023-04-19 DIAGNOSIS — N18.31 STAGE 3A CHRONIC KIDNEY DISEASE: ICD-10-CM

## 2023-04-19 DIAGNOSIS — I50.22 CHRONIC SYSTOLIC CONGESTIVE HEART FAILURE: ICD-10-CM

## 2023-04-19 DIAGNOSIS — R55 SYNCOPE, UNSPECIFIED SYNCOPE TYPE: ICD-10-CM

## 2023-04-19 DIAGNOSIS — R55 SYNCOPE AND COLLAPSE: Primary | ICD-10-CM

## 2023-04-19 DIAGNOSIS — R26.81 GAIT INSTABILITY: ICD-10-CM

## 2023-04-19 DIAGNOSIS — I95.1 ORTHOSTATIC HYPOTENSION: ICD-10-CM

## 2023-04-19 DIAGNOSIS — E78.2 MIXED HYPERLIPIDEMIA: ICD-10-CM

## 2023-04-19 DIAGNOSIS — E11.65 TYPE 2 DIABETES MELLITUS WITH HYPERGLYCEMIA, WITHOUT LONG-TERM CURRENT USE OF INSULIN: ICD-10-CM

## 2023-04-19 DIAGNOSIS — Z95.0 CARDIAC PACEMAKER IN SITU: ICD-10-CM

## 2023-04-19 DIAGNOSIS — I25.118 CORONARY ARTERY DISEASE OF NATIVE ARTERY OF NATIVE HEART WITH STABLE ANGINA PECTORIS: ICD-10-CM

## 2023-04-19 PROCEDURE — 99999 PR PBB SHADOW E&M-EST. PATIENT-LVL V: ICD-10-PCS | Mod: PBBFAC,,, | Performed by: NURSE PRACTITIONER

## 2023-04-19 PROCEDURE — 99999 PR PBB SHADOW E&M-EST. PATIENT-LVL V: CPT | Mod: PBBFAC,,, | Performed by: NURSE PRACTITIONER

## 2023-04-19 PROCEDURE — 99215 OFFICE O/P EST HI 40 MIN: CPT | Mod: S$PBB,,, | Performed by: NURSE PRACTITIONER

## 2023-04-19 PROCEDURE — 99215 OFFICE O/P EST HI 40 MIN: CPT | Mod: PBBFAC,PN | Performed by: NURSE PRACTITIONER

## 2023-04-19 PROCEDURE — 99215 PR OFFICE/OUTPT VISIT, EST, LEVL V, 40-54 MIN: ICD-10-PCS | Mod: S$PBB,,, | Performed by: NURSE PRACTITIONER

## 2023-04-19 NOTE — PROGRESS NOTES
Subjective:    Patient ID:  Tono Saez is a 77 y.o. male patient here for evaluation Loss of Consciousness (syncope), Fall, Hypotension, Transient Ischemic Attack, and Congestive Heart Failure    History of Present Illness:     Patient is 77-year-old male in clinic today with his daughter to follow-up post syncopal event on 04/04/2023.  Patient states on this day in the afternoon around 4:00 p.m. he was putting up dishes and after reaching to put a dish on the shelf, he states lights went out.  Patient states he would had a normal day at PT with no warning signs or symptoms.  He did fall with this event and suffered subsequent abrasions and bruising.  He saw his primary care who has ordered x-rays and CT of the head which is scheduled tomorrow.  He also ordered CBC, CMP, TSH.  All these labs were reviewed and no major abnormality seen.  He does have chronic kidney disease.  Primary care also gave him antibiotics for multiple abrasions.    He has had no syncopal episodes since 04/04/2023 but he has transient intermittent dizziness.  His daughter reports that last night his blood pressure was 79/54.  He was lying down and eating some chicken noodle soup and felt better afterwards.  Blood pressure while sitting today was recorded at 120/70  I did obtain orthostatic vital signs in office today.  Upon laying the blood pressure was 124/76.  Upon sitting blood pressure dropped to 90s over 60s.  And then again upon standing dropped even further to 80s over 40s.  He denied dizziness with this blood pressure.  Once he sat back down in the chair after a few minutes his blood pressure recovered to 110/70.    Patient is on Jardiance once daily otherwise no diabetes medications he states he takes his blood sugar routinely and it was never under 100.    Patient is still awaiting tooth extraction due to rotten tooth in the left lower mandible.  He is asking again for recommendations on holding the aspirin and Plavix.  I had  discussed this with Dr. Mckoy when patient was here in March in have written down these guidelines for him as he was planning this extraction early next week.    Last pacemaker check was on 01/16/2023 showing chronic high RA impedance and threshold which was known issue.  Patient has [employer] pacemaker and he is 100% dependent    LAST ECHOCARDIOGRAM  Results for orders placed during the hospital encounter of 10/26/22    Echo    Interpretation Summary  · The left ventricle is normal in size with mild concentric hypertrophy and normal systolic function.  · The estimated ejection fraction is 55%.  · Indeterminate left ventricular diastolic function.  · There is abnormal septal wall motion consistent with post-operative status.  · Moderate to severe left atrial enlargement.  · Mild to moderate tricuspid regurgitation.  · Mild-to-moderate aortic regurgitation.  · There is mild aortic valve stenosis.    LAST ISCHEMIC WORKUP   Results for orders placed in visit on 03/28/22    Nuclear Stress Test    Interpretation Summary    The nuclear portion of this study will be reported separately.    The EKG portion of this study is uninterpretable.    The patient reported chest pain during the stress test.    There were no arrhythmias during stress.    No valid procedures specified.  No valid procedures specified.    REVIEW OF SYSTEMS: As noted in HPI   CARDIOVASCULAR:  Syncope 2 weeks ago ongoing dizziness No recent chest pain, palpitations, arm, neck, or jaw pain.  RESPIRATORY: No recent fever, cough chills, SOB.  : No blood in the urine  GI: No nausea, vomiting, or blood in stool.   MUSCULOSKELETAL: No myalgias or falls.  Uses walker, back pain chronic  NEURO: No syncope, lightheadedness, or dizziness.  EYES: No sudden changes in vision.     Review of patient's allergies indicates:   Allergen Reactions    Adhesive Other (See Comments)     SILK TAPE PULL SKIN OFF    Metformin Other (See Comments)     Weight loss cachexia  anorexia,diarrhea.    Pcn [penicillins]      Patient states he passed out from this medication when he was 10 years old.        Past Medical History:   Diagnosis Date    Anemia     Anticoagulant long-term use     Arthritis     CAD (coronary artery disease)     CABG, STENTS '97,'99,'01,'05    Cancer     SKIN    Cataract     Diabetes mellitus     Diabetes mellitus type II     GERD (gastroesophageal reflux disease)     GI bleed 2020    Hypertension     Myocardial infarction     Wears glasses      Past Surgical History:   Procedure Laterality Date    CARDIAC PACEMAKER PLACEMENT      CARDIAC PACEMAKER PLACEMENT      CARDIAC PACEMAKER PLACEMENT  04/26/2018    replaced    CARDIAC SURGERY      1995   CABG X 5    CHOLECYSTECTOMY      COLON SURGERY      COLONOSCOPY N/A 2/21/2020    Procedure: COLONOSCOPY;  Surgeon: Abe Barragan III, MD;  Location: [facility];  Service: Endoscopy;  Laterality: N/A;    COLONOSCOPY N/A 2/23/2020    Procedure: COLONOSCOPY;  Surgeon: Bob Locke Jr., MD;  Location: [facility];  Service: Endoscopy;  Laterality: N/A;    CORONARY ANGIOGRAPHY INCLUDING BYPASS GRAFTS WITH CATHETERIZATION OF LEFT HEART N/A 10/26/2022    Procedure: ANGIOGRAM, CORONARY, INCLUDING BYPASS GRAFT, WITH LEFT HEART CATHETERIZATION;  Surgeon: Robles Mckoy MD;  Location: [facility] CATH/EP LAB;  Service: Cardiology;  Laterality: N/A;    CORONARY ARTERY BYPASS GRAFT  1995    CORONARY STENT PLACEMENT      stent x 5    ESOPHAGOGASTRODUODENOSCOPY N/A 2/23/2020    Procedure: EGD (ESOPHAGOGASTRODUODENOSCOPY);  Surgeon: Bob Lcoke Jr., MD;  Location: [facility] END[facility];  Service: Endoscopy;  Laterality: N/A;    EYE SURGERY      BILAT CATARACT    head laceration   01/19/2018    HEMORRHOID SURGERY      SMALL BOWEL ENTEROSCOPY N/A 2/21/2020    Procedure: ENTEROSCOPY;  Surgeon: Abe Barragan III, MD;  Location: [facility] END[facility];  Service: Endoscopy;  Laterality: N/A;    stint       Social History     Tobacco Use    Smoking status: Never     Smokeless tobacco: Never   Substance Use Topics    Alcohol use: No    Drug use: No         Objective      Vitals:    04/19/23 1133   BP: 120/70   Pulse: 75       LAST EKG  Results for orders placed or performed during the hospital encounter of 02/03/23   EKG 12-lead    Collection Time: 02/03/23 11:33 AM    Narrative    Test Reason : R55,    Vent. Rate : 061 BPM     Atrial Rate : 061 BPM     P-R Int : 216 ms          QRS Dur : 204 ms      QT Int : 482 ms       P-R-T Axes : 012 -88 084 degrees     QTc Int : 485 ms    Atrial-sensed ventricular-paced rhythm with prolonged AV conduction  Abnormal ECG  When compared with ECG of 26-OCT-2022 15:22,  Vent. rate has decreased BY   7 BPM  Confirmed by Kt Lux MD ([id number]) on 2/14/2023 11:08:18 AM    Referred By:             Confirmed By:Kt Lux MD     LIPIDS - LAST 2   Lab Results   Component Value Date    CHOL 141 01/12/2023    CHOL 134 07/22/2021    HDL 48 01/12/2023    HDL 40 07/22/2021    LDLCALC 68.6 01/12/2023    LDLCALC 71.2 07/22/2021    TRIG 122 01/12/2023    TRIG 114 07/22/2021    CHOLHDL 34.0 01/12/2023    CHOLHDL 29.9 07/22/2021     CARDIAC PROFILE - LAST 2  Lab Results   Component Value Date     (H) 02/28/2022     (H) 09/21/2020    CPK 39 04/14/2023    TROPONINI 2.505 (HH) 10/28/2022    TROPONINI 3.114 (HH) 10/28/2022      CBC - LAST 2  Lab Results   Component Value Date    WBC 7.42 04/14/2023    WBC 6.89 02/03/2023    RBC 4.36 (L) 04/14/2023    RBC 4.43 (L) 02/03/2023    HGB 11.4 (L) 04/14/2023    HGB 11.8 (L) 02/03/2023    HCT 38.1 (L) 04/14/2023    HCT 38.8 (L) 02/03/2023     04/14/2023     02/03/2023     Lab Results   Component Value Date    LABPT 14.1 (H) 10/27/2022    LABPT 23.5 (H) 02/20/2020    INR 1.1 02/03/2023    INR 1.2 10/27/2022    APTT 23.3 02/03/2023     CHEMISTRY - LAST 2  Lab Results   Component Value Date     04/14/2023     02/03/2023    K 4.4 04/14/2023    K 3.9 02/03/2023      04/14/2023     02/03/2023    CO2 27 04/14/2023    CO2 25 02/03/2023    ANIONGAP 4 (L) 04/14/2023    ANIONGAP 11 02/03/2023    BUN 29 (H) 04/14/2023    BUN 33 (H) 02/03/2023    CREATININE 1.5 (H) 04/14/2023    CREATININE 1.7 (H) 02/03/2023     (H) 04/14/2023     (H) 02/03/2023    CALCIUM 10.1 04/14/2023    CALCIUM 9.4 02/03/2023    MG 1.7 02/03/2023    MG 1.5 (L) 04/21/2020    ALBUMIN 3.8 04/14/2023    ALBUMIN 3.6 02/03/2023    PROT 6.9 04/14/2023    PROT 6.4 02/03/2023    ALKPHOS 95 04/14/2023    ALKPHOS 81 02/03/2023    ALT 14 04/14/2023    ALT 22 02/03/2023    AST 18 04/14/2023    AST 21 02/03/2023    BILITOT 0.9 04/14/2023    BILITOT 1.0 02/03/2023      ENDOCRINE - LAST 2  Lab Results   Component Value Date    HGBA1C 8.3 (H) 01/12/2023    HGBA1C 7.2 (H) 07/22/2021    TSH 2.270 04/14/2023    TSH 2.100 09/21/2020        PHYSICAL EXAM  CONSTITUTIONAL: Well built, well nourished in no apparent distress  NECK: no carotid bruit, no JVD  LUNGS: CTA  CHEST WALL: no tenderness  HEART: regular rate and rhythm, S1, S2 normal, no murmur, click, rub or gallop   ABDOMEN: soft, non-tender; bowel sounds normal; no masses,  no organomegaly  EXTREMITIES: Extremities normal, no edema, no calf tenderness noted  NEURO: AAO X 3  Orthostasis noted in clinic today  Blood pressure while sitting today was recorded at 120/70  Upon laying the blood pressure was 124/76.  Upon sitting blood pressure dropped to 90s over 60s.  And then again upon standing dropped even further to 80s over 40s.  He denied dizziness with this blood pressure.  Once he sat back down in the chair after a few minutes his blood pressure recovered to 110/70.    I HAVE REVIEWED :    The vital signs, most recent cardiac testing, and most recent pertinent non-cardiology provider notes.    Current Outpatient Medications   Medication Instructions    amLODIPine (NORVASC) 5 MG tablet TAKE ONE TABLET BY MOUTH ONCE DAILY    aspirin (ECOTRIN) 162 mg, Oral,  Daily    atorvastatin (LIPITOR) 40 mg, Oral, Nightly    betamethasone dipropionate (DIPROLENE) 0.05 % lotion Apply thin film to scalp bid prn itch    cephALEXin (KEFLEX) 500 mg, Oral, 4 times daily    clindamycin (CLEOCIN) 150 MG capsule Oral    clopidogreL (PLAVIX) 75 mg, Oral, Daily    diclofenac sodium (VOLTAREN) 2 g, Topical (Top), Daily    finasteride (PROSCAR) 5 mg, Oral, Daily    fluorouracil 5% 5 % Soln Apply small amount to top of head 1-2x/day for 2 weeks    fluticasone propionate (FLONASE) 50 mcg/actuation nasal spray Use nasally as directed as needed    furosemide (LASIX) 20 MG tablet TAKE 1 TABLET (20 MG TOTAL) BY MOUTH ONCE DAILY.    HYDROcodone-acetaminophen (NORCO) 5-325 mg per tablet 1 tablet, Oral, Every 4 hours PRN    isosorbide dinitrate (ISORDIL) 10 mg, Oral, 2 times daily    JARDIANCE 25 mg tablet TAKE 1 TABLET (25 MG TOTAL) BY MOUTH ONCE DAILY.    ketoconazole (NIZORAL) 2 % shampoo Wash all scaling areas let sit 3 minutes then rinse 3x/wk    levocetirizine (XYZAL) 5 MG tablet TAKE ONE TABLET BY MOUTH EVERY EVENING    magnesium oxide (MAG-OX) 400 mg, Oral, Daily    metoprolol tartrate (LOPRESSOR) 25 mg, Oral, 2 times daily    montelukast (SINGULAIR) 10 mg tablet TAKE 1 TABLET (10 MG TOTAL) BY MOUTH EVERY EVENING.    MOUNJARO 5 mg, Subcutaneous, Every 7 days    multivitamin capsule 1 capsule, Oral, Daily    mupirocin (BACTROBAN) 2 % ointment Topical (Top), 2 times daily    nitroGLYCERIN (NITROSTAT) 0.4 MG SL tablet DISSOLVE 1 TABLET UNDER THE TONGUE AS NEEDED FOR CHEST PAIN    pantoprazole (PROTONIX) 40 MG tablet TAKE ONE TABLET BY MOUTH ONCE DAILY    pioglitazone (ACTOS) 15 mg, Oral, Daily    pregabalin (LYRICA) 50 MG capsule 1 po qhs x 1 wk then 1 po bid if no excess drowsiness    sertraline (ZOLOFT) 50 MG tablet TAKE ONE TABLET BY MOUTH ONCE DAILY    sulfacetamide sodium (OVACE PLUS SHAMPOO) 10 % [place] scalp nightly    tolterodine (DETROL LA) 4 MG 24 hr capsule TAKE ONE CAPSULE BY MOUTH  ONCE DAILY    vitamin D (VITAMIN D3) 1,000 Units, Oral, Daily      Assessment & Plan     Syncope and collapse  Syncopal episode on 04/04/2023.  Patient was found to be significantly orthostatic in the office today.  His daughter also reports low blood pressure last night of study 79/54.    I have stopped the amlodipine 5 mg p.o. daily at this time.  Advised close blood pressure monitoring at home.  Advised slow position changes.  Advised compression hose to be worn at all times during the day and off at night.  Discussed zipper compression hose for easier application and better compliance.  Patient's daughter states she was going to order him some.  Suggested 30 mm Hg compression.    Also ordered in clinic device check with [employer] to evaluate pacemaker functioning.  Last pacemaker check in January 2023 showed chronic high RA impedance and threshold.    Gait instability  Uses walker  Chronic back pain  Encouraged slow position change    Type 2 diabetes mellitus with hyperglycemia   Last A1c in January of 2023 was 8.3.  He is only on Jardiance daily and this is managed by primary care    Anemia  Chronic, recent CBC was stable    Chronic anticoagulation  Patient has been on aspirin and Plavix and had a fall on 04/04/2023.  He is had no neuro changes but his primary care ordered a CT of the head which is scheduled for tomorrow.  Patient has various abrasions from the syncopal episode    Discussed holding aspirin for 48 hours prior to dental procedure next week and holding Plavix for 5 days prior to dental procedure next week.  These guidelines were discussed upon last visit with Dr. Mckoy and given with his approval.    Stage 3a chronic kidney disease  Chronic.  Labs from 04/14/2023 showed GFR 47.1    HTN (hypertension)  Blood pressure was normal upon sitting, 120/70 mm Hg.  However he was significantly orthostatic.  Lying blood pressure 124/76, sitting blood pressure dropped to 90/60, and standing blood pressure  dropped even further to 80 over 40s.  Daughter is also reporting low blood pressures at home, most recently 79/54 mm Hg.  I have advised patient to stop amlodipine at this time.    Advised to wear compression hose at all times during the day    Mixed hyperlipidemia  LDL is well controlled, 68.6 on 01/12/2023.  Continue atorvastatin 40 mg p.o. q.h.s.    Coronary artery disease of native artery of native heart with stable angina pectoris  Patient is status post stent in October 2022.  He is on aspirin and Plavix and statin therapies.  Patient has upcoming tooth extraction due to decay tooth of tooth 8.  Guidelines on holding Plavix and aspirin were discussed with Dr. Mckoy at last visit and given to patient again today as the procedure is scheduled for next week.      Chronic systolic congestive heart failure  Patient is identified as having systolic heart failure that is chronic CHF is currently controlled Latest ECHO performed and demonstrates- Results for orders placed during the hospital encounter of 10/26/22    Echo    Interpretation Summary  · The left ventricle is normal in size with mild concentric hypertrophy and normal systolic function.  · The estimated ejection fraction is 55%.  · Indeterminate left ventricular diastolic function.  · There is abnormal septal wall motion consistent with post-operative status.  · Moderate to severe left atrial enlargement.  · Mild to moderate tricuspid regurgitation.  · Mild-to-moderate aortic regurgitation.  · There is mild aortic valve stenosis.  . Continue Lasix 20 mg as needed for fluid retention and metoprolol 25 mg p.o. b.i.d. and monitor clinical status closely. Monitor on telemetry. Patient is on the CHF pathway.  Monitor strict Is&Os and daily weights.  Place on fluid restriction of 2 L Continue to stress to patient importance of self efficacy and  on diet for CHF. Last BNP reviewed- and noted below @LABRCNTIP(BNP,BNPTRIAGEBLO)@.   Latest Reference Range &  Units Most Recent   BNP 0 - 99 pg/mL 199 (H)  2/28/22 16:12   (H): Data is abnormally high    Recheck echo    Cardiac pacemaker in situ  Last pacemaker device check was in January of 2023 showing normal device function and chronic high RA impedance and threshold.  Ordered patient to return to office with [employer] rep to recheck pacer since he has had syncopal episode to rule out any pacer malfunction    BMI 24.0-24.9, adult  Stable    Orthostatic hypotension  Blood pressure dropped from lying to standing from 124/76 to 80/40.  I have reduced blood pressure medication today, discontinued amlodipine 5 mg daily.  I have advised to start wearing compression hose on during the day and off at night.  Continue to monitor blood pressure at home.  We will also recheck echocardiogram as mild aortic stenosis was found on last echo.          No follow-ups on file.

## 2023-04-19 NOTE — ASSESSMENT & PLAN NOTE
Syncopal episode on 04/04/2023.  Patient was found to be significantly orthostatic in the office today.  His daughter also reports low blood pressure last night of study 79/54.    I have stopped the amlodipine 5 mg p.o. daily at this time.  Advised close blood pressure monitoring at home.  Advised slow position changes.  Advised compression hose to be worn at all times during the day and off at night.  Discussed zipper compression hose for easier application and better compliance.  Patient's daughter states she was going to order him some.  Suggested 30 mm Hg compression.    Also ordered in clinic device check with [employer] to evaluate pacemaker functioning.  Last pacemaker check in January 2023 showed chronic high RA impedance and threshold.

## 2023-04-19 NOTE — ASSESSMENT & PLAN NOTE
Blood pressure dropped from lying to standing from 124/76 to 80/40.  I have reduced blood pressure medication today, discontinued amlodipine 5 mg daily.  I have advised to start wearing compression hose on during the day and off at night.  Continue to monitor blood pressure at home.  We will also recheck echocardiogram as mild aortic stenosis was found on last echo.

## 2023-04-19 NOTE — ASSESSMENT & PLAN NOTE
Patient is status post stent in October 2022.  He is on aspirin and Plavix and statin therapies.  Patient has upcoming tooth extraction due to decay tooth of tooth 8.  Guidelines on holding Plavix and aspirin were discussed with Dr. Mckoy at last visit and given to patient again today as the procedure is scheduled for next week.

## 2023-04-19 NOTE — ASSESSMENT & PLAN NOTE
Patient is identified as having systolic heart failure that is chronic CHF is currently controlled Latest ECHO performed and demonstrates- Results for orders placed during the hospital encounter of 10/26/22    Echo    Interpretation Summary  · The left ventricle is normal in size with mild concentric hypertrophy and normal systolic function.  · The estimated ejection fraction is 55%.  · Indeterminate left ventricular diastolic function.  · There is abnormal septal wall motion consistent with post-operative status.  · Moderate to severe left atrial enlargement.  · Mild to moderate tricuspid regurgitation.  · Mild-to-moderate aortic regurgitation.  · There is mild aortic valve stenosis.  . Continue Lasix 20 mg as needed for fluid retention and metoprolol 25 mg p.o. b.i.d. and monitor clinical status closely. Monitor on telemetry. Patient is on the CHF pathway.  Monitor strict Is&Os and daily weights.  Place on fluid restriction of 2 L Continue to stress to patient importance of self efficacy and  on diet for CHF. Last BNP reviewed- and noted below @LABRCNTIP(BNP,BNPTRIAGEBLO)@.   Latest Reference Range & Units Most Recent   BNP 0 - 99 pg/mL 199 (H)  2/28/22 16:12   (H): Data is abnormally high    Recheck echo

## 2023-04-19 NOTE — ASSESSMENT & PLAN NOTE
Last A1c in January of 2023 was 8.3.  He is only on Jardiance daily and this is managed by primary care

## 2023-04-19 NOTE — ASSESSMENT & PLAN NOTE
Last pacemaker device check was in January of 2023 showing normal device function and chronic high RA impedance and threshold.  Ordered patient to return to office with [employer] rep to recheck pacer since he has had syncopal episode to rule out any pacer malfunction   One month ago you declined pain medication for her, still no correct

## 2023-04-19 NOTE — ASSESSMENT & PLAN NOTE
Patient has been on aspirin and Plavix and had a fall on 04/04/2023.  He is had no neuro changes but his primary care ordered a CT of the head which is scheduled for tomorrow.  Patient has various abrasions from the syncopal episode    Discussed holding aspirin for 48 hours prior to dental procedure next week and holding Plavix for 5 days prior to dental procedure next week.  These guidelines were discussed upon last visit with Dr. Mckoy and given with his approval.

## 2023-04-19 NOTE — ASSESSMENT & PLAN NOTE
Blood pressure was normal upon sitting, 120/70 mm Hg.  However he was significantly orthostatic.  Lying blood pressure 124/76, sitting blood pressure dropped to 90/60, and standing blood pressure dropped even further to 80 over 40s.  Daughter is also reporting low blood pressures at home, most recently 79/54 mm Hg.  I have advised patient to stop amlodipine at this time.    Advised to wear compression hose at all times during the day

## 2023-04-20 ENCOUNTER — CLINICAL SUPPORT (OUTPATIENT)
Dept: REHABILITATION | Facility: HOSPITAL | Age: 78
End: 2023-04-20
Payer: MEDICARE

## 2023-04-20 ENCOUNTER — HOSPITAL ENCOUNTER (OUTPATIENT)
Dept: RADIOLOGY | Facility: HOSPITAL | Age: 78
Discharge: HOME | End: 2023-04-20
Attending: FAMILY MEDICINE
Payer: MEDICARE

## 2023-04-20 ENCOUNTER — TELEPHONE (OUTPATIENT)
Dept: FAMILY MEDICINE | Facility: CLINIC | Age: 78
End: 2023-04-20

## 2023-04-20 DIAGNOSIS — S09.90XA HEAD TRAUMA, INITIAL ENCOUNTER: ICD-10-CM

## 2023-04-20 DIAGNOSIS — Z74.09 IMPAIRED MOBILITY AND ACTIVITIES OF DAILY LIVING: ICD-10-CM

## 2023-04-20 DIAGNOSIS — M54.50 LUMBAR SPINE PAIN: Primary | ICD-10-CM

## 2023-04-20 DIAGNOSIS — Z78.9 IMPAIRED MOBILITY AND ACTIVITIES OF DAILY LIVING: ICD-10-CM

## 2023-04-20 PROCEDURE — 70450 CT HEAD/BRAIN W/O DYE: CPT | Mod: TC,PO

## 2023-04-20 PROCEDURE — 97110 THERAPEUTIC EXERCISES: CPT | Mod: PN

## 2023-04-20 PROCEDURE — 97112 NEUROMUSCULAR REEDUCATION: CPT | Mod: PN

## 2023-04-20 NOTE — TELEPHONE ENCOUNTER
----- Message from Scott Staley MD sent at 4/20/2023  1:22 PM CDT -----  Results Ok, notify patient.

## 2023-04-20 NOTE — PROGRESS NOTES
[facility]   Physical Therapy Treatment Note     Name: Tono Saez  Clinic Number: [id number]    Therapy Diagnosis:   Encounter Diagnoses   Name Primary?    Lumbar spine pain Yes    Impaired mobility and activities of daily living        Physician: Scott Staley MD    Visit Date: 4/20/2023    Physician Orders: PT Eval and Treat   Medical Diagnosis from Referral:   S32.010D (ICD-10-CM) - Compression fracture of L1 vertebra with routine healing, subsequent encounter   I49.5 (ICD-10-CM) - SSS (sick sinus syndrome)   I73.9 (ICD-10-CM) - Claudication of gluteal region   Evaluation Date: 3/24/2023  Authorization Period Expiration: 3/15/2024  Plan of Care Expiration: 6/24/2023 or 16v post eval  Visit # (episodes) / Visits authorized: 7 / eval + 16 (POC 6 / 16)  NO FOTO  Progress Note Due: 4/24/2023    Precautions:  Pacemaker; CAD; Blood Thinners; Ca history; DM; HTN; MI     Time In: 200  Time Out: 255  Total Billable Time: 55 minutes      SUBJECTIVE     Pt reports: fell on Monday when he went to get out of chair. Fell on the left side. Just came back from back from MD and CT scan. This is his second fall in 2 weeks. Discussed finishing here at 12 visits and refer him down the street for balance tx. Also recommend he discuss safer living arrangements with daughter.    He was compliant with home exercise program.  Response to previous treatment: increased pain  Functional change: none today    Pain: 0/10 LB  Location: bilateral Low Back / today left hip from fall 3/10     OBJECTIVE     None taken today    Treatment     DOI: 2/03/2023 fall  FALL RISK; should be bringing walker  Supine is painful    Steven received the treatments listed below:      therapeutic exercises to develop strength and flexibility, posture, and core stabilization for 27 minutes including:  NMRE utilized to activate appropriate mm to improve core and lumbar stabilization and lumbopelvic stability: 28 minutes    SEATED: on  Airex / pillow behind    Chin tucks, [id number]  Gentle Thoracic extension stretch, x 10  Physioball Core TrSets, 20x NMRE  Neural glide, df/pf 2 x 10 Bilateral   NMRE - stool  Ankle inv/ev, 20x Bilateral - stool  Long Arc Quad 2# 2x15 alternate Bilateral    Clamshells Green Theraband x 30   Core stab with strap, multi-direction pull NMRE, [id number]  STS with Core activation, thigh support, 5x, 2 sets - SBA required  Hamstring curls Green Theraband x 30 - just needs RIGHT 3+/5; left is 5/5 - NOT PERFORMED time    STAND:     Ab brace with Upright Posture: cues: Squeeze belly, lift chest, relax shoulders, with chin tuck, hold 5 sec, 20x  Red theraband Bilateral External Rotation, head on spine x 20  NMRE   Stand thoracic rotation, face wall, thumbs up, 10x Bilateral   Hands against wall, trunk and cervical extension x 10   NMRE  Physioball Arm flexion rollouts on wall, 10x - SBA required  +Airex Balance: WBOS / NBOS / Single Leg Balance with 1 hand suport - 30sec ea  Marches x 30 Bilateral  Standing hip steamboats 2 x 10 each Bilateral     Red Theraband: shoulder extension / rows x 30 each  - NOT PERFORMED time    Heel Raises/Toe Raises  x 30 - NOT PERFORMED time      FUTURE:  Gait training        Patient Education and Home Exercises     Home Exercises Provided and Patient Education Provided     Education provided:   - cont HOME EXERCISE PROGRAM     Written Home Exercises Provided: Patient instructed to cont prior HEP. Exercises were reviewed and Steven was able to demonstrate them prior to the end of the session.  Steven demonstrated good  understanding of the education provided. See EMR under Patient Instructions for exercises provided during therapy sessions    ASSESSMENT     Steven presents with no back pain overidden by left hip pain from 2nd fall in 2 weeks. Requires SBA for standing therex. Added balance therex today due to ability to be one on one with pt. Pt able to perform all therex with minimal pn provocation; STS  most difficult.    Steven Is progressing well towards his goals.   Pt prognosis is Good.     Pt will continue to benefit from skilled outpatient physical therapy to address the deficits listed in the problem list box on initial evaluation, provide pt/family education and to maximize pt's level of independence in the home and community environment.     Pt's spiritual, cultural and educational needs considered and pt agreeable to plan of care and goals.     Anticipated barriers to physical therapy: Co-morbidities     Goals:  Short Term GOALS: 4 weeks 4/20/2023 ongoing  1. Patient is independent with HEP.   2. Patient demonstrates independence with core activation and postural awareness while sitting and standing.   3. Patient will reduce pn to 2/10 while performing daily activities.  4. Patient will improve pectoralis flexibility to moderate restriction to improve posture.      Long Term GOALS: 8 weeks.  4/20/2023 ongoing  1. Patient demonstrates increased l/s FB ROM to 20 cm from floor to improve tolerance to reaching activities.   2. Patient demonstrates increased core, hip and BLE strength by 1/2 grade or greater to improve tolerance to home chores.  3. Patient demonstrates independence with body mechanics while taking care of himself    PLAN     Cont per Plan of Care, posture, core, and hip strengthening     Denice Palomares, PT

## 2023-04-24 ENCOUNTER — CLINICAL SUPPORT (OUTPATIENT)
Dept: REHABILITATION | Facility: HOSPITAL | Age: 78
End: 2023-04-24
Payer: MEDICARE

## 2023-04-24 DIAGNOSIS — M54.50 LUMBAR SPINE PAIN: Primary | ICD-10-CM

## 2023-04-24 DIAGNOSIS — Z78.9 IMPAIRED MOBILITY AND ACTIVITIES OF DAILY LIVING: ICD-10-CM

## 2023-04-24 DIAGNOSIS — Z74.09 IMPAIRED MOBILITY AND ACTIVITIES OF DAILY LIVING: ICD-10-CM

## 2023-04-24 PROCEDURE — 97110 THERAPEUTIC EXERCISES: CPT | Mod: PN,CQ

## 2023-04-24 PROCEDURE — 97112 NEUROMUSCULAR REEDUCATION: CPT | Mod: PN,CQ

## 2023-04-24 NOTE — PROGRESS NOTES
"[facility]   Physical Therapy Treatment Note     Name: Tono Saez  Clinic Number: [id number]    Therapy Diagnosis:   Encounter Diagnoses   Name Primary?    Lumbar spine pain Yes    Impaired mobility and activities of daily living        Physician: Scott Staley MD    Visit Date: 4/24/2023    Physician Orders: PT Eval and Treat   Medical Diagnosis from Referral:   S32.010D (ICD-10-CM) - Compression fracture of L1 vertebra with routine healing, subsequent encounter   I49.5 (ICD-10-CM) - SSS (sick sinus syndrome)   I73.9 (ICD-10-CM) - Claudication of gluteal region   Evaluation Date: 3/24/2023  Authorization Period Expiration: 3/15/2024  Plan of Care Expiration: 6/24/2023 or 16v post eval  Visit # (episodes) / Visits authorized: 8 / eval + 16 (POC 7 / 16)  NO FOTO  Progress Note Due: 4/24/2023    Precautions:  Pacemaker; CAD; Blood Thinners; Ca history; DM; HTN; MI     Time In: 300p  Time Out: 358p  Total Billable Time: 30 minutes      SUBJECTIVE     Pt reports: "I have a bruised  hip that just won't heal", states R foot is weaker than L foot    He was compliant with home exercise program.  Response to previous treatment: no complaints  Functional change: none today    Pain: 3/10 L hip, where he fell, 0/10 in Low Back   Location: bilateral Low Back / today left hip from fall 3/10     OBJECTIVE     None taken today    Treatment     DOI: 2/03/2023 fall  FALL RISK; should be bringing walker  Supine is painful    Steven received the treatments listed below:      therapeutic exercises to develop strength and flexibility, posture, and core stabilization for 30 minutes including:  NMRE utilized to activate appropriate mm to improve core and lumbar stabilization and lumbopelvic stability: 28 minutes    SEATED: on Airex / pillow behind    Chin tucks, 2 x [id number]  Gentle Thoracic extension stretch, x 10  Physioball Core TrSets, 20x NMRE  Neural glide, df/pf 3 x 10 Bilateral   NMRE - stool  Ankle " inv/ev, 30x Bilateral - stool  Long Arc Quad 2# 2x15 alternate Bilateral    Clamshells Green Theraband x 30   Core stab with strap, multi-direction pull NMRE, [id number]  STS with Core activation, thigh support, 5x, 2 sets - SBA required  Hamstring curls Green Theraband x 30 - just needs RIGHT 3+/5; left is 5/5-     STAND:     Ab brace with Upright Posture: cues: Squeeze belly, lift chest, relax shoulders, with chin tuck, hold 5 sec, 20x   NOT PERFORMED   Red theraband Bilateral External Rotation, head on spine x 20  NMRE   Stand thoracic rotation, face wall, thumbs up, 10x Bilateral   NOT PERFORMED   Hands against wall, trunk and cervical extension x 10   NMRE   NOT PERFORMED   Physioball Arm flexion rollouts on wall, 10x - SBA required   NOT PERFORMED   +Airex Balance: WBOS / NBOS / Single Leg Balance with 1 hand suport - 30sec ea  NOT PERFORMED   Marches x 30 Bilateral  Standing hip steamboats  x 10 each Bilateral     Red Theraband: shoulder extension / rows x 30 each  - NOT PERFORMED time    Heel Raises/Toe Raises  x 30 - NOT PERFORMED time      FUTURE:  Gait training      Patient Education and Home Exercises     Home Exercises Provided and Patient Education Provided     Education provided:   - cont HOME EXERCISE PROGRAM     Written Home Exercises Provided: Patient instructed to cont prior HEP. Exercises were reviewed and Steven was able to demonstrate them prior to the end of the session.  Steven demonstrated good  understanding of the education provided. See EMR under Patient Instructions for exercises provided during therapy sessions    ASSESSMENT     Steven tolerated PRE's with good form and minimal rest breaks.  His posture is minimally better, but is still very rounded.      Steven Is progressing well towards his goals.   Pt prognosis is Good.     Pt will continue to benefit from skilled outpatient physical therapy to address the deficits listed in the problem list box on initial evaluation, provide pt/family  education and to maximize pt's level of independence in the home and community environment.     Pt's spiritual, cultural and educational needs considered and pt agreeable to plan of care and goals.     Anticipated barriers to physical therapy: Co-morbidities     Goals:  Short Term GOALS: 4 weeks 4/24/2023 ongoing  1. Patient is independent with HEP.   2. Patient demonstrates independence with core activation and postural awareness while sitting and standing.   3. Patient will reduce pn to 2/10 while performing daily activities.  4. Patient will improve pectoralis flexibility to moderate restriction to improve posture.      Long Term GOALS: 8 weeks.  4/24/2023 ongoing  1. Patient demonstrates increased l/s FB ROM to 20 cm from floor to improve tolerance to reaching activities.   2. Patient demonstrates increased core, hip and BLE strength by 1/2 grade or greater to improve tolerance to home chores.  3. Patient demonstrates independence with body mechanics while taking care of himself    PLAN     Cont per Plan of Care, posture, core, and hip strengthening     Harriet Sneed, PTA

## 2023-04-27 ENCOUNTER — CLINICAL SUPPORT (OUTPATIENT)
Dept: REHABILITATION | Facility: HOSPITAL | Age: 78
End: 2023-04-27
Payer: MEDICARE

## 2023-04-27 DIAGNOSIS — Z78.9 IMPAIRED MOBILITY AND ACTIVITIES OF DAILY LIVING: ICD-10-CM

## 2023-04-27 DIAGNOSIS — M54.50 LUMBAR SPINE PAIN: Primary | ICD-10-CM

## 2023-04-27 DIAGNOSIS — Z74.09 IMPAIRED MOBILITY AND ACTIVITIES OF DAILY LIVING: ICD-10-CM

## 2023-04-27 PROCEDURE — 97110 THERAPEUTIC EXERCISES: CPT | Mod: PN,CQ

## 2023-04-27 PROCEDURE — 97112 NEUROMUSCULAR REEDUCATION: CPT | Mod: PN,CQ

## 2023-04-27 NOTE — PROGRESS NOTES
[facility]   Physical Therapy Treatment Note     Name: Tono Saez  Clinic Number: [id number]    Therapy Diagnosis:   Encounter Diagnoses   Name Primary?    Lumbar spine pain Yes    Impaired mobility and activities of daily living        Physician: Scott Staley MD    Visit Date: 4/27/2023    Physician Orders: PT Eval and Treat   Medical Diagnosis from Referral:   S32.010D (ICD-10-CM) - Compression fracture of L1 vertebra with routine healing, subsequent encounter   I49.5 (ICD-10-CM) - SSS (sick sinus syndrome)   I73.9 (ICD-10-CM) - Claudication of gluteal region   Evaluation Date: 3/24/2023  Authorization Period Expiration: 3/15/2024  Plan of Care Expiration: 6/24/2023 or 16v post eval  Visit # (episodes) / Visits authorized: 9 / eval + 16 (POC 8 / 16)  NO FOTO  Progress Note Due: 4/24/2023    Precautions:  Pacemaker; CAD; Blood Thinners; Ca history; DM; HTN; MI     Time In: 159p  Time Out: 256p  Total Billable Time: 57 minutes      SUBJECTIVE     Pt reports: his back is feeling good, pain in his hip that he fell on    He was compliant with home exercise program.  Response to previous treatment: good  Functional change: none today    Pain: 0/10 Low Back   Location: bilateral Low Back / today left hip from fall 3/10     OBJECTIVE     None taken today    Treatment     DOI: 2/03/2023 fall  FALL RISK; should be bringing walker  Supine is painful    Steven received the treatments listed below:      therapeutic exercises to develop strength and flexibility, posture, and core stabilization for 30 minutes including:  NMRE utilized to activate appropriate mm to improve core and lumbar stabilization and lumbopelvic stability: 27 minutes    SEATED: on Airex / pillow behind    Chin tucks, 2 x [id number]  Gentle Thoracic extension stretch, 2 x 10  Physioball Core TrSets, 20x NMRE  Neural glide, df/pf 3 x 10 Bilateral   NMRE - stool  Ankle inv/ev, Yellow theraband 30x Bilateral - stool  Long Arc Quad  2# 2x15 alternate Bilateral    Clamshells Green Theraband x 30   Core stab with strap, multi-direction pull NMRE, [id number]  STS with Core activation, thigh support, 5x, 2 sets - SBA required  Hamstring curls Green Theraband x 30 - just needs RIGHT 3+/5; left is 5/5-   NOT PERFORMED     STAND:     Ab brace with Upright Posture: cues: Squeeze belly, lift chest, relax shoulders, with chin tuck, hold 5 sec, 20x   NOT PERFORMED   Red theraband Bilateral External Rotation, head on spine x 20  NMRE  NOT PERFORMED   Stand thoracic rotation, face wall, thumbs up, 10x Bilateral   NOT PERFORMED   Hands against wall, trunk and cervical extension x 10   NMRE   NOT PERFORMED   Physioball Arm flexion rollouts on wall, 10x - SBA required   NOT PERFORMED   Airex Balance: WBOS / NBOS / Single Leg Balance with 1 hand suport - 30sec ea  Marches x 30 Bilateral  Standing hip steamboats  x 10 each Bilateral   NOT PERFORMED   Red Theraband: shoulder extension / rows x 30 each  - NOT PERFORMED time    Heel Raises/Toe Raises  x 30 Airex       FUTURE:  Gait training      Patient Education and Home Exercises     Home Exercises Provided and Patient Education Provided     Education provided:   - cont HOME EXERCISE PROGRAM     Written Home Exercises Provided: Patient instructed to cont prior HEP. Exercises were reviewed and Steven was able to demonstrate them prior to the end of the session.  Steven demonstrated good  understanding of the education provided. See EMR under Patient Instructions for exercises provided during therapy sessions    ASSESSMENT     Steven continues to report hip pain, but his back is doing much better.  He continues with decreased Bilateral hip and LE strength, which increases his fall risk.      Steven Is progressing well towards his goals.   Pt prognosis is Good.     Pt will continue to benefit from skilled outpatient physical therapy to address the deficits listed in the problem list box on initial evaluation, provide  pt/family education and to maximize pt's level of independence in the home and community environment.     Pt's spiritual, cultural and educational needs considered and pt agreeable to plan of care and goals.     Anticipated barriers to physical therapy: Co-morbidities     Goals:  Short Term GOALS: 4 weeks 4/27/2023 ongoing  1. Patient is independent with HEP.   2. Patient demonstrates independence with core activation and postural awareness while sitting and standing.   3. Patient will reduce pn to 2/10 while performing daily activities.  4. Patient will improve pectoralis flexibility to moderate restriction to improve posture.      Long Term GOALS: 8 weeks.  4/27/2023 ongoing  1. Patient demonstrates increased l/s FB ROM to 20 cm from floor to improve tolerance to reaching activities.   2. Patient demonstrates increased core, hip and BLE strength by 1/2 grade or greater to improve tolerance to home chores.  3. Patient demonstrates independence with body mechanics while taking care of himself    PLAN     Cont per Plan of Care, posture, core, and hip strengthening     Harriet Sneed, PTA

## 2023-05-02 ENCOUNTER — CLINICAL SUPPORT (OUTPATIENT)
Dept: REHABILITATION | Facility: HOSPITAL | Age: 78
End: 2023-05-02
Payer: MEDICARE

## 2023-05-02 DIAGNOSIS — Z78.9 IMPAIRED MOBILITY AND ACTIVITIES OF DAILY LIVING: ICD-10-CM

## 2023-05-02 DIAGNOSIS — M54.50 LUMBAR SPINE PAIN: Primary | ICD-10-CM

## 2023-05-02 DIAGNOSIS — Z74.09 IMPAIRED MOBILITY AND ACTIVITIES OF DAILY LIVING: ICD-10-CM

## 2023-05-02 PROCEDURE — 97112 NEUROMUSCULAR REEDUCATION: CPT | Mod: PN

## 2023-05-02 PROCEDURE — 97110 THERAPEUTIC EXERCISES: CPT | Mod: PN

## 2023-05-02 NOTE — PROGRESS NOTES
[facility]   Physical Therapy Treatment Note     Name: Tono Saez  Clinic Number: [id number]    Therapy Diagnosis:   Encounter Diagnoses   Name Primary?    Lumbar spine pain Yes    Impaired mobility and activities of daily living        Physician: Scott Staley MD    Visit Date: 5/2/2023    Physician Orders: PT Eval and Treat   Medical Diagnosis from Referral:   S32.010D (ICD-10-CM) - Compression fracture of L1 vertebra with routine healing, subsequent encounter   I49.5 (ICD-10-CM) - SSS (sick sinus syndrome)   I73.9 (ICD-10-CM) - Claudication of gluteal region   Evaluation Date: 3/24/2023  Authorization Period Expiration: 3/15/2024  Plan of Care Expiration: 6/24/2023 or 16v post eval  Visit # (episodes) / Visits authorized: 10 / eval + 16 (POC  9 / 16) - stop at 12 and refer to [facility] for balance and fall risk tx?  NO FOTO  Progress Note Due: 4/24/2023    Precautions:  Pacemaker; CAD; Blood Thinners; Ca history; DM; HTN; MI     Time In: 201  Time Out: 256  Total Billable Time: 55 minutes      SUBJECTIVE     Pt reports: his left hip pain increases with walking but not in therapy today. Back doing good; hip pain since latest fall.     He was compliant with home exercise program.  Response to previous treatment: good  Functional change: none today    Pain: 0/10 Low Back   Location: bilateral Low Back / today left hip from fall 3/10     OBJECTIVE     None taken today    Treatment     DOI: 2/03/2023 fall  FALL RISK; should be bringing walker  Supine is painful    Steven received the treatments listed below:      therapeutic exercises to develop strength and flexibility, posture, and core stabilization for 28 minutes including:  NMRE utilized to activate appropriate mm to improve core and lumbar stabilization and lumbopelvic stability: 27 minutes    SEATED: on Airex / pillow behind    Chin tucks, 2 x [id number]  Gentle Thoracic extension stretch, 2 x 10  Physioball Core TrSets, 20x NMRE  Neural  "glide, df/pf 3 x 10 Bilateral   NMRE - stool  Ankle inv/ev, Yellow theraband 30x Bilateral - stool - performed standing on airex today  Core stab with strap, multi-direction pull NMRE, [id number]  STS with Core activation, thigh support, 5x, 2 sets - SBA required    Long Arc Quad 2# 2x15 alternate Bilateral  - NOT PERFORMED   Clamshells Green Theraband x 30 - NOT PERFORMED   Hamstring curls Green Theraband x 30 - just needs RIGHT 3+/5; left is 5/5-   NOT PERFORMED     STAND:     Airex Balance: WBOS, eyes closed, 2x / NBOS, 2x / Single Leg Balance with 1 hand suport, 2x- 30sec ea  +Airex: INV/EV on Airex  Heel Raises/Toe Raises  x 30 Airex  Marches, 1 minute (marches off airex so just on floor)  Physioball Arm flexion rollouts on wall, 10x - SBA required     +FSU, stairs, 2x10 Bilateral   Add next 4" LSO   Red theraband Bilateral External Rotation, head on spine x 20  NMRE    Stand thoracic rotation, face wall, thumbs up, 10x Bilateral      Hands against wall, trunk and cervical extension x 10   NMRE   NOT PERFORMED   Standing hip steamboats  x 10 each Bilateral   NOT PERFORMED   Red Theraband: shoulder extension / rows x 30 each  - NOT PERFORMED time          FUTURE:  Gait training      Patient Education and Home Exercises     Home Exercises Provided and Patient Education Provided     Education provided:   - cont HOME EXERCISE PROGRAM     Written Home Exercises Provided: Patient instructed to cont prior HEP. Exercises were reviewed and Steven was able to demonstrate them prior to the end of the session.  Steven demonstrated good  understanding of the education provided. See EMR under Patient Instructions for exercises provided during therapy sessions    ASSESSMENT     Steven continues to report left hip pain, but his back pain has resolved.  He continues with decreased Bilateral hip and LE strength, which increases his fall risk. Added unsteady surface to most standing therex to increase proprioceptive feedback and " somatosensory stimulation.      Steven Is progressing well towards his goals.   Pt prognosis is Good.     Pt will continue to benefit from skilled outpatient physical therapy to address the deficits listed in the problem list box on initial evaluation, provide pt/family education and to maximize pt's level of independence in the home and community environment.     Pt's spiritual, cultural and educational needs considered and pt agreeable to plan of care and goals.     Anticipated barriers to physical therapy: Co-morbidities     Goals:  Short Term GOALS: 4 weeks 5/2/2023 ongoing  1. Patient is independent with HEP.   2. Patient demonstrates independence with core activation and postural awareness while sitting and standing.   3. Patient will reduce pn to 2/10 while performing daily activities.  4. Patient will improve pectoralis flexibility to moderate restriction to improve posture.      Long Term GOALS: 8 weeks.  5/2/2023 ongoing  1. Patient demonstrates increased l/s FB ROM to 20 cm from floor to improve tolerance to reaching activities.   2. Patient demonstrates increased core, hip and BLE strength by 1/2 grade or greater to improve tolerance to home chores.  3. Patient demonstrates independence with body mechanics while taking care of himself    PLAN     Cont per Plan of Care, posture, core, and hip strengthening; balance     Denice Palomares, PT

## 2023-05-09 ENCOUNTER — CLINICAL SUPPORT (OUTPATIENT)
Dept: REHABILITATION | Facility: HOSPITAL | Age: 78
End: 2023-05-09
Payer: MEDICARE

## 2023-05-09 DIAGNOSIS — Z74.09 IMPAIRED MOBILITY AND ACTIVITIES OF DAILY LIVING: ICD-10-CM

## 2023-05-09 DIAGNOSIS — Z78.9 IMPAIRED MOBILITY AND ACTIVITIES OF DAILY LIVING: ICD-10-CM

## 2023-05-09 DIAGNOSIS — M54.50 LUMBAR SPINE PAIN: Primary | ICD-10-CM

## 2023-05-09 PROCEDURE — 97110 THERAPEUTIC EXERCISES: CPT | Mod: KX,PN,CQ

## 2023-05-09 PROCEDURE — 97112 NEUROMUSCULAR REEDUCATION: CPT | Mod: KX,PN,CQ

## 2023-05-09 NOTE — PROGRESS NOTES
[facility]   Physical Therapy Treatment Note     Name: Tono Saez  Clinic Number: [id number]    Therapy Diagnosis:   Encounter Diagnoses   Name Primary?    Lumbar spine pain Yes    Impaired mobility and activities of daily living        Physician: Scott Staley MD    Visit Date: 5/9/2023    Physician Orders: PT Eval and Treat   Medical Diagnosis from Referral:   S32.010D (ICD-10-CM) - Compression fracture of L1 vertebra with routine healing, subsequent encounter   I49.5 (ICD-10-CM) - SSS (sick sinus syndrome)   I73.9 (ICD-10-CM) - Claudication of gluteal region   Evaluation Date: 3/24/2023  Authorization Period Expiration: 3/15/2024  Plan of Care Expiration: 6/24/2023 or 16v post eval  Visit # (episodes) / Visits authorized: 11 / eval + 16 (POC  10 / 16) - stop at 12 and refer to [facility] [place] for balance and fall risk tx?  NO FOTO  Progress Note Due: 4/24/2023    Precautions:  Pacemaker; CAD; Blood Thinners; Ca history; DM; HTN; MI     Time In: 300p  Time Out: 354p  Total Billable Time: 54 minutes      SUBJECTIVE     Pt reports: headache, toothache, hip ache, he states pain continues in his hip region    He was compliant with home exercise program.  Response to previous treatment: make him feel better  Functional change: none today    Pain: 7/10 Low Back   Location: bilateral Low Back    OBJECTIVE     None taken today    Treatment     DOI: 2/03/2023 fall  FALL RISK; should be bringing walker  Supine is painful    Steven received the treatments listed below:      therapeutic exercises to develop strength and flexibility, posture, and core stabilization for 30 minutes including:  NMRE utilized to activate appropriate mm to improve core and lumbar stabilization and lumbopelvic stability: 24 minutes    SEATED: on Airex / pillow behind    +Hamstring stretch 3 x 30 sec Bilateral   Chin tucks, 2 x [id number]  Gentle Thoracic extension stretch, 2 x 10  Physioball Core TrSets, 30x NMRE  Neural glide,  "df/pf 3 x 10 Bilateral   NMRE - stool  Ankle inv/ev, Yellow theraband 30x Bilateral - stool - performed standing on airex today  Core stab with strap, multi-direction pull NMRE, [id number]  Trunk flexion with Physioball 2 x 10    STS with Core activation, thigh support, 5x, 2 sets - SBA required - NOSE OVER TOES    Long Arc Quad 2# 2x15 alternate Bilateral    Clamshells Green Theraband x 30 - NOT PERFORMED   Hamstring curls Green Theraband x 30 - just needs RIGHT 3+/5; left is 5/5-   NOT PERFORMED     STAND:     Stand thoracic rotation, face wall, thumbs up, 5x Bilateral   -  CAUSES DIZZINESS    NOT PERFORMED  BELOW  Airex Balance: WBOS, eyes closed, 2x / NBOS, 2x / Single Leg Balance with 1 hand suport, 2x- 30sec ea  +Airex: INV/EV on Airex  Heel Raises/Toe Raises  x 30 Airex  Marches, 1 minute (marches off airex so just on floor)  Physioball Arm flexion rollouts on wall, 10x - SBA required     +FSU, stairs, 2x10 Bilateral   Add next 4" LSO  Red theraband Bilateral External Rotation, head on spine x 20  NMRE        Hands against wall, trunk and cervical extension x 10   NMRE   NOT PERFORMED   Standing hip steamboats  x 10 each Bilateral   NOT PERFORMED   Red Theraband: shoulder extension / rows x 30 each  - NOT PERFORMED time        FUTURE:  Gait training      Patient Education and Home Exercises     Home Exercises Provided and Patient Education Provided     Education provided:   - cont HOME EXERCISE PROGRAM     Written Home Exercises Provided: Patient instructed to cont prior HEP. Exercises were reviewed and Steven was able to demonstrate them prior to the end of the session.  Steven demonstrated good  understanding of the education provided. See EMR under Patient Instructions for exercises provided during therapy sessions    ASSESSMENT     Steven arrived reporting increased Low back pain.  He continues with a flexed trunk posture, forward head.  He remains a high fall risk.    Hip pain remains and is affecting his Low " back pain.  Steven Is progressing well towards his goals.   Pt prognosis is Good.     Pt will continue to benefit from skilled outpatient physical therapy to address the deficits listed in the problem list box on initial evaluation, provide pt/family education and to maximize pt's level of independence in the home and community environment.     Pt's spiritual, cultural and educational needs considered and pt agreeable to plan of care and goals.     Anticipated barriers to physical therapy: Co-morbidities     Goals:  Short Term GOALS: 4 weeks 5/9/2023 ongoing  1. Patient is independent with HEP.   2. Patient demonstrates independence with core activation and postural awareness while sitting and standing.   3. Patient will reduce pn to 2/10 while performing daily activities.  4. Patient will improve pectoralis flexibility to moderate restriction to improve posture.      Long Term GOALS: 8 weeks.  5/9/2023 ongoing  1. Patient demonstrates increased l/s FB ROM to 20 cm from floor to improve tolerance to reaching activities.   2. Patient demonstrates increased core, hip and BLE strength by 1/2 grade or greater to improve tolerance to home chores.  3. Patient demonstrates independence with body mechanics while taking care of himself    PLAN     Cont per Plan of Care, posture, core, and hip strengthening; balance     Harriet Sneed, PTA

## 2023-05-11 ENCOUNTER — CLINICAL SUPPORT (OUTPATIENT)
Dept: REHABILITATION | Facility: HOSPITAL | Age: 78
End: 2023-05-11
Payer: MEDICARE

## 2023-05-11 DIAGNOSIS — Z78.9 IMPAIRED MOBILITY AND ACTIVITIES OF DAILY LIVING: ICD-10-CM

## 2023-05-11 DIAGNOSIS — M54.50 LUMBAR SPINE PAIN: Primary | ICD-10-CM

## 2023-05-11 DIAGNOSIS — Z74.09 IMPAIRED MOBILITY AND ACTIVITIES OF DAILY LIVING: ICD-10-CM

## 2023-05-11 PROCEDURE — 97112 NEUROMUSCULAR REEDUCATION: CPT | Mod: KX,PN,CQ

## 2023-05-11 PROCEDURE — 97110 THERAPEUTIC EXERCISES: CPT | Mod: KX,PN,CQ

## 2023-05-11 NOTE — PROGRESS NOTES
[facility]   Physical Therapy Treatment Note     Name: Tono Saez  Clinic Number: [id number]    Therapy Diagnosis:   Encounter Diagnoses   Name Primary?    Lumbar spine pain Yes    Impaired mobility and activities of daily living        Physician: Scott Staley MD    Visit Date: 5/11/2023    Physician Orders: PT Eval and Treat   Medical Diagnosis from Referral:   S32.010D (ICD-10-CM) - Compression fracture of L1 vertebra with routine healing, subsequent encounter   I49.5 (ICD-10-CM) - SSS (sick sinus syndrome)   I73.9 (ICD-10-CM) - Claudication of gluteal region   Evaluation Date: 3/24/2023  Authorization Period Expiration: 3/15/2024  Plan of Care Expiration: 6/24/2023 or 16v post eval  Visit # (episodes) / Visits authorized: 12 / eval + 16 (POC  11 / 16) - stop at 12 and refer to [facility] for balance and fall risk tx?  NO FOTO  Progress Note Due: 4/24/2023    Precautions:  Pacemaker; CAD; Blood Thinners; Ca history; DM; HTN; MI     Time In: 200p  Time Out: 254p  Total Billable Time: 54 minutes      SUBJECTIVE     Pt reports: headache, toothache, hip ache, he states pain continues in his hip region    He was compliant with home exercise program.  Response to previous treatment: make him feel better  Functional change: none today    Pain: 0/10 Low Back   Location: bilateral Low Back    OBJECTIVE     None taken today    Treatment     DOI: 2/03/2023 fall  FALL RISK; should be bringing walker  Supine is painful    Steven received the treatments listed below:      therapeutic exercises to develop strength and flexibility, posture, and core stabilization for 30 minutes including:  NMRE utilized to activate appropriate mm to improve core and lumbar stabilization and lumbopelvic stability: 24 minutes    SEATED: on Airex / pillow behind    +Hamstring stretch 3 x 30 sec Bilateral   Chin tucks, 2 x [id number]  Gentle Thoracic extension stretch, 2 x 10  Physioball Core TrSets, 30x NMRE  Neural glide,  "df/pf 3 x 10 Bilateral   NMRE - stool    Core stab with strap, multi-direction pull NMRE, [id number]  Trunk flexion with Physioball 2 x 10    STAND:     Stand thoracic rotation, face wall, thumbs up, 5x Bilateral   -  CAUSES DIZZINESS -performed seated, holding stick in front  Heel Raises/Toe Raises  x 30 Airex  Marches, 1 minute (marches off airex so just on floor)  FSU, stairs, 4" 2x10 Bilateral   Standing hip abduction 2 x 10 Bilateral       Add next 4" LSO        NOT PERFORMED  Airex Balance: WBOS, eyes closed, 2x / NBOS, 2x / Single Leg Balance with 1 hand suport, 2x- 30sec ea  +Airex: INV/EV on Airex   Ankle inv/ev, Yellow theraband 30x Bilateral - stool - performed standing on airex today  NOT PERFORMED   Long Arc Quad 2# 2x15 alternate Bilateral    Red theraband Bilateral External Rotation, head on spine x 20  NMRE  Hands against wall, trunk and cervical extension x 10   NMRE     Standing hip steamboats  x 10 each Bilateral      Red Theraband: shoulder extension / rows x 30 each  -   STS with Core activation, thigh support, 5x, 2 sets - SBA required - NOSE OVER TOES  Physioball Arm flexion rollouts on wall, 10x - SBA required  Clamshells Green Theraband x 30     Hamstring curls Green Theraband x 30 - just needs RIGHT 3+/5; left is 5/5-    FUTURE:  Gait training      Patient Education and Home Exercises     Home Exercises Provided and Patient Education Provided     Education provided:   - cont HOME EXERCISE PROGRAM     Written Home Exercises Provided: Patient instructed to cont prior HEP. Exercises were reviewed and Steven was able to demonstrate them prior to the end of the session.  Steven demonstrated good  understanding of the education provided. See EMR under Patient Instructions for exercises provided during therapy sessions    ASSESSMENT     Steven reports his Low back pain has improved today, hip pain remains.  Decreased Bilateral hamstring flexibility contributes to his Low back pain and dysfunction.  "   Steven Is progressing well towards his goals.   Pt prognosis is Good.     Pt will continue to benefit from skilled outpatient physical therapy to address the deficits listed in the problem list box on initial evaluation, provide pt/family education and to maximize pt's level of independence in the home and community environment.     Pt's spiritual, cultural and educational needs considered and pt agreeable to plan of care and goals.     Anticipated barriers to physical therapy: Co-morbidities     Goals:  Short Term GOALS: 4 weeks 5/11/2023 ongoing  1. Patient is independent with HEP.   2. Patient demonstrates independence with core activation and postural awareness while sitting and standing.   3. Patient will reduce pn to 2/10 while performing daily activities.  4. Patient will improve pectoralis flexibility to moderate restriction to improve posture.      Long Term GOALS: 8 weeks.  5/11/2023 ongoing  1. Patient demonstrates increased l/s FB ROM to 20 cm from floor to improve tolerance to reaching activities.   2. Patient demonstrates increased core, hip and BLE strength by 1/2 grade or greater to improve tolerance to home chores.  3. Patient demonstrates independence with body mechanics while taking care of himself    PLAN     Cont per Plan of Care, posture, core, and hip strengthening; balance     Harriet Sneed, PTA

## 2023-05-16 ENCOUNTER — CLINICAL SUPPORT (OUTPATIENT)
Dept: REHABILITATION | Facility: HOSPITAL | Age: 78
End: 2023-05-16
Payer: MEDICARE

## 2023-05-16 DIAGNOSIS — Z74.09 IMPAIRED MOBILITY AND ACTIVITIES OF DAILY LIVING: ICD-10-CM

## 2023-05-16 DIAGNOSIS — Z78.9 IMPAIRED MOBILITY AND ACTIVITIES OF DAILY LIVING: ICD-10-CM

## 2023-05-16 DIAGNOSIS — M54.50 LUMBAR SPINE PAIN: Primary | ICD-10-CM

## 2023-05-16 PROCEDURE — 97112 NEUROMUSCULAR REEDUCATION: CPT | Mod: PN

## 2023-05-16 PROCEDURE — 97110 THERAPEUTIC EXERCISES: CPT | Mod: PN

## 2023-05-16 NOTE — PROGRESS NOTES
[facility]   Physical Therapy Treatment Note     Name: Tono Saez  Clinic Number: [id number]    Therapy Diagnosis:   No diagnosis found.      Physician: Scott Staley MD    Visit Date: 5/16/2023    Physician Orders: PT Eval and Treat   Medical Diagnosis from Referral:   S32.010D (ICD-10-CM) - Compression fracture of L1 vertebra with routine healing, subsequent encounter   I49.5 (ICD-10-CM) - SSS (sick sinus syndrome)   I73.9 (ICD-10-CM) - Claudication of gluteal region   Evaluation Date: 3/24/2023  Authorization Period Expiration: 3/15/2024  Plan of Care Expiration: 6/24/2023 or 16v post eval  Visit # (episodes) / Visits authorized: 12 / eval + 16 (POC  11 / 16) - stop at 12 and refer to [facility] for balance and fall risk tx?  NO FOTO  Progress Note Due: 4/24/2023    Precautions:  Pacemaker; CAD; Blood Thinners; Ca history; DM; HTN; MI     Time In: 200p  Time Out: 254p  Total Billable Time: 54 minutes      SUBJECTIVE     Pt reports: headache, toothache, hip ache, he states pain continues in his hip region    He was compliant with home exercise program.  Response to previous treatment: make him feel better  Functional change: none today    Pain: 0/10 Low Back   Location: bilateral Low Back    OBJECTIVE     Lumbar AROM: ROM-   Response to repeated movements   Flexion 41 cm from floor - stretch   Extension (15 norm) 10 flexion to 0 degrees    Right side bending  (20 norm) 15 degrees   Left side bending  (20 norm) 12 degrees   Rotation Observation NT         L/E MMT: 5/5 Left Right   Hip Flexion 5/5. 5/5.   Hip Extension 5/5. 5/5.   Hip Abduction 4+/5. 4+/5.   Hip Adduction 5/5. 4/5.   Hip IR 5/5. 5/5.   Hip ER 5/5. 5/5.   Knee Flexion 5/5. 3+/5.   Knee Extension 5/5 3+/5.   Ankle DF 5/5. 5/5.   Ankle INV 5/5. 5/5.   Ankle EV 5/5. 2/5.      Hip & LE Range of Motion:   In degrees Left  Limited Hip Flexion and External Rotation  Right  Limited Hip Flexion and External Rotation       Core  "strength MMT: NT  LLD (Leg length discrepancy) / Supine to Sit: NT     Flexibility Left Right   Hamstrings 90/90 (-20 norm) -40 degrees -40 degrees        Treatment     DOI: 2/03/2023 fall  FALL RISK; should be bringing walker  Supine is painful    Steven received the treatments listed below:      therapeutic exercises to develop strength and flexibility, posture, and core stabilization for 30 minutes including:  NMRE utilized to activate appropriate mm to improve core and lumbar stabilization and lumbopelvic stability: 24 minutes    SEATED: on Airex / pillow behind    +Hamstring stretch 3 x 30 sec Bilateral   Chin tucks, 2 x [id number]  Gentle Thoracic extension stretch, 2 x 10  Physioball Core TrSets, 30x NMRE  Neural glide, df/pf 3 x 10 Bilateral   NMRE - stool    Core stab with strap, multi-direction pull NMRE, [id number]  Trunk flexion with Physioball 2 x 10    STAND:     Stand thoracic rotation, face wall, thumbs up, 5x Bilateral   -  CAUSES DIZZINESS -performed seated, holding stick in front  Heel Raises/Toe Raises  x 30 Airex  Marches, 1 minute (marches off airex so just on floor)  FSU, stairs, 4" 2x10 Bilateral   Standing hip abduction 2 x 10 Bilateral       Add next 4" LSO        NOT PERFORMED  Airex Balance: WBOS, eyes closed, 2x / NBOS, 2x / Single Leg Balance with 1 hand suport, 2x- 30sec ea  +Airex: INV/EV on Airex   Ankle inv/ev, Yellow theraband 30x Bilateral - stool - performed standing on airex today  NOT PERFORMED   Long Arc Quad 2# 2x15 alternate Bilateral    Red theraband Bilateral External Rotation, head on spine x 20  NMRE  Hands against wall, trunk and cervical extension x 10   NMRE     Standing hip steamboats  x 10 each Bilateral      Red Theraband: shoulder extension / rows x 30 each  -   STS with Core activation, thigh support, 5x, 2 sets - SBA required - NOSE OVER TOES  Physioball Arm flexion rollouts on wall, 10x - SBA required  Clamshells Green Theraband x 30     Hamstring curls Green " Theraband x 30 - just needs RIGHT 3+/5; left is 5/5-    FUTURE:  Gait training      Patient Education and Home Exercises     Home Exercises Provided and Patient Education Provided     Education provided:   - cont HOME EXERCISE PROGRAM     Written Home Exercises Provided: Patient instructed to cont prior HEP. Exercises were reviewed and Steven was able to demonstrate them prior to the end of the session.  Steven demonstrated good  understanding of the education provided. See EMR under Patient Instructions for exercises provided during therapy sessions    ASSESSMENT     Steven reports his Low back pain has improved today, hip pain remains.  Decreased Bilateral hamstring flexibility contributes to his Low back pain and dysfunction.    Steven Is progressing well towards his goals.   Pt prognosis is Good.     Pt will continue to benefit from skilled outpatient physical therapy to address the deficits listed in the problem list box on initial evaluation, provide pt/family education and to maximize pt's level of independence in the home and community environment.     Pt's spiritual, cultural and educational needs considered and pt agreeable to plan of care and goals.     Anticipated barriers to physical therapy: Co-morbidities     Goals:  Short Term GOALS: 4 weeks 5/16/2023 ongoing  1. Patient is independent with HEP.   2. Patient demonstrates independence with core activation and postural awareness while sitting and standing.   3. Patient will reduce pn to 2/10 while performing daily activities.  4. Patient will improve pectoralis flexibility to moderate restriction to improve posture.      Long Term GOALS: 8 weeks.  5/16/2023 ongoing  1. Patient demonstrates increased l/s FB ROM to 20 cm from floor to improve tolerance to reaching activities.   2. Patient demonstrates increased core, hip and BLE strength by 1/2 grade or greater to improve tolerance to home chores.  3. Patient demonstrates independence with body mechanics  while taking care of himself    PLAN     Cont per Plan of Care, posture, core, and hip strengthening; balance     Denice Palomares, PT

## 2023-05-16 NOTE — PROGRESS NOTES
[facility]   Physical Therapy Treatment Note     Name: Tono Saez  Clinic Number: [id number]    Therapy Diagnosis:   Encounter Diagnoses   Name Primary?    Lumbar spine pain Yes    Impaired mobility and activities of daily living        Physician: Scott Staley MD    Visit Date: 5/16/2023    Physician Orders: PT Eval and Treat   Medical Diagnosis from Referral:   S32.010D (ICD-10-CM) - Compression fracture of L1 vertebra with routine healing, subsequent encounter   I49.5 (ICD-10-CM) - SSS (sick sinus syndrome)   I73.9 (ICD-10-CM) - Claudication of gluteal region   Evaluation Date: 3/24/2023  Authorization Period Expiration: 3/15/2024  Plan of Care Expiration: 6/24/2023 or 16v post eval  Visit # (episodes) / Visits authorized: 13 / eval + 16 (POC  12 / 16) - finish 16 / Requested referral from Lu[facility] to send pt to [facility] location for 1:1 balance and fall risk tx   NO FOTO    Precautions:  Pacemaker; CAD; Blood Thinners; Ca history; DM; HTN; MI     Time In: 200p  Time Out: 254p  Total Billable Time: 54 minutes      SUBJECTIVE     Pt reports: increased back pain today    He was compliant with home exercise program.  Response to previous treatment: make him feel better  Functional change: none today    Pain: 8/10 Low Back   Location: bilateral Low Back    OBJECTIVE     None taken today    Treatment     DOI: 2/03/2023 fall  FALL RISK; should be bringing walker  Supine is painful    Steven received the treatments listed below:      therapeutic exercises to develop strength and flexibility, posture, and core stabilization for 30 minutes including:  NMRE utilized to activate appropriate mm to improve core and lumbar stabilization and lumbopelvic stability: 24 minutes    SEATED: on Airex / pillow behind    Hamstring stretch 3 x 30 sec Bilateral   Chin tucks, 2 x [id number]  Gentle Thoracic extension stretch, 2 x 10  Physioball Core TrSets, 30x NMRE  Neural glide, df/pf 3 x 10 Bilateral   NMRE -  "stool    Core stab with strap, multi-direction pull NMRE, [id number]  Trunk flexion with Physioball 2 x 10    STAND:     Stand thoracic rotation, face wall, thumbs up, 5x Bilateral   -  CAUSES DIZZINESS -performed seated, holding stick in front  Heel Raises/Toe Raises  x 30 Airex  Marches, 1 minute (marches off airex so just on floor)  FSU, stairs, 4" 2x10 Bilateral   Standing hip abduction 2 x 10 Bilateral       Add next 4" LSO        NOT PERFORMED  Airex Balance: WBOS, eyes closed, 2x / NBOS, 2x / Single Leg Balance with 1 hand suport, 2x- 30sec ea  +Airex: INV/EV on Airex   Ankle inv/ev, Yellow theraband 30x Bilateral - stool - performed standing on airex today  NOT PERFORMED   Long Arc Quad 2# 2x15 alternate Bilateral    Red theraband Bilateral External Rotation, head on spine x 20  NMRE  Hands against wall, trunk and cervical extension x 10   NMRE     Standing hip steamboats  x 10 each Bilateral      Red Theraband: shoulder extension / rows x 30 each  -   STS with Core activation, thigh support, 5x, 2 sets - SBA required - NOSE OVER TOES  Physioball Arm flexion rollouts on wall, 10x - SBA required  Clamshells Green Theraband x 30     Hamstring curls Green Theraband x 30 - just needs RIGHT 3+/5; left is 5/5-    FUTURE:  Gait training      Patient Education and Home Exercises     Home Exercises Provided and Patient Education Provided     Education provided:   - cont HOME EXERCISE PROGRAM     Written Home Exercises Provided: Patient instructed to cont prior HEP. Exercises were reviewed and Steven was able to demonstrate them prior to the end of the session.  Steven demonstrated good  understanding of the education provided. See EMR under Patient Instructions for exercises provided during therapy sessions    ASSESSMENT     Setven reports his Low back pain has increased today, no known reason. Checked pelvic alignment was good. Was not able to tolerate lumbar extension. Steven Is progressing well towards his goals.   Pt " prognosis is Good.     Pt will continue to benefit from skilled outpatient physical therapy to address the deficits listed in the problem list box on initial evaluation, provide pt/family education and to maximize pt's level of independence in the home and community environment.     Pt's spiritual, cultural and educational needs considered and pt agreeable to plan of care and goals.     Anticipated barriers to physical therapy: Co-morbidities     Goals:  Short Term GOALS: 4 weeks 5/16/2023 ongoing  1. Patient is independent with HEP.   2. Patient demonstrates independence with core activation and postural awareness while sitting and standing.   3. Patient will reduce pn to 2/10 while performing daily activities.  4. Patient will improve pectoralis flexibility to moderate restriction to improve posture.      Long Term GOALS: 8 weeks.  5/16/2023 ongoing  1. Patient demonstrates increased l/s FB ROM to 20 cm from floor to improve tolerance to reaching activities.   2. Patient demonstrates increased core, hip and BLE strength by 1/2 grade or greater to improve tolerance to home chores.  3. Patient demonstrates independence with body mechanics while taking care of himself    PLAN     Cont per Plan of Care, posture, core, and hip strengthening; balance     Steven Nava, PT

## 2023-05-17 ENCOUNTER — TELEPHONE (OUTPATIENT)
Dept: FAMILY MEDICINE | Facility: CLINIC | Age: 78
End: 2023-05-17

## 2023-05-17 DIAGNOSIS — S32.010D COMPRESSION FRACTURE OF L1 VERTEBRA WITH ROUTINE HEALING, SUBSEQUENT ENCOUNTER: ICD-10-CM

## 2023-05-17 DIAGNOSIS — S32.010D COMPRESSION FRACTURE OF L1 VERTEBRA WITH ROUTINE HEALING, SUBSEQUENT ENCOUNTER: Primary | ICD-10-CM

## 2023-05-17 DIAGNOSIS — R26.81 GAIT INSTABILITY: Primary | ICD-10-CM

## 2023-05-17 DIAGNOSIS — R26.81 GAIT INSTABILITY: ICD-10-CM

## 2023-05-17 NOTE — TELEPHONE ENCOUNTER
----- Message from Scott Staley MD sent at 5/17/2023  8:29 AM CDT -----  I [place] with the patient about [place] [facility] [place] then I realize it is not on [facility] but the [address] [place] [address] [place] I will put the referral in and I am also going to get an evaluation with Neurology as this has not been done in a while.  ----- Message -----  From: Denice Palomares, PT  Sent: 5/16/2023   4:03 PM CDT  To: Harriet Sneed, LUCIANA, Scott Staley MD    Hello Dr. Staley,    I wanted to reach out about this pt. He was improving at first with physical therapy but he has not been progressing after his last fall in April (this fall happened after he started this round of therapy). His pain has risen to 8/10 - fluctuates from his low back to left hip. He is also having more difficulty standing up and walking. Almost more neurological in nature. He is telling me he cannot get an appt with his doctor (Dr. Galvan) until 6/15. I do not see this appt in his chart, nor do I know who he is talking about. I see you as the referring provider for this round of therapy so I wanted to reach out to you with my concerns. Perhaps someone in your office could reach out to him for a follow up.     He finishes with this round of therapy 5/30/2023.     I feel he could benefit greater if he is sent [place] [facility] [place] ([address] [place] [address]) for balance and fall risk deficits. He will need a new referral. This location can see him one on one there and have more of a neurologic/balance specialty. Either now or after this round of therapy.     I will be out of the country from 5/18-5/31 so I have included the PTA, Harriet Sneed, who has been working with [facility] as well, should you have any further questions. Clinic supervisor is Sudhir Cortez.     Thank you for your time,   Denice Palomares, PT

## 2023-05-17 NOTE — TELEPHONE ENCOUNTER
----- Message from Scott Staley MD sent at 5/17/2023  8:29 AM CDT -----  I discussed with the patient about going to [facility] but then I realize it is not on [facility] but the [address] [place] [address] [place] I will put the referral in and I am also going to get an evaluation with Neurology as this has not been done in a while.  ----- Message -----  From: Denice Palomares, PT  Sent: 5/16/2023   4:03 PM CDT  To: Harriet Sneed, LUCIANA, Scott Staley MD    Hello Dr. Staley,    I wanted to reach out about this pt. He was improving at first with physical therapy but he has not been progressing after his last fall in April (this fall happened after he started this round of therapy). His pain has risen to 8/10 - fluctuates from his low back to left hip. He is also having more difficulty standing up and walking. Almost more neurological in nature. He is telling me he cannot get an appt with his doctor (Dr. Galvan) until 6/15. I do not see this appt in his chart, nor do I know who he is talking about. I see you as the referring provider for this round of therapy so I wanted to reach out to you with my concerns. Perhaps someone in your office could reach out to him for a follow up.     He finishes with this round of therapy 5/30/2023.     I feel he could benefit greater if he is sent to the [facility] [place] clinic location ([address] [place] [address]) for balance and fall risk deficits. He will need a new referral. This location can see him one on one there and have more of a neurologic/balance specialty. Either now or after this round of therapy.     I will be out of the country from 5/18-5/31 so I have included the PTA, Harriet Sneed, who has been working with [facility] as well, should you have any further questions. Clinic supervisor is Sudhir Cortez.     Thank you for your time,   Denice Palomares, PT

## 2023-05-17 NOTE — TELEPHONE ENCOUNTER
----- Message from Denice Palomares, PT sent at 5/16/2023  3:25 PM CDT -----  Ren Staley,    I wanted to reach out about this pt. He was improving at first with physical therapy but he has not been progressing after his last fall in April (this fall happened after he started this round of therapy). His pain has risen to 8/10 - fluctuates from his low back to left hip. He is also having more difficulty standing up and walking. Almost more neurological in nature. He is telling me he cannot get an appt with his doctor (Dr. Galvan) until 6/15. I do not see this appt in his chart, nor do I know who he is talking about. I see you as the referring provider for this round of therapy so I wanted to reach out to you with my concerns. Perhaps someone in your office could reach out to him for a follow up.     He finishes with this round of therapy 5/30/2023.     I feel he could benefit greater if he is sent to the [facility] outpatient [place] clinic location ([address] [place] [address]) for balance and fall risk deficits. He will need a new referral. This location can see him one on one there and have more of a neurologic/balance specialty. Either now or after this round of therapy.     I will be out of the country from 5/18-5/31 so I have included the PTA, Harriet Sneed, who has been working with [facility] as well, should you have any further questions. Clinic supervisor is Sudhir Cortez.     Thank you for your time,   Denice Palomares, PT

## 2023-05-17 NOTE — TELEPHONE ENCOUNTER
As per PT's recommendations I recommended a referral to [facility] for gait and balance assessment and treatment.  Patient is reluctant to go all the way to [facility] 2 to 3 times a week for this so we opted to get an evaluation with Neurology and continue  PT here if possible.

## 2023-05-17 NOTE — TELEPHONE ENCOUNTER
----- Message from Denice Palomares, PT sent at 5/16/2023  3:25 PM CDT -----  Ren Staley,    I wanted to reach out about this pt. He was improving at first with physical therapy but he has not been progressing after his last fall in April (this fall happened after he started this round of therapy). His pain has risen to 8/10 - fluctuates from his low back to left hip. He is also having more difficulty standing up and walking. Almost more neurological in nature. He is telling me he cannot get an appt with his doctor (Dr. Galvan) until 6/15. I do not see this appt in his chart, nor do I know who he is talking about. I see you as the referring provider for this round of therapy so I wanted to reach out to you with my concerns. Perhaps someone in your office could reach out to him for a follow up.     He finishes with this round of therapy 5/30/2023.     I feel he could benefit greater if he is sent to the [facility] [place] clinic location ([address] [place] [address]) for balance and fall risk deficits. He will need a new referral. This location can see him one on one there and have more of a neurologic/balance specialty. Either now or after this round of therapy.     I will be out of the country from 5/18-5/31 so I have included the PTA, Harriet Sneed, who has been working with [facility] as well, should you have any further questions. Clinic supervisor is Sudhir Cortez.     Thank you for your time,   Denice Palomares, PT

## 2023-05-18 ENCOUNTER — TELEPHONE (OUTPATIENT)
Dept: CARDIOLOGY | Facility: CLINIC | Age: 78
End: 2023-05-18
Payer: MEDICARE

## 2023-05-18 DIAGNOSIS — Z95.0 CARDIAC PACEMAKER IN SITU: Primary | ICD-10-CM

## 2023-05-19 ENCOUNTER — TELEPHONE (OUTPATIENT)
Dept: CARDIOLOGY | Facility: CLINIC | Age: 78
End: 2023-05-19
Payer: MEDICARE

## 2023-05-19 DIAGNOSIS — D64.9 ANEMIA, UNSPECIFIED TYPE: ICD-10-CM

## 2023-05-19 RX ORDER — PANTOPRAZOLE SODIUM 40 MG/1
TABLET, DELAYED RELEASE ORAL
Qty: 90 TABLET | Refills: 1 | Status: SHIPPED | OUTPATIENT
Start: 2023-05-19 | End: 2023-08-11

## 2023-05-19 NOTE — TELEPHONE ENCOUNTER
----- Message from Lam Euceda sent at 5/19/2023 10:03 AM CDT -----  Type:  Sooner Appointment Request    Caller is requesting a sooner appointment.  Caller declined first available appointment listed below.  Caller will not accept being placed on the waitlist and is requesting a message be sent to doctor.    Name of Caller:  pt  When is the first available appointment?  None for devise check  Symptoms:  pt missed his appt yesterday and he need another appt--please call and advise  Best Call Back Number:  [phone] (home) [phone] (work)    Additional Information:  thank you

## 2023-05-22 ENCOUNTER — DOCUMENTATION ONLY (OUTPATIENT)
Dept: REHABILITATION | Facility: HOSPITAL | Age: 78
End: 2023-05-22
Payer: MEDICARE

## 2023-05-22 NOTE — PROGRESS NOTES
PT/PTA met face to face to discuss pt's treatment plan and progress towards established goals. Pt will be seen by a physical therapist minimally every 6th visit or every 30 days.    Harriet Sneed PTA

## 2023-05-23 ENCOUNTER — CLINICAL SUPPORT (OUTPATIENT)
Dept: REHABILITATION | Facility: HOSPITAL | Age: 78
End: 2023-05-23
Payer: MEDICARE

## 2023-05-23 DIAGNOSIS — M54.50 LUMBAR SPINE PAIN: Primary | ICD-10-CM

## 2023-05-23 DIAGNOSIS — Z78.9 IMPAIRED MOBILITY AND ACTIVITIES OF DAILY LIVING: ICD-10-CM

## 2023-05-23 DIAGNOSIS — Z74.09 IMPAIRED MOBILITY AND ACTIVITIES OF DAILY LIVING: ICD-10-CM

## 2023-05-23 PROCEDURE — 97110 THERAPEUTIC EXERCISES: CPT | Mod: PN

## 2023-05-23 PROCEDURE — 97530 THERAPEUTIC ACTIVITIES: CPT | Mod: PN

## 2023-05-23 PROCEDURE — 97112 NEUROMUSCULAR REEDUCATION: CPT | Mod: PN

## 2023-05-23 NOTE — PROGRESS NOTES
[facility]   Physical Therapy Treatment Note     Name: Tono Saez  Clinic Number: [id number]    Therapy Diagnosis:   Encounter Diagnoses   Name Primary?    Lumbar spine pain Yes    Impaired mobility and activities of daily living        Physician: Scott Staley MD    Visit Date: 5/23/2023    Physician Orders: PT Eval and Treat   Medical Diagnosis from Referral:   S32.010D (ICD-10-CM) - Compression fracture of L1 vertebra with routine healing, subsequent encounter   I49.5 (ICD-10-CM) - SSS (sick sinus syndrome)   I73.9 (ICD-10-CM) - Claudication of gluteal region   Evaluation Date: 3/24/2023  Authorization Period Expiration: 3/15/2024  Plan of Care Expiration: 6/24/2023 or 16v post eval  Visit # (episodes) / Visits authorized: 13 / eval + 16 (POC  12 / 16) - finish 16 / Requested referral from Luís to [place] [facility] [place] for 1:1 balance and fall risk tx   NO FOTO    Precautions:  Pacemaker; CAD; Blood Thinners; Ca history; DM; HTN; MI     Time In: 1 pm  Time Out: 1:57 pm  Total Billable Time: 57 minutes      SUBJECTIVE     Pt reports: Patient reports no better, no worse since starting therapy. Pain levels about the same and continue to be localized to the low back. Patient reports no falls for greater than 1 month. He uses the RW at all times. He says pain is pretty much 8/10 on average, with pain more than not.   Patient says he thinks he should be pain free by now, since it's been 11 weeks. PT reminding patient that bone healing takes time and that his back wasn't in good shape prior to the fracture, which can delay progress. However, at 11 weeks we would expect him to feel better, not worse. Patient follows up with Dr Gil tomorrow.     He was compliant with home exercise program.  Response to previous treatment: make him feel better  Functional change: none today    Pain: 8/10 Low Back   Location: bilateral Low Back    OBJECTIVE     None taken today    Treatment     DOI:  "2/03/2023 fall  FALL RISK; should be bringing walker  Supine is painful    Steven received the treatments listed below:      therapeutic exercises to develop strength and flexibility, posture, and core stabilization for 30 minutes including:  NMRE utilized to activate appropriate mm to improve core and lumbar stabilization and lumbopelvic stability: 14 minutes  Steven participated 1:1 in dynamic functional therapeutic activities to improve functional performance for 10  minutes      SEATED: on Airex / pillow behind    Hamstring stretch 3 x 30 sec Bilateral   Chin tucks, 2 x [id number]  Gentle Thoracic extension stretch, 2 x 10  Physioball Core TrSets, 30x NMRE  Seated thoracic rotation holding Tbar in front x 5 ea way  Core stabilization with green thera-band, multi-direction pull NMRE, [id number]  Seated rotation 25% ROM green thera-band x 5 ea- NMR  Seated BERNICE isometric RTB 5"H x 15- NMR  Trunk flexion with Physioball 2 x 10    STAND:     Heel Raises/Toe Raises  x 30 Airex- TA  Marches, 1 minute (marches off airex so just on floor)- TA  FSU, bottom stair step 2x10 Bilateral - TA  Standing hip abduction 2 x 10 Bilateral at walker- TA  Standing on bottom stair step, level pelvis and hold 10" for glute med activation x 3 ea leg- TA      Add next 4" LSO        NOT PERFORMED  Airex Balance: WBOS, eyes closed, 2x / NBOS, 2x / Single Leg Balance with 1 hand suport, 2x- 30sec ea  +Airex: INV/EV on Airex   Ankle inv/ev, Yellow theraband 30x Bilateral - stool - performed standing on airex today  NOT PERFORMED   Long Arc Quad 2# 2x15 alternate Bilateral    Red theraband Bilateral External Rotation, head on spine x 20  NMRE  Hands against wall, trunk and cervical extension x 10   NMRE     Standing hip steamboats  x 10 each Bilateral      Red Theraband: shoulder extension / rows x 30 each  -   STS with Core activation, thigh support, 5x, 2 sets - SBA required - NOSE OVER TOES  Physioball Arm flexion rollouts on wall, 10x - SBA " required  Clamshells Green Theraband x 30     Hamstring curls Green Theraband x 30 - just needs RIGHT 3+/5; left is 5/5-    FUTURE:  Gait training      Patient Education and Home Exercises     Home Exercises Provided and Patient Education Provided     Education provided:   - cont HOME EXERCISE PROGRAM     Written Home Exercises Provided: Patient instructed to cont prior HEP. Exercises were reviewed and Steven was able to demonstrate them prior to the end of the session.  Steven demonstrated good  understanding of the education provided. See EMR under Patient Instructions for exercises provided during therapy sessions    ASSESSMENT   Patient seems to be at a plateau at this point in his pain levels. He reports no improvement in his functional mobility, although he states his right leg has gotten stronger. 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 He demonstrates occasional cross over when walking which increases his risk of falling. Patient complaint of dizziness and some pain in his neck. This was improved with cuing to look ahead instead of down. Patient with tendency to look at his feet when mobile. He had lengthened posterior cervical muscles due to poor posture.   We will continue to focus on functional mobility until patient is transferred to [facility].       Pt prognosis is Good.     Pt will continue to benefit from skilled outpatient physical therapy to address the deficits listed in the problem list box on initial evaluation, provide pt/family education and to maximize pt's level of independence in the home and community environment.     Pt's spiritual, cultural and educational needs considered and pt agreeable to plan of care and goals.     Anticipated barriers to physical therapy: Co-morbidities     Goals:  Short Term GOALS: 4 weeks 5/23/2023 ongoing  1. Patient is independent with HEP.   2. Patient demonstrates independence with core activation and postural awareness while  sitting and standing.   3. Patient will reduce pn to 2/10 while performing daily activities.  4. Patient will improve pectoralis flexibility to moderate restriction to improve posture.      Long Term GOALS: 8 weeks.  5/23/2023 ongoing  1. Patient demonstrates increased l/s FB ROM to 20 cm from floor to improve tolerance to reaching activities.   2. Patient demonstrates increased core, hip and BLE strength by 1/2 grade or greater to improve tolerance to home chores.  3. Patient demonstrates independence with body mechanics while taking care of himself    PLAN     Cont per Plan of Care, posture, core, and hip strengthening; balance     Sophie Gonzales, PT

## 2023-05-24 ENCOUNTER — OFFICE VISIT (OUTPATIENT)
Dept: SPINE | Facility: CLINIC | Age: 78
End: 2023-05-24
Payer: MEDICARE

## 2023-05-24 ENCOUNTER — TELEPHONE (OUTPATIENT)
Dept: PAIN MEDICINE | Facility: CLINIC | Age: 78
End: 2023-05-24
Payer: MEDICARE

## 2023-05-24 VITALS — WEIGHT: 179.88 LBS | HEIGHT: 72 IN | BODY MASS INDEX: 24.36 KG/M2

## 2023-05-24 DIAGNOSIS — M47.816 LUMBAR SPONDYLOSIS: Primary | ICD-10-CM

## 2023-05-24 DIAGNOSIS — M54.50 ACUTE BILATERAL LOW BACK PAIN WITHOUT SCIATICA: Primary | ICD-10-CM

## 2023-05-24 PROCEDURE — 99213 OFFICE O/P EST LOW 20 MIN: CPT | Mod: S$GLB,,, | Performed by: PHYSICAL MEDICINE & REHABILITATION

## 2023-05-24 PROCEDURE — 99213 PR OFFICE/OUTPT VISIT, EST, LEVL III, 20-29 MIN: ICD-10-PCS | Mod: S$GLB,,, | Performed by: PHYSICAL MEDICINE & REHABILITATION

## 2023-05-24 NOTE — PROGRESS NOTES
SUBJECTIVE:    Patient ID: Tono Saez is a 77 y.o. male.    Chief Complaint: Back Pain    He presents today with ongoing complaints of low back pain at the lumbosacral junction without radicular symptoms.  He has been participating in physical therapy but is not making any progress.  Pain level is 8/10.  Again he is not having pain at the thoracolumbar junction where he has a known suspected acute L1 compression fracture        Past Medical History:   Diagnosis Date    Anemia     Anticoagulant long-term use     Arthritis     CAD (coronary artery disease)     CABG, STENTS '97,'99,'01,'05    Cancer     SKIN    Cataract     Diabetes mellitus     Diabetes mellitus type II     GERD (gastroesophageal reflux disease)     GI bleed 2020    Hypertension     Myocardial infarction     Wears glasses      Social History     Socioeconomic History    Marital status:    Tobacco Use    Smoking status: Never    Smokeless tobacco: Never   Substance and Sexual Activity    Alcohol use: No    Drug use: No    Sexual activity: Not Currently     Social Determinants of Health     Financial Resource Strain: Low Risk     Difficulty of Paying Living Expenses: Not hard at all   Food Insecurity: No Food Insecurity    Worried About Running Out of Food in the Last Year: Never true    Ran Out of Food in the Last Year: Never true   Transportation Needs: No Transportation Needs    Lack of Transportation (Medical): No    Lack of Transportation (Non-Medical): No   Physical Activity: Inactive    Days of Exercise per Week: 0 days    Minutes of Exercise per Session: 0 min   Stress: No Stress Concern Present    Feeling of Stress : Not at all   Social Connections: Moderately Integrated    Frequency of Communication with Friends and Family: More than three times a week    Frequency of Social Gatherings with Friends and Family: Never    Attends Mandaen Services: More than 4 times per year    Active Member of Clubs or Organizations: Yes    Attends  Club or Organization Meetings: Never    Marital Status:    Housing Stability: Low Risk     Unable to Pay for Housing in the Last Year: No    Number of Places Lived in the Last Year: 1    Unstable Housing in the Last Year: No     Past Surgical History:   Procedure Laterality Date    CARDIAC PACEMAKER PLACEMENT      CARDIAC PACEMAKER PLACEMENT      CARDIAC PACEMAKER PLACEMENT  04/26/2018    replaced    CARDIAC SURGERY      1995   CABG X 5    CHOLECYSTECTOMY      COLON SURGERY      COLONOSCOPY N/A 2/21/2020    Procedure: COLONOSCOPY;  Surgeon: Abe Barragan III, MD;  Location: [facility] ENDO;  Service: Endoscopy;  Laterality: N/A;    COLONOSCOPY N/A 2/23/2020    Procedure: COLONOSCOPY;  Surgeon: Bob Locke Jr., MD;  Location: [facility] ENDO;  Service: Endoscopy;  Laterality: N/A;    CORONARY ANGIOGRAPHY INCLUDING BYPASS GRAFTS WITH CATHETERIZATION OF LEFT HEART N/A 10/26/2022    Procedure: ANGIOGRAM, CORONARY, INCLUDING BYPASS GRAFT, WITH LEFT HEART CATHETERIZATION;  Surgeon: Robles Mckoy MD;  Location: [facility] CATH/EP LAB;  Service: Cardiology;  Laterality: N/A;    CORONARY ARTERY BYPASS GRAFT  1995    CORONARY STENT PLACEMENT      stent x 5    ESOPHAGOGASTRODUODENOSCOPY N/A 2/23/2020    Procedure: EGD (ESOPHAGOGASTRODUODENOSCOPY);  Surgeon: Bob Locke Jr., MD;  Location: [facility];  Service: Endoscopy;  Laterality: N/A;    EYE SURGERY      BILAT CATARACT    head laceration   01/19/2018    HEMORRHOID SURGERY      SMALL BOWEL ENTEROSCOPY N/A 2/21/2020    Procedure: ENTEROSCOPY;  Surgeon: Abe Barragan III, MD;  Location: [facility] ENDO;  Service: Endoscopy;  Laterality: N/A;    stint       Family History   Problem Relation Age of Onset    Heart disease Mother     Heart disease Father     Hyperlipidemia Sister     Hypertension Sister     Heart disease Brother     Heart disease Brother     Heart disease Brother     Heart disease Brother     Heart disease Brother      Vitals:    05/24/23 1008   Weight: 81.6  kg (179 lb 14.3 oz)   Height: 6' (1.829 m)       Review of Systems   Constitutional:  Negative for chills, diaphoresis, fatigue, fever and unexpected weight change.   HENT:  Negative for trouble swallowing.    Eyes:  Negative for visual disturbance.   Respiratory:  Negative for shortness of breath.    Cardiovascular:  Negative for chest pain.   Gastrointestinal:  Negative for abdominal pain, constipation, diarrhea, nausea and vomiting.   Genitourinary:  Negative for difficulty urinating.   Musculoskeletal:  Negative for arthralgias, back pain, gait problem, joint swelling, myalgias, neck pain and neck stiffness.   Neurological:  Negative for dizziness, speech difficulty, weakness, light-headedness, numbness and headaches.        Objective:      Physical Exam  Neurological:      Mental Status: He is alert and oriented to person, place, and time.      Comments: He is awake and in no acute distress  Mild tenderness to palpation lumbar paraspinous musculature at the lumbosacral junction with no palpable masses  No pain with palpation or percussion over the thoracolumbar junction  Mild pain with extension combined with rotation to the left and right           Assessment:       1. Acute bilateral low back pain without sciatica           Plan:     Clinically his ongoing back pain is more consistent with facet mediated discomfort than pain from an acute L1 compression fracture.  He has back pain with facet loading.  For that issue I am recommending medial branch blocks and subsequent radiofrequency ablation as indicated of the L4-5 and L5-S1 facet joints bilaterally.  He can follow up with me after the procedure      Acute bilateral low back pain without sciatica

## 2023-05-24 NOTE — TELEPHONE ENCOUNTER
----- Message from Kt Gil MD sent at 5/24/2023 10:26 AM CDT -----  Please schedule for medial branch blocks and subsequent radiofrequency ablation as indicated of the L4-5 and L5-S1 facet joints bilaterally

## 2023-05-29 ENCOUNTER — OFFICE VISIT (OUTPATIENT)
Dept: FAMILY MEDICINE | Facility: CLINIC | Age: 78
End: 2023-05-29
Payer: MEDICARE

## 2023-05-29 VITALS
HEIGHT: 72 IN | DIASTOLIC BLOOD PRESSURE: 61 MMHG | SYSTOLIC BLOOD PRESSURE: 123 MMHG | BODY MASS INDEX: 24.87 KG/M2 | HEART RATE: 74 BPM | WEIGHT: 183.63 LBS | OXYGEN SATURATION: 97 %

## 2023-05-29 DIAGNOSIS — E11.65 TYPE 2 DIABETES MELLITUS WITH HYPERGLYCEMIA, WITHOUT LONG-TERM CURRENT USE OF INSULIN: Primary | ICD-10-CM

## 2023-05-29 DIAGNOSIS — D71: ICD-10-CM

## 2023-05-29 DIAGNOSIS — N18.31 STAGE 3A CHRONIC KIDNEY DISEASE: ICD-10-CM

## 2023-05-29 DIAGNOSIS — M54.50 LUMBAR SPINE PAIN: ICD-10-CM

## 2023-05-29 DIAGNOSIS — J30.1 SEASONAL ALLERGIC RHINITIS DUE TO POLLEN: ICD-10-CM

## 2023-05-29 PROCEDURE — 99213 OFFICE O/P EST LOW 20 MIN: CPT | Mod: S$PBB,AQ,, | Performed by: FAMILY MEDICINE

## 2023-05-29 PROCEDURE — 99215 OFFICE O/P EST HI 40 MIN: CPT | Performed by: FAMILY MEDICINE

## 2023-05-29 PROCEDURE — 99213 PR OFFICE/OUTPT VISIT, EST, LEVL III, 20-29 MIN: ICD-10-PCS | Mod: S$PBB,AQ,, | Performed by: FAMILY MEDICINE

## 2023-05-29 RX ORDER — ATORVASTATIN CALCIUM 40 MG/1
1 TABLET, FILM COATED ORAL DAILY
COMMUNITY
Start: 2023-01-02

## 2023-05-29 RX ORDER — LEVOCETIRIZINE DIHYDROCHLORIDE 5 MG/1
5 TABLET, FILM COATED ORAL NIGHTLY
Qty: 60 TABLET | Refills: 11 | Status: SHIPPED | OUTPATIENT
Start: 2023-05-29 | End: 2023-08-16

## 2023-05-29 RX ORDER — PREGABALIN 50 MG/1
50 CAPSULE ORAL 3 TIMES DAILY
Qty: 90 CAPSULE | Refills: 1 | Status: SHIPPED | OUTPATIENT
Start: 2023-05-29 | End: 2023-09-18 | Stop reason: SDUPTHER

## 2023-05-29 RX ORDER — DULAGLUTIDE 0.75 MG/.5ML
0.75 INJECTION, SOLUTION SUBCUTANEOUS
Qty: 4 PEN | Refills: 0 | Status: SHIPPED | OUTPATIENT
Start: 2023-05-29 | End: 2023-06-26

## 2023-05-29 RX ORDER — PIOGLITAZONEHYDROCHLORIDE 30 MG/1
30 TABLET ORAL DAILY
Qty: 90 TABLET | Refills: 3 | Status: SHIPPED | OUTPATIENT
Start: 2023-05-29 | End: 2023-08-16

## 2023-05-29 NOTE — PROGRESS NOTES
Subjective:       Patient ID: Tono Saez is a 77 y.o. male.    Chief Complaint: Hypertension and Diabetes      Patient is here to follow-up on hypertension and diabetes after being off of amlodipine needs doing much better.  Home 120-135/    BP Readings from Last 3 Encounters:  05/29/23 : 123/61  04/19/23 : 120/70  04/14/23 : (!) 106/58    Lab Results       Component                Value               Date                       WBC                      7.42                04/14/2023                 HGB                      11.4 (L)            04/14/2023                 HCT                      38.1 (L)            04/14/2023                 PLT                      186                 04/14/2023                 CHOL                     141                 01/12/2023                 TRIG                     122                 01/12/2023                 HDL                      48                  01/12/2023                 ALT                      14                  04/14/2023                 AST                      18                  04/14/2023                 NA                       136                 04/14/2023                 K                        4.4                 04/14/2023                 CL                       105                 04/14/2023                 CREATININE               1.5 (H)             04/14/2023                 BUN                      29 (H)              04/14/2023                 CO2                      27                  04/14/2023                 TSH                      2.270               04/14/2023                 PSA                      1.3                 07/15/2022                 INR                      1.1                 02/03/2023                 HGBA1C                   8.3 (H)             01/12/2023                Back Pain  This is a recurrent problem. The current episode started more than 1 month ago. The problem occurs daily. The problem has been waxing  and waning since onset. The pain is present in the sacro-iliac. The quality of the pain is described as aching and stabbing. The pain does not radiate. The pain is at a severity of 8/10. The pain is moderate. The pain is Worse during the day. The symptoms are aggravated by bending and lying down. Stiffness is present All day. Associated symptoms include pelvic pain. Pertinent negatives include no abdominal pain, bladder incontinence, bowel incontinence, chest pain, dysuria, fever, headaches, leg pain, numbness, paresis, paresthesias, perianal numbness, tingling, weakness or weight loss. Risk factors include poor posture, recent trauma and history of renal stones. The treatment provided no relief.     Allergies and Medications:   Review of patient's allergies indicates:   Allergen Reactions    Adhesive Other (See Comments)     SILK TAPE PULL SKIN OFF    Metformin Other (See Comments)     Weight loss cachexia anorexia,diarrhea.    Pcn [penicillins]      Patient states he passed out from this medication when he was 10 years old.      Current Outpatient Medications   Medication Sig Dispense Refill    amLODIPine (NORVASC) 5 MG tablet TAKE ONE TABLET BY MOUTH ONCE DAILY (Patient taking differently: Take 5 mg by mouth once daily.) 90 tablet 0    aspirin (ECOTRIN) 81 MG EC tablet Take 2 tablets (162 mg total) by mouth once daily. (Patient taking differently: Take 81 mg by mouth once daily.) 120 tablet 0    atorvastatin (LIPITOR) 40 MG tablet Take 1 tablet by mouth once daily.      betamethasone dipropionate (DIPROLENE) 0.05 % lotion Apply thin film to scalp bid prn itch 60 mL 6    clindamycin (CLEOCIN) 150 MG capsule Take by mouth.      clopidogreL (PLAVIX) 75 mg tablet Take 1 tablet (75 mg total) by mouth once daily. 90 tablet 0    diclofenac sodium (VOLTAREN) 1 % Gel Apply 2 g topically once daily. 200 g 3    finasteride (PROSCAR) 5 mg tablet Take 5 mg by mouth once daily.      fluorouracil 5% 5 % Soln Apply small amount  to top of head 1-2x/day for 2 weeks 10 mL 1    fluticasone propionate (FLONASE) 50 mcg/actuation nasal spray Use nasally as directed as needed      furosemide (LASIX) 20 MG tablet TAKE 1 TABLET (20 MG TOTAL) BY MOUTH ONCE DAILY. (Patient taking differently: Take 20 mg by mouth once daily.) 90 tablet 3    HYDROcodone-acetaminophen (NORCO) 5-325 mg per tablet Take 1 tablet by mouth every 4 (four) hours as needed for Pain. 18 tablet 0    isosorbide dinitrate (ISORDIL) 10 MG tablet Take 1 tablet (10 mg total) by mouth 2 (two) times daily. 60 tablet 11    JARDIANCE 25 mg tablet TAKE 1 TABLET (25 MG TOTAL) BY MOUTH ONCE DAILY. (Patient taking differently: Take 25 mg by mouth once daily.) 30 tablet 6    ketoconazole (NIZORAL) 2 % shampoo Wash all scaling areas let sit 3 minutes then rinse 3x/wk 120 mL 11    magnesium oxide (MAG-OX) 400 mg (241.3 mg magnesium) tablet Take 1 tablet (400 mg total) by mouth once daily. 90 tablet 1    metoprolol tartrate (LOPRESSOR) 25 MG tablet Take 1 tablet (25 mg total) by mouth 2 (two) times daily. 120 tablet 5    montelukast (SINGULAIR) 10 mg tablet TAKE 1 TABLET (10 MG TOTAL) BY MOUTH EVERY EVENING. (Patient taking differently: Take 10 mg by mouth every evening.) 30 tablet 11    multivitamin capsule Take 1 capsule by mouth once daily.      mupirocin (BACTROBAN) 2 % ointment Apply topically 2 (two) times daily. 22 g 11    nitroGLYCERIN (NITROSTAT) 0.4 MG SL tablet DISSOLVE 1 TABLET UNDER THE TONGUE AS NEEDED FOR CHEST PAIN (Patient taking differently: Place 0.4 mg under the tongue every 5 (five) minutes as needed.) 100 tablet 3    pantoprazole (PROTONIX) 40 MG tablet TAKE ONE TABLET BY MOUTH ONCE DAILY 90 tablet 1    pregabalin (LYRICA) 50 MG capsule 1 po qhs x 1 wk then 1 po bid if no excess drowsiness (Patient taking differently: Take 50 mg by mouth once daily. 1 po qhs x 1 wk then 1 po bid if no excess drowsiness) 60 capsule 1    sertraline (ZOLOFT) 50 MG tablet TAKE ONE TABLET BY MOUTH  ONCE DAILY (Patient taking differently: Take 50 mg by mouth once daily.) 90 tablet 1    sulfacetamide sodium (OVACE PLUS SHAMPOO) 10 % Sham Wash scalp nightly 1 each 11    tolterodine (DETROL LA) 4 MG 24 hr capsule TAKE ONE CAPSULE BY MOUTH ONCE DAILY 90 capsule 1    vitamin D 1000 units Tab Take 1,000 Units by mouth once daily.      atorvastatin (LIPITOR) 40 MG tablet Take 1 tablet (40 mg total) by mouth every evening. (Patient not taking: Reported on 5/29/2023) 90 tablet 1    dulaglutide (TRULICITY) 0.75 mg/0.5 mL pen injector Inject 0.75 mg into the skin every 7 days. 4 pen 0    levocetirizine (XYZAL) 5 MG tablet Take 1 tablet (5 mg total) by mouth every evening. 60 tablet 11    pioglitazone (ACTOS) 30 MG tablet Take 1 tablet (30 mg total) by mouth once daily. 90 tablet 3    pregabalin (LYRICA) 50 MG capsule Take 1 capsule (50 mg total) by mouth 3 (three) times daily. 90 capsule 1     No current facility-administered medications for this visit.       Family History:   Family History   Problem Relation Age of Onset    Heart disease Mother     Heart disease Father     Hyperlipidemia Sister     Hypertension Sister     Heart disease Brother     Heart disease Brother     Heart disease Brother     Heart disease Brother     Heart disease Brother        Social History:   Social History     Socioeconomic History    Marital status:    Tobacco Use    Smoking status: Never    Smokeless tobacco: Never   Substance and Sexual Activity    Alcohol use: No    Drug use: No    Sexual activity: Not Currently     Social Determinants of Health     Financial Resource Strain: Low Risk     Difficulty of Paying Living Expenses: Not hard at all   Food Insecurity: No Food Insecurity    Worried About Running Out of Food in the Last Year: Never true    Ran Out of Food in the Last Year: Never true   Transportation Needs: No Transportation Needs    Lack of Transportation (Medical): No    Lack of Transportation (Non-Medical): No   Physical  Activity: Inactive    Days of Exercise per Week: 0 days    Minutes of Exercise per Session: 0 min   Stress: No Stress Concern Present    Feeling of Stress : Not at all   Social Connections: Moderately Integrated    Frequency of Communication with Friends and Family: More than three times a week    Frequency of Social Gatherings with Friends and Family: Once a week    Attends L[employer] day Services: More than 4 times per year    Active Member of Clubs or Organizations: Yes    Attends Club or Organization Meetings: More than 4 times per year    Marital Status:    Housing Stability: Low Risk     Unable to Pay for Housing in the Last Year: No    Number of Places Lived in the Last Year: 1    Unstable Housing in the Last Year: No       Review of Systems   Constitutional:  Negative for fever and weight loss.   Eyes:  Negative for blurred vision.   Cardiovascular:  Negative for chest pain.   Gastrointestinal:  Negative for abdominal pain and bowel incontinence.   Genitourinary:  Positive for pelvic pain. Negative for bladder incontinence, dysuria and hematuria.   Musculoskeletal:  Positive for back pain.   Neurological:  Negative for tingling, weakness, numbness, headaches and paresthesias.     Objective:     Vitals:    05/29/23 1354   BP: 123/61   Pulse: 74        Physical Exam    Assessment:       1. Type 2 diabetes mellitus with hyperglycemia, without long-term current use of insulin    2. Chronic (childhood) granulomatous disease    3. Stage 3a chronic kidney disease    4. Lumbar spine pain    5. Seasonal allergic rhinitis due to pollen        Plan:       Tono was seen today for hypertension and diabetes.    Diagnoses and all orders for this visit:    Type 2 diabetes mellitus with hyperglycemia, without long-term current use of insulin  -     dulaglutide (TRULICITY) 0.75 mg/0.5 mL pen injector; Inject 0.75 mg into the skin every 7 days.  -     Ambulatory referral/consult to Ophthalmology; Future    Chronic  (childhood) granulomatous disease    Stage 3a chronic kidney disease    Lumbar spine pain    Seasonal allergic rhinitis due to pollen  -     levocetirizine (XYZAL) 5 MG tablet; Take 1 tablet (5 mg total) by mouth every evening.    Other orders  -     pioglitazone (ACTOS) 30 MG tablet; Take 1 tablet (30 mg total) by mouth once daily.  -     pregabalin (LYRICA) 50 MG capsule; Take 1 capsule (50 mg total) by mouth 3 (three) times daily.         Follow up in about 3 months (around 8/29/2023).  Answers submitted by the patient for this visit:  Back Pain Questionnaire (Submitted on 5/24/2023)  Chief Complaint: Back pain  genital pain: No  Follow-up in 3 months diabetes

## 2023-06-01 ENCOUNTER — HOSPITAL ENCOUNTER (OUTPATIENT)
Dept: CARDIOLOGY | Facility: HOSPITAL | Age: 78
Discharge: HOME | End: 2023-06-01
Attending: NURSE PRACTITIONER
Payer: MEDICARE

## 2023-06-01 VITALS — WEIGHT: 183 LBS | HEIGHT: 72 IN | BODY MASS INDEX: 24.79 KG/M2

## 2023-06-01 DIAGNOSIS — R55 SYNCOPE, UNSPECIFIED SYNCOPE TYPE: ICD-10-CM

## 2023-06-01 PROCEDURE — 93306 TTE W/DOPPLER COMPLETE: CPT | Mod: 26,,, | Performed by: SPECIALIST

## 2023-06-01 PROCEDURE — 93306 TTE W/DOPPLER COMPLETE: CPT

## 2023-06-01 PROCEDURE — 93306 ECHO (CUPID ONLY): ICD-10-PCS | Mod: 26,,, | Performed by: SPECIALIST

## 2023-06-06 ENCOUNTER — TELEPHONE (OUTPATIENT)
Dept: CARDIOLOGY | Facility: CLINIC | Age: 78
End: 2023-06-06
Payer: MEDICARE

## 2023-06-06 RX ORDER — CLOPIDOGREL BISULFATE 75 MG/1
75 TABLET ORAL DAILY
Qty: 90 TABLET | Refills: 0 | Status: SHIPPED | OUTPATIENT
Start: 2023-06-06 | End: 2023-10-03

## 2023-06-06 NOTE — TELEPHONE ENCOUNTER
----- Message from Vimal Hernandez sent at 6/6/2023 11:43 AM CDT -----  Regarding: Return Call  Contact: Patient  Type:  Patient Returning Call    Who Called:Patient  Who Left Message for Patient:office staff  Does the patient know what this is regarding?:results  Would the patient rather a call back or a response via [facility]? call  Best Call Back Number:[phone]  Additional Information: Please call patient to advise.         Patient: Gail Perry    Procedure(s):  InterStim implant (nonrechargeable)    Diagnosis:Urge incontinence [N39.41]  Diagnosis Additional Information: No value filed.    Anesthesia Type:  MAC    Note:  Disposition: Outpatient   Postop Pain Control: Uneventful            Sign Out: Well controlled pain   PONV: No   Neuro/Psych: Uneventful            Sign Out: Acceptable/Baseline neuro status   Airway/Respiratory: Uneventful            Sign Out: Acceptable/Baseline resp. status   CV/Hemodynamics: Uneventful            Sign Out: Acceptable CV status; No obvious hypovolemia; No obvious fluid overload   Other NRE: NONE   DID A NON-ROUTINE EVENT OCCUR? No           Last vitals:  Vitals:    04/27/21 0751 04/27/21 0949 04/27/21 1000   BP: (!) 148/114 (!) 152/90 (!) 142/95   Pulse: 83 83 80   Resp: 16     Temp: 97.4  F (36.3  C) 97.3  F (36.3  C)    SpO2: 97% 94% 96%       Last vitals prior to Anesthesia Care Transfer:  CRNA VITALS  4/27/2021 0913 - 4/27/2021 1013      4/27/2021             Resp Rate (set):  10          Electronically Signed By: MORA Becker CRNA  April 27, 2021  1:29 PM

## 2023-06-06 NOTE — TELEPHONE ENCOUNTER
----- Message from Vimal Hernandez sent at 6/6/2023  3:23 PM CDT -----  Regarding: Refill  Contact: Patient  Type:  RX Refill Request    Who Called: Patient  Refill or New Rx:Refill  RX Name and Strength:clopidogreL (PLAVIX) 75 mg tablet  How is the patient currently taking it? (ex. 1XDay):  Is this a 30 day or 90 day RX:  Preferred Pharmacy with phone number:  [facility] [place] - [address]  [address]  [place] [address]  Phone: [phone] Fax: [phone]  Local or Mail Order:local  Ordering Provider:Kt Lux  Would the patient rather a call back or a response via [facility]? call  Best Call Back Number:[phone]  Additional Information: Patient called regarding a refill.

## 2023-06-08 ENCOUNTER — PATIENT MESSAGE (OUTPATIENT)
Dept: CARDIOLOGY | Facility: CLINIC | Age: 78
End: 2023-06-08
Payer: MEDICARE

## 2023-06-08 LAB
AORTIC ROOT ANNULUS: 4.4 CM
AORTIC VALVE CUSP SEPERATION: 1.4 CM
AV INDEX (PROSTH): 0.45
AV MEAN GRADIENT: 9 MMHG
AV PEAK GRADIENT: 17 MMHG
AV REGURGITATION PRESSURE HALF TIME: 934 MS
AV VALVE AREA: 1.57 CM2
AV VELOCITY RATIO: 0.42
BSA FOR ECHO PROCEDURE: 2.05 M2
CV ECHO LV RWT: 0.46 CM
DOP CALC AO PEAK VEL: 2.05 M/S
DOP CALC AO VTI: 43 CM
DOP CALC LVOT AREA: 3.5 CM2
DOP CALC LVOT DIAMETER: 2.1 CM
DOP CALC LVOT PEAK VEL: 0.87 M/S
DOP CALC LVOT STROKE VOLUME: 67.51 CM3
DOP CALCLVOT PEAK VEL VTI: 19.5 CM
E WAVE DECELERATION TIME: 275 MSEC
E/A RATIO: 0.55
E/E' RATIO: 12 M/S
ECHO LV POSTERIOR WALL: 1.09 CM (ref 0.6–1.1)
EJECTION FRACTION: 25 %
FRACTIONAL SHORTENING: 11 % (ref 28–44)
INTERVENTRICULAR SEPTUM: 1.16 CM (ref 0.6–1.1)
IVRT: 183 MSEC
LEFT ATRIUM VOLUME INDEX MOD: 13.3 ML/M2
LEFT ATRIUM VOLUME MOD: 27.2 CM3
LEFT INTERNAL DIMENSION IN SYSTOLE: 4.25 CM (ref 2.1–4)
LEFT VENTRICLE DIASTOLIC VOLUME INDEX: 51.71 ML/M2
LEFT VENTRICLE DIASTOLIC VOLUME: 106 ML
LEFT VENTRICLE MASS INDEX: 97 G/M2
LEFT VENTRICLE SYSTOLIC VOLUME INDEX: 39.4 ML/M2
LEFT VENTRICLE SYSTOLIC VOLUME: 80.8 ML
LEFT VENTRICULAR INTERNAL DIMENSION IN DIASTOLE: 4.78 CM (ref 3.5–6)
LEFT VENTRICULAR MASS: 198.79 G
LV LATERAL E/E' RATIO: 8.57 M/S
LV SEPTAL E/E' RATIO: 20 M/S
LVOT MG: 2 MMHG
LVOT MV: 0.56 CM/S
MV PEAK A VEL: 1.09 M/S
MV PEAK E VEL: 0.6 M/S
MV STENOSIS PRESSURE HALF TIME: 61 MS
MV VALVE AREA P 1/2 METHOD: 3.61 CM2
PISA AR MAX VEL: 3.37 M/S
PISA TR MAX VEL: 2.23 M/S
RA PRESSURE: 3 MMHG
RIGHT VENTRICULAR END-DIASTOLIC DIMENSION: 2.54 CM
TDI LATERAL: 0.07 M/S
TDI SEPTAL: 0.03 M/S
TDI: 0.05 M/S
TR MAX PG: 20 MMHG
TV REST PULMONARY ARTERY PRESSURE: 23 MMHG

## 2023-06-14 ENCOUNTER — CLINICAL SUPPORT (OUTPATIENT)
Dept: REHABILITATION | Facility: HOSPITAL | Age: 78
End: 2023-06-14
Payer: MEDICARE

## 2023-06-14 DIAGNOSIS — R29.898 LEG WEAKNESS, BILATERAL: ICD-10-CM

## 2023-06-14 DIAGNOSIS — R26.81 GAIT INSTABILITY: ICD-10-CM

## 2023-06-14 DIAGNOSIS — R26.89 IMBALANCE: ICD-10-CM

## 2023-06-14 PROCEDURE — 97112 NEUROMUSCULAR REEDUCATION: CPT | Mod: KX,PN

## 2023-06-14 PROCEDURE — 97161 PT EVAL LOW COMPLEX 20 MIN: CPT | Mod: KX,PN

## 2023-06-14 NOTE — PLAN OF CARE
[facility]  Physical Therapy Neurological Rehabilitation Initial Evaluation    Name: Tono Saez  Clinic Number: [id number]    Therapy Diagnosis:   Encounter Diagnoses   Name Primary?    Gait instability     Imbalance     Leg weakness, bilateral      Physician: Scott Staley MD    Physician Orders: PT Eval and Treat  Medical Diagnosis from Referral: gait instability  Evaluation Date: 6/14/2023  Authorization Period Expiration: 05/16/2024  Plan of Care Expiration: 07/29/2023  Visit # / Visits authorized: 1/ 1    Time In: 1350  Time Out: 1430  Total Billable Time: 40 minutes    Precautions: Diabetes and Fall      Subjective     Date of onset: 05/17/23  History of Current Symptoms, Tono reports: that he took a bad fall about six months ago and sustained multiple lacerations/abrasions throughout; patient endorses that he took another fall two months after that and injured his low back; Tono arrived with rolling-walker and reports no more falls since then; patient denies low back pain at rest but does report significant increase in his pain during transitional movements like getting out of bed; patient is schedule for lidocaine injections in his lumbar spine on 06/28/23.     History of migraines: no     Medical History:   Past Medical History:   Diagnosis Date    Anemia     Anticoagulant long-term use     Arthritis     CAD (coronary artery disease)     CABG, STENTS '97,'99,'01,'05    Cancer     SKIN    Cataract     Diabetes mellitus     Diabetes mellitus type II     GERD (gastroesophageal reflux disease)     GI bleed 2020    Hypertension     Myocardial infarction     Wears glasses      Surgical History:   Tono Saez  has a past surgical history that includes stint; Colon surgery; Coronary artery bypass graft (1995); Cardiac surgery; Cardiac pacemaker placement; Eye surgery; Coronary stent placement; Cardiac pacemaker placement; Hemorrhoid surgery; Cholecystectomy; head laceration  " (01/19/2018); Cardiac pacemaker placement (04/26/2018); Small bowel enteroscopy (N/A, 2/21/2020); Colonoscopy (N/A, 2/21/2020); Colonoscopy (N/A, 2/23/2020); Esophagogastroduodenoscopy (N/A, 2/23/2020); and Coronary angiography including bypass grafts with catheterization of left heart (N/A, 10/26/2022).    Medications:   Tono has a current medication list which includes the following prescription(s): amlodipine, aspirin, atorvastatin, atorvastatin, betamethasone dipropionate, clindamycin, clopidogrel, diclofenac sodium, trulicity, finasteride, fluorouracil 5%, fluticasone propionate, furosemide, hydrocodone-acetaminophen, isosorbide dinitrate, jardiance, ketoconazole, levocetirizine, magnesium oxide, metoprolol tartrate, montelukast, multivitamin, mupirocin, nitroglycerin, pantoprazole, pioglitazone, pregabalin, pregabalin, sertraline, sulfacetamide sodium, tolterodine, and vitamin d.    Allergies:   Review of patient's allergies indicates:   Allergen Reactions    Adhesive Other (See Comments)     SILK TAPE PULL SKIN OFF    Metformin Other (See Comments)     Weight loss cachexia anorexia,diarrhea.    Pcn [penicillins]      Patient states he passed out from this medication when he was 10 years old.       Imaging (CT of head on 04/20/23): No CT evidence of acute intracranial pathology. White matter microangiopathic changes.    Prior Therapy: for low back pain   Social History: lives alone in 1-story home (no steps)  Falls: see "History of Current Symptoms"  DME: Rolling-Walker (in the community) and Rollator (at home)   Occupation: retired  Prior Level of Function: supervision needed  Current Level of Function: moderate difficulty with activities of daily living     Pain:  Current 0/10, worst 10/10, best 0/10   Location: bilateral low back   Description: Aching and Dull  Aggravating Factors: transitional movement like getting out of bed   Easing Factors: rest    Pts goals: improve safety during general " mobility      Objective     - Follows commands: 100% of time   - Speech: no deficits     Functional Mobility & ADLs:   Sit to stand: stand by assist     Mental status: alert, oriented to person, place, and time, normal mood, behavior, speech, dress, motor activity, and thought processes  Appearance: Casually dressed  Behavior:  calm and cooperative  Attention Span and Concentration:  Normal    Posture Alignment in sitting:   Head: forward head    Sensation: Light Touch: Intact          Proprioception: Intact, Kinesthesia Intact         Coordination:   - fine motor: within functional limits   - UE coordination: within functional limits    - LE coordination:  within functional limits         RANGE OF MOTION--LOWER EXTREMITIES  (R) LE Hip: normal   Knee: normal   Ankle: normal    (L) LE: Hip: normal   Knee: normal   Ankle: normal    Strength: manual muscle test grades below     Lower Extremity Strength  Right LE  Left LE    Hip flexion:  3+/5 Hip flexion: 3+/5   Knee extension: 4-/5 Knee extension: 4-/5   Ankle dorsiflexion:  4/5 Ankle dorsiflexion: 4/5       TINETTI BALANCE ASSESSMENT TOOL    Collinetti ME, Kenneth TF, Jefferson R, Fall Risk Index for elderly patients based on number of chronic disabilities.Am J Med 1986:80:[id number]      BALANCE SECTION  Patient is seated in hard, armless chair;    1.Sitting Balance:   Steady; safe = 1  2.Rises from chair :  Able, uses arms to help = 1  3.Attempts to arise :  Able to arise, 1 attempt = 2  4.Immediate standing Balance (first 5 seconds) : Steady without walker or other support = 2  5.Standing balance: Narrow stance without support = 2  6.Nudged : Staggers, grabs, catches self = 1  7.Eyes closed: Unsteady = 0  8.Turning 360 degrees:  Continuous = 1 and Unsteady (grabs, staggers) = 0  9.Sitting Down : Uses arms or not a smooth motion = 1    Balance Score:  11/16    GAIT SECTION  Patient stands with therapist, walks across room (+/- aids), first at usual pace, then at rapid  pace.    10.Initiation of Gait (Immediately after told to go.): No hesitancy = 1  11.Step length and height :  Step through R=1 and Step through L=1  12.Foot Clearance :  R foot clears floor=1 and L foot clears floor=1  13.Step symmetry: Right & Left step length appear equal = 1  14.Step continuity : Steps appear continuous = 1  15.Path: Mild/moderate deviation or uses walking aid = 1  16.Trunk : No sway, but flexion of knees or back or spreads arm out while walking = 1  17.Walking Time: Heels amost touching while walking = 1    Gait Score: 10/12  Balance score:11/16  Total Score=Balance + Gait Score: 21/28     Risk Indicators:    Tinetti Tool Score   Risk of Falls   ?18    High   19-23   Moderate   ?24   Low       Gait Assessment:(if indicated)  - AD used: rolling-walker   - Assistance: stand by assist   - Distance: 120 feet     GAIT DEVIATIONS:  Tono displays the following deviations with ambulation: mild path deviation    Impairments contributing to deviations: imbalance    Endurance Deficit: moderate fatigue         CMS Impairment/Limitation/Restriction for FOTO NOC-Neuromuscular disorder Survey    Therapist reviewed FOTO scores for Tono Saez on 6/14/2023.   FOTO documents entered into [employer] - see Media section.    Limitation Score: 55%    Category: Mobility         TREATMENT     Treatment Time In: 1420  Treatment Time Out: 1430  Total Treatment time separate from Evaluation: 10 minutes    Steven participated in neuromuscular re-education activities to improve: Balance for 10 minutes. The following activities were included:    X 10 each balance therapeutic exercise = mini squats, heel raises/toe raises   X 15 feet balance gait = side stepping, backwards gait  X 15 each bilateral toe taps on 5-inch stool      Home Exercises and Patient Education Provided    Education provided:   - proper therapeutic exercise technique  - treatment plan    Written Home Exercises Provided:  to be provided at future  appointment .      Assessment     Tono is a 77 y.o. male referred to outpatient Physical Therapy with a medical diagnosis of gait instability. Pt presents to PT with the following impairments leading to his functional decline: imbalance.     Pt prognosis is Fair.   Pt will benefit from skilled outpatient Physical Therapy to address the deficits stated above and in the chart below, provide pt/family education, and to maximize pt's level of independence.     Plan of care discussed with patient: Yes  Pt's spiritual, cultural and educational needs considered and patient is agreeable to the plan of care and goals as stated below:     Anticipated Barriers for therapy: severity of imbalance    Medical Necessity is demonstrated by the following  History  Co-morbidities and personal factors that may impact the plan of care Co-morbidities:   CAD, diabetes, history of cancer, HTN, and prior abdominal surgery    Personal Factors:   no deficits     high   Examination  Body Structures and Functions, activity limitations and participation restrictions that may impact the plan of care Body Regions:   back  lower extremities    Body Systems:    strength  balance  gait  transfers    Participation Restrictions:   none    Activity limitations:   Learning and applying knowledge  no deficits    General Tasks and Commands  no deficits    Communication  no deficits    Mobility  lifting and carrying objects  walking  driving (bike, car, motorcycle)    Self care  no deficits    Domestic Life  shopping  cooking  doing house work (cleaning house, washing dishes, laundry)    Interactions/Relationships  no deficits    Life Areas  no deficits    Community and Social Life  no deficits         high   Clinical Presentation stable and uncomplicated low   Decision Making/ Complexity Score: low     Goals:    New Short Term Goals (3 Weeks):    Maintain patient's complaints of low back pain at less than 5/10 during activities of daily living.  2.    Patient to tolerate x 45 seconds bilateral step stance, eyes open to improve upright tolerance.  3.   Patient to tolerate use of 3# weights during lower extremity therapeutic exercise to improve overall strength.  4.   Patient to perform x 10 repetitions sit to stand so that he may rise without upper extremity support.     New Long Term Goals (6 Weeks):   Patient to demonstrate competence with home exercise program to maintain therapeutic gains.  2.   Patient to report improvement in his ability to walk around a room from limited a little to not limited at all.   3.   Patient to report no new falls.  4.   Patient to ambulate 200 feet with rolling-walker and stand by assist to improve household mobility.      Plan     Plan of care Certification: 6/14/2023 to 07/29/2023.    Outpatient Physical Therapy 2 times weekly for 6 weeks to include the following interventions: Gait Training, Manual Therapy, Moist Heat/ Ice, Neuromuscular Re-ed, Patient Education, Self Care, Therapeutic Activities, Therapeutic Exercise, and home exercise program .     Hubert Powers, PT

## 2023-06-20 ENCOUNTER — CLINICAL SUPPORT (OUTPATIENT)
Dept: REHABILITATION | Facility: HOSPITAL | Age: 78
End: 2023-06-20
Payer: MEDICARE

## 2023-06-20 DIAGNOSIS — R26.81 GAIT INSTABILITY: ICD-10-CM

## 2023-06-20 DIAGNOSIS — R26.89 IMBALANCE: Primary | ICD-10-CM

## 2023-06-20 DIAGNOSIS — R29.898 LEG WEAKNESS, BILATERAL: ICD-10-CM

## 2023-06-20 PROCEDURE — 97530 THERAPEUTIC ACTIVITIES: CPT | Mod: PN,CQ

## 2023-06-20 PROCEDURE — 97110 THERAPEUTIC EXERCISES: CPT | Mod: PN,CQ

## 2023-06-20 NOTE — PROGRESS NOTES
[facility]   Physical Therapy Treatment Note      Name: Tono Saez  Clinic Number: [id number]    Therapy Diagnosis:   Encounter Diagnoses   Name Primary?    Imbalance Yes    Gait instability     Leg weakness, bilateral      Physician: Scott Staley MD    Visit Date: 6/20/2023    Physician Orders: PT Eval and Treat  Medical Diagnosis from Referral: gait instability  Evaluation Date: 6/14/2023  Authorization Period Expiration: 05/16/2024  Plan of Care Expiration: 07/29/2023  Visit # / Visits authorized: 1/20 (2)    PTA Visit #: 1/5     Time In: 1115  Time Out: 1200  Total Billable Time: 45 minutes    Precautions: Diabetes and Fall     Subjective     Pt reports: Has been having low blood pressure lately and seeing doctor about that soon, reports Blood Pressure this a.m. was 140/88 millimeters of mercury.   He  had not received   home exercise program.  Response to previous treatment: no problems stated  Functional change: ongoing    Pain: 5/10  Location:  back     Objective      Objective Measures updated at progress report unless specified.     Treatment     Steven received the treatments listed below:      therapeutic exercises to develop strength, endurance, range of motion, flexibility and core stabilization for 35 minutes including:    SciFit Level 1 10 minutes with Bilateral Upper Extremities     Seated:  Bilateral Ankle pumps 30 times  Long arc quads 30 times Right Left alternating  March 30 times Right Left alternating    Supine:  Ball squeeze with Posterior pelvic tilt 30 times  Posterior pelvic tilt 20 times with verbal cues for proper form  Straight leg raise 2 sets of 10 Right Left with opposite knee bend    therapeutic activities to improve functional performance for 10 minutes, including:    Ambulation without assistive device 2x80 feet standby assistance with patient reaching out for objects    Sit <> supine with verbal cues and instruction for log roll       Patient  Education and Home Exercises       Education provided:   - Patient provided with verbal and demonstrative instruction for all activities performed in today's session.  - Instructed in use of assistive device to improve safety with ambulation and improve quality of gait    Written Home Exercises Provided: yes. Exercises were reviewed and Steven was able to demonstrate them prior to the end of the session.  Steven demonstrated good  understanding of the education provided. See EMR under Patient Instructions for exercises provided during therapy sessions    Assessment     Steven provided good participation and effort during today's session with treatment focused on lower extremity strengthening/endurance, core stabilization and functional mobility. Steven tolerated activities without complaints and with good follow through with instruction for proper form.  Encouraged Steven to use rolling walker at all times 2nd to reaching for objects while walking to help stabilize, Steven verbalized understanding.    Steven Is progressing towards his goals.   Pt prognosis is Fair.     Pt will continue to benefit from skilled outpatient physical therapy to address the deficits listed in the problem list box on initial evaluation, provide pt/family education and to maximize pt's level of independence in the home and community environment.     Pt's spiritual, cultural and educational needs considered and pt agreeable to plan of care and goals.     Anticipated barriers to physical therapy:  severity of imbalance     Goals:     New Short Term Goals (3 Weeks):    Maintain patient's complaints of low back pain at less than 5/10 during activities of daily living. (Ongoing)  2.   Patient to tolerate x 45 seconds bilateral step stance, eyes open to improve upright tolerance. (Ongoing)  3.   Patient to tolerate use of 3# weights during lower extremity therapeutic exercise to improve overall strength. (Ongoing)  4.   Patient to perform x 10  repetitions sit to stand so that he may rise without upper extremity support. (Ongoing)     New Long Term Goals (6 Weeks):   Patient to demonstrate competence with home exercise program to maintain therapeutic gains. (Ongoing)  2.   Patient to report improvement in his ability to walk around a room from limited a little to not limited at all.  (Ongoing)  3.   Patient to report no new falls. (Ongoing)  4.   Patient to ambulate 200 feet with rolling-walker and stand by assist to improve household mobility. (Ongoing)       Plan     Plan of care Certification: 6/14/2023 to 07/29/2023.     Outpatient Physical Therapy 2 times weekly for 6 weeks to include the following interventions: Gait Training, Manual Therapy, Moist Heat/ Ice, Neuromuscular Re-ed, Patient Education, Self Care, Therapeutic Activities, Therapeutic Exercise, and home exercise program .        Harriet Chavez, PTA

## 2023-06-22 ENCOUNTER — CLINICAL SUPPORT (OUTPATIENT)
Dept: REHABILITATION | Facility: HOSPITAL | Age: 78
End: 2023-06-22
Payer: MEDICARE

## 2023-06-22 DIAGNOSIS — R26.81 GAIT INSTABILITY: Primary | ICD-10-CM

## 2023-06-22 DIAGNOSIS — R26.89 IMBALANCE: ICD-10-CM

## 2023-06-22 DIAGNOSIS — R29.898 LEG WEAKNESS, BILATERAL: ICD-10-CM

## 2023-06-22 PROCEDURE — 97110 THERAPEUTIC EXERCISES: CPT | Mod: PN

## 2023-06-22 PROCEDURE — 97112 NEUROMUSCULAR REEDUCATION: CPT | Mod: PN

## 2023-06-22 NOTE — PROGRESS NOTES
[facility]   Physical Therapy Treatment Note      Name: Tono Saez  Clinic Number: [id number]    Therapy Diagnosis:   Encounter Diagnoses   Name Primary?    Gait instability Yes    Imbalance     Leg weakness, bilateral      Physician: Scott Staley MD    Visit Date: 6/22/2023    Physician Orders: PT Eval and Treat  Medical Diagnosis from Referral: gait instability  Evaluation Date: 6/14/2023  Authorization Period Expiration: 12/31/2023  Plan of Care Expiration: 07/29/2023  Visit # / Visits authorized: 2/ 20     Time In: 1116  Time Out: 1200  Total Billable Time: 44 minutes     Precautions: Diabetes and Fall      Subjective     Pt reports: that his blood pressure was low when he checked it this morning - complained of lightheadedness while upright.    He was compliant with home exercise program.  Response to previous treatment: no changes  Functional change: none    Pain: 0/10  Location: bilateral low back        Objective      Blood pressure (sitting) = 91/54  Blood pressure (standing) = 89/60    Treatment     Steven received the treatments listed below:      therapeutic exercises to develop strength and endurance for 15 minutes including:    X 10 minutes SciFit (level 2) to promote flexibility prior to strength/mobility training  X 3 sit to stands with 10 second holds while upright      neuromuscular re-education activities to improve: Balance for 29 minutes. The following activities were included:    X 15 each balance therapeutic exercise = mini squats, heel raises/toe raises   X 20 feet balance gait = side stepping, backwards gait  X 15 alternating toe taps on 5-inch stool   X 1 minute stand on AirEx   X 45 seconds each bilateral step stance       Patient Education and Home Exercises       Education provided:   - proper therapeutic exercise technique  - continue home exercise program     Written Home Exercises Provided: Patient instructed to cont prior HEP.       Assessment      Patient needed bilateral upper extremity support during balance therapeutic exercise and balance gait - no lightheadedness reported; sit to stands performed with 10 seconds standing holds - no lightheadedness reported; increased repetitions performed during balance therapeutic exercise; improved distance tolerated during balance gait; no loss of balance demonstrate while standing on AirEx.    Steven Is progressing fairly well towards his goals.   Pt prognosis is Fair.     Pt will continue to benefit from skilled outpatient physical therapy to address the deficits listed in the problem list box on initial evaluation, provide pt/family education and to maximize pt's level of independence in the home and community environment.     Pt's spiritual, cultural and educational needs considered and pt agreeable to plan of care and goals.     Anticipated barriers to physical therapy: vital signs, generalized weakness and imbalance    Goals:     New Short Term Goals (3 Weeks):    Maintain patient's complaints of low back pain at less than 5/10 during activities of daily living. (MET)  2.   Patient to tolerate x 45 seconds bilateral step stance, eyes open to improve upright tolerance. (MET)  3.   Patient to tolerate use of 3# weights during lower extremity therapeutic exercise to improve overall strength. (NOT MET)  4.   Patient to perform x 10 repetitions sit to stand so that he may rise without upper extremity support. (NOT MET)     New Long Term Goals (6 Weeks):   Patient to demonstrate competence with home exercise program to maintain therapeutic gains. (NOT MET)  2.   Patient to report improvement in his ability to walk around a room from limited a little to not limited at all. (NOT MET)  3.   Patient to report no new falls. (MET)  4.   Patient to ambulate 200 feet with rolling-walker and stand by assist to improve household mobility. (NOT MET)      Plan     Continue to progress strength/balance/mobility training to  patient's tolerance. Monitor blood pressure.      Hubert Powers, PT

## 2023-06-26 ENCOUNTER — OFFICE VISIT (OUTPATIENT)
Dept: CARDIOLOGY | Facility: CLINIC | Age: 78
End: 2023-06-26
Payer: MEDICARE

## 2023-06-26 VITALS
HEIGHT: 72 IN | BODY MASS INDEX: 24.87 KG/M2 | DIASTOLIC BLOOD PRESSURE: 68 MMHG | WEIGHT: 183.63 LBS | SYSTOLIC BLOOD PRESSURE: 118 MMHG | OXYGEN SATURATION: 97 % | HEART RATE: 66 BPM

## 2023-06-26 DIAGNOSIS — E08.42 DIABETIC POLYNEUROPATHY ASSOCIATED WITH DIABETES MELLITUS DUE TO UNDERLYING CONDITION: Primary | ICD-10-CM

## 2023-06-26 DIAGNOSIS — I95.1 ORTHOSTATIC HYPOTENSION: ICD-10-CM

## 2023-06-26 PROCEDURE — 99999 PR PBB SHADOW E&M-EST. PATIENT-LVL V: CPT | Mod: PBBFAC,,, | Performed by: SPECIALIST

## 2023-06-26 PROCEDURE — 99215 PR OFFICE/OUTPT VISIT, EST, LEVL V, 40-54 MIN: ICD-10-PCS | Mod: S$PBB,,, | Performed by: SPECIALIST

## 2023-06-26 PROCEDURE — 99215 OFFICE O/P EST HI 40 MIN: CPT | Mod: S$PBB,,, | Performed by: SPECIALIST

## 2023-06-26 PROCEDURE — 99999 PR PBB SHADOW E&M-EST. PATIENT-LVL V: ICD-10-PCS | Mod: PBBFAC,,, | Performed by: SPECIALIST

## 2023-06-26 PROCEDURE — 99215 OFFICE O/P EST HI 40 MIN: CPT | Mod: PBBFAC,PN | Performed by: SPECIALIST

## 2023-06-26 RX ORDER — METOPROLOL TARTRATE 25 MG/1
12.5 TABLET, FILM COATED ORAL 2 TIMES DAILY
Qty: 60 TABLET | Refills: 0 | Status: SHIPPED | OUTPATIENT
Start: 2023-06-26 | End: 2023-12-24

## 2023-06-26 RX ORDER — MIDODRINE HYDROCHLORIDE 2.5 MG/1
2.5 TABLET ORAL 3 TIMES DAILY
Qty: 90 TABLET | Refills: 11 | Status: SHIPPED | OUTPATIENT
Start: 2023-06-26 | End: 2023-09-21 | Stop reason: SDUPTHER

## 2023-06-26 NOTE — H&P (VIEW-ONLY)
Subjective:    Patient ID:  Tono Saez is a 77 y.o. male who presents for   Hypotension and Coronary Artery Disease    HPI  1) 3 mos of dizzy today 80/60 and stand bp  unobtainable and pt almost passed out       No cp or sob       Drives    dizzy stading   He has been having lots of dizziness  Repeat echo shows deterioration LV function but he is not having CHF symptoms  Review of patient's allergies indicates:   Allergen Reactions    Adhesive Other (See Comments)     SILK TAPE PULL SKIN OFF    Metformin Other (See Comments)     Weight loss cachexia anorexia,diarrhea.    Pcn [penicillins]      Patient states he passed out from this medication when he was 10 years old.        Past Medical History:   Diagnosis Date    Anemia     Anticoagulant long-term use     Arthritis     CAD (coronary artery disease)     CABG, STENTS '97,'99,'01,'05    Cancer     SKIN    Cataract     Diabetes mellitus     Diabetes mellitus type II     GERD (gastroesophageal reflux disease)     GI bleed 2020    Hypertension     Myocardial infarction     Wears glasses      Past Surgical History:   Procedure Laterality Date    CARDIAC PACEMAKER PLACEMENT      CARDIAC PACEMAKER PLACEMENT      CARDIAC PACEMAKER PLACEMENT  04/26/2018    replaced    CARDIAC SURGERY      1995   CABG X 5    CHOLECYSTECTOMY      COLON SURGERY      COLONOSCOPY N/A 2/21/2020    Procedure: COLONOSCOPY;  Surgeon: Abe Barragan III, MD;  Location: [facility] ENDO;  Service: Endoscopy;  Laterality: N/A;    COLONOSCOPY N/A 2/23/2020    Procedure: COLONOSCOPY;  Surgeon: Bob Locke Jr., MD;  Location: [facility] ENDO;  Service: Endoscopy;  Laterality: N/A;    CORONARY ANGIOGRAPHY INCLUDING BYPASS GRAFTS WITH CATHETERIZATION OF LEFT HEART N/A 10/26/2022    Procedure: ANGIOGRAM, CORONARY, INCLUDING BYPASS GRAFT, WITH LEFT HEART CATHETERIZATION;  Surgeon: Robles Mckoy MD;  Location: [facility] CATH/EP LAB;  Service: Cardiology;  Laterality: N/A;    CORONARY ARTERY BYPASS GRAFT  1995     CORONARY STENT PLACEMENT      stent x 5    ESOPHAGOGASTRODUODENOSCOPY N/A 2/23/2020    Procedure: EGD (ESOPHAGOGASTRODUODENOSCOPY);  Surgeon: Bob Locke Jr., MD;  Location: [facility];  Service: Endoscopy;  Laterality: N/A;    EYE SURGERY      BILAT CATARACT    head laceration   01/19/2018    HEMORRHOID SURGERY      SMALL BOWEL ENTEROSCOPY N/A 2/21/2020    Procedure: ENTEROSCOPY;  Surgeon: Abe Barragan III, MD;  Location: [facility];  Service: Endoscopy;  Laterality: N/A;    stint       Social History     Tobacco Use    Smoking status: Never    Smokeless tobacco: Never   Substance Use Topics    Alcohol use: No    Drug use: No        Review of Systems     Review of Systems   Constitutional: Positive for malaise/fatigue and weight loss.   Cardiovascular:  Positive for dyspnea on exertion and irregular heartbeat.   Respiratory:  Positive for shortness of breath.    Skin: Negative.    Gastrointestinal: Negative.    Genitourinary: Negative.    Neurological:  Positive for focal weakness, light-headedness, loss of balance and weakness.         Objective:        Vitals:    06/26/23 1310   BP: 118/68   Pulse: 66       Lab Results   Component Value Date    WBC 7.42 04/14/2023    HGB 11.4 (L) 04/14/2023    HCT 38.1 (L) 04/14/2023     04/14/2023    CHOL 141 01/12/2023    TRIG 122 01/12/2023    HDL 48 01/12/2023    ALT 14 04/14/2023    AST 18 04/14/2023     04/14/2023    K 4.4 04/14/2023     04/14/2023    CREATININE 1.5 (H) 04/14/2023    BUN 29 (H) 04/14/2023    CO2 27 04/14/2023    TSH 2.270 04/14/2023    PSA 1.3 07/15/2022    INR 1.1 02/03/2023    HGBA1C 8.3 (H) 01/12/2023        ECHOCARDIOGRAM RESULTS  Results for orders placed during the hospital encounter of 06/01/23    Echo    Interpretation Summary  · Concentric remodeling and moderately decreased systolic function.  · The estimated ejection fraction is 25%.  · Grade I left ventricular diastolic dysfunction.mean left atrial pressure 9  · There  are segmental left ventricular wall motion abnormalities.possible old ant apical scar  · There is abnormal septal wall motion consistent with right ventricular pacemaker.  · Atrial fibrillation not observed.  · With normal right ventricular systolic function.  · Mild-to-moderate aortic regurgitation.  · There is moderate aortic valve stenosis.  · Aortic valve area is 1.57 cm2; peak velocity is 2.05 m/s; mean gradient is 9 mmHg.  · Mild tricuspid regurgitation.  · Mild pulmonic regurgitation.  · Normal central venous pressure (3 mmHg).  · The estimated PA systolic pressure is 23 mmHg.no PH      CURRENT/PREVIOUS VISIT EKG  Results for orders placed or performed during the hospital encounter of 02/03/23   EKG 12-lead    Collection Time: 02/03/23 11:33 AM    Narrative    Test Reason : R55,    Vent. Rate : 061 BPM     Atrial Rate : 061 BPM     P-R Int : 216 ms          QRS Dur : 204 ms      QT Int : 482 ms       P-R-T Axes : 012 -88 084 degrees     QTc Int : 485 ms    Atrial-sensed ventricular-paced rhythm with prolonged AV conduction  Abnormal ECG  When compared with ECG of 26-OCT-2022 15:22,  Vent. rate has decreased BY   7 BPM  Confirmed by Jose J NYE, Kt MUSTAFA ([id number]) on 2/14/2023 11:08:18 AM    Referred By:             Confirmed By:Kt Lux MD     Results for orders placed during the hospital encounter of 06/01/23    Echo    Interpretation Summary  · Concentric remodeling and moderately decreased systolic function.  · The estimated ejection fraction is 25%.  · Grade I left ventricular diastolic dysfunction.mean left atrial pressure 9  · There are segmental left ventricular wall motion abnormalities.possible old ant apical scar  · There is abnormal septal wall motion consistent with right ventricular pacemaker.  · Atrial fibrillation not observed.  · With normal right ventricular systolic function.  · Mild-to-moderate aortic regurgitation.  · There is moderate aortic valve stenosis.  · Aortic valve area is 1.57  cm2; peak velocity is 2.05 m/s; mean gradient is 9 mmHg.  · Mild tricuspid regurgitation.  · Mild pulmonic regurgitation.  · Normal central venous pressure (3 mmHg).  · The estimated PA systolic pressure is 23 mmHg.no PH    Results for orders placed in visit on 03/28/22    Nuclear Stress Test    Interpretation Summary    The nuclear portion of this study will be reported separately.    The EKG portion of this study is uninterpretable.    The patient reported chest pain during the stress test.    There were no arrhythmias during stress.      PHYSICAL EXAM    Physical Exam blood pressure sitting in the chair is 80/60 and standing patient became very lightheaded no obtainable blood pressure no palpable pulse in the left  After he sat down pulse came  Back in left arm approximally 80 lungs lungs are clear  Cardiac regular  Abdomen no masses   No edema    Medication List with Changes/Refills   New Medications    MIDODRINE (PROAMATINE) 2.5 MG TAB    Take 1 tablet (2.5 mg total) by mouth 3 (three) times daily.   Current Medications    ASPIRIN (ECOTRIN) 81 MG EC TABLET    Take 2 tablets (162 mg total) by mouth once daily.    ATORVASTATIN (LIPITOR) 40 MG TABLET    Take 1 tablet (40 mg total) by mouth every evening.    ATORVASTATIN (LIPITOR) 40 MG TABLET    Take 1 tablet by mouth once daily.    BETAMETHASONE DIPROPIONATE (DIPROLENE) 0.05 % LOTION    Apply thin film to scalp bid prn itch    CLINDAMYCIN (CLEOCIN) 150 MG CAPSULE    Take by mouth.    CLOPIDOGREL (PLAVIX) 75 MG TABLET    Take 1 tablet (75 mg total) by mouth once daily.    DICLOFENAC SODIUM (VOLTAREN) 1 % GEL    apply 2 grams topically once a day    FINASTERIDE (PROSCAR) 5 MG TABLET    Take 5 mg by mouth once daily.    FLUOROURACIL 5% 5 % SOLN    Apply small amount to top of head 1-2x/day for 2 weeks    FLUTICASONE PROPIONATE (FLONASE) 50 MCG/ACTUATION NASAL SPRAY    Use nasally as directed as needed    FUROSEMIDE (LASIX) 20 MG TABLET    TAKE 1 TABLET (20 MG TOTAL)  BY MOUTH ONCE DAILY.    HYDROCODONE-ACETAMINOPHEN (NORCO) 5-325 MG PER TABLET    Take 1 tablet by mouth every 4 (four) hours as needed for Pain.    JARDIANCE 25 MG TABLET    TAKE 1 TABLET (25 MG TOTAL) BY MOUTH ONCE DAILY.    KETOCONAZOLE (NIZORAL) 2 % SHAMPOO    Wash all scaling areas let sit 3 minutes then rinse 3x/wk    LEVOCETIRIZINE (XYZAL) 5 MG TABLET    Take 1 tablet (5 mg total) by mouth every evening.    MAGNESIUM OXIDE (MAG-OX) 400 MG (241.3 MG MAGNESIUM) TABLET    Take 1 tablet (400 mg total) by mouth once daily.    MONTELUKAST (SINGULAIR) 10 MG TABLET    Take 1 tablet (10 mg total) by mouth every evening.    MULTIVITAMIN CAPSULE    Take 1 capsule by mouth once daily.    MUPIROCIN (BACTROBAN) 2 % OINTMENT    Apply topically 2 (two) times daily.    NITROGLYCERIN (NITROSTAT) 0.4 MG SL TABLET    DISSOLVE 1 TABLET UNDER THE TONGUE AS NEEDED FOR CHEST PAIN    PANTOPRAZOLE (PROTONIX) 40 MG TABLET    TAKE ONE TABLET BY MOUTH ONCE DAILY    PIOGLITAZONE (ACTOS) 30 MG TABLET    Take 1 tablet (30 mg total) by mouth once daily.    PREGABALIN (LYRICA) 50 MG CAPSULE    Take 1 capsule (50 mg total) by mouth 3 (three) times daily.    SERTRALINE (ZOLOFT) 50 MG TABLET    TAKE ONE TABLET BY MOUTH ONCE DAILY    SULFACETAMIDE SODIUM (OVACE PLUS SHAMPOO) 10 % SHAM    Wash scalp nightly    TOLTERODINE (DETROL LA) 4 MG 24 HR CAPSULE    TAKE ONE CAPSULE BY MOUTH ONCE DAILY    VITAMIN D 1000 UNITS TAB    Take 1,000 Units by mouth once daily.   Changed and/or Refilled Medications    Modified Medication Previous Medication    METOPROLOL TARTRATE (LOPRESSOR) 25 MG TABLET metoprolol tartrate (LOPRESSOR) 25 MG tablet       Take 0.5 tablets (12.5 mg total) by mouth 2 (two) times daily.    Take 1 tablet (25 mg total) by mouth 2 (two) times daily.   Discontinued Medications    AMLODIPINE (NORVASC) 5 MG TABLET    TAKE ONE TABLET BY MOUTH ONCE DAILY    DULAGLUTIDE (TRULICITY) 0.75 MG/0.5 ML PEN INJECTOR    Inject 0.75 mg into the skin every  7 days.    ISOSORBIDE DINITRATE (ISORDIL) 10 MG TABLET    Take 1 tablet (10 mg total) by mouth 2 (two) times daily.    PREGABALIN (LYRICA) 50 MG CAPSULE    1 po qhs x 1 wk then 1 po bid if no excess drowsiness           Assessment:       1. Diabetic polyneuropathy associated with diabetes mellitus due to underlying condition    2. Orthostatic hypotension    Cardiomyopathy ischemia  Successful status post stenting of the circumflex main  I substantially and  personally reviewed old and new ecg's, lab reports,, xray reports  and  other cardiovascular studies including  echo's, stress tests, angiogram reports, holters,and vascular studies .  In addition I evaluated original cardiac cath  _x __echo  ____cxr ______ct ____scan on [employer] or [employer] or other viewing platforms and  ____EKG's .   I reviewed  office and hospital notes Yes ___x _ of  referring providers and other providers.  I reviewed personally old hospital and office notes   Time spent evaluating and managing this patient:____35____min.         Plan:   Dc  isordil, midodrine 2.5 tid, metoprolol 12.5 bid         Problem List Items Addressed This Visit          Cardiac/Vascular    Orthostatic hypotension     Other Visit Diagnoses       Diabetic polyneuropathy associated with diabetes mellitus due to underlying condition    -  Primary             No follow-ups on file.

## 2023-06-26 NOTE — H&P (VIEW-ONLY)
Subjective:    Patient ID:  Tono Saez is a 77 y.o. male who presents for   Hypotension and Coronary Artery Disease    HPI  1) 3 mos of dizzy today 80/60 and stand bp  unobtainable and pt almost passed out       No cp or sob       Drives    dizzy stading   He has been having lots of dizziness  Repeat echo shows deterioration LV function but he is not having CHF symptoms  Review of patient's allergies indicates:   Allergen Reactions    Adhesive Other (See Comments)     SILK TAPE PULL SKIN OFF    Metformin Other (See Comments)     Weight loss cachexia anorexia,diarrhea.    Pcn [penicillins]      Patient states he passed out from this medication when he was 10 years old.        Past Medical History:   Diagnosis Date    Anemia     Anticoagulant long-term use     Arthritis     CAD (coronary artery disease)     CABG, STENTS '97,'99,'01,'05    Cancer     SKIN    Cataract     Diabetes mellitus     Diabetes mellitus type II     GERD (gastroesophageal reflux disease)     GI bleed 2020    Hypertension     Myocardial infarction     Wears glasses      Past Surgical History:   Procedure Laterality Date    CARDIAC PACEMAKER PLACEMENT      CARDIAC PACEMAKER PLACEMENT      CARDIAC PACEMAKER PLACEMENT  04/26/2018    replaced    CARDIAC SURGERY      1995   CABG X 5    CHOLECYSTECTOMY      COLON SURGERY      COLONOSCOPY N/A 2/21/2020    Procedure: COLONOSCOPY;  Surgeon: Abe Barragan III, MD;  Location: [facility] ENDO;  Service: Endoscopy;  Laterality: N/A;    COLONOSCOPY N/A 2/23/2020    Procedure: COLONOSCOPY;  Surgeon: Bob Locke Jr., MD;  Location: [facility] ENDO;  Service: Endoscopy;  Laterality: N/A;    CORONARY ANGIOGRAPHY INCLUDING BYPASS GRAFTS WITH CATHETERIZATION OF LEFT HEART N/A 10/26/2022    Procedure: ANGIOGRAM, CORONARY, INCLUDING BYPASS GRAFT, WITH LEFT HEART CATHETERIZATION;  Surgeon: Robles Mckoy MD;  Location: [facility] CATH/EP LAB;  Service: Cardiology;  Laterality: N/A;    CORONARY ARTERY BYPASS GRAFT  1995     CORONARY STENT PLACEMENT      stent x 5    ESOPHAGOGASTRODUODENOSCOPY N/A 2/23/2020    Procedure: EGD (ESOPHAGOGASTRODUODENOSCOPY);  Surgeon: Bob Locke Jr., MD;  Location: [facility];  Service: Endoscopy;  Laterality: N/A;    EYE SURGERY      BILAT CATARACT    head laceration   01/19/2018    HEMORRHOID SURGERY      SMALL BOWEL ENTEROSCOPY N/A 2/21/2020    Procedure: ENTEROSCOPY;  Surgeon: Abe Barragan III, MD;  Location: [facility];  Service: Endoscopy;  Laterality: N/A;    stint       Social History     Tobacco Use    Smoking status: Never    Smokeless tobacco: Never   Substance Use Topics    Alcohol use: No    Drug use: No        Review of Systems     Review of Systems   Constitutional: Positive for malaise/fatigue and weight loss.   Cardiovascular:  Positive for dyspnea on exertion and irregular heartbeat.   Respiratory:  Positive for shortness of breath.    Skin: Negative.    Gastrointestinal: Negative.    Genitourinary: Negative.    Neurological:  Positive for focal weakness, light-headedness, loss of balance and weakness.         Objective:        Vitals:    06/26/23 1310   BP: 118/68   Pulse: 66       Lab Results   Component Value Date    WBC 7.42 04/14/2023    HGB 11.4 (L) 04/14/2023    HCT 38.1 (L) 04/14/2023     04/14/2023    CHOL 141 01/12/2023    TRIG 122 01/12/2023    HDL 48 01/12/2023    ALT 14 04/14/2023    AST 18 04/14/2023     04/14/2023    K 4.4 04/14/2023     04/14/2023    CREATININE 1.5 (H) 04/14/2023    BUN 29 (H) 04/14/2023    CO2 27 04/14/2023    TSH 2.270 04/14/2023    PSA 1.3 07/15/2022    INR 1.1 02/03/2023    HGBA1C 8.3 (H) 01/12/2023        ECHOCARDIOGRAM RESULTS  Results for orders placed during the hospital encounter of 06/01/23    Echo    Interpretation Summary  · Concentric remodeling and moderately decreased systolic function.  · The estimated ejection fraction is 25%.  · Grade I left ventricular diastolic dysfunction.mean left atrial pressure 9  · There  are segmental left ventricular wall motion abnormalities.possible old ant apical scar  · There is abnormal septal wall motion consistent with right ventricular pacemaker.  · Atrial fibrillation not observed.  · With normal right ventricular systolic function.  · Mild-to-moderate aortic regurgitation.  · There is moderate aortic valve stenosis.  · Aortic valve area is 1.57 cm2; peak velocity is 2.05 m/s; mean gradient is 9 mmHg.  · Mild tricuspid regurgitation.  · Mild pulmonic regurgitation.  · Normal central venous pressure (3 mmHg).  · The estimated PA systolic pressure is 23 mmHg.no PH      CURRENT/PREVIOUS VISIT EKG  Results for orders placed or performed during the hospital encounter of 02/03/23   EKG 12-lead    Collection Time: 02/03/23 11:33 AM    Narrative    Test Reason : R55,    Vent. Rate : 061 BPM     Atrial Rate : 061 BPM     P-R Int : 216 ms          QRS Dur : 204 ms      QT Int : 482 ms       P-R-T Axes : 012 -88 084 degrees     QTc Int : 485 ms    Atrial-sensed ventricular-paced rhythm with prolonged AV conduction  Abnormal ECG  When compared with ECG of 26-OCT-2022 15:22,  Vent. rate has decreased BY   7 BPM  Confirmed by Jose J NYE, Kt MUSTAFA ([id number]) on 2/14/2023 11:08:18 AM    Referred By:             Confirmed By:Kt Lux MD     Results for orders placed during the hospital encounter of 06/01/23    Echo    Interpretation Summary  · Concentric remodeling and moderately decreased systolic function.  · The estimated ejection fraction is 25%.  · Grade I left ventricular diastolic dysfunction.mean left atrial pressure 9  · There are segmental left ventricular wall motion abnormalities.possible old ant apical scar  · There is abnormal septal wall motion consistent with right ventricular pacemaker.  · Atrial fibrillation not observed.  · With normal right ventricular systolic function.  · Mild-to-moderate aortic regurgitation.  · There is moderate aortic valve stenosis.  · Aortic valve area is 1.57  cm2; peak velocity is 2.05 m/s; mean gradient is 9 mmHg.  · Mild tricuspid regurgitation.  · Mild pulmonic regurgitation.  · Normal central venous pressure (3 mmHg).  · The estimated PA systolic pressure is 23 mmHg.no PH    Results for orders placed in visit on 03/28/22    Nuclear Stress Test    Interpretation Summary    The nuclear portion of this study will be reported separately.    The EKG portion of this study is uninterpretable.    The patient reported chest pain during the stress test.    There were no arrhythmias during stress.      PHYSICAL EXAM    Physical Exam blood pressure sitting in the chair is 80/60 and standing patient became very lightheaded no obtainable blood pressure no palpable pulse in the left  After he sat down pulse came  Back in left arm approximally 80 lungs lungs are clear  Cardiac regular  Abdomen no masses   No edema    Medication List with Changes/Refills   New Medications    MIDODRINE (PROAMATINE) 2.5 MG TAB    Take 1 tablet (2.5 mg total) by mouth 3 (three) times daily.   Current Medications    ASPIRIN (ECOTRIN) 81 MG EC TABLET    Take 2 tablets (162 mg total) by mouth once daily.    ATORVASTATIN (LIPITOR) 40 MG TABLET    Take 1 tablet (40 mg total) by mouth every evening.    ATORVASTATIN (LIPITOR) 40 MG TABLET    Take 1 tablet by mouth once daily.    BETAMETHASONE DIPROPIONATE (DIPROLENE) 0.05 % LOTION    Apply thin film to scalp bid prn itch    CLINDAMYCIN (CLEOCIN) 150 MG CAPSULE    Take by mouth.    CLOPIDOGREL (PLAVIX) 75 MG TABLET    Take 1 tablet (75 mg total) by mouth once daily.    DICLOFENAC SODIUM (VOLTAREN) 1 % GEL    apply 2 grams topically once a day    FINASTERIDE (PROSCAR) 5 MG TABLET    Take 5 mg by mouth once daily.    FLUOROURACIL 5% 5 % SOLN    Apply small amount to top of head 1-2x/day for 2 weeks    FLUTICASONE PROPIONATE (FLONASE) 50 MCG/ACTUATION NASAL SPRAY    Use nasally as directed as needed    FUROSEMIDE (LASIX) 20 MG TABLET    TAKE 1 TABLET (20 MG TOTAL)  BY MOUTH ONCE DAILY.    HYDROCODONE-ACETAMINOPHEN (NORCO) 5-325 MG PER TABLET    Take 1 tablet by mouth every 4 (four) hours as needed for Pain.    JARDIANCE 25 MG TABLET    TAKE 1 TABLET (25 MG TOTAL) BY MOUTH ONCE DAILY.    KETOCONAZOLE (NIZORAL) 2 % SHAMPOO    Wash all scaling areas let sit 3 minutes then rinse 3x/wk    LEVOCETIRIZINE (XYZAL) 5 MG TABLET    Take 1 tablet (5 mg total) by mouth every evening.    MAGNESIUM OXIDE (MAG-OX) 400 MG (241.3 MG MAGNESIUM) TABLET    Take 1 tablet (400 mg total) by mouth once daily.    MONTELUKAST (SINGULAIR) 10 MG TABLET    Take 1 tablet (10 mg total) by mouth every evening.    MULTIVITAMIN CAPSULE    Take 1 capsule by mouth once daily.    MUPIROCIN (BACTROBAN) 2 % OINTMENT    Apply topically 2 (two) times daily.    NITROGLYCERIN (NITROSTAT) 0.4 MG SL TABLET    DISSOLVE 1 TABLET UNDER THE TONGUE AS NEEDED FOR CHEST PAIN    PANTOPRAZOLE (PROTONIX) 40 MG TABLET    TAKE ONE TABLET BY MOUTH ONCE DAILY    PIOGLITAZONE (ACTOS) 30 MG TABLET    Take 1 tablet (30 mg total) by mouth once daily.    PREGABALIN (LYRICA) 50 MG CAPSULE    Take 1 capsule (50 mg total) by mouth 3 (three) times daily.    SERTRALINE (ZOLOFT) 50 MG TABLET    TAKE ONE TABLET BY MOUTH ONCE DAILY    SULFACETAMIDE SODIUM (OVACE PLUS SHAMPOO) 10 % SHAM    Wash scalp nightly    TOLTERODINE (DETROL LA) 4 MG 24 HR CAPSULE    TAKE ONE CAPSULE BY MOUTH ONCE DAILY    VITAMIN D 1000 UNITS TAB    Take 1,000 Units by mouth once daily.   Changed and/or Refilled Medications    Modified Medication Previous Medication    METOPROLOL TARTRATE (LOPRESSOR) 25 MG TABLET metoprolol tartrate (LOPRESSOR) 25 MG tablet       Take 0.5 tablets (12.5 mg total) by mouth 2 (two) times daily.    Take 1 tablet (25 mg total) by mouth 2 (two) times daily.   Discontinued Medications    AMLODIPINE (NORVASC) 5 MG TABLET    TAKE ONE TABLET BY MOUTH ONCE DAILY    DULAGLUTIDE (TRULICITY) 0.75 MG/0.5 ML PEN INJECTOR    Inject 0.75 mg into the skin every  7 days.    ISOSORBIDE DINITRATE (ISORDIL) 10 MG TABLET    Take 1 tablet (10 mg total) by mouth 2 (two) times daily.    PREGABALIN (LYRICA) 50 MG CAPSULE    1 po qhs x 1 wk then 1 po bid if no excess drowsiness           Assessment:       1. Diabetic polyneuropathy associated with diabetes mellitus due to underlying condition    2. Orthostatic hypotension    Cardiomyopathy ischemia  Successful status post stenting of the circumflex main  I substantially and  personally reviewed old and new ecg's, lab reports,, xray reports  and  other cardiovascular studies including  echo's, stress tests, angiogram reports, holters,and vascular studies .  In addition I evaluated original cardiac cath  _x __echo  ____cxr ______ct ____scan on [employer] or [employer] or other viewing platforms and  ____EKG's .   I reviewed  office and hospital notes Yes ___x _ of  referring providers and other providers.  I reviewed personally old hospital and office notes   Time spent evaluating and managing this patient:____35____min.         Plan:   Dc  isordil, midodrine 2.5 tid, metoprolol 12.5 bid         Problem List Items Addressed This Visit          Cardiac/Vascular    Orthostatic hypotension     Other Visit Diagnoses       Diabetic polyneuropathy associated with diabetes mellitus due to underlying condition    -  Primary             No follow-ups on file.

## 2023-06-26 NOTE — PROGRESS NOTES
Subjective:    Patient ID:  Tono Saez is a 77 y.o. male who presents for   Hypotension and Coronary Artery Disease    HPI  1) 3 mos of dizzy today 80/60 and stand bp  unobtainable and pt almost passed out       No cp or sob       Drives    dizzy stading   He has been having lots of dizziness  Repeat echo shows deterioration LV function but he is not having CHF symptoms  Review of patient's allergies indicates:   Allergen Reactions    Adhesive Other (See Comments)     SILK TAPE PULL SKIN OFF    Metformin Other (See Comments)     Weight loss cachexia anorexia,diarrhea.    Pcn [penicillins]      Patient states he passed out from this medication when he was 10 years old.        Past Medical History:   Diagnosis Date    Anemia     Anticoagulant long-term use     Arthritis     CAD (coronary artery disease)     CABG, STENTS '97,'99,'01,'05    Cancer     SKIN    Cataract     Diabetes mellitus     Diabetes mellitus type II     GERD (gastroesophageal reflux disease)     GI bleed 2020    Hypertension     Myocardial infarction     Wears glasses      Past Surgical History:   Procedure Laterality Date    CARDIAC PACEMAKER PLACEMENT      CARDIAC PACEMAKER PLACEMENT      CARDIAC PACEMAKER PLACEMENT  04/26/2018    replaced    CARDIAC SURGERY      1995   CABG X 5    CHOLECYSTECTOMY      COLON SURGERY      COLONOSCOPY N/A 2/21/2020    Procedure: COLONOSCOPY;  Surgeon: Abe Barragan III, MD;  Location: [facility] ENDO;  Service: Endoscopy;  Laterality: N/A;    COLONOSCOPY N/A 2/23/2020    Procedure: COLONOSCOPY;  Surgeon: Bob Locke Jr., MD;  Location: [facility] ENDO;  Service: Endoscopy;  Laterality: N/A;    CORONARY ANGIOGRAPHY INCLUDING BYPASS GRAFTS WITH CATHETERIZATION OF LEFT HEART N/A 10/26/2022    Procedure: ANGIOGRAM, CORONARY, INCLUDING BYPASS GRAFT, WITH LEFT HEART CATHETERIZATION;  Surgeon: Robles Mckoy MD;  Location: [facility] CATH/EP LAB;  Service: Cardiology;  Laterality: N/A;    CORONARY ARTERY BYPASS GRAFT  1995     CORONARY STENT PLACEMENT      stent x 5    ESOPHAGOGASTRODUODENOSCOPY N/A 2/23/2020    Procedure: EGD (ESOPHAGOGASTRODUODENOSCOPY);  Surgeon: Bob Locke Jr., MD;  Location: [facility];  Service: Endoscopy;  Laterality: N/A;    EYE SURGERY      BILAT CATARACT    head laceration   01/19/2018    HEMORRHOID SURGERY      SMALL BOWEL ENTEROSCOPY N/A 2/21/2020    Procedure: ENTEROSCOPY;  Surgeon: Abe Barragan III, MD;  Location: [facility];  Service: Endoscopy;  Laterality: N/A;    stint       Social History     Tobacco Use    Smoking status: Never    Smokeless tobacco: Never   Substance Use Topics    Alcohol use: No    Drug use: No        Review of Systems     Review of Systems   Constitutional: Positive for malaise/fatigue and weight loss.   Cardiovascular:  Positive for dyspnea on exertion and irregular heartbeat.   Respiratory:  Positive for shortness of breath.    Skin: Negative.    Gastrointestinal: Negative.    Genitourinary: Negative.    Neurological:  Positive for focal weakness, light-headedness, loss of balance and weakness.         Objective:        Vitals:    06/26/23 1310   BP: 118/68   Pulse: 66       Lab Results   Component Value Date    WBC 7.42 04/14/2023    HGB 11.4 (L) 04/14/2023    HCT 38.1 (L) 04/14/2023     04/14/2023    CHOL 141 01/12/2023    TRIG 122 01/12/2023    HDL 48 01/12/2023    ALT 14 04/14/2023    AST 18 04/14/2023     04/14/2023    K 4.4 04/14/2023     04/14/2023    CREATININE 1.5 (H) 04/14/2023    BUN 29 (H) 04/14/2023    CO2 27 04/14/2023    TSH 2.270 04/14/2023    PSA 1.3 07/15/2022    INR 1.1 02/03/2023    HGBA1C 8.3 (H) 01/12/2023        ECHOCARDIOGRAM RESULTS  Results for orders placed during the hospital encounter of 06/01/23    Echo    Interpretation Summary  · Concentric remodeling and moderately decreased systolic function.  · The estimated ejection fraction is 25%.  · Grade I left ventricular diastolic dysfunction.mean left atrial pressure 9  · There  are segmental left ventricular wall motion abnormalities.possible old ant apical scar  · There is abnormal septal wall motion consistent with right ventricular pacemaker.  · Atrial fibrillation not observed.  · With normal right ventricular systolic function.  · Mild-to-moderate aortic regurgitation.  · There is moderate aortic valve stenosis.  · Aortic valve area is 1.57 cm2; peak velocity is 2.05 m/s; mean gradient is 9 mmHg.  · Mild tricuspid regurgitation.  · Mild pulmonic regurgitation.  · Normal central venous pressure (3 mmHg).  · The estimated PA systolic pressure is 23 mmHg.no PH      CURRENT/PREVIOUS VISIT EKG  Results for orders placed or performed during the hospital encounter of 02/03/23   EKG 12-lead    Collection Time: 02/03/23 11:33 AM    Narrative    Test Reason : R55,    Vent. Rate : 061 BPM     Atrial Rate : 061 BPM     P-R Int : 216 ms          QRS Dur : 204 ms      QT Int : 482 ms       P-R-T Axes : 012 -88 084 degrees     QTc Int : 485 ms    Atrial-sensed ventricular-paced rhythm with prolonged AV conduction  Abnormal ECG  When compared with ECG of 26-OCT-2022 15:22,  Vent. rate has decreased BY   7 BPM  Confirmed by Jose J NYE, Kt MUSTAFA ([id number]) on 2/14/2023 11:08:18 AM    Referred By:             Confirmed By:Kt Lux MD     Results for orders placed during the hospital encounter of 06/01/23    Echo    Interpretation Summary  · Concentric remodeling and moderately decreased systolic function.  · The estimated ejection fraction is 25%.  · Grade I left ventricular diastolic dysfunction.mean left atrial pressure 9  · There are segmental left ventricular wall motion abnormalities.possible old ant apical scar  · There is abnormal septal wall motion consistent with right ventricular pacemaker.  · Atrial fibrillation not observed.  · With normal right ventricular systolic function.  · Mild-to-moderate aortic regurgitation.  · There is moderate aortic valve stenosis.  · Aortic valve area is 1.57  cm2; peak velocity is 2.05 m/s; mean gradient is 9 mmHg.  · Mild tricuspid regurgitation.  · Mild pulmonic regurgitation.  · Normal central venous pressure (3 mmHg).  · The estimated PA systolic pressure is 23 mmHg.no PH    Results for orders placed in visit on 03/28/22    Nuclear Stress Test    Interpretation Summary    The nuclear portion of this study will be reported separately.    The EKG portion of this study is uninterpretable.    The patient reported chest pain during the stress test.    There were no arrhythmias during stress.      PHYSICAL EXAM    Physical Exam blood pressure sitting in the chair is 80/60 and standing patient became very lightheaded no obtainable blood pressure no palpable pulse in the left  After he sat down pulse came  Back in left arm approximally 80 lungs lungs are clear  Cardiac regular  Abdomen no masses   No edema    Medication List with Changes/Refills   New Medications    MIDODRINE (PROAMATINE) 2.5 MG TAB    Take 1 tablet (2.5 mg total) by mouth 3 (three) times daily.   Current Medications    ASPIRIN (ECOTRIN) 81 MG EC TABLET    Take 2 tablets (162 mg total) by mouth once daily.    ATORVASTATIN (LIPITOR) 40 MG TABLET    Take 1 tablet (40 mg total) by mouth every evening.    ATORVASTATIN (LIPITOR) 40 MG TABLET    Take 1 tablet by mouth once daily.    BETAMETHASONE DIPROPIONATE (DIPROLENE) 0.05 % LOTION    Apply thin film to scalp bid prn itch    CLINDAMYCIN (CLEOCIN) 150 MG CAPSULE    Take by mouth.    CLOPIDOGREL (PLAVIX) 75 MG TABLET    Take 1 tablet (75 mg total) by mouth once daily.    DICLOFENAC SODIUM (VOLTAREN) 1 % GEL    apply 2 grams topically once a day    FINASTERIDE (PROSCAR) 5 MG TABLET    Take 5 mg by mouth once daily.    FLUOROURACIL 5% 5 % SOLN    Apply small amount to top of head 1-2x/day for 2 weeks    FLUTICASONE PROPIONATE (FLONASE) 50 MCG/ACTUATION NASAL SPRAY    Use nasally as directed as needed    FUROSEMIDE (LASIX) 20 MG TABLET    TAKE 1 TABLET (20 MG TOTAL)  BY MOUTH ONCE DAILY.    HYDROCODONE-ACETAMINOPHEN (NORCO) 5-325 MG PER TABLET    Take 1 tablet by mouth every 4 (four) hours as needed for Pain.    JARDIANCE 25 MG TABLET    TAKE 1 TABLET (25 MG TOTAL) BY MOUTH ONCE DAILY.    KETOCONAZOLE (NIZORAL) 2 % SHAMPOO    Wash all scaling areas let sit 3 minutes then rinse 3x/wk    LEVOCETIRIZINE (XYZAL) 5 MG TABLET    Take 1 tablet (5 mg total) by mouth every evening.    MAGNESIUM OXIDE (MAG-OX) 400 MG (241.3 MG MAGNESIUM) TABLET    Take 1 tablet (400 mg total) by mouth once daily.    MONTELUKAST (SINGULAIR) 10 MG TABLET    Take 1 tablet (10 mg total) by mouth every evening.    MULTIVITAMIN CAPSULE    Take 1 capsule by mouth once daily.    MUPIROCIN (BACTROBAN) 2 % OINTMENT    Apply topically 2 (two) times daily.    NITROGLYCERIN (NITROSTAT) 0.4 MG SL TABLET    DISSOLVE 1 TABLET UNDER THE TONGUE AS NEEDED FOR CHEST PAIN    PANTOPRAZOLE (PROTONIX) 40 MG TABLET    TAKE ONE TABLET BY MOUTH ONCE DAILY    PIOGLITAZONE (ACTOS) 30 MG TABLET    Take 1 tablet (30 mg total) by mouth once daily.    PREGABALIN (LYRICA) 50 MG CAPSULE    Take 1 capsule (50 mg total) by mouth 3 (three) times daily.    SERTRALINE (ZOLOFT) 50 MG TABLET    TAKE ONE TABLET BY MOUTH ONCE DAILY    SULFACETAMIDE SODIUM (OVACE PLUS SHAMPOO) 10 % SHAM    Wash scalp nightly    TOLTERODINE (DETROL LA) 4 MG 24 HR CAPSULE    TAKE ONE CAPSULE BY MOUTH ONCE DAILY    VITAMIN D 1000 UNITS TAB    Take 1,000 Units by mouth once daily.   Changed and/or Refilled Medications    Modified Medication Previous Medication    METOPROLOL TARTRATE (LOPRESSOR) 25 MG TABLET metoprolol tartrate (LOPRESSOR) 25 MG tablet       Take 0.5 tablets (12.5 mg total) by mouth 2 (two) times daily.    Take 1 tablet (25 mg total) by mouth 2 (two) times daily.   Discontinued Medications    AMLODIPINE (NORVASC) 5 MG TABLET    TAKE ONE TABLET BY MOUTH ONCE DAILY    DULAGLUTIDE (TRULICITY) 0.75 MG/0.5 ML PEN INJECTOR    Inject 0.75 mg into the skin every  7 days.    ISOSORBIDE DINITRATE (ISORDIL) 10 MG TABLET    Take 1 tablet (10 mg total) by mouth 2 (two) times daily.    PREGABALIN (LYRICA) 50 MG CAPSULE    1 po qhs x 1 wk then 1 po bid if no excess drowsiness           Assessment:       1. Diabetic polyneuropathy associated with diabetes mellitus due to underlying condition    2. Orthostatic hypotension    Cardiomyopathy ischemia  Successful status post stenting of the circumflex main  I substantially and  personally reviewed old and new ecg's, lab reports,, xray reports  and  other cardiovascular studies including  echo's, stress tests, angiogram reports, holters,and vascular studies .  In addition I evaluated original cardiac cath  _x __echo  ____cxr ______ct ____scan on [employer] or [employer] or other viewing platforms and  ____EKG's .   I reviewed  office and hospital notes Yes ___x _ of  referring providers and other providers.  I reviewed personally old hospital and office notes   Time spent evaluating and managing this patient:____35____min.         Plan:   Dc  isordil, midodrine 2.5 tid, metoprolol 12.5 bid         Problem List Items Addressed This Visit          Cardiac/Vascular    Orthostatic hypotension     Other Visit Diagnoses       Diabetic polyneuropathy associated with diabetes mellitus due to underlying condition    -  Primary             No follow-ups on file.

## 2023-06-27 ENCOUNTER — CLINICAL SUPPORT (OUTPATIENT)
Dept: REHABILITATION | Facility: HOSPITAL | Age: 78
End: 2023-06-27
Payer: MEDICARE

## 2023-06-27 DIAGNOSIS — R26.81 GAIT INSTABILITY: Primary | ICD-10-CM

## 2023-06-27 DIAGNOSIS — R29.898 LEG WEAKNESS, BILATERAL: ICD-10-CM

## 2023-06-27 DIAGNOSIS — R26.89 IMBALANCE: ICD-10-CM

## 2023-06-27 PROCEDURE — 97112 NEUROMUSCULAR REEDUCATION: CPT | Mod: KX,PN

## 2023-06-27 PROCEDURE — 97110 THERAPEUTIC EXERCISES: CPT | Mod: KX,PN

## 2023-06-27 NOTE — PROGRESS NOTES
[facility]   Physical Therapy Treatment Note      Name: Tono Saez  Clinic Number: [id number]    Therapy Diagnosis:   Encounter Diagnoses   Name Primary?    Gait instability Yes    Imbalance     Leg weakness, bilateral      Physician: Scott Staley MD    Visit Date: 6/27/2023    Physician Orders: PT Eval and Treat  Medical Diagnosis from Referral: gait instability  Evaluation Date: 6/14/2023  Authorization Period Expiration: 12/31/2023  Plan of Care Expiration: 07/29/2023  Visit # / Visits authorized: 3/ 20     Time In: 1119  Time Out: 1202  Total Billable Time: 43 minutes     Precautions: Diabetes and Fall      Subjective     Pt reports: that his blood pressure is improving - was prescribed a new medicine to help elevate it.    He was compliant with home exercise program.  Response to previous treatment: no changes  Functional change: none    Pain: 0/10  Location: bilateral low back        Objective      Blood pressure (sitting) = 144/78  Blood pressure (standing) = 110/65    Treatment     Steven received the treatments listed below:      therapeutic exercises to develop strength and endurance for 24 minutes including:     X 15 each seated bilateral lower extremity therapeutic exercise (2#) = marching, long arc quad, ball squeeze  X 5 sit to stands with 10 second holds while upright  X 10 minutes NuStep (level 3) as an adjunct to strength/mobility training  Functional ambulation 2 x 120 feet with stand by assist       neuromuscular re-education activities to improve: Balance for 19 minutes. The following activities were included:    X 20 each balance therapeutic exercise = mini squats, heel raises/toe raises   X 25 feet balance gait = side stepping, backwards gait   X 75 seconds stand on AirEx   X 30 seconds each full Romberg stance = eyes open/eyes closed        Patient Education and Home Exercises       Education provided:   - proper therapeutic exercise technique  - continue home  exercise program     Written Home Exercises Provided: Patient instructed to cont prior HEP.       Assessment     Patient was able to tolerate seated bilateral lower extremity therapeutic exercise with 2# weights; increased repetitions performed during sit to stands; bilateral upper extremity support needed during balance therapeutic exercise - intermittent use during balance gait; sit to stands performed with 10 seconds standing holds; increased repetitions performed during balance therapeutic exercise; improved distance tolerated during balance gait; increased stance time demonstrated on AirEx; static balance training progressed to full Romberg stance; no loss of balance demonstrated during Romberg training and while standing on AirEx; frequent gait instability during functional ambulation; no complaints of lightheadedness during treatment session.    Steven Is progressing fairly well towards his goals.   Pt prognosis is Fair.     Pt will continue to benefit from skilled outpatient physical therapy to address the deficits listed in the problem list box on initial evaluation, provide pt/family education and to maximize pt's level of independence in the home and community environment.     Pt's spiritual, cultural and educational needs considered and pt agreeable to plan of care and goals.     Anticipated barriers to physical therapy: vital signs, generalized weakness and imbalance    Goals:     New Short Term Goals (3 Weeks):    Maintain patient's complaints of low back pain at less than 5/10 during activities of daily living. (MET)  2.   Patient to tolerate x 45 seconds bilateral step stance, eyes open to improve upright tolerance. (MET)  3.   Patient to tolerate use of 3# weights during lower extremity therapeutic exercise to improve overall strength. (NOT MET)  4.   Patient to perform x 10 repetitions sit to stand so that he may rise without upper extremity support. (NOT MET)     New Long Term Goals (6 Weeks):    Patient to demonstrate competence with home exercise program to maintain therapeutic gains. (NOT MET)  2.   Patient to report improvement in his ability to walk around a room from limited a little to not limited at all. (NOT MET)  3.   Patient to report no new falls. (MET)  4.   Patient to ambulate 200 feet with rolling-walker and stand by assist to improve household mobility. (NOT MET)      Plan     Continue to progress strength/balance/mobility training to patient's tolerance. Monitor blood pressure.      Hubert Powers, PT

## 2023-06-28 ENCOUNTER — TELEPHONE (OUTPATIENT)
Dept: PAIN MEDICINE | Facility: CLINIC | Age: 78
End: 2023-06-28
Payer: MEDICARE

## 2023-06-28 NOTE — DISCHARGE INSTRUCTIONS
Nerve Block  This was a test, not a treatment. Your pain may return.  Keep your pain journal - The Dr needs to see if you have some pain relief - even if the pain returns. Dr office will call to check your pain levels within 3 days or message you on the [facility] portal.  Most patients have to have 2 tests before the Radiofrequency procedure.   Perform activities that normally cause you pain during this testing period    Home care instructions  You may apply ice pack to the injection site for 2 days at 20 minute intervals, apply for 20 minutes then remove for 20 minutes.   ,NO HEAT for the next  2 days to injection sites including no heating pads, hot packs, saunas, hot tubs or tub baths for the next 2 days.  You may take a shower but no soaking  tub or pool for 2 days  Resume Aspirin, Plavix, or Coumadin the day after the procedure unless otherwise instructed.  Do not drive for 24 hr      SEEK  IMMEDIATE MEDICAL HELP FOR:  Severe increase in your usual pain or appearance of new pain  Prolonged  ( more than 8 hours)or increasing weakness or numbness in the legs or arms  Drainage, redness, active bleeding, or increased swelling at the injection site  Temperature over 100.0 degrees F.  Headache that increases when your head is upright and decreases when you lie flat  Shortness of breath, chest pain, or problems breathing      After Surgery:  Always be aware that any surgery can cause these symptoms:    Pain- After this  test, we expect your pain to decrease but  then it may return as the local anesthetic wears off. Try to NOT take your pain medicine during this testing period. Ice and rest can help with pain relief.    Bleeding- a little bleeding after a surgery is usually within normal.  If there is a lot of blood you need to notify your MD.  Emergency treatments of bleeding are cold application, elevation of the bleeding site and compression.    Infection- Infection after surgery is NOT a normal occurrence.  Signs of  infection are fever, swelling, hot to touch the incision.  If this occurs notify your MD immediately.    Nausea- this can be common after a surgery especially if you have had anesthesia medicine or are taking pain medicine.  Staying on clear liquids, bland foods, gingerale, or over the counter anti nausea medicines can help.  If you vomit more than once, notify your MD.  Anti Nausea medicines can be prescribed.

## 2023-06-28 NOTE — TELEPHONE ENCOUNTER
----- Message from Kristal Jarquin sent at 6/28/2023  3:22 PM CDT -----  Contact: patient  'Type:  Needs Medical Advice    Who Called: patient     Would the patient rather a call back or a response via [facility]? Call     Best Call Back Number: ,[phone] (home) [phone] (work)     Additional Information: Patient would like to speak with the nurse in regards to appointment time tomorrow.     Please call to advise

## 2023-06-29 ENCOUNTER — HOSPITAL ENCOUNTER (OUTPATIENT)
Facility: HOSPITAL | Age: 78
Discharge: HOME | End: 2023-06-29
Attending: ANESTHESIOLOGY | Admitting: ANESTHESIOLOGY
Payer: MEDICARE

## 2023-06-29 DIAGNOSIS — M47.896 OTHER SPONDYLOSIS, LUMBAR REGION: ICD-10-CM

## 2023-06-29 LAB — POCT GLUCOSE: 112 MG/DL (ref 70–110)

## 2023-06-29 PROCEDURE — 99900103 DSU ONLY-NO CHARGE-INITIAL HR (STAT): Performed by: ANESTHESIOLOGY

## 2023-06-29 PROCEDURE — 64494 PR INJ DX/THER AGNT PARAVERT FACET JOINT,IMG GUIDE,LUMBAR/SAC, 2ND LEVEL: ICD-10-PCS | Mod: 50,,, | Performed by: ANESTHESIOLOGY

## 2023-06-29 PROCEDURE — 64493 PR INJ DX/THER AGNT PARAVERT FACET JOINT,IMG GUIDE,LUMBAR/SAC,1ST LVL: ICD-10-PCS | Mod: 50,,, | Performed by: ANESTHESIOLOGY

## 2023-06-29 PROCEDURE — 63600175 PHARM REV CODE 636 W HCPCS: Performed by: ANESTHESIOLOGY

## 2023-06-29 PROCEDURE — 36000704 HC OR TIME LEV I 1ST 15 MIN: Performed by: ANESTHESIOLOGY

## 2023-06-29 PROCEDURE — 25000003 PHARM REV CODE 250: Performed by: ANESTHESIOLOGY

## 2023-06-29 PROCEDURE — 64493 INJ PARAVERT F JNT L/S 1 LEV: CPT | Mod: 50,,, | Performed by: ANESTHESIOLOGY

## 2023-06-29 PROCEDURE — 64494 INJ PARAVERT F JNT L/S 2 LEV: CPT | Mod: 50,,, | Performed by: ANESTHESIOLOGY

## 2023-06-29 RX ORDER — SODIUM CHLORIDE 9 MG/ML
500 INJECTION, SOLUTION INTRAVENOUS CONTINUOUS
Status: DISCONTINUED | OUTPATIENT
Start: 2023-06-29 | End: 2023-06-29 | Stop reason: HOSPADM

## 2023-06-29 RX ORDER — MIDAZOLAM HYDROCHLORIDE 1 MG/ML
INJECTION, SOLUTION INTRAMUSCULAR; INTRAVENOUS
Status: DISCONTINUED | OUTPATIENT
Start: 2023-06-29 | End: 2023-06-29 | Stop reason: HOSPADM

## 2023-06-29 RX ORDER — BUPIVACAINE HYDROCHLORIDE 5 MG/ML
INJECTION, SOLUTION EPIDURAL; INTRACAUDAL
Status: DISCONTINUED | OUTPATIENT
Start: 2023-06-29 | End: 2023-06-29 | Stop reason: HOSPADM

## 2023-06-29 RX ORDER — SODIUM CHLORIDE, SODIUM LACTATE, POTASSIUM CHLORIDE, CALCIUM CHLORIDE 600; 310; 30; 20 MG/100ML; MG/100ML; MG/100ML; MG/100ML
INJECTION, SOLUTION INTRAVENOUS CONTINUOUS
Status: DISCONTINUED | OUTPATIENT
Start: 2023-06-29 | End: 2023-06-29 | Stop reason: HOSPADM

## 2023-06-29 RX ORDER — LIDOCAINE HYDROCHLORIDE 10 MG/ML
1 INJECTION, SOLUTION EPIDURAL; INFILTRATION; INTRACAUDAL; PERINEURAL ONCE
Status: DISCONTINUED | OUTPATIENT
Start: 2023-06-29 | End: 2023-06-29 | Stop reason: HOSPADM

## 2023-06-29 NOTE — DISCHARGE SUMMARY
[facility]  Discharge Note  Short Stay    Procedure(s) (LRB):  Block-nerve-medial branch-lumbar (Bilateral)      OUTCOME: Condition has improved and patient is now ready for discharge.    DISPOSITION: Home or Self Care    FINAL DIAGNOSIS:  <principal problem not specified>    FOLLOWUP: In clinic    DISCHARGE INSTRUCTIONS:    Discharge Procedure Orders   Notify your health care provider if you experience any of the following:  temperature >100.4     Notify your health care provider if you experience any of the following:  severe uncontrolled pain     Notify your health care provider if you experience any of the following:  redness, tenderness, or signs of infection (pain, swelling, redness, odor or green/yellow discharge around incision site)     Activity as tolerated        TIME SPENT ON DISCHARGE:     30 minutes

## 2023-06-29 NOTE — OP NOTE
PROCEDURE DATE: 6/29/2023    PROCEDURE:  Bilateral L3,4,5 medial branch nerve blocks under fluoroscopy (corresponds to bilateral L4/5 and L5/S1 facets)    DIAGNOSIS:  Other lumbar spondylosis    Post Op diagnosis: Same    PHYSICIAN: Maurice Montenegro MD    MEDICATIONS INJECTED: 0.5% bupivicaine, 0.5 ml at each level    SEDATION MEDICATIONS:RN IV Versed    LOCAL ANESTHETIC USED: None    ESTIMATED BLOOD LOSS:  NOne    COMPLICATIONS:  None    TECHNIQUE: A time out was taken to identify the patient, procedure and side of the procedure. The patient was placed in a prone position, then prepped and draped in the usual sterile fashion using ChloraPrep and sterile towels.  The levels were determined under fluoroscopic guidance and then marked.  A 25-gauge 3.5 inch needle was introduced to the anatomic location of the L3,4,5 medial branch nerves on the bilateral side. The above medication was then injected. The patient tolerated the procedure well.     The patient was monitored after the procedure. Patient was given pain diary to record pain levels at home.     If found to have greater than a 50% recovery and so will be scheduled for a radiofrequency ablation of the corresponding nerves.  Patient was given post procedure and discharge instructions to follow at home.  The patient was discharged in a stable condition.

## 2023-06-30 ENCOUNTER — TELEPHONE (OUTPATIENT)
Dept: PAIN MEDICINE | Facility: CLINIC | Age: 78
End: 2023-06-30
Payer: MEDICARE

## 2023-07-03 ENCOUNTER — TELEPHONE (OUTPATIENT)
Dept: PAIN MEDICINE | Facility: CLINIC | Age: 78
End: 2023-07-03
Payer: MEDICARE

## 2023-07-03 VITALS
HEART RATE: 71 BPM | RESPIRATION RATE: 14 BRPM | DIASTOLIC BLOOD PRESSURE: 72 MMHG | HEIGHT: 72 IN | BODY MASS INDEX: 24.79 KG/M2 | TEMPERATURE: 98 F | WEIGHT: 183 LBS | OXYGEN SATURATION: 97 % | SYSTOLIC BLOOD PRESSURE: 126 MMHG

## 2023-07-03 DIAGNOSIS — M47.816 LUMBAR SPONDYLOSIS: Primary | ICD-10-CM

## 2023-07-03 NOTE — TELEPHONE ENCOUNTER
Returned call to patient who reports:    1. What percentage of pain relief did you receive following the block, from 0-100%?   100% relief    What was pain score from 0-10? 6 to 0       2. How many hours did pain relief last following the block?  Relief lasted about 5 hours     3. During this time please describe in detail the activities you were able to do? Able all normal activities with no pain ie putting dishes away, going to mail box etc.    Patient given date 7/25/2023 for MBB #2 and advised the the [facility] nurses would contact him in the days prior with arrival time. Verbalizes understsanding.

## 2023-07-03 NOTE — TELEPHONE ENCOUNTER
----- Message from Buster Cabrallo sent at 7/3/2023  1:19 PM CDT -----  Type: Needs Medical Advice  Who Called:  Pt  Best Call Back Number: [phone]  Additional Information: Pt would like a call so he can give a status update about his injection he got on 6/29, pl call bk to advise thanks

## 2023-07-05 ENCOUNTER — CLINICAL SUPPORT (OUTPATIENT)
Dept: REHABILITATION | Facility: HOSPITAL | Age: 78
End: 2023-07-05
Payer: MEDICARE

## 2023-07-05 DIAGNOSIS — R26.89 IMBALANCE: ICD-10-CM

## 2023-07-05 DIAGNOSIS — R26.81 GAIT INSTABILITY: Primary | ICD-10-CM

## 2023-07-05 DIAGNOSIS — R29.898 LEG WEAKNESS, BILATERAL: ICD-10-CM

## 2023-07-05 PROCEDURE — 97110 THERAPEUTIC EXERCISES: CPT | Mod: KX,PN

## 2023-07-05 NOTE — PROGRESS NOTES
[facility]   Physical Therapy Treatment Note      Name: Tono Saez  Clinic Number: [id number]    Therapy Diagnosis:   Encounter Diagnoses   Name Primary?    Gait instability Yes    Imbalance     Leg weakness, bilateral      Physician: Scott Staley MD    Visit Date: 7/5/2023    Physician Orders: PT Eval and Treat  Medical Diagnosis from Referral: gait instability  Evaluation Date: 6/14/2023  Authorization Period Expiration: 12/31/2023  Plan of Care Expiration: 07/29/2023  Visit # / Visits authorized: 4/ 20     Time In: 1353  Time Out: 1436  Total Billable Time: 43 minutes     Precautions: Diabetes and Fall      Subjective     Pt reports: that his low back doesn't bother him at rest - only during transitional movement.    He was compliant with home exercise program.  Response to previous treatment: no changes  Functional change: none    Pain: 0/10  Location: bilateral low back        Objective      n/a    Treatment     Steven received the treatments listed below:      therapeutic exercises to develop strength and endurance for 43 minutes including:    X 10 minutes SciFit (level 2.5) to promote flexibility prior to strength/mobility training   X 20 each seated bilateral lower extremity therapeutic exercise (2#) = marching, long arc quad, ball squeeze  X 6 sit to stands with 10 second holds while upright  X 15 each supine bilateral lower extremity therapeutic exercise = heel slides, hip abduction/adduction, short arc quads (2#), marching       Patient Education and Home Exercises       Education provided:   - proper therapeutic exercise technique  - continue home exercise program     Written Home Exercises Provided: Patient instructed to cont prior HEP.       Assessment     Patient was able to tolerate increased repetitions during seated bilateral lower extremity therapeutic exercise; increased resistance tolerated on SciFit; increased repetitions performed during sit to stands; supine  lower extremity therapeutic exercise performed with minimal difficulty; moderate assist needed during supine to sit due to elevated low back pain.    Steven Is progressing fairly well towards his goals.   Pt prognosis is Fair.     Pt will continue to benefit from skilled outpatient physical therapy to address the deficits listed in the problem list box on initial evaluation, provide pt/family education and to maximize pt's level of independence in the home and community environment.     Pt's spiritual, cultural and educational needs considered and pt agreeable to plan of care and goals.     Anticipated barriers to physical therapy: vital signs, generalized weakness and imbalance    Goals:     New Short Term Goals (3 Weeks):    Maintain patient's complaints of low back pain at less than 5/10 during activities of daily living. (MET)  2.   Patient to tolerate x 45 seconds bilateral step stance, eyes open to improve upright tolerance. (MET)  3.   Patient to tolerate use of 3# weights during lower extremity therapeutic exercise to improve overall strength. (NOT MET)  4.   Patient to perform x 10 repetitions sit to stand so that he may rise without upper extremity support. (NOT MET)     New Long Term Goals (6 Weeks):   Patient to demonstrate competence with home exercise program to maintain therapeutic gains. (NOT MET)  2.   Patient to report improvement in his ability to walk around a room from limited a little to not limited at all. (NOT MET)  3.   Patient to report no new falls. (MET)  4.   Patient to ambulate 200 feet with rolling-walker and stand by assist to improve household mobility. (NOT MET)      Plan     Continue to progress strength/balance/mobility training to patient's tolerance.       Hubert Powers, PT

## 2023-07-07 ENCOUNTER — CLINICAL SUPPORT (OUTPATIENT)
Dept: REHABILITATION | Facility: HOSPITAL | Age: 78
End: 2023-07-07
Payer: MEDICARE

## 2023-07-07 DIAGNOSIS — R26.89 IMBALANCE: ICD-10-CM

## 2023-07-07 DIAGNOSIS — R26.81 GAIT INSTABILITY: Primary | ICD-10-CM

## 2023-07-07 DIAGNOSIS — R29.898 LEG WEAKNESS, BILATERAL: ICD-10-CM

## 2023-07-07 PROCEDURE — 97110 THERAPEUTIC EXERCISES: CPT | Mod: KX,PN

## 2023-07-07 PROCEDURE — 97112 NEUROMUSCULAR REEDUCATION: CPT | Mod: KX,PN

## 2023-07-07 NOTE — PROGRESS NOTES
"[facility]   Physical Therapy Progress Note      Name: Tono Saez  Clinic Number: [id number]    Therapy Diagnosis:   Encounter Diagnoses   Name Primary?    Gait instability Yes    Imbalance     Leg weakness, bilateral      Physician: Scott Staley MD    Visit Date: 7/7/2023    Physician Orders: PT Eval and Treat  Medical Diagnosis from Referral: gait instability  Evaluation Date: 6/14/2023  Authorization Period Expiration: 12/31/2023  Plan of Care Expiration: 07/29/2023  Visit # / Visits authorized: 5/ 20     Time In: 1303  Time Out: 1345  Total Billable Time: 42 minutes     Precautions: Diabetes and Fall      Subjective     Pt reports: that his legs are bothering him today - describes the sensation as "pins and needles".    He was compliant with home exercise program.  Response to previous treatment: increased leg pain  Functional change: poor mobility     Pain: 8/10  Location: bilateral lower extremities      Objective      TINETTI BALANCE ASSESSMENT TOOL     Collinetti ME, Kenneth TF, Maytitoki R, Fall Risk Index for elderly patients based on number of chronic disabilities.Am J Med [id number]        BALANCE SECTION  Patient is seated in hard, armless chair;     1.Sitting Balance:   Steady; safe = 1  2.Rises from chair :  Able, uses arms to help = 1  3.Attempts to arise :  Able to arise, 1 attempt = 2  4.Immediate standing Balance (first 5 seconds) : Steady without walker or other support = 1  5.Standing balance: Narrow stance without support = 1  6.Nudged : Staggers, grabs, catches self = 1  7.Eyes closed: Unsteady = 0  8.Turning 360 degrees:  Continuous = 1 and Unsteady (grabs, staggers) = 0  9.Sitting Down : Uses arms or not a smooth motion = 1     Balance Score:  9/16     GAIT SECTION  Patient stands with therapist, walks across room (+/- aids), first at usual pace, then at rapid pace.     10.Initiation of Gait (Immediately after told to go.): No hesitancy = 1  11.Step " length and height :  Step through R=1 and Step through L=1  12.Foot Clearance :  R foot clears floor=1 and L foot clears floor=1  13.Step symmetry: Right & Left step length appear equal = 0  14.Step continuity : Steps appear continuous = 0  15.Path: Mild/moderate deviation or uses walking aid = 1  16.Trunk : No sway, but flexion of knees or back or spreads arm out while walking = 1  17.Walking Time: Heels amost touching while walking = 1     Gait Score: 8/12  Balance score:9/16  Total Score=Balance + Gait Score: 17/28     Risk Indicators:     Tinetti Tool Score                 Risk of Falls              ?18                              High              19-23                           Moderate              ?24                              Low        Treatment     Steven received the treatments listed below:      therapeutic exercises to develop strength and endurance for 32 minutes including:    X 10 minutes SciFit (level 2) to promote flexibility prior to strength/mobility training   X 15 each seated bilateral lower extremity therapeutic exercise (2#) = marching, long arc quad, ball squeeze  X 15 each supine bilateral lower extremity therapeutic exercise = heel slides, hip abduction/adduction, short arc quads (2#), bridges      neuromuscular re-education activities to improve: Balance for 10 minutes. The following activities were included:     X 15 static standing holds   Functional ambulation 120 feet with stand by assist       Patient Education and Home Exercises       Education provided:   - proper therapeutic exercise technique  - continue home exercise program     Written Home Exercises Provided: Patient instructed to cont prior HEP.       Assessment     Decreased resistance performed on SciFit due to elevated pain levels; kept therapeutic exercise repetitions and resistance the same as a consequence; noted gait abnormalities include inconsistent step length, scissor stepping, and discontinuity between steps;  increased fall risk demonstrated as evidenced by Tinetti score or 17/26 (high fall risk if less than 18/26).    Steven Is progressing fairly well towards his goals.   Pt prognosis is Fair.     Pt will continue to benefit from skilled outpatient physical therapy to address the deficits listed in the problem list box on initial evaluation, provide pt/family education and to maximize pt's level of independence in the home and community environment.     Pt's spiritual, cultural and educational needs considered and pt agreeable to plan of care and goals.     Anticipated barriers to physical therapy: vital signs, generalized weakness and imbalance    Goals:     New Short Term Goals (3 Weeks):    Maintain patient's complaints of low back pain at less than 5/10 during activities of daily living. (MET)  2.   Patient to tolerate x 45 seconds bilateral step stance, eyes open to improve upright tolerance. (MET)  3.   Patient to tolerate use of 3# weights during lower extremity therapeutic exercise to improve overall strength. (NOT MET)  4.   Patient to perform x 10 repetitions sit to stand so that he may rise without upper extremity support. (NOT MET)     New Long Term Goals (6 Weeks):   Patient to demonstrate competence with home exercise program to maintain therapeutic gains. (NOT MET)  2.   Patient to report improvement in his ability to walk around a room from limited a little to not limited at all. (NOT MET)  3.   Patient to report no new falls. (MET)  4.   Patient to ambulate 200 feet with rolling-walker and stand by assist to improve household mobility. (NOT MET)      Plan     Continue to progress strength/balance/mobility training to patient's tolerance.       Hubert Powers, PT

## 2023-07-11 ENCOUNTER — DOCUMENTATION ONLY (OUTPATIENT)
Dept: REHABILITATION | Facility: HOSPITAL | Age: 78
End: 2023-07-11
Payer: MEDICARE

## 2023-07-11 ENCOUNTER — CLINICAL SUPPORT (OUTPATIENT)
Dept: REHABILITATION | Facility: HOSPITAL | Age: 78
End: 2023-07-11
Payer: MEDICARE

## 2023-07-11 DIAGNOSIS — R26.81 GAIT INSTABILITY: ICD-10-CM

## 2023-07-11 DIAGNOSIS — R29.898 LEG WEAKNESS, BILATERAL: ICD-10-CM

## 2023-07-11 DIAGNOSIS — R26.89 IMBALANCE: Primary | ICD-10-CM

## 2023-07-11 PROCEDURE — 97112 NEUROMUSCULAR REEDUCATION: CPT | Mod: PN,CQ

## 2023-07-11 PROCEDURE — 97110 THERAPEUTIC EXERCISES: CPT | Mod: PN,CQ

## 2023-07-11 PROCEDURE — 97530 THERAPEUTIC ACTIVITIES: CPT | Mod: PN,CQ

## 2023-07-11 NOTE — PROGRESS NOTES
"[facility]   Physical Therapy Treatment Note      Name: Tono Saez  Clinic Number: [id number]    Therapy Diagnosis:   Encounter Diagnoses   Name Primary?    Imbalance Yes    Gait instability     Leg weakness, bilateral      Physician: Scott Staley MD    Visit Date: 7/11/2023    Physician Orders: PT Eval and Treat  Medical Diagnosis from Referral: gait instability  Evaluation Date: 6/14/2023  Authorization Period Expiration: 05/16/2024  Plan of Care Expiration: 07/29/2023  Visit # / Visits authorized: 6/20 (7)    PTA Visit #: 1/5     Time In: 1435  Time Out: 1515  Total Billable Time: 40 minutes    Precautions: Diabetes and Fall     Subjective     Pt reports: Blood Pressure was 138/62 and 90/60 with standing, continued Blood Pressure difficulties "all over the place".  He reports was  home exercise program.  Response to previous treatment: no problems stated  Functional change: ongoing    Pain: 0/10  Location:  Not Applicable     Objective      Objective Measures updated at progress report unless specified.     To clinic with rolling walker     Blood Pressure in stand after session: 101/57 millimeters of mercury     Treatment     Steven received the treatments listed below:      therapeutic exercises to develop strength, endurance, range of motion, flexibility and core stabilization for 20 minutes including:    SciFit Level 1 10 minutes with Bilateral Upper Extremities (reports feels like the resistance is stronger than last time - Level 2 last visit)    Seated:  Long arc quads 3 sets of 10 2# Right Left   March in sit 2 sets of 10  2# Right Left     neuromuscular re-education activities to improve: Balance, Coordination, and Proprioception for 10 minutes. The following activities were included:    Parallel bars:  Weight shift on foam cushion 2 minutes: anterior/posterior, lateral Right Left   Tandem weight shift on foam cushion 1 minute x 3 Right Left       therapeutic activities to " improve functional performance for 10 minutes, including:    Ambulation with rolling walker 2x80 feet    Stand to sit with verbal cues for safety (turn to back into chair until chair is felt on back of legs)  Sit to stand with verbal cues for Bilateral hand placement on seat and not walker      Patient Education and Home Exercises       Education provided:   - Patient provided with verbal and demonstrative instruction for all activities performed in today's session.    Written Home Exercises Provided: Patient instructed to cont prior HEP.  See Electronic Medical Record  under Patient Instructions for exercises provided during therapy sessions    Assessment     Steven provided good participation and effort during today's session with treatment focused on lower extremity strengthening/endurance, balance activities and functional mobility.  Steven tolerated activities with reports of increased fatigue , Blood Pressure was taken at end of session and was on the low side and patient stated feeling like his face was drained although had good color to face.    Steven Is progressing towards his goals.   Pt prognosis is Fair.     Pt will continue to benefit from skilled outpatient physical therapy to address the deficits listed in the problem list box on initial evaluation, provide pt/family education and to maximize pt's level of independence in the home and community environment.     Pt's spiritual, cultural and educational needs considered and pt agreeable to plan of care and goals.     Anticipated barriers to physical therapy:  severity of imbalance     Goals:     New Short Term Goals (3 Weeks):    Maintain patient's complaints of low back pain at less than 5/10 during activities of daily living. (MET)  2.   Patient to tolerate x 45 seconds bilateral step stance, eyes open to improve upright tolerance. (MET)  3.   Patient to tolerate use of 3# weights during lower extremity therapeutic exercise to improve overall strength.  (progressing)  4.   Patient to perform x 10 repetitions sit to stand so that he may rise without upper extremity support. (NOT MET)     New Long Term Goals (6 Weeks):   Patient to demonstrate competence with home exercise program to maintain therapeutic gains. (NOT MET)  2.   Patient to report improvement in his ability to walk around a room from limited a little to not limited at all. (NOT MET)  3.   Patient to report no new falls. (MET)  4.   Patient to ambulate 200 feet with rolling-walker and stand by assist to improve household mobility. (NOT MET)    Plan     Plan of care Certification: 6/14/2023 to 07/29/2023.     Outpatient Physical Therapy 2 times weekly for 6 weeks to include the following interventions: Gait Training, Manual Therapy, Moist Heat/ Ice, Neuromuscular Re-ed, Patient Education, Self Care, Therapeutic Activities, Therapeutic Exercise, and home exercise program .        Harriet Chavez, PTA

## 2023-07-11 NOTE — PROGRESS NOTES
PT/PTA met face to face to discuss pt's treatment plan and progress towards established goals. Pt will be seen by a physical therapist minimally every 6th visit or every 30 days.    Harriet Chavez PTA

## 2023-07-13 ENCOUNTER — CLINICAL SUPPORT (OUTPATIENT)
Dept: REHABILITATION | Facility: HOSPITAL | Age: 78
End: 2023-07-13
Payer: MEDICARE

## 2023-07-13 DIAGNOSIS — R26.89 IMBALANCE: Primary | ICD-10-CM

## 2023-07-13 DIAGNOSIS — R29.898 LEG WEAKNESS, BILATERAL: ICD-10-CM

## 2023-07-13 DIAGNOSIS — R26.81 GAIT INSTABILITY: ICD-10-CM

## 2023-07-13 PROCEDURE — 97112 NEUROMUSCULAR REEDUCATION: CPT | Mod: PN,CQ

## 2023-07-13 PROCEDURE — 97110 THERAPEUTIC EXERCISES: CPT | Mod: PN,CQ

## 2023-07-13 PROCEDURE — 97530 THERAPEUTIC ACTIVITIES: CPT | Mod: PN,CQ

## 2023-07-13 NOTE — PROGRESS NOTES
"[facility]   Physical Therapy Treatment Note      Name: Tono Saez  Clinic Number: [id number]    Therapy Diagnosis:   Encounter Diagnoses   Name Primary?    Imbalance Yes    Gait instability     Leg weakness, bilateral      Physician: Scott Staley MD    Visit Date: 2023    Physician Orders: PT Eval and Treat  Medical Diagnosis from Referral: gait instability  Evaluation Date: 2023  Authorization Period Expiration: 2024  Plan of Care Expiration: 2023  Visit # / Visits authorized:  (8)    PTA Visit #: 2/5     Time In: 1300  Time Out: 1345  Total Billable Time: 45 minutes    Precautions: Diabetes and Fall     Subjective     Pt reports: Feeling bad 2nd to low blood pressure, "no problems when I sit".  He reports was compliant home exercise program.  Response to previous treatment: no problems stated  Functional change: ongoing    Pain: 0/10  Location:  Not Applicable     Objective      Objective Measures updated at progress report unless specified.     To clinic with rolling walker     Blood Pressure seated: 134/76 millimeters of mercury   Blood Pressure standin/60 millimeters of mercury     Treatment     Steven received the treatments listed below:      therapeutic exercises to develop strength, endurance, range of motion, flexibility and core stabilization for 20 minutes including:    SciFit Level 1 10 minutes with Bilateral Upper Extremities (reports feels like the resistance is stronger than last time - Level 2 last visit)    Seated:  Long arc quads 3 sets of 10 2# Right Left   March in sit 2 sets of 10  2# Right Left     neuromuscular re-education activities to improve: Balance, Coordination, and Proprioception for 10 minutes. The following activities were included:    Parallel bars:  Weight shift on foam cushion 2 minutes: anterior/posterior, lateral Right Left   Tandem weight shift on foam cushion 1 minute x 3 Right Left     therapeutic activities to " improve functional performance for 45 minutes, including:    Ambulation with rolling walker 2x80 feet    Sit <> Stand 5 times (x3) with hands on knees and verbal cues to increase weight shift to Left   Sit <> Stand 10 times with hands on knees    Stand to sit standby assistance   Sit to stand with verbal cues for Bilateral hand placement on seat and not walker    (Activity time includes skilled set-up and positioning as well as therapeutic rest)    Patient Education and Home Exercises       Education provided:   - Patient provided with verbal and demonstrative instruction for all activities performed in today's session.    Written Home Exercises Provided: Patient instructed to cont prior HEP.  See Electronic Medical Record  under Patient Instructions for exercises provided during therapy sessions    Assessment     Steven provided good participation and effort during today's session with treatment focused on lower extremity strengthening while monitoring for ill effects of decreased Blood Pressure. Steven tolerated standing activities without complaints of dizziness or feeling like his Blood Pressure was dropping. Did well with standing activities with moderate upper extremity assistance and verbal cues to increase head up.    Steven Is progressing towards his goals.   Pt prognosis is Fair.     Pt will continue to benefit from skilled outpatient physical therapy to address the deficits listed in the problem list box on initial evaluation, provide pt/family education and to maximize pt's level of independence in the home and community environment.     Pt's spiritual, cultural and educational needs considered and pt agreeable to plan of care and goals.     Anticipated barriers to physical therapy:  severity of imbalance     Goals:     New Short Term Goals (3 Weeks):    Maintain patient's complaints of low back pain at less than 5/10 during activities of daily living. (MET)  2.   Patient to tolerate x 45 seconds bilateral  step stance, eyes open to improve upright tolerance. (MET)  3.   Patient to tolerate use of 3# weights during lower extremity therapeutic exercise to improve overall strength. (progressing)  4.   Patient to perform x 10 repetitions sit to stand so that he may rise without upper extremity support. (progressing)     New Long Term Goals (6 Weeks):   Patient to demonstrate competence with home exercise program to maintain therapeutic gains. (progressing)  2.   Patient to report improvement in his ability to walk around a room from limited a little to not limited at all. (progressing)  3.   Patient to report no new falls. (MET)  4.   Patient to ambulate 200 feet with rolling-walker and stand by assist to improve household mobility. (progressing)    Plan     Plan of care Certification: 6/14/2023 to 07/29/2023.     Outpatient Physical Therapy 2 times weekly for 6 weeks to include the following interventions: Gait Training, Manual Therapy, Moist Heat/ Ice, Neuromuscular Re-ed, Patient Education, Self Care, Therapeutic Activities, Therapeutic Exercise, and home exercise program .        Harriet Chavez, PTA

## 2023-07-18 ENCOUNTER — CLINICAL SUPPORT (OUTPATIENT)
Dept: REHABILITATION | Facility: HOSPITAL | Age: 78
End: 2023-07-18
Payer: MEDICARE

## 2023-07-18 DIAGNOSIS — R26.81 GAIT INSTABILITY: ICD-10-CM

## 2023-07-18 DIAGNOSIS — R29.898 LEG WEAKNESS, BILATERAL: ICD-10-CM

## 2023-07-18 DIAGNOSIS — R26.89 IMBALANCE: Primary | ICD-10-CM

## 2023-07-18 PROCEDURE — 97530 THERAPEUTIC ACTIVITIES: CPT | Mod: PN,CQ

## 2023-07-18 PROCEDURE — 97110 THERAPEUTIC EXERCISES: CPT | Mod: PN,CQ

## 2023-07-18 NOTE — PROGRESS NOTES
"[facility]   Physical Therapy Treatment Note      Name: Tono Saez  Clinic Number: [id number]    Therapy Diagnosis:   Encounter Diagnoses   Name Primary?    Imbalance Yes    Gait instability     Leg weakness, bilateral      Physician: Scott Staley MD    Visit Date: 7/18/2023    Physician Orders: PT Eval and Treat  Medical Diagnosis from Referral: gait instability  Evaluation Date: 6/14/2023  Authorization Period Expiration: 05/16/2024  Plan of Care Expiration: 07/29/2023  Visit # / Visits authorized: 7/20 (8)    PTA Visit #: 1/5     Time In: 1300  Time Out: 1340  Total Billable Time: 40 minutes    Precautions: Diabetes and Fall     Subjective     Pt reports: Had the flu over the weekend and feeling tired from it, reports had no symptoms yesterday by still recovering and slept till noon today. "I feel 100% better" and Blood Pressure "staying steady".  He reports was compliant home exercise program.  Response to previous treatment: no problems stated  Functional change: ongoing    Pain: 0/10  Location:  Not Applicable     Objective      Objective Measures updated at progress report unless specified.     To clinic with rolling walker     Blood Pressure seated: 142/79 millimeters of mercury     Treatment     Steven received the treatments listed below:      therapeutic exercises to develop strength, endurance, range of motion, flexibility and core stabilization for 25 minutes including:    Seated:  Long arc quads 3 sets of 10 2# Right Left   March in sit 2 sets of 10  2# Right Left   Long arc quads with ball squeeze 2 sets of 10 Right Left       therapeutic activities to improve functional performance for 15 minutes, including:    Ambulation with rolling walker 2x80 feet    Sit <> Stand 5 times  with hands on seat (1 cushion in chair with arms)  Sit <> Stand x3 (x3 )with hands on knees (1 cushion in chair with arms)      (Activity time includes skilled set-up and positioning as well as " therapeutic rest)    Patient Education and Home Exercises       Education provided:   - Patient provided with verbal and demonstrative instruction for all activities performed in today's session.    Written Home Exercises Provided: Patient instructed to cont prior HEP.  See Electronic Medical Record  under Patient Instructions for exercises provided during therapy sessions    Assessment     Steven provided good participation and effort during today's session with treatment focused on lower extremity strengthening and functional mobility.  Steven tolerated activities with seated rest breaks with reports of fatigue but no complaints of dizziness.    Steven Is progressing towards his goals.   Pt prognosis is Fair.     Pt will continue to benefit from skilled outpatient physical therapy to address the deficits listed in the problem list box on initial evaluation, provide pt/family education and to maximize pt's level of independence in the home and community environment.     Pt's spiritual, cultural and educational needs considered and pt agreeable to plan of care and goals.     Anticipated barriers to physical therapy:  severity of imbalance     Goals:     New Short Term Goals (3 Weeks):    Maintain patient's complaints of low back pain at less than 5/10 during activities of daily living. (MET)  2.   Patient to tolerate x 45 seconds bilateral step stance, eyes open to improve upright tolerance. (MET)  3.   Patient to tolerate use of 3# weights during lower extremity therapeutic exercise to improve overall strength. (progressing)  4.   Patient to perform x 10 repetitions sit to stand so that he may rise without upper extremity support. (progressing)     New Long Term Goals (6 Weeks):   Patient to demonstrate competence with home exercise program to maintain therapeutic gains. (progressing)  2.   Patient to report improvement in his ability to walk around a room from limited a little to not limited at all. (progressing)  3.    Patient to report no new falls. (MET)  4.   Patient to ambulate 200 feet with rolling-walker and stand by assist to improve household mobility. (progressing)    Plan     Plan of care Certification: 6/14/2023 to 07/29/2023.     Outpatient Physical Therapy 2 times weekly for 6 weeks to include the following interventions: Gait Training, Manual Therapy, Moist Heat/ Ice, Neuromuscular Re-ed, Patient Education, Self Care, Therapeutic Activities, Therapeutic Exercise, and home exercise program .        Harriet Chavez, PTA

## 2023-07-20 ENCOUNTER — CLINICAL SUPPORT (OUTPATIENT)
Dept: REHABILITATION | Facility: HOSPITAL | Age: 78
End: 2023-07-20
Payer: MEDICARE

## 2023-07-20 ENCOUNTER — LAB VISIT (OUTPATIENT)
Dept: LAB | Facility: HOSPITAL | Age: 78
End: 2023-07-20
Attending: SPECIALIST
Payer: MEDICARE

## 2023-07-20 DIAGNOSIS — R26.81 GAIT INSTABILITY: Primary | ICD-10-CM

## 2023-07-20 DIAGNOSIS — R26.89 IMBALANCE: ICD-10-CM

## 2023-07-20 DIAGNOSIS — R29.898 LEG WEAKNESS, BILATERAL: ICD-10-CM

## 2023-07-20 DIAGNOSIS — Z12.5 SPECIAL SCREENING FOR MALIGNANT NEOPLASM OF PROSTATE: Primary | ICD-10-CM

## 2023-07-20 LAB — COMPLEXED PSA SERPL-MCNC: 1.5 NG/ML (ref 0–4)

## 2023-07-20 PROCEDURE — 84153 ASSAY OF PSA TOTAL: CPT | Performed by: SPECIALIST

## 2023-07-20 PROCEDURE — 97110 THERAPEUTIC EXERCISES: CPT | Mod: PN

## 2023-07-20 PROCEDURE — 97112 NEUROMUSCULAR REEDUCATION: CPT | Mod: PN

## 2023-07-20 PROCEDURE — 36415 COLL VENOUS BLD VENIPUNCTURE: CPT | Performed by: SPECIALIST

## 2023-07-20 NOTE — PROGRESS NOTES
[facility]   Physical Therapy Treatment Note      Name: Tono Saez  Clinic Number: [id number]    Therapy Diagnosis:   Encounter Diagnoses   Name Primary?    Gait instability Yes    Imbalance     Leg weakness, bilateral      Physician: Scott Staley MD    Visit Date: 7/20/2023    Physician Orders: PT Eval and Treat  Medical Diagnosis from Referral: gait instability  Evaluation Date: 6/14/2023  Authorization Period Expiration: 12/31/2023  Plan of Care Expiration: 07/29/2023  Visit # / Visits authorized: 9/ 20     Time In: 1302  Time Out: 1344  Total Billable Time: 42 minutes     Precautions: Diabetes and Fall      Subjective     Pt reports: that his legs were swollen yesterday - have since regressed to baseline; endorses that his blood pressure has been holding steady in a normal range.    He was compliant with home exercise program.  Response to previous treatment: increased leg pain  Functional change: use of rolling-walker during general mobility    Pain: 1-2/10  Location: bilateral lower extremities      Objective       n/a    Treatment     Steven received the treatments listed below:      therapeutic exercises to develop strength and endurance for 24 minutes including:    X 10 minutes SciFit (level 2.5) to promote flexibility prior to strength/mobility training   X 15 each seated bilateral lower extremity therapeutic exercise (3#) = marching, long arc quad, ball squeeze  X 8 sit to stands     neuromuscular re-education activities to improve: Balance for 18 minutes. The following activities were included:    X 15 each balance therapeutic exercise = mini squats, heel raises/toe raises   X 25 feet balance gait = side stepping, backwards gait              X 90 seconds stand on AirEx              X 15 alternating toe taps on 7-inch stool       Patient Education and Home Exercises       Education provided:   - proper therapeutic exercise technique  - continue home exercise program      Written Home Exercises Provided: Patient instructed to cont prior HEP.       Assessment     Increased resistance tolerated on the SciFit; patient was also able to tolerate increased resistance during seated lower extremity therapeutic exercise; increased repetitions performed during sit to stand - 10 second holds held due to stabilized blood pressure; single upper extremity support needed during balance therapeutic exercise; no upper extremity support needed during balance gait; increased stance time demonstrated on AirEx; able to use taller stool during alternating toe taps.    Steven Is progressing fairly well towards his goals.   Pt prognosis is Fair.     Pt will continue to benefit from skilled outpatient physical therapy to address the deficits listed in the problem list box on initial evaluation, provide pt/family education and to maximize pt's level of independence in the home and community environment.     Pt's spiritual, cultural and educational needs considered and pt agreeable to plan of care and goals.     Anticipated barriers to physical therapy: vital signs, generalized weakness and imbalance    Goals:     New Short Term Goals (3 Weeks):    Maintain patient's complaints of low back pain at less than 5/10 during activities of daily living. (MET)  2.   Patient to tolerate x 45 seconds bilateral step stance, eyes open to improve upright tolerance. (MET)  3.   Patient to tolerate use of 3# weights during lower extremity therapeutic exercise to improve overall strength. (PART MET)  4.   Patient to perform x 10 repetitions sit to stand so that he may rise without upper extremity support. (NOT MET)     New Long Term Goals (6 Weeks):   Patient to demonstrate competence with home exercise program to maintain therapeutic gains. (NOT MET)  2.   Patient to report improvement in his ability to walk around a room from limited a little to not limited at all. (NOT MET)  3.   Patient to report no new falls.  (MET)  4.   Patient to ambulate 200 feet with rolling-walker and stand by assist to improve household mobility. (NOT MET)      Plan     Continue to progress strength/balance/mobility training to patient's tolerance.       Hubert Powers, PT

## 2023-07-24 ENCOUNTER — CLINICAL SUPPORT (OUTPATIENT)
Dept: REHABILITATION | Facility: HOSPITAL | Age: 78
End: 2023-07-24
Payer: MEDICARE

## 2023-07-24 DIAGNOSIS — R26.89 IMBALANCE: ICD-10-CM

## 2023-07-24 DIAGNOSIS — R29.898 LEG WEAKNESS, BILATERAL: ICD-10-CM

## 2023-07-24 DIAGNOSIS — R26.81 GAIT INSTABILITY: Primary | ICD-10-CM

## 2023-07-24 PROCEDURE — 97112 NEUROMUSCULAR REEDUCATION: CPT | Mod: KX,PN

## 2023-07-24 PROCEDURE — 97110 THERAPEUTIC EXERCISES: CPT | Mod: KX,PN

## 2023-07-24 RX ORDER — ATORVASTATIN CALCIUM 40 MG/1
40 TABLET, FILM COATED ORAL NIGHTLY
Qty: 90 TABLET | Refills: 1 | Status: ON HOLD | OUTPATIENT
Start: 2023-07-24 | End: 2023-08-27 | Stop reason: HOSPADM

## 2023-07-24 NOTE — PROGRESS NOTES
[facility]   Physical Therapy Treatment Note      Name: Tono Saez  Clinic Number: [id number]    Therapy Diagnosis:   Encounter Diagnoses   Name Primary?    Gait instability Yes    Imbalance     Leg weakness, bilateral      Physician: Scott Staley MD    Visit Date: 7/24/2023    Physician Orders: PT Eval and Treat  Medical Diagnosis from Referral: gait instability  Evaluation Date: 6/14/2023  Authorization Period Expiration: 12/31/2023  Plan of Care Expiration: 07/29/2023  Visit # / Visits authorized: 10/ 20     Time In: 1121  Time Out: 1207  Total Billable Time: 46 minutes     Precautions: Diabetes and Fall      Subjective     Pt reports: that his blood pressure has been holding steady in a normal range.    He was compliant with home exercise program.  Response to previous treatment: no changes  Functional change: use of rolling-walker during general mobility    Pain: 1-2/10  Location: bilateral lower extremities      Objective       n/a    Treatment     Steven received the treatments listed below:      therapeutic exercises to develop strength and endurance for 31 minutes including:    X 10 minutes SciFit (level 2.5) to promote flexibility prior to strength/mobility training   X 20 each seated bilateral lower extremity therapeutic exercise (3#) = marching, long arc quad, ball squeeze  X 10 sit to stands   X 20 SportsArt knee curl (22#)      neuromuscular re-education activities to improve: Balance for 15 minutes. The following activities were included:    X 45 seconds each full Romberg stance = eyes open/eyes closed   X 20 each balance therapeutic exercise = mini squats, heel raises/toe raises   X 25 feet balance gait = side stepping, backwards gait                 Patient Education and Home Exercises       Education provided:   - proper therapeutic exercise technique  - continue home exercise program     Written Home Exercises Provided: Patient instructed to cont prior HEP.        Assessment     Patient was able to tolerate increased repetitions during sit to stands, balance therapeutic exercise and seated lower extremity therapeutic exercise; increased stance time demonstrated during full Romberg training; single upper extremity support needed during sit to stands and balance therapeutic exercise; no upper extremity support needed during balance gait.    Steven Is progressing fairly well towards his goals.   Pt prognosis is Fair.     Pt will continue to benefit from skilled outpatient physical therapy to address the deficits listed in the problem list box on initial evaluation, provide pt/family education and to maximize pt's level of independence in the home and community environment.     Pt's spiritual, cultural and educational needs considered and pt agreeable to plan of care and goals.     Anticipated barriers to physical therapy: vital signs, generalized weakness and imbalance    Goals:     New Short Term Goals (3 Weeks):    Maintain patient's complaints of low back pain at less than 5/10 during activities of daily living. (MET)  2.   Patient to tolerate x 45 seconds bilateral step stance, eyes open to improve upright tolerance. (MET)  3.   Patient to tolerate use of 3# weights during lower extremity therapeutic exercise to improve overall strength. (PART MET)  4.   Patient to perform x 10 repetitions sit to stand so that he may rise without upper extremity support. (PART MET)     New Long Term Goals (6 Weeks):   Patient to demonstrate competence with home exercise program to maintain therapeutic gains. (PART MET)  2.   Patient to report improvement in his ability to walk around a room from limited a little to not limited at all. (NOT MET)  3.   Patient to report no new falls. (MET)  4.   Patient to ambulate 200 feet with rolling-walker and stand by assist to improve household mobility. (NOT MET)      Plan     Continue to progress strength/balance/mobility training to patient's  tolerance.       Hubert Powers, PT

## 2023-07-25 ENCOUNTER — HOSPITAL ENCOUNTER (OUTPATIENT)
Facility: HOSPITAL | Age: 78
Discharge: HOME | End: 2023-07-25
Attending: ANESTHESIOLOGY | Admitting: ANESTHESIOLOGY
Payer: MEDICARE

## 2023-07-25 DIAGNOSIS — M47.896 OTHER SPONDYLOSIS, LUMBAR REGION: ICD-10-CM

## 2023-07-25 LAB — POCT GLUCOSE: 120 MG/DL (ref 70–110)

## 2023-07-25 PROCEDURE — 64494 PR INJ DX/THER AGNT PARAVERT FACET JOINT,IMG GUIDE,LUMBAR/SAC, 2ND LEVEL: ICD-10-PCS | Mod: 50,,, | Performed by: ANESTHESIOLOGY

## 2023-07-25 PROCEDURE — 64493 PR INJ DX/THER AGNT PARAVERT FACET JOINT,IMG GUIDE,LUMBAR/SAC,1ST LVL: ICD-10-PCS | Mod: 50,,, | Performed by: ANESTHESIOLOGY

## 2023-07-25 PROCEDURE — 64493 INJ PARAVERT F JNT L/S 1 LEV: CPT | Mod: 50,,, | Performed by: ANESTHESIOLOGY

## 2023-07-25 PROCEDURE — 64494 INJ PARAVERT F JNT L/S 2 LEV: CPT | Mod: 50,,, | Performed by: ANESTHESIOLOGY

## 2023-07-25 PROCEDURE — 64493 INJ PARAVERT F JNT L/S 1 LEV: CPT | Mod: 50 | Performed by: ANESTHESIOLOGY

## 2023-07-25 PROCEDURE — 63600175 PHARM REV CODE 636 W HCPCS: Performed by: ANESTHESIOLOGY

## 2023-07-25 PROCEDURE — 64494 INJ PARAVERT F JNT L/S 2 LEV: CPT | Mod: 50 | Performed by: ANESTHESIOLOGY

## 2023-07-25 RX ORDER — LIDOCAINE HYDROCHLORIDE 10 MG/ML
1 INJECTION, SOLUTION EPIDURAL; INFILTRATION; INTRACAUDAL; PERINEURAL ONCE
Status: ACTIVE | OUTPATIENT
Start: 2023-07-25

## 2023-07-25 RX ORDER — SODIUM CHLORIDE, SODIUM LACTATE, POTASSIUM CHLORIDE, CALCIUM CHLORIDE 600; 310; 30; 20 MG/100ML; MG/100ML; MG/100ML; MG/100ML
INJECTION, SOLUTION INTRAVENOUS CONTINUOUS
Status: ACTIVE | OUTPATIENT
Start: 2023-07-25

## 2023-07-25 RX ORDER — MIDAZOLAM HYDROCHLORIDE 1 MG/ML
INJECTION INTRAMUSCULAR; INTRAVENOUS
Status: DISCONTINUED | OUTPATIENT
Start: 2023-07-25 | End: 2023-07-25 | Stop reason: HOSPADM

## 2023-07-25 RX ORDER — BUPIVACAINE HYDROCHLORIDE 5 MG/ML
INJECTION, SOLUTION EPIDURAL; INTRACAUDAL
Status: DISCONTINUED | OUTPATIENT
Start: 2023-07-25 | End: 2023-07-25 | Stop reason: HOSPADM

## 2023-07-25 RX ADMIN — SODIUM CHLORIDE, POTASSIUM CHLORIDE, SODIUM LACTATE AND CALCIUM CHLORIDE: 600; 310; 30; 20 INJECTION, SOLUTION INTRAVENOUS at 10:07

## 2023-07-25 NOTE — DISCHARGE SUMMARY
[facility] Per[facility]  Discharge Note  Short Stay    Procedure(s) (LRB):  Block-nerve-medial branch-lumbar (Bilateral)      OUTCOME: Patient tolerated treatment/procedure well without complication and is now ready for discharge.    DISPOSITION: Home or Self Care    FINAL DIAGNOSIS:  <principal problem not specified>    FOLLOWUP: In clinic    DISCHARGE INSTRUCTIONS:    Discharge Procedure Orders   Notify your health care provider if you experience any of the following:  temperature >100.4     Notify your health care provider if you experience any of the following:  severe uncontrolled pain     Notify your health care provider if you experience any of the following:  redness, tenderness, or signs of infection (pain, swelling, redness, odor or green/yellow discharge around incision site)     Activity as tolerated        TIME SPENT ON DISCHARGE:   30 minutes

## 2023-07-25 NOTE — OP NOTE
PROCEDURE DATE: 7/25/2023    PROCEDURE:  Bilateral L3,4,5 medial branch nerve blocks under fluoroscopy  (corresponds to bilateral L4/5 and L5/S1 facets)    DIAGNOSIS:  Other lumbar spondylosis    Post Op diagnosis: Same    PHYSICIAN: Maurice Montenegro MD    MEDICATIONS INJECTED: 0.5% bupivicaine, 0.5 ml at each level    SEDATION MEDICATIONS:RN IV Versed    LOCAL ANESTHETIC USED: None    ESTIMATED BLOOD LOSS:  None    COMPLICATIONS:  None    TECHNIQUE: A time out was taken to identify the patient, procedure and side of the procedure. The patient was placed in a prone position, then prepped and draped in the usual sterile fashion using ChloraPrep and sterile towels.  The levels were determined under fluoroscopic guidance and then marked.  A 25-gauge 3.5 inch needle was introduced to the anatomic location of the L3,4,5 medial branch nerves on the bilateral side. The above medication was then injected. The patient tolerated the procedure well.     The patient was monitored after the procedure. Patient was given pain diary to record pain levels at home.     If found to have greater than a 50% recovery and so will be scheduled for a radiofrequency ablation of the corresponding nerves.  Patient was given post procedure and discharge instructions to follow at home.  The patient was discharged in a stable condition.

## 2023-07-26 ENCOUNTER — TELEPHONE (OUTPATIENT)
Dept: PAIN MEDICINE | Facility: CLINIC | Age: 78
End: 2023-07-26
Payer: MEDICARE

## 2023-07-26 VITALS
WEIGHT: 183.63 LBS | SYSTOLIC BLOOD PRESSURE: 193 MMHG | HEART RATE: 62 BPM | OXYGEN SATURATION: 94 % | DIASTOLIC BLOOD PRESSURE: 91 MMHG | BODY MASS INDEX: 24.87 KG/M2 | HEIGHT: 72 IN | RESPIRATION RATE: 18 BRPM | TEMPERATURE: 98 F

## 2023-07-26 DIAGNOSIS — M47.816 LUMBAR SPONDYLOSIS: Primary | ICD-10-CM

## 2023-07-26 NOTE — TELEPHONE ENCOUNTER
Pt had 2nd L3,4,5 MBB 07/25 called to get relief from this procedure he states he had 80% relief for 2 hours then pain returned . Scheduled RFA for 08/24/2023

## 2023-07-27 ENCOUNTER — CLINICAL SUPPORT (OUTPATIENT)
Dept: REHABILITATION | Facility: HOSPITAL | Age: 78
End: 2023-07-27
Payer: MEDICARE

## 2023-07-27 DIAGNOSIS — R26.89 IMBALANCE: ICD-10-CM

## 2023-07-27 DIAGNOSIS — R26.81 GAIT INSTABILITY: Primary | ICD-10-CM

## 2023-07-27 DIAGNOSIS — R29.898 LEG WEAKNESS, BILATERAL: ICD-10-CM

## 2023-07-27 PROCEDURE — 97110 THERAPEUTIC EXERCISES: CPT | Mod: KX,PN

## 2023-07-27 PROCEDURE — 97112 NEUROMUSCULAR REEDUCATION: CPT | Mod: KX,PN

## 2023-07-27 NOTE — PROGRESS NOTES
Patient would benefit from further physical therapy visits to continue addressing strength/balance/mobility deficits. Please see updated plan of care.

## 2023-07-27 NOTE — PLAN OF CARE
[facility]   Physical Therapy Updated plan of care      Name: Tono Saez  Clinic Number: [id number]    Therapy Diagnosis:   Encounter Diagnoses   Name Primary?    Gait instability Yes    Imbalance     Leg weakness, bilateral      Physician: Scott Staley MD    Visit Date: 7/27/2023    Physician Orders: PT Eval and Treat  Medical Diagnosis from Referral: gait instability  Evaluation Date: 6/14/2023  Authorization Period Expiration: 12/31/2023  Plan of Care Expiration: 07/29/2023  Visit # / Visits authorized: 11/ 20     Time In: 1300  Time Out: 1345  Total Billable Time: 45 minutes     Precautions: Diabetes and Fall  Functional Level Prior to Evaluation:  supervision needed      Subjective     Pt reports: minimal lower extremity pain; mostly complaints of malaise and fatigue; no falls reported since starting outpatient physical therapy.    He was compliant with home exercise program.  Response to previous treatment: no changes  Functional change: use of rolling-walker during general mobility    Pain: 1-2/10  Location: bilateral lower extremities      Objective      Strength: manual muscle test grades below      Lower Extremity Strength  Right LE   Left LE     Hip flexion:  3+/5 Hip flexion: 3+/5   Knee extension: 4-/5 Knee extension: 4-/5   Ankle dorsiflexion:  4/5 Ankle dorsiflexion: 4/5        Treatment     Steven received the treatments listed below:      therapeutic exercises to develop strength and endurance for 30 minutes including:    X 10 minutes SciFit (level 2.5) to promote flexibility prior to strength/mobility training   X 20 each seated bilateral lower extremity therapeutic exercise (3#) = marching, long arc quad, ball squeeze  X 10 sit to stands with bilateral upper extremity support  Functional ambulation 200 feet with rolling-walker and stand by assist      neuromuscular re-education activities to improve: Balance for 15 minutes. The following activities were  included:    X 30 seconds each 50% Romberg stance = eyes open/eyes closed*   X 15 alternating toe taps on 5-inch stool (no upper extremity support)  X 1 minute stand on AirEx                 Patient Education and Home Exercises       Education provided:   - proper therapeutic exercise technique  - continue home exercise program     Written Home Exercises Provided: Patient instructed to cont prior HEP.       Assessment     Patient was able to progress Romberg training to 50% - intermittent loss of balance while eyes closed; bilateral upper extremity support needed during sit to stands; improved gait endurance with rolling-walker; no upper extremity support needed during alternating toe taps; no loss of balance demonstrated while standing on AirEx.    Steven Is progressing fairly well towards his goals.   Pt prognosis is Fair.     Pt will continue to benefit from skilled outpatient physical therapy to address the deficits listed in the problem list box on initial evaluation, provide pt/family education and to maximize pt's level of independence in the home and community environment.     Pt's spiritual, cultural and educational needs considered and pt agreeable to plan of care and goals.     Anticipated barriers to physical therapy: vital signs, generalized weakness and imbalance    Goals:     Previous Short Term Goals (3 Weeks):    Maintain patient's complaints of low back pain at less than 5/10 during activities of daily living. (MET)  2.   Patient to tolerate x 45 seconds bilateral step stance, eyes open to improve upright tolerance. (MET)  3.   Patient to tolerate use of 3# weights during lower extremity therapeutic exercise to improve overall strength. (PART MET)  4.   Patient to perform x 10 repetitions sit to stand so that he may rise without upper extremity support. (PART MET)     Previous Long Term Goals (6 Weeks):   Patient to demonstrate competence with home exercise program to maintain therapeutic gains.  (PART MET)  2.   Patient to report improvement in his ability to walk around a room from limited a little to not limited at all. (NOT MET)  3.   Patient to report no new falls. (MET)  4.   Patient to ambulate 200 feet with rolling-walker and stand by assist to improve household mobility. (MET)    New Short Term Goals (2 Weeks):    Maintain patient's complaints of low back pain at less than 3/10 during activities of daily living.   2.   Patient to tolerate x 45 seconds 50% Romberg stance, eyes closed to improve upright tolerance.   3.   Patient to tolerate use of 4# weights during lower extremity therapeutic exercise to improve overall strength.   4.   Patient to perform x 10 repetitions sit to stand so that he may rise with single upper extremity support.      New Long Term Goals (4 Weeks):   Patient to demonstrate competence with home exercise program to maintain therapeutic gains.   2.   Patient to report improvement in his ability to walk around a room from limited a lot to limited a little.   3.   Patient to continue to report no new falls.   4.   Patient to ambulate 300 feet with rolling-walker and stand by assist to improve household mobility.     Reasons for Recertification of Therapy:   Patient would benefit from further physical therapy visits to continue addressing strength/balance/mobility deficits.      Plan     Updated Certification Period: 07/27/2023 to 08/26/2023   Recommended Treatment Plan: 2 times per week for 4 weeks (starting week of 07/31/23):  Gait Training, Manual Therapy, Moist Heat/ Ice, Neuromuscular Re-ed, Patient Education, Self Care, Therapeutic Activities, Therapeutic Exercise, and home exercise program  Other Recommendations: n/a    Hubert Powers, PT

## 2023-08-04 ENCOUNTER — TELEPHONE (OUTPATIENT)
Dept: CARDIOLOGY | Facility: CLINIC | Age: 78
End: 2023-08-04
Payer: MEDICARE

## 2023-08-04 NOTE — TELEPHONE ENCOUNTER
----- Message from Aria Toussaint sent at 2023 11:06 AM CDT -----  Regarding: Prescription  Pt came in requesting a handwritten prescription for Jardiance that he needs to be refilled as soon as possible. He said he needs the handwritten prescription so it will be cheaper and to let you know that the pharmacy will let the one they already have . Thank you!

## 2023-08-07 ENCOUNTER — TELEPHONE (OUTPATIENT)
Dept: CARDIOLOGY | Facility: CLINIC | Age: 78
End: 2023-08-07
Payer: MEDICARE

## 2023-08-07 DIAGNOSIS — E11.65 TYPE 2 DIABETES MELLITUS WITH HYPERGLYCEMIA, WITHOUT LONG-TERM CURRENT USE OF INSULIN: ICD-10-CM

## 2023-08-07 NOTE — TELEPHONE ENCOUNTER
----- Message from Isacc Thomas sent at 8/7/2023  4:19 PM CDT -----  Type: Need Medical Advice   Who Called:[facility]   Best callback number:   Additional Information: Called for medical clearance that was faxed over on 8/4  call between 9-12 or 2-4 need to know if patient can stop his blood thinners and Asprin now fax clearance to , surgery on Thursday 8/10  Please call to further assist, Thanks.

## 2023-08-07 NOTE — LETTER
2023    Tono Saez  [address]  [place] [address]             [facility] [place]  [address]  [place] [address]  Phone: [phone]  Fax: [phone] Patient: Tono Saez  : 1945  Referring Doctor:Dr. Bower  Circumcision     Current Outpatient Medications   Medication Sig    aspirin (ECOTRIN) 81 MG EC tablet Take 2 tablets (162 mg total) by mouth once daily. (Patient taking differently: Take 81 mg by mouth once daily.)    atorvastatin (LIPITOR) 40 MG tablet Take 1 tablet by mouth once daily.    atorvastatin (LIPITOR) 40 MG tablet Take 1 tablet (40 mg total) by mouth every evening.    betamethasone dipropionate (DIPROLENE) 0.05 % lotion Apply thin film to scalp bid prn itch    clindamycin (CLEOCIN) 150 MG capsule Take by mouth.    clopidogreL (PLAVIX) 75 mg tablet Take 1 tablet (75 mg total) by mouth once daily.    diclofenac sodium (VOLTAREN) 1 % Gel apply 2 grams topically once a day    empagliflozin (JARDIANCE) 25 mg tablet Take 1 tablet (25 mg total) by mouth once daily.    finasteride (PROSCAR) 5 mg tablet Take 5 mg by mouth once daily.    fluorouracil 5% 5 % Soln Apply small amount to top of head 1-2x/day for 2 weeks    fluticasone propionate (FLONASE) 50 mcg/actuation nasal spray Use nasally as directed as needed    furosemide (LASIX) 20 MG tablet TAKE 1 TABLET (20 MG TOTAL) BY MOUTH ONCE DAILY. (Patient taking differently: Take 20 mg by mouth once daily.)    HYDROcodone-acetaminophen (NORCO) 5-325 mg per tablet Take 1 tablet by mouth every 4 (four) hours as needed for Pain.    ketoconazole (NIZORAL) 2 % shampoo Wash all scaling areas let sit 3 minutes then rinse 3x/wk    levocetirizine (XYZAL) 5 MG tablet Take 1 tablet (5 mg total) by mouth every evening.    magnesium oxide (MAG-OX) 400 mg (241.3 mg magnesium) tablet Take 1 tablet (400 mg total) by mouth once daily.    metoprolol tartrate  (LOPRESSOR) 25 MG tablet Take 0.5 tablets (12.5 mg total) by mouth 2 (two) times daily.    midodrine (PROAMATINE) 2.5 MG Tab Take 1 tablet (2.5 mg total) by mouth 3 (three) times daily.    montelukast (SINGULAIR) 10 mg tablet Take 1 tablet (10 mg total) by mouth every evening.    multivitamin capsule Take 1 capsule by mouth once daily.    mupirocin (BACTROBAN) 2 % ointment Apply topically 2 (two) times daily.    nitroGLYCERIN (NITROSTAT) 0.4 MG SL tablet DISSOLVE 1 TABLET UNDER THE TONGUE AS NEEDED FOR CHEST PAIN (Patient taking differently: Place 0.4 mg under the tongue every 5 (five) minutes as needed.)    pantoprazole (PROTONIX) 40 MG tablet TAKE ONE TABLET BY MOUTH ONCE DAILY    pioglitazone (ACTOS) 30 MG tablet Take 1 tablet (30 mg total) by mouth once daily.    pregabalin (LYRICA) 50 MG capsule Take 1 capsule (50 mg total) by mouth 3 (three) times daily.    sertraline (ZOLOFT) 50 MG tablet TAKE ONE TABLET BY MOUTH ONCE DAILY (Patient taking differently: Take 50 mg by mouth once daily.)    sulfacetamide sodium (OVACE PLUS SHAMPOO) 10 % Sham Wash scalp nightly    tolterodine (DETROL [place]) 4 MG 24 hr capsule TAKE ONE CAPSULE BY MOUTH ONCE DAILY    vitamin D 1000 units Tab Take 1,000 Units by mouth once daily.     No current facility-administered medications for this visit.     Facility-Administered Medications Ordered in Other Visits   Medication    lactated ringers infusion    LIDOcaine (PF) 10 mg/ml (1%) injection 10 mg       This patient has been assessed for risk factors for clearance of surgery with the following stipulations:    ___ No contraindications  ___ Recommendations for antiplatelet/anticoagulant medications:  ___ Cleared for surgery with the following contraindications/precautions:  __x_ Not cleared for surgery due to the following reasons:      If you have any questions regarding the above, please contact my office at [phone].    Sincerely,

## 2023-08-08 ENCOUNTER — CLINICAL SUPPORT (OUTPATIENT)
Dept: REHABILITATION | Facility: HOSPITAL | Age: 78
End: 2023-08-08
Payer: MEDICARE

## 2023-08-08 ENCOUNTER — HOSPITAL ENCOUNTER (OUTPATIENT)
Dept: PREADMISSION TESTING | Facility: HOSPITAL | Age: 78
Discharge: HOME | End: 2023-08-08
Attending: SPECIALIST
Payer: MEDICARE

## 2023-08-08 DIAGNOSIS — R26.81 GAIT INSTABILITY: Primary | ICD-10-CM

## 2023-08-08 DIAGNOSIS — R29.898 LEG WEAKNESS, BILATERAL: ICD-10-CM

## 2023-08-08 DIAGNOSIS — R26.89 IMBALANCE: ICD-10-CM

## 2023-08-08 PROCEDURE — 97110 THERAPEUTIC EXERCISES: CPT | Mod: KX,PN

## 2023-08-08 PROCEDURE — 97112 NEUROMUSCULAR REEDUCATION: CPT | Mod: KX,PN

## 2023-08-08 NOTE — PROGRESS NOTES
[facility]   Physical Therapy Treatment Note      Name: Tono Saez  Clinic Number: [id number]    Therapy Diagnosis:   Encounter Diagnoses   Name Primary?    Gait instability Yes    Imbalance     Leg weakness, bilateral      Physician: Scott Staley MD    Visit Date: 8/8/2023    Physician Orders: PT Eval and Treat  Medical Diagnosis from Referral: gait instability  Evaluation Date: 6/14/2023  Authorization Period Expiration: 12/31/2023  Plan of Care Expiration: 07/29/2023  Visit # / Visits authorized: 12/ 20     Time In: 1435  Time Out: 1518  Total Billable Time: 43 minutes     Precautions: Diabetes and Fall      Subjective     Pt reports: no real pain today.    He was compliant with home exercise program.  Response to previous treatment: decreased pain  Functional change: none    Pain: 0/10  Location: bilateral lower extremities      Objective       n/a    Treatment     Steven received the treatments listed below:      therapeutic exercises to develop strength and endurance for 30 minutes including:     X 10 minutes SciFit (level 2) to promote flexibility prior to strength/mobility training              X 20 each seated bilateral lower extremity therapeutic exercise (3#) = marching, long arc quad, ball squeeze  X 8 sit to stands with single upper extremity support  Functional ambulation 200 feet with rolling-walker and stand by assist        neuromuscular re-education activities to improve: Balance for 13 minutes. The following activities were included:     X 45 seconds each 50% Romberg stance = eyes open/eyes closed*   X 15 alternating toe taps on 5-inch stool (no upper extremity support)              Patient Education and Home Exercises       Education provided:   - proper therapeutic exercise technique  - continue home exercise program     Written Home Exercises Provided: Patient instructed to cont prior HEP.       Assessment     Patient was able to perform sit to stands with  single upper extremity support; increased stance time demonstrated during 50% Romberg training; increased repetitions performed during alternating toe taps.    Steven Is progressing fairly well towards his goals.   Pt prognosis is Fair.     Pt will continue to benefit from skilled outpatient physical therapy to address the deficits listed in the problem list box on initial evaluation, provide pt/family education and to maximize pt's level of independence in the home and community environment.     Pt's spiritual, cultural and educational needs considered and pt agreeable to plan of care and goals.     Anticipated barriers to physical therapy: vital signs, generalized weakness and imbalance    Goals:     New Short Term Goals (3 Weeks):    Maintain patient's complaints of low back pain at less than 5/10 during activities of daily living. (MET)  2.   Patient to tolerate x 45 seconds bilateral step stance, eyes open to improve upright tolerance. (MET)  3.   Patient to tolerate use of 3# weights during lower extremity therapeutic exercise to improve overall strength. (PART MET)  4.   Patient to perform x 10 repetitions sit to stand so that he may rise without upper extremity support. (PART MET)     New Long Term Goals (6 Weeks):   Patient to demonstrate competence with home exercise program to maintain therapeutic gains. (PART MET)  2.   Patient to report improvement in his ability to walk around a room from limited a little to not limited at all. (NOT MET)  3.   Patient to report no new falls. (MET)  4.   Patient to ambulate 200 feet with rolling-walker and stand by assist to improve household mobility. (MET)      Plan     Continue to progress strength/balance/mobility training to patient's tolerance.       Hubert Powers, PT

## 2023-08-10 ENCOUNTER — TELEPHONE (OUTPATIENT)
Dept: PAIN MEDICINE | Facility: CLINIC | Age: 78
End: 2023-08-10
Payer: MEDICARE

## 2023-08-10 NOTE — TELEPHONE ENCOUNTER
Pt called to cancel 08/24 injections , but I cant find that note calling pt to cancel and RS if applicable. Spoke with pt moved from 08/24 to 09/13 informed surgery

## 2023-08-11 DIAGNOSIS — D64.9 ANEMIA, UNSPECIFIED TYPE: ICD-10-CM

## 2023-08-11 RX ORDER — PANTOPRAZOLE SODIUM 40 MG/1
TABLET, DELAYED RELEASE ORAL
Qty: 90 TABLET | Refills: 1 | Status: SHIPPED | OUTPATIENT
Start: 2023-08-11

## 2023-08-14 ENCOUNTER — CLINICAL SUPPORT (OUTPATIENT)
Dept: REHABILITATION | Facility: HOSPITAL | Age: 78
End: 2023-08-14
Payer: MEDICARE

## 2023-08-14 DIAGNOSIS — R26.81 GAIT INSTABILITY: ICD-10-CM

## 2023-08-14 DIAGNOSIS — R29.898 LEG WEAKNESS, BILATERAL: ICD-10-CM

## 2023-08-14 DIAGNOSIS — R26.89 IMBALANCE: Primary | ICD-10-CM

## 2023-08-14 PROCEDURE — 97110 THERAPEUTIC EXERCISES: CPT | Mod: PN

## 2023-08-14 PROCEDURE — 97116 GAIT TRAINING THERAPY: CPT | Mod: PN

## 2023-08-14 NOTE — PROGRESS NOTES
[facility]   Physical Therapy Treatment Note      Name: Tono Saez  Clinic Number: [id number]    Therapy Diagnosis:   Encounter Diagnoses   Name Primary?    Imbalance Yes    Gait instability     Leg weakness, bilateral      Physician: Scott Staley MD    Visit Date: 8/14/2023    Physician Orders: PT Eval and Treat  Medical Diagnosis from Referral: gait instability  Evaluation Date: 6/14/2023  Authorization Period Expiration: 12/31/2023  Plan of Care Expiration: 07/29/2023  Visit # / Visits authorized: 13/ 20     Time In: 2:34 pm  Time Out: 3:15  pm  Total Billable Time: 41 minutes     Precautions: Diabetes and Fall      Subjective     Pt reports: no real pain today.     He was compliant with home exercise program.  Response to previous treatment: decreased pain  Functional change: none    Pain: 0/10  Location: bilateral lower extremities      Objective       n/a    Treatment     Steven received the treatments listed below:      therapeutic exercises to develop strength and endurance for 23 minutes including:     X 10 minutes SciFit (level 2) to promote flexibility prior to strength/mobility training              X 20 each seated bilateral lower extremity therapeutic exercise (3#) = marching, long arc quad, ball squeeze  X 8 sit to stands with single upper extremity support  Functional ambulation 200 feet with rolling-walker and stand by assist        neuromuscular re-education activities to improve: Balance for 00 minutes. The following activities were included:     X 45 seconds each 50% Romberg stance = eyes open/eyes closed*   X 15 alternating toe taps on 5-inch stool (no upper extremity support)    Gait Training activities for 18 minutes including:     Performed in // bars with single UE support  Step forward over 1/2 roll x 10 each  Lateral stepping over 1/2 roll x 10 each  Reciprocal stepping in ladder x 2 laps     Performed in // bars with no UE support  Lateral stepping in  ladder x 2 laps with CGA-SBA              Patient Education and Home Exercises       Education provided:   - proper therapeutic exercise technique  - continue home exercise program     Written Home Exercises Provided: Patient instructed to cont prior HEP.       Assessment     Patient tolerated treatment well this session. Performed pre-gait training exercises to improve weight shifting to RLE and balance during RLE stance time. Continued LE strengthening and endurance training with good tolerance. He remains appropriate for physical therapy at this time.    Steven Is progressing fairly well towards his goals.   Pt prognosis is Fair.     Pt will continue to benefit from skilled outpatient physical therapy to address the deficits listed in the problem list box on initial evaluation, provide pt/family education and to maximize pt's level of independence in the home and community environment.     Pt's spiritual, cultural and educational needs considered and pt agreeable to plan of care and goals.     Anticipated barriers to physical therapy: vital signs, generalized weakness and imbalance    Goals:     New Short Term Goals (3 Weeks):    Maintain patient's complaints of low back pain at less than 5/10 during activities of daily living. (MET)  2.   Patient to tolerate x 45 seconds bilateral step stance, eyes open to improve upright tolerance. (MET)  3.   Patient to tolerate use of 3# weights during lower extremity therapeutic exercise to improve overall strength. (PART MET)  4.   Patient to perform x 10 repetitions sit to stand so that he may rise without upper extremity support. (PART MET)     New Long Term Goals (6 Weeks):   Patient to demonstrate competence with home exercise program to maintain therapeutic gains. (PART MET)  2.   Patient to report improvement in his ability to walk around a room from limited a little to not limited at all. (NOT MET)  3.   Patient to report no new falls. (MET)  4.   Patient to ambulate  200 feet with rolling-walker and stand by assist to improve household mobility. (MET)      Plan     Continue to progress strength/balance/mobility training to patient's tolerance.       Deloris Britt, PT

## 2023-08-16 ENCOUNTER — CLINICAL SUPPORT (OUTPATIENT)
Dept: REHABILITATION | Facility: HOSPITAL | Age: 78
End: 2023-08-16
Payer: MEDICARE

## 2023-08-16 ENCOUNTER — HOSPITAL ENCOUNTER (INPATIENT)
Facility: HOSPITAL | Age: 78
LOS: 10 days | Discharge: NURSING, REHAB OR LONG-TERM CARE | DRG: 247 | End: 2023-08-27
Attending: EMERGENCY MEDICINE | Admitting: FAMILY MEDICINE
Payer: MEDICARE

## 2023-08-16 DIAGNOSIS — S32.010G COMPRESSION FRACTURE OF L1 LUMBAR VERTEBRA, WITH DELAYED HEALING, SUBSEQUENT ENCOUNTER: Primary | ICD-10-CM

## 2023-08-16 DIAGNOSIS — R55 SYNCOPE: ICD-10-CM

## 2023-08-16 DIAGNOSIS — I25.10 CAD (CORONARY ARTERY DISEASE): ICD-10-CM

## 2023-08-16 DIAGNOSIS — R26.89 IMBALANCE: ICD-10-CM

## 2023-08-16 DIAGNOSIS — R07.9 CHEST PAIN: ICD-10-CM

## 2023-08-16 DIAGNOSIS — Z01.810 PREOP CARDIOVASCULAR EXAM: ICD-10-CM

## 2023-08-16 DIAGNOSIS — R29.898 LEG WEAKNESS, BILATERAL: ICD-10-CM

## 2023-08-16 DIAGNOSIS — S41.112A SKIN TEAR OF LEFT UPPER ARM WITHOUT COMPLICATION, INITIAL ENCOUNTER: ICD-10-CM

## 2023-08-16 DIAGNOSIS — R26.81 GAIT INSTABILITY: Primary | ICD-10-CM

## 2023-08-16 DIAGNOSIS — I21.4 NSTEMI (NON-ST ELEVATED MYOCARDIAL INFARCTION): ICD-10-CM

## 2023-08-16 DIAGNOSIS — I50.9 CHF (CONGESTIVE HEART FAILURE): ICD-10-CM

## 2023-08-16 LAB
ALBUMIN SERPL BCP-MCNC: 3.9 G/DL (ref 3.5–5.2)
ALP SERPL-CCNC: 82 U/L (ref 55–135)
ALT SERPL W/O P-5'-P-CCNC: 15 U/L (ref 10–44)
ANION GAP SERPL CALC-SCNC: 7 MMOL/L (ref 8–16)
AST SERPL-CCNC: 24 U/L (ref 10–40)
BACTERIA #/AREA URNS HPF: NEGATIVE /HPF
BASOPHILS # BLD AUTO: 0.03 K/UL (ref 0–0.2)
BASOPHILS NFR BLD: 0.4 % (ref 0–1.9)
BILIRUB SERPL-MCNC: 1.4 MG/DL (ref 0.1–1)
BILIRUB UR QL STRIP: NEGATIVE
BNP SERPL-MCNC: 328 PG/ML (ref 0–99)
BUN SERPL-MCNC: 29 MG/DL (ref 8–23)
CALCIUM SERPL-MCNC: 9.7 MG/DL (ref 8.7–10.5)
CHLORIDE SERPL-SCNC: 106 MMOL/L (ref 95–110)
CK SERPL-CCNC: 74 U/L (ref 20–200)
CLARITY UR: CLEAR
CO2 SERPL-SCNC: 27 MMOL/L (ref 23–29)
COLOR UR: YELLOW
CREAT SERPL-MCNC: 1.6 MG/DL (ref 0.5–1.4)
DIFFERENTIAL METHOD: ABNORMAL
EOSINOPHIL # BLD AUTO: 0.2 K/UL (ref 0–0.5)
EOSINOPHIL NFR BLD: 2.4 % (ref 0–8)
ERYTHROCYTE [DISTWIDTH] IN BLOOD BY AUTOMATED COUNT: 15.8 % (ref 11.5–14.5)
EST. GFR  (NO RACE VARIABLE): 43.3 ML/MIN/1.73 M^2
GLUCOSE SERPL-MCNC: 104 MG/DL (ref 70–110)
GLUCOSE SERPL-MCNC: 119 MG/DL (ref 70–110)
GLUCOSE UR QL STRIP: ABNORMAL
HCT VFR BLD AUTO: 36.5 % (ref 40–54)
HGB BLD-MCNC: 11.4 G/DL (ref 14–18)
HGB UR QL STRIP: NEGATIVE
HYALINE CASTS #/AREA URNS LPF: 0 /LPF
IMM GRANULOCYTES # BLD AUTO: 0.05 K/UL (ref 0–0.04)
IMM GRANULOCYTES NFR BLD AUTO: 0.7 % (ref 0–0.5)
KETONES UR QL STRIP: ABNORMAL
LEUKOCYTE ESTERASE UR QL STRIP: ABNORMAL
LYMPHOCYTES # BLD AUTO: 0.7 K/UL (ref 1–4.8)
LYMPHOCYTES NFR BLD: 9.4 % (ref 18–48)
MAGNESIUM SERPL-MCNC: 1.8 MG/DL (ref 1.6–2.6)
MCH RBC QN AUTO: 26.7 PG (ref 27–31)
MCHC RBC AUTO-ENTMCNC: 31.2 G/DL (ref 32–36)
MCV RBC AUTO: 86 FL (ref 82–98)
MICROSCOPIC COMMENT: ABNORMAL
MONOCYTES # BLD AUTO: 0.6 K/UL (ref 0.3–1)
MONOCYTES NFR BLD: 7.8 % (ref 4–15)
NEUTROPHILS # BLD AUTO: 6 K/UL (ref 1.8–7.7)
NEUTROPHILS NFR BLD: 79.3 % (ref 38–73)
NITRITE UR QL STRIP: NEGATIVE
NRBC BLD-RTO: 0 /100 WBC
PH UR STRIP: 8 [PH] (ref 5–8)
PLATELET # BLD AUTO: 118 K/UL (ref 150–450)
PMV BLD AUTO: 11.2 FL (ref 9.2–12.9)
POTASSIUM SERPL-SCNC: 4.5 MMOL/L (ref 3.5–5.1)
PROT SERPL-MCNC: 6.9 G/DL (ref 6–8.4)
PROT UR QL STRIP: NEGATIVE
RBC # BLD AUTO: 4.27 M/UL (ref 4.6–6.2)
RBC #/AREA URNS HPF: 1 /HPF (ref 0–4)
SODIUM SERPL-SCNC: 140 MMOL/L (ref 136–145)
SP GR UR STRIP: 1.01 (ref 1–1.03)
SQUAMOUS #/AREA URNS HPF: 1 /HPF
TROPONIN I SERPL HS-MCNC: 30.7 PG/ML (ref 0–14.9)
TROPONIN I SERPL HS-MCNC: 33.5 PG/ML (ref 0–14.9)
URN SPEC COLLECT METH UR: ABNORMAL
UROBILINOGEN UR STRIP-ACNC: NEGATIVE EU/DL
WBC # BLD AUTO: 7.58 K/UL (ref 3.9–12.7)
WBC #/AREA URNS HPF: 6 /HPF (ref 0–5)
YEAST URNS QL MICRO: ABNORMAL

## 2023-08-16 PROCEDURE — 97112 NEUROMUSCULAR REEDUCATION: CPT | Mod: KX,PN

## 2023-08-16 PROCEDURE — 36415 COLL VENOUS BLD VENIPUNCTURE: CPT | Performed by: FAMILY MEDICINE

## 2023-08-16 PROCEDURE — 99285 EMERGENCY DEPT VISIT HI MDM: CPT | Mod: 25

## 2023-08-16 PROCEDURE — 85025 COMPLETE CBC W/AUTO DIFF WBC: CPT | Performed by: EMERGENCY MEDICINE

## 2023-08-16 PROCEDURE — 93005 ELECTROCARDIOGRAM TRACING: CPT | Performed by: INTERNAL MEDICINE

## 2023-08-16 PROCEDURE — 83880 ASSAY OF NATRIURETIC PEPTIDE: CPT | Performed by: FAMILY MEDICINE

## 2023-08-16 PROCEDURE — 36415 COLL VENOUS BLD VENIPUNCTURE: CPT | Performed by: EMERGENCY MEDICINE

## 2023-08-16 PROCEDURE — 93010 EKG 12-LEAD: ICD-10-PCS | Mod: ,,, | Performed by: INTERNAL MEDICINE

## 2023-08-16 PROCEDURE — 81001 URINALYSIS AUTO W/SCOPE: CPT | Performed by: FAMILY MEDICINE

## 2023-08-16 PROCEDURE — 82962 GLUCOSE BLOOD TEST: CPT

## 2023-08-16 PROCEDURE — 82550 ASSAY OF CK (CPK): CPT | Performed by: EMERGENCY MEDICINE

## 2023-08-16 PROCEDURE — 63600175 PHARM REV CODE 636 W HCPCS: Performed by: FAMILY MEDICINE

## 2023-08-16 PROCEDURE — G0378 HOSPITAL OBSERVATION PER HR: HCPCS

## 2023-08-16 PROCEDURE — 84484 ASSAY OF TROPONIN QUANT: CPT | Performed by: FAMILY MEDICINE

## 2023-08-16 PROCEDURE — 93010 ELECTROCARDIOGRAM REPORT: CPT | Mod: ,,, | Performed by: INTERNAL MEDICINE

## 2023-08-16 PROCEDURE — 80053 COMPREHEN METABOLIC PANEL: CPT | Performed by: EMERGENCY MEDICINE

## 2023-08-16 PROCEDURE — 25000003 PHARM REV CODE 250: Performed by: EMERGENCY MEDICINE

## 2023-08-16 PROCEDURE — 83735 ASSAY OF MAGNESIUM: CPT | Performed by: EMERGENCY MEDICINE

## 2023-08-16 PROCEDURE — 84484 ASSAY OF TROPONIN QUANT: CPT | Mod: 91 | Performed by: EMERGENCY MEDICINE

## 2023-08-16 PROCEDURE — 97110 THERAPEUTIC EXERCISES: CPT | Mod: KX,PN

## 2023-08-16 PROCEDURE — 83036 HEMOGLOBIN GLYCOSYLATED A1C: CPT | Performed by: FAMILY MEDICINE

## 2023-08-16 RX ORDER — TALC
6 POWDER (GRAM) TOPICAL NIGHTLY PRN
Status: DISCONTINUED | OUTPATIENT
Start: 2023-08-16 | End: 2023-08-27 | Stop reason: HOSPADM

## 2023-08-16 RX ORDER — MORPHINE SULFATE 4 MG/ML
4 INJECTION, SOLUTION INTRAMUSCULAR; INTRAVENOUS EVERY 4 HOURS PRN
Status: DISCONTINUED | OUTPATIENT
Start: 2023-08-16 | End: 2023-08-21

## 2023-08-16 RX ORDER — AMOXICILLIN 250 MG
1 CAPSULE ORAL 2 TIMES DAILY PRN
Status: DISCONTINUED | OUTPATIENT
Start: 2023-08-16 | End: 2023-08-27 | Stop reason: HOSPADM

## 2023-08-16 RX ORDER — HYDRALAZINE HYDROCHLORIDE 20 MG/ML
5 INJECTION INTRAMUSCULAR; INTRAVENOUS EVERY 4 HOURS PRN
Status: DISCONTINUED | OUTPATIENT
Start: 2023-08-17 | End: 2023-08-27 | Stop reason: HOSPADM

## 2023-08-16 RX ORDER — SODIUM CHLORIDE 9 MG/ML
INJECTION, SOLUTION INTRAVENOUS CONTINUOUS
Status: DISCONTINUED | OUTPATIENT
Start: 2023-08-16 | End: 2023-08-25

## 2023-08-16 RX ORDER — ACETAMINOPHEN 325 MG/1
650 TABLET ORAL EVERY 8 HOURS PRN
Status: DISCONTINUED | OUTPATIENT
Start: 2023-08-16 | End: 2023-08-27 | Stop reason: HOSPADM

## 2023-08-16 RX ORDER — OXYBUTYNIN CHLORIDE 10 MG/1
10 TABLET, EXTENDED RELEASE ORAL DAILY
Status: DISCONTINUED | OUTPATIENT
Start: 2023-08-17 | End: 2023-08-16

## 2023-08-16 RX ORDER — ISOSORBIDE DINITRATE 10 MG/1
10 TABLET ORAL 2 TIMES DAILY
COMMUNITY
End: 2023-09-18 | Stop reason: SDUPTHER

## 2023-08-16 RX ORDER — IBUPROFEN 200 MG
16 TABLET ORAL
Status: DISCONTINUED | OUTPATIENT
Start: 2023-08-16 | End: 2023-08-27 | Stop reason: HOSPADM

## 2023-08-16 RX ORDER — POLYETHYLENE GLYCOL 3350 17 G/17G
17 POWDER, FOR SOLUTION ORAL 2 TIMES DAILY PRN
Status: DISCONTINUED | OUTPATIENT
Start: 2023-08-16 | End: 2023-08-27 | Stop reason: HOSPADM

## 2023-08-16 RX ORDER — IBUPROFEN 200 MG
24 TABLET ORAL
Status: DISCONTINUED | OUTPATIENT
Start: 2023-08-16 | End: 2023-08-27 | Stop reason: HOSPADM

## 2023-08-16 RX ORDER — HYDRALAZINE HYDROCHLORIDE 20 MG/ML
10 INJECTION INTRAMUSCULAR; INTRAVENOUS EVERY 4 HOURS PRN
Status: DISCONTINUED | OUTPATIENT
Start: 2023-08-17 | End: 2023-08-27 | Stop reason: HOSPADM

## 2023-08-16 RX ORDER — MIDODRINE HYDROCHLORIDE 2.5 MG/1
2.5 TABLET ORAL 3 TIMES DAILY
Status: DISCONTINUED | OUTPATIENT
Start: 2023-08-17 | End: 2023-08-16

## 2023-08-16 RX ORDER — METOPROLOL TARTRATE 25 MG/1
12.5 TABLET ORAL 2 TIMES DAILY
Status: DISCONTINUED | OUTPATIENT
Start: 2023-08-16 | End: 2023-08-17

## 2023-08-16 RX ORDER — ATORVASTATIN CALCIUM 40 MG/1
40 TABLET, FILM COATED ORAL DAILY
Status: DISCONTINUED | OUTPATIENT
Start: 2023-08-17 | End: 2023-08-27 | Stop reason: HOSPADM

## 2023-08-16 RX ORDER — TAMSULOSIN HYDROCHLORIDE 0.4 MG/1
0.4 CAPSULE ORAL DAILY
Status: DISCONTINUED | OUTPATIENT
Start: 2023-08-17 | End: 2023-08-16

## 2023-08-16 RX ORDER — MIDODRINE HYDROCHLORIDE 2.5 MG/1
2.5 TABLET ORAL
Status: DISCONTINUED | OUTPATIENT
Start: 2023-08-17 | End: 2023-08-17

## 2023-08-16 RX ORDER — CLOPIDOGREL BISULFATE 75 MG/1
75 TABLET ORAL DAILY
Status: DISCONTINUED | OUTPATIENT
Start: 2023-08-17 | End: 2023-08-27 | Stop reason: HOSPADM

## 2023-08-16 RX ORDER — PIOGLITAZONEHYDROCHLORIDE 15 MG/1
15 TABLET ORAL DAILY
COMMUNITY
Start: 2023-06-28 | End: 2023-09-21

## 2023-08-16 RX ORDER — FINASTERIDE 5 MG/1
5 TABLET, FILM COATED ORAL DAILY
Status: DISCONTINUED | OUTPATIENT
Start: 2023-08-17 | End: 2023-08-27 | Stop reason: HOSPADM

## 2023-08-16 RX ORDER — SODIUM CHLORIDE 0.9 % (FLUSH) 0.9 %
10 SYRINGE (ML) INJECTION
Status: DISCONTINUED | OUTPATIENT
Start: 2023-08-16 | End: 2023-08-27 | Stop reason: HOSPADM

## 2023-08-16 RX ORDER — NALOXONE HCL 0.4 MG/ML
0.02 VIAL (ML) INJECTION
Status: DISCONTINUED | OUTPATIENT
Start: 2023-08-16 | End: 2023-08-27 | Stop reason: HOSPADM

## 2023-08-16 RX ORDER — METOPROLOL TARTRATE 25 MG/1
12.5 TABLET ORAL 2 TIMES DAILY
Status: DISCONTINUED | OUTPATIENT
Start: 2023-08-16 | End: 2023-08-16

## 2023-08-16 RX ORDER — IPRATROPIUM BROMIDE AND ALBUTEROL SULFATE 2.5; .5 MG/3ML; MG/3ML
3 SOLUTION RESPIRATORY (INHALATION) EVERY 4 HOURS PRN
Status: DISCONTINUED | OUTPATIENT
Start: 2023-08-16 | End: 2023-08-27 | Stop reason: HOSPADM

## 2023-08-16 RX ORDER — GLUCAGON 1 MG
1 KIT INJECTION
Status: DISCONTINUED | OUTPATIENT
Start: 2023-08-16 | End: 2023-08-25

## 2023-08-16 RX ORDER — ONDANSETRON 2 MG/ML
4 INJECTION INTRAMUSCULAR; INTRAVENOUS EVERY 6 HOURS PRN
Status: DISCONTINUED | OUTPATIENT
Start: 2023-08-16 | End: 2023-08-27 | Stop reason: HOSPADM

## 2023-08-16 RX ORDER — ASPIRIN 81 MG/1
81 TABLET ORAL DAILY
Status: DISCONTINUED | OUTPATIENT
Start: 2023-08-17 | End: 2023-08-27 | Stop reason: HOSPADM

## 2023-08-16 RX ORDER — SERTRALINE HYDROCHLORIDE 50 MG/1
50 TABLET, FILM COATED ORAL DAILY
Status: DISCONTINUED | OUTPATIENT
Start: 2023-08-17 | End: 2023-08-27 | Stop reason: HOSPADM

## 2023-08-16 RX ORDER — ACETAMINOPHEN 500 MG
1000 TABLET ORAL
Status: COMPLETED | OUTPATIENT
Start: 2023-08-16 | End: 2023-08-16

## 2023-08-16 RX ORDER — SODIUM CHLORIDE 9 MG/ML
1000 INJECTION, SOLUTION INTRAVENOUS
Status: COMPLETED | OUTPATIENT
Start: 2023-08-16 | End: 2023-08-16

## 2023-08-16 RX ORDER — MONTELUKAST SODIUM 10 MG/1
10 TABLET ORAL NIGHTLY
Status: DISCONTINUED | OUTPATIENT
Start: 2023-08-16 | End: 2023-08-27 | Stop reason: HOSPADM

## 2023-08-16 RX ORDER — INSULIN ASPART 100 [IU]/ML
1-10 INJECTION, SOLUTION INTRAVENOUS; SUBCUTANEOUS
Status: DISCONTINUED | OUTPATIENT
Start: 2023-08-16 | End: 2023-08-27 | Stop reason: HOSPADM

## 2023-08-16 RX ORDER — SIMETHICONE 80 MG
1 TABLET,CHEWABLE ORAL 4 TIMES DAILY PRN
Status: DISCONTINUED | OUTPATIENT
Start: 2023-08-16 | End: 2023-08-27 | Stop reason: HOSPADM

## 2023-08-16 RX ORDER — OXYBUTYNIN CHLORIDE 5 MG/1
10 TABLET, EXTENDED RELEASE ORAL DAILY
Status: DISCONTINUED | OUTPATIENT
Start: 2023-08-17 | End: 2023-08-27 | Stop reason: HOSPADM

## 2023-08-16 RX ORDER — HYDROCODONE BITARTRATE AND ACETAMINOPHEN 5; 325 MG/1; MG/1
1 TABLET ORAL EVERY 4 HOURS PRN
Status: DISCONTINUED | OUTPATIENT
Start: 2023-08-16 | End: 2023-08-27 | Stop reason: HOSPADM

## 2023-08-16 RX ADMIN — HYDRALAZINE HYDROCHLORIDE 10 MG: 20 INJECTION INTRAMUSCULAR; INTRAVENOUS at 11:08

## 2023-08-16 RX ADMIN — ACETAMINOPHEN 1000 MG: 500 TABLET ORAL at 09:08

## 2023-08-16 RX ADMIN — SODIUM CHLORIDE 1000 ML: 0.9 INJECTION, SOLUTION INTRAVENOUS at 10:08

## 2023-08-16 RX ADMIN — BACITRACIN ZINC, NEOMYCIN, POLYMYXIN B: 400; 3.5; 5 OINTMENT TOPICAL at 10:08

## 2023-08-16 NOTE — PROGRESS NOTES
[facility]   Physical Therapy Treatment Note      Name: Tono Saez  Clinic Number: [id number]    Therapy Diagnosis:   Encounter Diagnoses   Name Primary?    Gait instability Yes    Imbalance     Leg weakness, bilateral      Physician: Scott Staley MD    Visit Date: 8/16/2023    Physician Orders: PT Eval and Treat  Medical Diagnosis from Referral: gait instability  Evaluation Date: 6/14/2023  Authorization Period Expiration: 12/31/2023  Plan of Care Expiration: 08/26/2023  Visit # / Visits authorized: 14/ 20     Time In: 1530  Time Out: 1616  Total Billable Time: 46 minutes     Precautions: Diabetes and Fall      Subjective     Pt reports: no real pain today.    He was compliant with home exercise program.  Response to previous treatment: no changes  Functional change: none    Pain: 0/10  Location: bilateral lower extremities      Objective       n/a    Treatment     Steven received the treatments listed below:      therapeutic exercises to develop strength and endurance for 18 minutes including:     X 10 minutes SciFit (level 2.5) to promote flexibility prior to strength/mobility training              X 15 each seated bilateral lower extremity therapeutic exercise (4#) = marching, long arc quad, ball squeeze  X 10 sit to stands with single upper extremity support        neuromuscular re-education activities to improve: Balance for 28 minutes. The following activities were included:     X 20 each balance therapeutic exercise = mini squats, heel raises/toe raises   X 25 feet balance gait = side stepping, backwards gait              X 90 seconds stand on AirEx              X 15 alternating toe taps on 5-inch stool while standing on AirEx  X 30 seconds each full Romberg stance on AirEx = eyes open*/eyes closed**              Patient Education and Home Exercises       Education provided:   - proper therapeutic exercise technique  - continue home exercise program     Written Home Exercises  Provided: Patient instructed to cont prior HEP.       Assessment     Patient was able to tolerate increased resistance on the SciFit and during seated bilateral lower extremity therapeutic exercise; increased stance time demonstrated while standing on AirEx; increased repetitions performed during sit to stands with single upper extremity support; 50% full Romberg training performed on AirEx - minimal sway noted while eyes open, frequent loss of balance demonstrated while eyes closed; single upper extremity support needed during balance therapeutic exercise - no upper extremity support needed during balance gait; alternating toe taps progressed to standing on AirEx - single upper extremity support needed.    Steven Is progressing fairly well towards his goals.   Pt prognosis is Fair.     Pt will continue to benefit from skilled outpatient physical therapy to address the deficits listed in the problem list box on initial evaluation, provide pt/family education and to maximize pt's level of independence in the home and community environment.     Pt's spiritual, cultural and educational needs considered and pt agreeable to plan of care and goals.     Anticipated barriers to physical therapy: vital signs, generalized weakness and imbalance    Goals:     New Short Term Goals (3 Weeks):    Maintain patient's complaints of low back pain at less than 5/10 during activities of daily living. (MET)  2.   Patient to tolerate x 45 seconds bilateral step stance, eyes open to improve upright tolerance. (MET)  3.   Patient to tolerate use of 3# weights during lower extremity therapeutic exercise to improve overall strength. (PART MET)  4.   Patient to perform x 10 repetitions sit to stand so that he may rise without upper extremity support. (PART MET)     New Long Term Goals (6 Weeks):   Patient to demonstrate competence with home exercise program to maintain therapeutic gains. (PART MET)  2.   Patient to report improvement in his  ability to walk around a room from limited a little to not limited at all. (NOT MET)  3.   Patient to report no new falls. (MET)  4.   Patient to ambulate 200 feet with rolling-walker and stand by assist to improve household mobility. (MET)      Plan     Continue to progress strength/balance/mobility training to patient's tolerance.       Hubert Powers, PT

## 2023-08-16 NOTE — Clinical Note
The catheter was repositioned into the ostial SVG graft. An angiography was performed of the graft. The angiography was performed via power injection. The injected amount was 6 mL contrast at 3 mL/s. The PSI from the power injection was 300.

## 2023-08-16 NOTE — Clinical Note
The catheter was repositioned into the ostial SVG graft. An angiography was performed Multiple views were taken. The angiography was performed via power injection. The injected amount was 6 mL contrast at 3 mL/s. The PSI from the power injection was 300.

## 2023-08-16 NOTE — Clinical Note
ostial LIMA graft. An angiography was performed of the graft. Multiple views were taken. The angiography was performed via power injection. The injected amount was 6 mL contrast at 3 mL/s. The PSI from the power injection was 300.

## 2023-08-17 ENCOUNTER — HOSPITAL ENCOUNTER (OUTPATIENT)
Dept: PREADMISSION TESTING | Facility: HOSPITAL | Age: 78
Discharge: HOME | End: 2023-08-17
Attending: SPECIALIST
Payer: MEDICARE

## 2023-08-17 ENCOUNTER — CLINICAL SUPPORT (OUTPATIENT)
Dept: CARDIOLOGY | Facility: HOSPITAL | Age: 78
DRG: 247 | End: 2023-08-17
Attending: FAMILY MEDICINE
Payer: MEDICARE

## 2023-08-17 VITALS — BODY MASS INDEX: 24.96 KG/M2 | HEIGHT: 72 IN | WEIGHT: 184.31 LBS

## 2023-08-17 PROBLEM — S32.010G: Status: ACTIVE | Noted: 2023-02-17

## 2023-08-17 LAB
ALBUMIN SERPL BCP-MCNC: 3.4 G/DL (ref 3.5–5.2)
ALP SERPL-CCNC: 82 U/L (ref 55–135)
ALT SERPL W/O P-5'-P-CCNC: 15 U/L (ref 10–44)
ANION GAP SERPL CALC-SCNC: 7 MMOL/L (ref 8–16)
AST SERPL-CCNC: 20 U/L (ref 10–40)
BASOPHILS # BLD AUTO: 0.04 K/UL (ref 0–0.2)
BASOPHILS NFR BLD: 0.6 % (ref 0–1.9)
BILIRUB SERPL-MCNC: 1.1 MG/DL (ref 0.1–1)
BSA FOR ECHO PROCEDURE: 2.06 M2
BUN SERPL-MCNC: 28 MG/DL (ref 8–23)
CALCIUM SERPL-MCNC: 9.5 MG/DL (ref 8.7–10.5)
CHLORIDE SERPL-SCNC: 108 MMOL/L (ref 95–110)
CO2 SERPL-SCNC: 26 MMOL/L (ref 23–29)
CREAT SERPL-MCNC: 1.4 MG/DL (ref 0.5–1.4)
CV ECHO LV RWT: 0.56 CM
DIFFERENTIAL METHOD: ABNORMAL
ECHO LV POSTERIOR WALL: 1.36 CM (ref 0.6–1.1)
EOSINOPHIL # BLD AUTO: 0.2 K/UL (ref 0–0.5)
EOSINOPHIL NFR BLD: 2.8 % (ref 0–8)
ERYTHROCYTE [DISTWIDTH] IN BLOOD BY AUTOMATED COUNT: 15.6 % (ref 11.5–14.5)
EST. GFR  (NO RACE VARIABLE): 50.8 ML/MIN/1.73 M^2
ESTIMATED AVG GLUCOSE: 160 MG/DL (ref 68–131)
FRACTIONAL SHORTENING: 20 % (ref 28–44)
GLUCOSE SERPL-MCNC: 153 MG/DL (ref 70–110)
HBA1C MFR BLD: 7.2 % (ref 4.5–6.2)
HCT VFR BLD AUTO: 37.7 % (ref 40–54)
HGB BLD-MCNC: 11.3 G/DL (ref 14–18)
IMM GRANULOCYTES # BLD AUTO: 0.02 K/UL (ref 0–0.04)
IMM GRANULOCYTES NFR BLD AUTO: 0.3 % (ref 0–0.5)
INTERVENTRICULAR SEPTUM: 1.67 CM (ref 0.6–1.1)
LEFT INTERNAL DIMENSION IN SYSTOLE: 3.85 CM (ref 2.1–4)
LEFT VENTRICLE DIASTOLIC VOLUME INDEX: 53.4 ML/M2
LEFT VENTRICLE DIASTOLIC VOLUME: 110 ML
LEFT VENTRICLE MASS INDEX: 151 G/M2
LEFT VENTRICLE SYSTOLIC VOLUME INDEX: 31 ML/M2
LEFT VENTRICLE SYSTOLIC VOLUME: 63.9 ML
LEFT VENTRICULAR INTERNAL DIMENSION IN DIASTOLE: 4.84 CM (ref 3.5–6)
LEFT VENTRICULAR MASS: 311.82 G
LYMPHOCYTES # BLD AUTO: 1.1 K/UL (ref 1–4.8)
LYMPHOCYTES NFR BLD: 14.9 % (ref 18–48)
MAGNESIUM SERPL-MCNC: 1.8 MG/DL (ref 1.6–2.6)
MCH RBC QN AUTO: 26.3 PG (ref 27–31)
MCHC RBC AUTO-ENTMCNC: 30 G/DL (ref 32–36)
MCV RBC AUTO: 88 FL (ref 82–98)
MONOCYTES # BLD AUTO: 0.8 K/UL (ref 0.3–1)
MONOCYTES NFR BLD: 10.6 % (ref 4–15)
NEUTROPHILS # BLD AUTO: 5.1 K/UL (ref 1.8–7.7)
NEUTROPHILS NFR BLD: 70.8 % (ref 38–73)
NRBC BLD-RTO: 0 /100 WBC
OHS LV EJECTION FRACTION SIMPSONS BIPLANE MOD: 48 %
PLATELET # BLD AUTO: 132 K/UL (ref 150–450)
PMV BLD AUTO: 12.4 FL (ref 9.2–12.9)
POTASSIUM SERPL-SCNC: 3.9 MMOL/L (ref 3.5–5.1)
PROT SERPL-MCNC: 6 G/DL (ref 6–8.4)
RA PRESSURE ESTIMATED: 3 MMHG
RBC # BLD AUTO: 4.29 M/UL (ref 4.6–6.2)
SODIUM SERPL-SCNC: 141 MMOL/L (ref 136–145)
TROPONIN I SERPL HS-MCNC: 37.7 PG/ML (ref 0–14.9)
WBC # BLD AUTO: 7.24 K/UL (ref 3.9–12.7)
Z-SCORE OF LEFT VENTRICULAR DIMENSION IN END DIASTOLE: -2.51
Z-SCORE OF LEFT VENTRICULAR DIMENSION IN END SYSTOLE: 0.09

## 2023-08-17 PROCEDURE — 93308 TTE F-UP OR LMTD: CPT

## 2023-08-17 PROCEDURE — 80053 COMPREHEN METABOLIC PANEL: CPT | Performed by: FAMILY MEDICINE

## 2023-08-17 PROCEDURE — 94760 N-INVAS EAR/PLS OXIMETRY 1: CPT

## 2023-08-17 PROCEDURE — 97535 SELF CARE MNGMENT TRAINING: CPT

## 2023-08-17 PROCEDURE — 36415 COLL VENOUS BLD VENIPUNCTURE: CPT | Performed by: STUDENT IN AN ORGANIZED HEALTH CARE EDUCATION/TRAINING PROGRAM

## 2023-08-17 PROCEDURE — 99900035 HC TECH TIME PER 15 MIN (STAT)

## 2023-08-17 PROCEDURE — 94761 N-INVAS EAR/PLS OXIMETRY MLT: CPT

## 2023-08-17 PROCEDURE — 94799 UNLISTED PULMONARY SVC/PX: CPT

## 2023-08-17 PROCEDURE — 97530 THERAPEUTIC ACTIVITIES: CPT

## 2023-08-17 PROCEDURE — 85025 COMPLETE CBC W/AUTO DIFF WBC: CPT | Performed by: FAMILY MEDICINE

## 2023-08-17 PROCEDURE — 97165 OT EVAL LOW COMPLEX 30 MIN: CPT

## 2023-08-17 PROCEDURE — 25000003 PHARM REV CODE 250: Performed by: FAMILY MEDICINE

## 2023-08-17 PROCEDURE — 21400001 HC TELEMETRY ROOM

## 2023-08-17 PROCEDURE — 93308 TTE F-UP OR LMTD: CPT | Mod: 26,,, | Performed by: INTERNAL MEDICINE

## 2023-08-17 PROCEDURE — 36415 COLL VENOUS BLD VENIPUNCTURE: CPT | Performed by: FAMILY MEDICINE

## 2023-08-17 PROCEDURE — 84484 ASSAY OF TROPONIN QUANT: CPT | Performed by: STUDENT IN AN ORGANIZED HEALTH CARE EDUCATION/TRAINING PROGRAM

## 2023-08-17 PROCEDURE — 83735 ASSAY OF MAGNESIUM: CPT | Performed by: FAMILY MEDICINE

## 2023-08-17 PROCEDURE — 93308 ECHO (CUPID ONLY): ICD-10-PCS | Mod: 26,,, | Performed by: INTERNAL MEDICINE

## 2023-08-17 RX ADMIN — MONTELUKAST 10 MG: 10 TABLET, FILM COATED ORAL at 08:08

## 2023-08-17 RX ADMIN — ASPIRIN 81 MG: 81 TABLET, COATED ORAL at 09:08

## 2023-08-17 RX ADMIN — CLOPIDOGREL BISULFATE 75 MG: 75 TABLET, FILM COATED ORAL at 09:08

## 2023-08-17 RX ADMIN — METOPROLOL TARTRATE 12.5 MG: 25 TABLET, FILM COATED ORAL at 09:08

## 2023-08-17 RX ADMIN — ATORVASTATIN CALCIUM 40 MG: 40 TABLET, FILM COATED ORAL at 08:08

## 2023-08-17 RX ADMIN — FINASTERIDE 5 MG: 5 TABLET, FILM COATED ORAL at 09:08

## 2023-08-17 RX ADMIN — OXYBUTYNIN CHLORIDE 10 MG: 5 TABLET, EXTENDED RELEASE ORAL at 09:08

## 2023-08-17 RX ADMIN — MONTELUKAST 10 MG: 10 TABLET, FILM COATED ORAL at 02:08

## 2023-08-17 RX ADMIN — METOPROLOL TARTRATE 12.5 MG: 25 TABLET, FILM COATED ORAL at 02:08

## 2023-08-17 RX ADMIN — MIDODRINE HYDROCHLORIDE 2.5 MG: 2.5 TABLET ORAL at 05:08

## 2023-08-17 RX ADMIN — SERTRALINE HYDROCHLORIDE 50 MG: 50 TABLET ORAL at 09:08

## 2023-08-17 RX ADMIN — MIDODRINE HYDROCHLORIDE 2.5 MG: 2.5 TABLET ORAL at 12:08

## 2023-08-17 RX ADMIN — SODIUM CHLORIDE: 0.9 INJECTION, SOLUTION INTRAVENOUS at 02:08

## 2023-08-17 NOTE — PLAN OF CARE
Pt was explained COOPER. Pt verbalized understanding of COOPER and signed. COOPER scanned to .       08/17/23 1431   COOPER Message   Medicare Outpatient and Observation Notification regarding financial responsibility Given to patient/caregiver;Explained to patient/caregiver;Signed/date by patient/caregiver   Date COOPER was signed 08/17/23   Time COOPER was signed [id number]

## 2023-08-17 NOTE — H&P
[facility] Medicine  Progress Note    Patient name: Tnoo Saez  MRN: [id number]  Admit Date: 8/16/2023   LOS: 0 days     SUBJECTIVE:     Principal problem: syncope    Interval History:      HPI-  78-year-old male who presents for evaluation after a syncopal episode.  The patient does have frequent falls and unsteady gait for which he  undergoes therapy.  He was leaving an office visit today and reports that he passed.  Reports that this was not his usual trip and fall.  He did strike his head.  Injured and scraped his right arm, his right knee as well as his right anterior chest.  He has had dull aching pain to the right anterior chest since the fall.  The pain is worse with palpation, movement and deep breathing but denies feeling short of breath.  He did not have any chest pain prior to the fall.  Denies any recent illnesses.  Denies fever, chills, cough or any upper or lower respiratory type symptoms.  Denies any abdominal pain or shortness of breath.  Has some mild pain to his right arm associated with a skin tear but denies any pain with range of motion as well as pain with an abrasion to the right knee but denies any pain with range of motion of the knee.  Denies any other problems or complaints.    8/17-  HR 60, hypertensive w/sbp 160s.  Chronic lower back pain.  F/u nsgy recs and echo.  Carotids no significant stenosis.      Scheduled Meds:   aspirin  81 mg Oral Daily    atorvastatin  40 mg Oral Daily    clopidogreL  75 mg Oral Daily    finasteride  5 mg Oral Daily    metoprolol tartrate  12.5 mg Oral BID    midodrine  2.5 mg Oral TID AC    montelukast  10 mg Oral QHS    oxybutynin  10 mg Oral Daily    sertraline  50 mg Oral Daily     Continuous Infusions:   sodium chloride 0.9% 100 mL/hr at 08/17/23 0241     PRN Meds:acetaminophen, albuterol-ipratropium, dextrose 50%, dextrose 50%, glucagon (human recombinant), glucose, glucose, hydrALAZINE, hydrALAZINE, HYDROcodone-acetaminophen,  insulin aspart U-100, melatonin, morphine, naloxone, ondansetron, polyethylene glycol, senna-docusate 8.6-50 mg, simethicone, sodium chloride 0.9%    Review of patient's allergies indicates:   Allergen Reactions    Adhesive Other (See Comments)     SILK TAPE PULL SKIN OFF    Metformin Other (See Comments)     Weight loss cachexia anorexia,diarrhea.    Pcn [penicillins]      Patient states he passed out from this medication when he was 10 years old.        Review of Systems: As per interval history    OBJECTIVE:     Vital Signs (Most Recent)  Temp: 97.8 °F (36.6 °C) (08/17/23 1500)  Pulse: 60 (08/17/23 1500)  Resp: 18 (08/17/23 1500)  BP: (!) 170/93 (08/17/23 1500)  SpO2: 97 % (08/17/23 1500)    Vital Signs Range (Last 24H):  Temp:  [97.8 °F (36.6 °C)-98.8 °F (37.1 °C)]   Pulse:  [60-75]   Resp:  [17-20]   BP: (137-202)/(69-98)   SpO2:  [92 %-98 %]     I & O (Last 24H):  Intake/Output Summary (Last 24 hours) at 8/17/2023 1712  Last data filed at 8/17/2023 1700  Gross per 24 hour   Intake 240 ml   Output 575 ml   Net -335 ml       Physical Exam:  General: Patient resting comfortably in no acute distress. Ecchymosis and swelling right cheek  Eyes: No conjunctival injection. No scleral icterus.  ENT: Hearing grossly intact. No discharge from ears. No nasal discharge.   CVS: RRR.   Lungs:  No tachypnea or accessory muscle use.   Abdomen:  Soft, nontender and nondistended.    Neuro: Alert.  Moves all extremities. Follows commands. Responds appropriately   Skin:  No rash or erythema noted    Laboratory:  I have reviewed all pertinent lab results within the past 24 hours.      ASSESSMENT/PLAN:         Active Hospital Problems    Diagnosis  POA    Syncope and collapse [R55]  Yes    Orthostatic hypotension [I95.1]  Yes    Chronic systolic congestive heart failure [I50.22]  Yes    SSS (sick sinus syndrome) [I49.5]  Yes    Stage 3a chronic kidney disease [N18.31]  Yes    Compression fracture of L1 lumbar vertebra, with delayed  healing, subsequent encounter [S32.010G]  Not Applicable    Coronary artery disease of native artery of native heart with stable angina pectoris [I25.118]  Yes     CABG, STENTS '97,'99,'01,'05    10/26/22:  Summary         The Dist LM lesion was 90% stenosed with 10% stenosis post-intervention.    The pre-procedure left ventricular end diastolic pressure was 12.    A STENT LIMA YOHANA 3.50 X 26 stent was not successfully placed at 12 JASPER for 20 sec.    The estimated blood loss was <50 mL.    Successful intravascular ultrasound-guided PCI of the critical stenosis of left main into circumflex with 1 drug-eluting stent    Patent vein graft to OM, occluded branch of the OM as compared to angiogram in 2016.    Patent vein graft to right coronary artery with patent stent, moderately degenerated, distal native RCA has 80% stenosis in medium caliber vessel.    Occluded LAD with a patent LIMA to LAD        Chronic anticoagulation [Z79.01]  Not Applicable    GERD (gastroesophageal reflux disease) [K21.9]  Yes    Type 2 diabetes mellitus with hyperglycemia  [E11.65]  Yes    HTN (hypertension) [I10]  Yes      Resolved Hospital Problems   No resolved problems to display.         Plan:     Syncope  Hx SSS  Hx combined HF with EF 25% and moderate AS  - CT head Small air-fluid level in the right maxillary sinus could be result of sinus disease or trauma. Right facial soft tissue swelling.  - echo complete, pending read  - carotid US no significant stenosis  - EKG, trending troponin  - telemetry  - discontinue BB, midodrine  - Orthostatic VS are as follows, patient did not tolerate standing. Patient denies symptoms.   Lying:: /86, HR 74 paced  Sitting:: /84, HR 74 paced  - pacemaker interrogation        DM2  - SSI     Chronic conditions as noted above/below; home medications reviewed personally by me and restarted as appropriate  Electrolyte derangement:  Trending BMP; Mg; replacement prn  DVT ppx: lovenox held due to recent  fall with multiple abrasions, risk of bleeding; continue SCDs  FULL CODE           VTE Risk Mitigation (From admission, onward)           Ordered     IP VTE HIGH RISK PATIENT  Once         08/16/23 2225     Place sequential compression device  Until discontinued         08/16/23 2225     Reason for No Pharmacological VTE Prophylaxis  Once        Question:  Reasons:  Answer:  Risk of Bleeding    08/16/23 2225                        Department Hospital Medicine  [facility]  Sixto Schultz MD  Date of service: 08/17/2023

## 2023-08-17 NOTE — ED PROVIDER NOTES
Encounter Date: 8/16/2023       History     Chief Complaint   Patient presents with    Fall     Syncope with fall      Loss of Consciousness     This is an 78-year-old male who presents for evaluation after a syncopal episode.  The patient does have frequent falls and unsteady gait for which he  undergoes therapy.  He was leaving an office visit today and reports that he passed.  Reports that this was not his usual trip and fall.  He did strike his head.  Injured and scraped his right arm, his right knee as well as his right anterior chest.  He has had dull aching pain to the right anterior chest since the fall.  The pain is worse with palpation, movement and deep breathing but denies feeling short of breath.  He did not have any chest pain prior to the fall.  Denies any recent illnesses.  Denies fever, chills, cough or any upper or lower respiratory type symptoms.  Denies any abdominal pain or shortness of breath.  Has some mild pain to his right arm associated with a skin tear but denies any pain with range of motion as well as pain with an abrasion to the right knee but denies any pain with range of motion of the knee.  Denies any other problems or complaints.        Review of patient's allergies indicates:   Allergen Reactions    Adhesive Other (See Comments)     SILK TAPE PULL SKIN OFF    Metformin Other (See Comments)     Weight loss cachexia anorexia,diarrhea.    Pcn [penicillins]      Patient states he passed out from this medication when he was 10 years old.      Past Medical History:   Diagnosis Date    Anemia     Anticoagulant long-term use     Arthritis     CAD (coronary artery disease)     CABG, STENTS '97,'99,'01,'05    Cancer     SKIN    Cataract     Diabetes mellitus     Diabetes mellitus type II     GERD (gastroesophageal reflux disease)     GI bleed 2020    Hypertension     Myocardial infarction     Wears glasses      Past Surgical History:   Procedure Laterality Date    CARDIAC PACEMAKER PLACEMENT       CARDIAC PACEMAKER PLACEMENT      CARDIAC PACEMAKER PLACEMENT  04/26/2018    replaced    CARDIAC SURGERY      1995   CABG X 5    CHOLECYSTECTOMY      COLON SURGERY      COLONOSCOPY N/A 2/21/2020    Procedure: COLONOSCOPY;  Surgeon: Abe Barragan III, MD;  Location: [facility] ENDO;  Service: Endoscopy;  Laterality: N/A;    COLONOSCOPY N/A 2/23/2020    Procedure: COLONOSCOPY;  Surgeon: Bob Locke Jr., MD;  Location: [facility] ENDO;  Service: Endoscopy;  Laterality: N/A;    CORONARY ANGIOGRAPHY INCLUDING BYPASS GRAFTS WITH CATHETERIZATION OF LEFT HEART N/A 10/26/2022    Procedure: ANGIOGRAM, CORONARY, INCLUDING BYPASS GRAFT, WITH LEFT HEART CATHETERIZATION;  Surgeon: Robles Mckoy MD;  Location: [facility] CATH/EP LAB;  Service: Cardiology;  Laterality: N/A;    CORONARY ARTERY BYPASS GRAFT  1995    CORONARY STENT PLACEMENT      stent x 5    ESOPHAGOGASTRODUODENOSCOPY N/A 2/23/2020    Procedure: EGD (ESOPHAGOGASTRODUODENOSCOPY);  Surgeon: Bob Locke Jr., MD;  Location: [facility];  Service: Endoscopy;  Laterality: N/A;    EYE SURGERY      BILAT CATARACT    head laceration   01/19/2018    HEMORRHOID SURGERY      INJECTION OF ANESTHETIC AGENT AROUND MEDIAL BRANCH NERVES INNERVATING LUMBAR FACET JOINT Bilateral 6/29/2023    Procedure: Block-nerve-medial branch-lumbar;  Surgeon: Maurice Montenegro MD;  Location: [facility] OR;  Service: Pain Management;  Laterality: Bilateral;  L3,4,5 MBB    INJECTION OF ANESTHETIC AGENT AROUND MEDIAL BRANCH NERVES INNERVATING LUMBAR FACET JOINT Bilateral 7/25/2023    Procedure: Block-nerve-medial branch-lumbar;  Surgeon: Maurice Montenegro MD;  Location: [facility] OR;  Service: Anesthesiology;  Laterality: Bilateral;  L3,4,5 MBB #2    SMALL BOWEL ENTEROSCOPY N/A 2/21/2020    Procedure: ENTEROSCOPY;  Surgeon: Abe Barragan III, MD;  Location: [facility];  Service: Endoscopy;  Laterality: N/A;    stint       Family History   Problem Relation Age of Onset    Heart disease Mother     Heart disease Father      Hyperlipidemia Sister     Hypertension Sister     Heart disease Brother     Heart disease Brother     Heart disease Brother     Heart disease Brother     Heart disease Brother      Social History     Tobacco Use    Smoking status: Never    Smokeless tobacco: Never   Substance Use Topics    Alcohol use: No    Drug use: No     Review of Systems   Constitutional: Negative.  Negative for activity change, appetite change, diaphoresis, fatigue and fever.   HENT:  Negative for dental problem, facial swelling, nosebleeds and trouble swallowing.    Respiratory: Negative.  Negative for cough, shortness of breath and stridor.    Cardiovascular:  Positive for chest pain. Negative for palpitations and leg swelling.   Gastrointestinal:  Negative for abdominal pain, diarrhea, nausea and vomiting.   Genitourinary:  Positive for hematuria. Negative for difficulty urinating, dysuria, flank pain, genital sores and testicular pain.   Musculoskeletal:  Positive for gait problem (Chronically unsteady gait, no sudden change or worsening.). Negative for back pain and neck pain.   Skin:  Positive for wound (scattered abrasions.).   Neurological:  Positive for syncope, weakness (nonfocal generalized weakness and fatigue), light-headedness and headaches (Has a headache after striking his head but not prior). Negative for seizures, facial asymmetry, speech difficulty and numbness.   Hematological:  Bruises/bleeds easily.   All other systems reviewed and are negative.      Physical Exam     Initial Vitals [08/16/23 1851]   BP Pulse Resp Temp SpO2   137/77 66 17 98.8 °F (37.1 °C) 96 %      MAP       --         Physical Exam    Nursing note and vitals reviewed.  Constitutional: He is active and cooperative. He does not appear ill. No distress.   HENT:   Head: Normocephalic. Head is with abrasion.       Right Ear: No hemotympanum.   Left Ear: No hemotympanum.   Nose: Nose normal. No nasal septal hematoma. Right sinus exhibits no maxillary sinus  tenderness and no frontal sinus tenderness. Left sinus exhibits no maxillary sinus tenderness and no frontal sinus tenderness.   Mouth/Throat: Uvula is midline, oropharynx is clear and moist and mucous membranes are normal. No uvula swelling. No oropharyngeal exudate, posterior oropharyngeal edema or posterior oropharyngeal erythema.   Abrasion to right supraorbital area above eyebrow, no active bleeding, mild hematoma, no step-off.   Eyes: Conjunctivae, EOM and lids are normal. Pupils are equal, round, and reactive to light.   Neck: Trachea normal and phonation normal. Neck supple. No stridor present. No JVD present.   Normal range of motion.   Full passive range of motion without pain.     Cardiovascular:  Normal rate, regular rhythm, normal heart sounds, intact distal pulses and normal pulses.     Exam reveals no gallop, no distant heart sounds, no friction rub and no decreased pulses.       No murmur heard.  Pulmonary/Chest: Effort normal and breath sounds normal. No stridor. No respiratory distress. He has no decreased breath sounds. He has no wheezes. He has no rhonchi. He has no rales. He exhibits tenderness. He exhibits no crepitus, no edema, no deformity and no swelling.     Tenderness to palpation of the right anterior chest fall with no deformity or ecchymosis   Abdominal: Abdomen is soft. Bowel sounds are normal. He exhibits no distension, no pulsatile midline mass and no mass. There is no abdominal tenderness. No hernia.   Abdomen completely soft and nontender throughout   No right CVA tenderness.  No left CVA tenderness.   Musculoskeletal:         General: No tenderness or edema. Normal range of motion.      Right hand: Normal. Normal capillary refill. Normal pulse.      Left hand: Normal. Normal capillary refill. Normal pulse.      Cervical back: Normal, full passive range of motion without pain, normal range of motion and neck supple. No erythema, tenderness or bony tenderness. No pain with movement  or spinous process tenderness. Normal range of motion and normal range of motion. Normal.      Thoracic back: Normal. No swelling, tenderness or bony tenderness. Normal range of motion.      Lumbar back: Normal. No swelling, tenderness or bony tenderness. Normal range of motion.      Right foot: Normal. Normal capillary refill. Normal pulse.      Left foot: Normal. Normal capillary refill. Normal pulse.      Comments: Pulses are 2+ throughout, cap refill is less than 2 sec throughout, no edema noted, extremities are nontender throughout with full range of motion  Bruising and skin abrasions noted to the right upper arm and elbow with no associated skeletal tenderness to palpation, scattered abrasions noted throughout with no associated skeletal tenderness to palpation, abrasion noted above right knee again with no associated skeletal tenderness to palpation or pain with range of motion.  Hips nontender with full range of motion bilaterally.  Patient is neurovascularly intact throughout all extremities.     Neurological: He is alert and oriented to person, place, and time. He has normal strength. No cranial nerve deficit or sensory deficit.   No focal deficits   Skin: Skin is warm and dry. Capillary refill takes less than 2 seconds. Abrasion, bruising and ecchymosis noted. No petechiae and no rash noted. No cyanosis or erythema. No pallor.   Psychiatric: He has a normal mood and affect. His speech is normal and behavior is normal.         ED Course   Procedures  Labs Reviewed   CBC W/ AUTO DIFFERENTIAL - Abnormal; Notable for the following components:       Result Value    RBC 4.27 (*)     Hemoglobin 11.4 (*)     Hematocrit 36.5 (*)     MCH 26.7 (*)     MCHC 31.2 (*)     RDW 15.8 (*)     Platelets 118 (*)     Immature Granulocytes 0.7 (*)     Immature Grans (Abs) 0.05 (*)     Lymph # 0.7 (*)     Gran % 79.3 (*)     Lymph % 9.4 (*)     All other components within normal limits   COMPREHENSIVE METABOLIC PANEL -  Abnormal; Notable for the following components:    Glucose 119 (*)     BUN 29 (*)     Creatinine 1.6 (*)     Total Bilirubin 1.4 (*)     eGFR 43.3 (*)     Anion Gap 7 (*)     All other components within normal limits   MAGNESIUM   TROPONIN I HIGH SENSITIVITY   CK   B-TYPE NATRIURETIC PEPTIDE   TROPONIN I HIGH SENSITIVITY   CK   URINALYSIS MICROSCOPIC   URINALYSIS, REFLEX TO URINE CULTURE          Imaging Results              CT Cervical Spine Without Contrast (Final result)  Result time 08/16/23 21:13:12      Final result by Frantz Pereira MD (08/16/23 21:13:12)                   Narrative:    EXAMINATION: CT HEAD WITHOUT CONTRAST (accession [id number]), CT CERVICAL SPINE WITHOUT CONTRAST (accession [id number])    CLINICAL HISTORY: Head trauma, moderate-severe    COMPARISON: November 19, 2020.    TECHNIQUE: Noncontrast axial images of the cervical spine and head were obtained. Sagittal and coronal reformatted images obtained.    FINDINGS:  CT cervical spine: High-grade disc space narrowing at C3-4, C5-6. Degenerative spondylosis. Facet degeneration most prominent on the left at C4-5. No evidence of fracture or acute subluxation. Dextroconvex curvature present.    CT HEAD: Small air-fluid level in the right maxillary sinus. The other sinuses are clear. No apparent skull fracture. The mastoid air cells and middle ears are clear. Right cheek soft tissue swelling may be related to recent trauma. No apparent facial bone or right orbit fracture demonstrated.    Age consistent volume loss. Moderate age-related small vessel cerebral white matter disease. No prior large vessel ischemia. No intracranial hemorrhage or mass lesion.    Bilateral cataract surgery the orbits otherwise normal.    The cerebellar tonsils are in the appropriate position. Pituitary gland is normal size.    IMPRESSION:  1: Degenerative changes of the cervical spine without evidence of acute traumatic injury.  2: Age-related changes within the brain  without acute intracranial finding  3: Small air-fluid level in the right maxillary sinus could be result of sinus disease or trauma. Right facial soft tissue swelling.    RADIATION DOSE REDUCTION: This exam was performed according to our departmental dose-optimization program which includes use of Automated Exposure Control, adjustment of the mA and/or kV according to patient size and/or use of iterative reconstruction technique.    Electronically signed by:  Frantz Pereira MD  8/16/2023 9:13 PM CDT Workstation: [id number]                                     CT Head Without Contrast (Final result)  Result time 08/16/23 21:13:12      Final result by Frantz Pereira MD (08/16/23 21:13:12)                   Narrative:    EXAMINATION: CT HEAD WITHOUT CONTRAST (accession [id number]), CT CERVICAL SPINE WITHOUT CONTRAST (accession [id number])    CLINICAL HISTORY: Head trauma, moderate-severe    COMPARISON: November 19, 2020.    TECHNIQUE: Noncontrast axial images of the cervical spine and head were obtained. Sagittal and coronal reformatted images obtained.    FINDINGS:  CT cervical spine: High-grade disc space narrowing at C3-4, C5-6. Degenerative spondylosis. Facet degeneration most prominent on the left at C4-5. No evidence of fracture or acute subluxation. Dextroconvex curvature present.    CT HEAD: Small air-fluid level in the right maxillary sinus. The other sinuses are clear. No apparent skull fracture. The mastoid air cells and middle ears are clear. Right cheek soft tissue swelling may be related to recent trauma. No apparent facial bone or right orbit fracture demonstrated.    Age consistent volume loss. Moderate age-related small vessel cerebral white matter disease. No prior large vessel ischemia. No intracranial hemorrhage or mass lesion.    Bilateral cataract surgery the orbits otherwise normal.    The cerebellar tonsils are in the appropriate position. Pituitary gland is normal  size.    IMPRESSION:  1: Degenerative changes of the cervical spine without evidence of acute traumatic injury.  2: Age-related changes within the brain without acute intracranial finding  3: Small air-fluid level in the right maxillary sinus could be result of sinus disease or trauma. Right facial soft tissue swelling.    RADIATION DOSE REDUCTION: This exam was performed according to our departmental dose-optimization program which includes use of Automated Exposure Control, adjustment of the mA and/or kV according to patient size and/or use of iterative reconstruction technique.    Electronically signed by:  Frantz Pereira MD  8/16/2023 9:13 PM CDT Workstation: [id number]                                     CT Chest Without Contrast (Final result)  Result time 08/16/23 21:09:10      Final result by Frantz Pereira MD (08/16/23 21:09:10)                   Narrative:    EXAM DESCRIPTION: CT CHEST WITHOUT CONTRAST    CLINICAL INDICATION: Chest trauma, blunt    COMPARISON: None    TECHNIQUE: Axial imaging obtained through the chest. Sagittal and coronal reconstructions.    CONTRAST: None    FINDINGS:  CT chest: The lungs are clear except for dependent changes and possibly mild chronic interstitial disease. No significant emphysema. No acute-appearing lung infiltrates. The heart is enlarged with previous sternotomy. The thoracic aorta is atherosclerotic but normal size.    There are no pleural or pericardial effusions. No mediastinal or hilar adenopathy. No evidence of acute mediastinal injury.    There is a pacemaker implanted in the left chest wall.    The adrenal glands are normal. The gallbladder has been removed. No acute findings are demonstrated on the upper abdominal images.    There is compression fracture of the L1 vertebral body which may be recent. Slight retropulsion of superior endplate causing mild spinal canal narrowing. This fracture has increased in prominence over February  2023.      IMPRESSION:  1: Heart disease.  2: Worsening L1 compression fracture which appears subacute.  3: No acute cardiopulmonary finding.      RADIATION DOSE REDUCTION: This exam was performed according to our departmental dose-optimization program which includes use of Automated Exposure Control, adjustment of the mA and/or kV according to patient size and/or use of iterative reconstruction technique.    Electronically signed by:  Frantz Pereira MD  8/16/2023 9:09 PM CDT Workstation: [id number]                                     Medications   0.9%  NaCl infusion (has no administration in time range)   neomycin-bacitracin-polymyxin ointment ( Topical (Top) Given 8/16/23 2201)   acetaminophen tablet 1,000 mg (1,000 mg Oral Given 8/16/23 2156)   0.9%  NaCl infusion (1,000 mLs Intravenous New Bag 8/16/23 2205)     Medical Decision Making:   Clinical Tests:   Lab Tests: Ordered and Reviewed  Radiological Study: Ordered and Reviewed  Medical Tests: Ordered and Reviewed  ED Management:  Emergent evaluation of an 78-year-old male presenting after a syncopal episode.  The patient is currently hemodynamically stable.  Has scattered bruises and abrasions.  No associated skeletal tenderness to palpation.  Good range of motion throughout all extremities.  No associated skeletal tenderness to palpation to these areas.  Last tetanus was in 2019 by review.  Patient has undergone wound care and cleansing with antibiotic ointment placement and nonadherent dressings.  Complaining of pain to the anterior right chest wall that occurred after the fall and not prior.  He has reproducible tenderness to this area.  EKG demonstrates a paced rhythm.  CT scan of the head and chest are negative for evidence of acute traumatic injury.  CT scan of the chest demonstrates an L1 fracture that was noted in February of this year.  Clinically correlating does not have any complaints of spinal or back pain and does not have any reproducible  spinal tenderness.  Secondary to cardiac risk factors, high-risk syncope--have discussed the case with the hospitalist provider who has assumed care and will admit.                          Clinical Impression:   Final diagnoses:  [R55] Syncope        ED Disposition Condition    Observation                 Rebecca Howard MD  08/16/23 1968

## 2023-08-17 NOTE — ASSESSMENT & PLAN NOTE
Mr. Saez is an 78-year-old male with a past medical history of diabetes, GERD, sick sinus syndrome, chronic heart failure, orthostatic hypotension, chronic kidney disease, syncopal episode, and L1 compression fracture.  Patient is currently asymptomatic and is neurologically intact.  Due to patient's comorbidities such as sick sinus syndrome, chronic heart failure, chronic kidney disease, he would not be a good candidate for surgical intervention. At this time would recommend continue conservative management of symptoms with TLSO bracing for the next 6 weeks, OTC pain medication and Pain Management clinic.     Patient can follow-up as needed    Discussed plan with Dr. Lion.

## 2023-08-17 NOTE — CONSULTS
[facility]  Neurosurgery  Consult Note    Consults  Subjective:     Chief Complaint/Reason for Admission:L1 compression fracture    History of Present Illness: (Per ED note on 8/16/23). This is an 78-year-old male who presents for evaluation after a syncopal episode.  The patient does have frequent falls and unsteady gait for which he  undergoes therapy.  He was leaving an office visit today and reports that he passed.  Reports that this was not his usual trip and fall.  He did strike his head.  Injured and scraped his right arm, his right knee as well as his right anterior chest.  He has had dull aching pain to the right anterior chest since the fall.  The pain is worse with palpation, movement and deep breathing but denies feeling short of breath.  He did not have any chest pain prior to the fall.  Denies any recent illnesses.  Denies fever, chills, cough or any upper or lower respiratory type symptoms.  Denies any abdominal pain or shortness of breath.  Has some mild pain to his right arm associated with a skin tear but denies any pain with range of motion as well as pain with an abrasion to the right knee but denies any pain with range of motion of the knee.  Denies any other problems or complaints.    CT chest obtained on 8/16/23 revealed a subacute L1 compression fracture.    8/17/23. Pt was found in bed, awake and alert. No family was present at time of encounter.  Patient does have a history of sick sinus syndrome and previous syncopal episodes.  L1 compression fracture was discovered 2/3/23 after a fall which was treated with bracing and physical therapy. Pt was eventually referred to pain management and underwent L3-5 bilateral medial branch nerve block on 6/29/23 and 7/9/23. Pt will also be undergoing a medial branch ablation on 9/13/23.  He reports chronic back pain for many years.  He also believed he fell due to weakness in legs.  At baseline patient does use a walker however he did not  use at time of fall because he believe it was not necessary due to the short distance.  Denies back pain, bowel or bladder changes or paresthesias.    Medical history provided by patient and medical record.       Medications Prior to Admission   Medication Sig Dispense Refill Last Dose    aspirin (ECOTRIN) 81 MG EC tablet Take 2 tablets (162 mg total) by mouth once daily. (Patient taking differently: Take 81 mg by mouth once daily.) 120 tablet 0 2023 at 11:00    atorvastatin (LIPITOR) 40 MG tablet Take 1 tablet by mouth once daily.   2023 at 11:00    betamethasone dipropionate (DIPROLENE) 0.05 % lotion Apply thin film to scalp bid prn itch (Patient taking differently: Apply 1 g topically 2 (two) times daily. Apply thin film to scalp bid prn itch) 60 mL 6 2023 at 11:00    clopidogreL (PLAVIX) 75 mg tablet Take 1 tablet (75 mg total) by mouth once daily. 90 tablet 0 2023 at 11:00    empagliflozin (JARDIANCE) 25 mg tablet Take 1 tablet (25 mg total) by mouth once daily. 90 tablet 2 2023 at 11:00    finasteride (PROSCAR) 5 mg tablet Take 5 mg by mouth once daily.   2023 at 11:00    furosemide (LASIX) 20 MG tablet TAKE 1 TABLET (20 MG TOTAL) BY MOUTH ONCE DAILY. (Patient taking differently: Take 20 mg by mouth once daily.) 90 tablet 3 Past Week    isosorbide dinitrate (ISORDIL) 10 MG tablet Take 10 mg by mouth 2 (two) times daily.   2023 at 11:00    [] magnesium oxide (MAG-OX) 400 mg (241.3 mg magnesium) tablet Take 1 tablet (400 mg total) by mouth once daily. 90 tablet 1 2023 at 11:00    metoprolol tartrate (LOPRESSOR) 25 MG tablet Take 0.5 tablets (12.5 mg total) by mouth 2 (two) times daily. 60 tablet 0 2023 at 11:00    midodrine (PROAMATINE) 2.5 MG Tab Take 1 tablet (2.5 mg total) by mouth 3 (three) times daily. 90 tablet 11 2023 at 11:00    montelukast (SINGULAIR) 10 mg tablet Take 1 tablet (10 mg total) by mouth every evening. 90 tablet 3  8/15/2023 at 11:00    multivitamin capsule Take 1 capsule by mouth once daily.   8/16/2023 at 11:00    pantoprazole (PROTONIX) 40 MG tablet TAKE ONE TABLET BY MOUTH ONCE DAILY (Patient taking differently: Take 40 mg by mouth once daily.) 90 tablet 1 8/16/2023 at 11:00    pioglitazone (ACTOS) 15 MG tablet Take 15 mg by mouth once daily.   8/16/2023 at 11:00    pregabalin (LYRICA) 50 MG capsule Take 1 capsule (50 mg total) by mouth 3 (three) times daily. 90 capsule 1 8/16/2023 at 11:00    sertraline (ZOLOFT) 50 MG tablet TAKE ONE TABLET BY MOUTH ONCE DAILY (Patient taking differently: Take 50 mg by mouth once daily.) 90 tablet 1 8/16/2023 at 11:00    tolterodine (DETROL LA) 4 MG 24 hr capsule TAKE ONE CAPSULE BY MOUTH ONCE DAILY (Patient taking differently: Take 4 mg by mouth once daily.) 90 capsule 1 8/15/2023 at 11:00    vitamin D 1000 units Tab Take 1,000 Units by mouth once daily.   8/16/2023 at 11:00    atorvastatin (LIPITOR) 40 MG tablet Take 1 tablet (40 mg total) by mouth every evening. 90 tablet 1     fluticasone propionate (FLONASE) 50 mcg/actuation nasal spray 1 spray by Each Nostril route as needed for Allergies.   More than a month    HYDROcodone-acetaminophen (NORCO) 5-325 mg per tablet Take 1 tablet by mouth every 4 (four) hours as needed for Pain. 18 tablet 0     nitroGLYCERIN (NITROSTAT) 0.4 MG SL tablet DISSOLVE 1 TABLET UNDER THE TONGUE AS NEEDED FOR CHEST PAIN (Patient taking differently: Place 0.4 mg under the tongue every 5 (five) minutes as needed.) 100 tablet 3        Review of patient's allergies indicates:   Allergen Reactions    Adhesive Other (See Comments)     SILK TAPE PULL SKIN OFF    Metformin Other (See Comments)     Weight loss cachexia anorexia,diarrhea.    Pcn [penicillins]      Patient states he passed out from this medication when he was 10 years old.        Past Medical History:   Diagnosis Date    Anemia     Anticoagulant long-term use     Arthritis     CAD  (coronary artery disease)     CABG, STENTS '97,'99,'01,'05    Cancer     SKIN    Cataract     Diabetes mellitus     Diabetes mellitus type II     GERD (gastroesophageal reflux disease)     GI bleed 2020    Hypertension     Myocardial infarction     Wears glasses      Past Surgical History:   Procedure Laterality Date    CARDIAC PACEMAKER PLACEMENT      CARDIAC PACEMAKER PLACEMENT      CARDIAC PACEMAKER PLACEMENT  04/26/2018    replaced    CARDIAC SURGERY      1995   CABG X 5    CHOLECYSTECTOMY      COLON SURGERY      COLONOSCOPY N/A 2/21/2020    Procedure: COLONOSCOPY;  Surgeon: Abe Barragan III, MD;  Location: [facility] ENDO;  Service: Endoscopy;  Laterality: N/A;    COLONOSCOPY N/A 2/23/2020    Procedure: COLONOSCOPY;  Surgeon: Bob Locke Jr., MD;  Location: [facility] ENDO;  Service: Endoscopy;  Laterality: N/A;    CORONARY ANGIOGRAPHY INCLUDING BYPASS GRAFTS WITH CATHETERIZATION OF LEFT HEART N/A 10/26/2022    Procedure: ANGIOGRAM, CORONARY, INCLUDING BYPASS GRAFT, WITH LEFT HEART CATHETERIZATION;  Surgeon: Robles Mckoy MD;  Location: [facility] CATH/EP LAB;  Service: Cardiology;  Laterality: N/A;    CORONARY ARTERY BYPASS GRAFT  1995    CORONARY STENT PLACEMENT      stent x 5    ESOPHAGOGASTRODUODENOSCOPY N/A 2/23/2020    Procedure: EGD (ESOPHAGOGASTRODUODENOSCOPY);  Surgeon: Bob Locke Jr., MD;  Location[facility];  Service: Endoscopy;  Laterality: N/A;    EYE SURGERY      BILAT CATARACT    head laceration   01/19/2018    HEMORRHOID SURGERY      INJECTION OF ANESTHETIC AGENT AROUND MEDIAL BRANCH NERVES INNERVATING LUMBAR FACET JOINT Bilateral 6/29/2023    Procedure: Block-nerve-medial branch-lumbar;  Surgeon: Maurice Montenegro MD;  Location: [facility];  Service: Pain Management;  Laterality: Bilateral;  L3,4,5 MBB    INJECTION OF ANESTHETIC AGENT AROUND MEDIAL BRANCH NERVES INNERVATING LUMBAR FACET JOINT Bilateral 7/25/2023    Procedure: Block-nerve-medial branch-lumbar;  Surgeon: Maurice DUGAN  MD Lan;  Location: [facility];  Service: Anesthesiology;  Laterality: Bilateral;  L3,4,5 MBB #2    SMALL BOWEL ENTEROSCOPY N/A 2/21/2020    Procedure: ENTEROSCOPY;  Surgeon: Abe Barragan III, MD;  Location: [facility] ENDO;  Service: Endoscopy;  Laterality: N/A;    stint       Family History       Problem Relation (Age of Onset)    Heart disease Mother, Father, Brother, Brother, Brother, Brother, Brother    Hyperlipidemia Sister    Hypertension Sister          Tobacco Use    Smoking status: Never    Smokeless tobacco: Never   Substance and Sexual Activity    Alcohol use: No    Drug use: No    Sexual activity: Not Currently       Objective:     Weight: 83.6 kg (184 lb 3.2 oz) (kg)  Body mass index is 24.98 kg/m².  Vital Signs (Most Recent):  Temp: 98 °F (36.7 °C) (08/17/23 0700)  Pulse: 64 (08/17/23 0306)  Resp: 18 (08/17/23 0700)  BP: (!) 163/83 (notified nurse) (08/17/23 0700)  SpO2: 97 % (08/17/23 0740) Vital Signs (24h Range):  Temp:  [97.9 °F (36.6 °C)-98.8 °F (37.1 °C)] 98 °F (36.7 °C)  Pulse:  [64-75] 64  Resp:  [17-20] 18  SpO2:  [92 %-98 %] 97 %  BP: (137-202)/(69-98) 163/83     Date 08/17/23 0700 - 08/18/23 0659   Shift 1954-0303 [id number] 24 Hour Total   INTAKE   Shift Total(mL/kg)       OUTPUT   Urine(mL/kg/hr) 375   375   Shift Total(mL/kg) 375(4.5)   375(4.5)   Weight (kg) 83.6 83.6 83.6 83.6             Neurosurgery Physical Exam  General: well developed, well nourished, no distress.   Head: normocephalic, atraumatic  Neck: No tracheal deviation.   Neurologic: Alert and oriented. Thought content appropriate.  GCS: E4 V5 M6; Total: 15  Vascular: Pulses 2+ and symmetric radial and dorsalis pedis. No LE edema.   Skin: Skin is warm, dry and intact.    Sensory: intact to light touch throughout  Motor Strength: Moves all extremities spontaneously with good tone.  Full strength upper and lower extremities. No abnormal movements seen.     Strength  Deltoids Triceps Biceps Wrist Extension  "Wrist Flexion Hand    Upper: R 5/5 5/5 5/5 5/5 5/5 5/5    L 5/5 5/5 5/5 5/5 5/5 5/5     Iliopsoas Quadriceps Knee  Flexion Tibialis  anterior Gastro- cnemius EHL   Lower: R 5/5 5/5 5/5 5/5 5/5 5/5    L 5/5 5/5 5/5 5/5 5/5 5/5        Cervical:   ROM: Full with flexion, extension, lateral rotation and ear-to-shoulder bend.   Midline TTP: Negative.     Thoracic:  Midline TTP: Negative.     Lumbar:  Midline TTP: Negative.       Significant Labs:  Recent Labs   Lab 08/16/23 1926 08/17/23  0323   * 153*    141   K 4.5 3.9    108   CO2 27 26   BUN 29* 28*   CREATININE 1.6* 1.4   CALCIUM 9.7 9.5   MG 1.8 1.8     Recent Labs   Lab 08/16/23 1926 08/17/23  0323   WBC 7.58 7.24   HGB 11.4* 11.3*   HCT 36.5* 37.7*   * 132*     No results for input(s): "LABPT", "INR", "APTT" in the last 48 hours.  Microbiology Results (last 7 days)       ** No results found for the last 168 hours. **              Assessment/Plan:     Compression fracture of L1 lumbar vertebra, with delayed healing, subsequent encounter  Mr. Saez is an 78-year-old male with a past medical history of diabetes, GERD, sick sinus syndrome, chronic heart failure, orthostatic hypotension, chronic kidney disease, syncopal episode, and L1 compression fracture.  Patient is currently asymptomatic and is neurologically intact.  Due to patient's comorbidities such as sick sinus syndrome, chronic heart failure, chronic kidney disease, he would not be a good candidate for surgical intervention. At this time would recommend continue conservative management of symptoms with TLSO bracing for the next 6 weeks, OTC pain medication and Pain Management clinic.     Patient can follow-up as needed    Discussed plan with Dr. Lion.        Thank you for your consult. I will follow-up with patient. Please contact us if you have any additional questions.    DAWNA MOSQUERA PA-C  Neurosurgery  [facility]  "

## 2023-08-17 NOTE — NURSING
Patient arrived to unit via stretcher from ED. Patient pulled over to bed with assistance without difficulty. Patient oriented to room, bed in lowest position, wheels locked, bed rails up x2, bed alarm initiated, call light and bedside table within reach. Safety measures addressed.

## 2023-08-17 NOTE — PLAN OF CARE
[facility]  Initial Discharge Assessment       Primary Care Provider: Scott Staley MD    Admission Diagnosis: Syncope [R55]    Admission Date: 8/16/2023  Expected Discharge Date: 8/18/2023       met with Pt at bedside to complete discharge assessment. Pt AAOx4s. Demographics, PCP, and insurance verified. Pt lives alone. No home health. No dialysis. DME listed below. Pt verbalized plan to discharge home via family transport. Pt has no other needs to be addressed at this time.    Transition of Care Barriers: None    Payor: MEDICARE / Plan: MEDICARE PART A & B / Product Type: Government /     Extended Emergency Contact Information  Primary Emergency Contact: Carmen Pederson  Mobile Phone: [phone]  Relation: Daughter  Preferred language: English   needed? No  Secondary Emergency Contact: Sid Saez  Mobile Phone: [phone]  Relation: Son  Preferred language: English   needed? No    Discharge Plan A: Home  Discharge Plan B: Home      [facility] - [place] - [address]  [address]  [place] [address]  Phone: [phone] Fax: [phone]    [facility] - [place] - [address] [facility] AT [address] [facility]  [place] [address]  Phone: [phone] Fax: [phone]      Initial Assessment (most recent)       Adult Discharge Assessment - 08/17/23 1436          Discharge Assessment    Assessment Type Discharge Planning Assessment     Confirmed/corrected address, phone number and insurance Yes     Confirmed Demographics Correct on Facesheet     Source of Information patient;family     Does patient/caregiver understand observation status Yes     Communicated LEE with patient/caregiver Date not available/Unable to determine     Reason For Admission syncope     People in Home alone     Facility Arrived From: home     Do you expect to return to your current living  situation? Yes     Do you have help at home or someone to help you manage your care at home? No     Prior to hospitilization cognitive status: Alert/Oriented     Current cognitive status: Alert/Oriented     Walking or Climbing Stairs ambulation difficulty, requires equipment     Mobility Management Rwalker     Dressing/Bathing bathing difficulty, requires equipment     Dressing/Bathing Management shower chair     Home Accessibility wheelchair accessible     Home Layout Able to live on 1st floor     Equipment Currently Used at Home walker, standard;walker, rolling;blood pressure machine;glucometer     Readmission within 30 days? No     Patient currently being followed by outpatient case management? No     Do you currently have service(s) that help you manage your care at home? No     Do you take prescription medications? Yes     Do you have prescription coverage? Yes     Coverage Payor:  MEDICARE - MEDICARE PART A & B     Do you have any problems affording any of your prescribed medications? No     Is the patient taking medications as prescribed? yes     Who is going to help you get home at discharge? Carmen Pederson (Daughter)   [phone]  or  son     How do you get to doctors appointments? car, drives self     Are you on dialysis? No     Do you take coumadin? No     DME Needed Upon Discharge  none     Discharge Plan discussed with: Patient     Transition of Care Barriers None     Discharge Plan A Home     Discharge Plan B Home

## 2023-08-17 NOTE — H&P
[facility]   History & Physical     Patient Name: Tono Saez  MRN: [id number]  Admission Date: 8/16/2023  5:43 PM  Attending Physician: Olivia Nation MD  Face-to-Face encounter date: 08/16/2023 10:32 PM    Patient information was obtained from patient, past medical records, ER physician, and ER records.     HISTORY OF PRESENT ILLNESS:     Tono Saez is a 78 y.o. White male   With PMH of frequent falls,  gait instability undergoing PT,  HFrEF 25%, moderate AS, SSS with pacemaker,  who presents with syncope.    Onset today  Was leaving outpt physician visit when occurred  Had LOC  (Pt isn't sure if he had LOC now;  However he told his daughter that he had)  No preceding symptoms  Not sure how long had LOC    No CP  No SOB  No n/v  No fever  No chills  HA now (and improving), but not previously  +generalized weakness  +fatigue  +intermittent dizziness  No dysarthria  No dysphagia  No focal weakness  No tingling/numbness  No facial droop    REVIEW OF SYSTEMS:     All systems reviewed and are negative except as noted per above.    PAST MEDICAL HISTORY:     Past Medical History:   Diagnosis Date    Anemia     Anticoagulant long-term use     Arthritis     CAD (coronary artery disease)     CABG, STENTS '97,'99,'01,'05    Cancer     SKIN    Cataract     Diabetes mellitus     Diabetes mellitus type II     GERD (gastroesophageal reflux disease)     GI bleed 2020    Hypertension     Myocardial infarction     Wears glasses        PAST SURGICAL HISTORY:     Past Surgical History:   Procedure Laterality Date    CARDIAC PACEMAKER PLACEMENT      CARDIAC PACEMAKER PLACEMENT      CARDIAC PACEMAKER PLACEMENT  04/26/2018    replaced    CARDIAC SURGERY      1995   CABG X 5    CHOLECYSTECTOMY      COLON SURGERY      COLONOSCOPY N/A 2/21/2020    Procedure: COLONOSCOPY;  Surgeon: Abe Barragan III, MD;  Location: [facility];  Service: Endoscopy;  Laterality: N/A;    COLONOSCOPY N/A 2/23/2020     Procedure: COLONOSCOPY;  Surgeon: Bob Locke Jr., MD;  Location: [facility] ENDO;  Service: Endoscopy;  Laterality: N/A;    CORONARY ANGIOGRAPHY INCLUDING BYPASS GRAFTS WITH CATHETERIZATION OF LEFT HEART N/A 10/26/2022    Procedure: ANGIOGRAM, CORONARY, INCLUDING BYPASS GRAFT, WITH LEFT HEART CATHETERIZATION;  Surgeon: Robles Mckoy MD;  Location: [facility] CATH/EP LAB;  Service: Cardiology;  Laterality: N/A;    CORONARY ARTERY BYPASS GRAFT  1995    CORONARY STENT PLACEMENT      stent x 5    ESOPHAGOGASTRODUODENOSCOPY N/A 2/23/2020    Procedure: EGD (ESOPHAGOGASTRODUODENOSCOPY);  Surgeon: Bob Locke Jr., MD;  Location: [facility] ENDO;  Service: Endoscopy;  Laterality: N/A;    EYE SURGERY      BILAT CATARACT    head laceration   01/19/2018    HEMORRHOID SURGERY      INJECTION OF ANESTHETIC AGENT AROUND MEDIAL BRANCH NERVES INNERVATING LUMBAR FACET JOINT Bilateral 6/29/2023    Procedure: Block-nerve-medial branch-lumbar;  Surgeon: Maurice Montenegro MD;  Location: [facility] OR;  Service: Pain Management;  Laterality: Bilateral;  L3,4,5 MBB    INJECTION OF ANESTHETIC AGENT AROUND MEDIAL BRANCH NERVES INNERVATING LUMBAR FACET JOINT Bilateral 7/25/2023    Procedure: Block-nerve-medial branch-lumbar;  Surgeon: Maurice Montenegro MD;  Location: [facility] AS OR;  Service: Anesthesiology;  Laterality: Bilateral;  L3,4,5 MBB #2    SMALL BOWEL ENTEROSCOPY N/A 2/21/2020    Procedure: ENTEROSCOPY;  Surgeon: Abe Barragan III, MD;  Location: [facility];  Service: Endoscopy;  Laterality: N/A;    stint         ALLERGIES:   Adhesive, Metformin, and Pcn [penicillins]    FAMILY HISTORY:     Family History   Problem Relation Age of Onset    Heart disease Mother     Heart disease Father     Hyperlipidemia Sister     Hypertension Sister     Heart disease Brother     Heart disease Brother     Heart disease Brother     Heart disease Brother     Heart disease Brother        SOCIAL HISTORY:     Social History     Tobacco Use    Smoking status: Never    Smokeless  tobacco: Never   Substance Use Topics    Alcohol use: No        Social History     Substance and Sexual Activity   Sexual Activity Not Currently        HOME MEDICATIONS:     Prior to Admission medications    Medication Sig Start Date End Date Taking? Authorizing Provider   aspirin (ECOTRIN) 81 MG EC tablet Take 2 tablets (162 mg total) by mouth once daily.  Patient taking differently: Take 81 mg by mouth once daily. 10/28/22   Koko Erickson MD   atorvastatin (LIPITOR) 40 MG tablet Take 1 tablet by mouth once daily. 1/2/23   Provider, Historical   atorvastatin (LIPITOR) 40 MG tablet Take 1 tablet (40 mg total) by mouth every evening. 7/24/23 7/23/24  Scott Staley MD   betamethasone dipropionate (DIPROLENE) 0.05 % lotion Apply thin film to scalp bid prn itch 4/20/22   Ayush Foster MD   clindamycin (CLEOCIN) 150 MG capsule Take by mouth. 1/20/23   Provider, Historical   clopidogreL (PLAVIX) 75 mg tablet Take 1 tablet (75 mg total) by mouth once daily. 6/6/23 9/4/23  Jud Kirby, NP   diclofenac sodium (VOLTAREN) 1 % Gel apply 2 grams topically once a day 6/19/23   Scott Staley MD   empagliflozin (JARDIANCE) 25 mg tablet Take 1 tablet (25 mg total) by mouth once daily. 8/7/23   Jud Kirby, NP   finasteride (PROSCAR) 5 mg tablet Take 5 mg by mouth once daily. 9/28/22   Provider, Historical   fluorouracil 5% 5 % Soln Apply small amount to top of head 1-2x/day for 2 weeks 1/31/23   Ayush Foster MD   fluticasone propionate (FLONASE) 50 mcg/actuation nasal spray Use nasally as directed as needed    Provider, Historical   furosemide (LASIX) 20 MG tablet TAKE 1 TABLET (20 MG TOTAL) BY MOUTH ONCE DAILY.  Patient taking differently: Take 20 mg by mouth once daily. 1/30/23   Jud Kirby, NP   HYDROcodone-acetaminophen (NORCO) 5-325 mg per tablet Take 1 tablet by mouth every 4 (four) hours as needed for Pain. 2/3/23   Abe Lakhani MD   ketoconazole (NIZORAL) 2 % shampoo Wash all  scaling areas let sit 3 minutes then rinse 3x/wk 3/16/22   Ayush Foster MD   levocetirizine (XYZAL) 5 MG tablet Take 1 tablet (5 mg total) by mouth every evening. 5/29/23   Scott Staley MD   magnesium oxide (MAG-OX) 400 mg (241.3 mg magnesium) tablet Take 1 tablet (400 mg total) by mouth once daily. 2/16/23 8/15/23  Scott Staley MD   metoprolol tartrate (LOPRESSOR) 25 MG tablet Take 0.5 tablets (12.5 mg total) by mouth 2 (two) times daily. 6/26/23 8/25/23  Kt Lux MD   midodrine (PROAMATINE) 2.5 MG Tab Take 1 tablet (2.5 mg total) by mouth 3 (three) times daily. 6/26/23 6/25/24  Kt Lux MD   montelukast (SINGULAIR) 10 mg tablet Take 1 tablet (10 mg total) by mouth every evening. 6/19/23   Scott Staley MD   multivitamin capsule Take 1 capsule by mouth once daily.    Provider, Historical   mupirocin (BACTROBAN) 2 % ointment Apply topically 2 (two) times daily. 3/7/22   Ayush Foster MD   nitroGLYCERIN (NITROSTAT) 0.4 MG SL tablet DISSOLVE 1 TABLET UNDER THE TONGUE AS NEEDED FOR CHEST PAIN  Patient taking differently: Place 0.4 mg under the tongue every 5 (five) minutes as needed. 8/24/22   Hema Patel MD   pantoprazole (PROTONIX) 40 MG tablet TAKE ONE TABLET BY MOUTH ONCE DAILY 8/11/23   Scott Staley MD   pioglitazone (ACTOS) 30 MG tablet Take 1 tablet (30 mg total) by mouth once daily. 5/29/23 5/28/24  Scott Staley MD   pregabalin (LYRICA) 50 MG capsule Take 1 capsule (50 mg total) by mouth 3 (three) times daily. 5/29/23 11/27/23  Scott Staley MD   sertraline (ZOLOFT) 50 MG tablet TAKE ONE TABLET BY MOUTH ONCE DAILY  Patient taking differently: Take 50 mg by mouth once daily. 1/23/23   Scott Staley MD   sulfacetamide sodium (OVACE PLUS SHAMPOO) 10 % [place] scalp nightly 4/20/22   Ayush Foster MD   tolterodine (DETROL LA) 4 MG 24 hr capsule TAKE ONE CAPSULE BY MOUTH ONCE DAILY 4/10/23   Scott Staley MD   vitamin D 1000 units Tab  Take 1,000 Units by mouth once daily.    Provider, Historical       PHYSICAL EXAM:     /77   Pulse 66   Temp 98.8 °F (37.1 °C) (Oral)   Resp 17   SpO2 96%     Gen: alert, responsive; abrasions throughout  HEENT:  Eyes - no pallor; forehead abrasion   External ears with no lesions  Nares patent  Mouth/Throat:  trachea midline  CV: RRR  Lungs: CTA B/L; +chest wall TTP  Abd: +BS, soft, NT, ND  Ext: no atrophy or edema; +RUE abrasions, R knee abrasion; full ROM, neurovascularly intact  Skin: warm, dry; abrasions as noted  Neuro: alert, responding appropriately, following commands  Psych: pleasant     LABS AND IMAGING:     Labs Reviewed   CBC W/ AUTO DIFFERENTIAL - Abnormal; Notable for the following components:       Result Value    RBC 4.27 (*)     Hemoglobin 11.4 (*)     Hematocrit 36.5 (*)     MCH 26.7 (*)     MCHC 31.2 (*)     RDW 15.8 (*)     Platelets 118 (*)     Immature Granulocytes 0.7 (*)     Immature Grans (Abs) 0.05 (*)     Lymph # 0.7 (*)     Gran % 79.3 (*)     Lymph % 9.4 (*)     All other components within normal limits   COMPREHENSIVE METABOLIC PANEL - Abnormal; Notable for the following components:    Glucose 119 (*)     BUN 29 (*)     Creatinine 1.6 (*)     Total Bilirubin 1.4 (*)     eGFR 43.3 (*)     Anion Gap 7 (*)     All other components within normal limits   TROPONIN I HIGH SENSITIVITY - Abnormal; Notable for the following components:    Troponin I High Sensitivity 30.7 (*)     All other components within normal limits   MAGNESIUM   TROPONIN I HIGH SENSITIVITY   CK   CK   B-TYPE NATRIURETIC PEPTIDE   URINALYSIS MICROSCOPIC   URINALYSIS, REFLEX TO URINE CULTURE   B-TYPE NATRIURETIC PEPTIDE   HEMOGLOBIN A1C       Imaging Results              CT Cervical Spine Without Contrast (Final result)  Result time 08/16/23 21:13:12      Final result by Frantz Pereira MD (08/16/23 21:13:12)                   Narrative:    EXAMINATION: CT HEAD WITHOUT CONTRAST (accession [id number]), CT CERVICAL  SPINE WITHOUT CONTRAST (accession [id number])    CLINICAL HISTORY: Head trauma, moderate-severe    COMPARISON: November 19, 2020.    TECHNIQUE: Noncontrast axial images of the cervical spine and head were obtained. Sagittal and coronal reformatted images obtained.    FINDINGS:  CT cervical spine: High-grade disc space narrowing at C3-4, C5-6. Degenerative spondylosis. Facet degeneration most prominent on the left at C4-5. No evidence of fracture or acute subluxation. Dextroconvex curvature present.    CT HEAD: Small air-fluid level in the right maxillary sinus. The other sinuses are clear. No apparent skull fracture. The mastoid air cells and middle ears are clear. Right cheek soft tissue swelling may be related to recent trauma. No apparent facial bone or right orbit fracture demonstrated.    Age consistent volume loss. Moderate age-related small vessel cerebral white matter disease. No prior large vessel ischemia. No intracranial hemorrhage or mass lesion.    Bilateral cataract surgery the orbits otherwise normal.    The cerebellar tonsils are in the appropriate position. Pituitary gland is normal size.    IMPRESSION:  1: Degenerative changes of the cervical spine without evidence of acute traumatic injury.  2: Age-related changes within the brain without acute intracranial finding  3: Small air-fluid level in the right maxillary sinus could be result of sinus disease or trauma. Right facial soft tissue swelling.    RADIATION DOSE REDUCTION: This exam was performed according to our departmental dose-optimization program which includes use of Automated Exposure Control, adjustment of the mA and/or kV according to patient size and/or use of iterative reconstruction technique.    Electronically signed by:  Frantz Pereira MD  8/16/2023 9:13 PM CDT Workstation: [id number]                                     CT Head Without Contrast (Final result)  Result time 08/16/23 21:13:12      Final result by Frantz Pereira,  MD (08/16/23 21:13:12)                   Narrative:    EXAMINATION: CT HEAD WITHOUT CONTRAST (accession [id number]), CT CERVICAL SPINE WITHOUT CONTRAST (accession [id number])    CLINICAL HISTORY: Head trauma, moderate-severe    COMPARISON: November 19, 2020.    TECHNIQUE: Noncontrast axial images of the cervical spine and head were obtained. Sagittal and coronal reformatted images obtained.    FINDINGS:  CT cervical spine: High-grade disc space narrowing at C3-4, C5-6. Degenerative spondylosis. Facet degeneration most prominent on the left at C4-5. No evidence of fracture or acute subluxation. Dextroconvex curvature present.    CT HEAD: Small air-fluid level in the right maxillary sinus. The other sinuses are clear. No apparent skull fracture. The mastoid air cells and middle ears are clear. Right cheek soft tissue swelling may be related to recent trauma. No apparent facial bone or right orbit fracture demonstrated.    Age consistent volume loss. Moderate age-related small vessel cerebral white matter disease. No prior large vessel ischemia. No intracranial hemorrhage or mass lesion.    Bilateral cataract surgery the orbits otherwise normal.    The cerebellar tonsils are in the appropriate position. Pituitary gland is normal size.    IMPRESSION:  1: Degenerative changes of the cervical spine without evidence of acute traumatic injury.  2: Age-related changes within the brain without acute intracranial finding  3: Small air-fluid level in the right maxillary sinus could be result of sinus disease or trauma. Right facial soft tissue swelling.    RADIATION DOSE REDUCTION: This exam was performed according to our departmental dose-optimization program which includes use of Automated Exposure Control, adjustment of the mA and/or kV according to patient size and/or use of iterative reconstruction technique.    Electronically signed by:  Frantz Pereira MD  8/16/2023 9:13 PM CDT Workstation: [id number]                                      CT Chest Without Contrast (Final result)  Result time 08/16/23 21:09:10      Final result by Frantz Pereira MD (08/16/23 21:09:10)                   Narrative:    EXAM DESCRIPTION: CT CHEST WITHOUT CONTRAST    CLINICAL INDICATION: Chest trauma, blunt    COMPARISON: None    TECHNIQUE: Axial imaging obtained through the chest. Sagittal and coronal reconstructions.    CONTRAST: None    FINDINGS:  CT chest: The lungs are clear except for dependent changes and possibly mild chronic interstitial disease. No significant emphysema. No acute-appearing lung infiltrates. The heart is enlarged with previous sternotomy. The thoracic aorta is atherosclerotic but normal size.    There are no pleural or pericardial effusions. No mediastinal or hilar adenopathy. No evidence of acute mediastinal injury.    There is a pacemaker implanted in the left chest wall.    The adrenal glands are normal. The gallbladder has been removed. No acute findings are demonstrated on the upper abdominal images.    There is compression fracture of the L1 vertebral body which may be recent. Slight retropulsion of superior endplate causing mild spinal canal narrowing. This fracture has increased in prominence over February 2023.      IMPRESSION:  1: Heart disease.  2: Worsening L1 compression fracture which appears subacute.  3: No acute cardiopulmonary finding.      RADIATION DOSE REDUCTION: This exam was performed according to our departmental dose-optimization program which includes use of Automated Exposure Control, adjustment of the mA and/or kV according to patient size and/or use of iterative reconstruction technique.    Electronically signed by:  Frantz Pereira MD  8/16/2023 9:09 PM CDT Workstation: [id number]                                    Labs and images reviewed personally by me.  See my personal assessment/interpretation of results below:    ASSESSMENT & PLAN:   Tono Saez is a 78 y.o. male admitted  for    Active Hospital Problems    Diagnosis  POA    Syncope and collapse [R55]  Yes    Orthostatic hypotension [I95.1]  Yes    Chronic systolic congestive heart failure [I50.22]  Yes    SSS (sick sinus syndrome) [I49.5]  Yes    Stage 3a chronic kidney disease [N18.31]  Yes    Coronary artery disease of native artery of native heart with stable angina pectoris [I25.118]  Yes     CABG, STENTS '97,'99,'01,'05    10/26/22:  Summary         The Dist LM lesion was 90% stenosed with 10% stenosis post-intervention.    The pre-procedure left ventricular end diastolic pressure was 12.    A STENT LIMA YOHANA 3.50 X 26 stent was not successfully placed at 12 JASPER for 20 sec.    The estimated blood loss was <50 mL.    Successful intravascular ultrasound-guided PCI of the critical stenosis of left main into circumflex with 1 drug-eluting stent    Patent vein graft to OM, occluded branch of the OM as compared to angiogram in 2016.    Patent vein graft to right coronary artery with patent stent, moderately degenerated, distal native RCA has 80% stenosis in medium caliber vessel.    Occluded LAD with a patent LIMA to LAD        Chronic anticoagulation [Z79.01]  Not Applicable    GERD (gastroesophageal reflux disease) [K21.9]  Yes    Type 2 diabetes mellitus with hyperglycemia  [E11.65]  Yes    HTN (hypertension) [I10]  Yes      Resolved Hospital Problems   No resolved problems to display.        Plan    Syncope  - CT head as noted above  - echo  - carotid US  - EKG, trending troponin  - telemetry  - continuing home BB, midodrine  - orthostatic VS  - pacemaker interrogation     DM2  - SSI    Chronic conditions as noted above/below; home medications reviewed personally by me and restarted as appropriate  Electrolyte derangement:  Trending BMP; Mg; replacement prn  DVT ppx: lovenox held due to recent fall with multiple abrasions, risk of bleeding; continue SCDs  FULL CODE      - The above conditions include an acute and/or chronic illness  that poses a threat to life (or bodily function).  - Previous notes/encounters/external records reviewed personally by me.  - Pt's case personally discussed with another physician:  ER physician    Olivia Nation MD  [facility] Hospitalist  08/16/2023

## 2023-08-17 NOTE — ED NOTES
Orthostatic VS are as follows, patient did not tolerate standing, MD present at bedside. Patient denies symptoms.     Lying:: /86, HR 74 paced    Sitting:: /84, HR 74 paced

## 2023-08-17 NOTE — PT/OT/SLP EVAL
"Occupational Therapy   Evaluation, tx    Name: Tono Saez  MRN: [id number]  Admitting Diagnosis: <principal problem not specified>  Recent Surgery: * No surgery found *    Diagnoses of Syncope, CHF (congestive heart failure), and Chest pain were pertinent to this visit.    Recommendations:     Discharge Recommendations: nursing facility, skilled  Discharge Equipment Recommendations:  bedside commode  Barriers to discharge:  Decreased caregiver support    Assessment:     Tono Saez is a 78 y.o. male with a medical diagnosis of <principal problem not specified>.  He presents with Performance deficits affecting function: weakness, impaired endurance, impaired self care skills, impaired functional mobility, gait instability, impaired balance, decreased lower extremity function, decreased safety awareness, pain, impaired skin, orthopedic precautions.      Pt c/o soreness "all over" at erst and right ribs/chest area with movement 3/10-7/10.  Pt. Performed bed mobility Min A with log roll tech and supine<>sit, sit<>stand CGA with RW, ambulated CGA with RW to bathroom, sink, performed g/hygiene standing with CGA at sink, LE dressing SBA for socks seated EOB. Son was present for FT.  DME rec for home: [facility]. Pt lives alone, limited caregiver assist and high fall risk. Pt has had frequent falls in the past. Continue with OT POC.     Rehab Prognosis: Good; patient would benefit from acute skilled OT services to address these deficits and reach maximum level of function.       Plan:     Patient to be seen 5 x/week to address the above listed problems via self-care/home management, therapeutic activities, therapeutic exercises  Plan of Care Expires: 09/17/23  Plan of Care Reviewed with: patient, son    Subjective     Chief Complaint: soreness all over, R rib/chest pain with movement.  Patient/Family Comments/goals: per son, he lives in [place] and his sister lives in [place] and may be able to help pt. when he gets " home.    Occupational Profile:  Living Environment: pt. lives alone in [facility] with 1 step; t/s combo with hydraulic bath seat.  Previous level of function: Indep-Mod I ADLS, IADLS, drives  Roles and Routines: Attends OPPT 2x/wk for balance.  Equipment Used at Home: walker, rolling, raised toilet (electric bed and hydraulic bath chair)  Assistance upon Discharge: son and daughter ?    Pain/Comfort:  Pain Rating 1: 7/10 (with movement)  Location - Side 1: Bilateral  Location - Orientation 1: upper  Location 1: chest (ribs)  Pain Addressed 1: Reposition, Distraction, Cessation of Activity  Pain Rating Post-Intervention 1: 3/10    Patients cultural, spiritual, Sabianist conflicts given the current situation: no    Objective:     Communicated with: nurse prior to session.  Patient found HOB elevated with bed alarm, telemetry, peripheral IV upon OT entry to room.    General Precautions: Standard, diabetic, fall, aspiration  Orthopedic Precautions: spinal precautions  Braces: TLSO (neuro recommending)  Respiratory Status: Room air    Occupational Performance:    Bed Mobility:    Patient completed Rolling/Turning to Left with  minimum assistance, with side rail, and log roll  Patient completed Scooting/Bridging with minimum assistance  Patient completed Supine to Sit with minimum assistance and with side rail  Patient completed Sit to Supine with minimum assistance and RLE assist onto bed 2/2 pain    Functional Mobility/Transfers:  Patient completed Sit <> Stand Transfer with contact guard assistance  with  rolling walker   Patient completed Toilet Transfer Step Transfer technique with contact guard assistance with  rolling walker  Functional Mobility: ambulated CGA with RW to bathroom, sink and back to bed, no LOB.    Activities of Daily Living:  Feeding:  independence .  Grooming: contact guard assistance standing with Rw at sink, washed hands, brushed teeth  Lower Body Dressing: stand by assistance donned/doffed socks  seated EOB, ankle crossed over opposite knee  Toileting: contact guard assistance with RW    Cognitive/Visual Perceptual:  Cognitive/Psychosocial Skills:     -       Oriented to: Person, Place, Time, and Situation   -       Follows Commands/attention:Follows one-step commands  -       Communication: clear/fluent  -       Memory: Poor immediate recall  -       Safety awareness/insight to disability: impaired   -       Mood/Affect/Coping skills/emotional control: Appropriate to situation  Visual/Perceptual:      -Intact ./    Physical Exam:  Balance:    -       sitting: good  dynamic: fair plus  2/2 pain  standing: fair dynamic: fair  Postural examination/scapula alignment:    -       Rounded shoulders  Skin integrity: Tear of arms, legs, R eye, ace wraps BLE knees, arms bandages  Dominant hand:    -       right  Upper Extremity Range of Motion:     -       Right Upper Extremity: WFL  -       Left Upper Extremity: WFL  Upper Extremity Strength:    -       Right Upper Extremity: WFL except shld 4-/5  -       Left Upper Extremity: WFL except 4-/5   Strength:    -       Right Upper Extremity: WFL  -       Left Upper Extremity: WFL    AMPAC 6 Click ADL:  AMPAC Total Score: 20    Treatment & Education:  Purpose of OT and POC  DC planning: informed pt's son that if pt goes home from acute he will need someone with pt all times because pt is a high fall risk. Son verbalized understanding and expressed that he lives in [place] pt's daughter lives in [place].   OT recommending [facility] use for home.  All questions./ cocnerns addressed within scope.  Explained call don;t fall for fall prevention.      Patient left HOB elevated with all lines intact, call button in reach, bed alarm on, and son present    GOALS:   Multidisciplinary Problems       Occupational Therapy Goals          Problem: Occupational Therapy    Goal Priority Disciplines Outcome Interventions   Occupational Therapy Goal     OT, PT/OT Ongoing, Progressing     Description: Goals to be met by: 8/31/2023     Patient will increase functional independence with ADLs by performing:    UE Dressing with Modified [place].  LE Dressing with Modified [place].  Grooming while standing at sink with Modified [place].  Toileting from toilet with Modified [place] for hygiene and clothing management.   Toilet transfer to toilet with Modified [place].                         History:     Past Medical History:   Diagnosis Date    Anemia     Anticoagulant long-term use     Arthritis     CAD (coronary artery disease)     CABG, STENTS '97,'99,'01,'05    Cancer     SKIN    Cataract     Diabetes mellitus     Diabetes mellitus type II     GERD (gastroesophageal reflux disease)     GI bleed 2020    Hypertension     Myocardial infarction     Wears glasses          Past Surgical History:   Procedure Laterality Date    CARDIAC PACEMAKER PLACEMENT      CARDIAC PACEMAKER PLACEMENT      CARDIAC PACEMAKER PLACEMENT  04/26/2018    replaced    CARDIAC SURGERY      1995   CABG X 5    CHOLECYSTECTOMY      COLON SURGERY      COLONOSCOPY N/A 2/21/2020    Procedure: COLONOSCOPY;  Surgeon: Abe Barragan III, MD;  Location: [facility] ENDO;  Service: Endoscopy;  Laterality: N/A;    COLONOSCOPY N/A 2/23/2020    Procedure: COLONOSCOPY;  Surgeon: Bob Locke Jr., MD;  Location: [facility] ENDO;  Service: Endoscopy;  Laterality: N/A;    CORONARY ANGIOGRAPHY INCLUDING BYPASS GRAFTS WITH CATHETERIZATION OF LEFT HEART N/A 10/26/2022    Procedure: ANGIOGRAM, CORONARY, INCLUDING BYPASS GRAFT, WITH LEFT HEART CATHETERIZATION;  Surgeon: Robles Mckoy MD;  Location: [facility] CATH/EP LAB;  Service: Cardiology;  Laterality: N/A;    CORONARY ARTERY BYPASS GRAFT  1995    CORONARY STENT PLACEMENT      stent x 5    ESOPHAGOGASTRODUODENOSCOPY N/A 2/23/2020    Procedure: EGD (ESOPHAGOGASTRODUODENOSCOPY);  Surgeon: Bob Locke Jr., MD;  Location: [facility] ENDO;  Service: Endoscopy;  Laterality: N/A;    EYE  SURGERY      BILAT CATARACT    head laceration   01/19/2018    HEMORRHOID SURGERY      INJECTION OF ANESTHETIC AGENT AROUND MEDIAL BRANCH NERVES INNERVATING LUMBAR FACET JOINT Bilateral 6/29/2023    Procedure: Block-nerve-medial branch-lumbar;  Surgeon: Maurice Montenegro MD;  Location: [facility] OR;  Service: Pain Management;  Laterality: Bilateral;  L3,4,5 MBB    INJECTION OF ANESTHETIC AGENT AROUND MEDIAL BRANCH NERVES INNERVATING LUMBAR FACET JOINT Bilateral 7/25/2023    Procedure: Block-nerve-medial branch-lumbar;  Surgeon: Maurice Montenegro MD;  Location: [facility] OR;  Service: Anesthesiology;  Laterality: Bilateral;  L3,4,5 MBB #2    SMALL BOWEL ENTEROSCOPY N/A 2/21/2020    Procedure: ENTEROSCOPY;  Surgeon: Abe Barragan III, MD;  Location: [facility];  Service: Endoscopy;  Laterality: N/A;    stint         Time Tracking:     OT Date of Treatment: 08/17/23  OT Start Time: 1356  OT Stop Time: 1434  OT Total Time (min): 38 min    Billable Minutes:Evaluation 10  Self Care/Home Management 15  Therapeutic Activity 8  Total Time 38    8/17/2023

## 2023-08-17 NOTE — PHARMACY MED REC
"              .    Admission Medication History     The home medication history was taken by Mer King.    You may go to "Admission" then "Reconcile Home Medications" tabs to review and/or act upon these items.     The home medication list has been updated by the Pharmacy department.   Please read ALL comments highlighted in yellow.   Please address this information as you see fit.    Feel free to contact us if you have any questions or require assistance.      The medications listed below were removed from the home medication list. Please reorder if appropriate:  Patient reports no longer taking the following medication(s):  Cleocin 150mg  Voltaren Gel  Nizoral Shampoo  Xyzal 5mg   Batroban ointment   Actos 30mg   Ovace Plus Shampoo    Medications listed below were obtained from: Patient/family and Analytic software- Snapchat  Current Facility-Administered Medications on File Prior to Encounter   Medication Dose Route Frequency Provider Last Rate Last Admin    lactated ringers infusion   Intravenous Continuous Maurice Montenegro MD 25 mL/hr at 07/25/23 1013 New Bag at 07/25/23 1013    LIDOcaine (PF) 10 mg/ml (1%) injection 10 mg  1 mL Intradermal Once Maurice Montenegro MD         Current Outpatient Medications on File Prior to Encounter   Medication Sig Dispense Refill    aspirin (ECOTRIN) 81 MG EC tablet Take 2 tablets (162 mg total) by mouth once daily. (Patient taking differently: Take 81 mg by mouth once daily.) 120 tablet 0    atorvastatin (LIPITOR) 40 MG tablet Take 1 tablet by mouth once daily.      betamethasone dipropionate (DIPROLENE) 0.05 % lotion Apply thin film to scalp bid prn itch (Patient taking differently: Apply 1 g topically 2 (two) times daily. Apply thin film to scalp bid prn itch) 60 mL 6    clopidogreL (PLAVIX) 75 mg tablet Take 1 tablet (75 mg total) by mouth once daily. 90 tablet 0    empagliflozin (JARDIANCE) 25 mg tablet Take 1 tablet (25 mg total) by mouth once daily. 90 tablet 2    " finasteride (PROSCAR) 5 mg tablet Take 5 mg by mouth once daily.      furosemide (LASIX) 20 MG tablet TAKE 1 TABLET (20 MG TOTAL) BY MOUTH ONCE DAILY. (Patient taking differently: Take 20 mg by mouth once daily.) 90 tablet 3    magnesium oxide (MAG-OX) 400 mg (241.3 mg magnesium) tablet Take 1 tablet (400 mg total) by mouth once daily. 90 tablet 1    metoprolol tartrate (LOPRESSOR) 25 MG tablet Take 0.5 tablets (12.5 mg total) by mouth 2 (two) times daily. 60 tablet 0    midodrine (PROAMATINE) 2.5 MG Tab Take 1 tablet (2.5 mg total) by mouth 3 (three) times daily. 90 tablet 11    montelukast (SINGULAIR) 10 mg tablet Take 1 tablet (10 mg total) by mouth every evening. 90 tablet 3    multivitamin capsule Take 1 capsule by mouth once daily.      pantoprazole (PROTONIX) 40 MG tablet TAKE ONE TABLET BY MOUTH ONCE DAILY (Patient taking differently: Take 40 mg by mouth once daily.) 90 tablet 1    pioglitazone (ACTOS) 15 MG tablet Take 15 mg by mouth once daily.      pregabalin (LYRICA) 50 MG capsule Take 1 capsule (50 mg total) by mouth 3 (three) times daily. 90 capsule 1    sertraline (ZOLOFT) 50 MG tablet TAKE ONE TABLET BY MOUTH ONCE DAILY (Patient taking differently: Take 50 mg by mouth once daily.) 90 tablet 1    tolterodine (DETROL LA) 4 MG 24 hr capsule TAKE ONE CAPSULE BY MOUTH ONCE DAILY (Patient taking differently: Take 4 mg by mouth once daily.) 90 capsule 1    vitamin D 1000 units Tab Take 1,000 Units by mouth once daily.      atorvastatin (LIPITOR) 40 MG tablet Take 1 tablet (40 mg total) by mouth every evening. 90 tablet 1    fluticasone propionate (FLONASE) 50 mcg/actuation nasal spray 1 spray by Each Nostril route as needed for Allergies.      HYDROcodone-acetaminophen (NORCO) 5-325 mg per tablet Take 1 tablet by mouth every 4 (four) hours as needed for Pain. 18 tablet 0    nitroGLYCERIN (NITROSTAT) 0.4 MG SL tablet DISSOLVE 1 TABLET UNDER THE TONGUE AS NEEDED FOR CHEST PAIN (Patient taking differently:  Place 0.4 mg under the tongue every 5 (five) minutes as needed.) 100 tablet 3    [DISCONTINUED] clindamycin (CLEOCIN) 150 MG capsule Take by mouth.      [DISCONTINUED] diclofenac sodium (VOLTAREN) 1 % Gel apply 2 grams topically once a day 200 g 3    [DISCONTINUED] fluorouracil 5% 5 % Soln Apply small amount to top of head 1-2x/day for 2 weeks 10 mL 1    [DISCONTINUED] ketoconazole (NIZORAL) 2 % shampoo Wash all scaling areas let sit 3 minutes then rinse 3x/wk 120 mL 11    [DISCONTINUED] levocetirizine (XYZAL) 5 MG tablet Take 1 tablet (5 mg total) by mouth every evening. 60 tablet 11    [DISCONTINUED] mupirocin (BACTROBAN) 2 % ointment Apply topically 2 (two) times daily. 22 g 11    [DISCONTINUED] pioglitazone (ACTOS) 30 MG tablet Take 1 tablet (30 mg total) by mouth once daily. 90 tablet 3    [DISCONTINUED] sulfacetamide sodium (OVACE PLUS SHAMPOO) 10 % Sham Wash scalp nightly 1 each 11       Potential issues to be addressed PRIOR TO DISCHARGE  Patient reported not taking the following medications: (Norco 5-325 mg). These medications remain on the home medication list. Please address accordingly.     Mer King  [id number]

## 2023-08-17 NOTE — PLAN OF CARE
Problem: Adult Inpatient Plan of Care  Goal: Plan of Care Review  Outcome: Ongoing, Progressing     Problem: Adult Inpatient Plan of Care  Goal: Absence of Hospital-Acquired Illness or Injury  Outcome: Ongoing, Progressing     Problem: Syncope  Goal: Absence of Syncopal Symptoms  Outcome: Ongoing, Progressing     Problem: Fall Injury Risk  Goal: Absence of Fall and Fall-Related Injury  Outcome: Ongoing, Progressing     Problem: Infection  Goal: Absence of Infection Signs and Symptoms  Outcome: Ongoing, Progressing

## 2023-08-17 NOTE — PT/OT/SLP PROGRESS
"Physical Therapy      Patient Name:  Tono Saez   MRN:  [id number]    Patient not seen today secondary to CT of chest indicates "worsening L1 compression fracture which appears subacute." Neurosurgery consult pending. Will follow-up 08/18/23.    "

## 2023-08-17 NOTE — PLAN OF CARE
Problem: Occupational Therapy  Goal: Occupational Therapy Goal  Description: Goals to be met by: 8/31/2023     Patient will increase functional independence with ADLs by performing:    UE Dressing with Modified [place].  LE Dressing with Modified [place].  Grooming while standing at sink with Modified [place].  Toileting from toilet with Modified [place] for hygiene and clothing management.   Toilet transfer to toilet with Modified [place].    Outcome: Ongoing, Progressing

## 2023-08-17 NOTE — SUBJECTIVE & OBJECTIVE
Medications Prior to Admission   Medication Sig Dispense Refill Last Dose    aspirin (ECOTRIN) 81 MG EC tablet Take 2 tablets (162 mg total) by mouth once daily. (Patient taking differently: Take 81 mg by mouth once daily.) 120 tablet 0 2023 at 11:00    atorvastatin (LIPITOR) 40 MG tablet Take 1 tablet by mouth once daily.   2023 at 11:00    betamethasone dipropionate (DIPROLENE) 0.05 % lotion Apply thin film to scalp bid prn itch (Patient taking differently: Apply 1 g topically 2 (two) times daily. Apply thin film to scalp bid prn itch) 60 mL 6 2023 at 11:00    clopidogreL (PLAVIX) 75 mg tablet Take 1 tablet (75 mg total) by mouth once daily. 90 tablet 0 2023 at 11:00    empagliflozin (JARDIANCE) 25 mg tablet Take 1 tablet (25 mg total) by mouth once daily. 90 tablet 2 2023 at 11:00    finasteride (PROSCAR) 5 mg tablet Take 5 mg by mouth once daily.   2023 at 11:00    furosemide (LASIX) 20 MG tablet TAKE 1 TABLET (20 MG TOTAL) BY MOUTH ONCE DAILY. (Patient taking differently: Take 20 mg by mouth once daily.) 90 tablet 3 Past Week    isosorbide dinitrate (ISORDIL) 10 MG tablet Take 10 mg by mouth 2 (two) times daily.   2023 at 11:00    [] magnesium oxide (MAG-OX) 400 mg (241.3 mg magnesium) tablet Take 1 tablet (400 mg total) by mouth once daily. 90 tablet 1 2023 at 11:00    metoprolol tartrate (LOPRESSOR) 25 MG tablet Take 0.5 tablets (12.5 mg total) by mouth 2 (two) times daily. 60 tablet 0 2023 at 11:00    midodrine (PROAMATINE) 2.5 MG Tab Take 1 tablet (2.5 mg total) by mouth 3 (three) times daily. 90 tablet 11 2023 at 11:00    montelukast (SINGULAIR) 10 mg tablet Take 1 tablet (10 mg total) by mouth every evening. 90 tablet 3 8/15/2023 at 11:00    multivitamin capsule Take 1 capsule by mouth once daily.   2023 at 11:00    pantoprazole (PROTONIX) 40 MG tablet TAKE ONE TABLET BY MOUTH ONCE DAILY (Patient taking differently: Take 40 mg by mouth once  daily.) 90 tablet 1 8/16/2023 at 11:00    pioglitazone (ACTOS) 15 MG tablet Take 15 mg by mouth once daily.   8/16/2023 at 11:00    pregabalin (LYRICA) 50 MG capsule Take 1 capsule (50 mg total) by mouth 3 (three) times daily. 90 capsule 1 8/16/2023 at 11:00    sertraline (ZOLOFT) 50 MG tablet TAKE ONE TABLET BY MOUTH ONCE DAILY (Patient taking differently: Take 50 mg by mouth once daily.) 90 tablet 1 8/16/2023 at 11:00    tolterodine (DETROL LA) 4 MG 24 hr capsule TAKE ONE CAPSULE BY MOUTH ONCE DAILY (Patient taking differently: Take 4 mg by mouth once daily.) 90 capsule 1 8/15/2023 at 11:00    vitamin D 1000 units Tab Take 1,000 Units by mouth once daily.   8/16/2023 at 11:00    atorvastatin (LIPITOR) 40 MG tablet Take 1 tablet (40 mg total) by mouth every evening. 90 tablet 1     fluticasone propionate (FLONASE) 50 mcg/actuation nasal spray 1 spray by Each Nostril route as needed for Allergies.   More than a month    HYDROcodone-acetaminophen (NORCO) 5-325 mg per tablet Take 1 tablet by mouth every 4 (four) hours as needed for Pain. 18 tablet 0     nitroGLYCERIN (NITROSTAT) 0.4 MG SL tablet DISSOLVE 1 TABLET UNDER THE TONGUE AS NEEDED FOR CHEST PAIN (Patient taking differently: Place 0.4 mg under the tongue every 5 (five) minutes as needed.) 100 tablet 3        Review of patient's allergies indicates:   Allergen Reactions    Adhesive Other (See Comments)     SILK TAPE PULL SKIN OFF    Metformin Other (See Comments)     Weight loss cachexia anorexia,diarrhea.    Pcn [penicillins]      Patient states he passed out from this medication when he was 10 years old.        Past Medical History:   Diagnosis Date    Anemia     Anticoagulant long-term use     Arthritis     CAD (coronary artery disease)     CABG, STENTS '97,'99,'01,'05    Cancer     SKIN    Cataract     Diabetes mellitus     Diabetes mellitus type II     GERD (gastroesophageal reflux disease)     GI bleed 2020    Hypertension     Myocardial infarction      Wears glasses      Past Surgical History:   Procedure Laterality Date    CARDIAC PACEMAKER PLACEMENT      CARDIAC PACEMAKER PLACEMENT      CARDIAC PACEMAKER PLACEMENT  04/26/2018    replaced    CARDIAC SURGERY      1995   CABG X 5    CHOLECYSTECTOMY      COLON SURGERY      COLONOSCOPY N/A 2/21/2020    Procedure: COLONOSCOPY;  Surgeon: Abe Barragan III, MD;  Location: [facility];  Service: Endoscopy;  Laterality: N/A;    COLONOSCOPY N/A 2/23/2020    Procedure: COLONOSCOPY;  Surgeon: Bob Locke Jr., MD;  Location: [facility];  Service: Endoscopy;  Laterality: N/A;    CORONARY ANGIOGRAPHY INCLUDING BYPASS GRAFTS WITH CATHETERIZATION OF LEFT HEART N/A 10/26/2022    Procedure: ANGIOGRAM, CORONARY, INCLUDING BYPASS GRAFT, WITH LEFT HEART CATHETERIZATION;  Surgeon: Robles Mckoy MD;  Location: [facility] CATH/EP LAB;  Service: Cardiology;  Laterality: N/A;    CORONARY ARTERY BYPASS GRAFT  1995    CORONARY STENT PLACEMENT      stent x 5    ESOPHAGOGASTRODUODENOSCOPY N/A 2/23/2020    Procedure: EGD (ESOPHAGOGASTRODUODENOSCOPY);  Surgeon: Bob Locke Jr., MD;  Location: [facility];  Service: Endoscopy;  Laterality: N/A;    EYE SURGERY      BILAT CATARACT    head laceration   01/19/2018    HEMORRHOID SURGERY      INJECTION OF ANESTHETIC AGENT AROUND MEDIAL BRANCH NERVES INNERVATING LUMBAR FACET JOINT Bilateral 6/29/2023    Procedure: Block-nerve-medial branch-lumbar;  Surgeon: Maurice Montenegro MD;  Location: [facility] OR;  Service: Pain Management;  Laterality: Bilateral;  L3,4,5 MBB    INJECTION OF ANESTHETIC AGENT AROUND MEDIAL BRANCH NERVES INNERVATING LUMBAR FACET JOINT Bilateral 7/25/2023    Procedure: Block-nerve-medial branch-lumbar;  Surgeon: Maurice Montenegro MD;  Location: [facility] OR;  Service: Anesthesiology;  Laterality: Bilateral;  L3,4,5 MBB #2    SMALL BOWEL ENTEROSCOPY N/A 2/21/2020    Procedure: ENTEROSCOPY;  Surgeon: Abe Barragan III, MD;  Location: [facility];  Service: Endoscopy;  Laterality: N/A;    stint        Family History       Problem Relation (Age of Onset)    Heart disease Mother, Father, Brother, Brother, Brother, Brother, Brother    Hyperlipidemia Sister    Hypertension Sister          Tobacco Use    Smoking status: Never    Smokeless tobacco: Never   Substance and Sexual Activity    Alcohol use: No    Drug use: No    Sexual activity: Not Currently       Objective:     Weight: 83.6 kg (184 lb 3.2 oz) (kg)  Body mass index is 24.98 kg/m².  Vital Signs (Most Recent):  Temp: 98 °F (36.7 °C) (08/17/23 0700)  Pulse: 64 (08/17/23 0306)  Resp: 18 (08/17/23 0700)  BP: (!) 163/83 (notified nurse) (08/17/23 0700)  SpO2: 97 % (08/17/23 0740) Vital Signs (24h Range):  Temp:  [97.9 °F (36.6 °C)-98.8 °F (37.1 °C)] 98 °F (36.7 °C)  Pulse:  [64-75] 64  Resp:  [17-20] 18  SpO2:  [92 %-98 %] 97 %  BP: (137-202)/(69-98) 163/83     Date 08/17/23 0700 - 08/18/23 0659   Shift 3850-6914 7205-2163 9176-6391 24 Hour Total   INTAKE   Shift Total(mL/kg)       OUTPUT   Urine(mL/kg/hr) 375   375   Shift Total(mL/kg) 375(4.5)   375(4.5)   Weight (kg) 83.6 83.6 83.6 83.6             Neurosurgery Physical Exam  General: well developed, well nourished, no distress.   Head: normocephalic, atraumatic  Neck: No tracheal deviation.   Neurologic: Alert and oriented. Thought content appropriate.  GCS: E4 V5 M6; Total: 15  Vascular: Pulses 2+ and symmetric radial and dorsalis pedis. No LE edema.   Skin: Skin is warm, dry and intact.    Sensory: intact to light touch throughout  Motor Strength: Moves all extremities spontaneously with good tone.  Full strength upper and lower extremities. No abnormal movements seen.     Strength  Deltoids Triceps Biceps Wrist Extension Wrist Flexion Hand    Upper: R 5/5 5/5 5/5 5/5 5/5 5/5    L 5/5 5/5 5/5 5/5 5/5 5/5     Iliopsoas Quadriceps Knee  Flexion Tibialis  anterior Gastro- cnemius EHL   Lower: R 5/5 5/5 5/5 5/5 5/5 5/5    L 5/5 5/5 5/5 5/5 5/5 5/5        Cervical:   ROM: Full with flexion, extension,  "lateral rotation and ear-to-shoulder bend.   Midline TTP: Negative.     Thoracic:  Midline TTP: Negative.     Lumbar:  Midline TTP: Negative.       Significant Labs:  Recent Labs   Lab 08/16/23 1926 08/17/23  0323   * 153*    141   K 4.5 3.9    108   CO2 27 26   BUN 29* 28*   CREATININE 1.6* 1.4   CALCIUM 9.7 9.5   MG 1.8 1.8     Recent Labs   Lab 08/16/23 1926 08/17/23  0323   WBC 7.58 7.24   HGB 11.4* 11.3*   HCT 36.5* 37.7*   * 132*     No results for input(s): "LABPT", "INR", "APTT" in the last 48 hours.  Microbiology Results (last 7 days)       ** No results found for the last 168 hours. **            "

## 2023-08-17 NOTE — NURSING
Nurses Note -- 4 Eyes      8/17/2023   0200      Skin assessed during: Admit      [] No Altered Skin Integrity Present    []Prevention Measures Documented      [x] Yes- Altered Skin Integrity Present or Discovered   [x] LDA Added if Not in Epic (Describe Wound)   [x] New Altered Skin Integrity was Present on Admit and Documented in LDA   [x] Wound Image Taken    Wound Care Consulted? Yes    Attending Nurse:  Cami Willson RN/Staff Member:   [id number]

## 2023-08-17 NOTE — PLAN OF CARE
Problem: Adult Inpatient Plan of Care  Goal: Plan of Care Review  Outcome: Ongoing, Progressing  Goal: Patient-Specific Goal (Individualized)  Outcome: Ongoing, Progressing  Goal: Absence of Hospital-Acquired Illness or Injury  Outcome: Ongoing, Progressing  Goal: Optimal Comfort and Wellbeing  Outcome: Ongoing, Progressing  Goal: Readiness for Transition of Care  Outcome: Ongoing, Progressing     Problem: Diabetes Comorbidity  Goal: Blood Glucose Level Within Targeted Range  Outcome: Ongoing, Progressing     Problem: Syncope  Goal: Absence of Syncopal Symptoms  Outcome: Ongoing, Progressing     Problem: Fall Injury Risk  Goal: Absence of Fall and Fall-Related Injury  Outcome: Ongoing, Progressing     Problem: Infection  Goal: Absence of Infection Signs and Symptoms  Outcome: Ongoing, Progressing     Problem: Impaired Wound Healing  Goal: Optimal Wound Healing  Outcome: Ongoing, Progressing     Problem: Skin Injury Risk Increased  Goal: Skin Health and Integrity  Outcome: Ongoing, Progressing

## 2023-08-17 NOTE — HPI
(Per ED note on 8/16/23). This is an 78-year-old male who presents for evaluation after a syncopal episode.  The patient does have frequent falls and unsteady gait for which he  undergoes therapy.  He was leaving an office visit today and reports that he passed.  Reports that this was not his usual trip and fall.  He did strike his head.  Injured and scraped his right arm, his right knee as well as his right anterior chest.  He has had dull aching pain to the right anterior chest since the fall.  The pain is worse with palpation, movement and deep breathing but denies feeling short of breath.  He did not have any chest pain prior to the fall.  Denies any recent illnesses.  Denies fever, chills, cough or any upper or lower respiratory type symptoms.  Denies any abdominal pain or shortness of breath.  Has some mild pain to his right arm associated with a skin tear but denies any pain with range of motion as well as pain with an abrasion to the right knee but denies any pain with range of motion of the knee.  Denies any other problems or complaints.    CT chest obtained on 8/16/23 revealed a subacute L1 compression fracture.    8/17/23. Pt was found in bed, awake and alert. No family was present at time of encounter.  Patient does have a history of sick sinus syndrome and previous syncopal episodes.  L1 compression fracture was discovered 2/3/23 after a fall which was treated with bracing and physical therapy. Pt was eventually referred to pain management and underwent L3-5 bilateral medial branch nerve block on 6/29/23 and 7/9/23. Pt will also be undergoing a medial branch ablation on 9/13/23.  He reports chronic back pain for many years.  He also believed he fell due to weakness in legs.  At baseline patient does use a walker however he did not use at time of fall because he believe it was not necessary due to the short distance.  Denies back pain, bowel or bladder changes or paresthesias.    Medical history provided  by patient and medical record.

## 2023-08-17 NOTE — NURSING
[facility]  Wound Care    Patient Name:  Tono Saez   MRN:  [id number]  Date: 8/17/2023  Diagnosis: <principal problem not specified>    History:     Past Medical History:   Diagnosis Date    Anemia     Anticoagulant long-term use     Arthritis     CAD (coronary artery disease)     CABG, STENTS '97,'99,'01,'05    Cancer     SKIN    Cataract     Diabetes mellitus     Diabetes mellitus type II     GERD (gastroesophageal reflux disease)     GI bleed 2020    Hypertension     Myocardial infarction     Wears glasses        Social History     Socioeconomic History    Marital status:    Tobacco Use    Smoking status: Never    Smokeless tobacco: Never   Substance and Sexual Activity    Alcohol use: No    Drug use: No    Sexual activity: Not Currently     Social Determinants of Health     Financial Resource Strain: Low Risk  (5/24/2023)    Overall Financial Resource Strain (CARDIA)     Difficulty of Paying Living Expenses: Not hard at all   Food Insecurity: No Food Insecurity (6/23/2023)    Hunger Vital Sign     Worried About Running Out of Food in the Last Year: Never true     Ran Out of Food in the Last Year: Never true   Transportation Needs: No Transportation Needs (6/23/2023)    PRAPARE - Transportation     Lack of Transportation (Medical): No     Lack of Transportation (Non-Medical): No   Physical Activity: Insufficiently Active (6/23/2023)    Exercise Vital Sign     Days of Exercise per Week: 2 days     Minutes of Exercise per Session: 30 min   Stress: No Stress Concern Present (6/23/2023)    [place] [employer] - Occupational Stress Questionnaire     Feeling of Stress : Not at all   Social Connections: Moderately Integrated (6/23/2023)    Social Connection and Isolation Panel [NHANES]     Frequency of Communication with Friends and Family: More than three times a week     Frequency of Social Gatherings with Friends and Family: More than three times a week     Attends  Voodoo Services: More than 4 times per year     Active Member of Clubs or Organizations: Yes     Attends Club or Organization Meetings: More than 4 times per year     Marital Status:    Housing Stability: Low Risk  (6/23/2023)    Housing Stability Vital Sign     Unable to Pay for Housing in the Last Year: No     Number of Places Lived in the Last Year: 1     Unstable Housing in the Last Year: No       Precautions:     Allergies as of 08/16/2023 - Reviewed 08/16/2023   Allergen Reaction Noted    Adhesive Other (See Comments) 02/19/2013    Metformin Other (See Comments) 08/25/2017    Pcn [penicillins]  11/27/2012       [facility] Assessment Details/Treatment   78 yr old male  alert and sitting in the bedside chair  with family in the room   Fall on cement   Bilat knees arms left hand  right shoulder abrasions   Right knee     Right knee     Right knee     Left knee     Right shoulder posterior     Left hand     Face right side     Left arm     Right arm     Recommendation:   Bilat arms and knees right side of face left hand right posterior shoulder  Clean with chlorhexidine/ns.  Pat dry. Apply Medihoney and cover with mepilex. Cover with adaptic, abd pad, and kerlex. Cover with large band aid.    08/17/2023

## 2023-08-18 LAB
ALBUMIN SERPL BCP-MCNC: 3.6 G/DL (ref 3.5–5.2)
ALP SERPL-CCNC: 89 U/L (ref 55–135)
ALT SERPL W/O P-5'-P-CCNC: 13 U/L (ref 10–44)
ANION GAP SERPL CALC-SCNC: 6 MMOL/L (ref 8–16)
AST SERPL-CCNC: 19 U/L (ref 10–40)
BASOPHILS # BLD AUTO: 0.05 K/UL (ref 0–0.2)
BASOPHILS NFR BLD: 0.8 % (ref 0–1.9)
BILIRUB SERPL-MCNC: 0.9 MG/DL (ref 0.1–1)
BUN SERPL-MCNC: 25 MG/DL (ref 8–23)
CALCIUM SERPL-MCNC: 9.4 MG/DL (ref 8.7–10.5)
CHLORIDE SERPL-SCNC: 110 MMOL/L (ref 95–110)
CO2 SERPL-SCNC: 23 MMOL/L (ref 23–29)
CREAT SERPL-MCNC: 1.2 MG/DL (ref 0.5–1.4)
DIFFERENTIAL METHOD: ABNORMAL
EOSINOPHIL # BLD AUTO: 0.3 K/UL (ref 0–0.5)
EOSINOPHIL NFR BLD: 4.3 % (ref 0–8)
ERYTHROCYTE [DISTWIDTH] IN BLOOD BY AUTOMATED COUNT: 15.7 % (ref 11.5–14.5)
EST. GFR  (NO RACE VARIABLE): >60 ML/MIN/1.73 M^2
GLUCOSE SERPL-MCNC: 119 MG/DL (ref 70–110)
GLUCOSE SERPL-MCNC: 132 MG/DL (ref 70–110)
GLUCOSE SERPL-MCNC: 175 MG/DL (ref 70–110)
GLUCOSE SERPL-MCNC: 178 MG/DL (ref 70–110)
GLUCOSE SERPL-MCNC: 182 MG/DL (ref 70–110)
HCT VFR BLD AUTO: 38.2 % (ref 40–54)
HGB BLD-MCNC: 11.9 G/DL (ref 14–18)
IMM GRANULOCYTES # BLD AUTO: 0.02 K/UL (ref 0–0.04)
IMM GRANULOCYTES NFR BLD AUTO: 0.3 % (ref 0–0.5)
LYMPHOCYTES # BLD AUTO: 1 K/UL (ref 1–4.8)
LYMPHOCYTES NFR BLD: 15.2 % (ref 18–48)
MAGNESIUM SERPL-MCNC: 1.6 MG/DL (ref 1.6–2.6)
MCH RBC QN AUTO: 26.8 PG (ref 27–31)
MCHC RBC AUTO-ENTMCNC: 31.2 G/DL (ref 32–36)
MCV RBC AUTO: 86 FL (ref 82–98)
MONOCYTES # BLD AUTO: 0.7 K/UL (ref 0.3–1)
MONOCYTES NFR BLD: 10.7 % (ref 4–15)
NEUTROPHILS # BLD AUTO: 4.4 K/UL (ref 1.8–7.7)
NEUTROPHILS NFR BLD: 68.7 % (ref 38–73)
NRBC BLD-RTO: 0 /100 WBC
PLATELET # BLD AUTO: 142 K/UL (ref 150–450)
PMV BLD AUTO: 11.4 FL (ref 9.2–12.9)
POTASSIUM SERPL-SCNC: 3.9 MMOL/L (ref 3.5–5.1)
PROT SERPL-MCNC: 6.4 G/DL (ref 6–8.4)
RBC # BLD AUTO: 4.44 M/UL (ref 4.6–6.2)
SODIUM SERPL-SCNC: 139 MMOL/L (ref 136–145)
WBC # BLD AUTO: 6.45 K/UL (ref 3.9–12.7)

## 2023-08-18 PROCEDURE — 63600175 PHARM REV CODE 636 W HCPCS: Performed by: FAMILY MEDICINE

## 2023-08-18 PROCEDURE — 97161 PT EVAL LOW COMPLEX 20 MIN: CPT

## 2023-08-18 PROCEDURE — 85025 COMPLETE CBC W/AUTO DIFF WBC: CPT | Performed by: FAMILY MEDICINE

## 2023-08-18 PROCEDURE — 99900035 HC TECH TIME PER 15 MIN (STAT)

## 2023-08-18 PROCEDURE — 25000003 PHARM REV CODE 250: Performed by: FAMILY MEDICINE

## 2023-08-18 PROCEDURE — 21400001 HC TELEMETRY ROOM

## 2023-08-18 PROCEDURE — 80053 COMPREHEN METABOLIC PANEL: CPT | Performed by: FAMILY MEDICINE

## 2023-08-18 PROCEDURE — 36415 COLL VENOUS BLD VENIPUNCTURE: CPT | Performed by: FAMILY MEDICINE

## 2023-08-18 PROCEDURE — 97530 THERAPEUTIC ACTIVITIES: CPT

## 2023-08-18 PROCEDURE — 97110 THERAPEUTIC EXERCISES: CPT

## 2023-08-18 PROCEDURE — 83735 ASSAY OF MAGNESIUM: CPT | Performed by: FAMILY MEDICINE

## 2023-08-18 PROCEDURE — 25000003 PHARM REV CODE 250: Performed by: STUDENT IN AN ORGANIZED HEALTH CARE EDUCATION/TRAINING PROGRAM

## 2023-08-18 PROCEDURE — 97535 SELF CARE MNGMENT TRAINING: CPT

## 2023-08-18 RX ORDER — PANTOPRAZOLE SODIUM 40 MG/1
40 TABLET, DELAYED RELEASE ORAL
Status: DISCONTINUED | OUTPATIENT
Start: 2023-08-18 | End: 2023-08-27 | Stop reason: HOSPADM

## 2023-08-18 RX ORDER — ISOSORBIDE DINITRATE 10 MG/1
10 TABLET ORAL 2 TIMES DAILY
Status: DISCONTINUED | OUTPATIENT
Start: 2023-08-18 | End: 2023-08-23

## 2023-08-18 RX ADMIN — ISOSORBIDE DINITRATE 10 MG: 10 TABLET ORAL at 08:08

## 2023-08-18 RX ADMIN — SERTRALINE HYDROCHLORIDE 50 MG: 50 TABLET ORAL at 10:08

## 2023-08-18 RX ADMIN — MONTELUKAST 10 MG: 10 TABLET, FILM COATED ORAL at 08:08

## 2023-08-18 RX ADMIN — ATORVASTATIN CALCIUM 40 MG: 40 TABLET, FILM COATED ORAL at 08:08

## 2023-08-18 RX ADMIN — HYDRALAZINE HYDROCHLORIDE 10 MG: 20 INJECTION INTRAMUSCULAR; INTRAVENOUS at 01:08

## 2023-08-18 RX ADMIN — SODIUM CHLORIDE: 0.9 INJECTION, SOLUTION INTRAVENOUS at 01:08

## 2023-08-18 RX ADMIN — PANTOPRAZOLE SODIUM 40 MG: 40 TABLET, DELAYED RELEASE ORAL at 10:08

## 2023-08-18 RX ADMIN — FINASTERIDE 5 MG: 5 TABLET, FILM COATED ORAL at 10:08

## 2023-08-18 RX ADMIN — ASPIRIN 81 MG: 81 TABLET, COATED ORAL at 10:08

## 2023-08-18 RX ADMIN — CLOPIDOGREL BISULFATE 75 MG: 75 TABLET, FILM COATED ORAL at 10:08

## 2023-08-18 RX ADMIN — ISOSORBIDE DINITRATE 10 MG: 10 TABLET ORAL at 10:08

## 2023-08-18 RX ADMIN — OXYBUTYNIN CHLORIDE 10 MG: 5 TABLET, EXTENDED RELEASE ORAL at 10:08

## 2023-08-18 NOTE — PT/OT/SLP EVAL
Physical Therapy Evaluation    Patient Name:  Tono Saez   MRN:  [id number]    Recommendations:     Discharge Recommendations: rehabilitation facility   Discharge Equipment Recommendations: none   Barriers to discharge: Decreased caregiver support    Assessment:     Tono Saez is a 78 y.o. male admitted with a medical diagnosis of <principal problem not specified>.  He presents with the following impairments/functional limitations: weakness, impaired endurance, impaired functional mobility, gait instability, impaired balance, decreased safety awareness, pain, impaired skin, impaired cardiopulmonary response to activity, orthopedic precautions .    Pt seen supine in bed with multiple skin tear. Pt sustained a collapse and fall at parking lot post eye appointment yesterday. Pt alert and agreeable to PT- was up in chair with nursing earlier. Pt seen for thera ex in supine. Mod assist to roll and to sit EOB with c/o R chest pain. Pt able to stand with RW with onset of weakness/not feeling well. Pt able to take 3 side steps and returned back supine. Pt has L1 compression fx. Await delivery of TLSO brace.  Pt to benefit from IPR.   Pt was home alone, ambulatory and driving prior to this admission.    Rehab Prognosis: Fair; patient would benefit from acute skilled PT services to address these deficits and reach maximum level of function.    Recent Surgery: * No surgery found *      Plan:     During this hospitalization, patient to be seen 6 x/week to address the identified rehab impairments via gait training, therapeutic activities, therapeutic exercises and progress toward the following goals:    Plan of Care Expires:       Subjective     Chief Complaint: weakness, R chest wall pain  Patient/Family Comments/goals: get well  Pain/Comfort:  Pain Rating 1:  (not rated)  Location - Side 1: Right  Location 1: chest  Pain Addressed 1: Reposition, Distraction, Cessation of Activity, Nurse notified    Patients cultural,  spiritual, Religion conflicts given the current situation:      Living Environment:  Home alone  Prior to admission, patients level of function was ambulatory, driving.  Equipment used at home: walker, rolling.  DME owned (not currently used): none.  Upon discharge, patient will have assistance from family.    Objective:     Communicated with nurse Lopez prior to session.  Patient found HOB elevated with bed alarm, telemetry  upon PT entry to room.    General Precautions: Standard, fall, anti-coagulation medicine, pacemaker  Orthopedic Precautions:spinal precautions (L1 compression fx)   Braces: TLSO (has orders for TLSO but not available yet)  Respiratory Status: Room air    Exams:  Postural Exam:  Patient presented with the following abnormalities:    -       Rounded shoulders  -       Forward head  -       BMI 24  RLE ROM: WFL  RLE Strength: Deficits: 3+/5  LLE ROM: WFL  LLE Strength: Deficits: 3+/5    Functional Mobility:  Bed Mobility:     Rolling Right: minimum assistance  Scooting: minimum assistance  Supine to Sit: moderate assistance  Sit to Supine: moderate assistance  Transfers:     Sit to Stand:  minimum assistance with rolling walker few side steps with RW with c/o weakness      AM-PAC 6 CLICK MOBILITY  Total Score:13       Treatment & Education:  Patient was educated on the importance of OOB activity and functional mobility to negate negative effects of prolonged bed rest during hospitalization, safe transfers and ambulation, and D/C planning   Thera ex in supine with AP,QS/GS,SLR, HS x 10-20 reps  Stood and took few steps only with c/o weakness    Patient left HOB elevated with all lines intact, call button in reach, bed alarm on, and nurse Jessica present.    GOALS:   Multidisciplinary Problems       Physical Therapy Goals          Problem: Physical Therapy    Goal Priority Disciplines Outcome Goal Variances Interventions   Physical Therapy Goal     PT, PT/OT Ongoing, Progressing      Description: Goals to be met by: 2023     Patient will increase functional independence with mobility by performin. Supine to sit with Contact Guard Assistance  2. Sit to stand transfer with Minimal Assistance  3. Bed to chair transfer with Minimal Assistance using Rolling Walker  4. Gait  x 150 feet with Minimal Assistance using Rolling Walker.   5. Lower extremity exercise program x20 reps                        History:     Past Medical History:   Diagnosis Date    Anemia     Anticoagulant long-term use     Arthritis     CAD (coronary artery disease)     CABG, STENTS ,,','05    Cancer     SKIN    Cataract     Diabetes mellitus     Diabetes mellitus type II     GERD (gastroesophageal reflux disease)     GI bleed     Hypertension     Myocardial infarction     Wears glasses        Past Surgical History:   Procedure Laterality Date    CARDIAC PACEMAKER PLACEMENT      CARDIAC PACEMAKER PLACEMENT      CARDIAC PACEMAKER PLACEMENT  2018    replaced    CARDIAC SURGERY         CABG X 5    CHOLECYSTECTOMY      COLON SURGERY      COLONOSCOPY N/A 2020    Procedure: COLONOSCOPY;  Surgeon: Abe Barragan III, MD;  Location[facility];  Service: Endoscopy;  Laterality: N/A;    COLONOSCOPY N/A 2020    Procedure: COLONOSCOPY;  Surgeon: Bob Locke Jr., MD;  Location: [facility] ENDO;  Service: Endoscopy;  Laterality: N/A;    CORONARY ANGIOGRAPHY INCLUDING BYPASS GRAFTS WITH CATHETERIZATION OF LEFT HEART N/A 10/26/2022    Procedure: ANGIOGRAM, CORONARY, INCLUDING BYPASS GRAFT, WITH LEFT HEART CATHETERIZATION;  Surgeon: Robles Mckoy MD;  Location: [facility] CATH/EP LAB;  Service: Cardiology;  Laterality: N/A;    CORONARY ARTERY BYPASS GRAFT      CORONARY STENT PLACEMENT      stent x 5    ESOPHAGOGASTRODUODENOSCOPY N/A 2020    Procedure: EGD (ESOPHAGOGASTRODUODENOSCOPY);  Surgeon: Bob Locke Jr., MD;  Location: [facility] ENDO;  Service: Endoscopy;  Laterality: N/A;    EYE  SURGERY      BILAT CATARACT    head laceration   01/19/2018    HEMORRHOID SURGERY      INJECTION OF ANESTHETIC AGENT AROUND MEDIAL BRANCH NERVES INNERVATING LUMBAR FACET JOINT Bilateral 6/29/2023    Procedure: Block-nerve-medial branch-lumbar;  Surgeon: Maurice Montenegro MD;  Location: [facility] OR;  Service: Pain Management;  Laterality: Bilateral;  L3,4,5 MBB    INJECTION OF ANESTHETIC AGENT AROUND MEDIAL BRANCH NERVES INNERVATING LUMBAR FACET JOINT Bilateral 7/25/2023    Procedure: Block-nerve-medial branch-lumbar;  Surgeon: Maurice Montenegro MD;  Location: [facility];  Service: Anesthesiology;  Laterality: Bilateral;  L3,4,5 MBB #2    SMALL BOWEL ENTEROSCOPY N/A 2/21/2020    Procedure: ENTEROSCOPY;  Surgeon: Abe Barragan III, MD;  [facility]: [facility];  Service: Endoscopy;  Laterality: N/A;    stint         Time Tracking:     PT Received On: 08/18/23  PT Start Time: 1001     PT Stop Time: 1025  PT Total Time (min): 24 min     Billable Minutes: Evaluation 10 and Therapeutic Exercise 14      08/18/2023

## 2023-08-18 NOTE — PLAN OF CARE
Problem: Physical Therapy  Goal: Physical Therapy Goal  Description: Goals to be met by: 2023     Patient will increase functional independence with mobility by performin. Supine to sit with Contact Guard Assistance  2. Sit to stand transfer with Minimal Assistance  3. Bed to chair transfer with Minimal Assistance using Rolling Walker  4. Gait  x 150 feet with Minimal Assistance using Rolling Walker.   5. Lower extremity exercise program x20 reps   Outcome: Ongoing, Progressing   PT eval and treat. EOB sitting min/mod assist. C/o pain R chest wall. Stood and took few side steps with c/o weakness. Pt was indep and driving and will benefit from inpt rehab

## 2023-08-18 NOTE — PLAN OF CARE
CM noted therapy recommendation for inpatient rehab and MD note that states patient is agreeable. Referral placed and sent to [facility] via [employer] and [employer] with NSR notified via secure chat to review.  CM to meet patient at bedside and obtain patient choice form.      CM has not yet received call back from [facility] after multiple attempts in regards to TLSO brace ordered yesterday.    15:40- CM sent email to purchasing regarding TLSO brace.  Order, facesheet and MD note sent to [facility] via [employer].  CM called [facility] to notify.       08/18/23 2551   Post-Acute Status   Post-Acute Authorization Placement   Post-Acute Placement Status Referrals Sent   E Status Referrals Sent   Patient choice form signed by patient/caregiver List with quality metrics by geographic area provided   Discharge Delays (!) Home Medical Equipment (Insurance, Delivery)   Discharge Plan   Discharge Plan A Rehab   Discharge Plan B Rehab

## 2023-08-18 NOTE — PLAN OF CARE
Problem: Adult Inpatient Plan of Care  Goal: Plan of Care Review  Outcome: Ongoing, Progressing     Problem: Adult Inpatient Plan of Care  Goal: Optimal Comfort and Wellbeing  Outcome: Ongoing, Progressing     Problem: Syncope  Goal: Absence of Syncopal Symptoms  Outcome: Ongoing, Progressing     Problem: Fall Injury Risk  Goal: Absence of Fall and Fall-Related Injury  Outcome: Ongoing, Progressing     Problem: Impaired Wound Healing  Goal: Optimal Wound Healing  Outcome: Ongoing, Progressing     Problem: Skin Injury Risk Increased  Goal: Skin Health and Integrity  Outcome: Ongoing, Progressing

## 2023-08-18 NOTE — PLAN OF CARE
CM noted the need for TLSO brace per neurosurgery note.  CM attempted to call number provided for [facility] twice with no answer.  SW attempted to call another number as well with no response.  CM to follow up again this morning.       08/18/23 0854   Post-Acute Status   Post-Acute Authorization HME   Discharge Delays (!) Home Medical Equipment (Insurance, Delivery)   Discharge Plan   Discharge Plan A Home   Discharge Plan B Home Health

## 2023-08-18 NOTE — PROGRESS NOTES
[facility] Medicine  Progress Note    Patient name: Tono Saez  MRN: [id number]  Admit Date: 8/16/2023   LOS: 1 day     SUBJECTIVE:     Principal problem: syncope    Interval History:      HPI-  78-year-old male who presents for evaluation after a syncopal episode.  The patient does have frequent falls and unsteady gait for which he  undergoes therapy.  He was leaving an office visit today and reports that he passed.  Reports that this was not his usual trip and fall.  He did strike his head.  Injured and scraped his right arm, his right knee as well as his right anterior chest.  He has had dull aching pain to the right anterior chest since the fall.  The pain is worse with palpation, movement and deep breathing but denies feeling short of breath.  He did not have any chest pain prior to the fall.  Denies any recent illnesses.  Denies fever, chills, cough or any upper or lower respiratory type symptoms.  Denies any abdominal pain or shortness of breath.  Has some mild pain to his right arm associated with a skin tear but denies any pain with range of motion as well as pain with an abrasion to the right knee but denies any pain with range of motion of the knee.  Denies any other problems or complaints.    8/17-  HR 60, hypertensive w/sbp 160s.  Chronic lower back pain.  F/u nsgy recs and echo.  Carotids no significant stenosis.      Scheduled Meds:   aspirin  81 mg Oral Daily    atorvastatin  40 mg Oral Daily    clopidogreL  75 mg Oral Daily    finasteride  5 mg Oral Daily    montelukast  10 mg Oral QHS    oxybutynin  10 mg Oral Daily    sertraline  50 mg Oral Daily     Continuous Infusions:   sodium chloride 0.9% 100 mL/hr at 08/18/23 0127     PRN Meds:acetaminophen, albuterol-ipratropium, dextrose 50%, dextrose 50%, glucagon (human recombinant), glucose, glucose, hydrALAZINE, hydrALAZINE, HYDROcodone-acetaminophen, insulin aspart U-100, melatonin, morphine, naloxone, ondansetron,  polyethylene glycol, senna-docusate 8.6-50 mg, simethicone, sodium chloride 0.9%    Review of patient's allergies indicates:   Allergen Reactions    Adhesive Other (See Comments)     SILK TAPE PULL SKIN OFF    Metformin Other (See Comments)     Weight loss cachexia anorexia,diarrhea.    Pcn [penicillins]      Patient states he passed out from this medication when he was 10 years old.        Review of Systems: As per interval history    OBJECTIVE:     Vital Signs (Most Recent)  Temp: 98.2 °F (36.8 °C) (08/18/23 0728)  Pulse: 68 (08/18/23 0728)  Resp: 20 (08/18/23 0728)  BP: (!) 176/98 (08/18/23 0728)  SpO2: 96 % (08/18/23 0846)    Vital Signs Range (Last 24H):  Temp:  [97.5 °F (36.4 °C)-98.4 °F (36.9 °C)]   Pulse:  [58-68]   Resp:  [16-20]   BP: (170-185)/(77-98)   SpO2:  [95 %-97 %]     I & O (Last 24H):  Intake/Output Summary (Last 24 hours) at 8/18/2023 0931  Last data filed at 8/18/2023 0333  Gross per 24 hour   Intake 440 ml   Output 975 ml   Net -535 ml         Physical Exam:  General: Patient resting comfortably in no acute distress. Ecchymosis and swelling right cheek  Eyes: No conjunctival injection. No scleral icterus.  ENT: Hearing grossly intact. No discharge from ears. No nasal discharge.   CVS: RRR.   Lungs:  No tachypnea or accessory muscle use.   Abdomen:  Soft, nontender and nondistended.    Neuro: Alert.  Moves all extremities. Follows commands. Responds appropriately   Skin:  No rash or erythema noted    Laboratory:  I have reviewed all pertinent lab results within the past 24 hours.      ASSESSMENT/PLAN:         Active Hospital Problems    Diagnosis  POA    Syncope and collapse [R55]  Yes    Orthostatic hypotension [I95.1]  Yes    Chronic systolic congestive heart failure [I50.22]  Yes    SSS (sick sinus syndrome) [I49.5]  Yes    Stage 3a chronic kidney disease [N18.31]  Yes    Compression fracture of L1 lumbar vertebra, with delayed healing, subsequent encounter [S32.010G]  Not Applicable     Coronary artery disease of native artery of native heart with stable angina pectoris [I25.118]  Yes     CABG, STENTS '97,'99,'01,'05    10/26/22:  Summary         The Dist LM lesion was 90% stenosed with 10% stenosis post-intervention.    The pre-procedure left ventricular end diastolic pressure was 12.    A STENT LIMA YOHANA 3.50 X 26 stent was not successfully placed at 12 JASPER for 20 sec.    The estimated blood loss was <50 mL.    Successful intravascular ultrasound-guided PCI of the critical stenosis of left main into circumflex with 1 drug-eluting stent    Patent vein graft to OM, occluded branch of the OM as compared to angiogram in 2016.    Patent vein graft to right coronary artery with patent stent, moderately degenerated, distal native RCA has 80% stenosis in medium caliber vessel.    Occluded LAD with a patent LIMA to LAD        Chronic anticoagulation [Z79.01]  Not Applicable    GERD (gastroesophageal reflux disease) [K21.9]  Yes    Type 2 diabetes mellitus with hyperglycemia  [E11.65]  Yes    HTN (hypertension) [I10]  Yes      Resolved Hospital Problems   No resolved problems to display.         Plan:     Syncope  Hx SSS  Hx combined HF with EF 25% and moderate AS  - CT head Small air-fluid level in the right maxillary sinus could be result of sinus disease or trauma. Right facial soft tissue swelling.  - echo complete, pending read  - carotid US no significant stenosis  - EKG, trending troponin  - telemetry  - discontinue BB, midodrine  - Orthostatic VS are as follows, patient did not tolerate standing. Patient denies symptoms.   Lying:: /86, HR 74 paced  Sitting:: /84, HR 74 paced  - pacemaker interrogation        DM2  - SSI     Chronic conditions as noted above/below; home medications reviewed personally by me and restarted as appropriate  Electrolyte derangement:  Trending BMP; Mg; replacement prn  DVT ppx: lovenox held due to recent fall with multiple abrasions, risk of bleeding; continue  SCDs  FULL CODE           VTE Risk Mitigation (From admission, onward)           Ordered     IP VTE HIGH RISK PATIENT  Once         08/16/23 2225     Place sequential compression device  Until discontinued         08/16/23 2225     Reason for No Pharmacological VTE Prophylaxis  Once        Question:  Reasons:  Answer:  Risk of Bleeding    08/16/23 2225                        Department Hospital Medicine  [facility]  Sixto Schultz MD  Date of service: 08/18/2023

## 2023-08-18 NOTE — PLAN OF CARE
08/17/23 1945   Patient Assessment/Suction   Level of Consciousness (AVPU) alert   Respiratory Effort Normal;Unlabored   All Lung Fields Breath Sounds clear   PRE-TX-O2   Device (Oxygen Therapy) room air   SpO2 95 %   Pulse Oximetry Type Intermittent   $ Pulse Oximetry - Multiple Charge Pulse Oximetry - Multiple   Pulse 66   Resp 16   Aerosol Therapy   $ Aerosol Therapy Charges PRN treatment not required   Respiratory Evaluation   $ Care Plan Tech Time 15 min   $ Eval/Re-eval Charges Re-evaluation

## 2023-08-18 NOTE — PT/OT/SLP PROGRESS
Occupational Therapy   Treatment    Name: Tono Saez  MRN: [id number]  Admitting Diagnosis:  <principal problem not specified>       Recommendations:     Discharge Recommendations: rehabilitation facility  Discharge Equipment Recommendations:  bedside commode  Barriers to discharge:  Decreased caregiver support    Assessment:     Tono Saez is a 78 y.o. male with a medical diagnosis of <principal problem not specified>.  Performance deficits affecting function are weakness, impaired endurance, impaired self care skills, impaired functional mobility, gait instability, impaired balance, decreased safety awareness, pain, impaired cardiopulmonary response to activity, orthopedic precautions.     Pt was motivated to progress towards goals today; he completed functional mobility throughout the room and participated in seated/standing ADLs; pt limited by generalized weakness and pain; he c/o R flank and RLE pain; RLE instability also noted; high fall risk.      Rehab Prognosis:  Good; patient would benefit from acute skilled OT services to address these deficits and reach maximum level of function.       Plan:     Patient to be seen 6 x/week to address the above listed problems via self-care/home management, therapeutic activities, therapeutic exercises  Plan of Care Expires: 09/17/23  Plan of Care Reviewed with: patient    Subjective     Chief Complaint: R knee and R flank pain  Patient/Family Comments/goals: to go to rehab and regain as much independence as possible  Pain/Comfort:  Pain Rating 1:  (pt c/o R flank pain and RLE pain; not rated on scale)  Pain Rating Post-Intervention 1:  (no change)    Objective:     Communicated with: nurse prior to session.  Patient found supine with telemetry upon OT entry to room.    General Precautions: Standard, fall, diabetic, aspiration    Orthopedic Precautions:spinal precautions  Braces: TLSO (brace recommended by MD but not yet delivered at time of this  treatment)  Respiratory Status: Room air     Occupational Performance:     Bed Mobility:    Patient completed Rolling/Turning to Left with  stand by assistance  Patient completed Scooting/Bridging with stand by assistance  Patient completed Supine to Sit with moderate assistance  Patient completed Sit to Supine with minimum assistance     Functional Mobility/Transfers:  Patient completed Sit <> Stand Transfer with minimum assistance  with  rolling walker   Patient completed Toilet Transfer Stand Pivot technique with minimum assistance with  rolling walker  Functional Mobility: ~30ft in room with Min A using RW; R knee instability noted    Activities of Daily Living:  Grooming: contact guard assistance standing at sink for hand washing  Lower Body Dressing: total assistance to don socks; pt would benefit from education in use of LB dressing AD  Toileting: contact guard assistance for stability during clothing management     Treatment & Education:  Reviewed spinal precautions during activities above; pt demonstrated understanding  Discussed d/c recommendations with pt/son-in-law; they verbalized understanding/agreement     Patient left HOB elevated with all lines intact, call button in reach, and bed alarm on    GOALS:   Multidisciplinary Problems       Occupational Therapy Goals          Problem: Occupational Therapy    Goal Priority Disciplines Outcome Interventions   Occupational Therapy Goal     OT, PT/OT Ongoing, Progressing    Description: Goals to be met by: 8/31/2023     Patient will increase functional independence with ADLs by performing:    UE Dressing with Modified [place].  LE Dressing with Modified [place].  Grooming while standing at sink with Modified [place].  Toileting from toilet with Modified [place] for hygiene and clothing management.   Toilet transfer to toilet with Modified [place].                         Time Tracking:     OT Date of Treatment: 08/18/23  OT Start  Time: 1430  OT Stop Time: 1508  OT Total Time (min): 38 min    Billable Minutes:Self Care/Home Management 23  Therapeutic Activity 15    OT/BRY: OT          8/18/2023

## 2023-08-18 NOTE — PROGRESS NOTES
[facility] Medicine  Progress Note    Patient name: Tono Saez  MRN: [id number]  Admit Date: 8/16/2023   LOS: 1 day     SUBJECTIVE:     Principal problem: syncope        HPI-  78-year-old male who presents for evaluation after a syncopal episode.  The patient does have frequent falls and unsteady gait for which he  undergoes therapy.  He was leaving an office visit today and reports that he passed.  Reports that this was not his usual trip and fall.  He did strike his head.  Injured and scraped his right arm, his right knee as well as his right anterior chest.  He has had dull aching pain to the right anterior chest since the fall.  The pain is worse with palpation, movement and deep breathing but denies feeling short of breath.  He did not have any chest pain prior to the fall.  Denies any recent illnesses.  Denies fever, chills, cough or any upper or lower respiratory type symptoms.  Denies any abdominal pain or shortness of breath.  Has some mild pain to his right arm associated with a skin tear but denies any pain with range of motion as well as pain with an abrasion to the right knee but denies any pain with range of motion of the knee.  Denies any other problems or complaints.      Interval History:      8/18- he is up to bedside chair, alert.  Denies feeling dizzy, light headed currently.  +right lateral chest M/s chest pain.  He cant remember exactly whether he felt dizzy before falling or falling mechanically.  But he does mention that he thinks he fell bc he wasn't using his walker.  While helping him back to bed, he was very weak and poor balance.  I feel it is more likely he had a mechanical fall while not using DME.  He is not orthostatic.  PM interrogation did not show any irregularities.  His echo actually shows improved cardiac function.  He lives alone.  I think IPR would be very beneficial to increase his strength and ability to perform ADLs before attempting to get him  back to his home.  He is agreeable to this.  Will ask PT for eval.      8/17-  HR 60, hypertensive w/sbp 160s.  Chronic lower back pain.  F/u nsgy recs and echo.  Carotids no significant stenosis.      Scheduled Meds:   aspirin  81 mg Oral Daily    atorvastatin  40 mg Oral Daily    clopidogreL  75 mg Oral Daily    finasteride  5 mg Oral Daily    isosorbide dinitrate  10 mg Oral BID    montelukast  10 mg Oral QHS    oxybutynin  10 mg Oral Daily    pantoprazole  40 mg Oral Before breakfast    sertraline  50 mg Oral Daily     Continuous Infusions:   sodium chloride 0.9% 100 mL/hr at 08/18/23 0127     PRN Meds:acetaminophen, albuterol-ipratropium, dextrose 50%, dextrose 50%, glucagon (human recombinant), glucose, glucose, hydrALAZINE, hydrALAZINE, HYDROcodone-acetaminophen, insulin aspart U-100, melatonin, morphine, naloxone, ondansetron, polyethylene glycol, senna-docusate 8.6-50 mg, simethicone, sodium chloride 0.9%    Review of patient's allergies indicates:   Allergen Reactions    Adhesive Other (See Comments)     SILK TAPE PULL SKIN OFF    Metformin Other (See Comments)     Weight loss cachexia anorexia,diarrhea.    Pcn [penicillins]      Patient states he passed out from this medication when he was 10 years old.        Review of Systems: As per interval history    OBJECTIVE:     Vital Signs (Most Recent)  Temp: 97.3 °F (36.3 °C) (08/18/23 1119)  Pulse: 69 (08/18/23 1119)  Resp: 20 (08/18/23 1119)  BP: 137/79 (08/18/23 1119)  SpO2: (!) 93 % (08/18/23 1119)    Vital Signs Range (Last 24H):  Temp:  [97.3 °F (36.3 °C)-98.4 °F (36.9 °C)]   Pulse:  [58-69]   Resp:  [16-20]   BP: (137-185)/(77-98)   SpO2:  [93 %-97 %]     I & O (Last 24H):  Intake/Output Summary (Last 24 hours) at 8/18/2023 1334  Last data filed at 8/18/2023 1032  Gross per 24 hour   Intake 440 ml   Output 875 ml   Net -435 ml         Physical Exam:  General: Patient resting comfortably in no acute distress. Ecchymosis and swelling right cheek  Eyes: No  conjunctival injection. No scleral icterus.  ENT: Hearing grossly intact. No discharge from ears. No nasal discharge.   CVS: RRR.   Lungs:  No tachypnea or accessory muscle use.   Abdomen:  Soft, nontender and nondistended.    Neuro: Alert.  Moves all extremities. Follows commands. Responds appropriately   Skin:  No rash or erythema noted    Laboratory:  I have reviewed all pertinent lab results within the past 24 hours.      ASSESSMENT/PLAN:         Active Hospital Problems    Diagnosis  POA    Syncope and collapse [R55]  Yes    Orthostatic hypotension [I95.1]  Yes    Chronic systolic congestive heart failure [I50.22]  Yes    SSS (sick sinus syndrome) [I49.5]  Yes    Stage 3a chronic kidney disease [N18.31]  Yes    Compression fracture of L1 lumbar vertebra, with delayed healing, subsequent encounter [S32.010G]  Not Applicable    Coronary artery disease of native artery of native heart with stable angina pectoris [I25.118]  Yes     CABG, STENTS '97,'99,'01,'05    10/26/22:  Summary         The Dist LM lesion was 90% stenosed with 10% stenosis post-intervention.    The pre-procedure left ventricular end diastolic pressure was 12.    A STENT LIMA YOHANA 3.50 X 26 stent was not successfully placed at 12 JASPER for 20 sec.    The estimated blood loss was <50 mL.    Successful intravascular ultrasound-guided PCI of the critical stenosis of left main into circumflex with 1 drug-eluting stent    Patent vein graft to OM, occluded branch of the OM as compared to angiogram in 2016.    Patent vein graft to right coronary artery with patent stent, moderately degenerated, distal native RCA has 80% stenosis in medium caliber vessel.    Occluded LAD with a patent LIMA to LAD        Chronic anticoagulation [Z79.01]  Not Applicable    GERD (gastroesophageal reflux disease) [K21.9]  Yes    Type 2 diabetes mellitus with hyperglycemia  [E11.65]  Yes    HTN (hypertension) [I10]  Yes      Resolved Hospital Problems   No resolved problems to  display.         Plan:     Syncope  Hx SSS  Hx combined HF with EF 25% and moderate AS  - CT head Small air-fluid level in the right maxillary sinus could be result of sinus disease or trauma. Right facial soft tissue swelling.  - echo complete, improved EF to 35-40%  - carotid US no significant stenosis  - EKG, trending troponin are negative  - telemetry  - discontinue BB, midodrine  - Orthostatic VS are as follows, patient did not tolerate standing. Patient denies symptoms.   Lying:: /86, HR 74 paced  Sitting:: /84, HR 74 paced  - pacemaker interrogation wnl  -very weak and off balance when assisted to ambulate  -suspect mechanical fall  -will ask PT and CM to eval for IPR at discharge       DM2  - SSI     Chronic conditions as noted above/below; home medications reviewed personally by me and restarted as appropriate  Electrolyte derangement:  Trending BMP; Mg; replacement prn  DVT ppx: lovenox held due to recent fall with multiple abrasions, risk of bleeding; continue SCDs  FULL CODE           VTE Risk Mitigation (From admission, onward)           Ordered     IP VTE HIGH RISK PATIENT  Once         08/16/23 2225     Place sequential compression device  Until discontinued         08/16/23 2225     Reason for No Pharmacological VTE Prophylaxis  Once        Question:  Reasons:  Answer:  Risk of Bleeding    08/16/23 2225                        Department Hospital Medicine  [facility]  Sixto Schultz MD  Date of service: 08/18/2023

## 2023-08-19 LAB
ALBUMIN SERPL BCP-MCNC: 3.4 G/DL (ref 3.5–5.2)
ALP SERPL-CCNC: 76 U/L (ref 55–135)
ALT SERPL W/O P-5'-P-CCNC: 12 U/L (ref 10–44)
ANION GAP SERPL CALC-SCNC: 5 MMOL/L (ref 8–16)
AST SERPL-CCNC: 17 U/L (ref 10–40)
BASOPHILS # BLD AUTO: 0.02 K/UL (ref 0–0.2)
BASOPHILS NFR BLD: 0.2 % (ref 0–1.9)
BILIRUB SERPL-MCNC: 1.2 MG/DL (ref 0.1–1)
BUN SERPL-MCNC: 23 MG/DL (ref 8–23)
CALCIUM SERPL-MCNC: 9.5 MG/DL (ref 8.7–10.5)
CHLORIDE SERPL-SCNC: 110 MMOL/L (ref 95–110)
CO2 SERPL-SCNC: 24 MMOL/L (ref 23–29)
CREAT SERPL-MCNC: 1.3 MG/DL (ref 0.5–1.4)
DIFFERENTIAL METHOD: ABNORMAL
EOSINOPHIL # BLD AUTO: 0.1 K/UL (ref 0–0.5)
EOSINOPHIL NFR BLD: 1.4 % (ref 0–8)
ERYTHROCYTE [DISTWIDTH] IN BLOOD BY AUTOMATED COUNT: 15.9 % (ref 11.5–14.5)
EST. GFR  (NO RACE VARIABLE): 55.5 ML/MIN/1.73 M^2
GLUCOSE SERPL-MCNC: 123 MG/DL (ref 70–110)
GLUCOSE SERPL-MCNC: 166 MG/DL (ref 70–110)
GLUCOSE SERPL-MCNC: 175 MG/DL (ref 70–110)
GLUCOSE SERPL-MCNC: 181 MG/DL (ref 70–110)
HCT VFR BLD AUTO: 34.4 % (ref 40–54)
HGB BLD-MCNC: 10.9 G/DL (ref 14–18)
IMM GRANULOCYTES # BLD AUTO: 0.03 K/UL (ref 0–0.04)
IMM GRANULOCYTES NFR BLD AUTO: 0.4 % (ref 0–0.5)
LYMPHOCYTES # BLD AUTO: 0.9 K/UL (ref 1–4.8)
LYMPHOCYTES NFR BLD: 10.8 % (ref 18–48)
MAGNESIUM SERPL-MCNC: 1.6 MG/DL (ref 1.6–2.6)
MCH RBC QN AUTO: 26.7 PG (ref 27–31)
MCHC RBC AUTO-ENTMCNC: 31.7 G/DL (ref 32–36)
MCV RBC AUTO: 84 FL (ref 82–98)
MONOCYTES # BLD AUTO: 0.9 K/UL (ref 0.3–1)
MONOCYTES NFR BLD: 10.7 % (ref 4–15)
NEUTROPHILS # BLD AUTO: 6.2 K/UL (ref 1.8–7.7)
NEUTROPHILS NFR BLD: 76.5 % (ref 38–73)
NRBC BLD-RTO: 0 /100 WBC
PLATELET # BLD AUTO: 132 K/UL (ref 150–450)
PMV BLD AUTO: 11.1 FL (ref 9.2–12.9)
POTASSIUM SERPL-SCNC: 3.7 MMOL/L (ref 3.5–5.1)
PROT SERPL-MCNC: 6.2 G/DL (ref 6–8.4)
RBC # BLD AUTO: 4.08 M/UL (ref 4.6–6.2)
SODIUM SERPL-SCNC: 139 MMOL/L (ref 136–145)
WBC # BLD AUTO: 8.04 K/UL (ref 3.9–12.7)

## 2023-08-19 PROCEDURE — 93010 EKG 12-LEAD: ICD-10-PCS | Mod: ,,, | Performed by: SPECIALIST

## 2023-08-19 PROCEDURE — 25000003 PHARM REV CODE 250: Performed by: STUDENT IN AN ORGANIZED HEALTH CARE EDUCATION/TRAINING PROGRAM

## 2023-08-19 PROCEDURE — 97530 THERAPEUTIC ACTIVITIES: CPT | Mod: CQ

## 2023-08-19 PROCEDURE — 93010 ELECTROCARDIOGRAM REPORT: CPT | Mod: ,,, | Performed by: SPECIALIST

## 2023-08-19 PROCEDURE — 97535 SELF CARE MNGMENT TRAINING: CPT | Mod: CO

## 2023-08-19 PROCEDURE — 21400001 HC TELEMETRY ROOM

## 2023-08-19 PROCEDURE — 80053 COMPREHEN METABOLIC PANEL: CPT | Performed by: FAMILY MEDICINE

## 2023-08-19 PROCEDURE — 97116 GAIT TRAINING THERAPY: CPT | Mod: CQ

## 2023-08-19 PROCEDURE — 93005 ELECTROCARDIOGRAM TRACING: CPT | Performed by: SPECIALIST

## 2023-08-19 PROCEDURE — 25000003 PHARM REV CODE 250: Performed by: FAMILY MEDICINE

## 2023-08-19 PROCEDURE — 99900035 HC TECH TIME PER 15 MIN (STAT)

## 2023-08-19 PROCEDURE — 85025 COMPLETE CBC W/AUTO DIFF WBC: CPT | Performed by: FAMILY MEDICINE

## 2023-08-19 PROCEDURE — 36415 COLL VENOUS BLD VENIPUNCTURE: CPT | Performed by: FAMILY MEDICINE

## 2023-08-19 PROCEDURE — 83735 ASSAY OF MAGNESIUM: CPT | Performed by: FAMILY MEDICINE

## 2023-08-19 PROCEDURE — 94761 N-INVAS EAR/PLS OXIMETRY MLT: CPT

## 2023-08-19 PROCEDURE — 63600175 PHARM REV CODE 636 W HCPCS: Performed by: FAMILY MEDICINE

## 2023-08-19 RX ORDER — NITROGLYCERIN 0.4 MG/1
0.4 TABLET SUBLINGUAL EVERY 5 MIN PRN
Status: DISCONTINUED | OUTPATIENT
Start: 2023-08-19 | End: 2023-08-27 | Stop reason: HOSPADM

## 2023-08-19 RX ADMIN — SERTRALINE HYDROCHLORIDE 50 MG: 50 TABLET ORAL at 09:08

## 2023-08-19 RX ADMIN — MORPHINE SULFATE 4 MG: 4 INJECTION, SOLUTION INTRAMUSCULAR; INTRAVENOUS at 08:08

## 2023-08-19 RX ADMIN — OXYBUTYNIN CHLORIDE 10 MG: 5 TABLET, EXTENDED RELEASE ORAL at 09:08

## 2023-08-19 RX ADMIN — HYDROCODONE BITARTRATE AND ACETAMINOPHEN 1 TABLET: 5; 325 TABLET ORAL at 10:08

## 2023-08-19 RX ADMIN — ASPIRIN 81 MG: 81 TABLET, COATED ORAL at 09:08

## 2023-08-19 RX ADMIN — HYDRALAZINE HYDROCHLORIDE 10 MG: 20 INJECTION INTRAMUSCULAR; INTRAVENOUS at 09:08

## 2023-08-19 RX ADMIN — FINASTERIDE 5 MG: 5 TABLET, FILM COATED ORAL at 09:08

## 2023-08-19 RX ADMIN — ISOSORBIDE DINITRATE 10 MG: 10 TABLET ORAL at 09:08

## 2023-08-19 RX ADMIN — ISOSORBIDE DINITRATE 10 MG: 10 TABLET ORAL at 08:08

## 2023-08-19 RX ADMIN — SODIUM CHLORIDE: 0.9 INJECTION, SOLUTION INTRAVENOUS at 12:08

## 2023-08-19 RX ADMIN — ONDANSETRON 4 MG: 2 INJECTION INTRAMUSCULAR; INTRAVENOUS at 10:08

## 2023-08-19 RX ADMIN — CLOPIDOGREL BISULFATE 75 MG: 75 TABLET, FILM COATED ORAL at 09:08

## 2023-08-19 RX ADMIN — MONTELUKAST 10 MG: 10 TABLET, FILM COATED ORAL at 08:08

## 2023-08-19 RX ADMIN — PANTOPRAZOLE SODIUM 40 MG: 40 TABLET, DELAYED RELEASE ORAL at 06:08

## 2023-08-19 RX ADMIN — ATORVASTATIN CALCIUM 40 MG: 40 TABLET, FILM COATED ORAL at 08:08

## 2023-08-19 RX ADMIN — ONDANSETRON 4 MG: 2 INJECTION INTRAMUSCULAR; INTRAVENOUS at 12:08

## 2023-08-19 RX ADMIN — HYDRALAZINE HYDROCHLORIDE 5 MG: 20 INJECTION, SOLUTION INTRAMUSCULAR; INTRAVENOUS at 08:08

## 2023-08-19 NOTE — PLAN OF CARE
Problem: Adult Inpatient Plan of Care  Goal: Plan of Care Review  Outcome: Ongoing, Progressing     Problem: Adult Inpatient Plan of Care  Goal: Readiness for Transition of Care  Outcome: Ongoing, Progressing     Problem: Diabetes Comorbidity  Goal: Blood Glucose Level Within Targeted Range  Outcome: Ongoing, Progressing     Problem: Infection  Goal: Absence of Infection Signs and Symptoms  Outcome: Ongoing, Progressing     Problem: Impaired Wound Healing  Goal: Optimal Wound Healing  Outcome: Ongoing, Progressing     Problem: Skin Injury Risk Increased  Goal: Skin Health and Integrity  Outcome: Ongoing, Progressing

## 2023-08-19 NOTE — PT/OT/SLP PROGRESS
Physical Therapy Treatment    Patient Name:  Tono Saez   MRN:  [id number]    Recommendations:     Discharge Recommendations: rehabilitation facility  Discharge Equipment Recommendations: none  Barriers to discharge:  spinal precautions, TLSO, decreased endurance, lightheadedness with mobility    Assessment:     Tono Saez is a 78 y.o. male admitted with a medical diagnosis of <principal problem not specified>.  He presents with the following impairments/functional limitations: weakness, impaired endurance, impaired self care skills, impaired functional mobility, gait instability, pain, orthopedic precautions, impaired cardiopulmonary response to activity.    Pt ambulated with TLSO donned 25', performed toileting in bathroom, then additional 12' to return to bed. Lightheaded post toileting, with /67 (89 map) and HR 98 bpm after seated EOB several minutes while therapist obtaining dynamap.     Pt and son receptive to pt educ re: spinal precautions, TLSO, bed mobility using log roll, ambulation with AD, and benefits of OOB as tolerated.     Returned to bed with TLSO doffed and Sadaf for log roll.     Rehab Prognosis: Good; patient would benefit from acute skilled PT services to address these deficits and reach maximum level of function.    Recent Surgery: * No surgery found *      Plan:     During this hospitalization, patient to be seen 6 x/week to address the identified rehab impairments via gait training, therapeutic activities, therapeutic exercises and progress toward the following goals:    Plan of Care Expires:       Subjective     Chief Complaint: R anterior chest pain during bed mobility  Patient/Family Comments/goals: rehab  Pain/Comfort:  Pain Rating 1:  (did not rate)  Location - Side 1: Right  Location - Orientation 1: anterior  Location 1: chest  Pain Addressed 1: Reposition, Distraction  Pain Rating Post-Intervention 1: 0/10      Objective:     Communicated with nurse prior to session.  Patient  found HOB elevated with telemetry, bed alarm, peripheral IV upon PT entry to room.     General Precautions: Standard, fall, diabetic, aspiration  Orthopedic Precautions: spinal precautions  Braces: TLSO  Respiratory Status: Room air     Functional Mobility:  Bed Mobility:     Supine to Sit: Sadaf and log roll  Sit to Supine: Sadaf and log roll  Transfers:     Sit to Stand:  minimum assistance with rolling walker  Toilet Transfer: minimum assistance with  rolling walker and grab bars  using  Step Transfer  Gait: 25', 12' RW, Sadaf, VC for RW mgmt, slow pace  Standing balance CGA for doffing/donning pullup at toilet, with RW support as needed       AM-PAC 6 CLICK MOBILITY          Treatment & Education:  -pt educ re: spinal precautions, TLSO, bed mobility using log roll, ambulation with AD, and benefits of OOB as tolerated, call light    Patient left HOB elevated with all lines intact, call button in reach, bed alarm on, nurse notified, and son present..    GOALS:   Multidisciplinary Problems       Physical Therapy Goals          Problem: Physical Therapy    Goal Priority Disciplines Outcome Goal Variances Interventions   Physical Therapy Goal     PT, PT/OT Ongoing, Progressing     Description: Goals to be met by: 2023     Patient will increase functional independence with mobility by performin. Supine to sit with Contact Guard Assistance  2. Sit to stand transfer with Minimal Assistance  3. Bed to chair transfer with Minimal Assistance using Rolling Walker  4. Gait  x 150 feet with Minimal Assistance using Rolling Walker.   5. Lower extremity exercise program x20 reps                        Time Tracking:     PT Received On: 23  PT Start Time: 1455     PT Stop Time: 1534  PT Total Time (min): 39 min     Billable Minutes: Gait Training 15 and Therapeutic Activity 24    Treatment Type: Treatment  PT/PTA: PTA     Number of PTA visits since last PT visit: 2023

## 2023-08-19 NOTE — PROGRESS NOTES
[facility] Medicine  Progress Note    Patient name: Tono Saez  MRN: [id number]  Admit Date: 8/16/2023   LOS: 2 days     SUBJECTIVE:     Principal problem: syncope        HPI-  78-year-old male who presents for evaluation after a syncopal episode.  The patient does have frequent falls and unsteady gait for which he  undergoes therapy.  He was leaving an office visit today and reports that he passed.  Reports that this was not his usual trip and fall.  He did strike his head.  Injured and scraped his right arm, his right knee as well as his right anterior chest.  He has had dull aching pain to the right anterior chest since the fall.  The pain is worse with palpation, movement and deep breathing but denies feeling short of breath.  He did not have any chest pain prior to the fall.  Denies any recent illnesses.  Denies fever, chills, cough or any upper or lower respiratory type symptoms.  Denies any abdominal pain or shortness of breath.  Has some mild pain to his right arm associated with a skin tear but denies any pain with range of motion as well as pain with an abrasion to the right knee but denies any pain with range of motion of the knee.  Denies any other problems or complaints.      Interval History:      8/19- has [employer] brace, working with PT.  Medically cleared for IPR.  CM following for assistance     8/18- he is up to bedside chair, alert.  Denies feeling dizzy, light headed currently.  +right lateral chest M/s chest pain.  He cant remember exactly whether he felt dizzy before falling or falling mechanically.  But he does mention that he thinks he fell bc he wasn't using his walker.  While helping him back to bed, he was very weak and poor balance.  I feel it is more likely he had a mechanical fall while not using DME.  He is not orthostatic.  PM interrogation did not show any irregularities.  His echo actually shows improved cardiac function.  He lives alone.  I think IPR would be  very beneficial to increase his strength and ability to perform ADLs before attempting to get him back to his home.  He is agreeable to this.  Will ask PT for eval.      8/17-  HR 60, hypertensive w/sbp 160s.  Chronic lower back pain.  F/u nsgy recs and echo.  Carotids no significant stenosis.      Scheduled Meds:   aspirin  81 mg Oral Daily    atorvastatin  40 mg Oral Daily    clopidogreL  75 mg Oral Daily    finasteride  5 mg Oral Daily    isosorbide dinitrate  10 mg Oral BID    montelukast  10 mg Oral QHS    oxybutynin  10 mg Oral Daily    pantoprazole  40 mg Oral Before breakfast    sertraline  50 mg Oral Daily     Continuous Infusions:   sodium chloride 0.9% 100 mL/hr at 08/19/23 1251     PRN Meds:acetaminophen, albuterol-ipratropium, dextrose 50%, dextrose 50%, glucagon (human recombinant), glucose, glucose, hydrALAZINE, hydrALAZINE, HYDROcodone-acetaminophen, insulin aspart U-100, melatonin, morphine, naloxone, ondansetron, polyethylene glycol, senna-docusate 8.6-50 mg, simethicone, sodium chloride 0.9%    Review of patient's allergies indicates:   Allergen Reactions    Adhesive Other (See Comments)     SILK TAPE PULL SKIN OFF    Metformin Other (See Comments)     Weight loss cachexia anorexia,diarrhea.    Pcn [penicillins]      Patient states he passed out from this medication when he was 10 years old.        Review of Systems: As per interval history    OBJECTIVE:     Vital Signs (Most Recent)  Temp: 98.2 °F (36.8 °C) (08/19/23 1100)  Pulse: 98 (08/19/23 1500)  Resp: 18 (08/19/23 1500)  BP: 126/67 (08/19/23 1500)  SpO2: 97 % (08/19/23 1500)    Vital Signs Range (Last 24H):  Temp:  [97.9 °F (36.6 °C)-98.3 °F (36.8 °C)]   Pulse:  [78-98]   Resp:  [18-19]   BP: (121-189)/(67-97)   SpO2:  [93 %-98 %]     I & O (Last 24H):  Intake/Output Summary (Last 24 hours) at 8/19/2023 1744  Last data filed at 8/19/2023 1530  Gross per 24 hour   Intake 410 ml   Output 676 ml   Net -266 ml         Physical Exam:  General:  Patient resting comfortably in no acute distress. Ecchymosis and swelling right cheek  Eyes: No conjunctival injection. No scleral icterus.  ENT: Hearing grossly intact. No discharge from ears. No nasal discharge.   CVS: RRR.   Lungs:  No tachypnea or accessory muscle use.   Abdomen:  Soft, nontender and nondistended.    Neuro: Alert.  Moves all extremities. Follows commands. Responds appropriately   Skin:  No rash or erythema noted    Laboratory:  I have reviewed all pertinent lab results within the past 24 hours.      ASSESSMENT/PLAN:         Active Hospital Problems    Diagnosis  POA    Syncope and collapse [R55]  Yes    Orthostatic hypotension [I95.1]  Yes    Chronic systolic congestive heart failure [I50.22]  Yes    SSS (sick sinus syndrome) [I49.5]  Yes    Stage 3a chronic kidney disease [N18.31]  Yes    Compression fracture of L1 lumbar vertebra, with delayed healing, subsequent encounter [S32.010G]  Not Applicable    Coronary artery disease of native artery of native heart with stable angina pectoris [I25.118]  Yes     CABG, STENTS '97,'99,'01,'05    10/26/22:  Summary         The Dist LM lesion was 90% stenosed with 10% stenosis post-intervention.    The pre-procedure left ventricular end diastolic pressure was 12.    A STENT LIMA YOHANA 3.50 X 26 stent was not successfully placed at 12 JASPER for 20 sec.    The estimated blood loss was <50 mL.    Successful intravascular ultrasound-guided PCI of the critical stenosis of left main into circumflex with 1 drug-eluting stent    Patent vein graft to OM, occluded branch of the OM as compared to angiogram in 2016.    Patent vein graft to right coronary artery with patent stent, moderately degenerated, distal native RCA has 80% stenosis in medium caliber vessel.    Occluded LAD with a patent LIMA to LAD        Chronic anticoagulation [Z79.01]  Not Applicable    GERD (gastroesophageal reflux disease) [K21.9]  Yes    Type 2 diabetes mellitus with hyperglycemia  [E11.65]   Yes    HTN (hypertension) [I10]  Yes      Resolved Hospital Problems   No resolved problems to display.         Plan:     Syncope  Hx SSS  Hx combined HF with EF 25% and moderate AS  - CT head Small air-fluid level in the right maxillary sinus could be result of sinus disease or trauma. Right facial soft tissue swelling.  - echo complete, improved EF to 35-40%  - carotid US no significant stenosis  - EKG, trending troponin are negative  - telemetry  - discontinue BB, midodrine  - Orthostatic VS are as follows, patient did not tolerate standing. Patient denies symptoms.   Lying:: /86, HR 74 paced  Sitting:: /84, HR 74 paced  - pacemaker interrogation wnl  -very weak and off balance when assisted to ambulate  -suspect mechanical fall  -will ask PT and CM to eval for IPR at discharge       DM2  - SSI     Chronic conditions as noted above/below; home medications reviewed personally by me and restarted as appropriate  Electrolyte derangement:  Trending BMP; Mg; replacement prn  DVT ppx: lovenox held due to recent fall with multiple abrasions, risk of bleeding; continue SCDs  FULL CODE           VTE Risk Mitigation (From admission, onward)           Ordered     IP VTE HIGH RISK PATIENT  Once         08/16/23 2225     Place sequential compression device  Until discontinued         08/16/23 2225     Reason for No Pharmacological VTE Prophylaxis  Once        Question:  Reasons:  Answer:  Risk of Bleeding    08/16/23 2225                        Department Hospital Medicine  [facility]  Sixto Schultz MD  Date of service: 08/19/2023

## 2023-08-20 LAB
ALBUMIN SERPL BCP-MCNC: 3.2 G/DL (ref 3.5–5.2)
ALP SERPL-CCNC: 71 U/L (ref 55–135)
ALT SERPL W/O P-5'-P-CCNC: 14 U/L (ref 10–44)
ANION GAP SERPL CALC-SCNC: 6 MMOL/L (ref 8–16)
AST SERPL-CCNC: 24 U/L (ref 10–40)
BASOPHILS # BLD AUTO: 0.02 K/UL (ref 0–0.2)
BASOPHILS NFR BLD: 0.3 % (ref 0–1.9)
BILIRUB SERPL-MCNC: 1.4 MG/DL (ref 0.1–1)
BUN SERPL-MCNC: 24 MG/DL (ref 8–23)
CALCIUM SERPL-MCNC: 9.3 MG/DL (ref 8.7–10.5)
CHLORIDE SERPL-SCNC: 108 MMOL/L (ref 95–110)
CO2 SERPL-SCNC: 24 MMOL/L (ref 23–29)
CREAT SERPL-MCNC: 1.2 MG/DL (ref 0.5–1.4)
DIFFERENTIAL METHOD: ABNORMAL
EOSINOPHIL # BLD AUTO: 0.1 K/UL (ref 0–0.5)
EOSINOPHIL NFR BLD: 1.2 % (ref 0–8)
ERYTHROCYTE [DISTWIDTH] IN BLOOD BY AUTOMATED COUNT: 15.9 % (ref 11.5–14.5)
EST. GFR  (NO RACE VARIABLE): >60 ML/MIN/1.73 M^2
GLUCOSE SERPL-MCNC: 133 MG/DL (ref 70–110)
GLUCOSE SERPL-MCNC: 134 MG/DL (ref 70–110)
GLUCOSE SERPL-MCNC: 147 MG/DL (ref 70–110)
GLUCOSE SERPL-MCNC: 148 MG/DL (ref 70–110)
GLUCOSE SERPL-MCNC: 155 MG/DL (ref 70–110)
HCT VFR BLD AUTO: 35 % (ref 40–54)
HGB BLD-MCNC: 11 G/DL (ref 14–18)
IMM GRANULOCYTES # BLD AUTO: 0.04 K/UL (ref 0–0.04)
IMM GRANULOCYTES NFR BLD AUTO: 0.5 % (ref 0–0.5)
LYMPHOCYTES # BLD AUTO: 0.7 K/UL (ref 1–4.8)
LYMPHOCYTES NFR BLD: 9.9 % (ref 18–48)
MAGNESIUM SERPL-MCNC: 1.4 MG/DL (ref 1.6–2.6)
MCH RBC QN AUTO: 26.8 PG (ref 27–31)
MCHC RBC AUTO-ENTMCNC: 31.4 G/DL (ref 32–36)
MCV RBC AUTO: 85 FL (ref 82–98)
MONOCYTES # BLD AUTO: 0.8 K/UL (ref 0.3–1)
MONOCYTES NFR BLD: 10.7 % (ref 4–15)
NEUTROPHILS # BLD AUTO: 5.7 K/UL (ref 1.8–7.7)
NEUTROPHILS NFR BLD: 77.4 % (ref 38–73)
NRBC BLD-RTO: 0 /100 WBC
PLATELET # BLD AUTO: 129 K/UL (ref 150–450)
PMV BLD AUTO: 11.2 FL (ref 9.2–12.9)
POTASSIUM SERPL-SCNC: 3.8 MMOL/L (ref 3.5–5.1)
PROT SERPL-MCNC: 6.1 G/DL (ref 6–8.4)
RBC # BLD AUTO: 4.1 M/UL (ref 4.6–6.2)
SODIUM SERPL-SCNC: 138 MMOL/L (ref 136–145)
WBC # BLD AUTO: 7.39 K/UL (ref 3.9–12.7)

## 2023-08-20 PROCEDURE — 25000003 PHARM REV CODE 250: Performed by: FAMILY MEDICINE

## 2023-08-20 PROCEDURE — 93010 EKG 12-LEAD: ICD-10-PCS | Mod: ,,, | Performed by: INTERNAL MEDICINE

## 2023-08-20 PROCEDURE — 93005 ELECTROCARDIOGRAM TRACING: CPT | Performed by: INTERNAL MEDICINE

## 2023-08-20 PROCEDURE — 36415 COLL VENOUS BLD VENIPUNCTURE: CPT | Performed by: FAMILY MEDICINE

## 2023-08-20 PROCEDURE — 85025 COMPLETE CBC W/AUTO DIFF WBC: CPT | Performed by: FAMILY MEDICINE

## 2023-08-20 PROCEDURE — 21400001 HC TELEMETRY ROOM

## 2023-08-20 PROCEDURE — 93010 ELECTROCARDIOGRAM REPORT: CPT | Mod: ,,, | Performed by: INTERNAL MEDICINE

## 2023-08-20 PROCEDURE — 63600175 PHARM REV CODE 636 W HCPCS: Performed by: FAMILY MEDICINE

## 2023-08-20 PROCEDURE — 83735 ASSAY OF MAGNESIUM: CPT | Performed by: FAMILY MEDICINE

## 2023-08-20 PROCEDURE — 94761 N-INVAS EAR/PLS OXIMETRY MLT: CPT

## 2023-08-20 PROCEDURE — 99900035 HC TECH TIME PER 15 MIN (STAT)

## 2023-08-20 PROCEDURE — 80053 COMPREHEN METABOLIC PANEL: CPT | Performed by: FAMILY MEDICINE

## 2023-08-20 PROCEDURE — 25000003 PHARM REV CODE 250: Performed by: STUDENT IN AN ORGANIZED HEALTH CARE EDUCATION/TRAINING PROGRAM

## 2023-08-20 PROCEDURE — 25000242 PHARM REV CODE 250 ALT 637 W/ HCPCS: Performed by: STUDENT IN AN ORGANIZED HEALTH CARE EDUCATION/TRAINING PROGRAM

## 2023-08-20 RX ADMIN — SODIUM CHLORIDE: 0.9 INJECTION, SOLUTION INTRAVENOUS at 06:08

## 2023-08-20 RX ADMIN — ASPIRIN 81 MG: 81 TABLET, COATED ORAL at 09:08

## 2023-08-20 RX ADMIN — ISOSORBIDE DINITRATE 10 MG: 10 TABLET ORAL at 08:08

## 2023-08-20 RX ADMIN — PANTOPRAZOLE SODIUM 40 MG: 40 TABLET, DELAYED RELEASE ORAL at 06:08

## 2023-08-20 RX ADMIN — CLOPIDOGREL BISULFATE 75 MG: 75 TABLET, FILM COATED ORAL at 09:08

## 2023-08-20 RX ADMIN — ATORVASTATIN CALCIUM 40 MG: 40 TABLET, FILM COATED ORAL at 08:08

## 2023-08-20 RX ADMIN — OXYBUTYNIN CHLORIDE 10 MG: 5 TABLET, EXTENDED RELEASE ORAL at 09:08

## 2023-08-20 RX ADMIN — SERTRALINE HYDROCHLORIDE 50 MG: 50 TABLET ORAL at 09:08

## 2023-08-20 RX ADMIN — MONTELUKAST 10 MG: 10 TABLET, FILM COATED ORAL at 08:08

## 2023-08-20 RX ADMIN — NITROGLYCERIN 0.4 MG: 0.4 TABLET SUBLINGUAL at 05:08

## 2023-08-20 RX ADMIN — HYDRALAZINE HYDROCHLORIDE 5 MG: 20 INJECTION, SOLUTION INTRAMUSCULAR; INTRAVENOUS at 08:08

## 2023-08-20 RX ADMIN — ISOSORBIDE DINITRATE 10 MG: 10 TABLET ORAL at 09:08

## 2023-08-20 RX ADMIN — SODIUM CHLORIDE: 0.9 INJECTION, SOLUTION INTRAVENOUS at 08:08

## 2023-08-20 RX ADMIN — FINASTERIDE 5 MG: 5 TABLET, FILM COATED ORAL at 09:08

## 2023-08-20 NOTE — NURSING
Pt c/o right chest pain w/o any radiation @ 2035. States is a 10 on pain scale and is sharp with pressure and he takes nitroglycerin at home. Morphine 4mg iv given. Ekg done. Chest pain resolved afterwards. Dr Steven Burks notified. Rec'd orders for ntg prn. Hydralazine 5mg given at 2028 for bp of 169/95 per prn orders.

## 2023-08-20 NOTE — PROGRESS NOTES
[facility] Medicine  Progress Note    Patient name: Tono Saez  MRN: [id number]  Admit Date: 8/16/2023   LOS: 3 days     SUBJECTIVE:     Principal problem: syncope        HPI-  78-year-old male who presents for evaluation after a syncopal episode.  The patient does have frequent falls and unsteady gait for which he  undergoes therapy.  He was leaving an office visit today and reports that he passed.  Reports that this was not his usual trip and fall.  He did strike his head.  Injured and scraped his right arm, his right knee as well as his right anterior chest.  He has had dull aching pain to the right anterior chest since the fall.  The pain is worse with palpation, movement and deep breathing but denies feeling short of breath.  He did not have any chest pain prior to the fall.  Denies any recent illnesses.  Denies fever, chills, cough or any upper or lower respiratory type symptoms.  Denies any abdominal pain or shortness of breath.  Has some mild pain to his right arm associated with a skin tear but denies any pain with range of motion as well as pain with an abrasion to the right knee but denies any pain with range of motion of the knee.  Denies any other problems or complaints.      Interval History:      8/20- resting in bed quietly, plan for IPR.  Working with PT    8/19- has TLSO brace, working with PT.  Medically cleared for IPR.  CM following for assistance     8/18- he is up to bedside chair, alert.  Denies feeling dizzy, light headed currently.  +right lateral chest M/s chest pain.  He cant remember exactly whether he felt dizzy before falling or falling mechanically.  But he does mention that he thinks he fell bc he wasn't using his walker.  While helping him back to bed, he was very weak and poor balance.  I feel it is more likely he had a mechanical fall while not using DME.  He is not orthostatic.  PM interrogation did not show any irregularities.  His echo actually shows  improved cardiac function.  He lives alone.  I think IPR would be very beneficial to increase his strength and ability to perform ADLs before attempting to get him back to his home.  He is agreeable to this.  Will ask PT for eval.      8/17-  HR 60, hypertensive w/sbp 160s.  Chronic lower back pain.  F/u nsgy recs and echo.  Carotids no significant stenosis.      Scheduled Meds:   aspirin  81 mg Oral Daily    atorvastatin  40 mg Oral Daily    clopidogreL  75 mg Oral Daily    finasteride  5 mg Oral Daily    isosorbide dinitrate  10 mg Oral BID    montelukast  10 mg Oral QHS    oxybutynin  10 mg Oral Daily    pantoprazole  40 mg Oral Before breakfast    sertraline  50 mg Oral Daily     Continuous Infusions:   sodium chloride 0.9% 100 mL/hr at 08/20/23 0618     PRN Meds:acetaminophen, albuterol-ipratropium, dextrose 50%, dextrose 50%, glucagon (human recombinant), glucose, glucose, hydrALAZINE, hydrALAZINE, HYDROcodone-acetaminophen, insulin aspart U-100, melatonin, morphine, naloxone, nitroGLYCERIN, ondansetron, polyethylene glycol, senna-docusate 8.6-50 mg, simethicone, sodium chloride 0.9%    Review of patient's allergies indicates:   Allergen Reactions    Adhesive Other (See Comments)     SILK TAPE PULL SKIN OFF    Metformin Other (See Comments)     Weight loss cachexia anorexia,diarrhea.    Pcn [penicillins]      Patient states he passed out from this medication when he was 10 years old.        Review of Systems: As per interval history    OBJECTIVE:     Vital Signs (Most Recent)  Temp: 97.5 °F (36.4 °C) (08/20/23 1100)  Pulse: 100 (08/20/23 1121)  Resp: 18 (08/20/23 1121)  BP: (!) 153/84 (08/20/23 1100)  SpO2: 100 % (08/20/23 1121)    Vital Signs Range (Last 24H):  Temp:  [97.5 °F (36.4 °C)-98.6 °F (37 °C)]   Pulse:  []   Resp:  [16-18]   BP: (147-186)/()   SpO2:  [93 %-100 %]     I & O (Last 24H):  Intake/Output Summary (Last 24 hours) at 8/20/2023 1719  Last data filed at 8/20/2023 1100  Gross per  24 hour   Intake --   Output 700 ml   Net -700 ml         Physical Exam:  General: Patient resting comfortably in no acute distress. Ecchymosis and swelling right cheek  Eyes: No conjunctival injection. No scleral icterus.  ENT: Hearing grossly intact. No discharge from ears. No nasal discharge.   CVS: RRR.   Lungs:  No tachypnea or accessory muscle use.   Abdomen:  Soft, nontender and nondistended.    Neuro: Alert.  Moves all extremities. Follows commands. Responds appropriately   Skin:  No rash or erythema noted    Laboratory:  I have reviewed all pertinent lab results within the past 24 hours.      ASSESSMENT/PLAN:         Active Hospital Problems    Diagnosis  POA    Syncope and collapse [R55]  Yes    Orthostatic hypotension [I95.1]  Yes    Chronic systolic congestive heart failure [I50.22]  Yes    SSS (sick sinus syndrome) [I49.5]  Yes    Stage 3a chronic kidney disease [N18.31]  Yes    Compression fracture of L1 lumbar vertebra, with delayed healing, subsequent encounter [S32.010G]  Not Applicable    Coronary artery disease of native artery of native heart with stable angina pectoris [I25.118]  Yes     CABG, STENTS '97,'99,'01,'05    10/26/22:  Summary         The Dist LM lesion was 90% stenosed with 10% stenosis post-intervention.    The pre-procedure left ventricular end diastolic pressure was 12.    A STENT LIMA YOHANA 3.50 X 26 stent was not successfully placed at 12 JASPER for 20 sec.    The estimated blood loss was <50 mL.    Successful intravascular ultrasound-guided PCI of the critical stenosis of left main into circumflex with 1 drug-eluting stent    Patent vein graft to OM, occluded branch of the OM as compared to angiogram in 2016.    Patent vein graft to right coronary artery with patent stent, moderately degenerated, distal native RCA has 80% stenosis in medium caliber vessel.    Occluded LAD with a patent LIMA to LAD        Chronic anticoagulation [Z79.01]  Not Applicable    GERD (gastroesophageal  reflux disease) [K21.9]  Yes    Type 2 diabetes mellitus with hyperglycemia  [E11.65]  Yes    HTN (hypertension) [I10]  Yes      Resolved Hospital Problems   No resolved problems to display.         Plan:     Syncope  Hx SSS  Hx combined HF with EF 25% and moderate AS  - CT head Small air-fluid level in the right maxillary sinus could be result of sinus disease or trauma. Right facial soft tissue swelling.  - echo complete, improved EF to 35-40%  - carotid US no significant stenosis  - EKG, trending troponin are negative  - telemetry  - discontinue BB, midodrine  - Orthostatic VS are as follows, patient did not tolerate standing. Patient denies symptoms.   Lying:: /86, HR 74 paced  Sitting:: /84, HR 74 paced  - pacemaker interrogation wnl  -very weak and off balance when assisted to ambulate  -suspect mechanical fall  -will ask PT and CM to eval for IPR at discharge       DM2  - SSI     Chronic conditions as noted above/below; home medications reviewed personally by me and restarted as appropriate  Electrolyte derangement:  Trending BMP; Mg; replacement prn  DVT ppx: lovenox held due to recent fall with multiple abrasions, risk of bleeding; continue SCDs  FULL CODE           VTE Risk Mitigation (From admission, onward)           Ordered     IP VTE HIGH RISK PATIENT  Once         08/16/23 2225     Place sequential compression device  Until discontinued         08/16/23 2225     Reason for No Pharmacological VTE Prophylaxis  Once        Question:  Reasons:  Answer:  Risk of Bleeding    08/16/23 2225                        Department Hospital Medicine  [facility]  Sixto Schultz MD  Date of service: 08/20/2023

## 2023-08-20 NOTE — PLAN OF CARE
Received secure chat from Trudy Salazar with [facility].  Patient's needs to be off IV pain medication 24 hrs [facility]

## 2023-08-21 PROBLEM — S41.112A SKIN TEAR OF LEFT UPPER ARM WITHOUT COMPLICATION: Status: ACTIVE | Noted: 2023-08-21

## 2023-08-21 LAB
ALBUMIN SERPL BCP-MCNC: 3.3 G/DL (ref 3.5–5.2)
ALP SERPL-CCNC: 81 U/L (ref 55–135)
ALT SERPL W/O P-5'-P-CCNC: 18 U/L (ref 10–44)
ANION GAP SERPL CALC-SCNC: 10 MMOL/L (ref 8–16)
AST SERPL-CCNC: 32 U/L (ref 10–40)
BASOPHILS # BLD AUTO: 0.03 K/UL (ref 0–0.2)
BASOPHILS NFR BLD: 0.3 % (ref 0–1.9)
BILIRUB SERPL-MCNC: 2 MG/DL (ref 0.1–1)
BUN SERPL-MCNC: 23 MG/DL (ref 8–23)
CALCIUM SERPL-MCNC: 9.4 MG/DL (ref 8.7–10.5)
CHLORIDE SERPL-SCNC: 111 MMOL/L (ref 95–110)
CO2 SERPL-SCNC: 19 MMOL/L (ref 23–29)
CREAT SERPL-MCNC: 1 MG/DL (ref 0.5–1.4)
DIFFERENTIAL METHOD: ABNORMAL
EOSINOPHIL # BLD AUTO: 0 K/UL (ref 0–0.5)
EOSINOPHIL NFR BLD: 0.4 % (ref 0–8)
ERYTHROCYTE [DISTWIDTH] IN BLOOD BY AUTOMATED COUNT: 16.2 % (ref 11.5–14.5)
EST. GFR  (NO RACE VARIABLE): >60 ML/MIN/1.73 M^2
GLUCOSE SERPL-MCNC: 137 MG/DL (ref 70–110)
GLUCOSE SERPL-MCNC: 150 MG/DL (ref 70–110)
GLUCOSE SERPL-MCNC: 151 MG/DL (ref 70–110)
GLUCOSE SERPL-MCNC: 154 MG/DL (ref 70–110)
GLUCOSE SERPL-MCNC: 160 MG/DL (ref 70–110)
HCT VFR BLD AUTO: 37.1 % (ref 40–54)
HGB BLD-MCNC: 11.4 G/DL (ref 14–18)
IMM GRANULOCYTES # BLD AUTO: 0.06 K/UL (ref 0–0.04)
IMM GRANULOCYTES NFR BLD AUTO: 0.5 % (ref 0–0.5)
LYMPHOCYTES # BLD AUTO: 0.6 K/UL (ref 1–4.8)
LYMPHOCYTES NFR BLD: 5 % (ref 18–48)
MAGNESIUM SERPL-MCNC: 1.4 MG/DL (ref 1.6–2.6)
MCH RBC QN AUTO: 26.5 PG (ref 27–31)
MCHC RBC AUTO-ENTMCNC: 30.7 G/DL (ref 32–36)
MCV RBC AUTO: 86 FL (ref 82–98)
MONOCYTES # BLD AUTO: 1.1 K/UL (ref 0.3–1)
MONOCYTES NFR BLD: 9.7 % (ref 4–15)
NEUTROPHILS # BLD AUTO: 9.2 K/UL (ref 1.8–7.7)
NEUTROPHILS NFR BLD: 84.1 % (ref 38–73)
NRBC BLD-RTO: 0 /100 WBC
PLATELET # BLD AUTO: 148 K/UL (ref 150–450)
PMV BLD AUTO: 11.5 FL (ref 9.2–12.9)
POTASSIUM SERPL-SCNC: 3.6 MMOL/L (ref 3.5–5.1)
PROT SERPL-MCNC: 6.4 G/DL (ref 6–8.4)
RBC # BLD AUTO: 4.3 M/UL (ref 4.6–6.2)
SODIUM SERPL-SCNC: 140 MMOL/L (ref 136–145)
TROPONIN I SERPL HS-MCNC: 1408.6 PG/ML (ref 0–14.9)
TROPONIN I SERPL HS-MCNC: 2745.8 PG/ML (ref 0–14.9)
WBC # BLD AUTO: 10.94 K/UL (ref 3.9–12.7)

## 2023-08-21 PROCEDURE — 84484 ASSAY OF TROPONIN QUANT: CPT | Mod: 91 | Performed by: STUDENT IN AN ORGANIZED HEALTH CARE EDUCATION/TRAINING PROGRAM

## 2023-08-21 PROCEDURE — 84484 ASSAY OF TROPONIN QUANT: CPT | Performed by: STUDENT IN AN ORGANIZED HEALTH CARE EDUCATION/TRAINING PROGRAM

## 2023-08-21 PROCEDURE — 99900035 HC TECH TIME PER 15 MIN (STAT)

## 2023-08-21 PROCEDURE — 99221 1ST HOSP IP/OBS SF/LOW 40: CPT | Mod: ,,, | Performed by: FAMILY MEDICINE

## 2023-08-21 PROCEDURE — 63600175 PHARM REV CODE 636 W HCPCS: Performed by: STUDENT IN AN ORGANIZED HEALTH CARE EDUCATION/TRAINING PROGRAM

## 2023-08-21 PROCEDURE — 36415 COLL VENOUS BLD VENIPUNCTURE: CPT | Performed by: STUDENT IN AN ORGANIZED HEALTH CARE EDUCATION/TRAINING PROGRAM

## 2023-08-21 PROCEDURE — 80053 COMPREHEN METABOLIC PANEL: CPT | Performed by: FAMILY MEDICINE

## 2023-08-21 PROCEDURE — 94761 N-INVAS EAR/PLS OXIMETRY MLT: CPT

## 2023-08-21 PROCEDURE — 21400001 HC TELEMETRY ROOM

## 2023-08-21 PROCEDURE — 99900031 HC PATIENT EDUCATION (STAT)

## 2023-08-21 PROCEDURE — 83735 ASSAY OF MAGNESIUM: CPT | Performed by: FAMILY MEDICINE

## 2023-08-21 PROCEDURE — 25000242 PHARM REV CODE 250 ALT 637 W/ HCPCS: Performed by: STUDENT IN AN ORGANIZED HEALTH CARE EDUCATION/TRAINING PROGRAM

## 2023-08-21 PROCEDURE — 25000003 PHARM REV CODE 250: Performed by: FAMILY MEDICINE

## 2023-08-21 PROCEDURE — 99223 1ST HOSP IP/OBS HIGH 75: CPT | Mod: ,,, | Performed by: SPECIALIST

## 2023-08-21 PROCEDURE — 85025 COMPLETE CBC W/AUTO DIFF WBC: CPT | Performed by: FAMILY MEDICINE

## 2023-08-21 PROCEDURE — 82962 GLUCOSE BLOOD TEST: CPT

## 2023-08-21 PROCEDURE — 25000003 PHARM REV CODE 250: Performed by: SPECIALIST

## 2023-08-21 PROCEDURE — 99223 PR INITIAL HOSPITAL CARE,LEVL III: ICD-10-PCS | Mod: ,,, | Performed by: SPECIALIST

## 2023-08-21 PROCEDURE — 63600175 PHARM REV CODE 636 W HCPCS: Performed by: FAMILY MEDICINE

## 2023-08-21 PROCEDURE — 25000003 PHARM REV CODE 250: Performed by: STUDENT IN AN ORGANIZED HEALTH CARE EDUCATION/TRAINING PROGRAM

## 2023-08-21 PROCEDURE — 99221 PR INITIAL HOSPITAL CARE,LEVL I: ICD-10-PCS | Mod: ,,, | Performed by: FAMILY MEDICINE

## 2023-08-21 RX ORDER — METOPROLOL TARTRATE 25 MG/1
25 TABLET, FILM COATED ORAL 2 TIMES DAILY
Status: DISCONTINUED | OUTPATIENT
Start: 2023-08-21 | End: 2023-08-23

## 2023-08-21 RX ORDER — ENOXAPARIN SODIUM 100 MG/ML
1 INJECTION SUBCUTANEOUS EVERY 12 HOURS
Status: COMPLETED | OUTPATIENT
Start: 2023-08-21 | End: 2023-08-23

## 2023-08-21 RX ORDER — SPIRONOLACTONE 25 MG/1
25 TABLET ORAL DAILY
Status: DISCONTINUED | OUTPATIENT
Start: 2023-08-21 | End: 2023-08-27 | Stop reason: HOSPADM

## 2023-08-21 RX ADMIN — MONTELUKAST 10 MG: 10 TABLET, FILM COATED ORAL at 09:08

## 2023-08-21 RX ADMIN — FINASTERIDE 5 MG: 5 TABLET, FILM COATED ORAL at 09:08

## 2023-08-21 RX ADMIN — METOPROLOL TARTRATE 25 MG: 25 TABLET, FILM COATED ORAL at 09:08

## 2023-08-21 RX ADMIN — MORPHINE SULFATE 4 MG: 4 INJECTION, SOLUTION INTRAMUSCULAR; INTRAVENOUS at 01:08

## 2023-08-21 RX ADMIN — HYDROCODONE BITARTRATE AND ACETAMINOPHEN 1 TABLET: 5; 325 TABLET ORAL at 09:08

## 2023-08-21 RX ADMIN — ENOXAPARIN SODIUM 80 MG: 80 INJECTION SUBCUTANEOUS at 02:08

## 2023-08-21 RX ADMIN — CLOPIDOGREL BISULFATE 75 MG: 75 TABLET, FILM COATED ORAL at 09:08

## 2023-08-21 RX ADMIN — NITROGLYCERIN 0.4 MG: 0.4 TABLET SUBLINGUAL at 01:08

## 2023-08-21 RX ADMIN — OXYBUTYNIN CHLORIDE 10 MG: 5 TABLET, EXTENDED RELEASE ORAL at 09:08

## 2023-08-21 RX ADMIN — Medication 200 MG: at 04:08

## 2023-08-21 RX ADMIN — SODIUM CHLORIDE 100 ML/HR: 0.9 INJECTION, SOLUTION INTRAVENOUS at 09:08

## 2023-08-21 RX ADMIN — ISOSORBIDE DINITRATE 10 MG: 10 TABLET ORAL at 09:08

## 2023-08-21 RX ADMIN — ENOXAPARIN SODIUM 80 MG: 80 INJECTION SUBCUTANEOUS at 11:08

## 2023-08-21 RX ADMIN — SERTRALINE HYDROCHLORIDE 50 MG: 50 TABLET ORAL at 09:08

## 2023-08-21 RX ADMIN — SPIRONOLACTONE 25 MG: 25 TABLET ORAL at 04:08

## 2023-08-21 RX ADMIN — ASPIRIN 81 MG: 81 TABLET, COATED ORAL at 09:08

## 2023-08-21 RX ADMIN — ATORVASTATIN CALCIUM 40 MG: 40 TABLET, FILM COATED ORAL at 09:08

## 2023-08-21 RX ADMIN — METOPROLOL TARTRATE 25 MG: 25 TABLET, FILM COATED ORAL at 04:08

## 2023-08-21 RX ADMIN — ONDANSETRON 4 MG: 2 INJECTION INTRAMUSCULAR; INTRAVENOUS at 02:08

## 2023-08-21 RX ADMIN — PANTOPRAZOLE SODIUM 40 MG: 40 TABLET, DELAYED RELEASE ORAL at 05:08

## 2023-08-21 NOTE — PT/OT/SLP PROGRESS
Occupational Therapy      Patient Name:  Tono Saez   MRN:  [id number]    Patient not seen today secondary to Nurse/ ROBERT hold.      8/21/2023

## 2023-08-21 NOTE — PROGRESS NOTES
"[facility]  Adult Nutrition   Progress Note (Initial Assessment)     SUMMARY     Recommendations  1) Continue current cardiac diet with Magnus BID and encourage intake.   2) RD to continue to obtain daily meal selections.    Goals:   Pt to meet 100% of his energy and protein needs.    Nutrition Goal Status: goal met    Communication of RD Recs: other (comment)    Dietitian Rounds Brief  LOS: Pt is an 78 yowm admitted with "no active principal problem" and a pmh of anemia, arthritis, CAD, cancer, diabetes, cataracts, GERD, HTN, MI, and GI Bleed. He is eating 100% of his cardiac diet and drinking his Magnus. He does have altered skin which is being addressed. His LBM was yesterday and will continue to follow prn.      Diet order:   Current Diet Order: Cardiac   Oral Nutrition Supplement: Magnus BID     Evaluation of Received Nutrient/Fluid Intake  Energy Calories Required: meeting needs  Protein Required: meeting needs  Fluid Required: meeting needs  Tolerance: tolerating     % Intake of Estimated Energy Needs: 75 - 100 %  % Meal Intake: 75 - 100 %      Intake/Output Summary (Last 24 hours) at 8/21/2023 1253  Last data filed at 8/21/2023 0547  Gross per 24 hour   Intake 240 ml   Output 200 ml   Net 40 ml        Anthropometrics  Temp: 97.4 °F (36.3 °C)  Height Method: Stated  Height: 6' (182.9 cm)  Height (inches): 72 in  Weight Method: Bed Scale  Weight: 82.2 kg (181 lb 3.2 oz)  Weight (lb): 181.2 lb  Ideal Body Weight (IBW), Male: 178 lb  % Ideal Body Weight, Male (lb): 101.8 %  BMI (Calculated): 24.6  BMI Grade: 25 - 29.9 - overweight       Estimated/Assessed Needs  Weight Used For Calorie Calculations: 82.2 kg (181 lb 3.5 oz)  Energy Calorie Requirements (kcal): 3345-4605 kcals/day (20-25 kcals/kg ABW)  Energy Need Method: Kcal/kg  Protein Requirements: 65-97 g/day (.8-1.2 g/kg IBW)  Weight Used For Protein Calculations: 81 kg (178 lb 9.2 oz)     Estimated Fluid Requirement Method: RDA Method  RDA " Method (mL): 7646       Reason for Assessment  Reason For Assessment: length of stay  Relevant Medical History: HTN (hypertension), Type 2 diabetes mellitus with hyperglycemia, GERD (gastroesophageal reflux disease), Chronic anticoagulation, Coronary artery disease of native artery of native heart with stable angina pectoris, Compression fracture of L1 lumbar vertebra, with delayed healing, subsequent encounter, Chronic systolic congestive heart failure, SSS (sick sinus syndrome), Stage 3a chronic kidney disease, Syncope and collapse Orthostatic hypotension    Nutrition/Diet History  Spiritual, Cultural Beliefs, [employer]ism Practices, Values that Affect Care: no  Food Allergies: NKFA  Factors Affecting Nutritional Intake: None identified at this time    Nutrition Risk Screen  Nutrition Risk Screen: no indicators present       Altered Skin Integrity 08/17/23 0300 Right lower Arm Skin Tear Partial thickness tissue loss. Shallow open ulcer with a red or pink wound bed, without slough. Intact or Open/Ruptured Serum-filled blister.-Wound Image: Images linked  MST Score: 0  Have you recently lost weight without trying?: No  Weight loss score: 0  Have you been eating poorly because of a decreased appetite?: No  Appetite score: 0       Weight History:  Wt Readings from Last 5 Encounters:   08/18/23 82.2 kg (181 lb 3.2 oz)   08/17/23 83.6 kg (184 lb 4.9 oz)   07/20/23 83.3 kg (183 lb 10.3 oz)   06/21/23 83 kg (183 lb)   06/26/23 83.3 kg (183 lb 10.3 oz)        Lab/Procedures/Meds: Pertinent Labs/Meds Reviewed    Medications:Pertinent Medications Reviewed  Scheduled Meds:   aspirin  81 mg Oral Daily    atorvastatin  40 mg Oral Daily    clopidogreL  75 mg Oral Daily    enoxparin  1 mg/kg Subcutaneous Q12H (treatment, non-standard time)    finasteride  5 mg Oral Daily    isosorbide dinitrate  10 mg Oral BID    montelukast  10 mg Oral QHS    oxybutynin  10 mg Oral Daily    pantoprazole  40 mg Oral Before breakfast    sertraline   "50 mg Oral Daily     Continuous Infusions:   sodium chloride 0.9% 100 mL/hr (08/21/23 0909)     PRN Meds:.acetaminophen, albuterol-ipratropium, dextrose 50%, dextrose 50%, glucagon (human recombinant), glucose, glucose, hydrALAZINE, hydrALAZINE, HYDROcodone-acetaminophen, insulin aspart U-100, melatonin, naloxone, nitroGLYCERIN, ondansetron, polyethylene glycol, senna-docusate 8.6-50 mg, simethicone, sodium chloride 0.9%    Labs: Pertinent Labs Reviewed  Clinical Chemistry:  Recent Labs   Lab 08/19/23  0413 08/20/23  0437 08/21/23  0319    138 140   K 3.7 3.8 3.6    108 111*   CO2 24 24 19*   * 147* 154*   BUN 23 24* 23   CREATININE 1.3 1.2 1.0   CALCIUM 9.5 9.3 9.4   PROT 6.2 6.1 6.4   ALBUMIN 3.4* 3.2* 3.3*   BILITOT 1.2* 1.4* 2.0*   ALKPHOS 76 71 81   AST 17 24 32   ALT 12 14 18   ANIONGAP 5* 6* 10   MG 1.6 1.4* 1.4*     CBC:   Recent Labs   Lab 08/21/23  0319   WBC 10.94   RBC 4.30*   HGB 11.4*   HCT 37.1*   *   MCV 86   MCH 26.5*   MCHC 30.7*     Cardiac Profile:  Recent Labs   Lab 08/16/23  1926   *   CPK 74     Diabetes:  Recent Labs   Lab 08/16/23  2248   HGBA1C 7.2*     No results for input(s): "TSH", "FREET4", "X2FCFJU", "O2ECIDM", "THYROIDAB" in the last 168 hours.    Monitor and Evaluation  Food and Nutrient Intake: food and beverage intake, energy intake  Food and Nutrient Adminstration: diet order  Knowledge/Beliefs/Attitudes: food and nutrition knowledge/skill, beliefs and attitudes  Physical Activity and Function: nutrition-related ADLs and IADLs, factors affecting access to physical activity  Anthropometric Measurements: weight, weight change, body mass index  Biochemical Data, Medical Tests and Procedures: lipid profile, inflammatory profile, glucose/endocrine profile, gastrointestinal profile, electrolyte and renal panel  Nutrition-Focused Physical Findings: overall appearance     Nutrition Risk  Level of Risk/Frequency of Follow-up: moderate     Nutrition " Follow-Up  RD Follow-up?: Yes      Margaret Martin, JAXON 08/21/2023 12:53 PM

## 2023-08-21 NOTE — CONSULTS
Critical access hospital  Cardiology  Consult Note    Patient Name: Tono Saez  MRN: [id number]  Admission Date: 8/16/2023  Hospital Length of Stay: 4 days  Code Status: Full Code   Attending Provider: Sixto Schultz,Trell   Consulting Provider: Kt Lux MD  Primary Care Physician: Scott Staley MD  Principal Problem:<principal problem not specified>    Patient information was obtained from patient, past medical records, and ER records.     Consults  Subjective:     Chief Complaint:  Syncopal episode     HPI:  Patient had syncopal episode; he has been unsteady on his feet and last time seen in the office his blood pressure was low and he was placed on midodrine  In the hospital this occasion is blood pressure has been slightly elevated  Problem 2.  Has a Biotronik pacemaker-I can not find interrogation report at this time  3. He is had previous bypass surgery-he had restenting of the main left and circumflex in 2022 because of severe chest pains.  Since then he has been relatively pain-free.  He had chest pain the other night troponins elevated now and he is not having chest discomfort now.  He is on Plavix aspirin and full-dose heparin    4. He has heart failure reduced ejection fraction and may be on Actos at home  5. He is weak and lethargic now     Past Medical History:   Diagnosis Date    Anemia     Anticoagulant long-term use     Arthritis     CAD (coronary artery disease)     CABG, STENTS '97,'99,'01,'05    Cancer     SKIN    Cataract     Diabetes mellitus     Diabetes mellitus type II     GERD (gastroesophageal reflux disease)     GI bleed 2020    Hypertension     Myocardial infarction     Wears glasses        Past Surgical History:   Procedure Laterality Date    CARDIAC PACEMAKER PLACEMENT      CARDIAC PACEMAKER PLACEMENT      CARDIAC PACEMAKER PLACEMENT  04/26/2018    replaced    CARDIAC SURGERY      1995   CABG X 5    CHOLECYSTECTOMY      COLON SURGERY      COLONOSCOPY N/A 2/21/2020     Procedure: COLONOSCOPY;  Surgeon: Abe Barragan III, MD;  Location: [facility];  Service: Endoscopy;  Laterality: N/A;    COLONOSCOPY N/A 2/23/2020    Procedure: COLONOSCOPY;  Surgeon: Bob Locke Jr., MD;  Location: [facility];  Service: Endoscopy;  Laterality: N/A;    CORONARY ANGIOGRAPHY INCLUDING BYPASS GRAFTS WITH CATHETERIZATION OF LEFT HEART N/A 10/26/2022    Procedure: ANGIOGRAM, CORONARY, INCLUDING BYPASS GRAFT, WITH LEFT HEART CATHETERIZATION;  Surgeon: Robles Mckoy MD;  Location: [facility] CATH/EP LAB;  Service: Cardiology;  Laterality: N/A;    CORONARY ARTERY BYPASS GRAFT  1995    CORONARY STENT PLACEMENT      stent x 5    ESOPHAGOGASTRODUODENOSCOPY N/A 2/23/2020    Procedure: EGD (ESOPHAGOGASTRODUODENOSCOPY);  Surgeon: Bob Locke Jr., MD;  Location: [facility];  Service: Endoscopy;  Laterality: N/A;    EYE SURGERY      BILAT CATARACT    head laceration   01/19/2018    HEMORRHOID SURGERY      INJECTION OF ANESTHETIC AGENT AROUND MEDIAL BRANCH NERVES INNERVATING LUMBAR FACET JOINT Bilateral 6/29/2023    Procedure: Block-nerve-medial branch-lumbar;  Surgeon: Maurice Montenegro MD;  Location: [facility] OR;  Service: Pain Management;  Laterality: Bilateral;  L3,4,5 MBB    INJECTION OF ANESTHETIC AGENT AROUND MEDIAL BRANCH NERVES INNERVATING LUMBAR FACET JOINT Bilateral 7/25/2023    Procedure: Block-nerve-medial branch-lumbar;  Surgeon: Maurice Montenegro MD;  Location: [facility];  Service: Anesthesiology;  Laterality: Bilateral;  L3,4,5 MBB #2    SMALL BOWEL ENTEROSCOPY N/A 2/21/2020    Procedure: ENTEROSCOPY;  Surgeon: Abe Barragan III, MD;  Location: [facility];  Service: Endoscopy;  Laterality: N/A;    stint         Review of patient's allergies indicates:   Allergen Reactions    Adhesive Other (See Comments)     SILK TAPE PULL SKIN OFF    Metformin Other (See Comments)     Weight loss cachexia anorexia,diarrhea.    Pcn [penicillins]      Patient states he passed out from this medication when he was 10  years old.        Current Facility-Administered Medications on File Prior to Encounter   Medication    lactated ringers infusion    LIDOcaine (PF) 10 mg/ml (1%) injection 10 mg     Current Outpatient Medications on File Prior to Encounter   Medication Sig    aspirin (ECOTRIN) 81 MG EC tablet Take 2 tablets (162 mg total) by mouth once daily. (Patient taking differently: Take 81 mg by mouth once daily.)    atorvastatin (LIPITOR) 40 MG tablet Take 1 tablet by mouth once daily.    betamethasone dipropionate (DIPROLENE) 0.05 % lotion Apply thin film to scalp bid prn itch (Patient taking differently: Apply 1 g topically 2 (two) times daily. Apply thin film to scalp bid prn itch)    clopidogreL (PLAVIX) 75 mg tablet Take 1 tablet (75 mg total) by mouth once daily.    empagliflozin (JARDIANCE) 25 mg tablet Take 1 tablet (25 mg total) by mouth once daily.    finasteride (PROSCAR) 5 mg tablet Take 5 mg by mouth once daily.    furosemide (LASIX) 20 MG tablet TAKE 1 TABLET (20 MG TOTAL) BY MOUTH ONCE DAILY. (Patient taking differently: Take 20 mg by mouth once daily.)    isosorbide dinitrate (ISORDIL) 10 MG tablet Take 10 mg by mouth 2 (two) times daily.    metoprolol tartrate (LOPRESSOR) 25 MG tablet Take 0.5 tablets (12.5 mg total) by mouth 2 (two) times daily.    midodrine (PROAMATINE) 2.5 MG Tab Take 1 tablet (2.5 mg total) by mouth 3 (three) times daily.    montelukast (SINGULAIR) 10 mg tablet Take 1 tablet (10 mg total) by mouth every evening.    multivitamin capsule Take 1 capsule by mouth once daily.    pantoprazole (PROTONIX) 40 MG tablet TAKE ONE TABLET BY MOUTH ONCE DAILY (Patient taking differently: Take 40 mg by mouth once daily.)    pioglitazone (ACTOS) 15 MG tablet Take 15 mg by mouth once daily.    pregabalin (LYRICA) 50 MG capsule Take 1 capsule (50 mg total) by mouth 3 (three) times daily.    sertraline (ZOLOFT) 50 MG tablet TAKE ONE TABLET BY MOUTH ONCE DAILY (Patient taking differently: Take 50 mg by mouth  once daily.)    tolterodine (DETROL LA) 4 MG 24 hr capsule TAKE ONE CAPSULE BY MOUTH ONCE DAILY (Patient taking differently: Take 4 mg by mouth once daily.)    vitamin D 1000 units Tab Take 1,000 Units by mouth once daily.    atorvastatin (LIPITOR) 40 MG tablet Take 1 tablet (40 mg total) by mouth every evening.    fluticasone propionate (FLONASE) 50 mcg/actuation nasal spray 1 spray by Each Nostril route as needed for Allergies.    HYDROcodone-acetaminophen (NORCO) 5-325 mg per tablet Take 1 tablet by mouth every 4 (four) hours as needed for Pain.    nitroGLYCERIN (NITROSTAT) 0.4 MG SL tablet DISSOLVE 1 TABLET UNDER THE TONGUE AS NEEDED FOR CHEST PAIN (Patient taking differently: Place 0.4 mg under the tongue every 5 (five) minutes as needed.)     Family History       Problem Relation (Age of Onset)    Heart disease Mother, Father, Brother, Brother, Brother, Brother, Brother    Hyperlipidemia Sister    Hypertension Sister          Tobacco Use    Smoking status: Never    Smokeless tobacco: Never   Substance and Sexual Activity    Alcohol use: No    Drug use: No    Sexual activity: Not Currently     ROS  Objective:     Vital Signs (Most Recent):  Temp: 97.4 °F (36.3 °C) (08/21/23 1100)  Pulse: 95 (08/21/23 1100)  Resp: 19 (08/21/23 1100)  BP: (!) 151/78 (08/21/23 1100)  SpO2: (!) 94 % (08/21/23 1100) Vital Signs (24h Range):  Temp:  [97.4 °F (36.3 °C)-98.4 °F (36.9 °C)] 97.4 °F (36.3 °C)  Pulse:  [] 95  Resp:  [15-19] 19  SpO2:  [93 %-96 %] 94 %  BP: (151-189)/() 151/78     Weight: 82.2 kg (181 lb 3.2 oz)  Body mass index is 24.58 kg/m².    SpO2: (!) 94 %         Intake/Output Summary (Last 24 hours) at 8/21/2023 1336  Last data filed at 8/21/2023 0547  Gross per 24 hour   Intake 240 ml   Output 200 ml   Net 40 ml       Lines/Drains/Airways       Peripheral Intravenous Line  Duration                  Peripheral IV - Single Lumen 08/16/23 2205 Anterior;Left Forearm 4 days                    Physical Exam  "blood pressure is 150/80 standing Pulse rates 80  Head neck no neck vein distention  Lungs are clear  Cardiac regular no S3  Abdomen is soft  Skin multiple scars and discoloration  Legs minimal edema    Significant Labs: CMP   Recent Labs   Lab 08/20/23  0437 08/21/23 0319    140   K 3.8 3.6    111*   CO2 24 19*   * 154*   BUN 24* 23   CREATININE 1.2 1.0   CALCIUM 9.3 9.4   PROT 6.1 6.4   ALBUMIN 3.2* 3.3*   BILITOT 1.4* 2.0*   ALKPHOS 71 81   AST 24 32   ALT 14 18   ANIONGAP 6* 10   , CBC   Recent Labs   Lab 08/20/23 0437 08/21/23 0319   WBC 7.39 10.94   HGB 11.0* 11.4*   HCT 35.0* 37.1*   * 148*   , and Troponin No results for input(s): "TROPONINI" in the last 48 hours.    Significant Imaging:   Assessment and Plan:   1. Week episode resulting in fall.  Patient has a history of orthostatic hypotension  but this has improved and he was taken off of Isordil  2. He has a Biotronik pacemaker-we will get this interrogated  3. He had previous bypass surgery and had stenting of the main left circumflex proximally a year ago and has done well since then  He is some chest pain last night and troponins bumped up he is currently  On heparin and blood thinners add add beta-blocker  4. He has LV dysfunction with apical scarring-DC Actos therapy which can exacerbate heart failure  5. He is slightly acidotic at the current time and mildly hypokalemic  Active Diagnoses:    Diagnosis Date Noted POA    Syncope and collapse [R55] 04/19/2023 Yes    Orthostatic hypotension [I95.1] 04/19/2023 Yes    Chronic systolic congestive heart failure [I50.22] 02/27/2023 Yes    SSS (sick sinus syndrome) [I49.5] 02/27/2023 Yes    Stage 3a chronic kidney disease [N18.31] 02/27/2023 Yes    Compression fracture of L1 lumbar vertebra, with delayed healing, subsequent encounter [S32.010G] 02/17/2023 Not Applicable    Coronary artery disease of native artery of native heart with stable angina pectoris [I25.118] 04/03/2020 " Yes    Chronic anticoagulation [Z79.01] 09/30/2019 Not Applicable    GERD (gastroesophageal reflux disease) [K21.9] 04/18/2019 Yes    Type 2 diabetes mellitus with hyperglycemia  [E11.65] 02/13/2019 Yes    HTN (hypertension) [I10] 05/21/2012 Yes      Problems Resolved During this Admission:       VTE Risk Mitigation (From admission, onward)           Ordered     enoxaparin injection 80 mg  Every 12 hours         08/21/23 1115     IP VTE HIGH RISK PATIENT  Once         08/16/23 2225     Place sequential compression device  Until discontinued         08/16/23 2225     Reason for No Pharmacological VTE Prophylaxis  Once        Question:  Reasons:  Answer:  Risk of Bleeding    08/16/23 2225                  I substantially and  personally reviewed old and new ecg's, lab reports,, xray reports  and  other cardiovascular studies including  echo's, stress tests, angiogram reports, holters,and vascular studies .  In addition I evaluated original cardiac cath  ___echo  ____cxr ______ct ____scan on [employer] or [employer] or other viewing platforms and  ___x_EKG's .  Ventricular pacing  I reviewed  office and hospital notes Yes ____ of  referring providers and other providers.  I reviewed personally old hospital and office notes   Time spent evaluating and managing this patient:____40____min.     Thank you for your consult.     Kt uLx MD  Cardiology   Replaced by [facility]

## 2023-08-21 NOTE — CARE UPDATE
08/21/23 0854   Patient Assessment/Suction   Level of Consciousness (AVPU) alert   Respiratory Effort Unlabored   All Lung Fields Breath Sounds clear;diminished   PRE-TX-O2   Device (Oxygen Therapy) room air   SpO2 (!) 93 %   Pulse Oximetry Type Intermittent   $ Pulse Oximetry - Multiple Charge Pulse Oximetry - Multiple   Pulse 106   Resp 15   Aerosol Therapy   $ Aerosol Therapy Charges PRN treatment not required   Education   $ Education Bronchodilator;15 min   Respiratory Evaluation   $ Care Plan Tech Time 15 min

## 2023-08-21 NOTE — PLAN OF CARE
Patient not medically clear for discharge at this time.  When cleared, patient is accepted at [facility] per Kailee.       08/21/23 1446   Post-Acute Status   Post-Acute Authorization Placement   Post-Acute Placement Status Set-up Complete/Auth obtained   Patient choice form signed by patient/caregiver List with quality metrics by geographic area provided   Discharge Plan   Discharge Plan A Rehab   Discharge Plan B Rehab

## 2023-08-21 NOTE — PT/OT/SLP PROGRESS
Occupational Therapy   Treatment    Name: Tono Saez  MRN: [id number]  Admitting Diagnosis:  <principal problem not specified>       Recommendations:     Discharge Recommendations: rehabilitation facility  Discharge Equipment Recommendations:  bedside commode  Barriers to discharge:  Decreased caregiver support    Assessment:     Tono Saez is a 78 y.o. male with a medical diagnosis of <principal problem not specified>.  He presents with pain of lumbar region, motivated to learn improved log roll techniques, stating TLSO rep donned brace and compression of chest was more painful than lumbar fx. MACK agreeable to focus on improved bed mobility and EOB activity w/o donning TLSO at this time, however pt felt urgent BM upon getting to EOB and toileting became the session focus. Pt dizzy post toileting; while seated cool wet cloths applied to neck and forehead ~3mins pt able to rise with ModA and slowly ambulate with CGA and RW 10ft to bed (TLSO not in use). Spinal precautions reviewed and demonstrated with pt and daughter to full understanding. Performance deficits affecting function are weakness, impaired endurance, pain, impaired self care skills, impaired functional mobility, impaired balance, gait instability, decreased lower extremity function, decreased ROM, orthopedic precautions.     Rehab Prognosis:  Good; patient would benefit from acute skilled OT services to address these deficits and reach maximum level of function.       Plan:     Patient to be seen 6 x/week to address the above listed problems via self-care/home management, therapeutic activities, therapeutic exercises  Plan of Care Expires: 09/17/23  Plan of Care Reviewed with: patient, daughter    Subjective     Chief Complaint: the TLSO hurts my chest  Patient/Family Comments/goals: decreased pain with increased mobility  Pain/Comfort:       Objective:     Communicated with: Jessica STERN prior to session.  Patient found HOB elevated with telemetry,  bed alarm, peripheral IV upon OT entry to room.    General Precautions: Standard, fall, diabetic, aspiration    Orthopedic Precautions:spinal precautions  Braces: TLSO  Respiratory Status: Room air     Occupational Performance:     Bed Mobility:    Patient completed Rolling/Turning to Left with  minimum assistance and using log roll technique after MACK demo and VI/  Patient completed Scooting/Bridging with stand by assistance and increased time for VC and sequencing.  Patient completed Supine to Sit with minimum assistance and extended time and VC for sequencing.  Patient completed Sit to Supine with minimum assistance and BLE leg lift with log roll technique.      Functional Mobility/Transfers:  Patient completed Sit <> Stand Transfer with contact guard assistance  with  rolling walker and from EOB. When completing Sit<>Stand from standard toilet pt required grab bars, ModA and two attempts to complete. Pt c/o dizzy and took 2 side steps with [place], VC for sequencing and RW to BSC parallel to toilet for seated rest from appropriate height. Next rise from BSC with BUE pushing from arm rests and CGA to RW.   Patient completed Toilet Transfer from ambulatory  with RW into bathroom technique with moderate assistance with  grab bars and VC to reach back with one hand for standard toilet which proved to be too low for tall pt.  Functional Mobility: FAIR; high fall risk.    Activities of Daily Living:  Lower Body Dressing: moderate assistance with no AE to doff/don pull up.  Toileting: moderate assistance 2° unable to perform garment mgmnt. Pt performs BM hygiene w/o assist.    [facility] 6 Click ADL:      Treatment & Education:  -MACK ed and demo spinal precautions, techniques, timeline, expectations; daughter typed them in her mobile device; and pt demos and verbalizes understanding throughout tx session.  -BSC placed over toilet for future toileting ease and increased independence with transfers.  -pt with narrow RAJESH  requiring moderate VC for wider RAJESH while ambulating/standing.    Patient left supine with all lines intact, call button in reach, RN notified, and daughter present    GOALS:   Multidisciplinary Problems       Occupational Therapy Goals          Problem: Occupational Therapy    Goal Priority Disciplines Outcome Interventions   Occupational Therapy Goal     OT, PT/OT Ongoing, Progressing    Description: Goals to be met by: 8/31/2023     Patient will increase functional independence with ADLs by performing:    UE Dressing with Modified [place].  LE Dressing with Modified [place].  Grooming while standing at sink with Modified [place].  Toileting from toilet with Modified [place] for hygiene and clothing management.   Toilet transfer to toilet with Modified [place].                         Time Tracking:     OT Date of Treatment: 08/19/23  OT Start Time: 1245  OT Stop Time: 1325  OT Total Time (min): 40 min    Billable Minutes:Self Care/Home Management 40 min    OT/BRY: BRY     Number of BRY visits since last OT visit: 1    8/21/2023

## 2023-08-21 NOTE — PT/OT/SLP PROGRESS
Physical Therapy      Patient Name:  Tono Saez   MRN:  [id number]    Patient not seen today secondary to Nurse/ ROBERT hold. Will follow-up 8/22/23.

## 2023-08-21 NOTE — PROGRESS NOTES
[facility] Medicine  Progress Note    Patient name: Tono Saez  MRN: [id number]  Admit Date: 8/16/2023   LOS: 4 days     SUBJECTIVE:     Principal problem: syncope        HPI-  78-year-old male who presents for evaluation after a syncopal episode.  The patient does have frequent falls and unsteady gait for which he  undergoes therapy.  He was leaving an office visit today and reports that he passed.  Reports that this was not his usual trip and fall.  He did strike his head.  Injured and scraped his right arm, his right knee as well as his right anterior chest.  He has had dull aching pain to the right anterior chest since the fall.  The pain is worse with palpation, movement and deep breathing but denies feeling short of breath.  He did not have any chest pain prior to the fall.  Denies any recent illnesses.  Denies fever, chills, cough or any upper or lower respiratory type symptoms.  Denies any abdominal pain or shortness of breath.  Has some mild pain to his right arm associated with a skin tear but denies any pain with range of motion as well as pain with an abrasion to the right knee but denies any pain with range of motion of the knee.  Denies any other problems or complaints.      Interval History:      8/21- resting comfortably, visiting with family.  He had an episode of cp yesterday evening.  Troponin was ordered at that time, around 6 pm.  This was not drawn or resulted until 3 am, and was elevated at 1400.  EKG no obvious change but difficlut to interpret.  Consulting cardiology today.      8/20- resting in bed quietly, plan for IPR.  Working with PT    8/19- has TLSO brace, working with PT.  Medically cleared for IPR.  CM following for assistance     8/18- he is up to bedside chair, alert.  Denies feeling dizzy, light headed currently.  +right lateral chest M/s chest pain.  He cant remember exactly whether he felt dizzy before falling or falling mechanically.  But he does  mention that he thinks he fell bc he wasn't using his walker.  While helping him back to bed, he was very weak and poor balance.  I feel it is more likely he had a mechanical fall while not using DME.  He is not orthostatic.  PM interrogation did not show any irregularities.  His echo actually shows improved cardiac function.  He lives alone.  I think IPR would be very beneficial to increase his strength and ability to perform ADLs before attempting to get him back to his home.  He is agreeable to this.  Will ask PT for eval.      8/17-  HR 60, hypertensive w/sbp 160s.  Chronic lower back pain.  F/u nsgy recs and echo.  Carotids no significant stenosis.      Scheduled Meds:   aspirin  81 mg Oral Daily    atorvastatin  40 mg Oral Daily    clopidogreL  75 mg Oral Daily    enoxparin  1 mg/kg Subcutaneous Q12H (treatment, non-standard time)    finasteride  5 mg Oral Daily    isosorbide dinitrate  10 mg Oral BID    montelukast  10 mg Oral QHS    oxybutynin  10 mg Oral Daily    pantoprazole  40 mg Oral Before breakfast    sertraline  50 mg Oral Daily     Continuous Infusions:   sodium chloride 0.9% 100 mL/hr (08/21/23 0909)     PRN Meds:acetaminophen, albuterol-ipratropium, dextrose 50%, dextrose 50%, glucagon (human recombinant), glucose, glucose, hydrALAZINE, hydrALAZINE, HYDROcodone-acetaminophen, insulin aspart U-100, melatonin, naloxone, nitroGLYCERIN, ondansetron, polyethylene glycol, senna-docusate 8.6-50 mg, simethicone, sodium chloride 0.9%    Review of patient's allergies indicates:   Allergen Reactions    Adhesive Other (See Comments)     SILK TAPE PULL SKIN OFF    Metformin Other (See Comments)     Weight loss cachexia anorexia,diarrhea.    Pcn [penicillins]      Patient states he passed out from this medication when he was 10 years old.        Review of Systems: As per interval history    OBJECTIVE:     Vital Signs (Most Recent)  Temp: 98.2 °F (36.8 °C) (08/21/23 0700)  Pulse: 106 (08/21/23 0854)  Resp: 19  (08/21/23 0908)  BP: (!) 189/89 (08/21/23 0700)  SpO2: (!) 93 % (08/21/23 0854)    Vital Signs Range (Last 24H):  Temp:  [98.1 °F (36.7 °C)-98.4 °F (36.9 °C)]   Pulse:  []   Resp:  [15-19]   BP: (162-189)/()   SpO2:  [93 %-96 %]     I & O (Last 24H):  Intake/Output Summary (Last 24 hours) at 8/21/2023 1140  Last data filed at 8/21/2023 0547  Gross per 24 hour   Intake 240 ml   Output 200 ml   Net 40 ml         Physical Exam:  General: Patient resting comfortably in no acute distress. Ecchymosis and swelling right cheek  Eyes: No conjunctival injection. No scleral icterus.  ENT: Hearing grossly intact. No discharge from ears. No nasal discharge.   CVS: RRR.   Lungs:  No tachypnea or accessory muscle use.   Abdomen:  Soft, nontender and nondistended.    Neuro: Alert.  Moves all extremities. Follows commands. Responds appropriately   Skin:  No rash or erythema noted    Laboratory:  I have reviewed all pertinent lab results within the past 24 hours.      ASSESSMENT/PLAN:         Active Hospital Problems    Diagnosis  POA    Syncope and collapse [R55]  Yes    Orthostatic hypotension [I95.1]  Yes    Chronic systolic congestive heart failure [I50.22]  Yes    SSS (sick sinus syndrome) [I49.5]  Yes    Stage 3a chronic kidney disease [N18.31]  Yes    Compression fracture of L1 lumbar vertebra, with delayed healing, subsequent encounter [S32.010G]  Not Applicable    Coronary artery disease of native artery of native heart with stable angina pectoris [I25.118]  Yes     CABG, STENTS '97,'99,'01,'05    10/26/22:  Summary         The Dist LM lesion was 90% stenosed with 10% stenosis post-intervention.    The pre-procedure left ventricular end diastolic pressure was 12.    A STENT LIMA YOHANA 3.50 X 26 stent was not successfully placed at 12 JASPER for 20 sec.    The estimated blood loss was <50 mL.    Successful intravascular ultrasound-guided PCI of the critical stenosis of left main into circumflex with 1 drug-eluting  stent    Patent vein graft to OM, occluded branch of the OM as compared to angiogram in 2016.    Patent vein graft to right coronary artery with patent stent, moderately degenerated, distal native RCA has 80% stenosis in medium caliber vessel.    Occluded LAD with a patent LIMA to LAD        Chronic anticoagulation [Z79.01]  Not Applicable    GERD (gastroesophageal reflux disease) [K21.9]  Yes    Type 2 diabetes mellitus with hyperglycemia  [E11.65]  Yes    HTN (hypertension) [I10]  Yes      Resolved Hospital Problems   No resolved problems to display.         Plan:       8/20   Chest pain  Elevated troponin  He received PRN morphine for pain  Stat ekg and trop  Trop at 3 am elevated to 1400, continue trend  Consult cardiology  Treatment dose lovenox      Syncope  Hx SSS  Hx combined HF with EF 25% and moderate AS  - CT head Small air-fluid level in the right maxillary sinus could be result of sinus disease or trauma. Right facial soft tissue swelling.  - echo complete, improved EF to 35-40%  - carotid US no significant stenosis  - EKG, trending troponin are negative  - telemetry  - discontinue BB, midodrine  - Orthostatic VS are as follows, patient did not tolerate standing. Patient denies symptoms.   Lying:: /86, HR 74 paced  Sitting:: /84, HR 74 paced  - pacemaker interrogation wnl  -very weak and off balance when assisted to ambulate  -suspect mechanical fall  -will ask PT and CM to eval for IPR at discharge       DM2  - SSI     Chronic conditions as noted above/below; home medications reviewed personally by me and restarted as appropriate  Electrolyte derangement:  Trending BMP; Mg; replacement prn  DVT ppx: lovenox held due to recent fall with multiple abrasions, risk of bleeding; continue SCDs  FULL CODE           VTE Risk Mitigation (From admission, onward)           Ordered     enoxaparin injection 80 mg  Every 12 hours         08/21/23 1115     IP VTE HIGH RISK PATIENT  Once         08/16/23  2225     Place sequential compression device  Until discontinued         08/16/23 2225     Reason for No Pharmacological VTE Prophylaxis  Once        Question:  Reasons:  Answer:  Risk of Bleeding    08/16/23 2225                        Department Hospital Medicine  [facility]  Sixto Schultz MD  Date of service: 08/21/2023

## 2023-08-22 ENCOUNTER — CLINICAL SUPPORT (OUTPATIENT)
Dept: CARDIOLOGY | Facility: HOSPITAL | Age: 78
DRG: 247 | End: 2023-08-22
Attending: SPECIALIST
Payer: MEDICARE

## 2023-08-22 VITALS — HEIGHT: 72 IN | WEIGHT: 181.19 LBS | BODY MASS INDEX: 24.54 KG/M2

## 2023-08-22 LAB
ALBUMIN SERPL BCP-MCNC: 3 G/DL (ref 3.5–5.2)
ALP SERPL-CCNC: 75 U/L (ref 55–135)
ALT SERPL W/O P-5'-P-CCNC: 20 U/L (ref 10–44)
ANION GAP SERPL CALC-SCNC: 7 MMOL/L (ref 8–16)
AORTIC ROOT ANNULUS: 4.1 CM
AORTIC VALVE CUSP SEPERATION: 4.3 CM
ASCENDING AORTA: 3.5 CM
AST SERPL-CCNC: 50 U/L (ref 10–40)
AV INDEX (PROSTH): 0.53
AV MEAN GRADIENT: 11 MMHG
AV PEAK GRADIENT: 21 MMHG
AV REGURGITATION PRESSURE HALF TIME: 389 MS
AV VALVE AREA BY VELOCITY RATIO: 1.97 CM²
AV VALVE AREA: 2.01 CM²
AV VELOCITY RATIO: 0.52
BASOPHILS # BLD AUTO: 0.04 K/UL (ref 0–0.2)
BASOPHILS NFR BLD: 0.4 % (ref 0–1.9)
BILIRUB SERPL-MCNC: 1.7 MG/DL (ref 0.1–1)
BNP SERPL-MCNC: 1890 PG/ML (ref 0–99)
BSA FOR ECHO PROCEDURE: 2.04 M2
BUN SERPL-MCNC: 27 MG/DL (ref 8–23)
CALCIUM SERPL-MCNC: 9.4 MG/DL (ref 8.7–10.5)
CHLORIDE SERPL-SCNC: 111 MMOL/L (ref 95–110)
CO2 SERPL-SCNC: 21 MMOL/L (ref 23–29)
CREAT SERPL-MCNC: 1 MG/DL (ref 0.5–1.4)
CV ECHO LV RWT: 0.53 CM
DIFFERENTIAL METHOD: ABNORMAL
DOP CALC AO PEAK VEL: 2.3 M/S
DOP CALC AO VTI: 41.9 CM
DOP CALC LVOT AREA: 3.8 CM2
DOP CALC LVOT DIAMETER: 2.2 CM
DOP CALC LVOT PEAK VEL: 1.19 M/S
DOP CALC LVOT STROKE VOLUME: 84.35 CM3
DOP CALCLVOT PEAK VEL VTI: 22.2 CM
E WAVE DECELERATION TIME: 190 MSEC
E/A RATIO: 0.79
E/E' RATIO: 17.23 M/S
ECHO LV POSTERIOR WALL: 1.36 CM (ref 0.6–1.1)
EOSINOPHIL # BLD AUTO: 0.1 K/UL (ref 0–0.5)
EOSINOPHIL NFR BLD: 0.8 % (ref 0–8)
ERYTHROCYTE [DISTWIDTH] IN BLOOD BY AUTOMATED COUNT: 16.4 % (ref 11.5–14.5)
EST. GFR  (NO RACE VARIABLE): >60 ML/MIN/1.73 M^2
FRACTIONAL SHORTENING: 21 % (ref 28–44)
GLUCOSE SERPL-MCNC: 143 MG/DL (ref 70–110)
GLUCOSE SERPL-MCNC: 151 MG/DL (ref 70–110)
GLUCOSE SERPL-MCNC: 165 MG/DL (ref 70–110)
GLUCOSE SERPL-MCNC: 165 MG/DL (ref 70–110)
GLUCOSE SERPL-MCNC: 199 MG/DL (ref 70–110)
HCT VFR BLD AUTO: 36 % (ref 40–54)
HGB BLD-MCNC: 11.2 G/DL (ref 14–18)
IMM GRANULOCYTES # BLD AUTO: 0.02 K/UL (ref 0–0.04)
IMM GRANULOCYTES NFR BLD AUTO: 0.2 % (ref 0–0.5)
INTERVENTRICULAR SEPTUM: 1.63 CM (ref 0.6–1.1)
IVC DIAMETER: 1.54 CM
LEFT ATRIUM SIZE: 4.1 CM
LEFT INTERNAL DIMENSION IN SYSTOLE: 4.01 CM (ref 2.1–4)
LEFT VENTRICLE DIASTOLIC VOLUME INDEX: 60.78 ML/M2
LEFT VENTRICLE DIASTOLIC VOLUME: 124 ML
LEFT VENTRICLE MASS INDEX: 162 G/M2
LEFT VENTRICLE SYSTOLIC VOLUME INDEX: 34.5 ML/M2
LEFT VENTRICLE SYSTOLIC VOLUME: 70.4 ML
LEFT VENTRICULAR INTERNAL DIMENSION IN DIASTOLE: 5.1 CM (ref 3.5–6)
LEFT VENTRICULAR MASS: 330.76 G
LV LATERAL E/E' RATIO: 14 M/S
LV SEPTAL E/E' RATIO: 22.4 M/S
LVOT MG: 3 MMHG
LVOT MV: 0.8 CM/S
LYMPHOCYTES # BLD AUTO: 0.5 K/UL (ref 1–4.8)
LYMPHOCYTES NFR BLD: 5 % (ref 18–48)
MAGNESIUM SERPL-MCNC: 1.4 MG/DL (ref 1.6–2.6)
MCH RBC QN AUTO: 26.9 PG (ref 27–31)
MCHC RBC AUTO-ENTMCNC: 31.1 G/DL (ref 32–36)
MCV RBC AUTO: 87 FL (ref 82–98)
MONOCYTES # BLD AUTO: 0.9 K/UL (ref 0.3–1)
MONOCYTES NFR BLD: 8.9 % (ref 4–15)
MV PEAK A VEL: 1.42 M/S
MV PEAK E VEL: 1.12 M/S
NEUTROPHILS # BLD AUTO: 8.4 K/UL (ref 1.8–7.7)
NEUTROPHILS NFR BLD: 84.7 % (ref 38–73)
NRBC BLD-RTO: 0 /100 WBC
PISA AR MAX VEL: 4.01 M/S
PISA MRMAX VEL: 4.55 M/S
PISA TR MAX VEL: 2.2 M/S
PLATELET # BLD AUTO: 168 K/UL (ref 150–450)
PMV BLD AUTO: 11.2 FL (ref 9.2–12.9)
POTASSIUM SERPL-SCNC: 3.6 MMOL/L (ref 3.5–5.1)
PROT SERPL-MCNC: 6.1 G/DL (ref 6–8.4)
PV MV: 0.74 M/S
PV PEAK GRADIENT: 5 MMHG
PV PEAK VELOCITY: 1.09 M/S
RA PRESSURE ESTIMATED: 3 MMHG
RA PRESSURE: 3 MMHG
RBC # BLD AUTO: 4.16 M/UL (ref 4.6–6.2)
RIGHT VENTRICULAR END-DIASTOLIC DIMENSION: 3.01 CM
RV TB RVSP: 5 MMHG
RV TISSUE DOPPLER FREE WALL SYSTOLIC VELOCITY 1 (APICAL 4 CHAMBER VIEW): 10.6 CM/S
SODIUM SERPL-SCNC: 139 MMOL/L (ref 136–145)
TDI LATERAL: 0.08 M/S
TDI SEPTAL: 0.05 M/S
TDI: 0.07 M/S
TR MAX PG: 19 MMHG
TRICUSPID ANNULAR PLANE SYSTOLIC EXCURSION: 2.59 CM
TROPONIN I SERPL HS-MCNC: 4500 PG/ML (ref 0–14.9)
TROPONIN I SERPL HS-MCNC: 7454.1 PG/ML (ref 0–14.9)
TV REST PULMONARY ARTERY PRESSURE: 22 MMHG
WBC # BLD AUTO: 9.91 K/UL (ref 3.9–12.7)
Z-SCORE OF LEFT VENTRICULAR DIMENSION IN END DIASTOLE: -1.76
Z-SCORE OF LEFT VENTRICULAR DIMENSION IN END SYSTOLE: 0.58

## 2023-08-22 PROCEDURE — 99900031 HC PATIENT EDUCATION (STAT)

## 2023-08-22 PROCEDURE — 94761 N-INVAS EAR/PLS OXIMETRY MLT: CPT

## 2023-08-22 PROCEDURE — 21400001 HC TELEMETRY ROOM

## 2023-08-22 PROCEDURE — 85025 COMPLETE CBC W/AUTO DIFF WBC: CPT | Performed by: FAMILY MEDICINE

## 2023-08-22 PROCEDURE — 93010 EKG 12-LEAD: ICD-10-PCS | Mod: ,,, | Performed by: SPECIALIST

## 2023-08-22 PROCEDURE — 63600175 PHARM REV CODE 636 W HCPCS: Performed by: SPECIALIST

## 2023-08-22 PROCEDURE — 93306 TTE W/DOPPLER COMPLETE: CPT

## 2023-08-22 PROCEDURE — 93005 ELECTROCARDIOGRAM TRACING: CPT | Performed by: SPECIALIST

## 2023-08-22 PROCEDURE — 84484 ASSAY OF TROPONIN QUANT: CPT | Mod: 91 | Performed by: SPECIALIST

## 2023-08-22 PROCEDURE — 25000003 PHARM REV CODE 250: Performed by: NURSE PRACTITIONER

## 2023-08-22 PROCEDURE — 36415 COLL VENOUS BLD VENIPUNCTURE: CPT | Performed by: SPECIALIST

## 2023-08-22 PROCEDURE — 83735 ASSAY OF MAGNESIUM: CPT | Performed by: FAMILY MEDICINE

## 2023-08-22 PROCEDURE — 36415 COLL VENOUS BLD VENIPUNCTURE: CPT | Performed by: FAMILY MEDICINE

## 2023-08-22 PROCEDURE — 82962 GLUCOSE BLOOD TEST: CPT

## 2023-08-22 PROCEDURE — 63600175 PHARM REV CODE 636 W HCPCS: Performed by: STUDENT IN AN ORGANIZED HEALTH CARE EDUCATION/TRAINING PROGRAM

## 2023-08-22 PROCEDURE — 25000003 PHARM REV CODE 250: Performed by: SPECIALIST

## 2023-08-22 PROCEDURE — 93306 ECHO (CUPID ONLY): ICD-10-PCS | Mod: 26,,, | Performed by: SPECIALIST

## 2023-08-22 PROCEDURE — 93010 ELECTROCARDIOGRAM REPORT: CPT | Mod: ,,, | Performed by: SPECIALIST

## 2023-08-22 PROCEDURE — 99233 PR SUBSEQUENT HOSPITAL CARE,LEVL III: ICD-10-PCS | Mod: ,,, | Performed by: SPECIALIST

## 2023-08-22 PROCEDURE — 99233 SBSQ HOSP IP/OBS HIGH 50: CPT | Mod: ,,, | Performed by: SPECIALIST

## 2023-08-22 PROCEDURE — 25000003 PHARM REV CODE 250: Performed by: FAMILY MEDICINE

## 2023-08-22 PROCEDURE — 80053 COMPREHEN METABOLIC PANEL: CPT | Performed by: FAMILY MEDICINE

## 2023-08-22 PROCEDURE — 93306 TTE W/DOPPLER COMPLETE: CPT | Mod: 26,,, | Performed by: SPECIALIST

## 2023-08-22 PROCEDURE — 25000003 PHARM REV CODE 250: Performed by: STUDENT IN AN ORGANIZED HEALTH CARE EDUCATION/TRAINING PROGRAM

## 2023-08-22 PROCEDURE — 83880 ASSAY OF NATRIURETIC PEPTIDE: CPT | Performed by: SPECIALIST

## 2023-08-22 PROCEDURE — 94799 UNLISTED PULMONARY SVC/PX: CPT

## 2023-08-22 PROCEDURE — 63600175 PHARM REV CODE 636 W HCPCS: Performed by: FAMILY MEDICINE

## 2023-08-22 PROCEDURE — 27000221 HC OXYGEN, UP TO 24 HOURS

## 2023-08-22 PROCEDURE — 99900035 HC TECH TIME PER 15 MIN (STAT)

## 2023-08-22 RX ORDER — MAGNESIUM SULFATE HEPTAHYDRATE 40 MG/ML
4 INJECTION, SOLUTION INTRAVENOUS ONCE
Status: COMPLETED | OUTPATIENT
Start: 2023-08-22 | End: 2023-08-22

## 2023-08-22 RX ORDER — FUROSEMIDE 10 MG/ML
40 INJECTION INTRAMUSCULAR; INTRAVENOUS ONCE
Status: COMPLETED | OUTPATIENT
Start: 2023-08-22 | End: 2023-08-22

## 2023-08-22 RX ORDER — DIPHENHYDRAMINE HCL 25 MG
50 CAPSULE ORAL
Status: DISCONTINUED | OUTPATIENT
Start: 2023-08-22 | End: 2023-08-24 | Stop reason: HOSPADM

## 2023-08-22 RX ORDER — SODIUM CHLORIDE 450 MG/100ML
INJECTION, SOLUTION INTRAVENOUS CONTINUOUS
Status: DISCONTINUED | OUTPATIENT
Start: 2023-08-22 | End: 2023-08-23

## 2023-08-22 RX ORDER — FUROSEMIDE 10 MG/ML
20 INJECTION INTRAMUSCULAR; INTRAVENOUS ONCE
Status: COMPLETED | OUTPATIENT
Start: 2023-08-22 | End: 2023-08-22

## 2023-08-22 RX ADMIN — FUROSEMIDE 40 MG: 10 INJECTION, SOLUTION INTRAVENOUS at 08:08

## 2023-08-22 RX ADMIN — ENOXAPARIN SODIUM 80 MG: 80 INJECTION SUBCUTANEOUS at 12:08

## 2023-08-22 RX ADMIN — HYDRALAZINE HYDROCHLORIDE 5 MG: 20 INJECTION, SOLUTION INTRAMUSCULAR; INTRAVENOUS at 04:08

## 2023-08-22 RX ADMIN — ISOSORBIDE DINITRATE 10 MG: 10 TABLET ORAL at 09:08

## 2023-08-22 RX ADMIN — FINASTERIDE 5 MG: 5 TABLET, FILM COATED ORAL at 11:08

## 2023-08-22 RX ADMIN — OXYBUTYNIN CHLORIDE 10 MG: 5 TABLET, EXTENDED RELEASE ORAL at 11:08

## 2023-08-22 RX ADMIN — PANTOPRAZOLE SODIUM 40 MG: 40 TABLET, DELAYED RELEASE ORAL at 06:08

## 2023-08-22 RX ADMIN — MONTELUKAST 10 MG: 10 TABLET, FILM COATED ORAL at 09:08

## 2023-08-22 RX ADMIN — ATORVASTATIN CALCIUM 40 MG: 40 TABLET, FILM COATED ORAL at 09:08

## 2023-08-22 RX ADMIN — METOPROLOL TARTRATE 25 MG: 25 TABLET, FILM COATED ORAL at 11:08

## 2023-08-22 RX ADMIN — ONDANSETRON 4 MG: 2 INJECTION INTRAMUSCULAR; INTRAVENOUS at 09:08

## 2023-08-22 RX ADMIN — FUROSEMIDE 20 MG: 10 INJECTION, SOLUTION INTRAMUSCULAR; INTRAVENOUS at 11:08

## 2023-08-22 RX ADMIN — SODIUM CHLORIDE 100 ML/HR: 0.45 INJECTION, SOLUTION INTRAVENOUS at 06:08

## 2023-08-22 RX ADMIN — SERTRALINE HYDROCHLORIDE 50 MG: 50 TABLET ORAL at 11:08

## 2023-08-22 RX ADMIN — SPIRONOLACTONE 25 MG: 25 TABLET ORAL at 11:08

## 2023-08-22 RX ADMIN — ASPIRIN 81 MG: 81 TABLET, COATED ORAL at 11:08

## 2023-08-22 RX ADMIN — ISOSORBIDE DINITRATE 10 MG: 10 TABLET ORAL at 11:08

## 2023-08-22 RX ADMIN — MAGNESIUM SULFATE HEPTAHYDRATE 4 G: 40 INJECTION, SOLUTION INTRAVENOUS at 06:08

## 2023-08-22 RX ADMIN — CLOPIDOGREL BISULFATE 75 MG: 75 TABLET, FILM COATED ORAL at 11:08

## 2023-08-22 RX ADMIN — METOPROLOL TARTRATE 25 MG: 25 TABLET, FILM COATED ORAL at 09:08

## 2023-08-22 NOTE — PROGRESS NOTES
[facility] Medicine  Progress Note    Patient name: Tono Saez  MRN: [id number]  Admit Date: 8/16/2023   LOS: 5 days     SUBJECTIVE:     Principal problem: syncope        HPI-  78-year-old male who presents for evaluation after a syncopal episode.  The patient does have frequent falls and unsteady gait for which he  undergoes therapy.  He was leaving an office visit today and reports that he passed.  Reports that this was not his usual trip and fall.  He did strike his head.  Injured and scraped his right arm, his right knee as well as his right anterior chest.  He has had dull aching pain to the right anterior chest since the fall.  The pain is worse with palpation, movement and deep breathing but denies feeling short of breath.  He did not have any chest pain prior to the fall.  Denies any recent illnesses.  Denies fever, chills, cough or any upper or lower respiratory type symptoms.  Denies any abdominal pain or shortness of breath.  Has some mild pain to his right arm associated with a skin tear but denies any pain with range of motion as well as pain with an abrasion to the right knee but denies any pain with range of motion of the knee.  Denies any other problems or complaints.      Interval History:      8/22- sitting up to bedside commode, feels nauseated.  No chest pain.  Trop trended up overnight.  Cardiology following.  Continue med mgmt for now with trx dose lovenox, asa, statin, plavix.  Repeat echo.   Eventual plan is hopefully to get in to IPR.     8/21- resting comfortably, visiting with family.  He had an episode of cp yesterday evening.  Troponin was ordered at that time, around 6 pm.  This was not drawn or resulted until 3 am, and was elevated at 1400.  EKG no obvious change but difficlut to interpret.  Consulting cardiology today.      8/20- resting in bed quietly, plan for IPR.  Working with PT    8/19- has TLSO brace, working with PT.  Medically cleared for IPR.  ARIANNA  following for assistance     8/18- he is up to bedside chair, alert.  Denies feeling dizzy, light headed currently.  +right lateral chest M/s chest pain.  He cant remember exactly whether he felt dizzy before falling or falling mechanically.  But he does mention that he thinks he fell bc he wasn't using his walker.  While helping him back to bed, he was very weak and poor balance.  I feel it is more likely he had a mechanical fall while not using DME.  He is not orthostatic.  PM interrogation did not show any irregularities.  His echo actually shows improved cardiac function.  He lives alone.  I think IPR would be very beneficial to increase his strength and ability to perform ADLs before attempting to get him back to his home.  He is agreeable to this.  Will ask PT for eval.      8/17-  HR 60, hypertensive w/sbp 160s.  Chronic lower back pain.  F/u nsgy recs and echo.  Carotids no significant stenosis.      Scheduled Meds:   aspirin  81 mg Oral Daily    atorvastatin  40 mg Oral Daily    clopidogreL  75 mg Oral Daily    enoxparin  1 mg/kg Subcutaneous Q12H (treatment, non-standard time)    finasteride  5 mg Oral Daily    furosemide (LASIX) injection  40 mg Intravenous Once    isosorbide dinitrate  10 mg Oral BID    metoprolol tartrate  25 mg Oral BID    montelukast  10 mg Oral QHS    oxybutynin  10 mg Oral Daily    pantoprazole  40 mg Oral Before breakfast    sertraline  50 mg Oral Daily    spironolactone  25 mg Oral Daily     Continuous Infusions:   sodium chloride 0.45%      sodium chloride 0.9% Stopped (08/21/23 8913)     PRN Meds:acetaminophen, albuterol-ipratropium, dextrose 50%, dextrose 50%, diphenhydrAMINE, glucagon (human recombinant), glucose, glucose, hydrALAZINE, hydrALAZINE, HYDROcodone-acetaminophen, insulin aspart U-100, melatonin, naloxone, nitroGLYCERIN, ondansetron, polyethylene glycol, senna-docusate 8.6-50 mg, simethicone, sodium chloride 0.9%    Review of patient's allergies indicates:    Allergen Reactions    Adhesive Other (See Comments)     SILK TAPE PULL SKIN OFF    Metformin Other (See Comments)     Weight loss cachexia anorexia,diarrhea.    Pcn [penicillins]      Patient states he passed out from this medication when he was 10 years old.        Review of Systems: As per interval history    OBJECTIVE:     Vital Signs (Most Recent)  Temp: 98.5 °F (36.9 °C) (08/22/23 1615)  Pulse: 74 (08/22/23 1615)  Resp: 16 (08/22/23 1615)  BP: (!) 147/71 (08/22/23 1615)  SpO2: 95 % (08/22/23 1615)    Vital Signs Range (Last 24H):  Temp:  [98.1 °F (36.7 °C)-99 °F (37.2 °C)]   Pulse:  [73-88]   Resp:  [15-18]   BP: (147-178)/()   SpO2:  [88 %-100 %]     I & O (Last 24H):No intake or output data in the 24 hours ending 08/22/23 1829      Physical Exam:  General: Patient resting comfortably in no acute distress. Ecchymosis and swelling right cheek  Eyes: No conjunctival injection. No scleral icterus.  ENT: Hearing grossly intact. No discharge from ears. No nasal discharge.   CVS: RRR.   Lungs:  No tachypnea or accessory muscle use.   Abdomen:  Soft, nontender and nondistended.    Neuro: Alert.  Moves all extremities. Follows commands. Responds appropriately   Skin:  No rash or erythema noted    Laboratory:  I have reviewed all pertinent lab results within the past 24 hours.      ASSESSMENT/PLAN:         Active Hospital Problems    Diagnosis  POA    Skin tear of left upper arm without complication [S41.112A]  Yes    Syncope and collapse [R55]  Yes    Orthostatic hypotension [I95.1]  Yes    Chronic systolic congestive heart failure [I50.22]  Yes    SSS (sick sinus syndrome) [I49.5]  Yes    Stage 3a chronic kidney disease [N18.31]  Yes    Compression fracture of L1 lumbar vertebra, with delayed healing, subsequent encounter [S32.010G]  Not Applicable    Coronary artery disease of native artery of native heart with stable angina pectoris [I25.118]  Yes     CABG, STENTS '97,'99,'01,'05    10/26/22:  Summary          The Dist LM lesion was 90% stenosed with 10% stenosis post-intervention.    The pre-procedure left ventricular end diastolic pressure was 12.    A STENT LIMA YOHANA 3.50 X 26 stent was not successfully placed at 12 JASPER for 20 sec.    The estimated blood loss was <50 mL.    Successful intravascular ultrasound-guided PCI of the critical stenosis of left main into circumflex with 1 drug-eluting stent    Patent vein graft to OM, occluded branch of the OM as compared to angiogram in 2016.    Patent vein graft to right coronary artery with patent stent, moderately degenerated, distal native RCA has 80% stenosis in medium caliber vessel.    Occluded LAD with a patent LIMA to LAD        Chronic anticoagulation [Z79.01]  Not Applicable    GERD (gastroesophageal reflux disease) [K21.9]  Yes    Type 2 diabetes mellitus with hyperglycemia  [E11.65]  Yes    HTN (hypertension) [I10]  Yes      Resolved Hospital Problems   No resolved problems to display.         Plan:       8/20   Chest pain  Elevated troponin  He received PRN morphine for pain  Stat ekg and trop  Trop at 3 am elevated to 1400, continued to trend up  Consulted cardiology, f/u recs  Treatment dose lovenox  Asa, statin, plavix  NPO at midnight in the event cardiology may want to take to cath lab tomorrow  Bnp eleavated and cxr pulm edema  Lasix IV  Repeat echo      Syncope  Hx SSS  Hx combined HF with EF 25% and moderate AS  - CT head Small air-fluid level in the right maxillary sinus could be result of sinus disease or trauma. Right facial soft tissue swelling.  - echo complete, improved EF to 35-40%  - carotid US no significant stenosis  - EKG, trending troponin are negative  - telemetry  - discontinue BB, midodrine  - Orthostatic VS are as follows, patient did not tolerate standing. Patient denies symptoms.   Lying:: /86, HR 74 paced  Sitting:: /84, HR 74 paced  - pacemaker interrogation wnl  -very weak and off balance when assisted to  ambulate  -suspect mechanical fall  -will ask PT and CM to eval for IPR at discharge       DM2  - SSI     Chronic conditions as noted above/below; home medications reviewed personally by me and restarted as appropriate  Electrolyte derangement:  Trending BMP; Mg; replacement prn  DVT ppx: lovenox held due to recent fall with multiple abrasions, risk of bleeding; continue SCDs  FULL CODE           VTE Risk Mitigation (From admission, onward)           Ordered     enoxaparin injection 80 mg  Every 12 hours         08/21/23 1115     IP VTE HIGH RISK PATIENT  Once         08/16/23 2225     Place sequential compression device  Until discontinued         08/16/23 2225     Reason for No Pharmacological VTE Prophylaxis  Once        Question:  Reasons:  Answer:  Risk of Bleeding    08/16/23 2225                        Department Hospital Medicine  [facility]  Sixto Schultz MD  Date of service: 08/22/2023

## 2023-08-22 NOTE — CARE UPDATE
Adde O2 2 lpm sats were 88% on room air   08/21/23 2038   Patient Assessment/Suction   Level of Consciousness (AVPU) alert   Respiratory Effort Unlabored;Normal   MACKENZIE Breath Sounds clear   LLL Breath Sounds diminished   RUL Breath Sounds clear   RML Breath Sounds clear   RLL Breath Sounds diminished   PRE-TX-O2   Device (Oxygen Therapy) room air   SpO2 (!) 88 %  (room air will place on NC 2)   Aerosol Therapy   $ Aerosol Therapy Charges PRN treatment not required

## 2023-08-22 NOTE — CARE UPDATE
08/22/23 0844   Patient Assessment/Suction   Level of Consciousness (AVPU) alert   All Lung Fields Breath Sounds clear;coarse   PRE-TX-O2   Device (Oxygen Therapy) nasal cannula   $ Is the patient on Low Flow Oxygen? Yes   Flow (L/min) 2   SpO2 98 %   Pulse Oximetry Type Intermittent   $ Pulse Oximetry - Multiple Charge Pulse Oximetry - Multiple   Pulse 88   Resp 15   Aerosol Therapy   $ Aerosol Therapy Charges PRN treatment not required   Education   $ Education Bronchodilator;15 min   Respiratory Evaluation   $ Care Plan Tech Time 15 min

## 2023-08-22 NOTE — CONSULTS
Chief complaint:  Fall (Syncope with fall/) and Loss of Consciousness      HPI:  Tono Saez is a 78 y.o. male presenting with multiple skin tears from a fall at home to the BL arms, BL knees, right face, left hand and posterior shoulder. Pts wounds are stable, and not draining. Pt will be able to FU at the wound clinic post hospital. Pt has no other complaints today    PMH:  As per HPI and below:  Past Medical History:   Diagnosis Date    Anemia     Anticoagulant long-term use     Arthritis     CAD (coronary artery disease)     CABG, STENTS '97,'99,'01,'05    Cancer     SKIN    Cataract     Diabetes mellitus     Diabetes mellitus type II     GERD (gastroesophageal reflux disease)     GI bleed 2020    Hypertension     Myocardial infarction     Wears glasses        Social History     Socioeconomic History    Marital status:    Tobacco Use    Smoking status: Never    Smokeless tobacco: Never   Substance and Sexual Activity    Alcohol use: No    Drug use: No    Sexual activity: Not Currently     Social Determinants of Health     Financial Resource Strain: Low Risk  (5/24/2023)    Overall Financial Resource Strain (CARDIA)     Difficulty of Paying Living Expenses: Not hard at all   Food Insecurity: No Food Insecurity (6/23/2023)    Hunger Vital Sign     Worried About Running Out of Food in the Last Year: Never true     Ran Out of Food in the Last Year: Never true   Transportation Needs: No Transportation Needs (6/23/2023)    PRAPARE - Transportation     Lack of Transportation (Medical): No     Lack of Transportation (Non-Medical): No   Physical Activity: Insufficiently Active (6/23/2023)    Exercise Vital Sign     Days of Exercise per Week: 2 days     Minutes of Exercise per Session: 30 min   Stress: No Stress Concern Present (6/23/2023)    [employer] - Occupational Stress Questionnaire     Feeling of Stress : Not at all   Social Connections: Moderately Integrated (6/23/2023)     Social Connection and Isolation Panel [NHANES]     Frequency of Communication with Friends and Family: More than three times a week     Frequency of Social Gatherings with Friends and Family: More than three times a week     Attends [employer]ae[employer] Services: More than 4 times per year     Active Member of Clubs or Organizations: Yes     Attends Club or Organization Meetings: More than 4 times per year     Marital Status:    Housing Stability: Low Risk  (6/23/2023)    Housing Stability Vital Sign     Unable to Pay for Housing in the Last Year: No     Number of Places Lived in the Last Year: 1     Unstable Housing in the Last Year: No       Past Surgical History:   Procedure Laterality Date    CARDIAC PACEMAKER PLACEMENT      CARDIAC PACEMAKER PLACEMENT      CARDIAC PACEMAKER PLACEMENT  04/26/2018    replaced    CARDIAC SURGERY      1995   CABG X 5    CHOLECYSTECTOMY      COLON SURGERY      COLONOSCOPY N/A 2/21/2020    Procedure: COLONOSCOPY;  Surgeon: Abe Barragan III, MD;  Location: [facility] ENDO;  Service: Endoscopy;  Laterality: N/A;    COLONOSCOPY N/A 2/23/2020    Procedure: COLONOSCOPY;  Surgeon: Bob Locke Jr., MD;  Location: [facility] ENDO;  Service: Endoscopy;  Laterality: N/A;    CORONARY ANGIOGRAPHY INCLUDING BYPASS GRAFTS WITH CATHETERIZATION OF LEFT HEART N/A 10/26/2022    Procedure: ANGIOGRAM, CORONARY, INCLUDING BYPASS GRAFT, WITH LEFT HEART CATHETERIZATION;  Surgeon: Robles Mckoy MD;  Location: [facility] CATH/EP LAB;  Service: Cardiology;  Laterality: N/A;    CORONARY ARTERY BYPASS GRAFT  1995    CORONARY STENT PLACEMENT      stent x 5    ESOPHAGOGASTRODUODENOSCOPY N/A 2/23/2020    Procedure: EGD (ESOPHAGOGASTRODUODENOSCOPY);  Surgeon: Bob Locke Jr., MD;  Location: [facility] ENDO;  Service: Endoscopy;  Laterality: N/A;    EYE SURGERY      BILAT CATARACT    head laceration   01/19/2018    HEMORRHOID SURGERY      INJECTION OF ANESTHETIC AGENT AROUND MEDIAL BRANCH NERVES INNERVATING LUMBAR FACET JOINT  Bilateral 6/29/2023    Procedure: Block-nerve-medial branch-lumbar;  Surgeon: Maurice Montenegro MD;  Location: [facility] OR;  Service: Pain Management;  Laterality: Bilateral;  L3,4,5 MBB    INJECTION OF ANESTHETIC AGENT AROUND MEDIAL BRANCH NERVES INNERVATING LUMBAR FACET JOINT Bilateral 7/25/2023    Procedure: Block-nerve-medial branch-lumbar;  Surgeon: Maurice Montenegro MD;  Location: [facility] OR;  Service: Anesthesiology;  Laterality: Bilateral;  L3,4,5 MBB #2    SMALL BOWEL ENTEROSCOPY N/A 2/21/2020    Procedure: ENTEROSCOPY;  Surgeon: Abe Barragan III, MD;  Location: [facility] ENDO;  Service: Endoscopy;  Laterality: N/A;    stint         Family History   Problem Relation Age of Onset    Heart disease Mother     Heart disease Father     Hyperlipidemia Sister     Hypertension Sister     Heart disease Brother     Heart disease Brother     Heart disease Brother     Heart disease Brother     Heart disease Brother        Review of patient's allergies indicates:   Allergen Reactions    Adhesive Other (See Comments)     SILK TAPE PULL SKIN OFF    Metformin Other (See Comments)     Weight loss cachexia anorexia,diarrhea.    Pcn [penicillins]      Patient states he passed out from this medication when he was 10 years old.        Current Facility-Administered Medications on File Prior to Encounter   Medication Dose Route Frequency Provider Last Rate Last Admin    lactated ringers infusion   Intravenous Continuous Maurice Montenegro MD 25 mL/hr at 07/25/23 1013 New Bag at 07/25/23 1013    LIDOcaine (PF) 10 mg/ml (1%) injection 10 mg  1 mL Intradermal Once Maurice Montenegro MD         Current Outpatient Medications on File Prior to Encounter   Medication Sig Dispense Refill    aspirin (ECOTRIN) 81 MG EC tablet Take 2 tablets (162 mg total) by mouth once daily. (Patient taking differently: Take 81 mg by mouth once daily.) 120 tablet 0    atorvastatin (LIPITOR) 40 MG tablet Take 1 tablet by mouth once daily.      betamethasone dipropionate  (DIPROLENE) 0.05 % lotion Apply thin film to scalp bid prn itch (Patient taking differently: Apply 1 g topically 2 (two) times daily. Apply thin film to scalp bid prn itch) 60 mL 6    clopidogreL (PLAVIX) 75 mg tablet Take 1 tablet (75 mg total) by mouth once daily. 90 tablet 0    empagliflozin (JARDIANCE) 25 mg tablet Take 1 tablet (25 mg total) by mouth once daily. 90 tablet 2    finasteride (PROSCAR) 5 mg tablet Take 5 mg by mouth once daily.      furosemide (LASIX) 20 MG tablet TAKE 1 TABLET (20 MG TOTAL) BY MOUTH ONCE DAILY. (Patient taking differently: Take 20 mg by mouth once daily.) 90 tablet 3    isosorbide dinitrate (ISORDIL) 10 MG tablet Take 10 mg by mouth 2 (two) times daily.      metoprolol tartrate (LOPRESSOR) 25 MG tablet Take 0.5 tablets (12.5 mg total) by mouth 2 (two) times daily. 60 tablet 0    midodrine (PROAMATINE) 2.5 MG Tab Take 1 tablet (2.5 mg total) by mouth 3 (three) times daily. 90 tablet 11    montelukast (SINGULAIR) 10 mg tablet Take 1 tablet (10 mg total) by mouth every evening. 90 tablet 3    multivitamin capsule Take 1 capsule by mouth once daily.      pantoprazole (PROTONIX) 40 MG tablet TAKE ONE TABLET BY MOUTH ONCE DAILY (Patient taking differently: Take 40 mg by mouth once daily.) 90 tablet 1    pioglitazone (ACTOS) 15 MG tablet Take 15 mg by mouth once daily.      pregabalin (LYRICA) 50 MG capsule Take 1 capsule (50 mg total) by mouth 3 (three) times daily. 90 capsule 1    sertraline (ZOLOFT) 50 MG tablet TAKE ONE TABLET BY MOUTH ONCE DAILY (Patient taking differently: Take 50 mg by mouth once daily.) 90 tablet 1    tolterodine (DETROL LA) 4 MG 24 hr capsule TAKE ONE CAPSULE BY MOUTH ONCE DAILY (Patient taking differently: Take 4 mg by mouth once daily.) 90 capsule 1    vitamin D 1000 units Tab Take 1,000 Units by mouth once daily.      atorvastatin (LIPITOR) 40 MG tablet Take 1 tablet (40 mg total) by mouth every evening. 90 tablet 1    fluticasone propionate (FLONASE) 50  mcg/actuation nasal spray 1 spray by Each Nostril route as needed for Allergies.      HYDROcodone-acetaminophen (NORCO) 5-325 mg per tablet Take 1 tablet by mouth every 4 (four) hours as needed for Pain. 18 tablet 0    nitroGLYCERIN (NITROSTAT) 0.4 MG SL tablet DISSOLVE 1 TABLET UNDER THE TONGUE AS NEEDED FOR CHEST PAIN (Patient taking differently: Place 0.4 mg under the tongue every 5 (five) minutes as needed.) 100 tablet 3       ROS: As per HPI and below:  Pertinent items are noted in HPI.      Physical Exam:     Vitals:    23 1100 23 1500 23 2038 23 2133   BP: (!) 151/78 (!) 174/96  (!) 176/101   BP Location: Right arm Right arm  Right arm   Patient Position:    Lying   Pulse: 95 88  83   Resp: 19 17  18   Temp: 97.4 °F (36.3 °C) 96.2 °F (35.7 °C)  98.4 °F (36.9 °C)   TempSrc: Oral Oral  Oral   SpO2: (!) 94% (!) 93% (!) 88% (!) 93%   Weight:       Height:           BP  Min: 121/72  Max: 202/94  Temp  Av °F (36.7 °C)  Min: 96.2 °F (35.7 °C)  Max: 98.8 °F (37.1 °C)  Pulse  Av.2  Min: 58  Max: 111  Resp  Av.8  Min: 15  Max: 20  SpO2  Av.3 %  Min: 88 %  Max: 100 %  Height  Av' (182.9 cm)  Min: 6' (182.9 cm)  Max: 6' (182.9 cm)  Weight  Av.1 kg (183 lb 3.8 oz)  Min: 82.2 kg (181 lb 3.2 oz)  Max: 83.6 kg (184 lb 4.9 oz)    Body mass index is 24.58 kg/m².          General:             Well developed, well nourished, no apparent distress  HEENT:              NCAT, no JVD, mucous membranes moist, EOM intact  Cardiovascular:  Regular rate and rhythm, normal S1, normal S2, No murmurs, rubs, or gallops  Respiratory:        Normal breath sounds, no wheezes, no rales, no rhonchi  Abdomen:           Bowel sounds present, non tender, non distended, no masses, no hepatojugular reflux  Extremities:        No clubbing, no cyanosis, no edema  Vascular:            2+ b/l radial.  Peripheral pulses intact.  No carotid bruits.  Neurological:      No focal deficits  Skin:                    No obvious rashes or erythema, skin tears to the Bilat arms and knees right side of face left hand right posterior shoulder                                           Lab Results   Component Value Date    WBC 10.94 08/21/2023    HGB 11.4 (L) 08/21/2023    HCT 37.1 (L) 08/21/2023    MCV 86 08/21/2023     (L) 08/21/2023     Lab Results   Component Value Date    CHOL 141 01/12/2023    CHOL 134 07/22/2021    CHOL 130 06/15/2020     Lab Results   Component Value Date    HDL 48 01/12/2023    HDL 40 07/22/2021    HDL 44 06/15/2020     Lab Results   Component Value Date    LDLCALC 68.6 01/12/2023    LDLCALC 71.2 07/22/2021    LDLCALC 57.2 (L) 06/15/2020     Lab Results   Component Value Date    TRIG 122 01/12/2023    TRIG 114 07/22/2021    TRIG 144 06/15/2020     Lab Results   Component Value Date    CHOLHDL 34.0 01/12/2023    CHOLHDL 29.9 07/22/2021    CHOLHDL 33.8 06/15/2020     CMP  Recent Labs   Lab 08/21/23  0319   *   CALCIUM 9.4   ALBUMIN 3.3*   PROT 6.4      K 3.6   CO2 19*   *   BUN 23   CREATININE 1.0   ALKPHOS 81   ALT 18   AST 32   BILITOT 2.0*      Lab Results   Component Value Date    TSH 2.270 04/14/2023             Assessment and Recommendations       Diagnoses:    1. Skin tears of the BL arms, knees, left hand, posterior shoulder and face    Plan:  1. Bilat arms and knees right side of face left hand right posterior shoulder  Clean with chlorhexidine/ns.  Pat dry. Apply Xeroform and cover with mepilex. Cover with adaptic, abd pad, and kerlex. Cover with large band aid.     Complexity:  Moderate

## 2023-08-22 NOTE — PT/OT/SLP PROGRESS
Occupational Therapy      Patient Name:  Tono Saez   MRN:  [id number]    On RN hold due to chest pain and elevated troponin. Will follow-up when appropriate.    8/22/2023

## 2023-08-22 NOTE — PT/OT/SLP PROGRESS
Physical Therapy      Patient Name:  Tono Saez   MRN:  [id number]    Patient not seen today secondary to Nurse/ ROBERT hold (elevated troponin). Will follow-up 8/23/23.

## 2023-08-22 NOTE — CONSULTS
[facility]  Cardiology  Progress Note    Patient Name: Tono Saez  MRN: [id number]  Admission Date: 8/16/2023  Hospital Length of Stay: 5 days  Code Status: Full Code   Attending Physician: Sixto Schultz,Trell   Primary Care Physician: Scott Staley MD  Expected Discharge Date: 8/23/2023  Principal Problem:<principal problem not specified>    Subjective:     Hospital Course:  Patient has been nauseated since he has been admitted    Interval History:  He did have chest pain on 2 occasions relieved with nitroglycerin and no chest pain today but his enzymes are trending upward  EKGs do not demonstrate any changes however he is got paced rhythm so it is going to be difficult to see any acute MI  I reviewed his catheterization from 10/22 and he had a large stent placed in the circumflex which were concerned about      ROS  Objective:     Vital Signs (Most Recent):  Temp: 99 °F (37.2 °C) (08/22/23 0755)  Pulse: 88 (08/22/23 0844)  Resp: 15 (08/22/23 0844)  BP: (!) 175/95 (08/22/23 0755)  SpO2: 98 % (08/22/23 0844) Vital Signs (24h Range):  Temp:  [96.2 °F (35.7 °C)-99 °F (37.2 °C)] 99 °F (37.2 °C)  Pulse:  [73-95] 88  Resp:  [15-19] 15  SpO2:  [88 %-100 %] 98 %  BP: (151-178)/() 175/95     Weight: 82.2 kg (181 lb 3.2 oz)  Body mass index is 24.58 kg/m².    SpO2: 98 %         Intake/Output Summary (Last 24 hours) at 8/22/2023 0933  Last data filed at 8/21/2023 1608  Gross per 24 hour   Intake --   Output 300 ml   Net -300 ml       Lines/Drains/Airways       Peripheral Intravenous Line  Duration                  Peripheral IV - Single Lumen 08/16/23 2205 Anterior;Left Forearm 5 days                    Physical Exam blood pressure is 150/80  Neck veins slightly distended  Lungs crackles or wheezes  Cardiac S4 no S3  Abdomen is nontender  Extremities mild edema    Significant Labs: CMP   Recent Labs   Lab 08/21/23  0319 08/22/23  0324    139   K 3.6 3.6   * 111*   CO2 19* 21*   GLU  "154* 151*   BUN 23 27*   CREATININE 1.0 1.0   CALCIUM 9.4 9.4   PROT 6.4 6.1   ALBUMIN 3.3* 3.0*   BILITOT 2.0* 1.7*   ALKPHOS 81 75   AST 32 50*   ALT 18 20   ANIONGAP 10 7*   , CBC   Recent Labs   Lab 08/21/23  0319 08/22/23  0324   WBC 10.94 9.91   HGB 11.4* 11.2*   HCT 37.1* 36.0*   * 168   , and Troponin No results for input(s): "TROPONINI" in the last 48 hours.    Significant Imaging:   Assessment and Plan:   Non-STEMI-the review of October 22 angiogram demonstrated patent internal mammary the LAD; a saphenous vein graft to obtuse marginal with slightly slow blood flow in this graft(which may have close and cause chest pain and troponin elevation) and placement of a large stent within the main left circumflex which looked good-question is whether there is issues with a stent  Continue heparin Plavix and aspirin  Rx Lasix for heart failure  He has been continually nauseated Rx Zofran and he is on PPIs  Will repeat troponin this evening  Brief HPI:     Active Diagnoses:    Diagnosis Date Noted POA    Skin tear of left upper arm without complication [S41.112A] 08/21/2023 Yes    Syncope and collapse [R55] 04/19/2023 Yes    Orthostatic hypotension [I95.1] 04/19/2023 Yes    Chronic systolic congestive heart failure [I50.22] 02/27/2023 Yes    SSS (sick sinus syndrome) [I49.5] 02/27/2023 Yes    Stage 3a chronic kidney disease [N18.31] 02/27/2023 Yes    Compression fracture of L1 lumbar vertebra, with delayed healing, subsequent encounter [S32.010G] 02/17/2023 Not Applicable    Coronary artery disease of native artery of native heart with stable angina pectoris [I25.118] 04/03/2020 Yes    Chronic anticoagulation [Z79.01] 09/30/2019 Not Applicable    GERD (gastroesophageal reflux disease) [K21.9] 04/18/2019 Yes    Type 2 diabetes mellitus with hyperglycemia  [E11.65] 02/13/2019 Yes    HTN (hypertension) [I10] 05/21/2012 Yes      Problems Resolved During this Admission:       VTE Risk Mitigation (From admission, " onward)           Ordered     enoxaparin injection 80 mg  Every 12 hours         08/21/23 1115     IP VTE HIGH RISK PATIENT  Once         08/16/23 2225     Place sequential compression device  Until discontinued         08/16/23 2225     Reason for No Pharmacological VTE Prophylaxis  Once        Question:  Reasons:  Answer:  Risk of Bleeding    08/16/23 2225                  I substantially and  personally reviewed old and new ecg's, lab reports,, xray reports  and  other cardiovascular studies including  echo's, stress tests, angiogram reports, holters,and vascular studies .  In addition I evaluated original cardiac cath  ___echo  ____cxr ______ct ____scan on [employer] or [employer] or other viewing platforms and  _x___EKG's .  Paced rhythm  I reviewed  office and hospital notes Yes ____ of  referring providers and other providers.  I reviewed personally old hospital and office notes review of 1022 angiogram showed patent internal mammary artery to LAD and stented circumflex and patent saphenous vein graft to small obtuse marginal with reduced blood flow  Time spent evaluating and managing this patient:_____35 ___min.      Kt Lux MD  Cardiology  [facility]

## 2023-08-23 LAB
ALBUMIN SERPL BCP-MCNC: 3.1 G/DL (ref 3.5–5.2)
ALP SERPL-CCNC: 75 U/L (ref 55–135)
ALT SERPL W/O P-5'-P-CCNC: 22 U/L (ref 10–44)
ANION GAP SERPL CALC-SCNC: 9 MMOL/L (ref 8–16)
AST SERPL-CCNC: 40 U/L (ref 10–40)
BASOPHILS # BLD AUTO: 0.03 K/UL (ref 0–0.2)
BASOPHILS NFR BLD: 0.3 % (ref 0–1.9)
BILIRUB SERPL-MCNC: 1.3 MG/DL (ref 0.1–1)
BUN SERPL-MCNC: 41 MG/DL (ref 8–23)
CALCIUM SERPL-MCNC: 9.4 MG/DL (ref 8.7–10.5)
CHLORIDE SERPL-SCNC: 107 MMOL/L (ref 95–110)
CO2 SERPL-SCNC: 22 MMOL/L (ref 23–29)
CREAT SERPL-MCNC: 1.3 MG/DL (ref 0.5–1.4)
DIFFERENTIAL METHOD: ABNORMAL
EOSINOPHIL # BLD AUTO: 0.1 K/UL (ref 0–0.5)
EOSINOPHIL NFR BLD: 1.1 % (ref 0–8)
ERYTHROCYTE [DISTWIDTH] IN BLOOD BY AUTOMATED COUNT: 16.5 % (ref 11.5–14.5)
EST. GFR  (NO RACE VARIABLE): 55.5 ML/MIN/1.73 M^2
GLUCOSE SERPL-MCNC: 141 MG/DL (ref 70–110)
GLUCOSE SERPL-MCNC: 146 MG/DL (ref 70–110)
GLUCOSE SERPL-MCNC: 147 MG/DL (ref 70–110)
GLUCOSE SERPL-MCNC: 161 MG/DL (ref 70–110)
GLUCOSE SERPL-MCNC: 174 MG/DL (ref 70–110)
HCT VFR BLD AUTO: 35.5 % (ref 40–54)
HGB BLD-MCNC: 10.9 G/DL (ref 14–18)
IMM GRANULOCYTES # BLD AUTO: 0.06 K/UL (ref 0–0.04)
IMM GRANULOCYTES NFR BLD AUTO: 0.6 % (ref 0–0.5)
LYMPHOCYTES # BLD AUTO: 0.4 K/UL (ref 1–4.8)
LYMPHOCYTES NFR BLD: 4.4 % (ref 18–48)
MAGNESIUM SERPL-MCNC: 1.8 MG/DL (ref 1.6–2.6)
MCH RBC QN AUTO: 26.9 PG (ref 27–31)
MCHC RBC AUTO-ENTMCNC: 30.7 G/DL (ref 32–36)
MCV RBC AUTO: 88 FL (ref 82–98)
MONOCYTES # BLD AUTO: 0.8 K/UL (ref 0.3–1)
MONOCYTES NFR BLD: 8.5 % (ref 4–15)
NEUTROPHILS # BLD AUTO: 8.2 K/UL (ref 1.8–7.7)
NEUTROPHILS NFR BLD: 85.1 % (ref 38–73)
NRBC BLD-RTO: 0 /100 WBC
PLATELET # BLD AUTO: 156 K/UL (ref 150–450)
PMV BLD AUTO: 11 FL (ref 9.2–12.9)
POTASSIUM SERPL-SCNC: 3.5 MMOL/L (ref 3.5–5.1)
PROT SERPL-MCNC: 6.2 G/DL (ref 6–8.4)
RBC # BLD AUTO: 4.05 M/UL (ref 4.6–6.2)
SODIUM SERPL-SCNC: 138 MMOL/L (ref 136–145)
TROPONIN I SERPL HS-MCNC: 3698.9 PG/ML (ref 0–14.9)
TROPONIN I SERPL HS-MCNC: 3698.9 PG/ML (ref 0–14.9)
WBC # BLD AUTO: 9.61 K/UL (ref 3.9–12.7)

## 2023-08-23 PROCEDURE — 93005 ELECTROCARDIOGRAM TRACING: CPT | Performed by: SPECIALIST

## 2023-08-23 PROCEDURE — 94761 N-INVAS EAR/PLS OXIMETRY MLT: CPT

## 2023-08-23 PROCEDURE — 94799 UNLISTED PULMONARY SVC/PX: CPT

## 2023-08-23 PROCEDURE — 25000003 PHARM REV CODE 250: Performed by: SPECIALIST

## 2023-08-23 PROCEDURE — 99232 SBSQ HOSP IP/OBS MODERATE 35: CPT | Mod: ,,, | Performed by: SPECIALIST

## 2023-08-23 PROCEDURE — 25000003 PHARM REV CODE 250: Performed by: STUDENT IN AN ORGANIZED HEALTH CARE EDUCATION/TRAINING PROGRAM

## 2023-08-23 PROCEDURE — 21400001 HC TELEMETRY ROOM

## 2023-08-23 PROCEDURE — 80053 COMPREHEN METABOLIC PANEL: CPT | Performed by: FAMILY MEDICINE

## 2023-08-23 PROCEDURE — 63600175 PHARM REV CODE 636 W HCPCS: Performed by: FAMILY MEDICINE

## 2023-08-23 PROCEDURE — 85025 COMPLETE CBC W/AUTO DIFF WBC: CPT | Performed by: FAMILY MEDICINE

## 2023-08-23 PROCEDURE — 63600175 PHARM REV CODE 636 W HCPCS: Performed by: SPECIALIST

## 2023-08-23 PROCEDURE — 25000003 PHARM REV CODE 250: Performed by: FAMILY MEDICINE

## 2023-08-23 PROCEDURE — 93010 EKG 12-LEAD: ICD-10-PCS | Mod: ,,, | Performed by: SPECIALIST

## 2023-08-23 PROCEDURE — 99900035 HC TECH TIME PER 15 MIN (STAT)

## 2023-08-23 PROCEDURE — 63600175 PHARM REV CODE 636 W HCPCS: Performed by: STUDENT IN AN ORGANIZED HEALTH CARE EDUCATION/TRAINING PROGRAM

## 2023-08-23 PROCEDURE — 93010 ELECTROCARDIOGRAM REPORT: CPT | Mod: ,,, | Performed by: SPECIALIST

## 2023-08-23 PROCEDURE — 36415 COLL VENOUS BLD VENIPUNCTURE: CPT | Performed by: FAMILY MEDICINE

## 2023-08-23 PROCEDURE — 83735 ASSAY OF MAGNESIUM: CPT | Performed by: FAMILY MEDICINE

## 2023-08-23 PROCEDURE — 99232 PR SUBSEQUENT HOSPITAL CARE,LEVL II: ICD-10-PCS | Mod: ,,, | Performed by: SPECIALIST

## 2023-08-23 PROCEDURE — 84484 ASSAY OF TROPONIN QUANT: CPT | Performed by: SPECIALIST

## 2023-08-23 PROCEDURE — 36415 COLL VENOUS BLD VENIPUNCTURE: CPT | Performed by: SPECIALIST

## 2023-08-23 RX ORDER — FUROSEMIDE 20 MG/1
20 TABLET ORAL DAILY
Status: DISCONTINUED | OUTPATIENT
Start: 2023-08-23 | End: 2023-08-27 | Stop reason: HOSPADM

## 2023-08-23 RX ORDER — ISOSORBIDE DINITRATE 10 MG/1
20 TABLET ORAL 2 TIMES DAILY
Status: DISCONTINUED | OUTPATIENT
Start: 2023-08-23 | End: 2023-08-23

## 2023-08-23 RX ORDER — METOPROLOL TARTRATE 25 MG/1
25 TABLET, FILM COATED ORAL 3 TIMES DAILY
Status: DISCONTINUED | OUTPATIENT
Start: 2023-08-23 | End: 2023-08-27 | Stop reason: HOSPADM

## 2023-08-23 RX ADMIN — PANTOPRAZOLE SODIUM 40 MG: 40 TABLET, DELAYED RELEASE ORAL at 06:08

## 2023-08-23 RX ADMIN — NITROGLYCERIN 2 INCH: 20 OINTMENT TOPICAL at 09:08

## 2023-08-23 RX ADMIN — MONTELUKAST 10 MG: 10 TABLET, FILM COATED ORAL at 08:08

## 2023-08-23 RX ADMIN — OXYBUTYNIN CHLORIDE 10 MG: 5 TABLET, EXTENDED RELEASE ORAL at 10:08

## 2023-08-23 RX ADMIN — METOPROLOL TARTRATE 25 MG: 25 TABLET, FILM COATED ORAL at 05:08

## 2023-08-23 RX ADMIN — SIMETHICONE 80 MG: 80 TABLET, CHEWABLE ORAL at 10:08

## 2023-08-23 RX ADMIN — SPIRONOLACTONE 25 MG: 25 TABLET ORAL at 10:08

## 2023-08-23 RX ADMIN — ATORVASTATIN CALCIUM 40 MG: 40 TABLET, FILM COATED ORAL at 08:08

## 2023-08-23 RX ADMIN — ENOXAPARIN SODIUM 80 MG: 80 INJECTION SUBCUTANEOUS at 12:08

## 2023-08-23 RX ADMIN — SERTRALINE HYDROCHLORIDE 50 MG: 50 TABLET ORAL at 10:08

## 2023-08-23 RX ADMIN — INSULIN ASPART 2 UNITS: 100 INJECTION, SOLUTION INTRAVENOUS; SUBCUTANEOUS at 12:08

## 2023-08-23 RX ADMIN — METOPROLOL TARTRATE 25 MG: 25 TABLET, FILM COATED ORAL at 08:08

## 2023-08-23 RX ADMIN — FUROSEMIDE 20 MG: 20 TABLET ORAL at 10:08

## 2023-08-23 RX ADMIN — FINASTERIDE 5 MG: 5 TABLET, FILM COATED ORAL at 10:08

## 2023-08-23 RX ADMIN — ASPIRIN 81 MG: 81 TABLET, COATED ORAL at 10:08

## 2023-08-23 RX ADMIN — CLOPIDOGREL BISULFATE 75 MG: 75 TABLET, FILM COATED ORAL at 10:08

## 2023-08-23 NOTE — PROGRESS NOTES
[facility] Medicine  Progress Note    Patient name: Tono Saez  MRN: [id number]  Admit Date: 8/16/2023   LOS: 6 days     SUBJECTIVE:     Principal problem: syncope        HPI-  78-year-old male who presents for evaluation after a syncopal episode.  The patient does have frequent falls and unsteady gait for which he  undergoes therapy.  He was leaving an office visit today and reports that he passed.  Reports that this was not his usual trip and fall.  He did strike his head.  Injured and scraped his right arm, his right knee as well as his right anterior chest.  He has had dull aching pain to the right anterior chest since the fall.  The pain is worse with palpation, movement and deep breathing but denies feeling short of breath.  He did not have any chest pain prior to the fall.  Denies any recent illnesses.  Denies fever, chills, cough or any upper or lower respiratory type symptoms.  Denies any abdominal pain or shortness of breath.  Has some mild pain to his right arm associated with a skin tear but denies any pain with range of motion as well as pain with an abrasion to the right knee but denies any pain with range of motion of the knee.  Denies any other problems or complaints.      Interval History:      8/23- had a brief episode of chest pain this evening, getting ekg and trop.  Cardiology following, plan for angio but delayed until tomorrow.     8/22- sitting up to bedside commode, feels nauseated.  No chest pain.  Trop trended up overnight.  Cardiology following.  Continue med mgmt for now with trx dose lovenox, asa, statin, plavix.  Repeat echo.   Eventual plan is hopefully to get in to IPR.     8/21- resting comfortably, visiting with family.  He had an episode of cp yesterday evening.  Troponin was ordered at that time, around 6 pm.  This was not drawn or resulted until 3 am, and was elevated at 1400.  EKG no obvious change but difficlut to interpret.  Consulting cardiology  today.      8/20- resting in bed quietly, plan for IPR.  Working with PT    8/19- has TLSO brace, working with PT.  Medically cleared for IPR.  CM following for assistance     8/18- he is up to bedside chair, alert.  Denies feeling dizzy, light headed currently.  +right lateral chest M/s chest pain.  He cant remember exactly whether he felt dizzy before falling or falling mechanically.  But he does mention that he thinks he fell bc he wasn't using his walker.  While helping him back to bed, he was very weak and poor balance.  I feel it is more likely he had a mechanical fall while not using DME.  He is not orthostatic.  PM interrogation did not show any irregularities.  His echo actually shows improved cardiac function.  He lives alone.  I think IPR would be very beneficial to increase his strength and ability to perform ADLs before attempting to get him back to his home.  He is agreeable to this.  Will ask PT for eval.      8/17-  HR 60, hypertensive w/sbp 160s.  Chronic lower back pain.  F/u nsgy recs and echo.  Carotids no significant stenosis.      Scheduled Meds:   aspirin  81 mg Oral Daily    atorvastatin  40 mg Oral Daily    clopidogreL  75 mg Oral Daily    finasteride  5 mg Oral Daily    furosemide  20 mg Oral Daily    metoprolol tartrate  25 mg Oral TID    montelukast  10 mg Oral QHS    nitroGLYCERIN 2% TD oint  2 inch Topical (Top) Q8H    oxybutynin  10 mg Oral Daily    pantoprazole  40 mg Oral Before breakfast    sertraline  50 mg Oral Daily    spironolactone  25 mg Oral Daily     Continuous Infusions:   sodium chloride 0.9% Stopped (08/21/23 7532)     PRN Meds:acetaminophen, albuterol-ipratropium, dextrose 50%, dextrose 50%, diphenhydrAMINE, glucagon (human recombinant), glucose, glucose, hydrALAZINE, hydrALAZINE, HYDROcodone-acetaminophen, insulin aspart U-100, melatonin, naloxone, nitroGLYCERIN, ondansetron, polyethylene glycol, senna-docusate 8.6-50 mg, simethicone, sodium chloride 0.9%    Review of  patient's allergies indicates:   Allergen Reactions    Adhesive Other (See Comments)     SILK TAPE PULL SKIN OFF    Metformin Other (See Comments)     Weight loss cachexia anorexia,diarrhea.    Pcn [penicillins]      Patient states he passed out from this medication when he was 10 years old.        Review of Systems: As per interval history    OBJECTIVE:     Vital Signs (Most Recent)  Temp: 97.8 °F (36.6 °C) (08/23/23 1656)  Pulse: 72 (08/23/23 1656)  Resp: 18 (08/23/23 1656)  BP: (!) 160/85 (08/23/23 1708)  SpO2: 96 % (08/23/23 1656)    Vital Signs Range (Last 24H):  Temp:  [97.3 °F (36.3 °C)-98.9 °F (37.2 °C)]   Pulse:  [72-92]   Resp:  [17-20]   BP: (138-164)/(80-93)   SpO2:  [91 %-96 %]     I & O (Last 24H):  Intake/Output Summary (Last 24 hours) at 8/23/2023 1709  Last data filed at 8/23/2023 0837  Gross per 24 hour   Intake 60 ml   Output 675 ml   Net -615 ml         Physical Exam:  General: Patient resting comfortably in no acute distress. Ecchymosis and swelling right cheek  Eyes: No conjunctival injection. No scleral icterus.  ENT: Hearing grossly intact. No discharge from ears. No nasal discharge.   CVS: RRR.   Lungs:  No tachypnea or accessory muscle use.   Abdomen:  Soft, nontender and nondistended.    Neuro: Alert.  Moves all extremities. Follows commands. Responds appropriately   Skin:  No rash or erythema noted    Laboratory:  I have reviewed all pertinent lab results within the past 24 hours.      ASSESSMENT/PLAN:         Active Hospital Problems    Diagnosis  POA    Skin tear of left upper arm without complication [S41.112A]  Yes    Syncope and collapse [R55]  Yes    Orthostatic hypotension [I95.1]  Yes    Chronic systolic congestive heart failure [I50.22]  Yes    SSS (sick sinus syndrome) [I49.5]  Yes    Stage 3a chronic kidney disease [N18.31]  Yes    Compression fracture of L1 lumbar vertebra, with delayed healing, subsequent encounter [S32.010G]  Not Applicable    Coronary artery disease of  native artery of native heart with stable angina pectoris [I25.118]  Yes     CABG, STENTS '97,'99,'01,'05    10/26/22:  Summary         The Dist LM lesion was 90% stenosed with 10% stenosis post-intervention.    The pre-procedure left ventricular end diastolic pressure was 12.    A STENT LIMA YOHANA 3.50 X 26 stent was not successfully placed at 12 JASPER for 20 sec.    The estimated blood loss was <50 mL.    Successful intravascular ultrasound-guided PCI of the critical stenosis of left main into circumflex with 1 drug-eluting stent    Patent vein graft to OM, occluded branch of the OM as compared to angiogram in 2016.    Patent vein graft to right coronary artery with patent stent, moderately degenerated, distal native RCA has 80% stenosis in medium caliber vessel.    Occluded LAD with a patent LIMA to LAD        Chronic anticoagulation [Z79.01]  Not Applicable    GERD (gastroesophageal reflux disease) [K21.9]  Yes    Type 2 diabetes mellitus with hyperglycemia  [E11.65]  Yes    HTN (hypertension) [I10]  Yes      Resolved Hospital Problems   No resolved problems to display.         Plan:       8/20   Chest pain  Elevated troponin  He received PRN morphine for pain  Stat ekg and trop  Trop at 3 am elevated to 1400, continued to trend up  Consulted cardiology, f/u recs  Treatment dose lovenox  Asa, statin, plavix  NPO at midnight in the event cardiology may want to take to cath lab tomorrow  Bnp eleavated and cxr pulm edema  Lasix IV    Plan for angio      Syncope  Hx SSS  Hx combined HF with EF 25% and moderate AS  - CT head Small air-fluid level in the right maxillary sinus could be result of sinus disease or trauma. Right facial soft tissue swelling.  - echo complete, improved EF to 35-40%  - carotid US no significant stenosis  - EKG, trending troponin are negative  - telemetry  - discontinue BB, midodrine  - Orthostatic VS are as follows, patient did not tolerate standing. Patient denies symptoms.   Lying:: /86,  HR 74 paced  Sitting:: /84, HR 74 paced  - pacemaker interrogation wnl  -very weak and off balance when assisted to ambulate  -suspect mechanical fall  -will ask PT and CM to eval for IPR at discharge       DM2  - SSI     Chronic conditions as noted above/below; home medications reviewed personally by me and restarted as appropriate  Electrolyte derangement:  Trending BMP; Mg; replacement prn  DVT ppx: lovenox held due to recent fall with multiple abrasions, risk of bleeding; continue SCDs  FULL CODE           VTE Risk Mitigation (From admission, onward)           Ordered     IP VTE HIGH RISK PATIENT  Once         08/16/23 2225     Place sequential compression device  Until discontinued         08/16/23 2225     Reason for No Pharmacological VTE Prophylaxis  Once        Question:  Reasons:  Answer:  Risk of Bleeding    08/16/23 2225                        Department Hospital Medicine  [facility]  Sixto Schultz MD  Date of service: 08/23/2023

## 2023-08-23 NOTE — PLAN OF CARE
Problem: Adult Inpatient Plan of Care  Goal: Plan of Care Review  Outcome: Ongoing, Progressing  Goal: Readiness for Transition of Care  Outcome: Ongoing, Progressing   POC reviewed with pt.  Pt aware of NPO status.  Call light within reach.

## 2023-08-23 NOTE — NURSING
1603 Pt c/o chest pain.  Pt state chest pain is 3 on scale of 1-10.  EKG ordered.  Will notify MD. VSS.  No acute distress noted.     1604 Pt state chest pain has resolved. No acute distress noted at this time.      1610 MD and Cardio notified.  Pt denies pain at this time.  Pt with family at bedside.  Pt instructed to call if if having active chest pain.  No acute distress noted at this time.  MD notified of EKG     1745 MD notified of Trop. Pt in bed at this time watching tv.  Pt denies needs.  Call light within reach.  Bed in lowest position. Bed alarm intact.

## 2023-08-23 NOTE — H&P (VIEW-ONLY)
[facility]  Cardiology  Progress Note    Patient Name: Tono Saez  MRN: [id number]  Admission Date: 8/16/2023  Hospital Length of Stay: 6 days  Code Status: Full Code   Attending Physician: Sixto Schultz,Trell   Primary Care Physician: Scott Staley MD  Expected Discharge Date: 8/24/2023  Principal Problem:<principal problem not specified>    Subjective:     Hospital Course:  Patient feels much better is not nauseated and less short of breath and diuresis    Interval History:  T ponins elevated Will repeat troponin today    ROS  Objective:     Vital Signs (Most Recent):  Temp: 98.9 °F (37.2 °C) (08/23/23 0752)  Pulse: 92 (08/23/23 0752)  Resp: 18 (08/23/23 0752)  BP: (!) 163/92 (08/23/23 0752)  SpO2: 95 % (08/23/23 0800) Vital Signs (24h Range):  Temp:  [97.3 °F (36.3 °C)-98.9 °F (37.2 °C)] 98.9 °F (37.2 °C)  Pulse:  [74-92] 92  Resp:  [16-20] 18  SpO2:  [91 %-96 %] 95 %  BP: (147-164)/(71-93) 163/92     Weight: 82.2 kg (181 lb 3.2 oz)  Body mass index is 24.58 kg/m².    SpO2: 95 %         Intake/Output Summary (Last 24 hours) at 8/23/2023 1021  Last data filed at 8/23/2023 0837  Gross per 24 hour   Intake 60 ml   Output 675 ml   Net -615 ml       Lines/Drains/Airways       Drain  Duration             Male External Urinary Catheter 08/22/23 1800 <1 day              Peripheral Intravenous Line  Duration                  Peripheral IV - Single Lumen 08/16/23 2205 Anterior;Left Forearm 6 days         Peripheral IV - Single Lumen 08/23/23 0008 20 G Anterior;Distal;Left Antecubital <1 day                    Physical Exam 160/90  Pulse is 80  Lungs few crackles  Cardiac S4-  1/6 systolic murmur left sternal  Abdomen is slightly status    Significant Labs: CMP   Recent Labs   Lab 08/22/23  0324 08/23/23  0324    138   K 3.6 3.5   * 107   CO2 21* 22*   * 141*   BUN 27* 41*   CREATININE 1.0 1.3   CALCIUM 9.4 9.4   PROT 6.1 6.2   ALBUMIN 3.0* 3.1*   BILITOT 1.7* 1.3*   ALKPHOS 75 75  "  AST 50* 40   ALT 20 22   ANIONGAP 7* 9   , CBC   Recent Labs   Lab 08/22/23  0324 08/23/23  0324   WBC 9.91 9.61   HGB 11.2* 10.9*   HCT 36.0* 35.5*    156   , and Troponin No results for input(s): "TROPONINI" in the last 48 hours.    Significant Imaging:   Left Ventricle: The left ventricle is normal in size. Moderately increased ventricular mass. Moderately increased wall thickness. There is moderate concentrict hypertrophy. Moderate global hypokinesis present. See diagram for wall motion findings.  Patient has evidence of akinesis and slight paradoxical motion of the mid inferior septum, distal anterior wall and slight paradoxic motion at the apex This is not significantly different than the echo noted 8/1723 The inferior wall in the lateral wall supplied by the main left stented circumflex appears to move well There is moderately reduced systolic function with a visually estimated ejection fraction of 30 - 40%. 40% EF Grade I diastolic dysfunction. Elevated left ventricular filling pressure. Tissue Doppler velocity is reduced.  Mean left atrial pressure 14    Left Atrium: Left atrium is mildly dilated.  Assessment and Plan:   Heart failure reduced  ASCVD  Possible closure saphenous vein graft to the right coronary small-vessel  Plan-recheck troponin; patient is not having chest pain or shortness of breath or nausea  Hold off cardiac catheterization tomorrow  Brief HPI:     Active Diagnoses:    Diagnosis Date Noted POA    Skin tear of left upper arm without complication [S41.112A] 08/21/2023 Yes    Syncope and collapse [R55] 04/19/2023 Yes    Orthostatic hypotension [I95.1] 04/19/2023 Yes    Chronic systolic congestive heart failure [I50.22] 02/27/2023 Yes    SSS (sick sinus syndrome) [I49.5] 02/27/2023 Yes    Stage 3a chronic kidney disease [N18.31] 02/27/2023 Yes    Compression fracture of L1 lumbar vertebra, with delayed healing, subsequent encounter [S32.010G] 02/17/2023 Not Applicable    Coronary " artery disease of native artery of native heart with stable angina pectoris [I25.118] 04/03/2020 Yes    Chronic anticoagulation [Z79.01] 09/30/2019 Not Applicable    GERD (gastroesophageal reflux disease) [K21.9] 04/18/2019 Yes    Type 2 diabetes mellitus with hyperglycemia  [E11.65] 02/13/2019 Yes    HTN (hypertension) [I10] 05/21/2012 Yes      Problems Resolved During this Admission:       VTE Risk Mitigation (From admission, onward)           Ordered     enoxaparin injection 80 mg  Every 12 hours         08/21/23 1115     IP VTE HIGH RISK PATIENT  Once         08/16/23 2225     Place sequential compression device  Until discontinued         08/16/23 2225     Reason for No Pharmacological VTE Prophylaxis  Once        Question:  Reasons:  Answer:  Risk of Bleeding    08/16/23 2225                    Kt Lux MD  Cardiology  [facility]

## 2023-08-23 NOTE — PT/OT/SLP PROGRESS
Occupational Therapy      Patient Name:  Tono Saez   MRN:  [id number]    Patient not seen today secondary to angiogram.      8/23/2023

## 2023-08-23 NOTE — CONSULTS
[facility]  Cardiology  Progress Note    Patient Name: Tono Saez  MRN: [id number]  Admission Date: 8/16/2023  Hospital Length of Stay: 6 days  Code Status: Full Code   Attending Physician: Sixto Schultz,Trell   Primary Care Physician: Scott Staley MD  Expected Discharge Date: 8/24/2023  Principal Problem:<principal problem not specified>    Subjective:     Hospital Course:  Patient feels much better is not nauseated and less short of breath and diuresis    Interval History:  T ponins elevated Will repeat troponin today    ROS  Objective:     Vital Signs (Most Recent):  Temp: 98.9 °F (37.2 °C) (08/23/23 0752)  Pulse: 92 (08/23/23 0752)  Resp: 18 (08/23/23 0752)  BP: (!) 163/92 (08/23/23 0752)  SpO2: 95 % (08/23/23 0800) Vital Signs (24h Range):  Temp:  [97.3 °F (36.3 °C)-98.9 °F (37.2 °C)] 98.9 °F (37.2 °C)  Pulse:  [74-92] 92  Resp:  [16-20] 18  SpO2:  [91 %-96 %] 95 %  BP: (147-164)/(71-93) 163/92     Weight: 82.2 kg (181 lb 3.2 oz)  Body mass index is 24.58 kg/m².    SpO2: 95 %         Intake/Output Summary (Last 24 hours) at 8/23/2023 1021  Last data filed at 8/23/2023 0837  Gross per 24 hour   Intake 60 ml   Output 675 ml   Net -615 ml       Lines/Drains/Airways       Drain  Duration             Male External Urinary Catheter 08/22/23 1800 <1 day              Peripheral Intravenous Line  Duration                  Peripheral IV - Single Lumen 08/16/23 2205 Anterior;Left Forearm 6 days         Peripheral IV - Single Lumen 08/23/23 0008 20 G Anterior;Distal;Left Antecubital <1 day                    Physical Exam 160/90  Pulse is 80  Lungs few crackles  Cardiac S4-  1/6 systolic murmur left sternal  Abdomen is slightly status    Significant Labs: CMP   Recent Labs   Lab 08/22/23  0324 08/23/23  0324    138   K 3.6 3.5   * 107   CO2 21* 22*   * 141*   BUN 27* 41*   CREATININE 1.0 1.3   CALCIUM 9.4 9.4   PROT 6.1 6.2   ALBUMIN 3.0* 3.1*   BILITOT 1.7* 1.3*   ALKPHOS 75 75  "  AST 50* 40   ALT 20 22   ANIONGAP 7* 9   , CBC   Recent Labs   Lab 08/22/23  0324 08/23/23  0324   WBC 9.91 9.61   HGB 11.2* 10.9*   HCT 36.0* 35.5*    156   , and Troponin No results for input(s): "TROPONINI" in the last 48 hours.    Significant Imaging:   Left Ventricle: The left ventricle is normal in size. Moderately increased ventricular mass. Moderately increased wall thickness. There is moderate concentrict hypertrophy. Moderate global hypokinesis present. See diagram for wall motion findings.  Patient has evidence of akinesis and slight paradoxical motion of the mid inferior septum, distal anterior wall and slight paradoxic motion at the apex This is not significantly different than the echo noted 8/1723 The inferior wall in the lateral wall supplied by the main left stented circumflex appears to move well There is moderately reduced systolic function with a visually estimated ejection fraction of 30 - 40%. 40% EF Grade I diastolic dysfunction. Elevated left ventricular filling pressure. Tissue Doppler velocity is reduced.  Mean left atrial pressure 14    Left Atrium: Left atrium is mildly dilated.  Assessment and Plan:   Heart failure reduced  ASCVD  Possible closure saphenous vein graft to the right coronary small-vessel  Plan-recheck troponin; patient is not having chest pain or shortness of breath or nausea  Hold off cardiac catheterization tomorrow  Brief HPI:     Active Diagnoses:    Diagnosis Date Noted POA    Skin tear of left upper arm without complication [S41.112A] 08/21/2023 Yes    Syncope and collapse [R55] 04/19/2023 Yes    Orthostatic hypotension [I95.1] 04/19/2023 Yes    Chronic systolic congestive heart failure [I50.22] 02/27/2023 Yes    SSS (sick sinus syndrome) [I49.5] 02/27/2023 Yes    Stage 3a chronic kidney disease [N18.31] 02/27/2023 Yes    Compression fracture of L1 lumbar vertebra, with delayed healing, subsequent encounter [S32.010G] 02/17/2023 Not Applicable    Coronary " artery disease of native artery of native heart with stable angina pectoris [I25.118] 04/03/2020 Yes    Chronic anticoagulation [Z79.01] 09/30/2019 Not Applicable    GERD (gastroesophageal reflux disease) [K21.9] 04/18/2019 Yes    Type 2 diabetes mellitus with hyperglycemia  [E11.65] 02/13/2019 Yes    HTN (hypertension) [I10] 05/21/2012 Yes      Problems Resolved During this Admission:       VTE Risk Mitigation (From admission, onward)           Ordered     enoxaparin injection 80 mg  Every 12 hours         08/21/23 1115     IP VTE HIGH RISK PATIENT  Once         08/16/23 2225     Place sequential compression device  Until discontinued         08/16/23 2225     Reason for No Pharmacological VTE Prophylaxis  Once        Question:  Reasons:  Answer:  Risk of Bleeding    08/16/23 2225                    Kt Lux MD  Cardiology  [facility]

## 2023-08-23 NOTE — PLAN OF CARE
08/22/23 2043   Patient Assessment/Suction   Level of Consciousness (AVPU) alert   Respiratory Effort Unlabored;Normal   All Lung Fields Breath Sounds clear   PRE-TX-O2   Device (Oxygen Therapy) room air   SpO2 96 %   Pulse Oximetry Type Intermittent   $ Pulse Oximetry - Multiple Charge Pulse Oximetry - Multiple   Pulse 81   Resp 17   Aerosol Therapy   $ Aerosol Therapy Charges PRN treatment not required   Respiratory Evaluation   $ Care Plan Tech Time 15 min   $ Eval/Re-eval Charges Re-evaluation

## 2023-08-23 NOTE — PT/OT/SLP PROGRESS
Physical Therapy      Patient Name:  Tono Saez   MRN:  [id number]    Patient not seen today secondary to  (angiogram). Will follow-up   .

## 2023-08-23 NOTE — NURSING
Phone consent for Angiogram scheduled for today done by this nurse and Jana STERN at this time with patients daughter Carmen Pederson at [phone].

## 2023-08-23 NOTE — PLAN OF CARE
Problem: Adult Inpatient Plan of Care  Goal: Plan of Care Review  8/23/2023 0519 by Mary Vigil LPN  Outcome: Ongoing, Progressing  8/23/2023 0519 by Mary Vigil LPN  Outcome: Ongoing, Progressing  Goal: Patient-Specific Goal (Individualized)  8/23/2023 0519 by Mary Vigil LPN  Outcome: Ongoing, Progressing  8/23/2023 0519 by Mary Vigil LPN  Outcome: Ongoing, Progressing  Goal: Absence of Hospital-Acquired Illness or Injury  8/23/2023 0519 by Mary Vigil LPN  Outcome: Ongoing, Progressing  8/23/2023 0519 by Mary Vigil LPN  Outcome: Ongoing, Progressing  Goal: Optimal Comfort and Wellbeing  8/23/2023 0519 by Mary Vigil LPN  Outcome: Ongoing, Progressing  8/23/2023 0519 by Mary Vigil LPN  Outcome: Ongoing, Progressing  Goal: Readiness for Transition of Care  8/23/2023 0519 by Mary Vigil LPN  Outcome: Ongoing, Progressing  8/23/2023 0519 by Mary Vigil LPN  Outcome: Ongoing, Progressing     Problem: Diabetes Comorbidity  Goal: Blood Glucose Level Within Targeted Range  8/23/2023 0519 by Mary Vigil LPN  Outcome: Ongoing, Progressing  8/23/2023 0519 by Mary iVgil LPN  Outcome: Ongoing, Progressing     Problem: Syncope  Goal: Absence of Syncopal Symptoms  8/23/2023 0519 by Mary Vigil LPN  Outcome: Ongoing, Progressing  8/23/2023 0519 by Mary Vigil LPN  Outcome: Ongoing, Progressing     Problem: Fall Injury Risk  Goal: Absence of Fall and Fall-Related Injury  8/23/2023 0519 by Mary Vigil LPN  Outcome: Ongoing, Progressing  8/23/2023 0519 by Mary Vigil LPN  Outcome: Ongoing, Progressing     Problem: Infection  Goal: Absence of Infection Signs and Symptoms  8/23/2023 0519 by Mary Vigil LPN  Outcome: Ongoing, Progressing  8/23/2023 0519 by Mary Vigil LPN  Outcome: Ongoing, Progressing     Problem: Impaired Wound Healing  Goal: Optimal Wound Healing  8/23/2023 0519 by Mary Vigil LPN  Outcome: Ongoing, Progressing  8/23/2023 0519 by  Mary Vigil LPN  Outcome: Ongoing, Progressing     Problem: Skin Injury Risk Increased  Goal: Skin Health and Integrity  8/23/2023 0519 by Mary Vigil LPN  Outcome: Ongoing, Progressing  8/23/2023 0519 by Mary Vigil LPN  Outcome: Ongoing, Progressing

## 2023-08-24 LAB
ALBUMIN SERPL BCP-MCNC: 2.9 G/DL (ref 3.5–5.2)
ALP SERPL-CCNC: 79 U/L (ref 55–135)
ALT SERPL W/O P-5'-P-CCNC: 37 U/L (ref 10–44)
ANION GAP SERPL CALC-SCNC: 5 MMOL/L (ref 8–16)
AST SERPL-CCNC: 45 U/L (ref 10–40)
BASOPHILS # BLD AUTO: 0.04 K/UL (ref 0–0.2)
BASOPHILS NFR BLD: 0.5 % (ref 0–1.9)
BILIRUB SERPL-MCNC: 1.2 MG/DL (ref 0.1–1)
BUN SERPL-MCNC: 43 MG/DL (ref 8–23)
CALCIUM SERPL-MCNC: 9.3 MG/DL (ref 8.7–10.5)
CHLORIDE SERPL-SCNC: 107 MMOL/L (ref 95–110)
CO2 SERPL-SCNC: 24 MMOL/L (ref 23–29)
CREAT SERPL-MCNC: 1.1 MG/DL (ref 0.5–1.4)
DIFFERENTIAL METHOD: ABNORMAL
EOSINOPHIL # BLD AUTO: 0.3 K/UL (ref 0–0.5)
EOSINOPHIL NFR BLD: 3.8 % (ref 0–8)
ERYTHROCYTE [DISTWIDTH] IN BLOOD BY AUTOMATED COUNT: 16.3 % (ref 11.5–14.5)
EST. GFR  (NO RACE VARIABLE): >60 ML/MIN/1.73 M^2
GLUCOSE SERPL-MCNC: 147 MG/DL (ref 70–110)
GLUCOSE SERPL-MCNC: 148 MG/DL (ref 70–110)
GLUCOSE SERPL-MCNC: 167 MG/DL (ref 70–110)
GLUCOSE SERPL-MCNC: 192 MG/DL (ref 70–110)
HCT VFR BLD AUTO: 32.8 % (ref 40–54)
HGB BLD-MCNC: 10.5 G/DL (ref 14–18)
IMM GRANULOCYTES # BLD AUTO: 0.04 K/UL (ref 0–0.04)
IMM GRANULOCYTES NFR BLD AUTO: 0.5 % (ref 0–0.5)
LYMPHOCYTES # BLD AUTO: 0.7 K/UL (ref 1–4.8)
LYMPHOCYTES NFR BLD: 8.3 % (ref 18–48)
MAGNESIUM SERPL-MCNC: 1.5 MG/DL (ref 1.6–2.6)
MCH RBC QN AUTO: 26.9 PG (ref 27–31)
MCHC RBC AUTO-ENTMCNC: 32 G/DL (ref 32–36)
MCV RBC AUTO: 84 FL (ref 82–98)
MONOCYTES # BLD AUTO: 0.7 K/UL (ref 0.3–1)
MONOCYTES NFR BLD: 8.5 % (ref 4–15)
NEUTROPHILS # BLD AUTO: 6.9 K/UL (ref 1.8–7.7)
NEUTROPHILS NFR BLD: 78.4 % (ref 38–73)
NRBC BLD-RTO: 0 /100 WBC
PLATELET # BLD AUTO: 197 K/UL (ref 150–450)
PMV BLD AUTO: 10.4 FL (ref 9.2–12.9)
POC ACTIVATED CLOTTING TIME K: 227 SEC (ref 74–137)
POC ACTIVATED CLOTTING TIME K: 287 SEC (ref 74–137)
POTASSIUM SERPL-SCNC: 3.1 MMOL/L (ref 3.5–5.1)
PROT SERPL-MCNC: 5.8 G/DL (ref 6–8.4)
RBC # BLD AUTO: 3.9 M/UL (ref 4.6–6.2)
SAMPLE: ABNORMAL
SAMPLE: ABNORMAL
SODIUM SERPL-SCNC: 136 MMOL/L (ref 136–145)
WBC # BLD AUTO: 8.72 K/UL (ref 3.9–12.7)

## 2023-08-24 PROCEDURE — 94761 N-INVAS EAR/PLS OXIMETRY MLT: CPT

## 2023-08-24 PROCEDURE — 93010 EKG 12-LEAD: ICD-10-PCS | Mod: ,,, | Performed by: SPECIALIST

## 2023-08-24 PROCEDURE — 63600175 PHARM REV CODE 636 W HCPCS: Performed by: FAMILY MEDICINE

## 2023-08-24 PROCEDURE — 93459 L HRT ART/GRFT ANGIO: CPT | Performed by: INTERNAL MEDICINE

## 2023-08-24 PROCEDURE — 92937 PRQ TRLUML REVSC CAB GRF 1: CPT | Mod: LC,,, | Performed by: INTERNAL MEDICINE

## 2023-08-24 PROCEDURE — 99900031 HC PATIENT EDUCATION (STAT)

## 2023-08-24 PROCEDURE — 12000002 HC ACUTE/MED SURGE SEMI-PRIVATE ROOM

## 2023-08-24 PROCEDURE — 25000003 PHARM REV CODE 250: Performed by: INTERNAL MEDICINE

## 2023-08-24 PROCEDURE — C1874 STENT, COATED/COV W/DEL SYS: HCPCS | Performed by: INTERNAL MEDICINE

## 2023-08-24 PROCEDURE — C1725 CATH, TRANSLUMIN NON-LASER: HCPCS | Performed by: INTERNAL MEDICINE

## 2023-08-24 PROCEDURE — 92978 ENDOLUMINL IVUS OCT C 1ST: CPT | Mod: LC | Performed by: INTERNAL MEDICINE

## 2023-08-24 PROCEDURE — 93459: ICD-10-PCS | Mod: 26,59,51, | Performed by: INTERNAL MEDICINE

## 2023-08-24 PROCEDURE — C1753 CATH, INTRAVAS ULTRASOUND: HCPCS | Performed by: INTERNAL MEDICINE

## 2023-08-24 PROCEDURE — 36415 COLL VENOUS BLD VENIPUNCTURE: CPT | Performed by: FAMILY MEDICINE

## 2023-08-24 PROCEDURE — 93459 L HRT ART/GRFT ANGIO: CPT | Mod: 26,59,51, | Performed by: INTERNAL MEDICINE

## 2023-08-24 PROCEDURE — 92978 ENDOLUMINL IVUS OCT C 1ST: CPT | Mod: 26,LC,, | Performed by: INTERNAL MEDICINE

## 2023-08-24 PROCEDURE — 92978 PR IVUS, CORONARY, 1ST VESSEL: ICD-10-PCS | Mod: 26,LC,, | Performed by: INTERNAL MEDICINE

## 2023-08-24 PROCEDURE — 93010 ELECTROCARDIOGRAM REPORT: CPT | Mod: ,,, | Performed by: SPECIALIST

## 2023-08-24 PROCEDURE — 63600175 PHARM REV CODE 636 W HCPCS: Performed by: INTERNAL MEDICINE

## 2023-08-24 PROCEDURE — 93005 ELECTROCARDIOGRAM TRACING: CPT | Performed by: SPECIALIST

## 2023-08-24 PROCEDURE — C1894 INTRO/SHEATH, NON-LASER: HCPCS | Performed by: INTERNAL MEDICINE

## 2023-08-24 PROCEDURE — 25000003 PHARM REV CODE 250: Performed by: SPECIALIST

## 2023-08-24 PROCEDURE — 25000003 PHARM REV CODE 250: Performed by: FAMILY MEDICINE

## 2023-08-24 PROCEDURE — C1887 CATHETER, GUIDING: HCPCS | Performed by: INTERNAL MEDICINE

## 2023-08-24 PROCEDURE — 25000003 PHARM REV CODE 250

## 2023-08-24 PROCEDURE — C1760 CLOSURE DEV, VASC: HCPCS | Performed by: INTERNAL MEDICINE

## 2023-08-24 PROCEDURE — 92937 PR BYPASS (IN OR THROUGH): ICD-10-PCS | Mod: LC,,, | Performed by: INTERNAL MEDICINE

## 2023-08-24 PROCEDURE — 96374 THER/PROPH/DIAG INJ IV PUSH: CPT | Performed by: INTERNAL MEDICINE

## 2023-08-24 PROCEDURE — C1769 GUIDE WIRE: HCPCS | Performed by: INTERNAL MEDICINE

## 2023-08-24 PROCEDURE — 25000003 PHARM REV CODE 250: Performed by: STUDENT IN AN ORGANIZED HEALTH CARE EDUCATION/TRAINING PROGRAM

## 2023-08-24 PROCEDURE — 99152 PR MOD CONSCIOUS SEDATION, SAME PHYS, 5+ YRS, FIRST 15 MIN: ICD-10-PCS | Mod: ,,, | Performed by: INTERNAL MEDICINE

## 2023-08-24 PROCEDURE — 99152 MOD SED SAME PHYS/QHP 5/>YRS: CPT | Mod: ,,, | Performed by: INTERNAL MEDICINE

## 2023-08-24 PROCEDURE — 21400001 HC TELEMETRY ROOM

## 2023-08-24 PROCEDURE — 99900035 HC TECH TIME PER 15 MIN (STAT)

## 2023-08-24 PROCEDURE — 27000221 HC OXYGEN, UP TO 24 HOURS

## 2023-08-24 PROCEDURE — 80053 COMPREHEN METABOLIC PANEL: CPT | Performed by: FAMILY MEDICINE

## 2023-08-24 PROCEDURE — 85025 COMPLETE CBC W/AUTO DIFF WBC: CPT | Performed by: FAMILY MEDICINE

## 2023-08-24 PROCEDURE — 83735 ASSAY OF MAGNESIUM: CPT | Performed by: FAMILY MEDICINE

## 2023-08-24 PROCEDURE — C9604 PERC D-E COR REVASC T CABG S: HCPCS | Mod: LC | Performed by: INTERNAL MEDICINE

## 2023-08-24 DEVICE — ANGIO-SEAL VIP VASCULAR CLOSURE DEVICE
Type: IMPLANTABLE DEVICE | Site: GROIN | Status: FUNCTIONAL
Brand: ANGIO-SEAL

## 2023-08-24 DEVICE — STENT ONYXNG30038UX ONYX 3.00X38RX
Type: IMPLANTABLE DEVICE | Site: CORONARY | Status: FUNCTIONAL
Brand: ONYX FRONTIER™

## 2023-08-24 DEVICE — STENT ONYXNG25022UX ONYX 2.50X22RX
Type: IMPLANTABLE DEVICE | Site: CORONARY | Status: FUNCTIONAL
Brand: ONYX FRONTIER™

## 2023-08-24 RX ORDER — LANOLIN ALCOHOL/MO/W.PET/CERES
400 CREAM (GRAM) TOPICAL EVERY 4 HOURS PRN
Status: DISCONTINUED | OUTPATIENT
Start: 2023-08-24 | End: 2023-08-27 | Stop reason: HOSPADM

## 2023-08-24 RX ORDER — HEPARIN SODIUM 1000 [USP'U]/ML
INJECTION, SOLUTION INTRAVENOUS; SUBCUTANEOUS
Status: DISCONTINUED | OUTPATIENT
Start: 2023-08-24 | End: 2023-08-24 | Stop reason: HOSPADM

## 2023-08-24 RX ORDER — MIDAZOLAM HYDROCHLORIDE 1 MG/ML
INJECTION INTRAMUSCULAR; INTRAVENOUS
Status: DISCONTINUED | OUTPATIENT
Start: 2023-08-24 | End: 2023-08-24 | Stop reason: HOSPADM

## 2023-08-24 RX ORDER — NITROGLYCERIN 5 MG/ML
INJECTION, SOLUTION INTRAVENOUS
Status: DISCONTINUED | OUTPATIENT
Start: 2023-08-24 | End: 2023-08-24 | Stop reason: HOSPADM

## 2023-08-24 RX ORDER — SODIUM CHLORIDE 9 MG/ML
INJECTION, SOLUTION INTRAVENOUS CONTINUOUS
Status: DISCONTINUED | OUTPATIENT
Start: 2023-08-24 | End: 2023-08-25

## 2023-08-24 RX ORDER — CLOPIDOGREL BISULFATE 75 MG/1
TABLET ORAL
Status: DISCONTINUED | OUTPATIENT
Start: 2023-08-24 | End: 2023-08-24 | Stop reason: HOSPADM

## 2023-08-24 RX ORDER — ACETAMINOPHEN 325 MG/1
TABLET ORAL
Status: DISPENSED
Start: 2023-08-24 | End: 2023-08-24

## 2023-08-24 RX ORDER — LANOLIN ALCOHOL/MO/W.PET/CERES
800 CREAM (GRAM) TOPICAL EVERY 4 HOURS PRN
Status: DISCONTINUED | OUTPATIENT
Start: 2023-08-24 | End: 2023-08-27 | Stop reason: HOSPADM

## 2023-08-24 RX ORDER — POTASSIUM CHLORIDE 20 MEQ/1
20 TABLET, EXTENDED RELEASE ORAL
Status: DISCONTINUED | OUTPATIENT
Start: 2023-08-24 | End: 2023-08-27 | Stop reason: HOSPADM

## 2023-08-24 RX ORDER — HYDRALAZINE HYDROCHLORIDE 20 MG/ML
INJECTION INTRAMUSCULAR; INTRAVENOUS
Status: DISPENSED
Start: 2023-08-24 | End: 2023-08-24

## 2023-08-24 RX ORDER — FENTANYL CITRATE 50 UG/ML
INJECTION, SOLUTION INTRAMUSCULAR; INTRAVENOUS
Status: DISCONTINUED | OUTPATIENT
Start: 2023-08-24 | End: 2023-08-24 | Stop reason: HOSPADM

## 2023-08-24 RX ADMIN — Medication 400 MG: at 08:08

## 2023-08-24 RX ADMIN — HYDROCODONE BITARTRATE AND ACETAMINOPHEN 1 TABLET: 5; 325 TABLET ORAL at 02:08

## 2023-08-24 RX ADMIN — SODIUM CHLORIDE: 0.9 INJECTION, SOLUTION INTRAVENOUS at 03:08

## 2023-08-24 RX ADMIN — PANTOPRAZOLE SODIUM 40 MG: 40 TABLET, DELAYED RELEASE ORAL at 06:08

## 2023-08-24 RX ADMIN — SPIRONOLACTONE 25 MG: 25 TABLET ORAL at 02:08

## 2023-08-24 RX ADMIN — HYDRALAZINE HYDROCHLORIDE 10 MG: 20 INJECTION INTRAMUSCULAR; INTRAVENOUS at 11:08

## 2023-08-24 RX ADMIN — NITROGLYCERIN 2 INCH: 20 OINTMENT TOPICAL at 06:08

## 2023-08-24 RX ADMIN — NITROGLYCERIN 2 INCH: 20 OINTMENT TOPICAL at 09:08

## 2023-08-24 RX ADMIN — Medication 800 MG: at 02:08

## 2023-08-24 RX ADMIN — FINASTERIDE 5 MG: 5 TABLET, FILM COATED ORAL at 02:08

## 2023-08-24 RX ADMIN — ATORVASTATIN CALCIUM 40 MG: 40 TABLET, FILM COATED ORAL at 09:08

## 2023-08-24 RX ADMIN — ACETAMINOPHEN 650 MG: 325 TABLET ORAL at 11:08

## 2023-08-24 RX ADMIN — OXYBUTYNIN CHLORIDE 10 MG: 5 TABLET, EXTENDED RELEASE ORAL at 02:08

## 2023-08-24 RX ADMIN — HYDRALAZINE HYDROCHLORIDE 5 MG: 20 INJECTION, SOLUTION INTRAMUSCULAR; INTRAVENOUS at 04:08

## 2023-08-24 RX ADMIN — ASPIRIN 81 MG: 81 TABLET, COATED ORAL at 08:08

## 2023-08-24 RX ADMIN — POTASSIUM CHLORIDE 20 MEQ: 1500 TABLET, EXTENDED RELEASE ORAL at 08:08

## 2023-08-24 RX ADMIN — FUROSEMIDE 20 MG: 20 TABLET ORAL at 02:08

## 2023-08-24 RX ADMIN — CLOPIDOGREL BISULFATE 75 MG: 75 TABLET, FILM COATED ORAL at 08:08

## 2023-08-24 RX ADMIN — SERTRALINE HYDROCHLORIDE 50 MG: 50 TABLET ORAL at 02:08

## 2023-08-24 RX ADMIN — NITROGLYCERIN 2 INCH: 20 OINTMENT TOPICAL at 02:08

## 2023-08-24 RX ADMIN — METOPROLOL TARTRATE 25 MG: 25 TABLET, FILM COATED ORAL at 02:08

## 2023-08-24 RX ADMIN — HYDRALAZINE HYDROCHLORIDE 5 MG: 20 INJECTION, SOLUTION INTRAMUSCULAR; INTRAVENOUS at 11:08

## 2023-08-24 RX ADMIN — Medication 400 MG: at 02:08

## 2023-08-24 RX ADMIN — MONTELUKAST 10 MG: 10 TABLET, FILM COATED ORAL at 09:08

## 2023-08-24 RX ADMIN — METOPROLOL TARTRATE 25 MG: 25 TABLET, FILM COATED ORAL at 09:08

## 2023-08-24 NOTE — INTERVAL H&P NOTE
The patient has been examined and the H&P has been reviewed:    I concur with the findings and no changes have occurred since H&P was written.    Procedure risks, benefits and alternative options discussed and understood by patient/family.          Active Hospital Problems    Diagnosis  POA    Skin tear of left upper arm without complication [S41.112A]  Yes    Syncope and collapse [R55]  Yes    Orthostatic hypotension [I95.1]  Yes    Chronic systolic congestive heart failure [I50.22]  Yes    SSS (sick sinus syndrome) [I49.5]  Yes    Stage 3a chronic kidney disease [N18.31]  Yes    Compression fracture of L1 lumbar vertebra, with delayed healing, subsequent encounter [S32.010G]  Not Applicable    Coronary artery disease of native artery of native heart with stable angina pectoris [I25.118]  Yes     CABG, STENTS '97,'99,'01,'05    10/26/22:  Summary         The Dist LM lesion was 90% stenosed with 10% stenosis post-intervention.    The pre-procedure left ventricular end diastolic pressure was 12.    A STENT LIMA YOHANA 3.50 X 26 stent was not successfully placed at 12 JASPER for 20 sec.    The estimated blood loss was <50 mL.    Successful intravascular ultrasound-guided PCI of the critical stenosis of left main into circumflex with 1 drug-eluting stent    Patent vein graft to OM, occluded branch of the OM as compared to angiogram in 2016.    Patent vein graft to right coronary artery with patent stent, moderately degenerated, distal native RCA has 80% stenosis in medium caliber vessel.    Occluded LAD with a patent LIMA to LAD        Chronic anticoagulation [Z79.01]  Not Applicable    GERD (gastroesophageal reflux disease) [K21.9]  Yes    Type 2 diabetes mellitus with hyperglycemia  [E11.65]  Yes    HTN (hypertension) [I10]  Yes      Resolved Hospital Problems   No resolved problems to display.

## 2023-08-24 NOTE — PT/OT/SLP PROGRESS
Occupational Therapy      Patient Name:  Tono Saez   MRN:  [id number]    Patient not seen today secondary to Off the floor for procedure/surgery when attempted.     8/24/2023

## 2023-08-24 NOTE — PLAN OF CARE
Problem: Adult Inpatient Plan of Care  Goal: Plan of Care Review  Outcome: Ongoing, Progressing     Problem: Adult Inpatient Plan of Care  Goal: Absence of Hospital-Acquired Illness or Injury  Outcome: Ongoing, Progressing     Problem: Impaired Wound Healing  Goal: Optimal Wound Healing  Outcome: Ongoing, Progressing     Problem: Skin Injury Risk Increased  Goal: Skin Health and Integrity  Outcome: Ongoing, Progressing     Problem: Fall Injury Risk  Goal: Absence of Fall and Fall-Related Injury  Outcome: Ongoing, Progressing     Problem: Diabetes Comorbidity  Goal: Blood Glucose Level Within Targeted Range  Outcome: Ongoing, Progressing

## 2023-08-24 NOTE — CARE UPDATE
08/23/23 2110   Patient Assessment/Suction   Level of Consciousness (AVPU) alert   Respiratory Effort Unlabored   Expansion/Accessory Muscles/Retractions no use of accessory muscles   All Lung Fields Breath Sounds clear;diminished   Rhythm/Pattern, Respiratory no shortness of breath reported   Cough Frequency no cough   PRE-TX-O2   Device (Oxygen Therapy) room air   SpO2 96 %   Pulse Oximetry Type Intermittent   $ Pulse Oximetry - Multiple Charge Pulse Oximetry - Multiple   Pulse 77   Resp 16   Positioning   Head of Bed (HOB) Positioning HOB elevated;HOB at 30-45 degrees   Respiratory Evaluation   $ Care Plan Tech Time 15 min   $ Eval/Re-eval Charges Re-evaluation

## 2023-08-24 NOTE — PT/OT/SLP PROGRESS
Physical Therapy      Patient Name:  Tono Saez   MRN:  [id number]    Patient not seen today secondary to  (heart cath). Will follow-up 8/25/2023.

## 2023-08-24 NOTE — PROGRESS NOTES
[facility]  Progress Note    Patient name: Tono Saez  MRN: [id number]  Admit Date: 8/16/2023   LOS: 7 days     SUBJECTIVE:     Principal problem: syncope        HPI-  78-year-old male who presents for evaluation after a syncopal episode.  The patient does have frequent falls and unsteady gait for which he  undergoes therapy.  He was leaving an office visit today and reports that he passed.  Reports that this was not his usual trip and fall.  He did strike his head.  Injured and scraped his right arm, his right knee as well as his right anterior chest.  He has had dull aching pain to the right anterior chest since the fall.  The pain is worse with palpation, movement and deep breathing but denies feeling short of breath.  He did not have any chest pain prior to the fall.  Denies any recent illnesses.  Denies fever, chills, cough or any upper or lower respiratory type symptoms.  Denies any abdominal pain or shortness of breath.  Has some mild pain to his right arm associated with a skin tear but denies any pain with range of motion as well as pain with an abrasion to the right knee but denies any pain with range of motion of the knee.  Denies any other problems or complaints.      Interval History:      8/24- patient sleeping soundly, son in room.  Discussed cath, waiting for read, will follow up    8/23- had a brief episode of chest pain this evening, getting ekg and trop.  Cardiology following, plan for angio but delayed until tomorrow.     8/22- sitting up to bedside commode, feels nauseated.  No chest pain.  Trop trended up overnight.  Cardiology following.  Continue med mgmt for now with trx dose lovenox, asa, statin, plavix.  Repeat echo.   Eventual plan is hopefully to get in to IPR.     8/21- resting comfortably, visiting with family.  He had an episode of cp yesterday evening.  Troponin was ordered at that time, around 6 pm.  This was not drawn or resulted until 3 am, and  was elevated at 1400.  EKG no obvious change but difficlut to interpret.  Consulting cardiology today.      8/20- resting in bed quietly, plan for IPR.  Working with PT    8/19- has TLSO brace, working with PT.  Medically cleared for IPR.  CM following for assistance     8/18- he is up to bedside chair, alert.  Denies feeling dizzy, light headed currently.  +right lateral chest M/s chest pain.  He cant remember exactly whether he felt dizzy before falling or falling mechanically.  But he does mention that he thinks he fell bc he wasn't using his walker.  While helping him back to bed, he was very weak and poor balance.  I feel it is more likely he had a mechanical fall while not using DME.  He is not orthostatic.  PM interrogation did not show any irregularities.  His echo actually shows improved cardiac function.  He lives alone.  I think IPR would be very beneficial to increase his strength and ability to perform ADLs before attempting to get him back to his home.  He is agreeable to this.  Will ask PT for eval.      8/17-  HR 60, hypertensive w/sbp 160s.  Chronic lower back pain.  F/u nsgy recs and echo.  Carotids no significant stenosis.      Scheduled Meds:   aspirin  81 mg Oral Daily    atorvastatin  40 mg Oral Daily    clopidogreL  75 mg Oral Daily    finasteride  5 mg Oral Daily    furosemide  20 mg Oral Daily    metoprolol tartrate  25 mg Oral TID    montelukast  10 mg Oral QHS    nitroGLYCERIN 2% TD oint  2 inch Topical (Top) Q8H    oxybutynin  10 mg Oral Daily    pantoprazole  40 mg Oral Before breakfast    sertraline  50 mg Oral Daily    spironolactone  25 mg Oral Daily     Continuous Infusions:   sodium chloride 0.9% Stopped (08/21/23 2141)    sodium chloride 0.9% 75 mL/hr at 08/24/23 1500     PRN Meds:acetaminophen, albuterol-ipratropium, dextrose 50%, dextrose 50%, glucagon (human recombinant), glucose, glucose, hydrALAZINE, hydrALAZINE, HYDROcodone-acetaminophen, insulin aspart U-100, magnesium  oxide, magnesium oxide, magnesium oxide, melatonin, naloxone, nitroGLYCERIN, ondansetron, polyethylene glycol, potassium chloride, potassium chloride, potassium chloride, senna-docusate 8.6-50 mg, simethicone, sodium chloride 0.9%    Review of patient's allergies indicates:   Allergen Reactions    Adhesive Other (See Comments)     SILK TAPE PULL SKIN OFF    Metformin Other (See Comments)     Weight loss cachexia anorexia,diarrhea.    Pcn [penicillins]      Patient states he passed out from this medication when he was 10 years old.        Review of Systems: As per interval history    OBJECTIVE:     Vital Signs (Most Recent)  Temp: 98.1 °F (36.7 °C) (08/24/23 1618)  Pulse: 68 (08/24/23 1618)  Resp: 17 (08/24/23 1618)  BP: (!) 147/76 (08/24/23 1618)  SpO2: 95 % (08/24/23 1618)    Vital Signs Range (Last 24H):  Temp:  [98.1 °F (36.7 °C)-98.9 °F (37.2 °C)]   Pulse:  [68-91]   Resp:  [8-21]   BP: (147-196)/()   SpO2:  [90 %-96 %]     I & O (Last 24H):  Intake/Output Summary (Last 24 hours) at 8/24/2023 1808  Last data filed at 8/24/2023 1101  Gross per 24 hour   Intake 150 ml   Output --   Net 150 ml         Physical Exam:  General: Patient resting comfortably in no acute distress. Ecchymosis and swelling right cheek  Eyes: No conjunctival injection. No scleral icterus.  ENT: Hearing grossly intact. No discharge from ears. No nasal discharge.   CVS: RRR.   Lungs:  No tachypnea or accessory muscle use.   Abdomen:  Soft, nontender and nondistended.    Neuro: Alert.  Moves all extremities. Follows commands. Responds appropriately   Skin:  No rash or erythema noted    Laboratory:  I have reviewed all pertinent lab results within the past 24 hours.      ASSESSMENT/PLAN:         Active Hospital Problems    Diagnosis  POA    Skin tear of left upper arm without complication [S41.112A]  Yes    Syncope and collapse [R55]  Yes    Orthostatic hypotension [I95.1]  Yes    Chronic systolic congestive heart failure [I50.22]  Yes     SSS (sick sinus syndrome) [I49.5]  Yes    Stage 3a chronic kidney disease [N18.31]  Yes    Compression fracture of L1 lumbar vertebra, with delayed healing, subsequent encounter [S32.010G]  Not Applicable    Coronary artery disease of native artery of native heart with stable angina pectoris [I25.118]  Yes     CABG, STENTS '97,'99,'01,'05    10/26/22:  Summary         The Dist LM lesion was 90% stenosed with 10% stenosis post-intervention.    The pre-procedure left ventricular end diastolic pressure was 12.    A STENT LIMA YOHANA 3.50 X 26 stent was not successfully placed at 12 JASPER for 20 sec.    The estimated blood loss was <50 mL.    Successful intravascular ultrasound-guided PCI of the critical stenosis of left main into circumflex with 1 drug-eluting stent    Patent vein graft to OM, occluded branch of the OM as compared to angiogram in 2016.    Patent vein graft to right coronary artery with patent stent, moderately degenerated, distal native RCA has 80% stenosis in medium caliber vessel.    Occluded LAD with a patent LIMA to LAD        Chronic anticoagulation [Z79.01]  Not Applicable    GERD (gastroesophageal reflux disease) [K21.9]  Yes    Type 2 diabetes mellitus with hyperglycemia  [E11.65]  Yes    HTN (hypertension) [I10]  Yes      Resolved Hospital Problems   No resolved problems to display.         Plan:       8/20   Chest pain  Elevated troponin  He received PRN morphine for pain  Stat ekg and trop  Trop at 3 am elevated to 1400, continued to trend up  Consulted cardiology, f/u recs  Treatment dose lovenox  Asa, statin, plavix  NPO at midnight in the event cardiology may want to take to cath lab tomorrow  Bnp eleavated and cxr pulm edema  Lasix IV  Angio today      Syncope  Hx SSS  Hx combined HF with EF 25% and moderate AS  - CT head Small air-fluid level in the right maxillary sinus could be result of sinus disease or trauma. Right facial soft tissue swelling.  - echo complete, improved EF to 35-40%  -  carotid US no significant stenosis  - EKG, trending troponin are negative  - telemetry  - discontinue BB, midodrine  - Orthostatic VS are as follows, patient did not tolerate standing. Patient denies symptoms.   Lying:: /86, HR 74 paced  Sitting:: /84, HR 74 paced  - pacemaker interrogation wnl  -very weak and off balance when assisted to ambulate  -suspect mechanical fall  -will ask PT and CM to eval for IPR at discharge       DM2  - SSI     Chronic conditions as noted above/below; home medications reviewed personally by me and restarted as appropriate  Electrolyte derangement:  Trending BMP; Mg; replacement prn  DVT ppx: lovenox held due to recent fall with multiple abrasions, risk of bleeding; continue SCDs  FULL CODE           VTE Risk Mitigation (From admission, onward)           Ordered     IP VTE HIGH RISK PATIENT  Once         08/16/23 2225     Place sequential compression device  Until discontinued         08/16/23 2225     Reason for No Pharmacological VTE Prophylaxis  Once        Question:  Reasons:  Answer:  Risk of Bleeding    08/16/23 2225                        Department Hospital Medicine  [facility]  Sixto Schultz MD  Date of service: 08/24/2023

## 2023-08-24 NOTE — NURSING TRANSFER
Nursing Transfer Note      8/24/2023   1:16 PM    Nurse giving handoff:Bruno FARFAN RN  Nurse receiving handoff:Sarai STERN    Reason patient is being transferred: s/p angiogram    Transfer To: [facility] from [facility]    Transfer via stretcher    Transfer with cardiac monitoring    Transported by Bruno FARFAN RN

## 2023-08-25 LAB
ALBUMIN SERPL BCP-MCNC: 2.9 G/DL (ref 3.5–5.2)
ALP SERPL-CCNC: 80 U/L (ref 55–135)
ALT SERPL W/O P-5'-P-CCNC: 36 U/L (ref 10–44)
ANION GAP SERPL CALC-SCNC: 7 MMOL/L (ref 8–16)
AST SERPL-CCNC: 37 U/L (ref 10–40)
BASOPHILS # BLD AUTO: 0.02 K/UL (ref 0–0.2)
BASOPHILS NFR BLD: 0.2 % (ref 0–1.9)
BILIRUB SERPL-MCNC: 1.4 MG/DL (ref 0.1–1)
BUN SERPL-MCNC: 33 MG/DL (ref 8–23)
CALCIUM SERPL-MCNC: 9.3 MG/DL (ref 8.7–10.5)
CHLORIDE SERPL-SCNC: 108 MMOL/L (ref 95–110)
CO2 SERPL-SCNC: 24 MMOL/L (ref 23–29)
CREAT SERPL-MCNC: 1 MG/DL (ref 0.5–1.4)
DIFFERENTIAL METHOD: ABNORMAL
EOSINOPHIL # BLD AUTO: 0.1 K/UL (ref 0–0.5)
EOSINOPHIL NFR BLD: 1.3 % (ref 0–8)
ERYTHROCYTE [DISTWIDTH] IN BLOOD BY AUTOMATED COUNT: 16.3 % (ref 11.5–14.5)
EST. GFR  (NO RACE VARIABLE): >60 ML/MIN/1.73 M^2
GLUCOSE SERPL-MCNC: 148 MG/DL (ref 70–110)
GLUCOSE SERPL-MCNC: 155 MG/DL (ref 70–110)
GLUCOSE SERPL-MCNC: 171 MG/DL (ref 70–110)
GLUCOSE SERPL-MCNC: 193 MG/DL (ref 70–110)
GLUCOSE SERPL-MCNC: 214 MG/DL (ref 70–110)
HCT VFR BLD AUTO: 33.7 % (ref 40–54)
HGB BLD-MCNC: 10.5 G/DL (ref 14–18)
IMM GRANULOCYTES # BLD AUTO: 0.05 K/UL (ref 0–0.04)
IMM GRANULOCYTES NFR BLD AUTO: 0.5 % (ref 0–0.5)
LYMPHOCYTES # BLD AUTO: 0.5 K/UL (ref 1–4.8)
LYMPHOCYTES NFR BLD: 5.4 % (ref 18–48)
MAGNESIUM SERPL-MCNC: 1.5 MG/DL (ref 1.6–2.6)
MCH RBC QN AUTO: 26.3 PG (ref 27–31)
MCHC RBC AUTO-ENTMCNC: 31.2 G/DL (ref 32–36)
MCV RBC AUTO: 84 FL (ref 82–98)
MONOCYTES # BLD AUTO: 0.8 K/UL (ref 0.3–1)
MONOCYTES NFR BLD: 9.1 % (ref 4–15)
NEUTROPHILS # BLD AUTO: 7.7 K/UL (ref 1.8–7.7)
NEUTROPHILS NFR BLD: 83.5 % (ref 38–73)
NRBC BLD-RTO: 0 /100 WBC
PLATELET # BLD AUTO: 183 K/UL (ref 150–450)
PMV BLD AUTO: 10.5 FL (ref 9.2–12.9)
POTASSIUM SERPL-SCNC: 3.1 MMOL/L (ref 3.5–5.1)
POTASSIUM SERPL-SCNC: 3.4 MMOL/L (ref 3.5–5.1)
PROT SERPL-MCNC: 5.9 G/DL (ref 6–8.4)
RBC # BLD AUTO: 4 M/UL (ref 4.6–6.2)
SODIUM SERPL-SCNC: 139 MMOL/L (ref 136–145)
WBC # BLD AUTO: 9.23 K/UL (ref 3.9–12.7)

## 2023-08-25 PROCEDURE — 25000003 PHARM REV CODE 250: Performed by: FAMILY MEDICINE

## 2023-08-25 PROCEDURE — 63600175 PHARM REV CODE 636 W HCPCS: Performed by: STUDENT IN AN ORGANIZED HEALTH CARE EDUCATION/TRAINING PROGRAM

## 2023-08-25 PROCEDURE — 94799 UNLISTED PULMONARY SVC/PX: CPT

## 2023-08-25 PROCEDURE — 25000003 PHARM REV CODE 250: Performed by: SPECIALIST

## 2023-08-25 PROCEDURE — 83735 ASSAY OF MAGNESIUM: CPT | Performed by: FAMILY MEDICINE

## 2023-08-25 PROCEDURE — 94761 N-INVAS EAR/PLS OXIMETRY MLT: CPT

## 2023-08-25 PROCEDURE — 84132 ASSAY OF SERUM POTASSIUM: CPT | Performed by: STUDENT IN AN ORGANIZED HEALTH CARE EDUCATION/TRAINING PROGRAM

## 2023-08-25 PROCEDURE — 12000002 HC ACUTE/MED SURGE SEMI-PRIVATE ROOM

## 2023-08-25 PROCEDURE — 36415 COLL VENOUS BLD VENIPUNCTURE: CPT | Performed by: STUDENT IN AN ORGANIZED HEALTH CARE EDUCATION/TRAINING PROGRAM

## 2023-08-25 PROCEDURE — 25000003 PHARM REV CODE 250: Performed by: STUDENT IN AN ORGANIZED HEALTH CARE EDUCATION/TRAINING PROGRAM

## 2023-08-25 PROCEDURE — 99232 PR SUBSEQUENT HOSPITAL CARE,LEVL II: ICD-10-PCS | Mod: ,,, | Performed by: SPECIALIST

## 2023-08-25 PROCEDURE — 97530 THERAPEUTIC ACTIVITIES: CPT

## 2023-08-25 PROCEDURE — 97535 SELF CARE MNGMENT TRAINING: CPT

## 2023-08-25 PROCEDURE — 99900035 HC TECH TIME PER 15 MIN (STAT)

## 2023-08-25 PROCEDURE — 99232 SBSQ HOSP IP/OBS MODERATE 35: CPT | Mod: ,,, | Performed by: SPECIALIST

## 2023-08-25 PROCEDURE — 85025 COMPLETE CBC W/AUTO DIFF WBC: CPT | Performed by: FAMILY MEDICINE

## 2023-08-25 PROCEDURE — 80053 COMPREHEN METABOLIC PANEL: CPT | Performed by: FAMILY MEDICINE

## 2023-08-25 PROCEDURE — 36415 COLL VENOUS BLD VENIPUNCTURE: CPT | Performed by: FAMILY MEDICINE

## 2023-08-25 RX ORDER — ENOXAPARIN SODIUM 100 MG/ML
40 INJECTION SUBCUTANEOUS EVERY 24 HOURS
Status: DISCONTINUED | OUTPATIENT
Start: 2023-08-25 | End: 2023-08-27 | Stop reason: HOSPADM

## 2023-08-25 RX ADMIN — Medication 800 MG: at 09:08

## 2023-08-25 RX ADMIN — NITROGLYCERIN 2 INCH: 20 OINTMENT TOPICAL at 02:08

## 2023-08-25 RX ADMIN — CLOPIDOGREL BISULFATE 75 MG: 75 TABLET, FILM COATED ORAL at 08:08

## 2023-08-25 RX ADMIN — ATORVASTATIN CALCIUM 40 MG: 40 TABLET, FILM COATED ORAL at 09:08

## 2023-08-25 RX ADMIN — NITROGLYCERIN 2 INCH: 20 OINTMENT TOPICAL at 05:08

## 2023-08-25 RX ADMIN — HYDROCODONE BITARTRATE AND ACETAMINOPHEN 1 TABLET: 5; 325 TABLET ORAL at 04:08

## 2023-08-25 RX ADMIN — METOPROLOL TARTRATE 25 MG: 25 TABLET, FILM COATED ORAL at 09:08

## 2023-08-25 RX ADMIN — PANTOPRAZOLE SODIUM 40 MG: 40 TABLET, DELAYED RELEASE ORAL at 05:08

## 2023-08-25 RX ADMIN — NITROGLYCERIN 2 INCH: 20 OINTMENT TOPICAL at 09:08

## 2023-08-25 RX ADMIN — OXYBUTYNIN CHLORIDE 10 MG: 5 TABLET, EXTENDED RELEASE ORAL at 08:08

## 2023-08-25 RX ADMIN — MONTELUKAST 10 MG: 10 TABLET, FILM COATED ORAL at 09:08

## 2023-08-25 RX ADMIN — FINASTERIDE 5 MG: 5 TABLET, FILM COATED ORAL at 08:08

## 2023-08-25 RX ADMIN — ENOXAPARIN SODIUM 40 MG: 40 INJECTION SUBCUTANEOUS at 05:08

## 2023-08-25 RX ADMIN — POTASSIUM CHLORIDE 20 MEQ: 1500 TABLET, EXTENDED RELEASE ORAL at 05:08

## 2023-08-25 RX ADMIN — POTASSIUM CHLORIDE 20 MEQ: 1500 TABLET, EXTENDED RELEASE ORAL at 09:08

## 2023-08-25 RX ADMIN — METOPROLOL TARTRATE 25 MG: 25 TABLET, FILM COATED ORAL at 02:08

## 2023-08-25 RX ADMIN — FUROSEMIDE 20 MG: 20 TABLET ORAL at 09:08

## 2023-08-25 RX ADMIN — METOPROLOL TARTRATE 25 MG: 25 TABLET, FILM COATED ORAL at 08:08

## 2023-08-25 RX ADMIN — Medication 400 MG: at 05:08

## 2023-08-25 RX ADMIN — ASPIRIN 81 MG: 81 TABLET, COATED ORAL at 08:08

## 2023-08-25 RX ADMIN — SPIRONOLACTONE 25 MG: 25 TABLET ORAL at 08:08

## 2023-08-25 RX ADMIN — ACETAMINOPHEN 650 MG: 325 TABLET ORAL at 05:08

## 2023-08-25 RX ADMIN — SERTRALINE HYDROCHLORIDE 50 MG: 50 TABLET ORAL at 09:08

## 2023-08-25 NOTE — PLAN OF CARE
ARIANNA contacted Makenzie with [facility] regarding patient possibly discharging today.  Makenzie requested new therapy notes.  CM notified therapy of new notes needed.  PT note put in and sent to Makenzie via careport.  Awaiting OT note at this time. CM following.    13:22- CM called Makenzie with [facility] regarding discharge today.  No answer: left voicemail.    14:19- OT note sent to [facility] via careport.  Makenzie notified and is heading this way to see the patient.      15:29- Per Dr. Brantley at [facility], patient can discharge to their facility Sunday, 8/27.  Makenzie with [facility] stated they do not have transport on the weekends, but she already spoke to patient's daughter who stated she will transport the patient there.        08/25/23 1122   Post-Acute Status   Post-Acute Authorization Placement   Post-Acute Placement Status Set-up Complete/Auth obtained   HME Status Set-up Complete/Auth obtained   Hospital Resources/Appts/Education Provided Post-Acute resouces added to AVS   Patient choice form signed by patient/caregiver List with quality metrics by geographic area provided   Discharge Plan   Discharge Plan A Rehab   Discharge Plan B Rehab

## 2023-08-25 NOTE — PROGRESS NOTES
[facility]  Department of Cardiology  Progress Note      PATIENT NAME: Tono Saez  MRN: [id number]  TODAY'S DATE: 08/25/2023  ADMIT DATE: 8/16/2023                          CONSULT REQUESTED BY: Sixto Schultz,Trell    SUBJECTIVE     PRINCIPAL PROBLEM: <principal problem not specified>      INTERVAL HISTORY  8/25/23  S/P PCI yesterday  Denies angina; reports chest soreness when coughs  Right groin w/o bleeding or swelling  VSS overnight  Paced rhythm on tele  K 3.1 / Mg 1.5 this AM  Planning for rehab after DC    8/23/23  Hospital Course:  Patient feels much better is not nauseated and less short of breath and diuresis     Interval History:  T ponins elevated Will repeat troponin today  Review of patient's allergies indicates:   Allergen Reactions    Adhesive Other (See Comments)     SILK TAPE PULL SKIN OFF    Metformin Other (See Comments)     Weight loss cachexia anorexia,diarrhea.    Pcn [penicillins]      Patient states he passed out from this medication when he was 10 years old.        Past Medical History:   Diagnosis Date    Anemia     Anticoagulant long-term use     Arthritis     CAD (coronary artery disease)     CABG, STENTS '97,'99,'01,'05    Cancer     SKIN    Cataract     Diabetes mellitus     Diabetes mellitus type II     GERD (gastroesophageal reflux disease)     GI bleed 2020    Hypertension     Myocardial infarction     Wears glasses      Past Surgical History:   Procedure Laterality Date    ANGIOGRAM, CORONARY, WITH LEFT HEART CATHETERIZATION N/A 8/24/2023    Procedure: Angiogram, Coronary, with Left Heart Cath;  Surgeon: Robles Mckoy MD;  Location: [facility] CATH/EP LAB;  Service: Cardiology;  Laterality: N/A;    CARDIAC PACEMAKER PLACEMENT      CARDIAC PACEMAKER PLACEMENT      CARDIAC PACEMAKER PLACEMENT  04/26/2018    replaced    CARDIAC SURGERY      1995   CABG X 5    CHOLECYSTECTOMY      COLON SURGERY      COLONOSCOPY N/A 2/21/2020    Procedure: COLONOSCOPY;  Surgeon: Abe  JERSON Barragan III, MD;  Location: [facility] ENDO;  Service: Endoscopy;  Laterality: N/A;    COLONOSCOPY N/A 2/23/2020    Procedure: COLONOSCOPY;  Surgeon: Bob Locke Jr., MD;  Location: [facility] ENDO;  Service: Endoscopy;  Laterality: N/A;    CORONARY ANGIOGRAPHY INCLUDING BYPASS GRAFTS WITH CATHETERIZATION OF LEFT HEART N/A 10/26/2022    Procedure: ANGIOGRAM, CORONARY, INCLUDING BYPASS GRAFT, WITH LEFT HEART CATHETERIZATION;  Surgeon: Robles Mckoy MD;  Location: [facility] CATH/EP LAB;  Service: Cardiology;  Laterality: N/A;    CORONARY ARTERY BYPASS GRAFT  1995    CORONARY BYPASS GRAFT ANGIOGRAPHY  8/24/2023    Procedure: Bypass graft study;  Surgeon: Robles Mckoy MD;  Location: [facility] CATH/EP LAB;  Service: Cardiology;;    CORONARY STENT PLACEMENT      stent x 5    ESOPHAGOGASTRODUODENOSCOPY N/A 2/23/2020    Procedure: EGD (ESOPHAGOGASTRODUODENOSCOPY);  Surgeon: Bob Locke Jr., MD;  Location: [facility];  Service: Endoscopy;  Laterality: N/A;    EYE SURGERY      BILAT CATARACT    head laceration   01/19/2018    HEMORRHOID SURGERY      INJECTION OF ANESTHETIC AGENT AROUND MEDIAL BRANCH NERVES INNERVATING LUMBAR FACET JOINT Bilateral 6/29/2023    Procedure: Block-nerve-medial branch-lumbar;  Surgeon: Maurice Montenegro MD;  Location: [facility] OR;  Service: Pain Management;  Laterality: Bilateral;  L3,4,5 MBB    INJECTION OF ANESTHETIC AGENT AROUND MEDIAL BRANCH NERVES INNERVATING LUMBAR FACET JOINT Bilateral 7/25/2023    Procedure: Block-nerve-medial branch-lumbar;  Surgeon: Maurice Montenegro MD;  Location: [facility] OR;  Service: Anesthesiology;  Laterality: Bilateral;  L3,4,5 MBB #2    INTRAVASCULAR ULTRASOUND, CORONARY N/A 8/24/2023    Procedure: Ultrasound-coronary;  Surgeon: Robles Mckoy MD;  Location: [facility] CATH/EP LAB;  Service: Cardiology;  Laterality: N/A;    PTCA, SINGLE VESSEL  8/24/2023    Procedure: PTCA, Single Vessel;  Surgeon: Robles Mckoy MD;  Location: [facility] CATH/EP LAB;  Service: Cardiology;;    SMALL BOWEL  ENTEROSCOPY N/A 2/21/2020    Procedure: ENTEROSCOPY;  Surgeon: Abe Barragan III, MD;  Location: [facility] ENDO;  Service: Endoscopy;  Laterality: N/A;    STENT, DRUG ELUTING, SINGLE VESSEL, CORONARY  8/24/2023    Procedure: Stent, Drug Eluting, Single Vessel, Coronary;  Surgeon: Robles Mckoy MD;  Location: [facility] CATH/EP LAB;  Service: Cardiology;;    stint       Social History     Tobacco Use    Smoking status: Never    Smokeless tobacco: Never   Substance Use Topics    Alcohol use: No    Drug use: No        REVIEW OF SYSTEMS  CONSTITUTIONAL: +fatigue; Negative for chills, and fever.   NEURO: No headaches, No dizziness  RESPIRATORY: Occasional dry cough, No shortness of breath or wheezing.    CARDIOVASCULAR: Negative for chest pain. Negative for palpitations and leg swelling. Groin site tenderness  GI: Negative for abdominal pain, No melena, diarrhea, nausea and vomiting.   : Incontinence of urine; Negative for dysuria and frequency, Negative for hematuria  SKIN: + various areas of bruising, Negative for edema   MUSCULOSKELETAL: +Recent fall; generalized weakness; planning for PT and Rehab;      OBJECTIVE     VITAL SIGNS (Most Recent)  Temp: 98.5 °F (36.9 °C) (08/25/23 0744)  Pulse: 76 (08/25/23 0806)  Resp: 18 (08/25/23 0806)  BP: (!) 148/86 (08/25/23 0744)  SpO2: 95 % (08/25/23 0806)    VENTILATION STATUS  Resp: 18 (08/25/23 0806)  SpO2: 95 % (08/25/23 0806)           I & O (Last 24H):  Intake/Output Summary (Last 24 hours) at 8/25/2023 1143  Last data filed at 8/25/2023 1005  Gross per 24 hour   Intake 320 ml   Output --   Net 320 ml       WEIGHTS  Wt Readings from Last 3 Encounters:   08/18/23 0500 82.2 kg (181 lb 3.2 oz)   08/17/23 0048 83.6 kg (184 lb 3.2 oz)   08/22/23 1008 82.2 kg (181 lb 3.5 oz)   08/17/23 1120 83.6 kg (184 lb 4.9 oz)       PHYSICAL EXAM  GENERAL: frail elderly male breathing comfortably in flat position in no apparent distress. Son at BS; Pt appears tired   HEENT: Normocephalic.  Pupils normal and conjunctivae normal.  Mucous membranes normal, no cyanosis or icterus, trachea central,no pallor or icterus is noted..   NECK: No JVD. No bruit..   CARDIAC: Regular rate and rhythm. S1 is normal.S2 is normal. +Faint systolic murmur; Paced rhythm;   CHEST ANATOMY: thin    LUNGS: Faint expiratory wheezing upper lung fields; Breathing comfortably in flat position on room air;  ABDOMEN: Soft flat, thin, no masses or organomegaly.  No abdomen pulsations or bruits.  Normal bowel sounds. No pulsations and no masses felt, No guarding or rebound.   URINARY incontinent to diaper   EXTREMITIES: Generalized bruising and scabs bilateral arms and shins; Right groin cath site is soft and w/o swelling or bleeding; No hematoma palpable. Site is tender  PERIPHERAL VASCULAR SYSTEM: Good palpable distal pulses.   CENTRAL NERVOUS SYSTEM: No focal motor or sensory deficits noted.   SKIN: Skin without lesions, moist, well perfused.   MUSCLE STRENGTH & TONE: No noteable weakness, atrophy or abnormal movement.     HOME MEDICATIONS:  Current Facility-Administered Medications on File Prior to Encounter   Medication Dose Route Frequency Provider Last Rate Last Admin    lactated ringers infusion   Intravenous Continuous Maurice Montenegro MD 25 mL/hr at 07/25/23 1013 New Bag at 07/25/23 1013    LIDOcaine (PF) 10 mg/ml (1%) injection 10 mg  1 mL Intradermal Once Maurice Montenegro MD         Current Outpatient Medications on File Prior to Encounter   Medication Sig Dispense Refill    aspirin (ECOTRIN) 81 MG EC tablet Take 2 tablets (162 mg total) by mouth once daily. (Patient taking differently: Take 81 mg by mouth once daily.) 120 tablet 0    atorvastatin (LIPITOR) 40 MG tablet Take 1 tablet by mouth once daily.      betamethasone dipropionate (DIPROLENE) 0.05 % lotion Apply thin film to scalp bid prn itch (Patient taking differently: Apply 1 g topically 2 (two) times daily. Apply thin film to scalp bid prn itch) 60 mL 6    clopidogreL  (PLAVIX) 75 mg tablet Take 1 tablet (75 mg total) by mouth once daily. 90 tablet 0    empagliflozin (JARDIANCE) 25 mg tablet Take 1 tablet (25 mg total) by mouth once daily. 90 tablet 2    finasteride (PROSCAR) 5 mg tablet Take 5 mg by mouth once daily.      furosemide (LASIX) 20 MG tablet TAKE 1 TABLET (20 MG TOTAL) BY MOUTH ONCE DAILY. (Patient taking differently: Take 20 mg by mouth once daily.) 90 tablet 3    isosorbide dinitrate (ISORDIL) 10 MG tablet Take 10 mg by mouth 2 (two) times daily.      metoprolol tartrate (LOPRESSOR) 25 MG tablet Take 0.5 tablets (12.5 mg total) by mouth 2 (two) times daily. 60 tablet 0    midodrine (PROAMATINE) 2.5 MG Tab Take 1 tablet (2.5 mg total) by mouth 3 (three) times daily. 90 tablet 11    montelukast (SINGULAIR) 10 mg tablet Take 1 tablet (10 mg total) by mouth every evening. 90 tablet 3    multivitamin capsule Take 1 capsule by mouth once daily.      pantoprazole (PROTONIX) 40 MG tablet TAKE ONE TABLET BY MOUTH ONCE DAILY (Patient taking differently: Take 40 mg by mouth once daily.) 90 tablet 1    pioglitazone (ACTOS) 15 MG tablet Take 15 mg by mouth once daily.      pregabalin (LYRICA) 50 MG capsule Take 1 capsule (50 mg total) by mouth 3 (three) times daily. 90 capsule 1    sertraline (ZOLOFT) 50 MG tablet TAKE ONE TABLET BY MOUTH ONCE DAILY (Patient taking differently: Take 50 mg by mouth once daily.) 90 tablet 1    tolterodine (DETROL LA) 4 MG 24 hr capsule TAKE ONE CAPSULE BY MOUTH ONCE DAILY (Patient taking differently: Take 4 mg by mouth once daily.) 90 capsule 1    vitamin D 1000 units Tab Take 1,000 Units by mouth once daily.      atorvastatin (LIPITOR) 40 MG tablet Take 1 tablet (40 mg total) by mouth every evening. 90 tablet 1    fluticasone propionate (FLONASE) 50 mcg/actuation nasal spray 1 spray by Each Nostril route as needed for Allergies.      HYDROcodone-acetaminophen (NORCO) 5-325 mg per tablet Take 1 tablet by mouth every 4 (four) hours as needed for  "Pain. 18 tablet 0    nitroGLYCERIN (NITROSTAT) 0.4 MG SL tablet DISSOLVE 1 TABLET UNDER THE TONGUE AS NEEDED FOR CHEST PAIN (Patient taking differently: Place 0.4 mg under the tongue every 5 (five) minutes as needed.) 100 tablet 3       SCHEDULED MEDS:   aspirin  81 mg Oral Daily    atorvastatin  40 mg Oral Daily    clopidogreL  75 mg Oral Daily    finasteride  5 mg Oral Daily    furosemide  20 mg Oral Daily    metoprolol tartrate  25 mg Oral TID    montelukast  10 mg Oral QHS    nitroGLYCERIN 2% TD oint  2 inch Topical (Top) Q8H    oxybutynin  10 mg Oral Daily    pantoprazole  40 mg Oral Before breakfast    sertraline  50 mg Oral Daily    spironolactone  25 mg Oral Daily       CONTINUOUS INFUSIONS:   sodium chloride 0.9% Stopped (08/21/23 2141)    sodium chloride 0.9% 75 mL/hr at 08/24/23 1500       PRN MEDS:acetaminophen, albuterol-ipratropium, dextrose 50%, dextrose 50%, glucagon (human recombinant), glucose, glucose, hydrALAZINE, hydrALAZINE, HYDROcodone-acetaminophen, insulin aspart U-100, magnesium oxide, magnesium oxide, magnesium oxide, melatonin, naloxone, nitroGLYCERIN, ondansetron, polyethylene glycol, potassium chloride, potassium chloride, potassium chloride, senna-docusate 8.6-50 mg, simethicone, sodium chloride 0.9%    LABS AND DIAGNOSTICS     CBC LAST 3 DAYS  Recent Labs   Lab 08/23/23  0324 08/24/23  0516 08/25/23  0418   WBC 9.61 8.72 9.23   RBC 4.05* 3.90* 4.00*   HGB 10.9* 10.5* 10.5*   HCT 35.5* 32.8* 33.7*   MCV 88 84 84   MCH 26.9* 26.9* 26.3*   MCHC 30.7* 32.0 31.2*   RDW 16.5* 16.3* 16.3*    197 183   MPV 11.0 10.4 10.5   GRAN 85.1*  8.2* 78.4*  6.9 83.5*  7.7   LYMPH 4.4*  0.4* 8.3*  0.7* 5.4*  0.5*   MONO 8.5  0.8 8.5  0.7 9.1  0.8   BASO 0.03 0.04 0.02   NRBC 0 0 0       COAGULATION LAST 3 DAYS  No results for input(s): "LABPT", "INR", "APTT" in the last 168 hours.    CHEMISTRY LAST 3 DAYS  Recent Labs   Lab 08/23/23  0324 08/24/23  0516 08/25/23  0418    136 139   K " "3.5 3.1* 3.1*    107 108   CO2 22* 24 24   ANIONGAP 9 5* 7*   BUN 41* 43* 33*   CREATININE 1.3 1.1 1.0   * 167* 171*   CALCIUM 9.4 9.3 9.3   MG 1.8 1.5* 1.5*   ALBUMIN 3.1* 2.9* 2.9*   PROT 6.2 5.8* 5.9*   ALKPHOS 75 79 80   ALT 22 37 36   AST 40 45* 37   BILITOT 1.3* 1.2* 1.4*       CARDIAC PROFILE LAST 3 DAYS  Recent Labs   Lab 08/22/23  1207   BNP 1,890*       ENDOCRINE LAST 3 DAYS  No results for input(s): "TSH", "PROCAL" in the last 168 hours.    LAST ARTERIAL BLOOD GAS  ABG  No results for input(s): "PH", "PO2", "PCO2", "HCO3", "BE" in the last 168 hours.    LAST 7 DAYS MICROBIOLOGY   Microbiology Results (last 7 days)       ** No results found for the last 168 hours. **            MOST RECENT IMAGING  Echo    Left Ventricle: The left ventricle is normal in size. Moderately   increased ventricular mass. Moderately increased wall thickness. There is   moderate concentrict hypertrophy. Moderate global hypokinesis present. See   diagram for wall motion findings.  Patient has evidence of akinesis and   slight paradoxical motion of the mid inferior septum, distal anterior wall   and slight paradoxic motion at the apex This is not significantly   different than the echo noted 8/1723 The inferior wall in the lateral wall   supplied by the main left stented circumflex appears to move well There is   moderately reduced systolic function with a visually estimated ejection   fraction of 30 - 40%. 40% EF Grade I diastolic dysfunction. Elevated left   ventricular filling pressure. Tissue Doppler velocity is reduced.  Mean   left atrial pressure 14    Left Atrium: Left atrium is mildly dilated.    Right Ventricle: Mild right ventricular enlargement. Systolic function   is moderately reduced.    Aortic Valve: There is mild aortic valve sclerosis. There is mild to   moderate aortic regurgitation.    Mitral Valve: There is no stenosis.    Pulmonary Artery: No pulmonary hypertension. The estimated pulmonary "   artery systolic pressure is 22 mmHg.    IVC/SVC: Normal venous pressure at 3 mmHg.    Pacer wires are seen  X-Ray Chest AP Portable  Chest single view    CLINICAL DATA: Congestive heart failure    FINDINGS: AP view is compared to February 3, 2023, as well as a CT chest from August 16, 2023.    The heart is enlarged. There has been previous median sternotomy. Cardiac pacemaker device is unchanged in position.    There is interval development of pulmonary vascular congestion with evidence of mild central pulmonary edema. No significant effusion is identified. There is no pneumothorax.    IMPRESSION:  1. Radiographic findings compatible with CHF/volume overload and mild central pulmonary edema.    Electronically signed by:  Gordy Ozuna MD  8/22/2023 11:18 AM CDT Workstation: [id number]      ECHOCARDIOGRAM RESULTS (last 5)  Results for orders placed during the hospital encounter of 08/16/23    Echo    Interpretation Summary    Left Ventricle: The left ventricle is normal in size. Moderately increased ventricular mass. Moderately increased wall thickness. There is moderate concentrict hypertrophy. Moderate global hypokinesis present. See diagram for wall motion findings.  Patient has evidence of akinesis and slight paradoxical motion of the mid inferior septum, distal anterior wall and slight paradoxic motion at the apex This is not significantly different than the echo noted 8/1723 The inferior wall in the lateral wall supplied by the main left stented circumflex appears to move well There is moderately reduced systolic function with a visually estimated ejection fraction of 30 - 40%. 40% EF Grade I diastolic dysfunction. Elevated left ventricular filling pressure. Tissue Doppler velocity is reduced.  Mean left atrial pressure 14    Left Atrium: Left atrium is mildly dilated.    Right Ventricle: Mild right ventricular enlargement. Systolic function is moderately reduced.    Aortic Valve: There is mild aortic  valve sclerosis. There is mild to moderate aortic regurgitation.    Mitral Valve: There is no stenosis.    Pulmonary Artery: No pulmonary hypertension. The estimated pulmonary artery systolic pressure is 22 mmHg.    IVC/SVC: Normal venous pressure at 3 mmHg.    Pacer wires are seen      Echo    Interpretation Summary    Left Ventricle: The left ventricle is normal in size. Moderately increased wall thickness. There is moderate concentrict hypertrophy. Normal wall motion. There is moderately reduced systolic function with a visually estimated ejection fraction of 35 - 40%. There is normal diastolic function.    Left Atrium: Left atrium is moderately dilated.    Right Ventricle: Normal right ventricular cavity size. Wall thickness is normal. Right ventricle wall motion  is normal. Systolic function is normal.    Aortic Valve: The aortic valve is a trileaflet valve. There is mild aortic valve sclerosis.    Mitral Valve: There is moderate posterior leaflet sclerosis. Mild mitral annular calcification.    IVC/SVC: Normal venous pressure at 3 mmHg.      Results for orders placed during the hospital encounter of 06/01/23    Echo    Interpretation Summary  · Concentric remodeling and moderately decreased systolic function.  · The estimated ejection fraction is 25%.  · Grade I left ventricular diastolic dysfunction.mean left atrial pressure 9  · There are segmental left ventricular wall motion abnormalities.possible old ant apical scar  · There is abnormal septal wall motion consistent with right ventricular pacemaker.  · Atrial fibrillation not observed.  · With normal right ventricular systolic function.  · Mild-to-moderate aortic regurgitation.  · There is moderate aortic valve stenosis.  · Aortic valve area is 1.57 cm2; peak velocity is 2.05 m/s; mean gradient is 9 mmHg.  · Mild tricuspid regurgitation.  · Mild pulmonic regurgitation.  · Normal central venous pressure (3 mmHg).  · The estimated PA systolic pressure is 23  mmHg.no PH      Results for orders placed during the hospital encounter of 10/26/22    Echo    Interpretation Summary  · The left ventricle is normal in size with mild concentric hypertrophy and normal systolic function.  · The estimated ejection fraction is 55%.  · Indeterminate left ventricular diastolic function.  · There is abnormal septal wall motion consistent with post-operative status.  · Moderate to severe left atrial enlargement.  · Mild to moderate tricuspid regurgitation.  · Mild-to-moderate aortic regurgitation.  · There is mild aortic valve stenosis.      Results for orders placed in visit on 03/28/22    Echo    Interpretation Summary  · The left ventricle is normal in size with concentric remodeling  · The estimated ejection fraction is 44%.  · Grade I left ventricular diastolic dysfunction.  · There are segmental left ventricular wall motion abnormalities. Apical hypokinesis noted  · There is abnormal septal wall motion consistent with right ventricular pacemaker.  · Normal right ventricular size with mildly reduced right ventricular systolic function.  · Mild-to-moderate aortic regurgitation.  · There is mild-to-moderate aortic valve stenosis.  · Aortic valve area is 1.77 cm2; peak velocity is 1.96 m/s; mean gradient is 9 mmHg.  · Mild mitral regurgitation.  · Normal central venous pressure (3 mmHg).  · The estimated PA systolic pressure is 13 mmHg.      CURRENT/PREVIOUS VISIT EKG  Results for orders placed or performed during the hospital encounter of 08/16/23   EKG 12-lead    Collection Time: 08/24/23  9:59 AM    Narrative    Test Reason : Z01.810,    Vent. Rate : 087 BPM     Atrial Rate : 087 BPM     P-R Int : 204 ms          QRS Dur : 200 ms      QT Int : 466 ms       P-R-T Axes : 055 -84 090 degrees     QTc Int : 560 ms    Atrial-sensed ventricular-paced rhythm  Abnormal ECG  When compared with ECG of 23-AUG-2023 16:05,  Premature atrial complexes are no longer Present  Vent. rate has increased  BY  14 BPM    Referred By: [email or web address]   SELF           Confirmed By:            ASSESSMENT/PLAN:     Active Hospital Problems    Diagnosis    Skin tear of left upper arm without complication    Syncope and collapse    Orthostatic hypotension    Chronic systolic congestive heart failure    SSS (sick sinus syndrome)    Stage 3a chronic kidney disease    Compression fracture of L1 lumbar vertebra, with delayed healing, subsequent encounter    Coronary artery disease of native artery of native heart with stable angina pectoris     CABG, STENTS '97,'99,'01,'05    10/26/22:  Summary         The Dist LM lesion was 90% stenosed with 10% stenosis post-intervention.    The pre-procedure left ventricular end diastolic pressure was 12.    A STENT LIMA YOHANA 3.50 X 26 stent was not successfully placed at 12 JASPER for 20 sec.    The estimated blood loss was <50 mL.    Successful intravascular ultrasound-guided PCI of the critical stenosis of left main into circumflex with 1 drug-eluting stent    Patent vein graft to OM, occluded branch of the OM as compared to angiogram in 2016.    Patent vein graft to right coronary artery with patent stent, moderately degenerated, distal native RCA has 80% stenosis in medium caliber vessel.    Occluded LAD with a patent LIMA to LAD        Chronic anticoagulation    GERD (gastroesophageal reflux disease)    Type 2 diabetes mellitus with hyperglycemia     HTN (hypertension)       ASSESSMENT & PLAN:   CAD, S/P PCI  Chronic systolic congestive heart failure  Syncope/Fall  CKD  DM2    RECOMMENDATIONS:  Patient is s/p PCI on 8/24/23. Continue aspirin 81 mg daily and plavix 75 mg daily and statin therapy.    HFrEF, Efx 30-40%. Continue metoprolol, spironolactone and lasix. (Cr/GFR today- 1.0/>60). BP has been stable overnight. Continue strict I/O, daily weight, low sodium diet    3. Low electrolytes this AM labs. Replace potassium today for level 3.1 and Magnesium 1.5 (Done). Continue to check and replace  potassium and magnesium. Goal for potassium is 4.0, and goal for magnesium is 2.0.     4. Case management arranging rehab post discharge. Patient will need follow up in cardiology clinic in 1-2 weeks post discharge.    Ritu Chi NP  [facility]  Department of Cardiology  Date of Service: 08/25/2023        I have personally interviewed and examined the patient, I have reviewed the Nurse Practitioner's history and physical, assessment, and plan. I agree with the findings and plan.      Dr Jose J M.D.  [facility]  Department of Cardiology  Date of Service: 08/25/2023  11:43 AM

## 2023-08-25 NOTE — PT/OT/SLP PROGRESS
"Physical Therapy Treatment    Patient Name:  Tono Saez   MRN:  [id number]    Recommendations:     Discharge Recommendations: rehabilitation facility  Discharge Equipment Recommendations: none  Barriers to discharge: Decreased caregiver support    Assessment:     Tono Saez is a 78 y.o. male admitted with a medical diagnosis of <principal problem not specified>.  He presents with the following impairments/functional limitations: weakness, impaired endurance, impaired self care skills, impaired functional mobility, impaired cardiopulmonary response to activity .    Pt readily consented to PT.  He explained that he was independent with mobility prior to this admit and used RW as needed. "That was before I blacked out."  . Pt voiced his desire for return to PLOF.  He t/f'ed to EOB with Mod A  from long sitting positioning. TLSO was placed on pt before t/f to stand  with Min A. Pt complained of  dizziness. He took a few steps  with RW and Min A before return to supine with Mod A . Pt appears appropriate for  Rehab upon D/C for progression toward PLOF.    Rehab Prognosis: Good; patient would benefit from acute skilled PT services to address these deficits and reach maximum level of function.    Recent Surgery: Procedure(s) (LRB):  Angiogram, Coronary, with Left Heart Cath (N/A)  Ultrasound-coronary (N/A)  Bypass graft study  PTCA, Single Vessel  Stent, Drug Eluting, Single Vessel, Coronary 1 Day Post-Op    Plan:     During this hospitalization, patient to be seen 6 x/week to address the identified rehab impairments via gait training, therapeutic activities, therapeutic exercises and progress toward the following goals:    Plan of Care Expires:       Subjective     Chief Complaint: dizziness  with stance  Patient/Family Comments/goals: PLOF  Pain/Comfort:         Objective:     Communicated with nurse prior to session.  Patient found supine with telemetry, bed alarm, peripheral IV upon PT entry to room.     General " Precautions: Standard, fall, diabetic, aspiration  Orthopedic Precautions: spinal precautions  Braces: TLSO  Respiratory Status: Room air     Functional Mobility:  Bed Mobility:     Supine to Sit: moderate assistance  Sit to Supine: moderate assistance  Transfers:     Sit to Stand:  minimum assistance with rolling walker  Gait: 6 steps with RW and Min A. Did not ambulate farther  as pt reported feeling unsteady      AM-PAC 6 CLICK MOBILITY          Treatment & Education:  Pt was educated on safety, use of call light, donning of TLSO participated in t/f training and short distance gait training    Patient left supine with all lines intact, call button in reach, and bed alarm on..    GOALS:   Multidisciplinary Problems       Physical Therapy Goals          Problem: Physical Therapy    Goal Priority Disciplines Outcome Goal Variances Interventions   Physical Therapy Goal     PT, PT/OT Ongoing, Progressing     Description: Goals to be met by: 2023     Patient will increase functional independence with mobility by performin. Supine to sit with Contact Guard Assistance  2. Sit to stand transfer with Minimal Assistance  3. Bed to chair transfer with Minimal Assistance using Rolling Walker  4. Gait  x 150 feet with Minimal Assistance using Rolling Walker.   5. Lower extremity exercise program x20 reps                        Time Tracking:     PT Received On: 23  PT Start Time: 930     PT Stop Time: 956  PT Total Time (min): 26 min     Billable Minutes: Therapeutic Activity 26 minutes    Treatment Type: Treatment  PT/PTA: PT     Number of PTA visits since last PT visit: 2023

## 2023-08-25 NOTE — PLAN OF CARE
08/24/23 2040   Patient Assessment/Suction   Level of Consciousness (AVPU) alert   Respiratory Effort Normal;Unlabored   All Lung Fields Breath Sounds clear   Rhythm/Pattern, Respiratory depth regular;pattern regular;unlabored;no shortness of breath reported   PRE-TX-O2   Device (Oxygen Therapy) nasal cannula   $ Is the patient on Low Flow Oxygen? Yes   Flow (L/min) 2   SpO2 98 %   Pulse Oximetry Type Intermittent   $ Pulse Oximetry - Multiple Charge Pulse Oximetry - Multiple   Pulse 72   Resp 18   Aerosol Therapy   $ Aerosol Therapy Charges PRN treatment not required   Respiratory Treatment Status (SVN) PRN treatment not required   Education   $ Education Bronchodilator;15 min   Respiratory Evaluation   $ Care Plan Tech Time 15 min

## 2023-08-25 NOTE — PLAN OF CARE
Problem: Adult Inpatient Plan of Care  Goal: Plan of Care Review  Outcome: Ongoing, Progressing     Problem: Adult Inpatient Plan of Care  Goal: Absence of Hospital-Acquired Illness or Injury  Outcome: Ongoing, Progressing     Problem: Adult Inpatient Plan of Care  Goal: Optimal Comfort and Wellbeing  Outcome: Ongoing, Progressing     Problem: Diabetes Comorbidity  Goal: Blood Glucose Level Within Targeted Range  Outcome: Ongoing, Progressing     Problem: Syncope  Goal: Absence of Syncopal Symptoms  Outcome: Ongoing, Progressing     Problem: Fall Injury Risk  Goal: Absence of Fall and Fall-Related Injury  Outcome: Ongoing, Progressing     Problem: Skin Injury Risk Increased  Goal: Skin Health and Integrity  Outcome: Ongoing, Progressing     Problem: Impaired Wound Healing  Goal: Optimal Wound Healing  Outcome: Ongoing, Progressing     Problem: Bleeding (Cardiac Catheterization)  Goal: Absence of Bleeding  Outcome: Ongoing, Progressing     Problem: Pain (Cardiac Catheterization)  Goal: Acceptable Pain Control  Outcome: Ongoing, Progressing

## 2023-08-25 NOTE — PROGRESS NOTES
"[facility]  Adult Nutrition   Progress Note (Follow-Up)    SUMMARY      Recommendations:   1) Continue current cardiac diet with Magnus BID and encourage intake.   2) RD to continue to obtain daily meal selections.       Goals:   Pt to meet 100% of his energy and protein needs.    Dietitian Rounds Brief  F/U Nutrition Note: Pt is an 78 yowm admitted with "no active principal problem" and a pmh of anemia, arthritis, CAD, cancer, diabetes, cataracts, GERD, HTN, MI, and GI Bleed. He is eating 100% of his cardiac diet and drinking his Magnus. He does have altered skin which is being addressed. His LBM was 8/23/23 and will continue to follow prn.    Diet order: Cardiac    Oral Supplement: Magnus BID    % Intake of Estimated Energy Needs: 0%  % Meal Intake: NPO    Estimated/Assessed Needs  Weight Used For Calorie Calculations: 82.2 kg (181 lb 3.5 oz)  Energy Calorie Requirements (kcal): 8342-8560 kcals/day (20-25 kcals/kg ABW)  Energy Need Method: Kcal/kg  Protein Requirements: 65-97 g/day (.8-1.2 g/kg IBW)  Weight Used For Protein Calculations: 81 kg (178 lb 9.2 oz)     Estimated Fluid Requirement Method: RDA Method  RDA Method (mL): 1644       Weight History:  Wt Readings from Last 5 Encounters:   08/18/23 82.2 kg (181 lb 3.2 oz)   08/22/23 82.2 kg (181 lb 3.5 oz)   08/17/23 83.6 kg (184 lb 4.9 oz)   07/20/23 83.3 kg (183 lb 10.3 oz)   06/21/23 83 kg (183 lb)        Reason for Assessment  Reason For Assessment: RD follow-up  Relevant Medical History: HTN (hypertension), Type 2 diabetes mellitus with hyperglycemia, GERD (gastroesophageal reflux disease), Chronic anticoagulation, Coronary artery disease of native artery of native heart with stable angina pectoris, Compression fracture of L1 lumbar vertebra, with delayed healing, subsequent encounter, Chronic systolic congestive heart failure, SSS (sick sinus syndrome), Stage 3a chronic kidney disease, Syncope and collapse Orthostatic " hypotension    Medications:Pertinent Medications Reviewed  Scheduled Meds:   aspirin  81 mg Oral Daily    atorvastatin  40 mg Oral Daily    clopidogreL  75 mg Oral Daily    enoxparin  40 mg Subcutaneous Q24H (prophylaxis, 1700)    finasteride  5 mg Oral Daily    furosemide  20 mg Oral Daily    metoprolol tartrate  25 mg Oral TID    montelukast  10 mg Oral QHS    nitroGLYCERIN 2% TD oint  2 inch Topical (Top) Q8H    oxybutynin  10 mg Oral Daily    pantoprazole  40 mg Oral Before breakfast    sertraline  50 mg Oral Daily    spironolactone  25 mg Oral Daily     Continuous Infusions:   sodium chloride 0.9% Stopped (08/21/23 2141)    sodium chloride 0.9% 75 mL/hr at 08/24/23 1500     PRN Meds:.acetaminophen, albuterol-ipratropium, dextrose 50%, dextrose 50%, glucagon (human recombinant), glucose, glucose, hydrALAZINE, hydrALAZINE, HYDROcodone-acetaminophen, insulin aspart U-100, magnesium oxide, magnesium oxide, magnesium oxide, melatonin, naloxone, nitroGLYCERIN, ondansetron, polyethylene glycol, potassium chloride, potassium chloride, potassium chloride, senna-docusate 8.6-50 mg, simethicone, sodium chloride 0.9%    Labs: Pertinent Labs Reviewed  Clinical Chemistry:  Recent Labs   Lab 08/23/23  0324 08/24/23  0516 08/25/23  0418    136 139   K 3.5 3.1* 3.1*    107 108   CO2 22* 24 24   * 167* 171*   BUN 41* 43* 33*   CREATININE 1.3 1.1 1.0   CALCIUM 9.4 9.3 9.3   PROT 6.2 5.8* 5.9*   ALBUMIN 3.1* 2.9* 2.9*   BILITOT 1.3* 1.2* 1.4*   ALKPHOS 75 79 80   AST 40 45* 37   ALT 22 37 36   ANIONGAP 9 5* 7*   MG 1.8 1.5* 1.5*     CBC:   Recent Labs   Lab 08/25/23  0418   WBC 9.23   RBC 4.00*   HGB 10.5*   HCT 33.7*      MCV 84   MCH 26.3*   MCHC 31.2*     Cardiac Profile:  Recent Labs   Lab 08/22/23  1207   BNP 1,890*       Monitor and Evaluation  Food and Nutrient Intake: food and beverage intake, energy intake  Food and Nutrient Adminstration: diet order  Knowledge/Beliefs/Attitudes: food and  nutrition knowledge/skill, beliefs and attitudes  Physical Activity and Function: nutrition-related ADLs and IADLs, factors affecting access to physical activity  Anthropometric Measurements: weight, weight change, body mass index  Biochemical Data, Medical Tests and Procedures: lipid profile, inflammatory profile, glucose/endocrine profile, gastrointestinal profile, electrolyte and renal panel  Nutrition-Focused Physical Findings: overall appearance     Nutrition Risk  Level of Risk/Frequency of Follow-up: moderate     Nutrition Follow-Up  RD Follow-up?: Yes    Margaret Martin RD 08/25/2023 11:55 AM

## 2023-08-25 NOTE — PT/OT/SLP PROGRESS
"Occupational Therapy   Treatment    Name: Tono Saez  MRN: [id number]  Admitting Diagnosis:  <principal problem not specified>  1 Day Post-Op    Recommendations:     Discharge Recommendations: rehabilitation facility  Discharge Equipment Recommendations:  bedside commode  Barriers to discharge:  Decreased caregiver support    Assessment:     Tono Saez is a 78 y.o. male with a medical diagnosis of <principal problem not specified>.  Performance deficits affecting function are weakness, impaired endurance, impaired self care skills, impaired functional mobility, gait instability, impaired balance, decreased safety awareness, pain, impaired cardiopulmonary response to activity, orthopedic precautions.     Pt participated in bed mobility, transfers, lower body dressing, and toileting today; he was agreeable and motivated to participate.  Pt left up in chair with needs in reach.      Rehab Prognosis:  Good; patient would benefit from acute skilled OT services to address these deficits and reach maximum level of function.       Plan:     Patient to be seen 6 x/week to address the above listed problems via self-care/home management, therapeutic activities, therapeutic exercises  Plan of Care Expires: 09/17/23  Plan of Care Reviewed with: patient    Subjective     Chief Complaint: none  Patient/Family Comments/goals: "I'll do whatever you want."    Pain/Comfort:  Pain Rating 1: 0/10  Pain Rating Post-Intervention 1: 0/10    Objective:     Communicated with: nurse prior to session.  Patient found supine with telemetry upon OT entry to room.    General Precautions: Standard, fall    Orthopedic Precautions:spinal precautions  Braces: N/A  Respiratory Status: Room air     Occupational Performance:     Bed Mobility:    Patient completed Rolling/Turning to Left with  stand by assistance and with side rail  Patient completed Supine to Sit with minimum assistance     Functional Mobility/Transfers:  Patient completed Sit <> " Stand Transfer with contact guard assistance  with  rolling walker   Patient completed Bed <> Chair Transfer using Stand Pivot technique with contact guard assistance with rolling walker and minimal cues for safety/sequencing     Activities of Daily Living:  Lower Body Dressing: moderate assistance to don brief seated EOB  Toileting: minimum assistance for stability while standing to urinate at EOB; used urinal vs walking to toilet due to urgency     Patient left up in chair with all lines intact, call button in reach, and chair alarm on    GOALS:   Multidisciplinary Problems       Occupational Therapy Goals          Problem: Occupational Therapy    Goal Priority Disciplines Outcome Interventions   Occupational Therapy Goal     OT, PT/OT Ongoing, Progressing    Description: Goals to be met by: 8/31/2023     Patient will increase functional independence with ADLs by performing:    UE Dressing with Modified [place].  LE Dressing with Modified [place].  Grooming while standing at sink with Modified [place].  Toileting from toilet with Modified [place] for hygiene and clothing management.   Toilet transfer to toilet with Modified [place].                         Time Tracking:     OT Date of Treatment: 08/25/23  OT Start Time: 1323  OT Stop Time: 1346  OT Total Time (min): 23 min    Billable Minutes:Self Care/Home Management 15  Therapeutic Activity 08    OT/BRY: OT     Number of BRY visits since last OT visit: 1    8/25/2023

## 2023-08-26 LAB
ALBUMIN SERPL BCP-MCNC: 2.9 G/DL (ref 3.5–5.2)
ALP SERPL-CCNC: 80 U/L (ref 55–135)
ALT SERPL W/O P-5'-P-CCNC: 32 U/L (ref 10–44)
ANION GAP SERPL CALC-SCNC: 5 MMOL/L (ref 8–16)
AST SERPL-CCNC: 30 U/L (ref 10–40)
BASOPHILS # BLD AUTO: 0.03 K/UL (ref 0–0.2)
BASOPHILS NFR BLD: 0.3 % (ref 0–1.9)
BILIRUB SERPL-MCNC: 1.2 MG/DL (ref 0.1–1)
BUN SERPL-MCNC: 34 MG/DL (ref 8–23)
CALCIUM SERPL-MCNC: 9.3 MG/DL (ref 8.7–10.5)
CHLORIDE SERPL-SCNC: 108 MMOL/L (ref 95–110)
CO2 SERPL-SCNC: 25 MMOL/L (ref 23–29)
CREAT SERPL-MCNC: 1 MG/DL (ref 0.5–1.4)
DIFFERENTIAL METHOD: ABNORMAL
EOSINOPHIL # BLD AUTO: 0.3 K/UL (ref 0–0.5)
EOSINOPHIL NFR BLD: 2.8 % (ref 0–8)
ERYTHROCYTE [DISTWIDTH] IN BLOOD BY AUTOMATED COUNT: 16.2 % (ref 11.5–14.5)
EST. GFR  (NO RACE VARIABLE): >60 ML/MIN/1.73 M^2
GLUCOSE SERPL-MCNC: 164 MG/DL (ref 70–110)
GLUCOSE SERPL-MCNC: 199 MG/DL (ref 70–110)
GLUCOSE SERPL-MCNC: 202 MG/DL (ref 70–110)
GLUCOSE SERPL-MCNC: 209 MG/DL (ref 70–110)
GLUCOSE SERPL-MCNC: 237 MG/DL (ref 70–110)
HCT VFR BLD AUTO: 32.3 % (ref 40–54)
HGB BLD-MCNC: 9.9 G/DL (ref 14–18)
IMM GRANULOCYTES # BLD AUTO: 0.05 K/UL (ref 0–0.04)
IMM GRANULOCYTES NFR BLD AUTO: 0.6 % (ref 0–0.5)
LYMPHOCYTES # BLD AUTO: 0.6 K/UL (ref 1–4.8)
LYMPHOCYTES NFR BLD: 6.9 % (ref 18–48)
MAGNESIUM SERPL-MCNC: 1.5 MG/DL (ref 1.6–2.6)
MCH RBC QN AUTO: 26.1 PG (ref 27–31)
MCHC RBC AUTO-ENTMCNC: 30.7 G/DL (ref 32–36)
MCV RBC AUTO: 85 FL (ref 82–98)
MONOCYTES # BLD AUTO: 0.8 K/UL (ref 0.3–1)
MONOCYTES NFR BLD: 9.2 % (ref 4–15)
NEUTROPHILS # BLD AUTO: 7.2 K/UL (ref 1.8–7.7)
NEUTROPHILS NFR BLD: 80.2 % (ref 38–73)
NRBC BLD-RTO: 0 /100 WBC
PLATELET # BLD AUTO: 191 K/UL (ref 150–450)
PMV BLD AUTO: 10.8 FL (ref 9.2–12.9)
POTASSIUM SERPL-SCNC: 3.5 MMOL/L (ref 3.5–5.1)
PROT SERPL-MCNC: 5.8 G/DL (ref 6–8.4)
RBC # BLD AUTO: 3.8 M/UL (ref 4.6–6.2)
SODIUM SERPL-SCNC: 138 MMOL/L (ref 136–145)
WBC # BLD AUTO: 8.99 K/UL (ref 3.9–12.7)

## 2023-08-26 PROCEDURE — 63600175 PHARM REV CODE 636 W HCPCS: Performed by: STUDENT IN AN ORGANIZED HEALTH CARE EDUCATION/TRAINING PROGRAM

## 2023-08-26 PROCEDURE — 97530 THERAPEUTIC ACTIVITIES: CPT

## 2023-08-26 PROCEDURE — 12000002 HC ACUTE/MED SURGE SEMI-PRIVATE ROOM

## 2023-08-26 PROCEDURE — 80053 COMPREHEN METABOLIC PANEL: CPT | Performed by: FAMILY MEDICINE

## 2023-08-26 PROCEDURE — 94799 UNLISTED PULMONARY SVC/PX: CPT

## 2023-08-26 PROCEDURE — 97535 SELF CARE MNGMENT TRAINING: CPT

## 2023-08-26 PROCEDURE — 99900031 HC PATIENT EDUCATION (STAT)

## 2023-08-26 PROCEDURE — 99232 PR SUBSEQUENT HOSPITAL CARE,LEVL II: ICD-10-PCS | Mod: ,,, | Performed by: SPECIALIST

## 2023-08-26 PROCEDURE — 83735 ASSAY OF MAGNESIUM: CPT | Performed by: FAMILY MEDICINE

## 2023-08-26 PROCEDURE — 25000003 PHARM REV CODE 250: Performed by: NURSE PRACTITIONER

## 2023-08-26 PROCEDURE — 25000003 PHARM REV CODE 250: Performed by: FAMILY MEDICINE

## 2023-08-26 PROCEDURE — 25000003 PHARM REV CODE 250: Performed by: STUDENT IN AN ORGANIZED HEALTH CARE EDUCATION/TRAINING PROGRAM

## 2023-08-26 PROCEDURE — 99232 SBSQ HOSP IP/OBS MODERATE 35: CPT | Mod: ,,, | Performed by: SPECIALIST

## 2023-08-26 PROCEDURE — 97116 GAIT TRAINING THERAPY: CPT

## 2023-08-26 PROCEDURE — 99900035 HC TECH TIME PER 15 MIN (STAT)

## 2023-08-26 PROCEDURE — 25000003 PHARM REV CODE 250: Performed by: SPECIALIST

## 2023-08-26 PROCEDURE — 94761 N-INVAS EAR/PLS OXIMETRY MLT: CPT

## 2023-08-26 PROCEDURE — 85025 COMPLETE CBC W/AUTO DIFF WBC: CPT | Performed by: FAMILY MEDICINE

## 2023-08-26 PROCEDURE — 36415 COLL VENOUS BLD VENIPUNCTURE: CPT | Performed by: FAMILY MEDICINE

## 2023-08-26 RX ADMIN — Medication 400 MG: at 06:08

## 2023-08-26 RX ADMIN — INSULIN ASPART 2 UNITS: 100 INJECTION, SOLUTION INTRAVENOUS; SUBCUTANEOUS at 09:08

## 2023-08-26 RX ADMIN — SPIRONOLACTONE 25 MG: 25 TABLET ORAL at 09:08

## 2023-08-26 RX ADMIN — ATORVASTATIN CALCIUM 40 MG: 40 TABLET, FILM COATED ORAL at 09:08

## 2023-08-26 RX ADMIN — NITROGLYCERIN 2 INCH: 20 OINTMENT TOPICAL at 09:08

## 2023-08-26 RX ADMIN — METOPROLOL TARTRATE 25 MG: 25 TABLET, FILM COATED ORAL at 09:08

## 2023-08-26 RX ADMIN — OXYBUTYNIN CHLORIDE 10 MG: 5 TABLET, EXTENDED RELEASE ORAL at 09:08

## 2023-08-26 RX ADMIN — ENOXAPARIN SODIUM 40 MG: 40 INJECTION SUBCUTANEOUS at 05:08

## 2023-08-26 RX ADMIN — NITROGLYCERIN 2 INCH: 20 OINTMENT TOPICAL at 06:08

## 2023-08-26 RX ADMIN — CLOPIDOGREL BISULFATE 75 MG: 75 TABLET, FILM COATED ORAL at 09:08

## 2023-08-26 RX ADMIN — POTASSIUM CHLORIDE 20 MEQ: 1500 TABLET, EXTENDED RELEASE ORAL at 06:08

## 2023-08-26 RX ADMIN — Medication 400 MG: at 12:08

## 2023-08-26 RX ADMIN — ASPIRIN 81 MG: 81 TABLET, COATED ORAL at 09:08

## 2023-08-26 RX ADMIN — SACUBITRIL AND VALSARTAN 1 TABLET: 24; 26 TABLET, FILM COATED ORAL at 02:08

## 2023-08-26 RX ADMIN — SERTRALINE HYDROCHLORIDE 50 MG: 50 TABLET ORAL at 09:08

## 2023-08-26 RX ADMIN — NITROGLYCERIN 2 INCH: 20 OINTMENT TOPICAL at 02:08

## 2023-08-26 RX ADMIN — POTASSIUM CHLORIDE 20 MEQ: 1500 TABLET, EXTENDED RELEASE ORAL at 12:08

## 2023-08-26 RX ADMIN — PANTOPRAZOLE SODIUM 40 MG: 40 TABLET, DELAYED RELEASE ORAL at 06:08

## 2023-08-26 RX ADMIN — FINASTERIDE 5 MG: 5 TABLET, FILM COATED ORAL at 09:08

## 2023-08-26 RX ADMIN — INSULIN ASPART 4 UNITS: 100 INJECTION, SOLUTION INTRAVENOUS; SUBCUTANEOUS at 05:08

## 2023-08-26 RX ADMIN — SACUBITRIL AND VALSARTAN 1 TABLET: 24; 26 TABLET, FILM COATED ORAL at 09:08

## 2023-08-26 RX ADMIN — METOPROLOL TARTRATE 25 MG: 25 TABLET, FILM COATED ORAL at 02:08

## 2023-08-26 RX ADMIN — FUROSEMIDE 20 MG: 20 TABLET ORAL at 09:08

## 2023-08-26 RX ADMIN — MONTELUKAST 10 MG: 10 TABLET, FILM COATED ORAL at 09:08

## 2023-08-26 NOTE — PLAN OF CARE
08/25/23 2011   Patient Assessment/Suction   Level of Consciousness (AVPU) alert   Respiratory Effort Normal;Unlabored   All Lung Fields Breath Sounds clear   PRE-TX-O2   Device (Oxygen Therapy) room air   SpO2 95 %   Pulse Oximetry Type Intermittent   $ Pulse Oximetry - Multiple Charge Pulse Oximetry - Multiple   Pulse 72   Resp 16   Aerosol Therapy   $ Aerosol Therapy Charges PRN treatment not required   Respiratory Evaluation   $ Care Plan Tech Time 15 min   $ Eval/Re-eval Charges Re-evaluation

## 2023-08-26 NOTE — SUBJECTIVE & OBJECTIVE
Interval History:   As per subjective     Review of Systems  Objective:     Vital Signs (Most Recent):  Temp: 97.9 °F (36.6 °C) (08/26/23 1521)  Pulse: 70 (08/26/23 1521)  Resp: 18 (08/26/23 1521)  BP: (!) 147/82 (08/26/23 1521)  SpO2: 95 % (08/26/23 1521) Vital Signs (24h Range):  Temp:  [97.9 °F (36.6 °C)-98.7 °F (37.1 °C)] 97.9 °F (36.6 °C)  Pulse:  [67-88] 70  Resp:  [15-18] 18  SpO2:  [92 %-96 %] 95 %  BP: (147-179)/(82-99) 147/82     Weight: 81.8 kg (180 lb 4.8 oz)  Body mass index is 24.45 kg/m².    Intake/Output Summary (Last 24 hours) at 8/26/2023 1531  Last data filed at 8/26/2023 1524  Gross per 24 hour   Intake 490 ml   Output 350 ml   Net 140 ml         Physical Exam  Constitutional:       General: He is not in acute distress.     Appearance: He is well-developed.   HENT:      Head: Normocephalic.   Eyes:      Pupils: Pupils are equal, round, and reactive to light.   Cardiovascular:      Rate and Rhythm: Normal rate and regular rhythm.   Pulmonary:      Effort: Pulmonary effort is normal. No respiratory distress.   Abdominal:      General: Abdomen is flat. There is no distension.      Tenderness: There is no abdominal tenderness.   Musculoskeletal:         General: Normal range of motion.      Cervical back: Neck supple.   Skin:     General: Skin is warm.      Findings: No rash.   Neurological:      Mental Status: He is alert and oriented to person, place, and time.   Psychiatric:         Mood and Affect: Mood normal.             Significant Labs: All pertinent labs within the past 24 hours have been reviewed.  CBC:   Recent Labs   Lab 08/25/23  0418 08/26/23  0320   WBC 9.23 8.99   HGB 10.5* 9.9*   HCT 33.7* 32.3*    191     CMP:   Recent Labs   Lab 08/25/23  0418 08/25/23  1239 08/26/23  0320     --  138   K 3.1* 3.4* 3.5     --  108   CO2 24  --  25   *  --  202*   BUN 33*  --  34*   CREATININE 1.0  --  1.0   CALCIUM 9.3  --  9.3   PROT 5.9*  --  5.8*   ALBUMIN 2.9*  --   "2.9*   BILITOT 1.4*  --  1.2*   ALKPHOS 80  --  80   AST 37  --  30   ALT 36  --  32   ANIONGAP 7*  --  5*     Cardiac Markers: No results for input(s): "CKMB", "MYOGLOBIN", "BNP", "TROPISTAT" in the last 48 hours.    Significant Imaging: I have reviewed all pertinent imaging results/findings within the past 24 hours.  "

## 2023-08-26 NOTE — PT/OT/SLP PROGRESS
Occupational Therapy   Treatment    Name: Tono Saez  MRN: [id number]  Admitting Diagnosis:  <principal problem not specified>  2 Days Post-Op    Recommendations:     Discharge Recommendations: rehabilitation facility  Discharge Equipment Recommendations:  bedside commode  Barriers to discharge:  Decreased caregiver support    Assessment:     Tono Saez is a 78 y.o. male with a medical diagnosis of <principal problem not specified>.  Performance deficits affecting function are weakness, impaired endurance, impaired self care skills, impaired functional mobility, gait instability, impaired balance, impaired cognition, decreased safety awareness, impaired cardiopulmonary response to activity.     Pt required increased assistance with ADLs today; increased weakness/fatigue today.  He remains motivated/cooperative.      Rehab Prognosis:  Good; patient would benefit from acute skilled OT services to address these deficits and reach maximum level of function.       Plan:     Patient to be seen 6 x/week to address the above listed problems via self-care/home management, therapeutic activities, therapeutic exercises, cognitive retraining  Plan of Care Expires: 09/17/23  Plan of Care Reviewed with: patient, family    Subjective     Pain/Comfort:  Pain Rating 1:  (pt c/o left flank pain; nurse notified; pain not rated on scale)  Pain Rating Post-Intervention 1:  (no change)    Objective:     Communicated with: nurse prior to session.  Patient found supine with telemetry upon OT entry to room.    General Precautions: Standard, fall    Orthopedic Precautions:spinal precautions  Braces: N/A  Respiratory Status: Room air     Occupational Performance:     Bed Mobility:    Patient completed Supine to Sit with moderate assistance     Functional Mobility/Transfers:  Patient completed Sit <> Stand Transfer with minimum assistance  with  rolling walker   Patient completed Toilet Transfer Stand Pivot technique with moderate  assistance with  rolling walker  Functional Mobility: Min A with RW EOB to/from bathroom     Activities of Daily Living:  Lower Body Dressing: maximal assistance seated on commode  Toileting: maximal assistance for hygiene/clothing management       Patient left up in chair with all lines intact, call button in reach, and family present    GOALS:   Multidisciplinary Problems       Occupational Therapy Goals          Problem: Occupational Therapy    Goal Priority Disciplines Outcome Interventions   Occupational Therapy Goal     OT, PT/OT Ongoing, Progressing    Description: Goals to be met by: 8/31/2023     Patient will increase functional independence with ADLs by performing:    UE Dressing with Modified [place].  LE Dressing with Modified [place].  Grooming while standing at sink with Modified [place].  Toileting from toilet with Modified [place] for hygiene and clothing management.   Toilet transfer to toilet with Modified [place].                         Time Tracking:     OT Date of Treatment: 08/26/23  OT Start Time: 1128  OT Stop Time: 1155  OT Total Time (min): 27 min    Billable Minutes:Self Care/Home Management 27    OT/BRY: OT     Number of BRY visits since last OT visit: 1    8/26/2023

## 2023-08-26 NOTE — PT/OT/SLP PROGRESS
"Physical Therapy Treatment    Patient Name:  Tono Saez   MRN:  [id number]    Recommendations:     Discharge Recommendations: rehabilitation facility  Discharge Equipment Recommendations:  (TBD at next level of care)  Barriers to discharge: Decreased caregiver support    Assessment:     Tono Saez is a 78 y.o. male admitted with a medical diagnosis of <principal problem not specified>.  He presents with the following impairments/functional limitations: weakness, impaired functional mobility, decreased safety awareness, impaired cognition, gait instability, impaired endurance, impaired self care skills, impaired balance, impaired cardiopulmonary response to activity, orthopedic precautions .    Pt found sitting on EOB without family present. Somewhat confused. BP sitting on /93, HR 76. TLSO was placed on pt before t/f to stand. BP in standing 136/67, HR 67. Pt without complaints of  dizziness. He took a 6 -8 steps forward and backwards with min and RW. Noted significant posterior lean in sitting and standing. SPT to BS chair with RW mod A. Lunch tray placed in front of patient. BP in chair 175/90, HR 78. Nursing notified.    Rehab Prognosis: Good; patient would benefit from acute skilled PT services to address these deficits and reach maximum level of function.    Recent Surgery: Procedure(s) (LRB):  Angiogram, Coronary, with Left Heart Cath (N/A)  Ultrasound-coronary (N/A)  Bypass graft study  PTCA, Single Vessel  Stent, Drug Eluting, Single Vessel, Coronary 2 Days Post-Op    Plan:     During this hospitalization, patient to be seen 6 x/week to address the identified rehab impairments via gait training, therapeutic activities, therapeutic exercises, neuromuscular re-education and progress toward the following goals:    Plan of Care Expires:  09/17/23    Subjective     Chief Complaint: "someone needs me at the  right away"  Patient/Family Comments/goals: PLOF  Pain/Comfort:  Pain Rating 1: " 0/10  Pain Rating Post-Intervention 1: 0/10      Objective:     Communicated with nurse Shelby prior to session.  Patient found sitting edge of bed with telemetry upon PT entry to room.     General Precautions: Standard, fall  Orthopedic Precautions: spinal precautions  Braces: TLSO  Respiratory Status: Room air     Functional Mobility:  Transfers:     Sit to Stand:  minimum assistance with rolling walker and facilitating forward lean  Bed to Chair: moderate assistance with  rolling walker  using  Stand Pivot  Gait: 6 steps with RW forward and backwards and Min A.Side steps along EOB unsafe to ambulate further without second person today  Balance: posterior lean in static sit with frequent cues to keep feet on floor and shift weight forward, posterior lean in standing requiring facilitation of forward WS and increased assistance with RW for stability        AM-PAC 6 CLICK MOBILITY  Turning over in bed (including adjusting bedclothes, sheets and blankets)?: 4  Sitting down on and standing up from a chair with arms (e.g., wheelchair, bedside commode, etc.): 3  Moving from lying on back to sitting on the side of the bed?: 4  Moving to and from a bed to a chair (including a wheelchair)?: 3  Need to walk in hospital room?: 3  Climbing 3-5 steps with a railing?: 1  Basic Mobility Total Score: 18       Treatment & Education:  Pt was educated on safety, fall prevention, use of call light, donning of TLSO participated in t/f training and short distance gait training  Sit<> stand x 3 trials with RW facilitating forward lean and inhibiting posterior ws  Patient left up in chair with all lines intact, call button in reach, chair alarm on, and nurseShelby notified..    GOALS:   Multidisciplinary Problems       Physical Therapy Goals          Problem: Physical Therapy    Goal Priority Disciplines Outcome Goal Variances Interventions   Physical Therapy Goal     PT, PT/OT Ongoing, Progressing     Description: Goals to be met by:  2023     Patient will increase functional independence with mobility by performin. Supine to sit with Contact Guard Assistance  2. Sit to stand transfer with Minimal Assistance  3. Bed to chair transfer with Minimal Assistance using Rolling Walker  4. Gait  x 150 feet with Minimal Assistance using Rolling Walker.   5. Lower extremity exercise program x20 reps                        Time Tracking:     PT Received On: 23  PT Start Time: 1355     PT Stop Time: 1419  PT Total Time (min): 24 min     Billable Minutes: Therapeutic Activity 24 minutes    Treatment Type: Treatment  PT/PTA: PT     Number of PTA visits since last PT visit: 1     2023

## 2023-08-26 NOTE — PROGRESS NOTES
[facility] Medicine  Progress Note    Patient name: Tono Saez  MRN: [id number]  Admit Date: 8/16/2023   LOS: 8 days     SUBJECTIVE:     Principal problem: syncope        HPI-  78-year-old male who presents for evaluation after a syncopal episode.  The patient does have frequent falls and unsteady gait for which he  undergoes therapy.  He was leaving an office visit today and reports that he passed.  Reports that this was not his usual trip and fall.  He did strike his head.  Injured and scraped his right arm, his right knee as well as his right anterior chest.  He has had dull aching pain to the right anterior chest since the fall.  The pain is worse with palpation, movement and deep breathing but denies feeling short of breath.  He did not have any chest pain prior to the fall.  Denies any recent illnesses.  Denies fever, chills, cough or any upper or lower respiratory type symptoms.  Denies any abdominal pain or shortness of breath.  Has some mild pain to his right arm associated with a skin tear but denies any pain with range of motion as well as pain with an abrasion to the right knee but denies any pain with range of motion of the knee.  Denies any other problems or complaints.      Interval History:      8/25- present when PT got him up.  He is very weak and balance is poor.  Very stiff and in pain from being in bed.  Stent x 2 yesterday.  Plan for IPR    8/24- patient sleeping soundly, son in room.  Discussed cath, waiting for read, will follow up    8/23- had a brief episode of chest pain this evening, getting ekg and trop.  Cardiology following, plan for angio but delayed until tomorrow.     8/22- sitting up to bedside commode, feels nauseated.  No chest pain.  Trop trended up overnight.  Cardiology following.  Continue med mgmt for now with trx dose lovenox, asa, statin, plavix.  Repeat echo.   Eventual plan is hopefully to get in to IPR.     8/21- resting comfortably, visiting  with family.  He had an episode of cp yesterday evening.  Troponin was ordered at that time, around 6 pm.  This was not drawn or resulted until 3 am, and was elevated at 1400.  EKG no obvious change but difficlut to interpret.  Consulting cardiology today.      8/20- resting in bed quietly, plan for IPR.  Working with PT    8/19- has Joyride brace, working with PT.  Medically cleared for IPR.  CM following for assistance     8/18- he is up to bedside chair, alert.  Denies feeling dizzy, light headed currently.  +right lateral chest M/s chest pain.  He cant remember exactly whether he felt dizzy before falling or falling mechanically.  But he does mention that he thinks he fell bc he wasn't using his walker.  While helping him back to bed, he was very weak and poor balance.  I feel it is more likely he had a mechanical fall while not using DME.  He is not orthostatic.  PM interrogation did not show any irregularities.  His echo actually shows improved cardiac function.  He lives alone.  I think IPR would be very beneficial to increase his strength and ability to perform ADLs before attempting to get him back to his home.  He is agreeable to this.  Will ask PT for eval.      8/17-  HR 60, hypertensive w/sbp 160s.  Chronic lower back pain.  F/u nsgy recs and echo.  Carotids no significant stenosis.      Scheduled Meds:   aspirin  81 mg Oral Daily    atorvastatin  40 mg Oral Daily    clopidogreL  75 mg Oral Daily    enoxparin  40 mg Subcutaneous Q24H (prophylaxis, 1700)    finasteride  5 mg Oral Daily    furosemide  20 mg Oral Daily    metoprolol tartrate  25 mg Oral TID    montelukast  10 mg Oral QHS    nitroGLYCERIN 2% TD oint  2 inch Topical (Top) Q8H    oxybutynin  10 mg Oral Daily    pantoprazole  40 mg Oral Before breakfast    sertraline  50 mg Oral Daily    spironolactone  25 mg Oral Daily     Continuous Infusions:      PRN Meds:acetaminophen, albuterol-ipratropium, glucose, glucose, hydrALAZINE, hydrALAZINE,  HYDROcodone-acetaminophen, insulin aspart U-100, magnesium oxide, magnesium oxide, magnesium oxide, melatonin, naloxone, nitroGLYCERIN, ondansetron, polyethylene glycol, potassium chloride, potassium chloride, potassium chloride, senna-docusate 8.6-50 mg, simethicone, sodium chloride 0.9%    Review of patient's allergies indicates:   Allergen Reactions    Adhesive Other (See Comments)     SILK TAPE PULL SKIN OFF    Metformin Other (See Comments)     Weight loss cachexia anorexia,diarrhea.    Pcn [penicillins]      Patient states he passed out from this medication when he was 10 years old.        Review of Systems: As per interval history    OBJECTIVE:     Vital Signs (Most Recent)  Temp: 98.5 °F (36.9 °C) (08/25/23 1633)  Pulse: 75 (08/25/23 1633)  Resp: 18 (08/25/23 1633)  BP: (!) 179/99 (08/25/23 1633)  SpO2: 95 % (08/25/23 1633)    Vital Signs Range (Last 24H):  Temp:  [97.8 °F (36.6 °C)-98.6 °F (37 °C)]   Pulse:  [71-79]   Resp:  [18-20]   BP: (148-179)/()   SpO2:  [92 %-98 %]     I & O (Last 24H):  Intake/Output Summary (Last 24 hours) at 8/25/2023 1939  Last data filed at 8/25/2023 1005  Gross per 24 hour   Intake 270 ml   Output --   Net 270 ml         Physical Exam:  General: Patient resting comfortably in no acute distress. Ecchymosis and swelling right cheek  Eyes: No conjunctival injection. No scleral icterus.  ENT: Hearing grossly intact. No discharge from ears. No nasal discharge.   CVS: RRR.   Lungs:  No tachypnea or accessory muscle use.   Abdomen:  Soft, nontender and nondistended.    Neuro: Alert.  Moves all extremities. Follows commands. Responds appropriately   Skin:  No rash or erythema noted    Laboratory:  I have reviewed all pertinent lab results within the past 24 hours.      ASSESSMENT/PLAN:         Active Hospital Problems    Diagnosis  POA    Skin tear of left upper arm without complication [S41.112A]  Yes    Syncope and collapse [R55]  Yes    Orthostatic hypotension [I95.1]  Yes     Chronic systolic congestive heart failure [I50.22]  Yes    SSS (sick sinus syndrome) [I49.5]  Yes    Stage 3a chronic kidney disease [N18.31]  Yes    Compression fracture of L1 lumbar vertebra, with delayed healing, subsequent encounter [S32.010G]  Not Applicable    Coronary artery disease of native artery of native heart with stable angina pectoris [I25.118]  Yes     CABG, STENTS '97,'99,'01,'05    10/26/22:  Summary         The Dist LM lesion was 90% stenosed with 10% stenosis post-intervention.    The pre-procedure left ventricular end diastolic pressure was 12.    A STENT LIMA YOHANA 3.50 X 26 stent was not successfully placed at 12 JASPER for 20 sec.    The estimated blood loss was <50 mL.    Successful intravascular ultrasound-guided PCI of the critical stenosis of left main into circumflex with 1 drug-eluting stent    Patent vein graft to OM, occluded branch of the OM as compared to angiogram in 2016.    Patent vein graft to right coronary artery with patent stent, moderately degenerated, distal native RCA has 80% stenosis in medium caliber vessel.    Occluded LAD with a patent LIMA to LAD        Chronic anticoagulation [Z79.01]  Not Applicable    GERD (gastroesophageal reflux disease) [K21.9]  Yes    Type 2 diabetes mellitus with hyperglycemia  [E11.65]  Yes    HTN (hypertension) [I10]  Yes      Resolved Hospital Problems   No resolved problems to display.         Plan:       8/20   Chest pain  Elevated troponin  He received PRN morphine for pain  Stat ekg and trop  Trop at 3 am elevated to 1400, continued to trend up  Consulted cardiology, f/u recs  Treatment dose lovenox  Asa, statin, plavix  NPO at midnight in the event cardiology may want to take to cath lab tomorrow  Bnp eleavated and cxr pulm edema  Lasix IV  Angio 8/24 with stent x2      Syncope  Hx SSS  Hx combined HF with EF 25% and moderate AS  - CT head Small air-fluid level in the right maxillary sinus could be result of sinus disease or trauma. Right  facial soft tissue swelling.  - echo complete, improved EF to 35-40%  - carotid US no significant stenosis  - EKG, trending troponin are negative  - telemetry  - discontinue BB, midodrine  - Orthostatic VS are as follows, patient did not tolerate standing. Patient denies symptoms.   Lying:: /86, HR 74 paced  Sitting:: /84, HR 74 paced  - pacemaker interrogation wnl  -very weak and off balance when assisted to ambulate  -suspect mechanical fall  -will ask PT and CM to eval for IPR at discharge       DM2  - SSI     Chronic conditions as noted above/below; home medications reviewed personally by me and restarted as appropriate  Electrolyte derangement:  Trending BMP; Mg; replacement prn  DVT ppx: lovenox held due to recent fall with multiple abrasions, risk of bleeding; continue SCDs  FULL CODE           VTE Risk Mitigation (From admission, onward)           Ordered     enoxaparin injection 40 mg  Every 24 hours         08/25/23 1150     IP VTE HIGH RISK PATIENT  Once         08/16/23 2225     Place sequential compression device  Until discontinued         08/16/23 2225     Reason for No Pharmacological VTE Prophylaxis  Once        Question:  Reasons:  Answer:  Risk of Bleeding    08/16/23 2225                        Department Hospital Medicine  [facility]  Sixto Schultz MD  Date of service: 08/25/2023

## 2023-08-26 NOTE — HOSPITAL COURSE
78 y.o.male  With PMH of frequent falls and gait instability , HFrEF 25%, moderate AS, SSS with pacemaker was admitted with syncope .  Found to have L1 compression fracture but Patient is currently asymptomatic and is neurologically intact and neuro Sx reviewed and recommend only conservative approach and with TLSO bracing .  Patient had history of pacemaker and interrogation was done and did not show any abnormality and cardiology reviewed  and given previous his bypass surgery, repeat angiogram was done because of chest discomfort and needed PCI and got 2 YOHANA . ( Final cath report was not in the chart at the time of discharge)  Later patient did condition alert and transferred to rehab place in stable condition.  Patient was discharged with dual platelet therapy, statin, Lasix, Aldactone and also syncopal workup was negative with negative CT head and negative carotid ultrasound.

## 2023-08-26 NOTE — PROGRESS NOTES
[facility]  Department of Cardiology  Progress Note      PATIENT NAME: Tono Saez  MRN: [id number]  TODAY'S DATE: 08/26/2023  ADMIT DATE: 8/16/2023                          CONSULT REQUESTED BY: Koko Erickson MD    SUBJECTIVE     PRINCIPAL PROBLEM: <principal problem not specified>      INTERVAL HISTORY  8/26/23  Pt OOB to chair; Had some weakness with getting OOB;  +lower sternum soreness 2/2 prior fall.   Cr/Gfr normal; Pt states he was on Entresto in past; family thinks was stopped 2/2 orthostatic hypotension  BP trending high in hospital      8/25/23  S/P PCI yesterday  Denies angina; reports chest soreness when coughs  Right groin w/o bleeding or swelling  VSS overnight  Paced rhythm on tele  K 3.1 / Mg 1.5 this AM  Planning for rehab after DC    8/23/23  Hospital Course:  Patient feels much better is not nauseated and less short of breath and diuresis     Interval History:  T ponins elevated Will repeat troponin today  Review of patient's allergies indicates:   Allergen Reactions    Adhesive Other (See Comments)     SILK TAPE PULL SKIN OFF    Metformin Other (See Comments)     Weight loss cachexia anorexia,diarrhea.    Pcn [penicillins]      Patient states he passed out from this medication when he was 10 years old.        Past Medical History:   Diagnosis Date    Anemia     Anticoagulant long-term use     Arthritis     CAD (coronary artery disease)     CABG, STENTS '97,'99,'01,'05    Cancer     SKIN    Cataract     Diabetes mellitus     Diabetes mellitus type II     GERD (gastroesophageal reflux disease)     GI bleed 2020    Hypertension     Myocardial infarction     Wears glasses      Past Surgical History:   Procedure Laterality Date    ANGIOGRAM, CORONARY, WITH LEFT HEART CATHETERIZATION N/A 8/24/2023    Procedure: Angiogram, Coronary, with Left Heart Cath;  Surgeon: Robles Mckoy MD;  Location: [facility] CATH/EP LAB;  Service: Cardiology;  Laterality: N/A;    CARDIAC PACEMAKER PLACEMENT       CARDIAC PACEMAKER PLACEMENT      CARDIAC PACEMAKER PLACEMENT  04/26/2018    replaced    CARDIAC SURGERY      1995   CABG X 5    CHOLECYSTECTOMY      COLON SURGERY      COLONOSCOPY N/A 2/21/2020    Procedure: COLONOSCOPY;  Surgeon: Abe Barragan III, MD;  Location: [facility] ENDO;  Service: Endoscopy;  Laterality: N/A;    COLONOSCOPY N/A 2/23/2020    Procedure: COLONOSCOPY;  Surgeon: Bob Locke Jr., MD;  Location: [facility] ENDO;  Service: Endoscopy;  Laterality: N/A;    CORONARY ANGIOGRAPHY INCLUDING BYPASS GRAFTS WITH CATHETERIZATION OF LEFT HEART N/A 10/26/2022    Procedure: ANGIOGRAM, CORONARY, INCLUDING BYPASS GRAFT, WITH LEFT HEART CATHETERIZATION;  Surgeon: Robles Mckoy MD;  Location: [facility] CATH/EP LAB;  Service: Cardiology;  Laterality: N/A;    CORONARY ARTERY BYPASS GRAFT  1995    CORONARY BYPASS GRAFT ANGIOGRAPHY  8/24/2023    Procedure: Bypass graft study;  Surgeon: Robles Mckoy MD;  Location: [facility] CATH/EP LAB;  Service: Cardiology;;    CORONARY STENT PLACEMENT      stent x 5    ESOPHAGOGASTRODUODENOSCOPY N/A 2/23/2020    Procedure: EGD (ESOPHAGOGASTRODUODENOSCOPY);  Surgeon: Bob Locke Jr., MD;  Location: [facility];  Service: Endoscopy;  Laterality: N/A;    EYE SURGERY      BILAT CATARACT    head laceration   01/19/2018    HEMORRHOID SURGERY      INJECTION OF ANESTHETIC AGENT AROUND MEDIAL BRANCH NERVES INNERVATING LUMBAR FACET JOINT Bilateral 6/29/2023    Procedure: Block-nerve-medial branch-lumbar;  Surgeon: Maurice Montenegro MD;  Location: [facility] OR;  Service: Pain Management;  Laterality: Bilateral;  L3,4,5 MBB    INJECTION OF ANESTHETIC AGENT AROUND MEDIAL BRANCH NERVES INNERVATING LUMBAR FACET JOINT Bilateral 7/25/2023    Procedure: Block-nerve-medial branch-lumbar;  Surgeon: Maurice Montenegro MD;  Location: [facility] OR;  Service: Anesthesiology;  Laterality: Bilateral;  L3,4,5 MBB #2    INTRAVASCULAR ULTRASOUND, CORONARY N/A 8/24/2023    Procedure: Ultrasound-coronary;  Surgeon: Robles Mckoy MD;   Location: [facility] CATH/EP LAB;  Service: Cardiology;  Laterality: N/A;    PTCA, SINGLE VESSEL  8/24/2023    Procedure: PTCA, Single Vessel;  Surgeon: Robles Mckoy MD;  Location: [facility] CATH/EP LAB;  Service: Cardiology;;    SMALL BOWEL ENTEROSCOPY N/A 2/21/2020    Procedure: ENTEROSCOPY;  Surgeon: Abe Barragan III, MD;  Location: [facility] ENDO;  Service: Endoscopy;  Laterality: N/A;    STENT, DRUG ELUTING, SINGLE VESSEL, CORONARY  8/24/2023    Procedure: Stent, Drug Eluting, Single Vessel, Coronary;  Surgeon: Robles Mckoy MD;  Location: [facility] CATH/EP LAB;  Service: Cardiology;;    stint       Social History     Tobacco Use    Smoking status: Never    Smokeless tobacco: Never   Substance Use Topics    Alcohol use: No    Drug use: No        REVIEW OF SYSTEMS  CONSTITUTIONAL: +fatigue; Negative for chills, and fever.   NEURO: No headaches, No dizziness  RESPIRATORY: Occasional dry cough, No shortness of breath or wheezing.    CARDIOVASCULAR: Negative for chest pain. Negative for palpitations and leg swelling  GI: Negative for abdominal pain, No melena, diarrhea, nausea and vomiting.   : Incontinence of urine; Negative for dysuria and frequency, Negative for hematuria  SKIN: + various areas of bruising, Negative for edema   MUSCULOSKELETAL: +Recent fall; generalized weakness; planning for PT and Rehab;  Lower sternum tenderness on palpation    OBJECTIVE     VITAL SIGNS (Most Recent)  Temp: 98.7 °F (37.1 °C) (08/26/23 1133)  Pulse: 67 (08/26/23 1133)  Resp: 18 (08/26/23 1133)  BP: (!) 179/87 (08/26/23 1133)  SpO2: 95 % (08/26/23 1133)    VENTILATION STATUS  Resp: 18 (08/26/23 1133)  SpO2: 95 % (08/26/23 1133)           I & O (Last 24H):  Intake/Output Summary (Last 24 hours) at 8/26/2023 1243  Last data filed at 8/26/2023 0629  Gross per 24 hour   Intake 250 ml   Output 350 ml   Net -100 ml         WEIGHTS  Wt Readings from Last 3 Encounters:   08/26/23 0320 81.8 kg (180 lb 4.8 oz)   08/18/23 0500 82.2 kg (181 lb  3.2 oz)   08/17/23 0048 83.6 kg (184 lb 3.2 oz)   08/22/23 1008 82.2 kg (181 lb 3.5 oz)   08/17/23 1120 83.6 kg (184 lb 4.9 oz)       PHYSICAL EXAM  GENERAL: frail elderly male breathing comfortably  OOB in chair in no apparent distress. Multiple family at BS; Pt appears tired   HEENT: Normocephalic. Pupils normal and conjunctivae normal.  Mucous membranes normal, no cyanosis or icterus, trachea central,no pallor or icterus is noted..   NECK: No JVD. No bruit..   CARDIAC: Regular rate and rhythm. S1 is normal.S2 is normal. + systolic murmur; Paced rhythm;   CHEST ANATOMY: thin ; tenderness near lower mid sternum  LUNGS: Lung sounds improved; clear; no coughing noted  ABDOMEN: Soft flat, thin, no masses or organomegaly.  No abdomen pulsations or bruits.  Normal bowel sounds.  URINARY incontinent to diaper   EXTREMITIES: Generalized bruising and scabs bilateral arms and shins; Right groin cath site is soft and w/o swelling or bleeding; No hematoma palpable. Site is tender  PERIPHERAL VASCULAR SYSTEM: Good palpable distal pulses.   CENTRAL NERVOUS SYSTEM: No focal motor or sensory deficits noted.   SKIN: Skin without lesions, moist, well perfused.   MUSCLE STRENGTH & TONE: No noteable weakness, atrophy or abnormal movement.     HOME MEDICATIONS:  Current Facility-Administered Medications on File Prior to Encounter   Medication Dose Route Frequency Provider Last Rate Last Admin    lactated ringers infusion   Intravenous Continuous Maurice Montenegro MD 25 mL/hr at 07/25/23 1013 New Bag at 07/25/23 1013    LIDOcaine (PF) 10 mg/ml (1%) injection 10 mg  1 mL Intradermal Once Maurice Montenegro MD         Current Outpatient Medications on File Prior to Encounter   Medication Sig Dispense Refill    aspirin (ECOTRIN) 81 MG EC tablet Take 2 tablets (162 mg total) by mouth once daily. (Patient taking differently: Take 81 mg by mouth once daily.) 120 tablet 0    atorvastatin (LIPITOR) 40 MG tablet Take 1 tablet by mouth once daily.       betamethasone dipropionate (DIPROLENE) 0.05 % lotion Apply thin film to scalp bid prn itch (Patient taking differently: Apply 1 g topically 2 (two) times daily. Apply thin film to scalp bid prn itch) 60 mL 6    clopidogreL (PLAVIX) 75 mg tablet Take 1 tablet (75 mg total) by mouth once daily. 90 tablet 0    empagliflozin (JARDIANCE) 25 mg tablet Take 1 tablet (25 mg total) by mouth once daily. 90 tablet 2    finasteride (PROSCAR) 5 mg tablet Take 5 mg by mouth once daily.      furosemide (LASIX) 20 MG tablet TAKE 1 TABLET (20 MG TOTAL) BY MOUTH ONCE DAILY. (Patient taking differently: Take 20 mg by mouth once daily.) 90 tablet 3    isosorbide dinitrate (ISORDIL) 10 MG tablet Take 10 mg by mouth 2 (two) times daily.      metoprolol tartrate (LOPRESSOR) 25 MG tablet Take 0.5 tablets (12.5 mg total) by mouth 2 (two) times daily. 60 tablet 0    midodrine (PROAMATINE) 2.5 MG Tab Take 1 tablet (2.5 mg total) by mouth 3 (three) times daily. 90 tablet 11    montelukast (SINGULAIR) 10 mg tablet Take 1 tablet (10 mg total) by mouth every evening. 90 tablet 3    multivitamin capsule Take 1 capsule by mouth once daily.      pantoprazole (PROTONIX) 40 MG tablet TAKE ONE TABLET BY MOUTH ONCE DAILY (Patient taking differently: Take 40 mg by mouth once daily.) 90 tablet 1    pioglitazone (ACTOS) 15 MG tablet Take 15 mg by mouth once daily.      pregabalin (LYRICA) 50 MG capsule Take 1 capsule (50 mg total) by mouth 3 (three) times daily. 90 capsule 1    sertraline (ZOLOFT) 50 MG tablet TAKE ONE TABLET BY MOUTH ONCE DAILY (Patient taking differently: Take 50 mg by mouth once daily.) 90 tablet 1    tolterodine (DETROL LA) 4 MG 24 hr capsule TAKE ONE CAPSULE BY MOUTH ONCE DAILY (Patient taking differently: Take 4 mg by mouth once daily.) 90 capsule 1    vitamin D 1000 units Tab Take 1,000 Units by mouth once daily.      atorvastatin (LIPITOR) 40 MG tablet Take 1 tablet (40 mg total) by mouth every evening. 90 tablet 1    fluticasone  "propionate (FLONASE) 50 mcg/actuation nasal spray 1 spray by Each Nostril route as needed for Allergies.      HYDROcodone-acetaminophen (NORCO) 5-325 mg per tablet Take 1 tablet by mouth every 4 (four) hours as needed for Pain. 18 tablet 0    nitroGLYCERIN (NITROSTAT) 0.4 MG SL tablet DISSOLVE 1 TABLET UNDER THE TONGUE AS NEEDED FOR CHEST PAIN (Patient taking differently: Place 0.4 mg under the tongue every 5 (five) minutes as needed.) 100 tablet 3       SCHEDULED MEDS:   aspirin  81 mg Oral Daily    atorvastatin  40 mg Oral Daily    clopidogreL  75 mg Oral Daily    enoxparin  40 mg Subcutaneous Q24H (prophylaxis, 1700)    finasteride  5 mg Oral Daily    furosemide  20 mg Oral Daily    metoprolol tartrate  25 mg Oral TID    montelukast  10 mg Oral QHS    nitroGLYCERIN 2% TD oint  2 inch Topical (Top) Q8H    oxybutynin  10 mg Oral Daily    pantoprazole  40 mg Oral Before breakfast    sertraline  50 mg Oral Daily    spironolactone  25 mg Oral Daily       CONTINUOUS INFUSIONS:        PRN MEDS:acetaminophen, albuterol-ipratropium, glucose, glucose, hydrALAZINE, hydrALAZINE, HYDROcodone-acetaminophen, insulin aspart U-100, magnesium oxide, magnesium oxide, magnesium oxide, melatonin, naloxone, nitroGLYCERIN, ondansetron, polyethylene glycol, potassium chloride, potassium chloride, potassium chloride, senna-docusate 8.6-50 mg, simethicone, sodium chloride 0.9%    LABS AND DIAGNOSTICS     CBC LAST 3 DAYS  Recent Labs   Lab 08/24/23  0516 08/25/23  0418 08/26/23  0320   WBC 8.72 9.23 8.99   RBC 3.90* 4.00* 3.80*   HGB 10.5* 10.5* 9.9*   HCT 32.8* 33.7* 32.3*   MCV 84 84 85   MCH 26.9* 26.3* 26.1*   MCHC 32.0 31.2* 30.7*   RDW 16.3* 16.3* 16.2*    183 191   MPV 10.4 10.5 10.8   GRAN 78.4*  6.9 83.5*  7.7 80.2*  7.2   LYMPH 8.3*  0.7* 5.4*  0.5* 6.9*  0.6*   MONO 8.5  0.7 9.1  0.8 9.2  0.8   BASO 0.04 0.02 0.03   NRBC 0 0 0         COAGULATION LAST 3 DAYS  No results for input(s): "LABPT", "INR", "APTT" in the " "last 168 hours.    CHEMISTRY LAST 3 DAYS  Recent Labs   Lab 08/24/23  0516 08/25/23  0418 08/25/23  1239 08/26/23  0320    139  --  138   K 3.1* 3.1* 3.4* 3.5    108  --  108   CO2 24 24  --  25   ANIONGAP 5* 7*  --  5*   BUN 43* 33*  --  34*   CREATININE 1.1 1.0  --  1.0   * 171*  --  202*   CALCIUM 9.3 9.3  --  9.3   MG 1.5* 1.5*  --  1.5*   ALBUMIN 2.9* 2.9*  --  2.9*   PROT 5.8* 5.9*  --  5.8*   ALKPHOS 79 80  --  80   ALT 37 36  --  32   AST 45* 37  --  30   BILITOT 1.2* 1.4*  --  1.2*         CARDIAC PROFILE LAST 3 DAYS  Recent Labs   Lab 08/22/23  1207   BNP 1,890*         ENDOCRINE LAST 3 DAYS  No results for input(s): "TSH", "PROCAL" in the last 168 hours.    LAST ARTERIAL BLOOD GAS  ABG  No results for input(s): "PH", "PO2", "PCO2", "HCO3", "BE" in the last 168 hours.    LAST 7 DAYS MICROBIOLOGY   Microbiology Results (last 7 days)       ** No results found for the last 168 hours. **            MOST RECENT IMAGING  Echo    Left Ventricle: The left ventricle is normal in size. Moderately   increased ventricular mass. Moderately increased wall thickness. There is   moderate concentrict hypertrophy. Moderate global hypokinesis present. See   diagram for wall motion findings.  Patient has evidence of akinesis and   slight paradoxical motion of the mid inferior septum, distal anterior wall   and slight paradoxic motion at the apex This is not significantly   different than the echo noted 8/1723 The inferior wall in the lateral wall   supplied by the main left stented circumflex appears to move well There is   moderately reduced systolic function with a visually estimated ejection   fraction of 30 - 40%. 40% EF Grade I diastolic dysfunction. Elevated left   ventricular filling pressure. Tissue Doppler velocity is reduced.  Mean   left atrial pressure 14    Left Atrium: Left atrium is mildly dilated.    Right Ventricle: Mild right ventricular enlargement. Systolic function   is moderately " reduced.    Aortic Valve: There is mild aortic valve sclerosis. There is mild to   moderate aortic regurgitation.    Mitral Valve: There is no stenosis.    Pulmonary Artery: No pulmonary hypertension. The estimated pulmonary   artery systolic pressure is 22 mmHg.    IVC/SVC: Normal venous pressure at 3 mmHg.    Pacer wires are seen  X-Ray Chest AP Portable  Chest single view    CLINICAL DATA: Congestive heart failure    FINDINGS: AP view is compared to February 3, 2023, as well as a CT chest from August 16, 2023.    The heart is enlarged. There has been previous median sternotomy. Cardiac pacemaker device is unchanged in position.    There is interval development of pulmonary vascular congestion with evidence of mild central pulmonary edema. No significant effusion is identified. There is no pneumothorax.    IMPRESSION:  1. Radiographic findings compatible with CHF/volume overload and mild central pulmonary edema.    Electronically signed by:  Gordy Ozuna MD  8/22/2023 11:18 AM CDT Workstation: [id number]      ECHOCARDIOGRAM RESULTS (last 5)  Results for orders placed during the hospital encounter of 08/16/23    Echo    Interpretation Summary    Left Ventricle: The left ventricle is normal in size. Moderately increased ventricular mass. Moderately increased wall thickness. There is moderate concentrict hypertrophy. Moderate global hypokinesis present. See diagram for wall motion findings.  Patient has evidence of akinesis and slight paradoxical motion of the mid inferior septum, distal anterior wall and slight paradoxic motion at the apex This is not significantly different than the echo noted 8/1723 The inferior wall in the lateral wall supplied by the main left stented circumflex appears to move well There is moderately reduced systolic function with a visually estimated ejection fraction of 30 - 40%. 40% EF Grade I diastolic dysfunction. Elevated left ventricular filling pressure. Tissue Doppler velocity  is reduced.  Mean left atrial pressure 14    Left Atrium: Left atrium is mildly dilated.    Right Ventricle: Mild right ventricular enlargement. Systolic function is moderately reduced.    Aortic Valve: There is mild aortic valve sclerosis. There is mild to moderate aortic regurgitation.    Mitral Valve: There is no stenosis.    Pulmonary Artery: No pulmonary hypertension. The estimated pulmonary artery systolic pressure is 22 mmHg.    IVC/SVC: Normal venous pressure at 3 mmHg.    Pacer wires are seen      Echo    Interpretation Summary    Left Ventricle: The left ventricle is normal in size. Moderately increased wall thickness. There is moderate concentrict hypertrophy. Normal wall motion. There is moderately reduced systolic function with a visually estimated ejection fraction of 35 - 40%. There is normal diastolic function.    Left Atrium: Left atrium is moderately dilated.    Right Ventricle: Normal right ventricular cavity size. Wall thickness is normal. Right ventricle wall motion  is normal. Systolic function is normal.    Aortic Valve: The aortic valve is a trileaflet valve. There is mild aortic valve sclerosis.    Mitral Valve: There is moderate posterior leaflet sclerosis. Mild mitral annular calcification.    IVC/SVC: Normal venous pressure at 3 mmHg.      Results for orders placed during the hospital encounter of 06/01/23    Echo    Interpretation Summary  · Concentric remodeling and moderately decreased systolic function.  · The estimated ejection fraction is 25%.  · Grade I left ventricular diastolic dysfunction.mean left atrial pressure 9  · There are segmental left ventricular wall motion abnormalities.possible old ant apical scar  · There is abnormal septal wall motion consistent with right ventricular pacemaker.  · Atrial fibrillation not observed.  · With normal right ventricular systolic function.  · Mild-to-moderate aortic regurgitation.  · There is moderate aortic valve stenosis.  · Aortic  valve area is 1.57 cm2; peak velocity is 2.05 m/s; mean gradient is 9 mmHg.  · Mild tricuspid regurgitation.  · Mild pulmonic regurgitation.  · Normal central venous pressure (3 mmHg).  · The estimated PA systolic pressure is 23 mmHg.no PH      Results for orders placed during the hospital encounter of 10/26/22    Echo    Interpretation Summary  · The left ventricle is normal in size with mild concentric hypertrophy and normal systolic function.  · The estimated ejection fraction is 55%.  · Indeterminate left ventricular diastolic function.  · There is abnormal septal wall motion consistent with post-operative status.  · Moderate to severe left atrial enlargement.  · Mild to moderate tricuspid regurgitation.  · Mild-to-moderate aortic regurgitation.  · There is mild aortic valve stenosis.      Results for orders placed in visit on 03/28/22    Echo    Interpretation Summary  · The left ventricle is normal in size with concentric remodeling  · The estimated ejection fraction is 44%.  · Grade I left ventricular diastolic dysfunction.  · There are segmental left ventricular wall motion abnormalities. Apical hypokinesis noted  · There is abnormal septal wall motion consistent with right ventricular pacemaker.  · Normal right ventricular size with mildly reduced right ventricular systolic function.  · Mild-to-moderate aortic regurgitation.  · There is mild-to-moderate aortic valve stenosis.  · Aortic valve area is 1.77 cm2; peak velocity is 1.96 m/s; mean gradient is 9 mmHg.  · Mild mitral regurgitation.  · Normal central venous pressure (3 mmHg).  · The estimated PA systolic pressure is 13 mmHg.      CURRENT/PREVIOUS VISIT EKG  Results for orders placed or performed during the hospital encounter of 08/16/23   EKG 12-lead    Collection Time: 08/24/23  9:59 AM    Narrative    Test Reason : Z01.810,    Vent. Rate : 087 BPM     Atrial Rate : 087 BPM     P-R Int : 204 ms          QRS Dur : 200 ms      QT Int : 466 ms        P-R-T Axes : 055 -84 090 degrees     QTc Int : 560 ms    Atrial-sensed ventricular-paced rhythm  Abnormal ECG  When compared with ECG of 23-AUG-2023 16:05,  Premature atrial complexes are no longer Present  Vent. rate has increased BY  14 BPM  Confirmed by Jose J NYE, Kt MUSTAFA ([id number]) on 8/25/2023 6:58:54 PM    Referred By: [email or web address]   SELF           Confirmed By:Kt Lux MD           ASSESSMENT/PLAN:     Active Hospital Problems    Diagnosis    Skin tear of left upper arm without complication    Syncope and collapse    Orthostatic hypotension    Chronic systolic congestive heart failure    SSS (sick sinus syndrome)    Stage 3a chronic kidney disease    Compression fracture of L1 lumbar vertebra, with delayed healing, subsequent encounter    Coronary artery disease of native artery of native heart with stable angina pectoris     CABG, STENTS '97,'99,'01,'05    10/26/22:  Summary         The Dist LM lesion was 90% stenosed with 10% stenosis post-intervention.    The pre-procedure left ventricular end diastolic pressure was 12.    A STENT LIMA YOHANA 3.50 X 26 stent was not successfully placed at 12 JASPER for 20 sec.    The estimated blood loss was <50 mL.    Successful intravascular ultrasound-guided PCI of the critical stenosis of left main into circumflex with 1 drug-eluting stent    Patent vein graft to OM, occluded branch of the OM as compared to angiogram in 2016.    Patent vein graft to right coronary artery with patent stent, moderately degenerated, distal native RCA has 80% stenosis in medium caliber vessel.    Occluded LAD with a patent LIMA to LAD        Chronic anticoagulation    GERD (gastroesophageal reflux disease)    Type 2 diabetes mellitus with hyperglycemia     HTN (hypertension)       ASSESSMENT & PLAN:   CAD, S/P PCI  Chronic systolic congestive heart failure  Syncope/Fall  CKD  DM2    RECOMMENDATIONS:  Patient is s/p PCI on 8/24/23. Continue aspirin 81 mg daily and plavix 75 mg daily and statin  therapy.    HFrEF, Efx 30-40%. Continue metoprolol, spironolactone and lasix. (Cr/GFR today- 1.0/>60). BP has been stable overnight. Continue strict I/O, daily weight, low sodium diet    3. Discussed re-starting Entresto today. Pt and family state he was on this in past and had to discontinue, possible 2/2 orthostasis and CKD. BP and renal function is normal at this time.     4.  Replace potassium today for level 3.5 and Magnesium 1.5 (Done). Continue to check and replace potassium and magnesium. Goal for potassium is 4.0, and goal for magnesium is 2.0.     5. Case management arranging rehab post discharge. Patient will need follow up in cardiology clinic in 1-2 weeks post discharge.    Ritu Chi NP  [facility]  Department of Cardiology  Date of Service: 08/26/2023        I have personally interviewed and examined the patient, I have reviewed the Nurse Practitioner's history and physical, assessment, and plan. I agree with the findings and plan.      Dr Jose J M.D.  [facility]  Department of Cardiology  Date of Service: 08/26/2023  11:43 AM

## 2023-08-26 NOTE — CARE UPDATE
08/26/23 0925   Patient Assessment/Suction   Level of Consciousness (AVPU) alert   Respiratory Effort Unlabored   All Lung Fields Breath Sounds clear;diminished   PRE-TX-O2   Device (Oxygen Therapy) room air   SpO2 (!) 92 %   Pulse Oximetry Type Intermittent   $ Pulse Oximetry - Multiple Charge Pulse Oximetry - Multiple   Pulse 84   Resp 15   Aerosol Therapy   $ Aerosol Therapy Charges PRN treatment not required   Education   $ Education Bronchodilator;15 min   Respiratory Evaluation   $ Care Plan Tech Time 15 min

## 2023-08-26 NOTE — PROGRESS NOTES
[facility] Medicine  Progress Note    Patient Name: Tono Saez  MRN: [id number]  Patient Class: IP- Inpatient   Admission Date: 8/16/2023  Length of Stay: 9 days  Attending Physician: Koko Erickson MD  Primary Care Provider: Scott Staley MD        Subjective:     Principal Problem:<principal problem not specified>        HPI:  No notes on file    Overview/Hospital Course:  Doing well   Agree to go to rehab. Large troponin elevation noted         Interval History:   As per subjective     Review of Systems  Objective:     Vital Signs (Most Recent):  Temp: 97.9 °F (36.6 °C) (08/26/23 1521)  Pulse: 70 (08/26/23 1521)  Resp: 18 (08/26/23 1521)  BP: (!) 147/82 (08/26/23 1521)  SpO2: 95 % (08/26/23 1521) Vital Signs (24h Range):  Temp:  [97.9 °F (36.6 °C)-98.7 °F (37.1 °C)] 97.9 °F (36.6 °C)  Pulse:  [67-88] 70  Resp:  [15-18] 18  SpO2:  [92 %-96 %] 95 %  BP: (147-179)/(82-99) 147/82     Weight: 81.8 kg (180 lb 4.8 oz)  Body mass index is 24.45 kg/m².    Intake/Output Summary (Last 24 hours) at 8/26/2023 1531  Last data filed at 8/26/2023 1524  Gross per 24 hour   Intake 490 ml   Output 350 ml   Net 140 ml         Physical Exam  Constitutional:       General: He is not in acute distress.     Appearance: He is well-developed.   HENT:      Head: Normocephalic.   Eyes:      Pupils: Pupils are equal, round, and reactive to light.   Cardiovascular:      Rate and Rhythm: Normal rate and regular rhythm.   Pulmonary:      Effort: Pulmonary effort is normal. No respiratory distress.   Abdominal:      General: Abdomen is flat. There is no distension.      Tenderness: There is no abdominal tenderness.   Musculoskeletal:         General: Normal range of motion.      Cervical back: Neck supple.   Skin:     General: Skin is warm.      Findings: No rash.   Neurological:      Mental Status: He is alert and oriented to person, place, and time.   Psychiatric:         Mood and Affect: Mood normal.          "    Significant Labs: All pertinent labs within the past 24 hours have been reviewed.  CBC:   Recent Labs   Lab 08/25/23  0418 08/26/23  0320   WBC 9.23 8.99   HGB 10.5* 9.9*   HCT 33.7* 32.3*    191     CMP:   Recent Labs   Lab 08/25/23  0418 08/25/23  1239 08/26/23  0320     --  138   K 3.1* 3.4* 3.5     --  108   CO2 24  --  25   *  --  202*   BUN 33*  --  34*   CREATININE 1.0  --  1.0   CALCIUM 9.3  --  9.3   PROT 5.9*  --  5.8*   ALBUMIN 2.9*  --  2.9*   BILITOT 1.4*  --  1.2*   ALKPHOS 80  --  80   AST 37  --  30   ALT 36  --  32   ANIONGAP 7*  --  5*     Cardiac Markers: No results for input(s): "CKMB", "MYOGLOBIN", "BNP", "TROPISTAT" in the last 48 hours.    Significant Imaging: I have reviewed all pertinent imaging results/findings within the past 24 hours.      Assessment/Plan:      Active Hospital Problems    Diagnosis    Skin tear of left upper arm without complication    Syncope and collapse    Orthostatic hypotension    Chronic systolic congestive heart failure    SSS (sick sinus syndrome)    Stage 3a chronic kidney disease    Compression fracture of L1 lumbar vertebra, with delayed healing, subsequent encounter    Coronary artery disease of native artery of native heart with stable angina pectoris     CABG, STENTS '97,'99,'01,'05    10/26/22:  Summary         The Dist LM lesion was 90% stenosed with 10% stenosis post-intervention.    The pre-procedure left ventricular end diastolic pressure was 12.    A STENT LIMA YOHANA 3.50 X 26 stent was not successfully placed at 12 JASPER for 20 sec.    The estimated blood loss was <50 mL.    Successful intravascular ultrasound-guided PCI of the critical stenosis of left main into circumflex with 1 drug-eluting stent    Patent vein graft to OM, occluded branch of the OM as compared to angiogram in 2016.    Patent vein graft to right coronary artery with patent stent, moderately degenerated, distal native RCA has 80% stenosis in " medium caliber vessel.    Occluded LAD with a patent LIMA to LAD        Chronic anticoagulation    GERD (gastroesophageal reflux disease)    Type 2 diabetes mellitus with hyperglycemia     HTN (hypertension)     Plan:    Lasix IV  Angio 8/24 with stent x2 inserted   Continue ASA , plavix    Syncope work up negative    - echo complete, improved EF to 35-40%  - carotid US no significant stenosis  orthostais vitals PRN and prior to transfer to rehab  - pacemaker interrogation wnl  - SSI   FULL CODE      VTE Risk Mitigation (From admission, onward)         Ordered     enoxaparin injection 40 mg  Every 24 hours         08/25/23 1150     IP VTE HIGH RISK PATIENT  Once         08/16/23 2225     Place sequential compression device  Until discontinued         08/16/23 2225     Reason for No Pharmacological VTE Prophylaxis  Once        Question:  Reasons:  Answer:  Risk of Bleeding    08/16/23 2225                Discharge Planning   LEE: 8/27/2023     Code Status: Full Code   Is the patient medically ready for discharge?:     Reason for patient still in hospital (select all that apply): Treatment  Discharge Plan A: Rehab   Discharge Delays: (!) Home Medical Equipment (Insurance, Delivery)              Koko Erickson MD  Department of Hospital Medicine   [facility]

## 2023-08-26 NOTE — PLAN OF CARE
Problem: Adult Inpatient Plan of Care  Goal: Plan of Care Review  Outcome: Ongoing, Progressing     Problem: Adult Inpatient Plan of Care  Goal: Optimal Comfort and Wellbeing  Outcome: Ongoing, Progressing     Problem: Diabetes Comorbidity  Goal: Blood Glucose Level Within Targeted Range  Outcome: Ongoing, Progressing     Problem: Infection  Goal: Absence of Infection Signs and Symptoms  Outcome: Ongoing, Progressing     Problem: Impaired Wound Healing  Goal: Optimal Wound Healing  Outcome: Ongoing, Progressing     Problem: Fall Injury Risk  Goal: Absence of Fall and Fall-Related Injury  Outcome: Ongoing, Progressing     Problem: Pain (Cardiac Catheterization)  Goal: Acceptable Pain Control  Outcome: Ongoing, Progressing     Problem: Embolism (Cardiac Catheterization)  Goal: Absence of Embolism Signs and Symptoms  Outcome: Ongoing, Progressing

## 2023-08-27 VITALS
WEIGHT: 180.38 LBS | DIASTOLIC BLOOD PRESSURE: 89 MMHG | RESPIRATION RATE: 18 BRPM | BODY MASS INDEX: 24.43 KG/M2 | TEMPERATURE: 98 F | SYSTOLIC BLOOD PRESSURE: 158 MMHG | OXYGEN SATURATION: 96 % | HEIGHT: 72 IN | HEART RATE: 62 BPM

## 2023-08-27 LAB
ALBUMIN SERPL BCP-MCNC: 2.9 G/DL (ref 3.5–5.2)
ALP SERPL-CCNC: 80 U/L (ref 55–135)
ALT SERPL W/O P-5'-P-CCNC: 35 U/L (ref 10–44)
ANION GAP SERPL CALC-SCNC: 4 MMOL/L (ref 8–16)
AST SERPL-CCNC: 35 U/L (ref 10–40)
BASOPHILS # BLD AUTO: 0.04 K/UL (ref 0–0.2)
BASOPHILS NFR BLD: 0.5 % (ref 0–1.9)
BILIRUB SERPL-MCNC: 1.6 MG/DL (ref 0.1–1)
BUN SERPL-MCNC: 27 MG/DL (ref 8–23)
CALCIUM SERPL-MCNC: 9.1 MG/DL (ref 8.7–10.5)
CHLORIDE SERPL-SCNC: 109 MMOL/L (ref 95–110)
CO2 SERPL-SCNC: 24 MMOL/L (ref 23–29)
CREAT SERPL-MCNC: 0.9 MG/DL (ref 0.5–1.4)
DIFFERENTIAL METHOD: ABNORMAL
EOSINOPHIL # BLD AUTO: 0.2 K/UL (ref 0–0.5)
EOSINOPHIL NFR BLD: 2.6 % (ref 0–8)
ERYTHROCYTE [DISTWIDTH] IN BLOOD BY AUTOMATED COUNT: 16.1 % (ref 11.5–14.5)
EST. GFR  (NO RACE VARIABLE): >60 ML/MIN/1.73 M^2
GLUCOSE SERPL-MCNC: 136 MG/DL (ref 70–110)
GLUCOSE SERPL-MCNC: 139 MG/DL (ref 70–110)
GLUCOSE SERPL-MCNC: 143 MG/DL (ref 70–110)
HCT VFR BLD AUTO: 32.3 % (ref 40–54)
HGB BLD-MCNC: 10 G/DL (ref 14–18)
IMM GRANULOCYTES # BLD AUTO: 0.04 K/UL (ref 0–0.04)
IMM GRANULOCYTES NFR BLD AUTO: 0.5 % (ref 0–0.5)
LYMPHOCYTES # BLD AUTO: 0.8 K/UL (ref 1–4.8)
LYMPHOCYTES NFR BLD: 9.7 % (ref 18–48)
MAGNESIUM SERPL-MCNC: 1.5 MG/DL (ref 1.6–2.6)
MCH RBC QN AUTO: 26.4 PG (ref 27–31)
MCHC RBC AUTO-ENTMCNC: 31 G/DL (ref 32–36)
MCV RBC AUTO: 85 FL (ref 82–98)
MONOCYTES # BLD AUTO: 0.8 K/UL (ref 0.3–1)
MONOCYTES NFR BLD: 9.9 % (ref 4–15)
NEUTROPHILS # BLD AUTO: 6.6 K/UL (ref 1.8–7.7)
NEUTROPHILS NFR BLD: 76.8 % (ref 38–73)
NRBC BLD-RTO: 0 /100 WBC
PLATELET # BLD AUTO: 208 K/UL (ref 150–450)
PMV BLD AUTO: 10.9 FL (ref 9.2–12.9)
POTASSIUM SERPL-SCNC: 3.5 MMOL/L (ref 3.5–5.1)
PROT SERPL-MCNC: 5.7 G/DL (ref 6–8.4)
RBC # BLD AUTO: 3.79 M/UL (ref 4.6–6.2)
SODIUM SERPL-SCNC: 137 MMOL/L (ref 136–145)
WBC # BLD AUTO: 8.52 K/UL (ref 3.9–12.7)

## 2023-08-27 PROCEDURE — 83735 ASSAY OF MAGNESIUM: CPT | Performed by: FAMILY MEDICINE

## 2023-08-27 PROCEDURE — 99900031 HC PATIENT EDUCATION (STAT)

## 2023-08-27 PROCEDURE — 25000003 PHARM REV CODE 250: Performed by: NURSE PRACTITIONER

## 2023-08-27 PROCEDURE — 85025 COMPLETE CBC W/AUTO DIFF WBC: CPT | Performed by: FAMILY MEDICINE

## 2023-08-27 PROCEDURE — 94761 N-INVAS EAR/PLS OXIMETRY MLT: CPT

## 2023-08-27 PROCEDURE — 80053 COMPREHEN METABOLIC PANEL: CPT | Performed by: FAMILY MEDICINE

## 2023-08-27 PROCEDURE — 36415 COLL VENOUS BLD VENIPUNCTURE: CPT | Performed by: FAMILY MEDICINE

## 2023-08-27 PROCEDURE — 25000003 PHARM REV CODE 250: Performed by: STUDENT IN AN ORGANIZED HEALTH CARE EDUCATION/TRAINING PROGRAM

## 2023-08-27 PROCEDURE — 99900035 HC TECH TIME PER 15 MIN (STAT)

## 2023-08-27 PROCEDURE — 25000003 PHARM REV CODE 250: Performed by: FAMILY MEDICINE

## 2023-08-27 PROCEDURE — 25000003 PHARM REV CODE 250: Performed by: SPECIALIST

## 2023-08-27 RX ORDER — SPIRONOLACTONE 25 MG/1
25 TABLET ORAL DAILY
Qty: 90 TABLET | Refills: 0 | Status: SHIPPED | OUTPATIENT
Start: 2023-08-28 | End: 2023-12-24

## 2023-08-27 RX ADMIN — OXYBUTYNIN CHLORIDE 10 MG: 5 TABLET, EXTENDED RELEASE ORAL at 08:08

## 2023-08-27 RX ADMIN — FINASTERIDE 5 MG: 5 TABLET, FILM COATED ORAL at 08:08

## 2023-08-27 RX ADMIN — PANTOPRAZOLE SODIUM 40 MG: 40 TABLET, DELAYED RELEASE ORAL at 05:08

## 2023-08-27 RX ADMIN — CLOPIDOGREL BISULFATE 75 MG: 75 TABLET, FILM COATED ORAL at 08:08

## 2023-08-27 RX ADMIN — SACUBITRIL AND VALSARTAN 1 TABLET: 24; 26 TABLET, FILM COATED ORAL at 08:08

## 2023-08-27 RX ADMIN — FUROSEMIDE 20 MG: 20 TABLET ORAL at 08:08

## 2023-08-27 RX ADMIN — NITROGLYCERIN 2 INCH: 20 OINTMENT TOPICAL at 05:08

## 2023-08-27 RX ADMIN — ASPIRIN 81 MG: 81 TABLET, COATED ORAL at 08:08

## 2023-08-27 RX ADMIN — SPIRONOLACTONE 25 MG: 25 TABLET ORAL at 08:08

## 2023-08-27 RX ADMIN — SERTRALINE HYDROCHLORIDE 50 MG: 50 TABLET ORAL at 08:08

## 2023-08-27 RX ADMIN — METOPROLOL TARTRATE 25 MG: 25 TABLET, FILM COATED ORAL at 08:08

## 2023-08-27 NOTE — PLAN OF CARE
NALLELY sent patient information to [facility] via [employer] system.  NALLELY sent secure message to Makenzie to review.       08/27/23 0956   Post-Acute Status   Post-Acute Authorization Placement   Post-Acute Placement Status Pending post-acute provider review/more information requested

## 2023-08-27 NOTE — CARE UPDATE
08/27/23 0806   Patient Assessment/Suction   Level of Consciousness (AVPU) alert   Respiratory Effort Unlabored   All Lung Fields Breath Sounds clear;diminished   PRE-TX-O2   Device (Oxygen Therapy) room air   SpO2 (!) 93 %   Pulse Oximetry Type Intermittent   $ Pulse Oximetry - Multiple Charge Pulse Oximetry - Multiple   Pulse 68   Resp 15   Aerosol Therapy   $ Aerosol Therapy Charges PRN treatment not required   Education   $ Education Bronchodilator;15 min   Respiratory Evaluation   $ Care Plan Tech Time 15 min

## 2023-08-27 NOTE — DISCHARGE SUMMARY
formerly [facility] Medicine  Discharge Summary      Patient Name: Tono Saez  MRN: [id number]  BRITTANY: [id number]  Patient Class: IP- Inpatient  Admission Date: 8/16/2023  Hospital Length of Stay: 10 days  Discharge Date and Time:  08/27/2023 3:42 PM  Attending Physician: Koko Erickson MD   Discharging Provider: Koko Erickson MD  Primary Care Provider: Scott Staley MD    Primary Care Team: Networked reference to record PCT     HPI:   No notes on file    Procedure(s) (LRB):  Angiogram, Coronary, with Left Heart Cath (N/A)  Ultrasound-coronary (N/A)  Bypass graft study  PTCA, Single Vessel  Stent, Drug Eluting, Single Vessel, Coronary      Hospital Course:     78 y.o.male  With PMH of frequent falls and gait instability , HFrEF 25%, moderate AS, SSS with pacemaker was admitted with syncope .  Found to have L1 compression fracture but Patient is currently asymptomatic and is neurologically intact and neuro Sx reviewed and recommend only conservative approach and with TLSO bracing .  Patient had history of pacemaker and interrogation was done and did not show any abnormality and cardiology reviewed  and given previous his bypass surgery, repeat angiogram was done because of chest discomfort and needed PCI and got 2 YOHANA . ( Final cath report was not in the chart at the time of discharge)  Later patient did condition alert and transferred to rehab place in stable condition.  Patient was discharged with dual platelet therapy, statin, Lasix, Aldactone and also syncopal workup was negative with negative CT head and negative carotid ultrasound.  Entresto did not started because patient intolerant in the past.         Goals of Care Treatment Preferences:  Code Status: Full Code      Consults:   Consults (From admission, onward)        Status Ordering Provider     Inpatient consult to Cardiology  Once        Provider:  Kt Lux MD    Acknowledged TAYLOR WRAY     Inpatient consult to    Once        Provider:  (Not yet assigned)    Completed TAYLOR WRAY     Inpatient consult to Neurosurgery  Once        Provider:  Miguel Lion, DO    Acknowledged TANO HOLLY          No new Assessment & Plan notes have been filed under this hospital service since the last note was generated.  Service: Hospital Medicine    Final Active Diagnoses:    Diagnosis Date Noted POA    PRINCIPAL PROBLEM:  NSTEMI (non-ST elevated myocardial infarction) [I21.4] 10/28/2022 Unknown    Skin tear of left upper arm without complication [S41.112A] 08/21/2023 Yes    Syncope and collapse [R55] 04/19/2023 Yes    Orthostatic hypotension [I95.1] 04/19/2023 Yes    Chronic systolic congestive heart failure [I50.22] 02/27/2023 Yes    SSS (sick sinus syndrome) [I49.5] 02/27/2023 Yes    Stage 3a chronic kidney disease [N18.31] 02/27/2023 Yes    Compression fracture of L1 lumbar vertebra, with delayed healing, subsequent encounter [S32.010G] 02/17/2023 Not Applicable    Coronary artery disease of native artery of native heart with stable angina pectoris [I25.118] 04/03/2020 Yes    Chronic anticoagulation [Z79.01] 09/30/2019 Not Applicable    GERD (gastroesophageal reflux disease) [K21.9] 04/18/2019 Yes    Type 2 diabetes mellitus with hyperglycemia  [E11.65] 02/13/2019 Yes    HTN (hypertension) [I10] 05/21/2012 Yes      Problems Resolved During this Admission:       Discharged Condition: good    Disposition: Rehab Facility    Follow Up:   Follow-up Information     Jhoan Yip DO Follow up in 1 week(s).    Specialty: Wound Care  Why: For wound re-check  Contact information:  [address]  [address]  [place] [address]  [phone]             Kt Lux MD Follow up in 2 week(s).    Specialties: Cardiology, Cardiovascular Disease  Contact information:  [address]  [address]  [place] [address]  [phone]             [facility] Follow up.     Specialties: Physical Therapy, Occupational Therapy  Why: Rehab  Contact information:  [address] [facility] [place] [address]  [phone]                       Patient Instructions:      Back/Cervical Brace For Home Use     Order Specific Question Answer Comments   Height: 6' (1.829 m)    Weight: 82.2 kg (181 lb 3.2 oz)    Length of need (1-99 months): 99    Product(s) ordered: TLSO BRACE    Does patient have medical equipment at home? walker, rolling      Ambulatory referral/consult to Outpatient Case Management   Referral Priority: Routine Referral Type: Consultation   Referral Reason: Specialty Services Required   Number of Visits Requested: 1     Diet Cardiac     Cardiac rehab phase II   Standing Status: Future Standing Exp. Date: 08/24/24     Order Specific Question Answer Comments   Department [facility] [place]    Select qualifying diagnosis: Z98.61 - Coronary angioplasty status      EKG 12-lead   Standing Status: Future Standing Exp. Date: 08/22/24     Activity as tolerated       Significant Diagnostic Studies: Labs:   CMP   Recent Labs   Lab 08/26/23  0320 08/27/23  0419    137   K 3.5 3.5    109   CO2 25 24   * 143*   BUN 34* 27*   CREATININE 1.0 0.9   CALCIUM 9.3 9.1   PROT 5.8* 5.7*   ALBUMIN 2.9* 2.9*   BILITOT 1.2* 1.6*   ALKPHOS 80 80   AST 30 35   ALT 32 35   ANIONGAP 5* 4*    and CBC   Recent Labs   Lab 08/26/23  0320 08/27/23  0419   WBC 8.99 8.52   HGB 9.9* 10.0*   HCT 32.3* 32.3*    208       Pending Diagnostic Studies:     Procedure Component Value Units Date/Time    Echo [[id number]]     Order Status: Sent Lab Status: No result          Medications:  Reconciled Home Medications:      Medication List      START taking these medications    spironolactone 25 MG tablet  Commonly known as: ALDACTONE  Take 1 tablet (25 mg total) by mouth once daily.  Start taking on: August 28, 2023        CHANGE how you take these medications    aspirin 81 MG EC  tablet  Commonly known as: ECOTRIN  Take 2 tablets (162 mg total) by mouth once daily.  What changed: how much to take     atorvastatin 40 MG tablet  Commonly known as: LIPITOR  Take 1 tablet by mouth once daily.  What changed: Another medication with the same name was removed. Continue taking this medication, and follow the directions you see here.     betamethasone dipropionate 0.05 % lotion  Commonly known as: DIPROLENE  Apply thin film to scalp bid prn itch  What changed:   · how much to take  · how to take this  · when to take this     furosemide 20 MG tablet  Commonly known as: LASIX  TAKE 1 TABLET (20 MG TOTAL) BY MOUTH ONCE DAILY.  What changed: when to take this     nitroGLYCERIN 0.4 MG SL tablet  Commonly known as: NITROSTAT  DISSOLVE 1 TABLET UNDER THE TONGUE AS NEEDED FOR CHEST PAIN  What changed: See the new instructions.     pantoprazole 40 MG tablet  Commonly known as: PROTONIX  TAKE ONE TABLET BY MOUTH ONCE DAILY  What changed: when to take this     sertraline 50 MG tablet  Commonly known as: ZOLOFT  TAKE ONE TABLET BY MOUTH ONCE DAILY  What changed: when to take this     tolterodine 4 MG 24 hr capsule  Commonly known as: DETROL LA  TAKE ONE CAPSULE BY MOUTH ONCE DAILY  What changed: when to take this        CONTINUE taking these medications    clopidogreL 75 mg tablet  Commonly known as: PLAVIX  Take 1 tablet (75 mg total) by mouth once daily.     empagliflozin 25 mg tablet  Commonly known as: JARDIANCE  Take 1 tablet (25 mg total) by mouth once daily.     finasteride 5 mg tablet  Commonly known as: PROSCAR  Take 5 mg by mouth once daily.     fluticasone propionate 50 mcg/actuation nasal spray  Commonly known as: FLONASE  1 spray by Each Nostril route as needed for Allergies.     HYDROcodone-acetaminophen 5-325 mg per tablet  Commonly known as: NORCO  Take 1 tablet by mouth every 4 (four) hours as needed for Pain.     isosorbide dinitrate 10 MG tablet  Commonly known as: ISORDIL  Take 10 mg by mouth  2 (two) times daily.     metoprolol tartrate 25 MG tablet  Commonly known as: LOPRESSOR  Take 0.5 tablets (12.5 mg total) by mouth 2 (two) times daily.     midodrine 2.5 MG Tab  Commonly known as: PROAMATINE  Take 1 tablet (2.5 mg total) by mouth 3 (three) times daily.     montelukast 10 mg tablet  Commonly known as: SINGULAIR  Take 1 tablet (10 mg total) by mouth every evening.     multivitamin capsule  Take 1 capsule by mouth once daily.     pioglitazone 15 MG tablet  Commonly known as: ACTOS  Take 15 mg by mouth once daily.     pregabalin 50 MG capsule  Commonly known as: LYRICA  Take 1 capsule (50 mg total) by mouth 3 (three) times daily.     vitamin D 1000 units Tab  Commonly known as: VITAMIN D3  Take 1,000 Units by mouth once daily.        ASK your doctor about these medications    magnesium oxide 400 mg (241.3 mg magnesium) tablet  Commonly known as: MAG-OX  Take 1 tablet (400 mg total) by mouth once daily.  Ask about: Should I take this medication?            Indwelling Lines/Drains at time of discharge:   Lines/Drains/Airways     Drain  Duration           Male External Urinary Catheter 08/22/23 1800 4 days            General: Patient resting comfortably in no acute distress. Appears as stated age. Calm  Eyes: EOM intact. No conjunctivae injection. No scleral icterus.  ENT: Hearing grossly intact. No discharge from ears. No nasal discharge.   CVS: RRR. No LE edema BL.  Lungs: CTA BL, no wheezing or crackles. Good breath sounds. No accessory muscle use. No acute respiratory distress  Neuro: non focal , Follows commands. Responds appropriately    Time spent on the discharge of patient: 33 minutes         Koko Erickson MD  Department of Hospital Medicine  [facility]

## 2023-08-27 NOTE — PLAN OF CARE
Problem: Adult Inpatient Plan of Care  Goal: Plan of Care Review  Outcome: Ongoing, Progressing  Goal: Patient-Specific Goal (Individualized)  Outcome: Ongoing, Progressing  Goal: Absence of Hospital-Acquired Illness or Injury  Outcome: Ongoing, Progressing  Goal: Optimal Comfort and Wellbeing  Outcome: Ongoing, Progressing  Goal: Readiness for Transition of Care  Outcome: Ongoing, Progressing     Problem: Syncope  Goal: Absence of Syncopal Symptoms  Outcome: Ongoing, Progressing     Problem: Skin Injury Risk Increased  Goal: Skin Health and Integrity  Outcome: Ongoing, Progressing     Problem: Impaired Wound Healing  Goal: Optimal Wound Healing  Outcome: Ongoing, Progressing

## 2023-08-27 NOTE — PLAN OF CARE
Problem: Adult Inpatient Plan of Care  Goal: Plan of Care Review  8/27/2023 1558 by Madison Chowdhury LPN  Outcome: Ongoing, Progressing  8/27/2023 0919 by Madison Chowdhury LPN  Outcome: Ongoing, Progressing  Goal: Patient-Specific Goal (Individualized)  8/27/2023 1558 by Madison Chowdhury LPN  Outcome: Ongoing, Progressing  8/27/2023 0919 by Madison Chowdhury LPN  Outcome: Ongoing, Progressing  Goal: Absence of Hospital-Acquired Illness or Injury  8/27/2023 1558 by Madison Chowdhury LPN  Outcome: Ongoing, Progressing  8/27/2023 0919 by Madison Chowdhury LPN  Outcome: Ongoing, Progressing  Goal: Optimal Comfort and Wellbeing  8/27/2023 1558 by Madison Chowdhury LPN  Outcome: Ongoing, Progressing  8/27/2023 0919 by Madison Chowdhury LPN  Outcome: Ongoing, Progressing  Goal: Readiness for Transition of Care  8/27/2023 1558 by Madison Chowdhury LPN  Outcome: Ongoing, Progressing  8/27/2023 0919 by Madison Chowdhury LPN  Outcome: Ongoing, Progressing     Problem: Diabetes Comorbidity  Goal: Blood Glucose Level Within Targeted Range  8/27/2023 1558 by Madison Chowdhury LPN  Outcome: Ongoing, Progressing  8/27/2023 0919 by Madison Chowdhury LPN  Outcome: Ongoing, Progressing     Problem: Syncope  Goal: Absence of Syncopal Symptoms  8/27/2023 1558 by Madison Chowdhury LPN  Outcome: Ongoing, Progressing  8/27/2023 0919 by Madison Chowdhury LPN  Outcome: Ongoing, Progressing     Problem: Fall Injury Risk  Goal: Absence of Fall and Fall-Related Injury  8/27/2023 1558 by Madison Chowdhury LPN  Outcome: Ongoing, Progressing  8/27/2023 0919 by Madison Chowdhury LPN  Outcome: Ongoing, Progressing     Problem: Infection  Goal: Absence of Infection Signs and Symptoms  8/27/2023 1558 by Madison Chowdhury LPN  Outcome: Ongoing, Progressing  8/27/2023 0919 by Madison Chowdhury LPN  Outcome: Ongoing, Progressing     Problem: Impaired Wound Healing  Goal: Optimal Wound Healing  8/27/2023 1558 by Madison Chowdhury,  LPN  Outcome: Ongoing, Progressing  8/27/2023 0919 by Madison Chowdhury LPN  Outcome: Ongoing, Progressing     Problem: Skin Injury Risk Increased  Goal: Skin Health and Integrity  8/27/2023 1558 by Madison Chowdhury LPN  Outcome: Ongoing, Progressing  8/27/2023 0919 by Madison Chowdhury LPN  Outcome: Ongoing, Progressing     Problem: Arrhythmia/Dysrhythmia (Cardiac Catheterization)  Goal: Stable Heart Rate and Rhythm  8/27/2023 1558 by Madison Chowdhury LPN  Outcome: Ongoing, Progressing  8/27/2023 0919 by Madison Chowdhury LPN  Outcome: Ongoing, Progressing     Problem: Bleeding (Cardiac Catheterization)  Goal: Absence of Bleeding  8/27/2023 1558 by Madison Chowdhury LPN  Outcome: Ongoing, Progressing  8/27/2023 0919 by Madison Chowdhury LPN  Outcome: Ongoing, Progressing     Problem: Contrast-Induced Injury Risk (Cardiac Catheterization)  Goal: Absence of Contrast-Induced Injury  8/27/2023 1558 by Madison Chowdhury LPN  Outcome: Ongoing, Progressing  8/27/2023 0919 by Madison Chowdhury LPN  Outcome: Ongoing, Progressing     Problem: Embolism (Cardiac Catheterization)  Goal: Absence of Embolism Signs and Symptoms  8/27/2023 1558 by Madison Chowdhury LPN  Outcome: Ongoing, Progressing  8/27/2023 0919 by Madison Chowdhury LPN  Outcome: Ongoing, Progressing     Problem: Ongoing Anesthesia/Sedation Effects (Cardiac Catheterization)  Goal: Anesthesia/Sedation Recovery  8/27/2023 1558 by Madison Chowdhury LPN  Outcome: Ongoing, Progressing  8/27/2023 0919 by Madison Chowdhury LPN  Outcome: Ongoing, Progressing     Problem: Pain (Cardiac Catheterization)  Goal: Acceptable Pain Control  8/27/2023 1558 by Madison Chowdhury LPN  Outcome: Ongoing, Progressing  8/27/2023 0919 by Madison Chowdhury LPN  Outcome: Ongoing, Progressing     Problem: Vascular Access Protection (Cardiac Catheterization)  Goal: Absence of Vascular Access Complication  8/27/2023 1558 by Madison Chowdhury LPN  Outcome: Ongoing,  Progressing  8/27/2023 0919 by Madison Chowdhury LPN  Outcome: Ongoing, Progressing

## 2023-08-27 NOTE — PLAN OF CARE
Patient cleared for discharge from case management standpoint.    Per Makenzie with [facility] via secure message, patient's nurse can call report to [phone], daughter has been notified and will  around noon-1230, she will need a little assist getting him in car.       08/27/23 1042   Final Note   Assessment Type Final Discharge Note   Anticipated Discharge Disposition Rehab   Hospital Resources/Appts/Education Provided Post-Acute resouces added to AVS;Provided patient/caregiver with written discharge plan information;Provided education on problems/symptoms using teachback   Post-Acute Status   Post-Acute Authorization Placement   Post-Acute Placement Status Set-up Complete/Auth obtained   Patient choice form signed by patient/caregiver List with quality metrics by geographic area provided   Discharge Delays (!) Personal Transportation

## 2023-08-28 ENCOUNTER — OUTPATIENT CASE MANAGEMENT (OUTPATIENT)
Dept: ADMINISTRATIVE | Facility: OTHER | Age: 78
End: 2023-08-28
Payer: MEDICARE

## 2023-09-06 ENCOUNTER — TELEPHONE (OUTPATIENT)
Dept: CARDIOLOGY | Facility: CLINIC | Age: 78
End: 2023-09-06
Payer: MEDICARE

## 2023-09-06 ENCOUNTER — TELEPHONE (OUTPATIENT)
Dept: PAIN MEDICINE | Facility: CLINIC | Age: 78
End: 2023-09-06
Payer: MEDICARE

## 2023-09-06 NOTE — TELEPHONE ENCOUNTER
Pt procedure is denied needs pain score . Calling daughter now.     On 07/25 pt had pain of 8 before his block and a pain of 2 after the block      On 06/29 pt had the first MBB please see note from 07/03 the pain went from a 6 to a zero    All other information is documented in 07/03 and 07/26 please resubmit to insurance calling daughter to cancel.she verbalizes understanding. Asking pre auth to resubmit.

## 2023-09-06 NOTE — TELEPHONE ENCOUNTER
----- Message from Abby Jarquin MA sent at 9/5/2023  4:39 PM CDT -----    ----- Message -----  From: Honey Hopkins  Sent: 9/5/2023   4:34 PM CDT  To: [facility] Staff    Type:  Needs Medical Advice    Who Called: pt daughter ivory Castillo Call Back Number: [phone]  Additional Information:  Requesting call back  pt wants to know if he should keep taking blood thinners sx appt 9/13    please advise thank you

## 2023-09-07 ENCOUNTER — TELEPHONE (OUTPATIENT)
Dept: NEUROSURGERY | Facility: CLINIC | Age: 78
End: 2023-09-07
Payer: MEDICARE

## 2023-09-07 ENCOUNTER — DOCUMENTATION ONLY (OUTPATIENT)
Dept: REHABILITATION | Facility: HOSPITAL | Age: 78
End: 2023-09-07
Payer: MEDICARE

## 2023-09-07 NOTE — TELEPHONE ENCOUNTER
----- Message from Ese Bambi sent at 9/7/2023  8:37 AM CDT -----  Regarding: appointment  Contact: Taryn with [facility]  Type:  Sooner Appointment Request    Caller is requesting a sooner appointment.  Caller declined first available appointment listed below.  Caller will not accept being placed on the waitlist and is requesting a message be sent to doctor.    Name of Caller:  Taryn with [facility]  When is the first available appointment?    Symptoms:  discharge 09/08/23  Best Call Back Number:  [phone] (home) [phone] (work)    Additional Information:  Please call patient to schedule.  Thanks!

## 2023-09-07 NOTE — PROGRESS NOTES
[facility]  Physical Therapy Discharge Note    Name: Tono Saez  Clinic Number: [id number]    Therapy Diagnosis: No diagnosis found.  Physician: No ref. provider found    Physician Orders: PT Eval and Treat  Medical Diagnosis from Referral: gait instability  Evaluation Date: 6/14/2023    Date of Last visit: 08/16/2023  Total Visits Received: 15      ASSESSMENT      Discharge reason: Patient has been hospitalized.    Goals:     New Short Term Goals (3 Weeks):    Maintain patient's complaints of low back pain at less than 5/10 during activities of daily living. (MET)  2.   Patient to tolerate x 45 seconds bilateral step stance, eyes open to improve upright tolerance. (MET)  3.   Patient to tolerate use of 3# weights during lower extremity therapeutic exercise to improve overall strength. (PART MET)  4.   Patient to perform x 10 repetitions sit to stand so that he may rise without upper extremity support. (PART MET)     New Long Term Goals (6 Weeks):   Patient to demonstrate competence with home exercise program to maintain therapeutic gains. (PART MET)  2.   Patient to report improvement in his ability to walk around a room from limited a little to not limited at all. (NOT MET)  3.   Patient to report no new falls. (MET)  4.   Patient to ambulate 200 feet with rolling-walker and stand by assist to improve household mobility. (MET)       PLAN     This patient is discharged from Physical Therapy.      Hubert Powers, PT

## 2023-09-07 NOTE — TELEPHONE ENCOUNTER
Returned call to Taryn at [facility] and informed that pt does not require a Neurosurgery follow up at this time. She voiced understanding.

## 2023-09-09 PROCEDURE — G0180 PR HOME HEALTH MD CERTIFICATION: ICD-10-PCS | Mod: AQ,,, | Performed by: FAMILY MEDICINE

## 2023-09-09 PROCEDURE — G0180 MD CERTIFICATION HHA PATIENT: HCPCS | Mod: AQ,,, | Performed by: FAMILY MEDICINE

## 2023-09-18 ENCOUNTER — OFFICE VISIT (OUTPATIENT)
Dept: FAMILY MEDICINE | Facility: CLINIC | Age: 78
End: 2023-09-18
Payer: MEDICARE

## 2023-09-18 VITALS
DIASTOLIC BLOOD PRESSURE: 68 MMHG | OXYGEN SATURATION: 99 % | WEIGHT: 173.5 LBS | HEART RATE: 70 BPM | SYSTOLIC BLOOD PRESSURE: 134 MMHG | RESPIRATION RATE: 18 BRPM | BODY MASS INDEX: 23.5 KG/M2 | TEMPERATURE: 99 F | HEIGHT: 72 IN

## 2023-09-18 DIAGNOSIS — G89.4 CHRONIC PAIN SYNDROME: ICD-10-CM

## 2023-09-18 DIAGNOSIS — S41.112A SKIN TEAR OF LEFT UPPER ARM WITHOUT COMPLICATION, INITIAL ENCOUNTER: ICD-10-CM

## 2023-09-18 DIAGNOSIS — D50.0 IRON DEFICIENCY ANEMIA DUE TO CHRONIC BLOOD LOSS: ICD-10-CM

## 2023-09-18 DIAGNOSIS — Z91.81: Primary | ICD-10-CM

## 2023-09-18 DIAGNOSIS — I25.10 CORONARY ARTERY DISEASE INVOLVING NATIVE CORONARY ARTERY OF NATIVE HEART WITHOUT ANGINA PECTORIS: ICD-10-CM

## 2023-09-18 DIAGNOSIS — I10 PRIMARY HYPERTENSION: ICD-10-CM

## 2023-09-18 DIAGNOSIS — E11.65 TYPE 2 DIABETES MELLITUS WITH HYPERGLYCEMIA, WITHOUT LONG-TERM CURRENT USE OF INSULIN: ICD-10-CM
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
## 2023-09-18 DIAGNOSIS — R26.81 GAIT INSTABILITY: ICD-10-CM

## 2023-09-18 PROCEDURE — 99215 OFFICE O/P EST HI 40 MIN: CPT | Performed by: FAMILY MEDICINE

## 2023-09-18 PROCEDURE — 99215 OFFICE O/P EST HI 40 MIN: CPT | Mod: S$PBB,AQ,, | Performed by: FAMILY MEDICINE

## 2023-09-18 PROCEDURE — G0008 ADMIN INFLUENZA VIRUS VAC: HCPCS | Mod: PBBFAC | Performed by: FAMILY MEDICINE

## 2023-09-18 PROCEDURE — 99999PBSHW FLU VACCINE - QUADRIVALENT - HIGH DOSE (65+) PRESERVATIVE FREE IM: Mod: PBBFAC,,,

## 2023-09-18 PROCEDURE — 99999PBSHW FLU VACCINE - QUADRIVALENT - HIGH DOSE (65+) PRESERVATIVE FREE IM: ICD-10-PCS | Mod: PBBFAC,,,

## 2023-09-18 PROCEDURE — 99215 PR OFFICE/OUTPT VISIT, EST, LEVL V, 40-54 MIN: ICD-10-PCS | Mod: S$PBB,AQ,, | Performed by: FAMILY MEDICINE

## 2023-09-18 RX ORDER — LANOLIN ALCOHOL/MO/W.PET/CERES
1 CREAM (GRAM) TOPICAL EVERY MORNING
COMMUNITY
End: 2023-09-18 | Stop reason: SINTOL

## 2023-09-18 RX ORDER — ISOSORBIDE DINITRATE 10 MG/1
10 TABLET ORAL 2 TIMES DAILY
Qty: 60 TABLET | Refills: 5 | Status: SHIPPED | OUTPATIENT
Start: 2023-09-18 | End: 2023-09-27 | Stop reason: SINTOL

## 2023-09-18 RX ORDER — PREGABALIN 50 MG/1
50 CAPSULE ORAL NIGHTLY PRN
Qty: 90 CAPSULE | Refills: 1 | Status: ON HOLD | OUTPATIENT
Start: 2023-09-18 | End: 2023-10-27 | Stop reason: HOSPADM

## 2023-09-18 NOTE — PROGRESS NOTES
Subjective:       Patient ID: Tono Saez is a 78 y.o. male.    Chief Complaint: HFU (Pt suffered a fall on 9/7/23. Spent two weeks in the hospital. During the stay, the pt had a heart attack. Angio ordered by Dr. Lux)      Is here as a hospital follow-up he was admitted after a fall in the street with multiple contusions.  Was in the hospital for several days when he developed a a coronary symptoms had angiogram revealing critical stenoses and did get 2 stents he was sent to cardiac rehab in [facility] for 2 weeks and is now discharged x1 week.  Fell again yesterday., suffering lacerations to both elbows.  He does have home health orders and has had tubes visits at home since discharge  Has a sitter or family present.   Lab Results       Component                Value               Date                       WBC                      8.52                08/27/2023                 HGB                      10.0 (L)            08/27/2023                 HCT                      32.3 (L)            08/27/2023                 PLT                      208                 08/27/2023                 CHOL                     141                 01/12/2023                 TRIG                     122                 01/12/2023                 HDL                      48                  01/12/2023                 ALT                      35                  08/27/2023                 AST                      35                  08/27/2023                 NA                       137                 08/27/2023                 K                        3.5                 08/27/2023                 CL                       109                 08/27/2023                 CREATININE               0.9                 08/27/2023                 BUN                      27 (H)              08/27/2023                 CO2                      24                  08/27/2023                 TSH                      2.270                04/14/2023                 PSA                      1.5                 07/20/2023                 INR                      1.1                 02/03/2023                 HGBA1C                   7.2 (H)             08/16/2023            Wt Readings from Last 4 Encounters:  09/18/23 : 78.7 kg (173 lb 8 oz)  08/27/23 : 81.8 kg (180 lb 6.4 oz)  08/22/23 : 82.2 kg (181 lb 3.5 oz)  08/17/23 : 83.6 kg (184 lb 4.9 oz)            Allergies and Medications:   Review of patient's allergies indicates:   Allergen Reactions    Adhesive Other (See Comments)     SILK TAPE PULL SKIN OFF    Metformin Other (See Comments)     Weight loss cachexia anorexia,diarrhea.    Pcn [penicillins]      Patient states he passed out from this medication when he was 10 years old.      Current Outpatient Medications   Medication Sig Dispense Refill    aspirin (ECOTRIN) 81 MG EC tablet Take 2 tablets (162 mg total) by mouth once daily. 120 tablet 0    atorvastatin (LIPITOR) 40 MG tablet Take 1 tablet by mouth once daily.      clopidogreL (PLAVIX) 75 mg tablet Take 1 tablet (75 mg total) by mouth once daily. 90 tablet 0    empagliflozin (JARDIANCE) 25 mg tablet Take 1 tablet (25 mg total) by mouth once daily. 90 tablet 2    finasteride (PROSCAR) 5 mg tablet Take 5 mg by mouth once daily.      isosorbide dinitrate (ISORDIL) 10 MG tablet Take 10 mg by mouth 2 (two) times daily.      magnesium oxide (MAG-OX) 400 mg (241.3 mg magnesium) tablet Take 1 tablet by mouth every morning.      metoprolol tartrate (LOPRESSOR) 25 MG tablet Take 0.5 tablets (12.5 mg total) by mouth 2 (two) times daily. 60 tablet 0    montelukast (SINGULAIR) 10 mg tablet Take 1 tablet (10 mg total) by mouth every evening. 90 tablet 3    multivitamin capsule Take 1 capsule by mouth once daily.      pantoprazole (PROTONIX) 40 MG tablet TAKE ONE TABLET BY MOUTH ONCE DAILY (Patient taking differently: Take 40 mg by mouth once daily.) 90 tablet 1    pioglitazone (ACTOS) 15 MG tablet Take  15 mg by mouth once daily.      pregabalin (LYRICA) 50 MG capsule Take 1 capsule (50 mg total) by mouth 3 (three) times daily. 90 capsule 1    sertraline (ZOLOFT) 50 MG tablet TAKE ONE TABLET BY MOUTH ONCE DAILY (Patient taking differently: Take 50 mg by mouth once daily.) 90 tablet 1    spironolactone (ALDACTONE) 25 MG tablet Take 1 tablet (25 mg total) by mouth once daily. 90 tablet 0    tolterodine (DETROL LA) 4 MG 24 hr capsule TAKE ONE CAPSULE BY MOUTH ONCE DAILY (Patient taking differently: Take 4 mg by mouth once daily.) 90 capsule 1    vitamin D 1000 units Tab Take 1,000 Units by mouth once daily.      betamethasone dipropionate (DIPROLENE) 0.05 % lotion Apply thin film to scalp bid prn itch (Patient not taking: Reported on 9/18/2023) 60 mL 6    fluticasone propionate (FLONASE) 50 mcg/actuation nasal spray 1 spray by Each Nostril route as needed for Allergies.      HYDROcodone-acetaminophen (NORCO) 5-325 mg per tablet Take 1 tablet by mouth every 4 (four) hours as needed for Pain. (Patient not taking: Reported on 9/18/2023) 18 tablet 0    midodrine (PROAMATINE) 2.5 MG Tab Take 1 tablet (2.5 mg total) by mouth 3 (three) times daily. (Patient not taking: Reported on 9/18/2023) 90 tablet 11    multivitamin with minerals tablet Take 1 capsule by mouth every morning.      nitroGLYCERIN (NITROSTAT) 0.4 MG SL tablet DISSOLVE 1 TABLET UNDER THE TONGUE AS NEEDED FOR CHEST PAIN (Patient not taking: Reported on 9/18/2023) 100 tablet 3     No current facility-administered medications for this visit.     Facility-Administered Medications Ordered in Other Visits   Medication Dose Route Frequency Provider Last Rate Last Admin    lactated ringers infusion   Intravenous Continuous Maurice Montenegro MD 25 mL/hr at 07/25/23 1013 New Bag at 07/25/23 1013    LIDOcaine (PF) 10 mg/ml (1%) injection 10 mg  1 mL Intradermal Once Maurice Montenegro MD           Family History:   Family History   Problem Relation Age of Onset    Heart disease  Mother     Heart disease Father     Hyperlipidemia Sister     Hypertension Sister     Heart disease Brother     Heart disease Brother     Heart disease Brother     Heart disease Brother     Heart disease Brother        Social History:   Social History     Socioeconomic History    Marital status:    Tobacco Use    Smoking status: Never    Smokeless tobacco: Never   Substance and Sexual Activity    Alcohol use: No    Drug use: No    Sexual activity: Not Currently     Social Determinants of Health     Financial Resource Strain: Low Risk  (5/24/2023)    Overall Financial Resource Strain (CARDIA)     Difficulty of Paying Living Expenses: Not hard at all   Food Insecurity: No Food Insecurity (6/23/2023)    Hunger Vital Sign     Worried About Running Out of Food in the Last Year: Never true     Ran Out of Food in the Last Year: Never true   Transportation Needs: No Transportation Needs (6/23/2023)    PRAPARE - Transportation     Lack of Transportation (Medical): No     Lack of Transportation (Non-Medical): No   Physical Activity: Insufficiently Active (6/23/2023)    Exercise Vital Sign     Days of Exercise per Week: 2 days     Minutes of Exercise per Session: 30 min   Stress: No Stress Concern Present (6/23/2023)    [employer] [place] [employer] - Occupational Stress Questionnaire     Feeling of Stress : Not at all   Social Connections: Moderately Integrated (6/23/2023)    Social Connection and Isolation Panel [NHANES]     Frequency of Communication with Friends and Family: More than three times a week     Frequency of Social Gatherings with Friends and Family: More than three times a week     Attends Synagogue Services: More than 4 times per year     Active Member of Clubs or Organizations: Yes     Attends Club or Organization Meetings: More than 4 times per year     Marital Status:    Housing Stability: Low Risk  (6/23/2023)    Housing Stability Vital Sign     Unable to Pay for Housing in the  Last Year: No     Number of Places Lived in the Last Year: 1     Unstable Housing in the Last Year: No       Review of Systems    Objective:     Vitals:    09/18/23 0859   BP: 134/68   Pulse: 70   Resp: 18   Temp: 98.8 °F (37.1 °C)        Physical Exam  Vitals and nursing note reviewed.   Constitutional:       Appearance: He is well-developed. He is not diaphoretic.   HENT:      Head: Normocephalic.   Eyes:      Conjunctiva/sclera: Conjunctivae normal.      Pupils: Pupils are equal, round, and reactive to light.   Cardiovascular:      Rate and Rhythm: Normal rate and regular rhythm.      Heart sounds: Normal heart sounds. No murmur heard.     No friction rub. No gallop.   Pulmonary:      Effort: Pulmonary effort is normal. No respiratory distress.      Breath sounds: Normal breath sounds. No stridor. No wheezing or rales.   Chest:      Chest wall: No tenderness.   Musculoskeletal:      Right lower leg: No edema.      Left lower leg: No edema.   Skin:     General: Skin is warm and dry.      Comments: Multiple contusions and skin tears dressed.   Psychiatric:         Behavior: Behavior normal.         Thought Content: Thought content normal.         Judgment: Judgment normal.         Assessment:       1. At high risk for falls per [employer] fall risk assessment scale    2. Iron deficiency anemia due to chronic blood loss    3. Primary hypertension    4. Type 2 diabetes mellitus with hyperglycemia, without long-term current use of insulin    5. Fall, initial encounter    6. Gait instability    7. Skin tear of left upper arm without complication, initial encounter        Plan:       Tono was seen today for hfu.    Diagnoses and all orders for this visit:    At high risk for falls per [employer] fall risk assessment scale  -     Ambulatory referral/consult to Home Health; Future    Iron deficiency anemia due to chronic blood loss  -     CBC Auto Differential; Future  -     Comprehensive Metabolic Panel; Future    Primary  hypertension    Type 2 diabetes mellitus with hyperglycemia, without long-term current use of insulin    Fall, initial encounter    Gait instability    Skin tear of left upper arm without complication, initial encounter    Other orders  -     Influenza - Quadrivalent - High Dose (65+) (PF) (IM)         Follow up in about 3 months (around 12/18/2023) for Gait instability and falls, follow up.

## 2023-09-21 ENCOUNTER — OFFICE VISIT (OUTPATIENT)
Dept: CARDIOLOGY | Facility: CLINIC | Age: 78
End: 2023-09-21
Payer: MEDICARE

## 2023-09-21 VITALS
DIASTOLIC BLOOD PRESSURE: 58 MMHG | WEIGHT: 173.5 LBS | HEART RATE: 78 BPM | OXYGEN SATURATION: 98 % | BODY MASS INDEX: 23.5 KG/M2 | SYSTOLIC BLOOD PRESSURE: 90 MMHG | HEIGHT: 72 IN

## 2023-09-21 DIAGNOSIS — Z95.0 CARDIAC PACEMAKER IN SITU: ICD-10-CM

## 2023-09-21 DIAGNOSIS — N18.31 STAGE 3A CHRONIC KIDNEY DISEASE: ICD-10-CM

## 2023-09-21 DIAGNOSIS — Z79.01 CHRONIC ANTICOAGULATION: ICD-10-CM

## 2023-09-21 DIAGNOSIS — I50.22 CHRONIC SYSTOLIC CONGESTIVE HEART FAILURE: ICD-10-CM

## 2023-09-21 DIAGNOSIS — I25.10 CORONARY ARTERY DISEASE INVOLVING NATIVE CORONARY ARTERY OF NATIVE HEART WITHOUT ANGINA PECTORIS: ICD-10-CM

## 2023-09-21 DIAGNOSIS — I95.1 ORTHOSTATIC HYPOTENSION: ICD-10-CM

## 2023-09-21 DIAGNOSIS — I10 PRIMARY HYPERTENSION: ICD-10-CM

## 2023-09-21 DIAGNOSIS — E78.2 MIXED HYPERLIPIDEMIA: ICD-10-CM

## 2023-09-21 PROCEDURE — 99215 PR OFFICE/OUTPT VISIT, EST, LEVL V, 40-54 MIN: ICD-10-PCS | Mod: S$PBB,,, | Performed by: NURSE PRACTITIONER

## 2023-09-21 PROCEDURE — 99999 PR PBB SHADOW E&M-EST. PATIENT-LVL IV: CPT | Mod: PBBFAC,,, | Performed by: NURSE PRACTITIONER

## 2023-09-21 PROCEDURE — 99999 PR PBB SHADOW E&M-EST. PATIENT-LVL IV: ICD-10-PCS | Mod: PBBFAC,,, | Performed by: NURSE PRACTITIONER

## 2023-09-21 PROCEDURE — 99214 OFFICE O/P EST MOD 30 MIN: CPT | Mod: PBBFAC,PN | Performed by: NURSE PRACTITIONER

## 2023-09-21 PROCEDURE — 99215 OFFICE O/P EST HI 40 MIN: CPT | Mod: S$PBB,,, | Performed by: NURSE PRACTITIONER

## 2023-09-21 RX ORDER — MIDODRINE HYDROCHLORIDE 2.5 MG/1
2.5 TABLET ORAL 3 TIMES DAILY
Qty: 270 TABLET | Refills: 3 | Status: SHIPPED | OUTPATIENT
Start: 2023-09-21 | End: 2023-09-27 | Stop reason: DRUGHIGH

## 2023-09-21 RX ORDER — GLUCOSAMINE HCL 500 MG
1 TABLET ORAL DAILY
COMMUNITY

## 2023-09-21 NOTE — ASSESSMENT & PLAN NOTE
Euvolemic  Weight stable    Echo    Interpretation Summary    Left Ventricle: The left ventricle is normal in size. Moderately increased ventricular mass. Moderately increased wall thickness. There is moderate concentrict hypertrophy. Moderate global hypokinesis present. See diagram for wall motion findings.  Patient has evidence of akinesis and slight paradoxical motion of the mid inferior septum, distal anterior wall and slight paradoxic motion at the apex This is not significantly different than the echo noted 8/1723 The inferior wall in the lateral wall supplied by the main left stented circumflex appears to move well There is moderately reduced systolic function with a visually estimated ejection fraction of 30 - 40%. 40% EF Grade I diastolic dysfunction. Elevated left ventricular filling pressure. Tissue Doppler velocity is reduced.  Mean left atrial pressure 14    Left Atrium: Left atrium is mildly dilated.    Right Ventricle: Mild right ventricular enlargement. Systolic function is moderately reduced.    Aortic Valve: There is mild aortic valve sclerosis. There is mild to moderate aortic regurgitation.    Mitral Valve: There is no stenosis.    Pulmonary Artery: No pulmonary hypertension. The estimated pulmonary artery systolic pressure is 22 mmHg.    IVC/SVC: Normal venous pressure at 3 mmHg.    Pacer wires are seen  . Continue metoprolol, isosorbide and spironolactone at mild doses and monitor clinical status closely. Place on fluid restriction of 1.5 liters. Daily weight

## 2023-09-21 NOTE — ASSESSMENT & PLAN NOTE
Latest GFR normal   Latest Reference Range & Units Most Recent   eGFR >60 mL/min/1.73 m^2 >60.0  8/27/23 04:19

## 2023-09-21 NOTE — ASSESSMENT & PLAN NOTE
Add Midodrine 2.5 mg TID ( he was on this in hospital and had good response)  Continue to monitor BP BID  F/up with Jose J at next available appt  Hold Spironolactone if SBP <100  Metoprolol tartrate 12.5 mg BID; Hold for SBP < 100  BP sittin/58, standing 72/42, back to sittin/58  Stressed importance of adequate hydration and importance of compression hose   Discussed slow position changes and fall precautions

## 2023-09-21 NOTE — ASSESSMENT & PLAN NOTE
Add Midodrine 2.5 mg TID  F/up with Jose J at next available appt  Hold Spironolactone if SBP <100  Metoprolol tartrate 12.5 mg BID; Hold for SBP < 100  BP sittin/58, standing 72/42, back to sittin/58  Stressed importance of adequate hydration and importance of compression hose   Discussed slow position changes and fall precautions

## 2023-09-21 NOTE — ASSESSMENT & PLAN NOTE
2 stents in August  Continue plavix and aspirin  Do not stop these medications without notifying office  At risk for stenosis if blood thinners held

## 2023-09-21 NOTE — PROGRESS NOTES
Subjective:    Patient ID:  Tono Saez is a 78 y.o. male patient here for evaluation Hospital Follow Up (Patient had fall on 08/16/2023 . Dr Mckoy did the angiogram , with stent . His blood pressure has been low while standing , so having lack of balance. )    History of Present Illness:  Hospital follow up  Overall doing fair  Completed Cardiac rehab  Has orthostasis; affects his balance, Had fall last week  BP monitored regularly at home;   PT/OT going to house BIW; lives alone but multiple family members check on him frequently  Just lost his 90 yo brother last week;   Present today w/ son in law  Wants to know if can hold aspirin/plavix for needed procedures: back injection per Dr. Montenegro and skin cancer removal to right forearm          Most Recent Echocardiogram Results  Results for orders placed during the hospital encounter of 08/16/23    Echo    Interpretation Summary    Left Ventricle: The left ventricle is normal in size. Moderately increased ventricular mass. Moderately increased wall thickness. There is moderate concentrict hypertrophy. Moderate global hypokinesis present. See diagram for wall motion findings.  Patient has evidence of akinesis and slight paradoxical motion of the mid inferior septum, distal anterior wall and slight paradoxic motion at the apex This is not significantly different than the echo noted 8/1723 The inferior wall in the lateral wall supplied by the main left stented circumflex appears to move well There is moderately reduced systolic function with a visually estimated ejection fraction of 30 - 40%. 40% EF Grade I diastolic dysfunction. Elevated left ventricular filling pressure. Tissue Doppler velocity is reduced.  Mean left atrial pressure 14    Left Atrium: Left atrium is mildly dilated.    Right Ventricle: Mild right ventricular enlargement. Systolic function is moderately reduced.    Aortic Valve: There is mild aortic valve sclerosis. There is mild to moderate aortic  regurgitation.    Mitral Valve: There is no stenosis.    Pulmonary Artery: No pulmonary hypertension. The estimated pulmonary artery systolic pressure is 22 mmHg.    IVC/SVC: Normal venous pressure at 3 mmHg.    Pacer wires are seen      Most Recent Nuclear Stress Test Results  Results for orders placed in visit on 03/28/22    Nuclear Stress Test    Interpretation Summary    The nuclear portion of this study will be reported separately.    The EKG portion of this study is uninterpretable.    The patient reported chest pain during the stress test.    There were no arrhythmias during stress.      Most Recent Cardiac PET Stress Test Results  No results found for this or any previous visit.      Most Recent Cardiovascular Angiogram results  Results for orders placed during the hospital encounter of 08/16/23    Interventional Radiology    Conclusion    The estimated blood loss was <50 mL.    The pre-procedure left ventricular end diastolic pressure was 15.    Occluded LAD with patent LIMA to LAD    Occluded right coronary artery with patent vein graft to right PDA, native right PDA has 80% stenosis which is unchanged from previous angiography    Patent stent in left main into left circumflex with mild InStent restenoses    Occluded OM, patent vein graft to OM with severe stenosis in midbody of the graft and distal anastomotic site stenosis, successfully treated with IVUS guided PCI with 2 drug-eluting stents.    The Mid Graft lesion was 80% stenosed with 10% stenosis post-intervention.    Mid Graft lesion before 1st Mrg: A STENT 3.0 x 38MM LIMA FRONTIER RX CORONARY stent was successfully placed at 14 JASPER for 15 sec.    The Dist Graft to Insertion lesion was 80% stenosed with 10% stenosis post-intervention.    Dist Graft to Insertion lesion before 1st Mrg: A STENT 2.50 x 22MM LIMA FRONTIER RX CORONARY stent was successfully placed.    The procedure log was documented by Documenter: Latanya Posadas RT and verified by  Robles Mckoy MD.    Date: 8/29/2023  Time: 9:32 AM      Other Most Recent Cardiology Results  Results for orders placed during the hospital encounter of 08/16/23    CARDIAC MONITORING STRIPS      REVIEW OF SYSTEMS: As noted in HPI       Past Medical History:   Diagnosis Date    Anemia     Anticoagulant long-term use     Arthritis     CAD (coronary artery disease)     CABG, STENTS '97,'99,'01,'05    Cancer     SKIN    Cataract     Diabetes mellitus     Diabetes mellitus type II     GERD (gastroesophageal reflux disease)     GI bleed 2020    Hypertension     Myocardial infarction     Wears glasses      Past Surgical History:   Procedure Laterality Date    ANGIOGRAM, CORONARY, WITH LEFT HEART CATHETERIZATION N/A 8/24/2023    Procedure: Angiogram, Coronary, with Left Heart Cath;  Surgeon: Robles Mckoy MD;  Location: [facility] CATH/EP LAB;  Service: Cardiology;  Laterality: N/A;    CARDIAC PACEMAKER PLACEMENT      CARDIAC PACEMAKER PLACEMENT      CARDIAC PACEMAKER PLACEMENT  04/26/2018    replaced    CARDIAC SURGERY      1995   CABG X 5    CHOLECYSTECTOMY      COLON SURGERY      COLONOSCOPY N/A 2/21/2020    Procedure: COLONOSCOPY;  Surgeon: Abe Barragan III, MD;  Location: [facility] ENDO;  Service: Endoscopy;  Laterality: N/A;    COLONOSCOPY N/A 2/23/2020    Procedure: COLONOSCOPY;  Surgeon: Bob Locke Jr., MD;  Location: [facility] ENDO;  Service: Endoscopy;  Laterality: N/A;    CORONARY ANGIOGRAPHY INCLUDING BYPASS GRAFTS WITH CATHETERIZATION OF LEFT HEART N/A 10/26/2022    Procedure: ANGIOGRAM, CORONARY, INCLUDING BYPASS GRAFT, WITH LEFT HEART CATHETERIZATION;  Surgeon: Robles Mckoy MD;  Location: [facility] CATH/EP LAB;  Service: Cardiology;  Laterality: N/A;    CORONARY ARTERY BYPASS GRAFT  1995    CORONARY BYPASS GRAFT ANGIOGRAPHY  8/24/2023    Procedure: Bypass graft study;  Surgeon: Robles Mckoy MD;  Location: [facility] CATH/EP LAB;  Service: Cardiology;;    CORONARY STENT PLACEMENT      stent x 5     ESOPHAGOGASTRODUODENOSCOPY N/A 2/23/2020    Procedure: EGD (ESOPHAGOGASTRODUODENOSCOPY);  Surgeon: Bob Locke Jr., MD;  Location: [facility] ENDO;  Service: Endoscopy;  Laterality: N/A;    EYE SURGERY      BILAT CATARACT    head laceration   01/19/2018    HEMORRHOID SURGERY      INJECTION OF ANESTHETIC AGENT AROUND MEDIAL BRANCH NERVES INNERVATING LUMBAR FACET JOINT Bilateral 6/29/2023    Procedure: Block-nerve-medial branch-lumbar;  Surgeon: Maurice Montenegro MD;  Location: [facility] OR;  Service: Pain Management;  Laterality: Bilateral;  L3,4,5 MBB    INJECTION OF ANESTHETIC AGENT AROUND MEDIAL BRANCH NERVES INNERVATING LUMBAR FACET JOINT Bilateral 7/25/2023    Procedure: Block-nerve-medial branch-lumbar;  Surgeon: Maurice Montenegro MD;  Location: [facility] OR;  Service: Anesthesiology;  Laterality: Bilateral;  L3,4,5 MBB #2    INTRAVASCULAR ULTRASOUND, CORONARY N/A 8/24/2023    Procedure: Ultrasound-coronary;  Surgeon: Robles Mckoy MD;  Location: [facility] CATH/EP LAB;  Service: Cardiology;  Laterality: N/A;    PTCA, SINGLE VESSEL  8/24/2023    Procedure: PTCA, Single Vessel;  Surgeon: Robles Mckoy MD;  Location: [facility] CATH/EP LAB;  Service: Cardiology;;    SMALL BOWEL ENTEROSCOPY N/A 2/21/2020    Procedure: ENTEROSCOPY;  Surgeon: Abe Barragan III, MD;  Location: [facility] ENDO;  Service: Endoscopy;  Laterality: N/A;    STENT, DRUG ELUTING, SINGLE VESSEL, CORONARY  8/24/2023    Procedure: Stent, Drug Eluting, Single Vessel, Coronary;  Surgeon: Robles Mckoy MD;  Location: [facility] CATH/EP LAB;  Service: Cardiology;;    stint       Social History     Tobacco Use    Smoking status: Never    Smokeless tobacco: Never   Substance Use Topics    Alcohol use: No    Drug use: No         Objective      Vitals:    09/21/23 1040   BP: (!) 90/58   Pulse:        LAST EKG  Results for orders placed or performed during the hospital encounter of 08/16/23   EKG 12-lead    Collection Time: 08/24/23  9:59 AM    Narrative    Test Reason :  Z01.810,    Vent. Rate : 087 BPM     Atrial Rate : 087 BPM     P-R Int : 204 ms          QRS Dur : 200 ms      QT Int : 466 ms       P-R-T Axes : 055 -84 090 degrees     QTc Int : 560 ms    Atrial-sensed ventricular-paced rhythm  Abnormal ECG  When compared with ECG of 23-AUG-2023 16:05,  Premature atrial complexes are no longer Present  Vent. rate has increased BY  14 BPM  Confirmed by Jose J NYE, Kt MUSTAFA ([id number]) on 8/25/2023 6:58:54 PM    Referred By: [email or web address]   SELF           Confirmed By:Kt Lux MD     LIPIDS - LAST 2   Lab Results   Component Value Date    CHOL 141 01/12/2023    CHOL 134 07/22/2021    HDL 48 01/12/2023    HDL 40 07/22/2021    LDLCALC 68.6 01/12/2023    LDLCALC 71.2 07/22/2021    TRIG 122 01/12/2023    TRIG 114 07/22/2021    CHOLHDL 34.0 01/12/2023    CHOLHDL 29.9 07/22/2021     CARDIAC PROFILE - LAST 2  Lab Results   Component Value Date    BNP 1,890 (H) 08/22/2023     (H) 08/16/2023    CPK 74 08/16/2023    CPK 39 04/14/2023    TROPONINI 2.505 (HH) 10/28/2022    TROPONINI 3.114 (HH) 10/28/2022      CBC - LAST 2  Lab Results   Component Value Date    WBC 8.52 08/27/2023    WBC 8.99 08/26/2023    RBC 3.79 (L) 08/27/2023    RBC 3.80 (L) 08/26/2023    HGB 10.0 (L) 08/27/2023    HGB 9.9 (L) 08/26/2023    HCT 32.3 (L) 08/27/2023    HCT 32.3 (L) 08/26/2023     08/27/2023     08/26/2023     Lab Results   Component Value Date    LABPT 14.1 (H) 10/27/2022    LABPT 23.5 (H) 02/20/2020    INR 1.1 02/03/2023    INR 1.2 10/27/2022    APTT 23.3 02/03/2023     CHEMISTRY - LAST 2  Lab Results   Component Value Date     08/27/2023     08/26/2023    K 3.5 08/27/2023    K 3.5 08/26/2023     08/27/2023     08/26/2023    CO2 24 08/27/2023    CO2 25 08/26/2023    ANIONGAP 4 (L) 08/27/2023    ANIONGAP 5 (L) 08/26/2023    BUN 27 (H) 08/27/2023    BUN 34 (H) 08/26/2023    CREATININE 0.9 08/27/2023    CREATININE 1.0 08/26/2023     (H) 08/27/2023     (H)  08/26/2023    CALCIUM 9.1 08/27/2023    CALCIUM 9.3 08/26/2023    MG 1.5 (L) 08/27/2023    MG 1.5 (L) 08/26/2023    ALBUMIN 2.9 (L) 08/27/2023    ALBUMIN 2.9 (L) 08/26/2023    PROT 5.7 (L) 08/27/2023    PROT 5.8 (L) 08/26/2023    ALKPHOS 80 08/27/2023    ALKPHOS 80 08/26/2023    ALT 35 08/27/2023    ALT 32 08/26/2023    AST 35 08/27/2023    AST 30 08/26/2023    BILITOT 1.6 (H) 08/27/2023    BILITOT 1.2 (H) 08/26/2023      ENDOCRINE - LAST 2  Lab Results   Component Value Date    HGBA1C 7.2 (H) 08/16/2023    HGBA1C 8.3 (H) 01/12/2023    TSH 2.270 04/14/2023    TSH 2.100 09/21/2020        PHYSICAL EXAM  CONSTITUTIONAL: frail elderly gentleman in no apparent distress  NECK: no carotid bruit, no JVD  LUNGS: CTA  CHEST WALL: no tenderness  HEART: regular rate and rhythm, S1, S2 normal, + murmur+orthostatic  ABDOMEN: soft, flat, non-tender; bowel sounds normal;   EXTREMITIES: Extremities generalized bruising, scabs and dry patches  NEURO: AAO X 3    I HAVE REVIEWED :    The vital signs, most recent cardiac testing, and most recent pertinent non-cardiology provider notes.    Current Outpatient Medications   Medication Instructions    aspirin (ECOTRIN) 162 mg, Oral, Daily    atorvastatin (LIPITOR) 40 MG tablet 1 tablet, Oral, Daily    betamethasone dipropionate (DIPROLENE) 0.05 % lotion Apply thin film to scalp bid prn itch    cholecalciferol, vitamin D3, 75 mcg (3,000 unit) Tab Oral    clopidogreL (PLAVIX) 75 mg, Oral, Daily    empagliflozin (JARDIANCE) 25 mg, Oral, Daily    finasteride (PROSCAR) 5 mg, Oral, Daily    fluticasone propionate (FLONASE) 50 mcg/actuation nasal spray 1 spray, Each Nostril, As needed (PRN)    isosorbide dinitrate (ISORDIL) 10 mg, Oral, 2 times daily    magnesium oxide (MAG-OXIDE ORAL) Oral, 400 mg , once a day     metoprolol tartrate (LOPRESSOR) 12.5 mg, Oral, 2 times daily    midodrine (PROAMATINE) 2.5 mg, Oral, 3 times daily    multivitamin capsule 1 capsule, Oral, Daily    nitroGLYCERIN  (NITROSTAT) 0.4 MG SL tablet DISSOLVE 1 TABLET UNDER THE TONGUE AS NEEDED FOR CHEST PAIN    pantoprazole (PROTONIX) 40 MG tablet TAKE ONE TABLET BY MOUTH ONCE DAILY    pregabalin (LYRICA) 50 mg, Oral, Nightly PRN    sertraline (ZOLOFT) 50 MG tablet TAKE ONE TABLET BY MOUTH ONCE DAILY    spironolactone (ALDACTONE) 25 mg, Oral, Daily    tolterodine (DETROL LA) 4 MG 24 hr capsule TAKE ONE CAPSULE BY MOUTH ONCE DAILY      Assessment & Plan     BMI 23.0-23.9, adult  Stable  Encouraged adequate nutrition and hydration    Chronic anticoagulation  No bleeding tendencies but very bruised     HTN (hypertension)  Add Midodrine 2.5 mg TID  F/up with Jose J at next available appt  Hold Spironolactone if SBP <100  Metoprolol tartrate 12.5 mg BID; Hold for SBP < 100  BP sittin/58, standing 72/42, back to sittin/58  Stressed importance of adequate hydration and importance of compression hose   Discussed slow position changes and fall precautions    Mixed hyperlipidemia  Continue atorvastatin    CAD (coronary artery disease)  2 stents in August  Continue plavix and aspirin  Do not stop these medications without notifying office  At risk for stenosis if blood thinners held    Chronic systolic congestive heart failure  Euvolemic  Weight stable    Echo    Interpretation Summary    Left Ventricle: The left ventricle is normal in size. Moderately increased ventricular mass. Moderately increased wall thickness. There is moderate concentrict hypertrophy. Moderate global hypokinesis present. See diagram for wall motion findings.  Patient has evidence of akinesis and slight paradoxical motion of the mid inferior septum, distal anterior wall and slight paradoxic motion at the apex This is not significantly different than the echo noted 1723 The inferior wall in the lateral wall supplied by the main left stented circumflex appears to move well There is moderately reduced systolic function with a visually estimated ejection fraction  of 30 - 40%. 40% EF Grade I diastolic dysfunction. Elevated left ventricular filling pressure. Tissue Doppler velocity is reduced.  Mean left atrial pressure 14    Left Atrium: Left atrium is mildly dilated.    Right Ventricle: Mild right ventricular enlargement. Systolic function is moderately reduced.    Aortic Valve: There is mild aortic valve sclerosis. There is mild to moderate aortic regurgitation.    Mitral Valve: There is no stenosis.    Pulmonary Artery: No pulmonary hypertension. The estimated pulmonary artery systolic pressure is 22 mmHg.    IVC/SVC: Normal venous pressure at 3 mmHg.    Pacer wires are seen  . Continue metoprolol, isosorbide and spironolactone at mild doses and monitor clinical status closely. Place on fluid restriction of 1.5 liters. Daily weight    Cardiac pacemaker in situ  Continue device checks as scheduled    Orthostatic hypotension  Add Midodrine 2.5 mg TID ( he was on this in hospital and had good response)  Continue to monitor BP BID  F/up with Jose J at next available appt  Hold Spironolactone if SBP <100  Metoprolol tartrate 12.5 mg BID; Hold for SBP < 100  BP sittin/58, standing 72/42, back to sittin/58  Stressed importance of adequate hydration and importance of compression hose   Discussed slow position changes and fall precautions    Stage 3a chronic kidney disease  Latest GFR normal   Latest Reference Range & Units Most Recent   eGFR >60 mL/min/1.73 m^2 >60.0  23 04:19       Wants to know if can hold aspirin/plavix for needed procedures: back injection per Dr. Montenegro and skin cancer removal to right forearm  Instructed to not hold anticoagulation meds as this will increase risk of stent closure. Voiced understanding  Advised follow up with Dr. Lux at his next available appt with BP log

## 2023-09-23 ENCOUNTER — TELEPHONE (OUTPATIENT)
Dept: CARDIAC REHAB | Facility: HOSPITAL | Age: 78
End: 2023-09-23

## 2023-09-26 ENCOUNTER — TELEPHONE (OUTPATIENT)
Dept: CARDIOLOGY | Facility: CLINIC | Age: 78
End: 2023-09-26
Payer: MEDICARE

## 2023-09-26 NOTE — TELEPHONE ENCOUNTER
----- Message from Mary Grimaldo, Patient Care Assistant sent at 9/26/2023  3:52 PM CDT -----  Contact: Carmen daughter  Carmen is calling to speak with a nurse regarding a her father low bp and fatigue. Please call back to advise  [phone]  thanks

## 2023-09-27 ENCOUNTER — OFFICE VISIT (OUTPATIENT)
Dept: CARDIOLOGY | Facility: CLINIC | Age: 78
End: 2023-09-27
Payer: MEDICARE

## 2023-09-27 ENCOUNTER — TELEPHONE (OUTPATIENT)
Dept: CARDIOLOGY | Facility: CLINIC | Age: 78
End: 2023-09-27

## 2023-09-27 VITALS
DIASTOLIC BLOOD PRESSURE: 60 MMHG | BODY MASS INDEX: 23.52 KG/M2 | HEART RATE: 78 BPM | OXYGEN SATURATION: 98 % | WEIGHT: 173.63 LBS | SYSTOLIC BLOOD PRESSURE: 110 MMHG | HEIGHT: 72 IN

## 2023-09-27 DIAGNOSIS — I25.10 ASCVD (ARTERIOSCLEROTIC CARDIOVASCULAR DISEASE): ICD-10-CM

## 2023-09-27 DIAGNOSIS — I95.1 ORTHOSTATIC HYPOTENSION: Primary | ICD-10-CM

## 2023-09-27 DIAGNOSIS — E87.6 HYPOKALEMIA: ICD-10-CM

## 2023-09-27 PROCEDURE — 99999 PR PBB SHADOW E&M-EST. PATIENT-LVL IV: ICD-10-PCS | Mod: PBBFAC,,, | Performed by: SPECIALIST

## 2023-09-27 PROCEDURE — 99999 PR PBB SHADOW E&M-EST. PATIENT-LVL IV: CPT | Mod: PBBFAC,,, | Performed by: SPECIALIST

## 2023-09-27 PROCEDURE — 99214 OFFICE O/P EST MOD 30 MIN: CPT | Mod: S$PBB,,, | Performed by: SPECIALIST

## 2023-09-27 PROCEDURE — 99214 OFFICE O/P EST MOD 30 MIN: CPT | Mod: PBBFAC,PN | Performed by: SPECIALIST

## 2023-09-27 PROCEDURE — 99214 PR OFFICE/OUTPT VISIT, EST, LEVL IV, 30-39 MIN: ICD-10-PCS | Mod: S$PBB,,, | Performed by: SPECIALIST

## 2023-09-27 RX ORDER — MIDODRINE HYDROCHLORIDE 5 MG/1
5 TABLET ORAL
Qty: 270 TABLET | Refills: 3 | Status: SHIPPED | OUTPATIENT
Start: 2023-09-27 | End: 2023-12-12

## 2023-09-27 RX ORDER — FLUDROCORTISONE ACETATE 0.1 MG/1
100 TABLET ORAL DAILY
Qty: 30 TABLET | Refills: 0 | Status: SHIPPED | OUTPATIENT
Start: 2023-09-27 | End: 2023-10-27

## 2023-09-27 RX ORDER — POTASSIUM CHLORIDE 750 MG/1
10 TABLET, EXTENDED RELEASE ORAL DAILY
Qty: 90 TABLET | Refills: 5 | Status: SHIPPED | OUTPATIENT
Start: 2023-09-27 | End: 2023-12-24

## 2023-09-27 NOTE — LETTER
2023    Tono Saez  [address]  [place] [address]             [facility] [facility] [place]  [address]  [address]  [place] [address]  Phone: [phone]  Fax: [phone] Patient: Tono Saez  : 1945  Referring Doctor: dr ramirez   Type of procedure: radiofrequency ablation     Current Outpatient Medications   Medication Sig    aspirin (ECOTRIN) 81 MG EC tablet Take 2 tablets (162 mg total) by mouth once daily.    atorvastatin (LIPITOR) 40 MG tablet Take 1 tablet by mouth once daily.    betamethasone dipropionate (DIPROLENE) 0.05 % lotion Apply thin film to scalp bid prn itch    cholecalciferol, vitamin D3, 75 mcg (3,000 unit) Tab Take by mouth.    clopidogreL (PLAVIX) 75 mg tablet Take 1 tablet (75 mg total) by mouth once daily.    empagliflozin (JARDIANCE) 25 mg tablet Take 1 tablet (25 mg total) by mouth once daily.    finasteride (PROSCAR) 5 mg tablet Take 5 mg by mouth once daily.    fludrocortisone (FLORINEF) 0.1 mg Tab Take 1 tablet (100 mcg total) by mouth once daily.    fluticasone propionate (FLONASE) 50 mcg/actuation nasal spray 1 spray by Each Nostril route as needed for Allergies.    magnesium oxide (MAG-OXIDE ORAL) Take by mouth. 400 mg , once a day    metoprolol tartrate (LOPRESSOR) 25 MG tablet Take 0.5 tablets (12.5 mg total) by mouth 2 (two) times daily.    midodrine (PROAMATINE) 5 MG Tab Take 1 tablet (5 mg total) by mouth 3 (three) times daily with meals.    multivitamin capsule Take 1 capsule by mouth once daily.    nitroGLYCERIN (NITROSTAT) 0.4 MG SL tablet DISSOLVE 1 TABLET UNDER THE TONGUE AS NEEDED FOR CHEST PAIN    pantoprazole (PROTONIX) 40 MG tablet TAKE ONE TABLET BY MOUTH ONCE DAILY (Patient taking differently: Take 40 mg by mouth once daily.)    potassium chloride (KLOR-CON) 10 MEQ TbSR Take 1 tablet (10 mEq total) by mouth once daily.    pregabalin (LYRICA) 50 MG capsule Take 1 capsule  (50 mg total) by mouth nightly as needed.    sertraline (ZOLOFT) 50 MG tablet TAKE ONE TABLET BY MOUTH ONCE DAILY (Patient taking differently: Take 50 mg by mouth once daily.)    spironolactone (ALDACTONE) 25 MG tablet Take 1 tablet (25 mg total) by mouth once daily.    tolterodine (DETROL LA) 4 MG 24 hr capsule TAKE ONE CAPSULE BY MOUTH ONCE DAILY (Patient taking differently: Take 4 mg by mouth once daily.)     No current facility-administered medications for this visit.     Facility-Administered Medications Ordered in Other Visits   Medication    lactated ringers infusion    LIDOcaine (PF) 10 mg/ml (1%) injection 10 mg       This patient has been assessed for risk factors for clearance of surgery with the following stipulations:    ___ No contraindications  ___ Recommendations for antiplatelet/anticoagulant medications:  ___ Cleared for surgery with the following contraindications/precautions:  ___ Not cleared for surgery due to the following reasons:      If you have any questions regarding the above, please contact my office at [phone].    Sincerely,

## 2023-09-27 NOTE — TELEPHONE ENCOUNTER
----- Message from Cristian Bates sent at 9/27/2023  4:05 PM CDT -----  Contact: Self  Type: Needs Medical Advice  Who Called:  Patient    Best Call Back Number: [phone]   Additional Information: States he would like to speak with office, needs clearance for ablation by Dr Montenegro. Please call

## 2023-09-27 NOTE — PROGRESS NOTES
Subjective:    Patient ID:  Tono Saez is a 78 y.o. male who presents for   Hypotension, syncope, Congestive Heart Failure, and Coronary Artery Disease    HPI problem 1 patient has coronary disease and in August he had large stent placed in the main body of the main left circumflex with excellent results  He is not having chest and is on Plavix  Problem number 2he has significant orthostasis    He is on midodrine 2.5 3 times a day  He states at home his blood pressure is 110 sitting and 80 standing in the nurse recorded a blood pressure of 80 standing however here in the office checking a standing pressure several times blood pressure was 50      Review of patient's allergies indicates:   Allergen Reactions    Adhesive Other (See Comments)     SILK TAPE PULL SKIN OFF    Metformin Other (See Comments)     Weight loss cachexia anorexia,diarrhea.    Pcn [penicillins]      Patient states he passed out from this medication when he was 10 years old.        Past Medical History:   Diagnosis Date    Anemia     Anticoagulant long-term use     Arthritis     CAD (coronary artery disease)     CABG, STENTS '97,'99,'01,'05    Cancer     SKIN    Cataract     Diabetes mellitus     Diabetes mellitus type II     GERD (gastroesophageal reflux disease)     GI bleed 2020    Hypertension     Myocardial infarction     Wears glasses      Past Surgical History:   Procedure Laterality Date    ANGIOGRAM, CORONARY, WITH LEFT HEART CATHETERIZATION N/A 8/24/2023    Procedure: Angiogram, Coronary, with Left Heart Cath;  Surgeon: Robles Mckoy MD;  Location: [facility] CATH/EP LAB;  Service: Cardiology;  Laterality: N/A;    CARDIAC PACEMAKER PLACEMENT      CARDIAC PACEMAKER PLACEMENT      CARDIAC PACEMAKER PLACEMENT  04/26/2018    replaced    CARDIAC SURGERY      1995   CABG X 5    CHOLECYSTECTOMY      COLON SURGERY      COLONOSCOPY N/A 2/21/2020    Procedure: COLONOSCOPY;  Surgeon: Abe Barragan III, MD;  Location: [facility] ENDO;  Service:  Endoscopy;  Laterality: N/A;    COLONOSCOPY N/A 2/23/2020    Procedure: COLONOSCOPY;  Surgeon: Bob Locke Jr., MD;  Location: [facility] ENDO;  Service: Endoscopy;  Laterality: N/A;    CORONARY ANGIOGRAPHY INCLUDING BYPASS GRAFTS WITH CATHETERIZATION OF LEFT HEART N/A 10/26/2022    Procedure: ANGIOGRAM, CORONARY, INCLUDING BYPASS GRAFT, WITH LEFT HEART CATHETERIZATION;  Surgeon: Robles Mckoy MD;  Location: [facility] CATH/EP LAB;  Service: Cardiology;  Laterality: N/A;    CORONARY ARTERY BYPASS GRAFT  1995    CORONARY BYPASS GRAFT ANGIOGRAPHY  8/24/2023    Procedure: Bypass graft study;  Surgeon: Robles Mckoy MD;  Location: [facility] CATH/EP LAB;  Service: Cardiology;;    CORONARY STENT PLACEMENT      stent x 5    ESOPHAGOGASTRODUODENOSCOPY N/A 2/23/2020    Procedure: EGD (ESOPHAGOGASTRODUODENOSCOPY);  Surgeon: Bob Locke Jr., MD;  Location: [facility] ENDO;  Service: Endoscopy;  Laterality: N/A;    EYE SURGERY      BILAT CATARACT    head laceration   01/19/2018    HEMORRHOID SURGERY      INJECTION OF ANESTHETIC AGENT AROUND MEDIAL BRANCH NERVES INNERVATING LUMBAR FACET JOINT Bilateral 6/29/2023    Procedure: Block-nerve-medial branch-lumbar;  Surgeon: Maurice Montenegro MD;  Location: [facility] OR;  Service: Pain Management;  Laterality: Bilateral;  L3,4,5 MBB    INJECTION OF ANESTHETIC AGENT AROUND MEDIAL BRANCH NERVES INNERVATING LUMBAR FACET JOINT Bilateral 7/25/2023    Procedure: Block-nerve-medial branch-lumbar;  Surgeon: Maurice Montenegro MD;  Location: [facility] OR;  Service: Anesthesiology;  Laterality: Bilateral;  L3,4,5 MBB #2    INTRAVASCULAR ULTRASOUND, CORONARY N/A 8/24/2023    Procedure: Ultrasound-coronary;  Surgeon: Robles Mckoy MD;  Location: [facility] CATH/EP LAB;  Service: Cardiology;  Laterality: N/A;    PTCA, SINGLE VESSEL  8/24/2023    Procedure: PTCA, Single Vessel;  Surgeon: Robles Mckoy MD;  Location: [facility] CATH/EP LAB;  Service: Cardiology;;    SMALL BOWEL ENTEROSCOPY N/A 2/21/2020    Procedure: ENTEROSCOPY;   Surgeon: Abe Barragan III, MD;  Location: [facility] ENDO;  Service: Endoscopy;  Laterality: N/A;    STENT, DRUG ELUTING, SINGLE VESSEL, CORONARY  8/24/2023    Procedure: Stent, Drug Eluting, Single Vessel, Coronary;  Surgeon: Robles Mckoy MD;  Location: [facility] CATH/EP LAB;  Service: Cardiology;;    stint       Social History     Tobacco Use    Smoking status: Never    Smokeless tobacco: Never   Substance Use Topics    Alcohol use: No    Drug use: No        Review of Systems     Review of Systems   Constitutional: Positive for weight loss.   HENT: Negative.     Cardiovascular:  Positive for dyspnea on exertion and syncope.   Respiratory:  Positive for shortness of breath.    Genitourinary: Negative.    Neurological:  Positive for dizziness.   Psychiatric/Behavioral: Negative.             Objective:        Vitals:    09/27/23 1341   BP: 110/60   Pulse: 78       Lab Results   Component Value Date    WBC 8.52 08/27/2023    HGB 10.0 (L) 08/27/2023    HCT 32.3 (L) 08/27/2023     08/27/2023    CHOL 141 01/12/2023    TRIG 122 01/12/2023    HDL 48 01/12/2023    ALT 35 08/27/2023    AST 35 08/27/2023     08/27/2023    K 3.5 08/27/2023     08/27/2023    CREATININE 0.9 08/27/2023    BUN 27 (H) 08/27/2023    CO2 24 08/27/2023    TSH 2.270 04/14/2023    PSA 1.5 07/20/2023    INR 1.1 02/03/2023    HGBA1C 7.2 (H) 08/16/2023        ECHOCARDIOGRAM RESULTS  Results for orders placed during the hospital encounter of 08/16/23    Echo    Interpretation Summary    Left Ventricle: The left ventricle is normal in size. Moderately increased ventricular mass. Moderately increased wall thickness. There is moderate concentrict hypertrophy. Moderate global hypokinesis present. See diagram for wall motion findings.  Patient has evidence of akinesis and slight paradoxical motion of the mid inferior septum, distal anterior wall and slight paradoxic motion at the apex This is not significantly different than the echo noted  8/1723 The inferior wall in the lateral wall supplied by the main left stented circumflex appears to move well There is moderately reduced systolic function with a visually estimated ejection fraction of 30 - 40%. 40% EF Grade I diastolic dysfunction. Elevated left ventricular filling pressure. Tissue Doppler velocity is reduced.  Mean left atrial pressure 14    Left Atrium: Left atrium is mildly dilated.    Right Ventricle: Mild right ventricular enlargement. Systolic function is moderately reduced.    Aortic Valve: There is mild aortic valve sclerosis. There is mild to moderate aortic regurgitation.    Mitral Valve: There is no stenosis.    Pulmonary Artery: No pulmonary hypertension. The estimated pulmonary artery systolic pressure is 22 mmHg.    IVC/SVC: Normal venous pressure at 3 mmHg.    Pacer wires are seen      CURRENT/PREVIOUS VISIT EKG  Results for orders placed or performed during the hospital encounter of 08/16/23   EKG 12-lead    Collection Time: 08/24/23  9:59 AM    Narrative    Test Reason : Z01.810,    Vent. Rate : 087 BPM     Atrial Rate : 087 BPM     P-R Int : 204 ms          QRS Dur : 200 ms      QT Int : 466 ms       P-R-T Axes : 055 -84 090 degrees     QTc Int : 560 ms    Atrial-sensed ventricular-paced rhythm  Abnormal ECG  When compared with ECG of 23-AUG-2023 16:05,  Premature atrial complexes are no longer Present  Vent. rate has increased BY  14 BPM  Confirmed by Jose J NYE, Kt MUSTAFA ([id number]) on 8/25/2023 6:58:54 PM    Referred By: [email or web address]   SELF           Confirmed By:Kt Lux MD     Results for orders placed during the hospital encounter of 08/16/23    Echo    Interpretation Summary    Left Ventricle: The left ventricle is normal in size. Moderately increased ventricular mass. Moderately increased wall thickness. There is moderate concentrict hypertrophy. Moderate global hypokinesis present. See diagram for wall motion findings.  Patient has evidence of akinesis and slight  paradoxical motion of the mid inferior septum, distal anterior wall and slight paradoxic motion at the apex This is not significantly different than the echo noted 8/1723 The inferior wall in the lateral wall supplied by the main left stented circumflex appears to move well There is moderately reduced systolic function with a visually estimated ejection fraction of 30 - 40%. 40% EF Grade I diastolic dysfunction. Elevated left ventricular filling pressure. Tissue Doppler velocity is reduced.  Mean left atrial pressure 14    Left Atrium: Left atrium is mildly dilated.    Right Ventricle: Mild right ventricular enlargement. Systolic function is moderately reduced.    Aortic Valve: There is mild aortic valve sclerosis. There is mild to moderate aortic regurgitation.    Mitral Valve: There is no stenosis.    Pulmonary Artery: No pulmonary hypertension. The estimated pulmonary artery systolic pressure is 22 mmHg.    IVC/SVC: Normal venous pressure at 3 mmHg.    Pacer wires are seen    Results for orders placed in visit on 03/28/22    Nuclear Stress Test    Interpretation Summary    The nuclear portion of this study will be reported separately.    The EKG portion of this study is uninterpretable.    The patient reported chest pain during the stress test.    There were no arrhythmias during stress.      PHYSICAL EXAM    Physical Exam blood pressure sitting is approximally 80/60  Blood pressure standing is 50 systolic  Both arms lungs are clear  Cardiac regular no S3    Medication List with Changes/Refills   New Medications    FLUDROCORTISONE (FLORINEF) 0.1 MG TAB    Take 1 tablet (100 mcg total) by mouth once daily.    MIDODRINE (PROAMATINE) 5 MG TAB    Take 1 tablet (5 mg total) by mouth 3 (three) times daily with meals.    POTASSIUM CHLORIDE (KLOR-CON) 10 MEQ TBSR    Take 1 tablet (10 mEq total) by mouth once daily.   Current Medications    ASPIRIN (ECOTRIN) 81 MG EC TABLET    Take 2 tablets (162 mg total) by mouth once  daily.    ATORVASTATIN (LIPITOR) 40 MG TABLET    Take 1 tablet by mouth once daily.    BETAMETHASONE DIPROPIONATE (DIPROLENE) 0.05 % LOTION    Apply thin film to scalp bid prn itch    CHOLECALCIFEROL, VITAMIN D3, 75 MCG (3,000 UNIT) TAB    Take by mouth.    CLOPIDOGREL (PLAVIX) 75 MG TABLET    Take 1 tablet (75 mg total) by mouth once daily.    EMPAGLIFLOZIN (JARDIANCE) 25 MG TABLET    Take 1 tablet (25 mg total) by mouth once daily.    FINASTERIDE (PROSCAR) 5 MG TABLET    Take 5 mg by mouth once daily.    FLUTICASONE PROPIONATE (FLONASE) 50 MCG/ACTUATION NASAL SPRAY    1 spray by Each Nostril route as needed for Allergies.    MAGNESIUM OXIDE (MAG-OXIDE ORAL)    Take by mouth. 400 mg , once a day    METOPROLOL TARTRATE (LOPRESSOR) 25 MG TABLET    Take 0.5 tablets (12.5 mg total) by mouth 2 (two) times daily.    MULTIVITAMIN CAPSULE    Take 1 capsule by mouth once daily.    NITROGLYCERIN (NITROSTAT) 0.4 MG SL TABLET    DISSOLVE 1 TABLET UNDER THE TONGUE AS NEEDED FOR CHEST PAIN    PANTOPRAZOLE (PROTONIX) 40 MG TABLET    TAKE ONE TABLET BY MOUTH ONCE DAILY    PREGABALIN (LYRICA) 50 MG CAPSULE    Take 1 capsule (50 mg total) by mouth nightly as needed.    SERTRALINE (ZOLOFT) 50 MG TABLET    TAKE ONE TABLET BY MOUTH ONCE DAILY    SPIRONOLACTONE (ALDACTONE) 25 MG TABLET    Take 1 tablet (25 mg total) by mouth once daily.    TOLTERODINE (DETROL LA) 4 MG 24 HR CAPSULE    TAKE ONE CAPSULE BY MOUTH ONCE DAILY   Discontinued Medications    ISOSORBIDE DINITRATE (ISORDIL) 10 MG TABLET    Take 1 tablet (10 mg total) by mouth 2 (two) times daily.    MIDODRINE (PROAMATINE) 2.5 MG TAB    Take 1 tablet (2.5 mg total) by mouth 3 (three) times daily.           Assessment:     Hh gets stand bp  80  today x 3 I get bp 50 standing !!! + pt weak   1. Orthostatic hypotension    2. Hypokalemia    3. ASCVD (arteriosclerotic cardiovascular disease)    He is taking isosorbide     Plan:   Increase mid 5 tid, florinef qd + k qd    dc isordil    Follow-up in approximally 1 month    Problem List Items Addressed This Visit          Cardiac/Vascular    Orthostatic hypotension - Primary     Other Visit Diagnoses       Hypokalemia        ASCVD (arteriosclerotic cardiovascular disease)                 No follow-ups on file.

## 2023-09-29 ENCOUNTER — EXTERNAL HOME HEALTH (OUTPATIENT)
Dept: HOME HEALTH SERVICES | Facility: HOSPITAL | Age: 78
End: 2023-09-29
Payer: MEDICARE

## 2023-10-03 ENCOUNTER — HOSPITAL ENCOUNTER (INPATIENT)
Facility: HOSPITAL | Age: 78
LOS: 6 days | Discharge: NURSING, REHAB OR LONG-TERM CARE | DRG: 522 | End: 2023-10-09
Attending: EMERGENCY MEDICINE | Admitting: STUDENT IN AN ORGANIZED HEALTH CARE EDUCATION/TRAINING PROGRAM
Payer: MEDICARE

## 2023-10-03 DIAGNOSIS — R07.9 CHEST PAIN: ICD-10-CM

## 2023-10-03 DIAGNOSIS — R29.6 FREQUENT FALLS: ICD-10-CM
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
## 2023-10-03 DIAGNOSIS — N18.1 CRI (CHRONIC RENAL INSUFFICIENCY), STAGE 1: ICD-10-CM

## 2023-10-03 DIAGNOSIS — S72.001A CLOSED FRACTURE OF RIGHT HIP, INITIAL ENCOUNTER: Primary | ICD-10-CM

## 2023-10-03 PROBLEM — S72.009A HIP FRACTURE: Status: ACTIVE | Noted: 2023-10-03

## 2023-10-03 LAB
ALBUMIN SERPL BCP-MCNC: 3.9 G/DL (ref 3.5–5.2)
ALP SERPL-CCNC: 109 U/L (ref 55–135)
ALT SERPL W/O P-5'-P-CCNC: 18 U/L (ref 10–44)
ANION GAP SERPL CALC-SCNC: 7 MMOL/L (ref 8–16)
AST SERPL-CCNC: 18 U/L (ref 10–40)
BASOPHILS # BLD AUTO: 0.03 K/UL (ref 0–0.2)
BASOPHILS NFR BLD: 0.2 % (ref 0–1.9)
BILIRUB SERPL-MCNC: 1.2 MG/DL (ref 0.1–1)
BNP SERPL-MCNC: 299 PG/ML (ref 0–99)
BUN SERPL-MCNC: 43 MG/DL (ref 8–23)
CALCIUM SERPL-MCNC: 9.8 MG/DL (ref 8.7–10.5)
CHLORIDE SERPL-SCNC: 107 MMOL/L (ref 95–110)
CK SERPL-CCNC: 27 U/L (ref 20–200)
CO2 SERPL-SCNC: 23 MMOL/L (ref 23–29)
CREAT SERPL-MCNC: 1.7 MG/DL (ref 0.5–1.4)
DIFFERENTIAL METHOD: ABNORMAL
EOSINOPHIL # BLD AUTO: 0 K/UL (ref 0–0.5)
EOSINOPHIL NFR BLD: 0.2 % (ref 0–8)
ERYTHROCYTE [DISTWIDTH] IN BLOOD BY AUTOMATED COUNT: 15.3 % (ref 11.5–14.5)
EST. GFR  (NO RACE VARIABLE): 40.2 ML/MIN/1.73 M^2
GLUCOSE SERPL-MCNC: 211 MG/DL (ref 70–110)
HCT VFR BLD AUTO: 36.7 % (ref 40–54)
HGB BLD-MCNC: 11.6 G/DL (ref 14–18)
IMM GRANULOCYTES # BLD AUTO: 0.03 K/UL (ref 0–0.04)
IMM GRANULOCYTES NFR BLD AUTO: 0.2 % (ref 0–0.5)
LIPASE SERPL-CCNC: 26 U/L (ref 4–60)
LYMPHOCYTES # BLD AUTO: 0.4 K/UL (ref 1–4.8)
LYMPHOCYTES NFR BLD: 2.7 % (ref 18–48)
MAGNESIUM SERPL-MCNC: 1.7 MG/DL (ref 1.6–2.6)
MCH RBC QN AUTO: 26.4 PG (ref 27–31)
MCHC RBC AUTO-ENTMCNC: 31.6 G/DL (ref 32–36)
MCV RBC AUTO: 84 FL (ref 82–98)
MONOCYTES # BLD AUTO: 0.8 K/UL (ref 0.3–1)
MONOCYTES NFR BLD: 5.9 % (ref 4–15)
NEUTROPHILS # BLD AUTO: 12.1 K/UL (ref 1.8–7.7)
NEUTROPHILS NFR BLD: 90.8 % (ref 38–73)
NRBC BLD-RTO: 0 /100 WBC
PLATELET # BLD AUTO: 162 K/UL (ref 150–450)
PMV BLD AUTO: 10.4 FL (ref 9.2–12.9)
POTASSIUM SERPL-SCNC: 4.3 MMOL/L (ref 3.5–5.1)
PROT SERPL-MCNC: 6.5 G/DL (ref 6–8.4)
RBC # BLD AUTO: 4.39 M/UL (ref 4.6–6.2)
SODIUM SERPL-SCNC: 137 MMOL/L (ref 136–145)
TROPONIN I SERPL HS-MCNC: 47.2 PG/ML (ref 0–14.9)
TSH SERPL DL<=0.005 MIU/L-ACNC: 1.1 UIU/ML (ref 0.34–5.6)
WBC # BLD AUTO: 13.28 K/UL (ref 3.9–12.7)

## 2023-10-03 PROCEDURE — 93010 ELECTROCARDIOGRAM REPORT: CPT | Mod: ,,, | Performed by: INTERNAL MEDICINE

## 2023-10-03 PROCEDURE — 83690 ASSAY OF LIPASE: CPT | Performed by: EMERGENCY MEDICINE

## 2023-10-03 PROCEDURE — 84484 ASSAY OF TROPONIN QUANT: CPT | Performed by: EMERGENCY MEDICINE

## 2023-10-03 PROCEDURE — 93005 ELECTROCARDIOGRAM TRACING: CPT | Performed by: INTERNAL MEDICINE

## 2023-10-03 PROCEDURE — 93010 EKG 12-LEAD: ICD-10-PCS | Mod: ,,, | Performed by: INTERNAL MEDICINE

## 2023-10-03 PROCEDURE — 99285 EMERGENCY DEPT VISIT HI MDM: CPT | Mod: 25

## 2023-10-03 PROCEDURE — 25000003 PHARM REV CODE 250: Performed by: EMERGENCY MEDICINE

## 2023-10-03 PROCEDURE — 84443 ASSAY THYROID STIM HORMONE: CPT | Performed by: EMERGENCY MEDICINE

## 2023-10-03 PROCEDURE — 12000002 HC ACUTE/MED SURGE SEMI-PRIVATE ROOM

## 2023-10-03 PROCEDURE — 83880 ASSAY OF NATRIURETIC PEPTIDE: CPT | Performed by: EMERGENCY MEDICINE

## 2023-10-03 PROCEDURE — 83735 ASSAY OF MAGNESIUM: CPT | Performed by: EMERGENCY MEDICINE

## 2023-10-03 PROCEDURE — 85025 COMPLETE CBC W/AUTO DIFF WBC: CPT | Performed by: EMERGENCY MEDICINE

## 2023-10-03 PROCEDURE — 80053 COMPREHEN METABOLIC PANEL: CPT | Performed by: EMERGENCY MEDICINE

## 2023-10-03 PROCEDURE — 82550 ASSAY OF CK (CPK): CPT | Performed by: EMERGENCY MEDICINE

## 2023-10-03 RX ORDER — ATORVASTATIN CALCIUM 40 MG/1
40 TABLET, FILM COATED ORAL DAILY
Status: DISCONTINUED | OUTPATIENT
Start: 2023-10-04 | End: 2023-10-09 | Stop reason: HOSPADM

## 2023-10-03 RX ORDER — ACETAMINOPHEN 325 MG/1
650 TABLET ORAL EVERY 8 HOURS PRN
Status: DISCONTINUED | OUTPATIENT
Start: 2023-10-03 | End: 2023-10-07

## 2023-10-03 RX ORDER — ONDANSETRON 2 MG/ML
4 INJECTION INTRAMUSCULAR; INTRAVENOUS EVERY 8 HOURS PRN
Status: DISCONTINUED | OUTPATIENT
Start: 2023-10-03 | End: 2023-10-09 | Stop reason: HOSPADM

## 2023-10-03 RX ORDER — SERTRALINE HYDROCHLORIDE 50 MG/1
50 TABLET, FILM COATED ORAL DAILY
Status: DISCONTINUED | OUTPATIENT
Start: 2023-10-04 | End: 2023-10-09 | Stop reason: HOSPADM

## 2023-10-03 RX ORDER — POTASSIUM CHLORIDE 750 MG/1
10 CAPSULE, EXTENDED RELEASE ORAL DAILY
Status: DISCONTINUED | OUTPATIENT
Start: 2023-10-04 | End: 2023-10-09 | Stop reason: HOSPADM

## 2023-10-03 RX ORDER — FLUDROCORTISONE ACETATE 0.1 MG/1
100 TABLET ORAL DAILY
Status: DISCONTINUED | OUTPATIENT
Start: 2023-10-04 | End: 2023-10-09 | Stop reason: HOSPADM

## 2023-10-03 RX ORDER — FINASTERIDE 5 MG/1
5 TABLET, FILM COATED ORAL DAILY
Status: DISCONTINUED | OUTPATIENT
Start: 2023-10-04 | End: 2023-10-09 | Stop reason: HOSPADM

## 2023-10-03 RX ORDER — INSULIN ASPART 100 [IU]/ML
0-10 INJECTION, SOLUTION INTRAVENOUS; SUBCUTANEOUS
Status: DISCONTINUED | OUTPATIENT
Start: 2023-10-03 | End: 2023-10-04

## 2023-10-03 RX ORDER — MIDODRINE HYDROCHLORIDE 2.5 MG/1
5 TABLET ORAL
Status: DISCONTINUED | OUTPATIENT
Start: 2023-10-04 | End: 2023-10-09 | Stop reason: HOSPADM

## 2023-10-03 RX ORDER — IBUPROFEN 200 MG
24 TABLET ORAL
Status: DISCONTINUED | OUTPATIENT
Start: 2023-10-03 | End: 2023-10-04

## 2023-10-03 RX ORDER — METOPROLOL TARTRATE 25 MG/1
12.5 TABLET ORAL 2 TIMES DAILY
Status: DISCONTINUED | OUTPATIENT
Start: 2023-10-03 | End: 2023-10-09 | Stop reason: HOSPADM

## 2023-10-03 RX ORDER — SODIUM CHLORIDE 0.9 % (FLUSH) 0.9 %
10 SYRINGE (ML) INJECTION
Status: DISCONTINUED | OUTPATIENT
Start: 2023-10-03 | End: 2023-10-09 | Stop reason: HOSPADM

## 2023-10-03 RX ORDER — PREGABALIN 25 MG/1
50 CAPSULE ORAL NIGHTLY PRN
Status: DISCONTINUED | OUTPATIENT
Start: 2023-10-03 | End: 2023-10-09 | Stop reason: HOSPADM

## 2023-10-03 RX ORDER — IBUPROFEN 200 MG
16 TABLET ORAL
Status: DISCONTINUED | OUTPATIENT
Start: 2023-10-03 | End: 2023-10-04

## 2023-10-03 RX ORDER — CLOPIDOGREL BISULFATE 75 MG/1
75 TABLET ORAL
Qty: 90 TABLET | Refills: 3 | Status: SHIPPED | OUTPATIENT
Start: 2023-10-03

## 2023-10-03 RX ORDER — SPIRONOLACTONE 25 MG/1
25 TABLET ORAL DAILY
Status: DISCONTINUED | OUTPATIENT
Start: 2023-10-04 | End: 2023-10-09 | Stop reason: HOSPADM

## 2023-10-03 RX ORDER — SODIUM CHLORIDE 9 MG/ML
1000 INJECTION, SOLUTION INTRAVENOUS
Status: COMPLETED | OUTPATIENT
Start: 2023-10-03 | End: 2023-10-03

## 2023-10-03 RX ORDER — TALC
6 POWDER (GRAM) TOPICAL NIGHTLY PRN
Status: DISCONTINUED | OUTPATIENT
Start: 2023-10-03 | End: 2023-10-09 | Stop reason: HOSPADM

## 2023-10-03 RX ORDER — FLUTICASONE PROPIONATE 50 MCG
1 SPRAY, SUSPENSION (ML) NASAL
Status: DISCONTINUED | OUTPATIENT
Start: 2023-10-03 | End: 2023-10-09 | Stop reason: HOSPADM

## 2023-10-03 RX ORDER — ACETAMINOPHEN 500 MG
1000 TABLET ORAL
Status: COMPLETED | OUTPATIENT
Start: 2023-10-03 | End: 2023-10-03

## 2023-10-03 RX ORDER — OXYBUTYNIN CHLORIDE 10 MG/1
10 TABLET, EXTENDED RELEASE ORAL DAILY
Status: DISCONTINUED | OUTPATIENT
Start: 2023-10-04 | End: 2023-10-09 | Stop reason: HOSPADM

## 2023-10-03 RX ORDER — HYDROMORPHONE HYDROCHLORIDE 1 MG/ML
1 INJECTION, SOLUTION INTRAMUSCULAR; INTRAVENOUS; SUBCUTANEOUS EVERY 4 HOURS PRN
Status: DISCONTINUED | OUTPATIENT
Start: 2023-10-03 | End: 2023-10-06

## 2023-10-03 RX ORDER — HYDROMORPHONE HYDROCHLORIDE 1 MG/ML
0.5 INJECTION, SOLUTION INTRAMUSCULAR; INTRAVENOUS; SUBCUTANEOUS EVERY 4 HOURS PRN
Status: DISCONTINUED | OUTPATIENT
Start: 2023-10-03 | End: 2023-10-06

## 2023-10-03 RX ORDER — GLUCAGON 1 MG
1 KIT INJECTION
Status: DISCONTINUED | OUTPATIENT
Start: 2023-10-03 | End: 2023-10-04

## 2023-10-03 RX ORDER — POLYETHYLENE GLYCOL 3350 17 G/17G
17 POWDER, FOR SOLUTION ORAL DAILY
Status: DISCONTINUED | OUTPATIENT
Start: 2023-10-04 | End: 2023-10-09 | Stop reason: HOSPADM

## 2023-10-03 RX ORDER — KETOCONAZOLE 20 MG/ML
1 SHAMPOO, SUSPENSION TOPICAL
COMMUNITY
Start: 2023-09-20 | End: 2023-12-24

## 2023-10-03 RX ORDER — PANTOPRAZOLE SODIUM 40 MG/1
40 TABLET, DELAYED RELEASE ORAL
Status: DISCONTINUED | OUTPATIENT
Start: 2023-10-04 | End: 2023-10-09 | Stop reason: HOSPADM

## 2023-10-03 RX ADMIN — ACETAMINOPHEN 1000 MG: 500 TABLET ORAL at 08:10

## 2023-10-03 RX ADMIN — SODIUM CHLORIDE 1000 ML: 0.9 INJECTION, SOLUTION INTRAVENOUS at 10:10

## 2023-10-03 NOTE — Clinical Note
Jeremiah Saez accompanied their child to the emergency department on 10/3/2023. They may return to work on 10/05/2023.      If you have any questions or concerns, please don't hesitate to call.      Rebecca Howard MD

## 2023-10-03 NOTE — Clinical Note
Jeremiah Saez accompanied their father to the emergency department on 10/3/2023. They may return to work on 10/05/2023.      If you have any questions or concerns, please don't hesitate to call.      Rebecca Howard MD

## 2023-10-04 ENCOUNTER — TELEPHONE (OUTPATIENT)
Dept: CARDIOLOGY | Facility: CLINIC | Age: 78
End: 2023-10-04
Payer: MEDICARE

## 2023-10-04 LAB
ALBUMIN SERPL BCP-MCNC: 3.8 G/DL (ref 3.5–5.2)
ALP SERPL-CCNC: 89 U/L (ref 55–135)
ALT SERPL W/O P-5'-P-CCNC: 14 U/L (ref 10–44)
ANION GAP SERPL CALC-SCNC: 8 MMOL/L (ref 8–16)
AST SERPL-CCNC: 17 U/L (ref 10–40)
BACTERIA #/AREA URNS HPF: NEGATIVE /HPF
BASOPHILS # BLD AUTO: 0.03 K/UL (ref 0–0.2)
BASOPHILS NFR BLD: 0.2 % (ref 0–1.9)
BILIRUB SERPL-MCNC: 1.6 MG/DL (ref 0.1–1)
BILIRUB UR QL STRIP: NEGATIVE
BUN SERPL-MCNC: 45 MG/DL (ref 8–23)
CALCIUM SERPL-MCNC: 9.7 MG/DL (ref 8.7–10.5)
CHLORIDE SERPL-SCNC: 107 MMOL/L (ref 95–110)
CLARITY UR: CLEAR
CO2 SERPL-SCNC: 21 MMOL/L (ref 23–29)
COLOR UR: YELLOW
CREAT SERPL-MCNC: 1.6 MG/DL (ref 0.5–1.4)
DIFFERENTIAL METHOD: ABNORMAL
EOSINOPHIL # BLD AUTO: 0.2 K/UL (ref 0–0.5)
EOSINOPHIL NFR BLD: 1.5 % (ref 0–8)
ERYTHROCYTE [DISTWIDTH] IN BLOOD BY AUTOMATED COUNT: 15.1 % (ref 11.5–14.5)
EST. GFR  (NO RACE VARIABLE): 43.3 ML/MIN/1.73 M^2
GLUCOSE SERPL-MCNC: 152 MG/DL (ref 70–110)
GLUCOSE SERPL-MCNC: 160 MG/DL (ref 70–110)
GLUCOSE SERPL-MCNC: 169 MG/DL (ref 70–110)
GLUCOSE SERPL-MCNC: 171 MG/DL (ref 70–110)
GLUCOSE SERPL-MCNC: 195 MG/DL (ref 70–110)
GLUCOSE SERPL-MCNC: 259 MG/DL (ref 70–110)
GLUCOSE UR QL STRIP: ABNORMAL
HCT VFR BLD AUTO: 38 % (ref 40–54)
HGB BLD-MCNC: 11.7 G/DL (ref 14–18)
HGB UR QL STRIP: NEGATIVE
HYALINE CASTS #/AREA URNS LPF: 5 /LPF
IMM GRANULOCYTES # BLD AUTO: 0.09 K/UL (ref 0–0.04)
IMM GRANULOCYTES NFR BLD AUTO: 0.6 % (ref 0–0.5)
KETONES UR QL STRIP: NEGATIVE
LEUKOCYTE ESTERASE UR QL STRIP: ABNORMAL
LYMPHOCYTES # BLD AUTO: 0.5 K/UL (ref 1–4.8)
LYMPHOCYTES NFR BLD: 3.1 % (ref 18–48)
MCH RBC QN AUTO: 25.9 PG (ref 27–31)
MCHC RBC AUTO-ENTMCNC: 30.8 G/DL (ref 32–36)
MCV RBC AUTO: 84 FL (ref 82–98)
MICROSCOPIC COMMENT: ABNORMAL
MONOCYTES # BLD AUTO: 0.7 K/UL (ref 0.3–1)
MONOCYTES NFR BLD: 5.1 % (ref 4–15)
NEUTROPHILS # BLD AUTO: 12.9 K/UL (ref 1.8–7.7)
NEUTROPHILS NFR BLD: 89.5 % (ref 38–73)
NITRITE UR QL STRIP: NEGATIVE
NRBC BLD-RTO: 0 /100 WBC
PH UR STRIP: 5 [PH] (ref 5–8)
PLATELET # BLD AUTO: 173 K/UL (ref 150–450)
PMV BLD AUTO: 10.7 FL (ref 9.2–12.9)
POTASSIUM SERPL-SCNC: 4.4 MMOL/L (ref 3.5–5.1)
PROT SERPL-MCNC: 6.4 G/DL (ref 6–8.4)
PROT UR QL STRIP: NEGATIVE
RBC # BLD AUTO: 4.51 M/UL (ref 4.6–6.2)
RBC #/AREA URNS HPF: 1 /HPF (ref 0–4)
SODIUM SERPL-SCNC: 136 MMOL/L (ref 136–145)
SP GR UR STRIP: 1.02 (ref 1–1.03)
SQUAMOUS #/AREA URNS HPF: 1 /HPF
TROPONIN I SERPL HS-MCNC: 80.7 PG/ML (ref 0–14.9)
URN SPEC COLLECT METH UR: ABNORMAL
UROBILINOGEN UR STRIP-ACNC: NEGATIVE EU/DL
WBC # BLD AUTO: 14.38 K/UL (ref 3.9–12.7)
WBC #/AREA URNS HPF: 31 /HPF (ref 0–5)
YEAST URNS QL MICRO: ABNORMAL

## 2023-10-04 PROCEDURE — 94761 N-INVAS EAR/PLS OXIMETRY MLT: CPT

## 2023-10-04 PROCEDURE — 63600175 PHARM REV CODE 636 W HCPCS: Performed by: STUDENT IN AN ORGANIZED HEALTH CARE EDUCATION/TRAINING PROGRAM

## 2023-10-04 PROCEDURE — 99223 1ST HOSP IP/OBS HIGH 75: CPT | Mod: ,,, | Performed by: INTERNAL MEDICINE

## 2023-10-04 PROCEDURE — 25000003 PHARM REV CODE 250: Performed by: STUDENT IN AN ORGANIZED HEALTH CARE EDUCATION/TRAINING PROGRAM

## 2023-10-04 PROCEDURE — 84484 ASSAY OF TROPONIN QUANT: CPT | Performed by: EMERGENCY MEDICINE

## 2023-10-04 PROCEDURE — 87086 URINE CULTURE/COLONY COUNT: CPT | Performed by: EMERGENCY MEDICINE

## 2023-10-04 PROCEDURE — 99900031 HC PATIENT EDUCATION (STAT)

## 2023-10-04 PROCEDURE — 99223 1ST HOSP IP/OBS HIGH 75: CPT | Mod: 57,ICN,, | Performed by: ORTHOPAEDIC SURGERY

## 2023-10-04 PROCEDURE — 12000002 HC ACUTE/MED SURGE SEMI-PRIVATE ROOM

## 2023-10-04 PROCEDURE — 99223 PR INITIAL HOSPITAL CARE,LEVL III: ICD-10-PCS | Mod: 57,ICN,, | Performed by: ORTHOPAEDIC SURGERY

## 2023-10-04 PROCEDURE — 82962 GLUCOSE BLOOD TEST: CPT

## 2023-10-04 PROCEDURE — 85025 COMPLETE CBC W/AUTO DIFF WBC: CPT | Performed by: STUDENT IN AN ORGANIZED HEALTH CARE EDUCATION/TRAINING PROGRAM

## 2023-10-04 PROCEDURE — 99900035 HC TECH TIME PER 15 MIN (STAT)

## 2023-10-04 PROCEDURE — 81001 URINALYSIS AUTO W/SCOPE: CPT | Performed by: EMERGENCY MEDICINE

## 2023-10-04 PROCEDURE — 99223 PR INITIAL HOSPITAL CARE,LEVL III: ICD-10-PCS | Mod: ,,, | Performed by: INTERNAL MEDICINE

## 2023-10-04 PROCEDURE — 80053 COMPREHEN METABOLIC PANEL: CPT | Performed by: STUDENT IN AN ORGANIZED HEALTH CARE EDUCATION/TRAINING PROGRAM

## 2023-10-04 RX ORDER — IBUPROFEN 200 MG
24 TABLET ORAL
Status: DISCONTINUED | OUTPATIENT
Start: 2023-10-04 | End: 2023-10-06

## 2023-10-04 RX ORDER — GLUCAGON 1 MG
1 KIT INJECTION
Status: DISCONTINUED | OUTPATIENT
Start: 2023-10-04 | End: 2023-10-06

## 2023-10-04 RX ORDER — CLOPIDOGREL BISULFATE 75 MG/1
75 TABLET ORAL DAILY
Status: DISCONTINUED | OUTPATIENT
Start: 2023-10-04 | End: 2023-10-04

## 2023-10-04 RX ORDER — INSULIN ASPART 100 [IU]/ML
0-5 INJECTION, SOLUTION INTRAVENOUS; SUBCUTANEOUS
Status: DISCONTINUED | OUTPATIENT
Start: 2023-10-04 | End: 2023-10-06

## 2023-10-04 RX ORDER — IBUPROFEN 200 MG
16 TABLET ORAL
Status: DISCONTINUED | OUTPATIENT
Start: 2023-10-04 | End: 2023-10-06

## 2023-10-04 RX ORDER — NAPROXEN SODIUM 220 MG/1
81 TABLET, FILM COATED ORAL DAILY
Status: DISCONTINUED | OUTPATIENT
Start: 2023-10-04 | End: 2023-10-09 | Stop reason: HOSPADM

## 2023-10-04 RX ADMIN — HYDROMORPHONE HYDROCHLORIDE 0.5 MG: 0.5 INJECTION, SOLUTION INTRAMUSCULAR; INTRAVENOUS; SUBCUTANEOUS at 05:10

## 2023-10-04 RX ADMIN — FLUDROCORTISONE ACETATE 100 MCG: 0.1 TABLET ORAL at 10:10

## 2023-10-04 RX ADMIN — MIDODRINE HYDROCHLORIDE 5 MG: 2.5 TABLET ORAL at 07:10

## 2023-10-04 RX ADMIN — INSULIN ASPART 6 UNITS: 100 INJECTION, SOLUTION INTRAVENOUS; SUBCUTANEOUS at 12:10

## 2023-10-04 RX ADMIN — MIDODRINE HYDROCHLORIDE 5 MG: 2.5 TABLET ORAL at 05:10

## 2023-10-04 RX ADMIN — METOPROLOL TARTRATE 12.5 MG: 25 TABLET, FILM COATED ORAL at 10:10

## 2023-10-04 RX ADMIN — FINASTERIDE 5 MG: 5 TABLET, FILM COATED ORAL at 10:10

## 2023-10-04 RX ADMIN — METOPROLOL TARTRATE 12.5 MG: 25 TABLET, FILM COATED ORAL at 08:10

## 2023-10-04 RX ADMIN — ATORVASTATIN CALCIUM 40 MG: 40 TABLET, FILM COATED ORAL at 10:10

## 2023-10-04 RX ADMIN — OXYBUTYNIN CHLORIDE 10 MG: 10 TABLET, EXTENDED RELEASE ORAL at 10:10

## 2023-10-04 RX ADMIN — MIDODRINE HYDROCHLORIDE 5 MG: 2.5 TABLET ORAL at 12:10

## 2023-10-04 RX ADMIN — ASPIRIN 81 MG 81 MG: 81 TABLET ORAL at 11:10

## 2023-10-04 RX ADMIN — HYDROMORPHONE HYDROCHLORIDE 0.5 MG: 0.5 INJECTION, SOLUTION INTRAMUSCULAR; INTRAVENOUS; SUBCUTANEOUS at 04:10

## 2023-10-04 RX ADMIN — SERTRALINE HYDROCHLORIDE 50 MG: 50 TABLET ORAL at 10:10

## 2023-10-04 RX ADMIN — SPIRONOLACTONE 25 MG: 25 TABLET ORAL at 10:10

## 2023-10-04 RX ADMIN — PANTOPRAZOLE SODIUM 40 MG: 40 TABLET, DELAYED RELEASE ORAL at 07:10

## 2023-10-04 RX ADMIN — POTASSIUM CHLORIDE 10 MEQ: 750 CAPSULE, EXTENDED RELEASE ORAL at 10:10

## 2023-10-04 NOTE — ASSESSMENT & PLAN NOTE
Pt with recent CATH- on Aug/2023- on DAPT  Cardio on board and discussed and now plan is to stop plavix and resume aspirin -   Orthopedic were made aware  C/w statin and aspirin  Other GDMT

## 2023-10-04 NOTE — TELEPHONE ENCOUNTER
S/w pts daughter & advised that dr velazquez has retired.   But I will s/w dr stockton. Surgery is tomorrow and pt had a stent put in august and they want to stop the plavix.

## 2023-10-04 NOTE — NURSING
[facility]  Wound Care    Patient Name:  Tono Saez   MRN:  [id number]  Date: 10/4/2023  Diagnosis: Hip fracture    History:     Past Medical History:   Diagnosis Date    Anemia     Anticoagulant long-term use     Arthritis     CAD (coronary artery disease)     CABG, STENTS '97,'99,'01,'05    Cancer     SKIN    Cataract     Diabetes mellitus     Diabetes mellitus type II     GERD (gastroesophageal reflux disease)     GI bleed 2020    Hypertension     Myocardial infarction     Wears glasses        Social History     Socioeconomic History    Marital status:    Tobacco Use    Smoking status: Never    Smokeless tobacco: Never   Substance and Sexual Activity    Alcohol use: No    Drug use: No    Sexual activity: Not Currently     Social Determinants of Health     Financial Resource Strain: Low Risk  (9/20/2023)    Overall Financial Resource Strain (CARDIA)     Difficulty of Paying Living Expenses: Not hard at all   Food Insecurity: No Food Insecurity (9/20/2023)    Hunger Vital Sign     Worried About Running Out of Food in the Last Year: Never true     Ran Out of Food in the Last Year: Never true   Transportation Needs: No Transportation Needs (9/20/2023)    PRAPARE - Transportation     Lack of Transportation (Medical): No     Lack of Transportation (Non-Medical): No   Physical Activity: Inactive (9/20/2023)    Exercise Vital Sign     Days of Exercise per Week: 0 days     Minutes of Exercise per Session: 0 min   Stress: No Stress Concern Present (9/20/2023)    [employer] - Occupational Stress Questionnaire     Feeling of Stress : Not at all   Social Connections: Moderately Integrated (9/20/2023)    Social Connection and Isolation Panel [NHANES]     Frequency of Communication with Friends and Family: More than three times a week     Frequency of Social Gatherings with Friends and Family: Once a week     Attends [employer] Services: More than 4 times per year     Active  Member of Clubs or Organizations: Yes     Attends Club or Organization Meetings: More than 4 times per year     Marital Status:    Housing Stability: Low Risk  (9/20/2023)    Housing Stability Vital Sign     Unable to Pay for Housing in the Last Year: No     Number of Places Lived in the Last Year: 1     Unstable Housing in the Last Year: No       Precautions:     Allergies as of 10/03/2023 - Reviewed 10/03/2023   Allergen Reaction Noted    Adhesive Other (See Comments) 02/19/2013    Metformin Other (See Comments) 08/25/2017    Pcn [penicillins]  11/27/2012       [facility] Assessment Details/Treatment   78 yr old male  by H&P fall at home   From veiwing pictures in the chart   pictures taken and named by nursing thank you   Skin tears   Right elbow skin tear 3x3 open     Left elbow skin tear 3x2 open with bruising     Left lower leg abrasion 1x2     Left heel abrasion     Left hip bruising    Right knee  blood blistering abrasion     Sacral  redness     Recommendation:   Bilat elbows left shin and right knee   Clean area with chlorhexidine/ns. Pat dry. Replace skin flap with sterile Q-tip. Apply  Medihoneyadaptic, 4x4 and kerlex wrap. Change every 72 hours or if drainage strikes through.   Turn reposition q2 as pt's condition will allow.  Bilat heel pillows   Float and elevate heels of bed with pillows.  air mattress  Border dressing to the sacral and buttock.    10/04/2023

## 2023-10-04 NOTE — ASSESSMENT & PLAN NOTE
To the OR for right hip hemiarthroplasty.  Patient is higher risk due to Plavix and recent cardiac stents.

## 2023-10-04 NOTE — ED PROVIDER NOTES
Encounter Date: 10/3/2023       History     Chief Complaint   Patient presents with    Fall     Slid/Fell around 11a, increased pain to LLE     78-year-old male presents complaining of right leg pain.  The patient had a mechanical fall at home.  He has not been able to put weight on his legs since then.  He reports pain to the anterior mid thigh and hip area.  He is comfortable if he is not moving his hip or leg but has pain with movement of the hip area.  He has been having frequent falls secondary to orthostatic hypotension for which he is being treated with midodrine by his cardiologist.        Review of patient's allergies indicates:   Allergen Reactions    Adhesive Other (See Comments)     SILK TAPE PULL SKIN OFF    Metformin Other (See Comments)     Weight loss cachexia anorexia,diarrhea.    Pcn [penicillins]      Patient states he passed out from this medication when he was 10 years old.      Past Medical History:   Diagnosis Date    Anemia     Anticoagulant long-term use     Arthritis     CAD (coronary artery disease)     CABG, STENTS '97,'99,'01,'05    Cancer     SKIN    Cataract     Diabetes mellitus     Diabetes mellitus type II     GERD (gastroesophageal reflux disease)     GI bleed 2020    Hypertension     Myocardial infarction     Wears glasses      Past Surgical History:   Procedure Laterality Date    ANGIOGRAM, CORONARY, WITH LEFT HEART CATHETERIZATION N/A 8/24/2023    Procedure: Angiogram, Coronary, with Left Heart Cath;  Surgeon: Robles Mckoy MD;  Location: [facility] CATH/EP LAB;  Service: Cardiology;  Laterality: N/A;    CARDIAC PACEMAKER PLACEMENT      CARDIAC PACEMAKER PLACEMENT      CARDIAC PACEMAKER PLACEMENT  04/26/2018    replaced    CARDIAC SURGERY      1995   CABG X 5    CHOLECYSTECTOMY      COLON SURGERY      COLONOSCOPY N/A 2/21/2020    Procedure: COLONOSCOPY;  Surgeon: Abe Barragan III, MD;  Location: [facility] ENDO;  Service: Endoscopy;  Laterality: N/A;    COLONOSCOPY N/A 2/23/2020     Procedure: COLONOSCOPY;  Surgeon: Bob Locke Jr., MD;  Location: [facility];  Service: Endoscopy;  Laterality: N/A;    CORONARY ANGIOGRAPHY INCLUDING BYPASS GRAFTS WITH CATHETERIZATION OF LEFT HEART N/A 10/26/2022    Procedure: ANGIOGRAM, CORONARY, INCLUDING BYPASS GRAFT, WITH LEFT HEART CATHETERIZATION;  Surgeon: Robles Mckoy MD;  Location: [facility] CATH/EP LAB;  Service: Cardiology;  Laterality: N/A;    CORONARY ARTERY BYPASS GRAFT  1995    CORONARY BYPASS GRAFT ANGIOGRAPHY  8/24/2023    Procedure: Bypass graft study;  Surgeon: Robles Mckoy MD;  Location: [facility] CATH/EP LAB;  Service: Cardiology;;    CORONARY STENT PLACEMENT      stent x 5    ESOPHAGOGASTRODUODENOSCOPY N/A 2/23/2020    Procedure: EGD (ESOPHAGOGASTRODUODENOSCOPY);  Surgeon: Bob Locke Jr., MD;  Location: [facility];  Service: Endoscopy;  Laterality: N/A;    EYE SURGERY      BILAT CATARACT    head laceration   01/19/2018    HEMORRHOID SURGERY      INJECTION OF ANESTHETIC AGENT AROUND MEDIAL BRANCH NERVES INNERVATING LUMBAR FACET JOINT Bilateral 6/29/2023    Procedure: Block-nerve-medial branch-lumbar;  Surgeon: Maurice Montenegro MD;  Location: [facility] OR;  Service: Pain Management;  Laterality: Bilateral;  L3,4,5 MBB    INJECTION OF ANESTHETIC AGENT AROUND MEDIAL BRANCH NERVES INNERVATING LUMBAR FACET JOINT Bilateral 7/25/2023    Procedure: Block-nerve-medial branch-lumbar;  Surgeon: Maurice Montenegro MD;  Location: [facility] OR;  Service: Anesthesiology;  Laterality: Bilateral;  L3,4,5 MBB #2    INTRAVASCULAR ULTRASOUND, CORONARY N/A 8/24/2023    Procedure: Ultrasound-coronary;  Surgeon: Robles Mckoy MD;  Location: [facility] CATH/EP LAB;  Service: Cardiology;  Laterality: N/A;    PTCA, SINGLE VESSEL  8/24/2023    Procedure: PTCA, Single Vessel;  Surgeon: Robles Mckoy MD;  Location: [facility] CATH/EP LAB;  Service: Cardiology;;    SMALL BOWEL ENTEROSCOPY N/A 2/21/2020    Procedure: ENTEROSCOPY;  Surgeon: Abe Barragan III, MD;  Location: [facility];   Service: Endoscopy;  Laterality: N/A;    STENT, DRUG ELUTING, SINGLE VESSEL, CORONARY  8/24/2023    Procedure: Stent, Drug Eluting, Single Vessel, Coronary;  Surgeon: Robles Mckoy MD;  Location: [facility] CATH/EP LAB;  Service: Cardiology;;    stint       Family History   Problem Relation Age of Onset    Heart disease Mother     Heart disease Father     Hyperlipidemia Sister     Hypertension Sister     Heart disease Brother     Heart disease Brother     Heart disease Brother     Heart disease Brother     Heart disease Brother      Social History     Tobacco Use    Smoking status: Never    Smokeless tobacco: Never   Substance Use Topics    Alcohol use: No    Drug use: No     Review of Systems   Constitutional:  Positive for fatigue. Negative for activity change (Patient is chronically in active as he has generalized weakness and frequent falls.), appetite change, chills, diaphoresis, fever and unexpected weight change.   HENT: Negative.  Negative for congestion, dental problem, ear pain, nosebleeds, rhinorrhea, sinus pain, sore throat, trouble swallowing and voice change.    Eyes: Negative.  Negative for pain and visual disturbance.   Respiratory: Negative.  Negative for cough, chest tightness, shortness of breath and wheezing.    Cardiovascular: Negative.  Negative for chest pain, palpitations and leg swelling.   Gastrointestinal: Negative.  Negative for abdominal pain, blood in stool, constipation, diarrhea, nausea and vomiting.   Endocrine: Negative.    Genitourinary: Negative.  Negative for difficulty urinating, dysuria, flank pain, frequency, penile pain, testicular pain and urgency.   Musculoskeletal:  Positive for arthralgias (pain to the right hip and right anterior leg only.) and myalgias. Negative for back pain, gait problem, joint swelling, neck pain and neck stiffness.   Skin: Negative.  Negative for pallor and rash.   Neurological:  Positive for weakness (chronic generalized weakness and fatigue.) and  light-headedness (chronic lightheadedness when he changes positions.). Negative for dizziness, seizures, syncope, facial asymmetry, speech difficulty, numbness and headaches.   Hematological: Negative.  Does not bruise/bleed easily.   Psychiatric/Behavioral: Negative.  Negative for confusion.    All other systems reviewed and are negative.      Physical Exam     Initial Vitals [10/03/23 1839]   BP Pulse Resp Temp SpO2   117/69 72 20 98.7 °F (37.1 °C) 95 %      MAP       --         Physical Exam    Nursing note and vitals reviewed.  Constitutional: He is cooperative. He does not have a sickly appearance. He does not appear ill. No distress.   HENT:   Head: Normocephalic and atraumatic.   Right Ear: Tympanic membrane normal.   Left Ear: Tympanic membrane normal.   Nose: Nose normal. No mucosal edema or rhinorrhea.   Mouth/Throat: Uvula is midline, oropharynx is clear and moist and mucous membranes are normal. No oral lesions. No trismus in the jaw. No uvula swelling. No oropharyngeal exudate, posterior oropharyngeal edema, posterior oropharyngeal erythema or tonsillar abscesses.   Eyes: Conjunctivae, EOM and lids are normal. Pupils are equal, round, and reactive to light. Right eye exhibits no discharge. Left eye exhibits no discharge. No scleral icterus.   Neck: Trachea normal and phonation normal. Neck supple. No thyromegaly present. No stridor present. No tracheal deviation present. No JVD present.   Normal range of motion.   Full passive range of motion without pain.     Cardiovascular:  Normal rate, regular rhythm, normal heart sounds, intact distal pulses and normal pulses.     Exam reveals no gallop, no distant heart sounds, no friction rub and no decreased pulses.       No murmur heard.  Pulmonary/Chest: Effort normal and breath sounds normal. No stridor. No respiratory distress. He has no decreased breath sounds. He has no wheezes. He has no rhonchi. He has no rales.   Abdominal: Abdomen is soft. Bowel sounds  are normal. He exhibits no distension, no pulsatile midline mass and no mass. There is no abdominal tenderness. No hernia. There is no rebound and no guarding.   Musculoskeletal:         General: No edema.      Right hand: Normal. No tenderness or bony tenderness. Normal range of motion. Normal strength. Normal sensation. Normal capillary refill. Normal pulse.      Left hand: Normal. No tenderness or bony tenderness. Normal range of motion. Normal strength. Normal sensation. Normal capillary refill. Normal pulse.      Cervical back: Normal, full passive range of motion without pain, normal range of motion and neck supple. No swelling, edema, erythema, rigidity, tenderness or bony tenderness. No pain with movement, spinous process tenderness or muscular tenderness. Normal range of motion and normal range of motion. Normal.      Thoracic back: Normal. No swelling, tenderness or bony tenderness. Normal range of motion.      Lumbar back: Normal. No swelling, edema, tenderness or bony tenderness. Normal range of motion.      Right hip: Tenderness present. No deformity or crepitus. Decreased range of motion. Normal strength.      Right upper leg: Normal. No swelling, edema, deformity, lacerations, tenderness or bony tenderness.      Left upper leg: Normal.      Right knee: Normal. No swelling, deformity, effusion, erythema, ecchymosis, bony tenderness or crepitus. Normal range of motion. No tenderness. Normal alignment and normal meniscus.      Left knee: Normal.      Right foot: Normal. Normal range of motion and normal capillary refill. No swelling, tenderness or bony tenderness. Normal pulse.      Left foot: Normal. Normal range of motion and normal capillary refill. No swelling, tenderness or bony tenderness. Normal pulse.      Comments: Pulses are 2+ throughout, cap refill is less than 2 sec throughout, no edema noted,  Patient points to his right mid thigh area as the area of pain however there is no reproducible  tenderness to palpation to this area, only has pain with palpation of in range of motion of the right hip.  No other areas of extremity tenderness to palpation or pain with range of motion.  He is neurovascularly intact throughout all extremities with no instability or ligamentous laxity.     Lymphadenopathy:     He has no cervical adenopathy.   Neurological: He is alert and oriented to person, place, and time. He has normal strength. No cranial nerve deficit or sensory deficit. Coordination normal. GCS score is 15. GCS eye subscore is 4. GCS verbal subscore is 5. GCS motor subscore is 6.   No focal deficits   Skin: Skin is warm and dry. Capillary refill takes less than 2 seconds. No ecchymosis, no petechiae and no rash noted. No cyanosis or erythema. No pallor.   Psychiatric: He has a normal mood and affect. His speech is normal and behavior is normal.         ED Course   Procedures  Labs Reviewed   CBC W/ AUTO DIFFERENTIAL - Abnormal; Notable for the following components:       Result Value    WBC 13.28 (*)     RBC 4.39 (*)     Hemoglobin 11.6 (*)     Hematocrit 36.7 (*)     MCH 26.4 (*)     MCHC 31.6 (*)     RDW 15.3 (*)     Gran # (ANC) 12.1 (*)     Lymph # 0.4 (*)     Gran % 90.8 (*)     Lymph % 2.7 (*)     All other components within normal limits   COMPREHENSIVE METABOLIC PANEL - Abnormal; Notable for the following components:    Glucose 211 (*)     BUN 43 (*)     Creatinine 1.7 (*)     Total Bilirubin 1.2 (*)     eGFR 40.2 (*)     Anion Gap 7 (*)     All other components within normal limits   TROPONIN I HIGH SENSITIVITY - Abnormal; Notable for the following components:    Troponin I High Sensitivity 47.2 (*)     All other components within normal limits   B-TYPE NATRIURETIC PEPTIDE - Abnormal; Notable for the following components:     (*)     All other components within normal limits   LIPASE   CK   MAGNESIUM   TSH   URINALYSIS, REFLEX TO URINE CULTURE   TROPONIN I HIGH SENSITIVITY   POCT GLUCOSE  MONITORING CONTINUOUS          Imaging Results              CT Cervical Spine Without Contrast (Final result)  Result time 10/03/23 21:44:29      Final result by Corbin Hemphill MD (10/03/23 21:44:29)                   Narrative:    EXAM DESCRIPTION:    CT CERVICAL SPINE WITHOUT CONTRAST    CLINICAL HISTORY:    78 years  Male  Neck trauma (Age >= 65y)    COMPARISON:    CT from 8/16/2023    TECHNIQUE:    CT performed without IV contrast. Sagittal and coronal reconstruction.  This exam was performed according to our departmental dose-optimization program, which includes automated exposure control, adjustment of the mA and/or kV according to patient size and/or use of iterative reconstruction technique.    FINDINGS:    No acute fracture dislocation. No suspicious acute prevertebral soft tissue swelling. Multiple anterior osteophytes. Moderate thickening of the transverse ligament without evidence to suggest the upper cord compression. Multilevel degenerative changes. Mild to moderate central stenosis at C3-4 and C5-6. Multiple levels of foraminal stenosis. Diffuse atherosclerotic disease in the carotid arteries.    Limited images of the upper chest demonstrate questionable small bilateral pleural effusions.    IMPRESSION:  1.  No acute fracture or dislocation.  2.  Multilevel degenerative changes. Mild to moderate central stenosis at C3-4 and C5-6. Multiple levels of foraminal stenosis.  3.  Diffuse atherosclerotic disease in the carotid arteries.  4.  Questionable small bilateral pleural effusions.    Electronically signed by:  Corbin Hemphill MD  10/03/2023 09:44 PM CDT Workstation: [id number]                                     CT Head Without Contrast (Final result)  Result time 10/03/23 21:38:25      Final result by Corbin Hemphill MD (10/03/23 21:38:25)                   Narrative:    EXAM DESCRIPTION:    CT HEAD WITHOUT CONTRAST    CLINICAL HISTORY:    78 years  Male  Head trauma,  moderate-severe    COMPARISON:    8/16/2023    TECHNIQUE:    CT of the head performed without IV contrast. Sagittal coronal reconstruction. This exam was performed according to our departmental dose-optimization program, which includes automated exposure control, adjustment of the mA and/or kV according to patient size and/or use of iterative reconstruction technique.    FINDINGS:    Moderate to prominent central and cortical atrophy. Moderate to prominent periventricular white matter small vessel disease and additional true lacunar infarctions. Advanced atherosclerotic disease including distal vertebral and internal carotid arteries. No acute intra-axial or extra-axial abnormality.. Visualized paranasal sinuses, mastoid air cells and bony calvarium are unremarkable.    IMPRESSION:  Advanced atrophy, small vessel disease and atherosclerotic disease. There are no acute intracranial findings.    Electronically signed by:  Corbin Hemphill MD  10/03/2023 09:38 PM CDT Workstation: [id number]                                     X-Ray Knee 3 View Right (In process)    Procedure changed from X-Ray Knee 3 View Left                    X-Ray Chest AP Portable (In process)                      X-Ray Hip 2 or 3 views Right (with Pelvis when performed) (In process)    Procedure changed from X-Ray Hip 2 or 3 views Left (with Pelvis when performed)                    X-Ray Femur 2 View Right (In process)    Procedure changed from X-Ray Femur AP/LAT Left                    Medications   atorvastatin tablet 40 mg (has no administration in time range)   finasteride tablet 5 mg (has no administration in time range)   fludrocortisone tablet 100 mcg (has no administration in time range)   fluticasone propionate 50 mcg/actuation nasal spray 50 mcg (has no administration in time range)   metoprolol tartrate (LOPRESSOR) split tablet 12.5 mg (has no administration in time range)   midodrine tablet 5 mg (has no administration in time range)    pantoprazole EC tablet 40 mg (has no administration in time range)   potassium chloride CR capsule 10 mEq (has no administration in time range)   pregabalin capsule 50 mg (has no administration in time range)   sertraline tablet 50 mg (has no administration in time range)   spironolactone tablet 25 mg (has no administration in time range)   oxybutynin 24 hr tablet 10 mg (has no administration in time range)   sodium chloride 0.9% flush 10 mL (has no administration in time range)   melatonin tablet 6 mg (has no administration in time range)   acetaminophen tablet 650 mg (has no administration in time range)   HYDROmorphone injection 0.5 mg (has no administration in time range)   HYDROmorphone injection 1 mg (has no administration in time range)   polyethylene glycol packet 17 g (has no administration in time range)   ondansetron injection 4 mg (has no administration in time range)   acetaminophen tablet 650 mg (has no administration in time range)   glucose chewable tablet 16 g (has no administration in time range)   glucose chewable tablet 24 g (has no administration in time range)   dextrose 50% injection 12.5 g (has no administration in time range)   dextrose 50% injection 25 g (has no administration in time range)   glucagon (human recombinant) injection 1 mg (has no administration in time range)   insulin aspart U-100 pen 0-10 Units (has no administration in time range)   acetaminophen tablet 1,000 mg (1,000 mg Oral Given 10/3/23 2057)   0.9%  NaCl infusion (1,000 mLs Intravenous New Bag 10/3/23 2241)     Medical Decision Making  Emergent evaluation of an 78-year-old male presenting secondary to right leg pain after a fall, frequent falls secondary to hypotension, patient being treated with midodrine.  His daughter is actually very happy that his blood pressure has been stable throughout his ED stay.  Exam with tenderness to palpation to the right hip area, x-ray demonstrates right femoral neck fracture.  He  is neurovascularly intact.  Tylenol has helped his pain.  Orthopedics notified, patient is on Plavix by review of med rec, having the med reconciliation completed to ascertain if there are any other blood thinners present.  Will discuss with hospitalist for admission.    Amount and/or Complexity of Data Reviewed  Labs: ordered.  Radiology: ordered.    Risk  OTC drugs.  Prescription drug management.  Decision regarding hospitalization.                               Clinical Impression:   Final diagnoses:  [R29.6] Frequent falls  [W19.XXXA] Fall  [S72.001A] Closed fracture of right hip, initial encounter (Primary)        ED Disposition Condition    Admit Stable                Rebecca Howard MD  10/04/23 0029       Rebecca Howard MD  10/04/23 0030

## 2023-10-04 NOTE — H&P
Patient Name: Tono Saez  MRN: [id number]  Patient Class: IP- Inpatient   Admission Date: 10/3/2023  6:36 PM  Attending Physician: Sixto Schultz MD  Primary Care Provider: Scott Staley MD  Face-to-Face encounter date: 10/3/23  Code Status: Full  MPOA:  Chief Complaint: Fall (Slid/Fell around 11a, increased pain to LLE)        HISTORY OF PRESENT ILLNESS:     Tono Saez is a 78 y.o.  male with known history of  CAD w/ stents and post CABG, DM2, HTN, HLD, GERD, GIB who came in today after a mechanical fall.  He states he was standing at his sink and attempted to turn using walker and tripped over it.  He now complains of pain to right thigh and hip area.  CT of head, and c-spine are negative.  Xray shows right side femoral neck fracture. Patient was just discharged from here after a fall and has known orthostatic hypotension.  Patient denies chest pain, shortness of breath, palpitations, abdominal pain, nausea, vomiting, diarrhea, constipation,  hematochezia, hematemesis, fever, cough, congestion, runny nose, changes in vision, hearing, numbness, tingling, headache, focal weakness, dysuria, frequency, hematuria. History was obtained from the patient and ER physician Sign-out.     REVIEW OF SYSTEMS:     10 Point Review of System was performed and was found to be negative except for that mentioned already in the HPI above.     PAST MEDICAL HISTORY:     Past Medical History:   Diagnosis Date    Anemia     Anticoagulant long-term use     Arthritis     CAD (coronary artery disease)     CABG, STENTS '97,'99,'01,'05    Cancer     SKIN    Cataract     Diabetes mellitus     Diabetes mellitus type II     GERD (gastroesophageal reflux disease)     GI bleed 2020    Hypertension     Myocardial infarction     Wears glasses        PAST SURGICAL HISTORY:     Past Surgical History:   Procedure Laterality Date    ANGIOGRAM, CORONARY, WITH LEFT HEART CATHETERIZATION N/A 8/24/2023    Procedure: Angiogram, Coronary, with  Left Heart Cath;  Surgeon: Robles Mckoy MD;  Location: [facility] CATH/EP LAB;  Service: Cardiology;  Laterality: N/A;    CARDIAC PACEMAKER PLACEMENT      CARDIAC PACEMAKER PLACEMENT      CARDIAC PACEMAKER PLACEMENT  04/26/2018    replaced    CARDIAC SURGERY      1995   CABG X 5    CHOLECYSTECTOMY      COLON SURGERY      COLONOSCOPY N/A 2/21/2020    Procedure: COLONOSCOPY;  Surgeon: Abe Barragan III, MD;  Location: [facility];  Service: Endoscopy;  Laterality: N/A;    COLONOSCOPY N/A 2/23/2020    Procedure: COLONOSCOPY;  Surgeon: Bob Locke Jr., MD;  Location: [facility];  Service: Endoscopy;  Laterality: N/A;    CORONARY ANGIOGRAPHY INCLUDING BYPASS GRAFTS WITH CATHETERIZATION OF LEFT HEART N/A 10/26/2022    Procedure: ANGIOGRAM, CORONARY, INCLUDING BYPASS GRAFT, WITH LEFT HEART CATHETERIZATION;  Surgeon: Robles Mckoy MD;  Location: [facility] CATH/EP LAB;  Service: Cardiology;  Laterality: N/A;    CORONARY ARTERY BYPASS GRAFT  1995    CORONARY BYPASS GRAFT ANGIOGRAPHY  8/24/2023    Procedure: Bypass graft study;  Surgeon: Robles Mckoy MD;  Location: [facility] CATH/EP LAB;  Service: Cardiology;;    CORONARY STENT PLACEMENT      stent x 5    ESOPHAGOGASTRODUODENOSCOPY N/A 2/23/2020    Procedure: EGD (ESOPHAGOGASTRODUODENOSCOPY);  Surgeon: Bob Locke Jr., MD;  Location: [facility];  Service: Endoscopy;  Laterality: N/A;    EYE SURGERY      BILAT CATARACT    head laceration   01/19/2018    HEMORRHOID SURGERY      INJECTION OF ANESTHETIC AGENT AROUND MEDIAL BRANCH NERVES INNERVATING LUMBAR FACET JOINT Bilateral 6/29/2023    Procedure: Block-nerve-medial branch-lumbar;  Surgeon: Maurice Montenegro MD;  Location: [facility] OR;  Service: Pain Management;  Laterality: Bilateral;  L3,4,5 MBB    INJECTION OF ANESTHETIC AGENT AROUND MEDIAL BRANCH NERVES INNERVATING LUMBAR FACET JOINT Bilateral 7/25/2023    Procedure: Block-nerve-medial branch-lumbar;  Surgeon: Maurice Montenegro MD;  Location: [facility] OR;  Service: Anesthesiology;   Laterality: Bilateral;  L3,4,5 MBB #2    INTRAVASCULAR ULTRASOUND, CORONARY N/A 8/24/2023    Procedure: Ultrasound-coronary;  Surgeon: Robles Mckoy MD;  Location: [facility] CATH/EP LAB;  Service: Cardiology;  Laterality: N/A;    PTCA, SINGLE VESSEL  8/24/2023    Procedure: PTCA, Single Vessel;  Surgeon: Robles Mckoy MD;  Location: [facility] CATH/EP LAB;  Service: Cardiology;;    SMALL BOWEL ENTEROSCOPY N/A 2/21/2020    Procedure: ENTEROSCOPY;  Surgeon: Abe Barragan III, MD;  Location: [facility] ENDO;  Service: Endoscopy;  Laterality: N/A;    STENT, DRUG ELUTING, SINGLE VESSEL, CORONARY  8/24/2023    Procedure: Stent, Drug Eluting, Single Vessel, Coronary;  Surgeon: Robles Mckoy MD;  Location: [facility] CATH/EP LAB;  Service: Cardiology;;    stint         ALLERGIES:   Adhesive, Metformin, and Pcn [penicillins]    FAMILY HISTORY:     Family History   Problem Relation Age of Onset    Heart disease Mother     Heart disease Father     Hyperlipidemia Sister     Hypertension Sister     Heart disease Brother     Heart disease Brother     Heart disease Brother     Heart disease Brother     Heart disease Brother        SOCIAL HISTORY:     Social History     Tobacco Use    Smoking status: Never    Smokeless tobacco: Never   Substance Use Topics    Alcohol use: No        Social History     Substance and Sexual Activity   Sexual Activity Not Currently        HOME MEDICATIONS:     Prior to Admission medications    Medication Sig Start Date End Date Taking? Authorizing Provider   aspirin (ECOTRIN) 81 MG EC tablet Take 2 tablets (162 mg total) by mouth once daily. 10/28/22   Koko Erickson MD   atorvastatin (LIPITOR) 40 MG tablet Take 1 tablet by mouth once daily. 1/2/23   Provider, Historical   betamethasone dipropionate (DIPROLENE) 0.05 % lotion Apply thin film to scalp bid prn itch 4/20/22   Ayush Foster MD   cholecalciferol, vitamin D3, 75 mcg (3,000 unit) Tab Take by mouth.    Provider, Historical   clopidogreL (PLAVIX) 75  mg tablet TAKE ONE TABLET BY MOUTH EVERY DAY 10/3/23   Hema Patel MD   empagliflozin (JARDIANCE) 25 mg tablet Take 1 tablet (25 mg total) by mouth once daily. 8/7/23   Jud Kirby, NP   finasteride (PROSCAR) 5 mg tablet Take 5 mg by mouth once daily. 9/28/22   Provider, Historical   fludrocortisone (FLORINEF) 0.1 mg Tab Take 1 tablet (100 mcg total) by mouth once daily. 9/27/23 10/27/23  Kt Lux MD   fluticasone propionate (FLONASE) 50 mcg/actuation nasal spray 1 spray by Each Nostril route as needed for Allergies.    Provider, Historical   ketoconazole (NIZORAL) 2 % shampoo Apply 1 application  topically 3 (three) times a week. 9/20/23   Provider, Historical   magnesium oxide (MAG-OXIDE ORAL) Take by mouth. 400 mg , once a day    Provider, Historical   metoprolol tartrate (LOPRESSOR) 25 MG tablet Take 0.5 tablets (12.5 mg total) by mouth 2 (two) times daily. 6/26/23 9/21/23  Kt Lux MD   midodrine (PROAMATINE) 5 MG Tab Take 1 tablet (5 mg total) by mouth 3 (three) times daily with meals. 9/27/23 9/26/24  Kt Lux MD   multivitamin capsule Take 1 capsule by mouth once daily.    Provider, Historical   nitroGLYCERIN (NITROSTAT) 0.4 MG SL tablet DISSOLVE 1 TABLET UNDER THE TONGUE AS NEEDED FOR CHEST PAIN 8/24/22   Hema Patel MD   pantoprazole (PROTONIX) 40 MG tablet TAKE ONE TABLET BY MOUTH ONCE DAILY  Patient taking differently: Take 40 mg by mouth once daily. 8/11/23   Scott Staley MD   potassium chloride (KLOR-CON) 10 MEQ TbSR Take 1 tablet (10 mEq total) by mouth once daily. 9/27/23   Kt Lux MD   pregabalin (LYRICA) 50 MG capsule Take 1 capsule (50 mg total) by mouth nightly as needed. 9/18/23 3/18/24  Scott Staley MD   sertraline (ZOLOFT) 50 MG tablet TAKE ONE TABLET BY MOUTH ONCE DAILY  Patient taking differently: Take 50 mg by mouth once daily. 1/23/23   Scott Staley MD   spironolactone (ALDACTONE) 25 MG tablet Take 1 tablet (25 mg  total) by mouth once daily. 8/28/23 11/26/23  Koko Erickson MD   tolterodine (DETROL LA) 4 MG 24 hr capsule TAKE ONE CAPSULE BY MOUTH ONCE DAILY  Patient taking differently: Take 4 mg by mouth once daily. 4/10/23   Scott Staley MD         PHYSICAL EXAM:     /62   Pulse 75   Temp 98.7 °F (37.1 °C) (Oral)   Resp 15   Wt 79.4 kg (175 lb)   SpO2 95%   BMI 23.73 kg/m²   Vitals Reviewed    General appearance: calm, pleasnat  Skin: No Rash.   Neuro: Motor and sensory exams grossly intact. Exam limited by pain  HENT: Atraumatic head. Moist mucous membranes of oral cavity.  Eyes: Normal extraocular movements.   Lungs: Clear to auscultation bilaterally. No wheezing present.   Heart: Regular rate and rhythm.   Abdomen: Soft, non-distended, non-tender.   Extremities: Pain with palpation and ROM of right leg.   Psych/mental status: Alert and oriented. Cooperative. Responds appropriately to questions.       EMERGENCY DEPARTMENT LABS AND IMAGING:     Labs Reviewed   CBC W/ AUTO DIFFERENTIAL - Abnormal; Notable for the following components:       Result Value    WBC 13.28 (*)     RBC 4.39 (*)     Hemoglobin 11.6 (*)     Hematocrit 36.7 (*)     MCH 26.4 (*)     MCHC 31.6 (*)     RDW 15.3 (*)     Gran # (ANC) 12.1 (*)     Lymph # 0.4 (*)     Gran % 90.8 (*)     Lymph % 2.7 (*)     All other components within normal limits   COMPREHENSIVE METABOLIC PANEL - Abnormal; Notable for the following components:    Glucose 211 (*)     BUN 43 (*)     Creatinine 1.7 (*)     Total Bilirubin 1.2 (*)     eGFR 40.2 (*)     Anion Gap 7 (*)     All other components within normal limits   TROPONIN I HIGH SENSITIVITY - Abnormal; Notable for the following components:    Troponin I High Sensitivity 47.2 (*)     All other components within normal limits   B-TYPE NATRIURETIC PEPTIDE - Abnormal; Notable for the following components:     (*)     All other components within normal limits   LIPASE   CK   MAGNESIUM   TSH   URINALYSIS,  REFLEX TO URINE CULTURE   TROPONIN I HIGH SENSITIVITY   CBC W/ AUTO DIFFERENTIAL   COMPREHENSIVE METABOLIC PANEL   POCT GLUCOSE MONITORING CONTINUOUS       CT Cervical Spine Without Contrast   Final Result      CT Head Without Contrast   Final Result      X-Ray Chest AP Portable    (Results Pending)   X-Ray Hip 2 or 3 views Right (with Pelvis when performed)    (Results Pending)   X-Ray Femur 2 View Right    (Results Pending)   X-Ray Knee 3 View Right    (Results Pending)       ASSESSMENT & PLAN:     Tono Saez is a 78 y.o. male admitted for fall with femoral neck fracture    Active Hospital Problems    Diagnosis    *Hip fracture          Plan    Femoral neck fracture  Admit to [facility]  Vitals q4  NPO  Dilaudid IV prn pain  Zofran prn nausea      CAD  Hx CABG, stents  Continue statin    GERD  PPI    DM2  Accuchecks ac/hs  SSI while inpt    Diet: NPO    DVT Prophylaxis: SCD's while in bed and Encourage ambulation. OOB as tolerated.     Discharge Planning and Disposition:   possible SNF or IPR when medically ready    Expected LOS: 3-4    ________________________________________________________________________________    INPATIENT LIST OF MEDICATIONS     Current Facility-Administered Medications:     acetaminophen tablet 650 mg, 650 mg, Oral, Q8H PRN, Sixto Schultz MD    acetaminophen tablet 650 mg, 650 mg, Oral, Q8H PRN, Sixto Schultz MD    atorvastatin tablet 40 mg, 40 mg, Oral, Daily, Sixto Schultz MD    dextrose 50% injection 12.5 g, 12.5 g, Intravenous, PRN, Sixto Schultz MD    dextrose 50% injection 25 g, 25 g, Intravenous, PRN, Sixto Schultz MD    finasteride tablet 5 mg, 5 mg, Oral, Daily, Sixto Schultz MD    fludrocortisone tablet 100 mcg, 100 mcg, Oral, Daily, Sixto Schultz MD    fluticasone propionate 50 mcg/actuation nasal spray 50 mcg, 1 spray, Each Nostril, PRN, Sixto Schultz MD    glucagon (human recombinant)  injection 1 mg, 1 mg, Intramuscular, PRN, Sixto Schultz MD    glucose chewable tablet 16 g, 16 g, Oral, PRN, Sixto Schultz MD    glucose chewable tablet 24 g, 24 g, Oral, PRN, Sixto Schultz MD    HYDROmorphone injection 0.5 mg, 0.5 mg, Intravenous, Q4H PRN, Sixto Schultz MD    HYDROmorphone injection 1 mg, 1 mg, Intravenous, Q4H PRN, Sixto cShultz MD    insulin aspart U-100 pen 0-10 Units, 0-10 Units, Subcutaneous, QID (AC + HS) PRN, Sixto Schultz MD    melatonin tablet 6 mg, 6 mg, Oral, Nightly PRN, Sixto Schultz MD    metoprolol tartrate (LOPRESSOR) split tablet 12.5 mg, 12.5 mg, Oral, BID, Sixto Schultz MD    midodrine tablet 5 mg, 5 mg, Oral, TID WM, Sixto Schultz MD    ondansetron injection 4 mg, 4 mg, Intravenous, Q8H PRN, Sixto Schultz MD    oxybutynin 24 hr tablet 10 mg, 10 mg, Oral, Daily, Sixto Schultz MD    pantoprazole EC tablet 40 mg, 40 mg, Oral, Before breakfast, Sixto Schultz MD    polyethylene glycol packet 17 g, 17 g, Oral, Daily, Sixto Schultz MD    potassium chloride CR capsule 10 mEq, 10 mEq, Oral, Daily, Sixto Schultz MD    pregabalin capsule 50 mg, 50 mg, Oral, Nightly PRN, Sixto Schultz MD    sertraline tablet 50 mg, 50 mg, Oral, Daily, Sixto Schultz MD    sodium chloride 0.9% flush 10 mL, 10 mL, Intravenous, PRN, Sixto Schultz MD    spironolactone tablet 25 mg, 25 mg, Oral, Daily, Sixto Schultz MD    Current Outpatient Medications:     aspirin (ECOTRIN) 81 MG EC tablet, Take 2 tablets (162 mg total) by mouth once daily., Disp: 120 tablet, Rfl: 0    atorvastatin (LIPITOR) 40 MG tablet, Take 1 tablet by mouth once daily., Disp: , Rfl:     betamethasone dipropionate (DIPROLENE) 0.05 % lotion, Apply thin film to scalp bid prn itch, Disp: 60 mL, Rfl: 6    cholecalciferol, vitamin D3, 75 mcg (3,000  unit) Tab, Take by mouth., Disp: , Rfl:     clopidogreL (PLAVIX) 75 mg tablet, TAKE ONE TABLET BY MOUTH EVERY DAY, Disp: 90 tablet, Rfl: 3    empagliflozin (JARDIANCE) 25 mg tablet, Take 1 tablet (25 mg total) by mouth once daily., Disp: 90 tablet, Rfl: 2    finasteride (PROSCAR) 5 mg tablet, Take 5 mg by mouth once daily., Disp: , Rfl:     fludrocortisone (FLORINEF) 0.1 mg Tab, Take 1 tablet (100 mcg total) by mouth once daily., Disp: 30 tablet, Rfl: 0    fluticasone propionate (FLONASE) 50 mcg/actuation nasal spray, 1 spray by Each Nostril route as needed for Allergies., Disp: , Rfl:     ketoconazole (NIZORAL) 2 % shampoo, Apply 1 application  topically 3 (three) times a week., Disp: , Rfl:     magnesium oxide (MAG-OXIDE ORAL), Take by mouth. 400 mg , once a day, Disp: , Rfl:     metoprolol tartrate (LOPRESSOR) 25 MG tablet, Take 0.5 tablets (12.5 mg total) by mouth 2 (two) times daily., Disp: 60 tablet, Rfl: 0    midodrine (PROAMATINE) 5 MG Tab, Take 1 tablet (5 mg total) by mouth 3 (three) times daily with meals., Disp: 270 tablet, Rfl: 3    multivitamin capsule, Take 1 capsule by mouth once daily., Disp: , Rfl:     nitroGLYCERIN (NITROSTAT) 0.4 MG SL tablet, DISSOLVE 1 TABLET UNDER THE TONGUE AS NEEDED FOR CHEST PAIN, Disp: 100 tablet, Rfl: 3    pantoprazole (PROTONIX) 40 MG tablet, TAKE ONE TABLET BY MOUTH ONCE DAILY (Patient taking differently: Take 40 mg by mouth once daily.), Disp: 90 tablet, Rfl: 1    potassium chloride (KLOR-CON) 10 MEQ TbSR, Take 1 tablet (10 mEq total) by mouth once daily., Disp: 90 tablet, Rfl: 5    pregabalin (LYRICA) 50 MG capsule, Take 1 capsule (50 mg total) by mouth nightly as needed., Disp: 90 capsule, Rfl: 1    sertraline (ZOLOFT) 50 MG tablet, TAKE ONE TABLET BY MOUTH ONCE DAILY (Patient taking differently: Take 50 mg by mouth once daily.), Disp: 90 tablet, Rfl: 1    spironolactone (ALDACTONE) 25 MG tablet, Take 1 tablet (25 mg total) by mouth once daily., Disp: 90 tablet, Rfl:  0    tolterodine (DETROL LA) 4 MG 24 hr capsule, TAKE ONE CAPSULE BY MOUTH ONCE DAILY (Patient taking differently: Take 4 mg by mouth once daily.), Disp: 90 capsule, Rfl: 1    Facility-Administered Medications Ordered in Other Encounters:     lactated ringers infusion, , Intravenous, Continuous, Maurice Montenegro MD, Last Rate: 25 mL/hr at 07/25/23 1013, New Bag at 07/25/23 1013    LIDOcaine (PF) 10 mg/ml (1%) injection 10 mg, 1 mL, Intradermal, Once, Maurice Montenegro MD      Scheduled Meds:   atorvastatin  40 mg Oral Daily    finasteride  5 mg Oral Daily    fludrocortisone  100 mcg Oral Daily    metoprolol tartrate  12.5 mg Oral BID    midodrine  5 mg Oral TID WM    oxybutynin  10 mg Oral Daily    pantoprazole  40 mg Oral Before breakfast    polyethylene glycol  17 g Oral Daily    potassium chloride  10 mEq Oral Daily    sertraline  50 mg Oral Daily    spironolactone  25 mg Oral Daily     Continuous Infusions:  PRN Meds:.acetaminophen, acetaminophen, dextrose 50%, dextrose 50%, fluticasone propionate, glucagon (human recombinant), glucose, glucose, HYDROmorphone, HYDROmorphone, insulin aspart U-100, melatonin, ondansetron, pregabalin, sodium chloride 0.9%      Sixto Schultz  [facility] Hospitalist  10/3/23

## 2023-10-04 NOTE — PLAN OF CARE
[facility]  Initial Discharge Assessment       Primary Care Provider: Scott Staley MD    Admission Diagnosis: Closed fracture of right hip, initial encounter [S72.001A]    Admission Date: 10/3/2023  Expected Discharge Date: 10/6/2023     met with Pt at bedside to complete discharge assessment. Pt AAOx4s. Demographics, PCP, and insurance verified. [facility] No dialysis. Pt reports ability to complete ADLs with the assistance of roling walker, BP Machine, and glucometer. Pt unsure of discharge plan due to living alone; could benefit from Rehab. Pt has no other needs to be addressed at this time.    Transition of Care Barriers: None    Payor: MEDICARE / Plan: MEDICARE PART A & B / Product Type: Government /     Extended Emergency Contact Information  Primary Emergency Contact: Carmen Pederson  Mobile Phone: [phone]  Relation: Daughter  Preferred language: English   needed? No  Secondary Emergency Contact: Sid Saez  Mobile Phone: [phone]  Relation: Son  Preferred language: English   needed? No    Discharge Plan A: Home Health  Discharge Plan B: Home Health      [facility] - [place] - [address]  [address] [address]  Phone: [phone] Fax: [phone]    [place] [facility] - [facility] AT Portal to Registered [facility]  [facility]  [place] [address]  Phone: [phone] Fax: [phone]      Initial Assessment (most recent)       Adult Discharge Assessment - 10/04/23 1540          Discharge Assessment    Assessment Type Discharge Planning Assessment     Confirmed/corrected address, phone number and insurance Yes     Confirmed Demographics Correct on Facesheet     Source of Information patient     Does patient/caregiver understand observation status Yes     Communicated LEE with patient/caregiver Date not  available/Unable to determine     Reason For Admission Hip fracture     People in Home alone     Facility Arrived From: Home     Do you expect to return to your current living situation? Yes     Do you have help at home or someone to help you manage your care at home? No     Prior to hospitilization cognitive status: Alert/Oriented     Current cognitive status: Alert/Oriented     Walking or Climbing Stairs ambulation difficulty, requires equipment     Mobility Management Rolling walker     Dressing/Bathing bathing difficulty, requires equipment     Dressing/Bathing Management shower chair     Home Accessibility wheelchair accessible     Home Layout Able to live on 1st floor     Equipment Currently Used at Home blood pressure machine;glucometer     Readmission within 30 days? No     Patient currently being followed by outpatient case management? No     Do you currently have service(s) that help you manage your care at home? Yes     Name and Contact number of agency [facility]     Is the pt/caregiver preference to resume services with current agency Yes     Do you take prescription medications? Yes     Do you have prescription coverage? Yes     Coverage Medicare A & B     Do you have any problems affording any of your prescribed medications? No     Is the patient taking medications as prescribed? yes     Who is going to help you get home at discharge? Carmen Pederson (Daughter)   [phone] (     How do you get to doctors appointments? car, drives self;family or friend will provide     Are you on dialysis? No     Do you take coumadin? No   Plavix and Aspirin    DME Needed Upon Discharge  none     Discharge Plan discussed with: Patient     Transition of Care Barriers None     Discharge Plan A Home Health     Discharge Plan B Home Health

## 2023-10-04 NOTE — CONSULTS
[facility]opedics  Consult Note    Patient Name: Tono Saez  MRN: [id number]  Admission Date: 10/3/2023  Hospital Length of Stay: 1 days  Attending Provider: Anjali Butler MD  Primary Care Provider: Scott Staley MD    Patient information was obtained from patient, relative(s) and ER records.     Inpatient consult to Orthopedic Surgery  Consult performed by: Avery Borrero MD  Consult ordered by: Sixto Schultz MD        Subjective:     Principal Problem:Hip fracture    Chief Complaint:   Chief Complaint   Patient presents with    Fall     Slid/Fell around 11a, increased pain to LLE        HPI: Tono Saez is a 78 y.o.  male with known history of  CAD w/ stents and post CABG, DM2, HTN, HLD, GERD, GIB who came in today after a mechanical fall.  He states he was standing at his sink and attempted to turn using walker and tripped over it.  He now complains of pain to right thigh and hip area.  CT of head, and c-spine are negative.  Xray shows right side femoral neck fracture. Patient was just discharged from here after a fall and has known orthostatic hypotension.  Patient denies chest pain, shortness of breath, palpitations, abdominal pain, nausea, vomiting, diarrhea, constipation,  hematochezia, hematemesis, fever, cough, congestion, runny nose, changes in vision, hearing, numbness, tingling, headache, focal weakness, dysuria, frequency, hematuria.       Past Medical History:   Diagnosis Date    Anemia     Anticoagulant long-term use     Arthritis     CAD (coronary artery disease)     CABG, STENTS '97,'99,'01,'05    Cancer     SKIN    Cataract     Diabetes mellitus     Diabetes mellitus type II     GERD (gastroesophageal reflux disease)     GI bleed 2020    Hypertension     Myocardial infarction     Wears glasses        Past Surgical History:   Procedure Laterality Date    ANGIOGRAM, CORONARY, WITH LEFT HEART CATHETERIZATION N/A 8/24/2023    Procedure:  Angiogram, Coronary, with Left Heart Cath;  Surgeon: Robles Mckoy MD;  Location: [facility] CATH/EP LAB;  Service: Cardiology;  Laterality: N/A;    CARDIAC PACEMAKER PLACEMENT      CARDIAC PACEMAKER PLACEMENT      CARDIAC PACEMAKER PLACEMENT  04/26/2018    replaced    CARDIAC SURGERY      1995   CABG X 5    CHOLECYSTECTOMY      COLON SURGERY      COLONOSCOPY N/A 2/21/2020    Procedure: COLONOSCOPY;  Surgeon: Abe Barragan III, MD;  Location: [facility] ENDO;  Service: Endoscopy;  Laterality: N/A;    COLONOSCOPY N/A 2/23/2020    Procedure: COLONOSCOPY;  Surgeon: Bob Locke Jr., MD;  Location: [facility];  Service: Endoscopy;  Laterality: N/A;    CORONARY ANGIOGRAPHY INCLUDING BYPASS GRAFTS WITH CATHETERIZATION OF LEFT HEART N/A 10/26/2022    Procedure: ANGIOGRAM, CORONARY, INCLUDING BYPASS GRAFT, WITH LEFT HEART CATHETERIZATION;  Surgeon: Robles Mckoy MD;  Location: [facility] CATH/EP LAB;  Service: Cardiology;  Laterality: N/A;    CORONARY ARTERY BYPASS GRAFT  1995    CORONARY BYPASS GRAFT ANGIOGRAPHY  8/24/2023    Procedure: Bypass graft study;  Surgeon: Robles Mckoy MD;  Location: [facility] CATH/EP LAB;  Service: Cardiology;;    CORONARY STENT PLACEMENT      stent x 5    ESOPHAGOGASTRODUODENOSCOPY N/A 2/23/2020    Procedure: EGD (ESOPHAGOGASTRODUODENOSCOPY);  Surgeon: Bob Locke Jr., MD;  Location: [facility];  Service: Endoscopy;  Laterality: N/A;    EYE SURGERY      BILAT CATARACT    head laceration   01/19/2018    HEMORRHOID SURGERY      INJECTION OF ANESTHETIC AGENT AROUND MEDIAL BRANCH NERVES INNERVATING LUMBAR FACET JOINT Bilateral 6/29/2023    Procedure: Block-nerve-medial branch-lumbar;  Surgeon: Maurice Montenegro MD;  Location: [facility];  Service: Pain Management;  Laterality: Bilateral;  L3,4,5 MBB    INJECTION OF ANESTHETIC AGENT AROUND MEDIAL BRANCH NERVES INNERVATING LUMBAR FACET JOINT Bilateral 7/25/2023    Procedure: Block-nerve-medial branch-lumbar;  Surgeon: Maurice Montenegro MD;  Location:  [facility] ASU OR;  Service: Anesthesiology;  Laterality: Bilateral;  L3,4,5 MBB #2    INTRAVASCULAR ULTRASOUND, CORONARY N/A 8/24/2023    Procedure: Ultrasound-coronary;  Surgeon: Robles Mckoy MD;  Location: [facility] CATH/EP LAB;  Service: Cardiology;  Laterality: N/A;    PTCA, SINGLE VESSEL  8/24/2023    Procedure: PTCA, Single Vessel;  Surgeon: Robles Mckoy MD;  Location: [facility] CATH/EP LAB;  Service: Cardiology;;    SMALL BOWEL ENTEROSCOPY N/A 2/21/2020    Procedure: ENTEROSCOPY;  Surgeon: Abe Barragan III, MD;  Location: [facility] ENDO;  Service: Endoscopy;  Laterality: N/A;    STENT, DRUG ELUTING, SINGLE VESSEL, CORONARY  8/24/2023    Procedure: Stent, Drug Eluting, Single Vessel, Coronary;  Surgeon: Robles Mckoy MD;  Location: [facility] CATH/EP LAB;  Service: Cardiology;;    stint         Review of patient's allergies indicates:   Allergen Reactions    Adhesive Other (See Comments)     SILK TAPE PULL SKIN OFF    Metformin Other (See Comments)     Weight loss cachexia anorexia,diarrhea.    Pcn [penicillins]      Patient states he passed out from this medication when he was 10 years old.        Current Facility-Administered Medications   Medication    acetaminophen tablet 650 mg    acetaminophen tablet 650 mg    aspirin chewable tablet 81 mg    atorvastatin tablet 40 mg    dextrose 50% injection 12.5 g    dextrose 50% injection 25 g    finasteride tablet 5 mg    fludrocortisone tablet 100 mcg    fluticasone propionate 50 mcg/actuation nasal spray 50 mcg    glucagon (human recombinant) injection 1 mg    glucose chewable tablet 16 g    glucose chewable tablet 24 g    HYDROmorphone injection 0.5 mg    HYDROmorphone injection 1 mg    insulin aspart U-100 pen 0-5 Units    melatonin tablet 6 mg    metoprolol tartrate (LOPRESSOR) split tablet 12.5 mg    midodrine tablet 5 mg    ondansetron injection 4 mg    oxybutynin 24 hr tablet 10 mg    pantoprazole EC tablet 40 mg    polyethylene glycol  packet 17 g    potassium chloride CR capsule 10 mEq    pregabalin capsule 50 mg    sertraline tablet 50 mg    sodium chloride 0.9% flush 10 mL    spironolactone tablet 25 mg     Facility-Administered Medications Ordered in Other Encounters   Medication    lactated ringers infusion    LIDOcaine (PF) 10 mg/ml (1%) injection 10 mg     Family History       Problem Relation (Age of Onset)    Heart disease Mother, Father, Brother, Brother, Brother, Brother, Brother    Hyperlipidemia Sister    Hypertension Sister          Tobacco Use    Smoking status: Never    Smokeless tobacco: Never   Substance and Sexual Activity    Alcohol use: No    Drug use: No    Sexual activity: Not Currently     Review of Systems   Constitutional: Negative for chills and fever.   HENT:  Negative for congestion.    Eyes:  Negative for blurred vision.   Cardiovascular:  Negative for chest pain and syncope.   Respiratory:  Negative for cough and shortness of breath.    Endocrine: Negative for polyuria.   Hematologic/Lymphatic: Positive for bleeding problem. Bruises/bleeds easily.   Skin:  Negative for rash.   Musculoskeletal:  Positive for falls, joint pain and joint swelling. Negative for muscle cramps, muscle weakness and myalgias.   Gastrointestinal:  Negative for abdominal pain, nausea and vomiting.   Genitourinary:  Negative for flank pain.   Neurological:  Negative for numbness and seizures.   Psychiatric/Behavioral:  Negative for altered mental status.    Allergic/Immunologic: Negative for persistent infections.     Objective:     Vital Signs (Most Recent):  Temp: 98.5 °F (36.9 °C) (10/04/23 1638)  Pulse: 72 (10/04/23 1638)  Resp: 16 (10/04/23 1638)  BP: 107/64 (10/04/23 1638)  SpO2: 95 % (10/04/23 1638) Vital Signs (24h Range):  Temp:  [97.5 °F (36.4 °C)-98.7 °F (37.1 °C)] 98.5 °F (36.9 °C)  Pulse:  [71-84] 72  Resp:  [13-25] 16  SpO2:  [92 %-95 %] 95 %  BP: (106-143)/(56-82) 107/64     Weight: 76.1 kg (167 lb 12.8 oz)  Height:  "6' (182.9 cm)  Body mass index is 22.76 kg/m².      Intake/Output Summary (Last 24 hours) at 10/4/2023 1743  Last data filed at 10/4/2023 1151  Gross per 24 hour   Intake 280 ml   Output --   Net 280 ml        General    Nursing note and vitals reviewed.  Constitutional: He is oriented to person, place, and time. He appears well-developed and well-nourished. No distress.   HENT:   Head: Atraumatic.   Eyes: EOM are normal.   Cardiovascular:  Normal rate.            Pulmonary/Chest: Effort normal.   Abdominal: Soft.   Neurological: He is alert and oriented to person, place, and time.   Psychiatric: His behavior is normal.             Right Hip Exam     Inspection   Swelling: present  Erythema: absent    Tenderness   The patient tender to palpation of the trochanteric bursa.    Range of Motion   Abduction:  abnormal   Adduction:  abnormal   Extension:  abnormal   Flexion:  abnormal   External rotation:  abnormal   Internal rotation:  abnormal     Tests   Log Roll: positive    Other   Sensation: normal  Left Hip Exam   Left hip exam is normal.          Vascular Exam     Right Pulses  Dorsalis Pedis:      2+             Significant Labs: CBC:   Recent Labs   Lab 10/03/23  2058 10/04/23  0400   WBC 13.28* 14.38*   HGB 11.6* 11.7*   HCT 36.7* 38.0*    173     CMP:   Recent Labs   Lab 10/03/23  2058 10/04/23  0400    136   K 4.3 4.4    107   CO2 23 21*   * 171*   BUN 43* 45*   CREATININE 1.7* 1.6*   CALCIUM 9.8 9.7   PROT 6.5 6.4   ALBUMIN 3.9 3.8   BILITOT 1.2* 1.6*   ALKPHOS 109 89   AST 18 17   ALT 18 14   ANIONGAP 7* 8     Coagulation: No results for input(s): "LABPROT", "INR", "APTT" in the last 48 hours.  All pertinent labs within the past 24 hours have been reviewed.    Significant Imaging: X-Ray: I have reviewed all pertinent results/findings and my personal findings are:  X-rays of the right hip are ordered and reviewed which show a displaced right femoral neck fracture    Assessment/Plan: "     * Hip fracture  To the OR for right hip hemiarthroplasty.  Patient is higher risk due to Plavix and recent cardiac stents.        Thank you for your consult. I will follow-up with patient. Please contact us if you have any additional questions.    Avery Borrero MD  Orthopedics  [facility]

## 2023-10-04 NOTE — CONSULTS
[facility]  Department of Cardiology  Consult Note      PATIENT NAME: Tono Saez  MRN: [id number]  TODAY'S DATE: 10/04/2023  ADMIT DATE: 10/3/2023                          CONSULT REQUESTED BY: Anjali Butler MD    SUBJECTIVE     PRINCIPAL PROBLEM: Hip fracture      REASON FOR CONSULT:    Cardiac clearance for surgery      HPI:    Patient was an 78-year-old male with past medical history of CAD s/p PCI and post CABG, diabetes mellitus, hypertension, hyperlipidemia, GERD, GI bleed who presented to the ED after mechanical fall.  Chest x-ray confirms right femoral neck fracture.  Patient had recent PCI on 08/24/2023.  Patient needs cardiac clearance for surgery of right femoral neck fracture.  Troponin HS mildly elevated 47.2, repeat 80.7.  BNP elevated, to 99.  Chest x-ray with no acute findings.      8/24/23 Cath       The estimated blood loss was <50 mL.    The pre-procedure left ventricular end diastolic pressure was 15.    Occluded LAD with patent LIMA to LAD    Occluded right coronary artery with patent vein graft to right PDA, native right PDA has 80% stenosis which is unchanged from previous angiography    Patent stent in left main into left circumflex with mild InStent restenoses    Occluded OM, patent vein graft to OM with severe stenosis in midbody of the graft and distal anastomotic site stenosis, successfully treated with IVUS guided PCI with 2 drug-eluting stents.    The Mid Graft lesion was 80% stenosed with 10% stenosis post-intervention.    Mid Graft lesion before 1st Mrg: A STENT 3.0 x 38MM LIMA FRONTIER RX CORONARY stent was successfully placed at 14 JASPER for 15 sec.    The Dist Graft to Insertion lesion was 80% stenosed with 10% stenosis post-intervention.    Dist Graft to Insertion lesion before 1st Mrg: A STENT 2.50 x 22MM LIMA FRONTIER RX CORONARY stent was successfully placed.     The procedure log was documented by Documenter: Latanya Posadas RT and verified by Robles Mckoy  MD.       FROM H&P     Tono Saez is a 78 y.o.  male with known history of  CAD w/ stents and post CABG, DM2, HTN, HLD, GERD, GIB who came in today after a mechanical fall.  He states he was standing at his sink and attempted to turn using walker and tripped over it.  He now complains of pain to right thigh and hip area.  CT of head, and c-spine are negative.  Xray shows right side femoral neck fracture. Patient was just discharged from here after a fall and has known orthostatic hypotension.  Patient denies chest pain, shortness of breath, palpitations, abdominal pain, nausea, vomiting, diarrhea, constipation,  hematochezia, hematemesis, fever, cough, congestion, runny nose, changes in vision, hearing, numbness, tingling, headache, focal weakness, dysuria, frequency, hematuria. History was obtained from the patient and ER physician Sign-out.         Review of patient's allergies indicates:   Allergen Reactions    Adhesive Other (See Comments)     SILK TAPE PULL SKIN OFF    Metformin Other (See Comments)     Weight loss cachexia anorexia,diarrhea.    Pcn [penicillins]      Patient states he passed out from this medication when he was 10 years old.        Past Medical History:   Diagnosis Date    Anemia     Anticoagulant long-term use     Arthritis     CAD (coronary artery disease)     CABG, STENTS '97,'99,'01,'05    Cancer     SKIN    Cataract     Diabetes mellitus     Diabetes mellitus type II     GERD (gastroesophageal reflux disease)     GI bleed 2020    Hypertension     Myocardial infarction     Wears glasses      Past Surgical History:   Procedure Laterality Date    ANGIOGRAM, CORONARY, WITH LEFT HEART CATHETERIZATION N/A 8/24/2023    Procedure: Angiogram, Coronary, with Left Heart Cath;  Surgeon: Robles Mckoy MD;  Location: [facility] CATH/EP LAB;  Service: Cardiology;  Laterality: N/A;    CARDIAC PACEMAKER PLACEMENT      CARDIAC PACEMAKER PLACEMENT      CARDIAC PACEMAKER PLACEMENT  04/26/2018    replaced     CARDIAC SURGERY      1995   CABG X 5    CHOLECYSTECTOMY      COLON SURGERY      COLONOSCOPY N/A 2/21/2020    Procedure: COLONOSCOPY;  Surgeon: Abe Barragan III, MD;  Location: [facility] ENDO;  Service: Endoscopy;  Laterality: N/A;    COLONOSCOPY N/A 2/23/2020    Procedure: COLONOSCOPY;  Surgeon: Bob Locke Jr., MD;  Location: [facility] ENDO;  Service: Endoscopy;  Laterality: N/A;    CORONARY ANGIOGRAPHY INCLUDING BYPASS GRAFTS WITH CATHETERIZATION OF LEFT HEART N/A 10/26/2022    Procedure: ANGIOGRAM, CORONARY, INCLUDING BYPASS GRAFT, WITH LEFT HEART CATHETERIZATION;  Surgeon: Robles Mckoy MD;  Location: [facility] CATH/EP LAB;  Service: Cardiology;  Laterality: N/A;    CORONARY ARTERY BYPASS GRAFT  1995    CORONARY BYPASS GRAFT ANGIOGRAPHY  8/24/2023    Procedure: Bypass graft study;  Surgeon: Robles Mckoy MD;  Location: [facility] CATH/EP LAB;  Service: Cardiology;;    CORONARY STENT PLACEMENT      stent x 5    ESOPHAGOGASTRODUODENOSCOPY N/A 2/23/2020    Procedure: EGD (ESOPHAGOGASTRODUODENOSCOPY);  Surgeon: Bob Locke Jr., MD;  Location: [facility];  Service: Endoscopy;  Laterality: N/A;    EYE SURGERY      BILAT CATARACT    head laceration   01/19/2018    HEMORRHOID SURGERY      INJECTION OF ANESTHETIC AGENT AROUND MEDIAL BRANCH NERVES INNERVATING LUMBAR FACET JOINT Bilateral 6/29/2023    Procedure: Block-nerve-medial branch-lumbar;  Surgeon: Maurice Montenegro MD;  Location: [facility] OR;  Service: Pain Management;  Laterality: Bilateral;  L3,4,5 MBB    INJECTION OF ANESTHETIC AGENT AROUND MEDIAL BRANCH NERVES INNERVATING LUMBAR FACET JOINT Bilateral 7/25/2023    Procedure: Block-nerve-medial branch-lumbar;  Surgeon: Maurice Montenegro MD;  Location: [facility] OR;  Service: Anesthesiology;  Laterality: Bilateral;  L3,4,5 MBB #2    INTRAVASCULAR ULTRASOUND, CORONARY N/A 8/24/2023    Procedure: Ultrasound-coronary;  Surgeon: Robles Mckoy MD;  Location: [facility] CATH/EP LAB;  Service: Cardiology;  Laterality: N/A;    PTCA, SINGLE VESSEL   8/24/2023    Procedure: PTCA, Single Vessel;  Surgeon: Robles Mckoy MD;  Location: [facility] CATH/EP LAB;  Service: Cardiology;;    SMALL BOWEL ENTEROSCOPY N/A 2/21/2020    Procedure: ENTEROSCOPY;  Surgeon: Abe Barragan III, MD;  Location: [facility] ENDO;  Service: Endoscopy;  Laterality: N/A;    STENT, DRUG ELUTING, SINGLE VESSEL, CORONARY  8/24/2023    Procedure: Stent, Drug Eluting, Single Vessel, Coronary;  Surgeon: Robles Mckoy MD;  Location: [facility] CATH/EP LAB;  Service: Cardiology;;    stint       Social History     Tobacco Use    Smoking status: Never    Smokeless tobacco: Never   Substance Use Topics    Alcohol use: No    Drug use: No        REVIEW OF SYSTEMS    As mentioned in HPI    OBJECTIVE     VITAL SIGNS (Most Recent)  Temp: 97.5 °F (36.4 °C) (10/04/23 1234)  Pulse: 74 (10/04/23 1234)  Resp: 16 (10/04/23 1234)  BP: 118/71 (10/04/23 1234)  SpO2: 95 % (10/04/23 1234)    VENTILATION STATUS  Resp: 16 (10/04/23 1234)  SpO2: 95 % (10/04/23 1234)           I & O (Last 24H):  Intake/Output Summary (Last 24 hours) at 10/4/2023 1239  Last data filed at 10/4/2023 1151  Gross per 24 hour   Intake 280 ml   Output --   Net 280 ml       WEIGHTS  Wt Readings from Last 3 Encounters:   10/04/23 0900 76.1 kg (167 lb 12.8 oz)   10/03/23 1839 79.4 kg (175 lb)   09/27/23 1341 78.8 kg (173 lb 9.8 oz)   09/21/23 1025 78.7 kg (173 lb 8 oz)       PHYSICAL EXAM    CONSTITUTIONAL: NAD  HEENT: Normocephalic. No pallor  NECK: no JVD  LUNGS: CTA b/l  HEART: regular rate and rhythm, S1, S2 normal, no murmur   ABDOMEN: soft, non-tender, bowel sounds normal  EXTREMITIES: trace BLE edema  SKIN: No rash  NEURO: AAO X 3  PSYCH: normal affect      HOME MEDICATIONS:  Current Facility-Administered Medications on File Prior to Encounter   Medication Dose Route Frequency Provider Last Rate Last Admin    lactated ringers infusion   Intravenous Continuous Maurice Montenegro MD 25 mL/hr at 07/25/23 1013 New Bag at 07/25/23 1013    LIDOcaine (PF) 10  mg/ml (1%) injection 10 mg  1 mL Intradermal Once Maurice Montenegro MD         Current Outpatient Medications on File Prior to Encounter   Medication Sig Dispense Refill    aspirin (ECOTRIN) 81 MG EC tablet Take 2 tablets (162 mg total) by mouth once daily. 120 tablet 0    atorvastatin (LIPITOR) 40 MG tablet Take 1 tablet by mouth once daily.      betamethasone dipropionate (DIPROLENE) 0.05 % lotion Apply thin film to scalp bid prn itch 60 mL 6    cholecalciferol, vitamin D3, 75 mcg (3,000 unit) Tab Take by mouth.      clopidogreL (PLAVIX) 75 mg tablet TAKE ONE TABLET BY MOUTH EVERY DAY 90 tablet 3    empagliflozin (JARDIANCE) 25 mg tablet Take 1 tablet (25 mg total) by mouth once daily. 90 tablet 2    finasteride (PROSCAR) 5 mg tablet Take 5 mg by mouth once daily.      fludrocortisone (FLORINEF) 0.1 mg Tab Take 1 tablet (100 mcg total) by mouth once daily. 30 tablet 0    fluticasone propionate (FLONASE) 50 mcg/actuation nasal spray 1 spray by Each Nostril route as needed for Allergies.      ketoconazole (NIZORAL) 2 % shampoo Apply 1 application  topically 3 (three) times a week.      magnesium oxide (MAG-OXIDE ORAL) Take by mouth. 400 mg , once a day      metoprolol tartrate (LOPRESSOR) 25 MG tablet Take 0.5 tablets (12.5 mg total) by mouth 2 (two) times daily. 60 tablet 0    midodrine (PROAMATINE) 5 MG Tab Take 1 tablet (5 mg total) by mouth 3 (three) times daily with meals. 270 tablet 3    multivitamin capsule Take 1 capsule by mouth once daily.      nitroGLYCERIN (NITROSTAT) 0.4 MG SL tablet DISSOLVE 1 TABLET UNDER THE TONGUE AS NEEDED FOR CHEST PAIN 100 tablet 3    pantoprazole (PROTONIX) 40 MG tablet TAKE ONE TABLET BY MOUTH ONCE DAILY (Patient taking differently: Take 40 mg by mouth once daily.) 90 tablet 1    potassium chloride (KLOR-CON) 10 MEQ TbSR Take 1 tablet (10 mEq total) by mouth once daily. 90 tablet 5    pregabalin (LYRICA) 50 MG capsule Take 1 capsule (50 mg total) by mouth nightly as needed. 90  "capsule 1    sertraline (ZOLOFT) 50 MG tablet TAKE ONE TABLET BY MOUTH ONCE DAILY (Patient taking differently: Take 50 mg by mouth once daily.) 90 tablet 1    spironolactone (ALDACTONE) 25 MG tablet Take 1 tablet (25 mg total) by mouth once daily. 90 tablet 0    tolterodine (DETROL LA) 4 MG 24 hr capsule TAKE ONE CAPSULE BY MOUTH ONCE DAILY (Patient taking differently: Take 4 mg by mouth once daily.) 90 capsule 1       SCHEDULED MEDS:   aspirin  81 mg Oral Daily    atorvastatin  40 mg Oral Daily    finasteride  5 mg Oral Daily    fludrocortisone  100 mcg Oral Daily    metoprolol tartrate  12.5 mg Oral BID    midodrine  5 mg Oral TID WM    oxybutynin  10 mg Oral Daily    pantoprazole  40 mg Oral Before breakfast    polyethylene glycol  17 g Oral Daily    potassium chloride  10 mEq Oral Daily    sertraline  50 mg Oral Daily    spironolactone  25 mg Oral Daily       CONTINUOUS INFUSIONS:    PRN MEDS:acetaminophen, acetaminophen, dextrose 50%, dextrose 50%, dextrose 50%, dextrose 50%, fluticasone propionate, glucagon (human recombinant), glucagon (human recombinant), glucose, glucose, glucose, glucose, HYDROmorphone, HYDROmorphone, insulin aspart U-100, insulin aspart U-100, melatonin, ondansetron, pregabalin, sodium chloride 0.9%    LABS AND DIAGNOSTICS     CBC LAST 3 DAYS  Recent Labs   Lab 10/03/23  2058 10/04/23  0400   WBC 13.28* 14.38*   RBC 4.39* 4.51*   HGB 11.6* 11.7*   HCT 36.7* 38.0*   MCV 84 84   MCH 26.4* 25.9*   MCHC 31.6* 30.8*   RDW 15.3* 15.1*    173   MPV 10.4 10.7   GRAN 90.8*  12.1* 89.5*  12.9*   LYMPH 2.7*  0.4* 3.1*  0.5*   MONO 5.9  0.8 5.1  0.7   BASO 0.03 0.03   NRBC 0 0       COAGULATION LAST 3 DAYS  No results for input(s): "LABPT", "INR", "APTT" in the last 168 hours.    CHEMISTRY LAST 3 DAYS  Recent Labs   Lab 10/03/23  2058 10/04/23  0400    136   K 4.3 4.4    107   CO2 23 21*   ANIONGAP 7* 8   BUN 43* 45*   CREATININE 1.7* 1.6*   * 171*   CALCIUM 9.8 9.7 " "  MG 1.7  --    ALBUMIN 3.9 3.8   PROT 6.5 6.4   ALKPHOS 109 89   ALT 18 14   AST 18 17   BILITOT 1.2* 1.6*       CARDIAC PROFILE LAST 3 DAYS  Recent Labs   Lab 10/03/23  2058 10/04/23  0400   *  --    CPK 27  --    TROPONINIHS 47.2* 80.7*       ENDOCRINE LAST 3 DAYS  Recent Labs   Lab 10/03/23  2058   TSH 1.105       LAST ARTERIAL BLOOD GAS  ABG  No results for input(s): "PH", "PO2", "PCO2", "HCO3", "BE" in the last 168 hours.    LAST 7 DAYS MICROBIOLOGY   Microbiology Results (last 7 days)       Procedure Component Value Units Date/Time    Urine culture [[id number]] Collected: 10/04/23 0723    Order Status: No result Specimen: Urine Updated: 10/04/23 0754            MOST RECENT IMAGING  X-Ray Knee 3 View Right  Reason: pain fall    FINDINGS:  3 views of the right knee show no fracture, dislocation, or destructive osseous lesion. Chondrocalcinosis occurs in medial and lateral compartments. Mild medial compartment joint space narrowing is evident. Negative for joint effusion. Arterial vascular calcifications are present. Patellar enthesophyte arises near quadriceps tendon insertion.    IMPRESSION:  No acute right knee abnormality.    Electronically signed by:  Mann Skinner MD  10/04/2023 07:50 AM CDT Workstation: [id number]  X-Ray Hip 2 or 3 views Right (with Pelvis when performed)  Reason: pain fall    FINDINGS:  AP pelvis and 2 views right hip compared with 4/14/2023 show acute subcapital right femoral neck fracture with mild to moderate varus angulation. Right femoral head remains concentrically located within the right acetabulum.    No additional fracture or dislocation. Bilateral hip joint spaces are maintained, as is pubic symphysis and sacroiliac joints. Vascular calcifications are present. Soft tissues otherwise unremarkable.    IMPRESSION:  Acute subcapital right femoral neck fracture.    Electronically signed by:  Mann Skinner MD  10/04/2023 07:49 AM CDT Workstation: [id number]  X-Ray Chest AP " Portable  Reason: falls fall    FINDINGS:  Portable chest at 2102 compared with a 20/2/2023 shows normal cardiomediastinal silhouette with postsurgical changes of CABG. Left transvenous pacer unchanged.    Lungs are hypoinflated but clear. Previous central pulmonary vascular prominence has resolved. No pleural effusion or pneumothorax. No acute osseous abnormality.    IMPRESSION:  Resolution of prior central pulmonary vascular prominence, with no acute cardiopulmonary abnormality.    Electronically signed by:  Mann Skinner MD  10/04/2023 07:47 AM CDT Workstation: [id number]  X-Ray Femur 2 View Right  Reason: pain fall    FINDINGS:  2 views of right femur show acute subcapital right femoral neck fracture with mild to moderate varus angulation. The right femoral head remains concentrically located within the right acetabulum.    No additional fracture, no dislocation. Arterial vascular calcifications are present.    IMPRESSION:  Acute subcapital right femoral neck fracture.    Electronically signed by:  Mann Skinner MD  10/04/2023 07:46 AM CDT Workstation: [id number]      ECHOCARDIOGRAM RESULTS (last 5)  Results for orders placed during the hospital encounter of 08/16/23    Echo    Interpretation Summary    Left Ventricle: The left ventricle is normal in size. Moderately increased ventricular mass. Moderately increased wall thickness. There is moderate concentrict hypertrophy. Moderate global hypokinesis present. See diagram for wall motion findings.  Patient has evidence of akinesis and slight paradoxical motion of the mid inferior septum, distal anterior wall and slight paradoxic motion at the apex This is not significantly different than the echo noted 8/1723 The inferior wall in the lateral wall supplied by the main left stented circumflex appears to move well There is moderately reduced systolic function with a visually estimated ejection fraction of 30 - 40%. 40% EF Grade I diastolic dysfunction. Elevated left  ventricular filling pressure. Tissue Doppler velocity is reduced.  Mean left atrial pressure 14    Left Atrium: Left atrium is mildly dilated.    Right Ventricle: Mild right ventricular enlargement. Systolic function is moderately reduced.    Aortic Valve: There is mild aortic valve sclerosis. There is mild to moderate aortic regurgitation.    Mitral Valve: There is no stenosis.    Pulmonary Artery: No pulmonary hypertension. The estimated pulmonary artery systolic pressure is 22 mmHg.    IVC/SVC: Normal venous pressure at 3 mmHg.    Pacer wires are seen      Echo    Interpretation Summary    Left Ventricle: The left ventricle is normal in size. Moderately increased wall thickness. There is moderate concentrict hypertrophy. Normal wall motion. There is moderately reduced systolic function with a visually estimated ejection fraction of 35 - 40%. There is normal diastolic function.    Left Atrium: Left atrium is moderately dilated.    Right Ventricle: Normal right ventricular cavity size. Wall thickness is normal. Right ventricle wall motion  is normal. Systolic function is normal.    Aortic Valve: The aortic valve is a trileaflet valve. There is mild aortic valve sclerosis.    Mitral Valve: There is moderate posterior leaflet sclerosis. Mild mitral annular calcification.    IVC/SVC: Normal venous pressure at 3 mmHg.      Results for orders placed during the hospital encounter of 06/01/23    Echo    Interpretation Summary  · Concentric remodeling and moderately decreased systolic function.  · The estimated ejection fraction is 25%.  · Grade I left ventricular diastolic dysfunction.mean left atrial pressure 9  · There are segmental left ventricular wall motion abnormalities.possible old ant apical scar  · There is abnormal septal wall motion consistent with right ventricular pacemaker.  · Atrial fibrillation not observed.  · With normal right ventricular systolic function.  · Mild-to-moderate aortic regurgitation.  ·  There is moderate aortic valve stenosis.  · Aortic valve area is 1.57 cm2; peak velocity is 2.05 m/s; mean gradient is 9 mmHg.  · Mild tricuspid regurgitation.  · Mild pulmonic regurgitation.  · Normal central venous pressure (3 mmHg).  · The estimated PA systolic pressure is 23 mmHg.no PH      Results for orders placed during the hospital encounter of 10/26/22    Echo    Interpretation Summary  · The left ventricle is normal in size with mild concentric hypertrophy and normal systolic function.  · The estimated ejection fraction is 55%.  · Indeterminate left ventricular diastolic function.  · There is abnormal septal wall motion consistent with post-operative status.  · Moderate to severe left atrial enlargement.  · Mild to moderate tricuspid regurgitation.  · Mild-to-moderate aortic regurgitation.  · There is mild aortic valve stenosis.      Results for orders placed in visit on 03/28/22    Echo    Interpretation Summary  · The left ventricle is normal in size with concentric remodeling  · The estimated ejection fraction is 44%.  · Grade I left ventricular diastolic dysfunction.  · There are segmental left ventricular wall motion abnormalities. Apical hypokinesis noted  · There is abnormal septal wall motion consistent with right ventricular pacemaker.  · Normal right ventricular size with mildly reduced right ventricular systolic function.  · Mild-to-moderate aortic regurgitation.  · There is mild-to-moderate aortic valve stenosis.  · Aortic valve area is 1.77 cm2; peak velocity is 1.96 m/s; mean gradient is 9 mmHg.  · Mild mitral regurgitation.  · Normal central venous pressure (3 mmHg).  · The estimated PA systolic pressure is 13 mmHg.      CURRENT/PREVIOUS VISIT EKG  Results for orders placed or performed during the hospital encounter of 10/03/23   EKG 12-lead    Collection Time: 10/03/23  9:08 PM    Narrative    Test Reason : R29.6,    Vent. Rate : 071 BPM     Atrial Rate : 071 BPM     P-R Int : 210 ms           QRS Dur : 200 ms      QT Int : 470 ms       P-R-T Axes : 107 -85 091 degrees     QTc Int : 510 ms    Atrial-sensed ventricular-paced rhythm with prolonged AV conduction  Abnormal ECG  When compared with ECG of 24-AUG-2023 09:59,  Vent. rate has decreased BY  16 BPM    Referred By: MURRAY   SELF           Confirmed By:            ASSESSMENT/PLAN:     Active Hospital Problems    Diagnosis    *Hip fracture    CAD (coronary artery disease)     CABG, STENTS '97,'99,'01,'05    10/26/22:  Summary         The Dist LM lesion was 90% stenosed with 10% stenosis post-intervention.    The pre-procedure left ventricular end diastolic pressure was 12.    A STENT LIMA YOHANA 3.50 X 26 stent was not successfully placed at 12 JASPER for 20 sec.    The estimated blood loss was <50 mL.    Successful intravascular ultrasound-guided PCI of the critical stenosis of left main into circumflex with 1 drug-eluting stent    Patent vein graft to OM, occluded branch of the OM as compared to angiogram in 2016.    Patent vein graft to right coronary artery with patent stent, moderately degenerated, distal native RCA has 80% stenosis in medium caliber vessel.    Occluded LAD with a patent LIMA to LAD        Type 2 diabetes mellitus with hyperglycemia     HTN (hypertension)       ASSESSMENT & PLAN:     R femoral neck fracture  CAD s/p CABG and PCI on 8/24/23  Diabetes Mellitus  Hypertension        RECOMMENDATIONS:    Recent PCI on 8/24/23.  Patient on DAPT.    S/p mechanical fall with R femoral neck fracture.  Plans for surgery next am.    Hold Plavix.  Continue aspirin.  Please restart plavix as soon as cleared from surgerical standpoint.  Mildly elevated troponin.  Denies CP or anginal equivalent symptoms. No acute changes on EKG.  Trend to peak.  BNP elevated. CTA.  No acute findings on CXR.  Will hold off on diuresis for now.   We will follow PRN.      Love Starr NP  Department of Cardiology  Date of Service: 10/04/2023

## 2023-10-04 NOTE — HOSPITAL COURSE
Patient was admitted for mechanical fall found to have right side femoral neck fracture.  Given recent left heart catheterization in August cardiology was consulted and Plavix was held prior to surgery.  Patient had right hip surgery on 10/05/2023.  Plavix to be resumed after surgery once cleared by ortho. Discussed with orthopedics, Plavix resumed.  Troponins elevated during admission, cardiology made aware.  Cardiology cleared patient for discharge and instructed patient to follow up outpatient.  Patient also instructed to follow up with orthopedic surgery after discharge.  Plan for rehab on discharge.

## 2023-10-04 NOTE — SUBJECTIVE & OBJECTIVE
Interval History: Pt is comfortable- no reporting of any chest pain, sob, fever, chills, n/v, urine or bowel problem.   Review of Systems   Constitutional:  Negative for activity change, appetite change, chills and fever.   HENT:  Negative for congestion, ear pain and nosebleeds.    Eyes:  Negative for itching and visual disturbance.   Respiratory:  Negative for apnea, cough, chest tightness, shortness of breath and wheezing.    Cardiovascular:  Negative for chest pain, palpitations and leg swelling.   Gastrointestinal:  Negative for abdominal distention, abdominal pain, constipation, diarrhea, nausea and vomiting.   Genitourinary:  Negative for dysuria and flank pain.   Musculoskeletal:  Positive for arthralgias. Negative for back pain.   Neurological:  Negative for dizziness and headaches.     Objective:     Vital Signs (Most Recent):  Temp: 98.3 °F (36.8 °C) (10/04/23 0908)  Pulse: 81 (10/04/23 0908)  Resp: 16 (10/04/23 0908)  BP: (!) 140/82 (10/04/23 0908)  SpO2: (!) 93 % (10/04/23 0908) Vital Signs (24h Range):  Temp:  [98.3 °F (36.8 °C)-98.7 °F (37.1 °C)] 98.3 °F (36.8 °C)  Pulse:  [71-84] 81  Resp:  [13-25] 16  SpO2:  [92 %-95 %] 93 %  BP: (106-143)/(56-82) 140/82     Weight: 76.1 kg (167 lb 12.8 oz)  Body mass index is 22.76 kg/m².    Intake/Output Summary (Last 24 hours) at 10/4/2023 1200  Last data filed at 10/4/2023 1151  Gross per 24 hour   Intake 280 ml   Output --   Net 280 ml         Physical Exam  Vitals reviewed.   Constitutional:       General: He is not in acute distress.     Appearance: Normal appearance. He is not ill-appearing, toxic-appearing or diaphoretic.   HENT:      Head: Normocephalic and atraumatic.      Mouth/Throat:      Mouth: Mucous membranes are moist.      Pharynx: Oropharynx is clear.   Eyes:      General: No scleral icterus.     Conjunctiva/sclera: Conjunctivae normal.   Neck:      Vascular: No carotid bruit.   Cardiovascular:      Rate and Rhythm: Normal rate and regular  rhythm.      Pulses: Normal pulses.      Heart sounds: Normal heart sounds.   Pulmonary:      Effort: Pulmonary effort is normal.      Breath sounds: Normal breath sounds.   Abdominal:      General: Abdomen is flat. Bowel sounds are normal.      Palpations: Abdomen is soft.   Musculoskeletal:         General: Tenderness present. Normal range of motion.   Skin:     General: Skin is warm.   Neurological:      General: No focal deficit present.      Mental Status: He is alert and oriented to person, place, and time. Mental status is at baseline.   Psychiatric:         Mood and Affect: Mood normal.         Behavior: Behavior normal.             Significant Labs: All pertinent labs within the past 24 hours have been reviewed.  BMP:   Recent Labs   Lab 10/03/23  2058 10/04/23  0400   * 171*    136   K 4.3 4.4    107   CO2 23 21*   BUN 43* 45*   CREATININE 1.7* 1.6*   CALCIUM 9.8 9.7   MG 1.7  --      CBC:   Recent Labs   Lab 10/03/23  2058 10/04/23  0400   WBC 13.28* 14.38*   HGB 11.6* 11.7*   HCT 36.7* 38.0*    173     CMP:   Recent Labs   Lab 10/03/23  2058 10/04/23  0400    136   K 4.3 4.4    107   CO2 23 21*   * 171*   BUN 43* 45*   CREATININE 1.7* 1.6*   CALCIUM 9.8 9.7   PROT 6.5 6.4   ALBUMIN 3.9 3.8   BILITOT 1.2* 1.6*   ALKPHOS 109 89   AST 18 17   ALT 18 14   ANIONGAP 7* 8     Magnesium:   Recent Labs   Lab 10/03/23  2058   MG 1.7       Significant Imaging: I have reviewed all pertinent imaging results/findings within the past 24 hours.

## 2023-10-04 NOTE — NURSING
Nurses Note -- 4 Eyes      10/4/2023   11:49 AM      Skin assessed during: Admit      [] No Altered Skin Integrity Present    []Prevention Measures Documented      [x] Yes- Altered Skin Integrity Present or Discovered   [x] LDA Added if Not in Epic (Describe Wound)   [x] New Altered Skin Integrity was Present on Admit and Documented in LDA   [x] Wound Image Taken    Wound Care Consulted? Yes    Attending Nurse:  KC Gr RN     Second RN/Staff Member:  CK Monroy RN

## 2023-10-04 NOTE — TELEPHONE ENCOUNTER
----- Message from Merjenny Houston sent at 10/4/2023  3:59 PM CDT -----  Regarding: PRE OP CLEARANCE  Contact: Carmen Pederson - daughter    Type: Needs Medical Advice      Who Called:  Carmen Pederson - vanesa     Best Call Back Number: [phone]    Additional Information: Patient daughter is calling to speak with nurse/MA regarding cardiac clearance. States patient had a bad fell and scheduled for surgery on tomorrow. Requesting to speak with nurse regarding post procedure of stents placement and Rx clopidogreL (PLAVIX).   Please call back and advise. Thanks

## 2023-10-04 NOTE — ED NOTES
Report called to JARRED Lopez. Pt stable and ready for transport to unit. Awaiting tele box. Belongings sent with pt.

## 2023-10-04 NOTE — HPI
Tono Saez is a 78 y.o.  male with known history of  CAD w/ stents and post CABG, DM2, HTN, HLD, GERD, GIB who came in today after a mechanical fall.  He states he was standing at his sink and attempted to turn using walker and tripped over it.  He now complains of pain to right thigh and hip area.  CT of head, and c-spine are negative.  Xray shows right side femoral neck fracture. Patient was just discharged from here after a fall and has known orthostatic hypotension.  Patient denies chest pain, shortness of breath, palpitations, abdominal pain, nausea, vomiting, diarrhea, constipation,  hematochezia, hematemesis, fever, cough, congestion, runny nose, changes in vision, hearing, numbness, tingling, headache, focal weakness, dysuria, frequency, hematuria.

## 2023-10-04 NOTE — PROGRESS NOTES
[facility]  Progress Note    Patient Name: Tono Saez  MRN: [id number]  Patient Class: IP- Inpatient   Admission Date: 10/3/2023  Length of Stay: 1 days  Attending Physician: Anjali Butler MD  Primary Care Provider: Scott Staley MD        Subjective:     Principal Problem:Hip fracture        HPI:  No notes on file    Overview/Hospital Course:  No notes on file    Interval History: Pt is comfortable- no reporting of any chest pain, sob, fever, chills, n/v, urine or bowel problem.   Review of Systems   Constitutional:  Negative for activity change, appetite change, chills and fever.   HENT:  Negative for congestion, ear pain and nosebleeds.    Eyes:  Negative for itching and visual disturbance.   Respiratory:  Negative for apnea, cough, chest tightness, shortness of breath and wheezing.    Cardiovascular:  Negative for chest pain, palpitations and leg swelling.   Gastrointestinal:  Negative for abdominal distention, abdominal pain, constipation, diarrhea, nausea and vomiting.   Genitourinary:  Negative for dysuria and flank pain.   Musculoskeletal:  Positive for arthralgias. Negative for back pain.   Neurological:  Negative for dizziness and headaches.     Objective:     Vital Signs (Most Recent):  Temp: 98.3 °F (36.8 °C) (10/04/23 0908)  Pulse: 81 (10/04/23 0908)  Resp: 16 (10/04/23 0908)  BP: (!) 140/82 (10/04/23 0908)  SpO2: (!) 93 % (10/04/23 0908) Vital Signs (24h Range):  Temp:  [98.3 °F (36.8 °C)-98.7 °F (37.1 °C)] 98.3 °F (36.8 °C)  Pulse:  [71-84] 81  Resp:  [13-25] 16  SpO2:  [92 %-95 %] 93 %  BP: (106-143)/(56-82) 140/82     Weight: 76.1 kg (167 lb 12.8 oz)  Body mass index is 22.76 kg/m².    Intake/Output Summary (Last 24 hours) at 10/4/2023 1200  Last data filed at 10/4/2023 1151  Gross per 24 hour   Intake 280 ml   Output --   Net 280 ml         Physical Exam  Vitals reviewed.   Constitutional:       General: He is not in acute distress.     Appearance: Normal  appearance. He is not ill-appearing, toxic-appearing or diaphoretic.   HENT:      Head: Normocephalic and atraumatic.      Mouth/Throat:      Mouth: Mucous membranes are moist.      Pharynx: Oropharynx is clear.   Eyes:      General: No scleral icterus.     Conjunctiva/sclera: Conjunctivae normal.   Neck:      Vascular: No carotid bruit.   Cardiovascular:      Rate and Rhythm: Normal rate and regular rhythm.      Pulses: Normal pulses.      Heart sounds: Normal heart sounds.   Pulmonary:      Effort: Pulmonary effort is normal.      Breath sounds: Normal breath sounds.   Abdominal:      General: Abdomen is flat. Bowel sounds are normal.      Palpations: Abdomen is soft.   Musculoskeletal:         General: Tenderness present. Normal range of motion.   Skin:     General: Skin is warm.   Neurological:      General: No focal deficit present.      Mental Status: He is alert and oriented to person, place, and time. Mental status is at baseline.   Psychiatric:         Mood and Affect: Mood normal.         Behavior: Behavior normal.             Significant Labs: All pertinent labs within the past 24 hours have been reviewed.  BMP:   Recent Labs   Lab 10/03/23  2058 10/04/23  0400   * 171*    136   K 4.3 4.4    107   CO2 23 21*   BUN 43* 45*   CREATININE 1.7* 1.6*   CALCIUM 9.8 9.7   MG 1.7  --      CBC:   Recent Labs   Lab 10/03/23  2058 10/04/23  0400   WBC 13.28* 14.38*   HGB 11.6* 11.7*   HCT 36.7* 38.0*    173     CMP:   Recent Labs   Lab 10/03/23  2058 10/04/23  0400    136   K 4.3 4.4    107   CO2 23 21*   * 171*   BUN 43* 45*   CREATININE 1.7* 1.6*   CALCIUM 9.8 9.7   PROT 6.5 6.4   ALBUMIN 3.9 3.8   BILITOT 1.2* 1.6*   ALKPHOS 109 89   AST 18 17   ALT 18 14   ANIONGAP 7* 8     Magnesium:   Recent Labs   Lab 10/03/23  2058   MG 1.7       Significant Imaging: I have reviewed all pertinent imaging results/findings within the past 24 hours.      Assessment/Plan:      * Hip  fracture  Pending surgery tomorrow-  Pt does not complaint of pain-   Hb is stable      CAD (coronary artery disease)  Pt with recent CATH- on Aug/2023- on DAPT  Cardio on board and discussed and now plan is to stop plavix and resume aspirin -   Orthopedic were made aware  C/w statin and aspirin  Other GDMT    Type 2 diabetes mellitus with hyperglycemia   POCs stable-   SSI and accu checks    HTN (hypertension)  C/w home meds as tolerated      VTE Risk Mitigation (From admission, onward)         Ordered     Reason for No Pharmacological VTE Prophylaxis  Once        Question:  Reasons:  Answer:  Risk of Bleeding    10/03/23 2235     IP VTE HIGH RISK PATIENT  Once         10/03/23 2235     Place sequential compression device  Until discontinued         10/03/23 2235                Discharge Planning   LEE: 10/6/2023     Code Status: Full Code   Is the patient medically ready for discharge?:     Reason for patient still in hospital (select all that apply): Treatment                     Anjali Butler MD  Department of Hospital Medicine   [facility]

## 2023-10-04 NOTE — HPI
Tono Saez is a 78 y.o.  male with known history of  CAD w/ stents and post CABG, DM2, HTN, HLD, GERD, GIB who came in today after a mechanical fall.  He states he was standing at his sink and attempted to turn using walker and tripped over it.  He now complains of pain to right thigh and hip area.  CT of head, and c-spine are negative.  Xray shows right side femoral neck fracture. Patient was just discharged from here after a fall and has known orthostatic hypotension.  Patient denies chest pain, shortness of breath, palpitations, abdominal pain, nausea, vomiting, diarrhea, constipation,  hematochezia, hematemesis, fever, cough, congestion, runny nose, changes in vision, hearing, numbness, tingling, headache, focal weakness, dysuria, frequency, hematuria. History was obtained from the patient and ER physician Sign-out.

## 2023-10-04 NOTE — SUBJECTIVE & OBJECTIVE
Past Medical History:   Diagnosis Date    Anemia     Anticoagulant long-term use     Arthritis     CAD (coronary artery disease)     CABG, STENTS '97,'99,'01,'05    Cancer     SKIN    Cataract     Diabetes mellitus     Diabetes mellitus type II     GERD (gastroesophageal reflux disease)     GI bleed 2020    Hypertension     Myocardial infarction     Wears glasses        Past Surgical History:   Procedure Laterality Date    ANGIOGRAM, CORONARY, WITH LEFT HEART CATHETERIZATION N/A 8/24/2023    Procedure: Angiogram, Coronary, with Left Heart Cath;  Surgeon: Robles Mckoy MD;  Location: [facility] CATH/EP LAB;  Service: Cardiology;  Laterality: N/A;    CARDIAC PACEMAKER PLACEMENT      CARDIAC PACEMAKER PLACEMENT      CARDIAC PACEMAKER PLACEMENT  04/26/2018    replaced    CARDIAC SURGERY      1995   CABG X 5    CHOLECYSTECTOMY      COLON SURGERY      COLONOSCOPY N/A 2/21/2020    Procedure: COLONOSCOPY;  Surgeon: Abe Barragan III, MD;  Location: [facility] ENDO;  Service: Endoscopy;  Laterality: N/A;    COLONOSCOPY N/A 2/23/2020    Procedure: COLONOSCOPY;  Surgeon: Bob Locke Jr., MD;  Location: [facility] ENDO;  Service: Endoscopy;  Laterality: N/A;    CORONARY ANGIOGRAPHY INCLUDING BYPASS GRAFTS WITH CATHETERIZATION OF LEFT HEART N/A 10/26/2022    Procedure: ANGIOGRAM, CORONARY, INCLUDING BYPASS GRAFT, WITH LEFT HEART CATHETERIZATION;  Surgeon: Robles Mckoy MD;  Location: [facility] CATH/EP LAB;  Service: Cardiology;  Laterality: N/A;    CORONARY ARTERY BYPASS GRAFT  1995    CORONARY BYPASS GRAFT ANGIOGRAPHY  8/24/2023    Procedure: Bypass graft study;  Surgeon: Robles Mckoy MD;  Location: [facility] CATH/EP LAB;  Service: Cardiology;;    CORONARY STENT PLACEMENT      stent x 5    ESOPHAGOGASTRODUODENOSCOPY N/A 2/23/2020    Procedure: EGD (ESOPHAGOGASTRODUODENOSCOPY);  Surgeon: Bob Locke Jr., MD;  Location: [facility] ENDO;  Service: Endoscopy;  Laterality: N/A;    EYE SURGERY      BILAT CATARACT    head laceration   01/19/2018     HEMORRHOID SURGERY      INJECTION OF ANESTHETIC AGENT AROUND MEDIAL BRANCH NERVES INNERVATING LUMBAR FACET JOINT Bilateral 6/29/2023    Procedure: Block-nerve-medial branch-lumbar;  Surgeon: Maurice Montenegro MD;  Location: [facility] OR;  Service: Pain Management;  Laterality: Bilateral;  L3,4,5 MBB    INJECTION OF ANESTHETIC AGENT AROUND MEDIAL BRANCH NERVES INNERVATING LUMBAR FACET JOINT Bilateral 7/25/2023    Procedure: Block-nerve-medial branch-lumbar;  Surgeon: Maurice Montenegro MD;  Location: [facility] OR;  Service: Anesthesiology;  Laterality: Bilateral;  L3,4,5 MBB #2    INTRAVASCULAR ULTRASOUND, CORONARY N/A 8/24/2023    Procedure: Ultrasound-coronary;  Surgeon: Robles Mckoy MD;  Location: [facility] CATH/EP LAB;  Service: Cardiology;  Laterality: N/A;    PTCA, SINGLE VESSEL  8/24/2023    Procedure: PTCA, Single Vessel;  Surgeon: Robles Mckoy MD;  Location: [facility] CATH/EP LAB;  Service: Cardiology;;    SMALL BOWEL ENTEROSCOPY N/A 2/21/2020    Procedure: ENTEROSCOPY;  Surgeon: Abe Barragan III, MD;  Location: [facility] ENDO;  Service: Endoscopy;  Laterality: N/A;    STENT, DRUG ELUTING, SINGLE VESSEL, CORONARY  8/24/2023    Procedure: Stent, Drug Eluting, Single Vessel, Coronary;  Surgeon: Robles Mckoy MD;  Location: [facility] CATH/EP LAB;  Service: Cardiology;;    stint         Review of patient's allergies indicates:   Allergen Reactions    Adhesive Other (See Comments)     SILK TAPE PULL SKIN OFF    Metformin Other (See Comments)     Weight loss cachexia anorexia,diarrhea.    Pcn [penicillins]      Patient states he passed out from this medication when he was 10 years old.        Current Facility-Administered Medications   Medication    acetaminophen tablet 650 mg    acetaminophen tablet 650 mg    aspirin chewable tablet 81 mg    atorvastatin tablet 40 mg    dextrose 50% injection 12.5 g    dextrose 50% injection 25 g    finasteride tablet 5 mg    fludrocortisone tablet 100 mcg    fluticasone propionate 50 mcg/actuation  nasal spray 50 mcg    glucagon (human recombinant) injection 1 mg    glucose chewable tablet 16 g    glucose chewable tablet 24 g    HYDROmorphone injection 0.5 mg    HYDROmorphone injection 1 mg    insulin aspart U-100 pen 0-5 Units    melatonin tablet 6 mg    metoprolol tartrate (LOPRESSOR) split tablet 12.5 mg    midodrine tablet 5 mg    ondansetron injection 4 mg    oxybutynin 24 hr tablet 10 mg    pantoprazole EC tablet 40 mg    polyethylene glycol packet 17 g    potassium chloride CR capsule 10 mEq    pregabalin capsule 50 mg    sertraline tablet 50 mg    sodium chloride 0.9% flush 10 mL    spironolactone tablet 25 mg     Facility-Administered Medications Ordered in Other Encounters   Medication    lactated ringers infusion    LIDOcaine (PF) 10 mg/ml (1%) injection 10 mg     Family History       Problem Relation (Age of Onset)    Heart disease Mother, Father, Brother, Brother, Brother, Brother, Brother    Hyperlipidemia Sister    Hypertension Sister          Tobacco Use    Smoking status: Never    Smokeless tobacco: Never   Substance and Sexual Activity    Alcohol use: No    Drug use: No    Sexual activity: Not Currently     Review of Systems   Constitutional: Negative for chills and fever.   HENT:  Negative for congestion.    Eyes:  Negative for blurred vision.   Cardiovascular:  Negative for chest pain and syncope.   Respiratory:  Negative for cough and shortness of breath.    Endocrine: Negative for polyuria.   Hematologic/Lymphatic: Positive for bleeding problem. Bruises/bleeds easily.   Skin:  Negative for rash.   Musculoskeletal:  Positive for falls, joint pain and joint swelling. Negative for muscle cramps, muscle weakness and myalgias.   Gastrointestinal:  Negative for abdominal pain, nausea and vomiting.   Genitourinary:  Negative for flank pain.   Neurological:  Negative for numbness and seizures.   Psychiatric/Behavioral:  Negative for altered mental status.    Allergic/Immunologic: Negative for  persistent infections.     Objective:     Vital Signs (Most Recent):  Temp: 98.5 °F (36.9 °C) (10/04/23 1638)  Pulse: 72 (10/04/23 1638)  Resp: 16 (10/04/23 1638)  BP: 107/64 (10/04/23 1638)  SpO2: 95 % (10/04/23 1638) Vital Signs (24h Range):  Temp:  [97.5 °F (36.4 °C)-98.7 °F (37.1 °C)] 98.5 °F (36.9 °C)  Pulse:  [71-84] 72  Resp:  [13-25] 16  SpO2:  [92 %-95 %] 95 %  BP: (106-143)/(56-82) 107/64     Weight: 76.1 kg (167 lb 12.8 oz)  Height: 6' (182.9 cm)  Body mass index is 22.76 kg/m².      Intake/Output Summary (Last 24 hours) at 10/4/2023 1743  Last data filed at 10/4/2023 1151  Gross per 24 hour   Intake 280 ml   Output --   Net 280 ml        General    Nursing note and vitals reviewed.  Constitutional: He is oriented to person, place, and time. He appears well-developed and well-nourished. No distress.   HENT:   Head: Atraumatic.   Eyes: EOM are normal.   Cardiovascular:  Normal rate.            Pulmonary/Chest: Effort normal.   Abdominal: Soft.   Neurological: He is alert and oriented to person, place, and time.   Psychiatric: His behavior is normal.             Right Hip Exam     Inspection   Swelling: present  Erythema: absent    Tenderness   The patient tender to palpation of the trochanteric bursa.    Range of Motion   Abduction:  abnormal   Adduction:  abnormal   Extension:  abnormal   Flexion:  abnormal   External rotation:  abnormal   Internal rotation:  abnormal     Tests   Log Roll: positive    Other   Sensation: normal  Left Hip Exam   Left hip exam is normal.          Vascular Exam     Right Pulses  Dorsalis Pedis:      2+             Significant Labs: CBC:   Recent Labs   Lab 10/03/23  2058 10/04/23  0400   WBC 13.28* 14.38*   HGB 11.6* 11.7*   HCT 36.7* 38.0*    173     CMP:   Recent Labs   Lab 10/03/23  2058 10/04/23  0400    136   K 4.3 4.4    107   CO2 23 21*   * 171*   BUN 43* 45*   CREATININE 1.7* 1.6*   CALCIUM 9.8 9.7   PROT 6.5 6.4   ALBUMIN 3.9 3.8   BILITOT  "1.2* 1.6*   ALKPHOS 109 89   AST 18 17   ALT 18 14   ANIONGAP 7* 8     Coagulation: No results for input(s): "LABPROT", "INR", "APTT" in the last 48 hours.  All pertinent labs within the past 24 hours have been reviewed.    Significant Imaging: X-Ray: I have reviewed all pertinent results/findings and my personal findings are:  X-rays of the right hip are ordered and reviewed which show a displaced right femoral neck fracture  "

## 2023-10-04 NOTE — LETTER
2023    Tono Saez  [address]  [place] [address]             [facility] [place]  [address]  [address]  [place] [address]  Phone: [phone]  Fax: [phone] Patient: Tono Saez  : 1945  Referring Doctor: dr dove   Type of procedure: hip surgery    No current facility-administered medications for this visit.     No current outpatient medications on file.     Facility-Administered Medications Ordered in Other Visits   Medication    acetaminophen tablet 650 mg    acetaminophen tablet 650 mg    aspirin chewable tablet 81 mg    atorvastatin tablet 40 mg    dextrose 50% injection 12.5 g    dextrose 50% injection 12.5 g    dextrose 50% injection 25 g    dextrose 50% injection 25 g    finasteride tablet 5 mg    fludrocortisone tablet 100 mcg    fluticasone propionate 50 mcg/actuation nasal spray 50 mcg    glucagon (human recombinant) injection 1 mg    glucagon (human recombinant) injection 1 mg    glucose chewable tablet 16 g    glucose chewable tablet 16 g    glucose chewable tablet 24 g    glucose chewable tablet 24 g    HYDROmorphone injection 0.5 mg    HYDROmorphone injection 1 mg    insulin aspart U-100 pen 0-10 Units    insulin aspart U-100 pen 0-5 Units    lactated ringers infusion    LIDOcaine (PF) 10 mg/ml (1%) injection 10 mg    melatonin tablet 6 mg    metoprolol tartrate (LOPRESSOR) split tablet 12.5 mg    midodrine tablet 5 mg    ondansetron injection 4 mg    oxybutynin 24 hr tablet 10 mg    pantoprazole EC tablet 40 mg    polyethylene glycol packet 17 g    potassium chloride CR capsule 10 mEq    pregabalin capsule 50 mg    sertraline tablet 50 mg    sodium chloride 0.9% flush 10 mL    spironolactone tablet 25 mg       This patient has been assessed for risk factors for clearance of surgery with the following stipulations:    ___ No contraindications  ___ Recommendations for antiplatelet/anticoagulant  medications:  _x__ Cleared for surgery with moderate risk the following contraindications/precautions: has to stay on plavix due to recent stent  ___ Not cleared for surgery due to the following reasons:      If you have any questions regarding the above, please contact my office at [phone]    Sincerely,  .

## 2023-10-05 ENCOUNTER — ANESTHESIA EVENT (OUTPATIENT)
Dept: SURGERY | Facility: HOSPITAL | Age: 78
DRG: 522 | End: 2023-10-05
Payer: MEDICARE

## 2023-10-05 ENCOUNTER — ANESTHESIA (OUTPATIENT)
Dept: SURGERY | Facility: HOSPITAL | Age: 78
DRG: 522 | End: 2023-10-05
Payer: MEDICARE

## 2023-10-05 DIAGNOSIS — N18.1 CRI (CHRONIC RENAL INSUFFICIENCY), STAGE 1: ICD-10-CM

## 2023-10-05 PROBLEM — R79.89 ELEVATED TROPONIN: Status: ACTIVE | Noted: 2023-10-05

## 2023-10-05 LAB
ALBUMIN SERPL BCP-MCNC: 3.7 G/DL (ref 3.5–5.2)
ALP SERPL-CCNC: 103 U/L (ref 55–135)
ALT SERPL W/O P-5'-P-CCNC: 13 U/L (ref 10–44)
ANION GAP SERPL CALC-SCNC: 10 MMOL/L (ref 8–16)
AST SERPL-CCNC: 15 U/L (ref 10–40)
BACTERIA UR CULT: NORMAL
BACTERIA UR CULT: NORMAL
BASOPHILS # BLD AUTO: 0.04 K/UL (ref 0–0.2)
BASOPHILS NFR BLD: 0.2 % (ref 0–1.9)
BILIRUB SERPL-MCNC: 1.7 MG/DL (ref 0.1–1)
BUN SERPL-MCNC: 43 MG/DL (ref 8–23)
CALCIUM SERPL-MCNC: 10 MG/DL (ref 8.7–10.5)
CHLORIDE SERPL-SCNC: 105 MMOL/L (ref 95–110)
CO2 SERPL-SCNC: 22 MMOL/L (ref 23–29)
CREAT SERPL-MCNC: 1.4 MG/DL (ref 0.5–1.4)
DIFFERENTIAL METHOD: ABNORMAL
EOSINOPHIL # BLD AUTO: 0.1 K/UL (ref 0–0.5)
EOSINOPHIL NFR BLD: 0.7 % (ref 0–8)
ERYTHROCYTE [DISTWIDTH] IN BLOOD BY AUTOMATED COUNT: 15.6 % (ref 11.5–14.5)
EST. GFR  (NO RACE VARIABLE): 50.8 ML/MIN/1.73 M^2
GLUCOSE SERPL-MCNC: 151 MG/DL (ref 70–110)
GLUCOSE SERPL-MCNC: 155 MG/DL (ref 70–110)
GLUCOSE SERPL-MCNC: 159 MG/DL (ref 70–110)
GLUCOSE SERPL-MCNC: 179 MG/DL (ref 70–110)
GLUCOSE SERPL-MCNC: 227 MG/DL (ref 70–110)
HCT VFR BLD AUTO: 37.9 % (ref 40–54)
HGB BLD-MCNC: 11.7 G/DL (ref 14–18)
IMM GRANULOCYTES # BLD AUTO: 0.13 K/UL (ref 0–0.04)
IMM GRANULOCYTES NFR BLD AUTO: 0.8 % (ref 0–0.5)
LYMPHOCYTES # BLD AUTO: 0.5 K/UL (ref 1–4.8)
LYMPHOCYTES NFR BLD: 2.7 % (ref 18–48)
MCH RBC QN AUTO: 26.2 PG (ref 27–31)
MCHC RBC AUTO-ENTMCNC: 30.9 G/DL (ref 32–36)
MCV RBC AUTO: 85 FL (ref 82–98)
MONOCYTES # BLD AUTO: 0.9 K/UL (ref 0.3–1)
MONOCYTES NFR BLD: 5 % (ref 4–15)
NEUTROPHILS # BLD AUTO: 15.7 K/UL (ref 1.8–7.7)
NEUTROPHILS NFR BLD: 90.6 % (ref 38–73)
NRBC BLD-RTO: 0 /100 WBC
PLATELET # BLD AUTO: 174 K/UL (ref 150–450)
PMV BLD AUTO: 11.9 FL (ref 9.2–12.9)
POTASSIUM SERPL-SCNC: 4.3 MMOL/L (ref 3.5–5.1)
PROT SERPL-MCNC: 6.3 G/DL (ref 6–8.4)
RBC # BLD AUTO: 4.46 M/UL (ref 4.6–6.2)
SODIUM SERPL-SCNC: 137 MMOL/L (ref 136–145)
TROPONIN I SERPL HS-MCNC: 132.2 PG/ML (ref 0–14.9)
TROPONIN I SERPL HS-MCNC: 93.3 PG/ML (ref 0–14.9)
WBC # BLD AUTO: 17.29 K/UL (ref 3.9–12.7)

## 2023-10-05 PROCEDURE — 25000003 PHARM REV CODE 250: Performed by: STUDENT IN AN ORGANIZED HEALTH CARE EDUCATION/TRAINING PROGRAM

## 2023-10-05 PROCEDURE — 80053 COMPREHEN METABOLIC PANEL: CPT | Performed by: STUDENT IN AN ORGANIZED HEALTH CARE EDUCATION/TRAINING PROGRAM

## 2023-10-05 PROCEDURE — D9220A PRA ANESTHESIA: ICD-10-PCS | Mod: CRNA,,, | Performed by: NURSE ANESTHETIST, CERTIFIED REGISTERED

## 2023-10-05 PROCEDURE — 25000003 PHARM REV CODE 250: Performed by: NURSE ANESTHETIST, CERTIFIED REGISTERED

## 2023-10-05 PROCEDURE — C1776 JOINT DEVICE (IMPLANTABLE): HCPCS | Performed by: ORTHOPAEDIC SURGERY

## 2023-10-05 PROCEDURE — D9220A PRA ANESTHESIA: ICD-10-PCS | Mod: ANES,,, | Performed by: ANESTHESIOLOGY

## 2023-10-05 PROCEDURE — 36415 COLL VENOUS BLD VENIPUNCTURE: CPT | Performed by: STUDENT IN AN ORGANIZED HEALTH CARE EDUCATION/TRAINING PROGRAM

## 2023-10-05 PROCEDURE — 27000673 HC TUBING BLOOD Y: Performed by: NURSE ANESTHETIST, CERTIFIED REGISTERED

## 2023-10-05 PROCEDURE — D9220A PRA ANESTHESIA: Mod: ANES,,, | Performed by: ANESTHESIOLOGY

## 2023-10-05 PROCEDURE — 27202107 HC XP QUATRO SENSOR: Performed by: NURSE ANESTHETIST, CERTIFIED REGISTERED

## 2023-10-05 PROCEDURE — 36000710: Performed by: ORTHOPAEDIC SURGERY

## 2023-10-05 PROCEDURE — 27236 TREAT THIGH FRACTURE: CPT | Mod: RT,,, | Performed by: ORTHOPAEDIC SURGERY

## 2023-10-05 PROCEDURE — 27000671 HC TUBING MICROBORE EXT: Performed by: NURSE ANESTHETIST, CERTIFIED REGISTERED

## 2023-10-05 PROCEDURE — 27236 PR FEMORAL FX, OPEN TX: ICD-10-PCS | Mod: RT,,, | Performed by: ORTHOPAEDIC SURGERY

## 2023-10-05 PROCEDURE — 63600175 PHARM REV CODE 636 W HCPCS: Performed by: ANESTHESIOLOGY

## 2023-10-05 PROCEDURE — 63600175 PHARM REV CODE 636 W HCPCS: Performed by: ORTHOPAEDIC SURGERY

## 2023-10-05 PROCEDURE — 27201107 HC STYLET, STANDARD: Performed by: NURSE ANESTHETIST, CERTIFIED REGISTERED

## 2023-10-05 PROCEDURE — 36000711: Performed by: ORTHOPAEDIC SURGERY

## 2023-10-05 PROCEDURE — 25000003 PHARM REV CODE 250: Performed by: ORTHOPAEDIC SURGERY

## 2023-10-05 PROCEDURE — 71000039 HC RECOVERY, EACH ADD'L HOUR: Performed by: ORTHOPAEDIC SURGERY

## 2023-10-05 PROCEDURE — 27201423 OPTIME MED/SURG SUP & DEVICES STERILE SUPPLY: Performed by: ORTHOPAEDIC SURGERY

## 2023-10-05 PROCEDURE — 84484 ASSAY OF TROPONIN QUANT: CPT | Mod: 91 | Performed by: STUDENT IN AN ORGANIZED HEALTH CARE EDUCATION/TRAINING PROGRAM

## 2023-10-05 PROCEDURE — 37000009 HC ANESTHESIA EA ADD 15 MINS: Performed by: ORTHOPAEDIC SURGERY

## 2023-10-05 PROCEDURE — 85025 COMPLETE CBC W/AUTO DIFF WBC: CPT | Performed by: STUDENT IN AN ORGANIZED HEALTH CARE EDUCATION/TRAINING PROGRAM

## 2023-10-05 PROCEDURE — 71000033 HC RECOVERY, INTIAL HOUR: Performed by: ORTHOPAEDIC SURGERY

## 2023-10-05 PROCEDURE — 37000008 HC ANESTHESIA 1ST 15 MINUTES: Performed by: ORTHOPAEDIC SURGERY

## 2023-10-05 PROCEDURE — 27000284 HC CANNULA NASAL: Performed by: NURSE ANESTHETIST, CERTIFIED REGISTERED

## 2023-10-05 PROCEDURE — 12000002 HC ACUTE/MED SURGE SEMI-PRIVATE ROOM

## 2023-10-05 PROCEDURE — 63600175 PHARM REV CODE 636 W HCPCS: Performed by: NURSE ANESTHETIST, CERTIFIED REGISTERED

## 2023-10-05 PROCEDURE — 27000080 OPTIME MED/SURG SUP & DEVICES GENERAL CLASSIFICATION: Performed by: ORTHOPAEDIC SURGERY

## 2023-10-05 PROCEDURE — D9220A PRA ANESTHESIA: Mod: CRNA,,, | Performed by: NURSE ANESTHETIST, CERTIFIED REGISTERED

## 2023-10-05 DEVICE — IMPLANTABLE DEVICE: Type: IMPLANTABLE DEVICE | Site: HIP | Status: FUNCTIONAL

## 2023-10-05 RX ORDER — FAMOTIDINE 10 MG/ML
INJECTION INTRAVENOUS
Status: DISCONTINUED | OUTPATIENT
Start: 2023-10-05 | End: 2023-10-05

## 2023-10-05 RX ORDER — DOCUSATE SODIUM 100 MG/1
100 CAPSULE, LIQUID FILLED ORAL EVERY 12 HOURS
Status: DISCONTINUED | OUTPATIENT
Start: 2023-10-05 | End: 2023-10-09 | Stop reason: HOSPADM

## 2023-10-05 RX ORDER — ONDANSETRON 2 MG/ML
4 INJECTION INTRAMUSCULAR; INTRAVENOUS DAILY PRN
Status: DISCONTINUED | OUTPATIENT
Start: 2023-10-05 | End: 2023-10-05 | Stop reason: HOSPADM

## 2023-10-05 RX ORDER — SUCCINYLCHOLINE CHLORIDE 20 MG/ML
INJECTION INTRAMUSCULAR; INTRAVENOUS
Status: DISCONTINUED | OUTPATIENT
Start: 2023-10-05 | End: 2023-10-05

## 2023-10-05 RX ORDER — LIDOCAINE HYDROCHLORIDE 20 MG/ML
INJECTION INTRAVENOUS
Status: DISCONTINUED | OUTPATIENT
Start: 2023-10-05 | End: 2023-10-05

## 2023-10-05 RX ORDER — HYDROCODONE BITARTRATE AND ACETAMINOPHEN 5; 325 MG/1; MG/1
1 TABLET ORAL EVERY 4 HOURS PRN
Status: DISCONTINUED | OUTPATIENT
Start: 2023-10-05 | End: 2023-10-09 | Stop reason: HOSPADM

## 2023-10-05 RX ORDER — FENTANYL CITRATE 50 UG/ML
INJECTION, SOLUTION INTRAMUSCULAR; INTRAVENOUS
Status: DISCONTINUED | OUTPATIENT
Start: 2023-10-05 | End: 2023-10-05

## 2023-10-05 RX ORDER — LOPERAMIDE HYDROCHLORIDE 2 MG/1
2 CAPSULE ORAL CONTINUOUS PRN
Status: DISCONTINUED | OUTPATIENT
Start: 2023-10-05 | End: 2023-10-09 | Stop reason: HOSPADM

## 2023-10-05 RX ORDER — FENTANYL CITRATE 50 UG/ML
100 INJECTION, SOLUTION INTRAMUSCULAR; INTRAVENOUS ONCE AS NEEDED
Status: DISCONTINUED | OUTPATIENT
Start: 2023-10-05 | End: 2023-10-05 | Stop reason: HOSPADM

## 2023-10-05 RX ORDER — ROCURONIUM BROMIDE 10 MG/ML
INJECTION, SOLUTION INTRAVENOUS
Status: DISCONTINUED | OUTPATIENT
Start: 2023-10-05 | End: 2023-10-05

## 2023-10-05 RX ORDER — OXYCODONE HYDROCHLORIDE 5 MG/1
5 TABLET ORAL
Status: DISCONTINUED | OUTPATIENT
Start: 2023-10-05 | End: 2023-10-05 | Stop reason: HOSPADM

## 2023-10-05 RX ORDER — KETAMINE HYDROCHLORIDE 50 MG/ML
INJECTION, SOLUTION INTRAMUSCULAR; INTRAVENOUS
Status: DISCONTINUED | OUTPATIENT
Start: 2023-10-05 | End: 2023-10-05

## 2023-10-05 RX ORDER — ACETAMINOPHEN 10 MG/ML
INJECTION, SOLUTION INTRAVENOUS
Status: DISCONTINUED | OUTPATIENT
Start: 2023-10-05 | End: 2023-10-05

## 2023-10-05 RX ORDER — CEFAZOLIN SODIUM 1 G/3ML
INJECTION, POWDER, FOR SOLUTION INTRAMUSCULAR; INTRAVENOUS
Status: DISCONTINUED | OUTPATIENT
Start: 2023-10-05 | End: 2023-10-05

## 2023-10-05 RX ORDER — SODIUM CHLORIDE 0.9 % (FLUSH) 0.9 %
10 SYRINGE (ML) INJECTION
Status: DISCONTINUED | OUTPATIENT
Start: 2023-10-05 | End: 2023-10-09 | Stop reason: HOSPADM

## 2023-10-05 RX ADMIN — OXYBUTYNIN CHLORIDE 10 MG: 10 TABLET, EXTENDED RELEASE ORAL at 10:10

## 2023-10-05 RX ADMIN — ACETAMINOPHEN 1000 MG: 10 INJECTION, SOLUTION INTRAVENOUS at 12:10

## 2023-10-05 RX ADMIN — KETAMINE HYDROCHLORIDE 25 MG: 50 INJECTION INTRAMUSCULAR; INTRAVENOUS at 12:10

## 2023-10-05 RX ADMIN — ROCURONIUM BROMIDE 5 MG: 10 INJECTION, SOLUTION INTRAVENOUS at 12:10

## 2023-10-05 RX ADMIN — FAMOTIDINE 20 MG: 10 INJECTION, SOLUTION INTRAVENOUS at 12:10

## 2023-10-05 RX ADMIN — LIDOCAINE HYDROCHLORIDE 75 MG: 20 INJECTION, SOLUTION INTRAVENOUS at 12:10

## 2023-10-05 RX ADMIN — DOCUSATE SODIUM 100 MG: 100 CAPSULE, LIQUID FILLED ORAL at 08:10

## 2023-10-05 RX ADMIN — SERTRALINE HYDROCHLORIDE 50 MG: 50 TABLET ORAL at 10:10

## 2023-10-05 RX ADMIN — MIDODRINE HYDROCHLORIDE 5 MG: 2.5 TABLET ORAL at 10:10

## 2023-10-05 RX ADMIN — POTASSIUM CHLORIDE 10 MEQ: 750 CAPSULE, EXTENDED RELEASE ORAL at 10:10

## 2023-10-05 RX ADMIN — FLUDROCORTISONE ACETATE 100 MCG: 0.1 TABLET ORAL at 10:10

## 2023-10-05 RX ADMIN — METOPROLOL TARTRATE 12.5 MG: 25 TABLET, FILM COATED ORAL at 10:10

## 2023-10-05 RX ADMIN — ONDANSETRON 4 MG: 2 INJECTION INTRAMUSCULAR; INTRAVENOUS at 12:10

## 2023-10-05 RX ADMIN — HYDROCODONE BITARTRATE AND ACETAMINOPHEN 1 TABLET: 5; 325 TABLET ORAL at 05:10

## 2023-10-05 RX ADMIN — VANCOMYCIN HYDROCHLORIDE 1500 MG: 1.5 INJECTION, POWDER, LYOPHILIZED, FOR SOLUTION INTRAVENOUS at 05:10

## 2023-10-05 RX ADMIN — ROCURONIUM BROMIDE 30 MG: 10 INJECTION, SOLUTION INTRAVENOUS at 12:10

## 2023-10-05 RX ADMIN — FENTANYL CITRATE 50 MCG: 50 INJECTION, SOLUTION INTRAMUSCULAR; INTRAVENOUS at 12:10

## 2023-10-05 RX ADMIN — SPIRONOLACTONE 25 MG: 25 TABLET ORAL at 10:10

## 2023-10-05 RX ADMIN — ATORVASTATIN CALCIUM 40 MG: 40 TABLET, FILM COATED ORAL at 10:10

## 2023-10-05 RX ADMIN — ASPIRIN 81 MG 81 MG: 81 TABLET ORAL at 10:10

## 2023-10-05 RX ADMIN — Medication 160 MG: at 12:10

## 2023-10-05 RX ADMIN — SODIUM CHLORIDE, SODIUM LACTATE, POTASSIUM CHLORIDE, AND CALCIUM CHLORIDE: .6; .31; .03; .02 INJECTION, SOLUTION INTRAVENOUS at 12:10

## 2023-10-05 RX ADMIN — CEFAZOLIN 2 G: 330 INJECTION, POWDER, FOR SOLUTION INTRAMUSCULAR; INTRAVENOUS at 11:10

## 2023-10-05 RX ADMIN — SUGAMMADEX 200 MG: 100 INJECTION, SOLUTION INTRAVENOUS at 01:10

## 2023-10-05 RX ADMIN — FINASTERIDE 5 MG: 5 TABLET, FILM COATED ORAL at 10:10

## 2023-10-05 RX ADMIN — MIDODRINE HYDROCHLORIDE 5 MG: 2.5 TABLET ORAL at 05:10

## 2023-10-05 RX ADMIN — PANTOPRAZOLE SODIUM 40 MG: 40 TABLET, DELAYED RELEASE ORAL at 05:10

## 2023-10-05 NOTE — ANESTHESIA POSTPROCEDURE EVALUATION
Anesthesia Post Evaluation    Patient: Tono Saez    Procedure(s) Performed: Procedure(s) (LRB):  HEMIARTHROPLASTY, HIP (Right)    Final Anesthesia Type: general      Patient location during evaluation: PACU  Patient participation: Yes- Able to Participate  Level of consciousness: awake and alert  Post-procedure vital signs: reviewed and stable  Pain management: adequate  Airway patency: patent    PONV status at discharge: No PONV  Anesthetic complications: no      Cardiovascular status: blood pressure returned to baseline and stable  Respiratory status: unassisted and room air  Hydration status: euvolemic  Follow-up not needed.          Vitals Value Taken Time   /62 10/05/23 1500   Temp 36.7 °C (98 °F) 10/05/23 1350   Pulse 74 10/05/23 1511   Resp 22 10/05/23 1511   SpO2 95 % 10/05/23 1511   Vitals shown include unvalidated device data.      No case tracking events are documented in the log.      Pain/Camilo Score: Pain Rating Prior to Med Admin: 5 (10/4/2023  4:12 PM)  Pain Rating Post Med Admin: 2 (10/4/2023  4:42 PM)  Camilo Score: 10 (10/5/2023  2:05 PM)

## 2023-10-05 NOTE — TRANSFER OF CARE
Anesthesia Transfer of Care Note    Patient: Tono Saez    Procedure(s) Performed: Procedure(s) (LRB):  HEMIARTHROPLASTY, HIP (Right)    Patient location: PACU    Anesthesia Type: general    Transport from OR: Transported from OR on room air with adequate spontaneous ventilation    Post pain: adequate analgesia    Post assessment: no apparent anesthetic complications    Post vital signs: stable    Level of consciousness: awake and alert    Nausea/Vomiting: no nausea/vomiting    Complications: none    Transfer of care protocol was followed      Last vitals:   Visit Vitals  /71   Pulse 85   Temp 36.9 °C (98.5 °F) (Oral)   Resp 18   Ht 6' (1.829 m)   Wt 76.1 kg (167 lb 12.8 oz)   SpO2 (!) 93%   BMI 22.76 kg/m²

## 2023-10-05 NOTE — PROGRESS NOTES
[facility] Medicine  Progress Note    Patient Name: Tono Saez  MRN: [id number]  Patient Class: IP- Inpatient   Admission Date: 10/3/2023  Length of Stay: 2 days  Attending Physician: Harshad Haddad MD  Primary Care Provider: Scott Staley MD        Subjective:     Principal Problem:Hip fracture        HPI:  Tono Saez is a 78 y.o.  male with known history of  CAD w/ stents and post CABG, DM2, HTN, HLD, GERD, GIB who came in today after a mechanical fall.  He states he was standing at his sink and attempted to turn using walker and tripped over it.  He now complains of pain to right thigh and hip area.  CT of head, and c-spine are negative.  Xray shows right side femoral neck fracture. Patient was just discharged from here after a fall and has known orthostatic hypotension.  Patient denies chest pain, shortness of breath, palpitations, abdominal pain, nausea, vomiting, diarrhea, constipation,  hematochezia, hematemesis, fever, cough, congestion, runny nose, changes in vision, hearing, numbness, tingling, headache, focal weakness, dysuria, frequency, hematuria. History was obtained from the patient and ER physician Sign-out.      Overview/Hospital Course:  Patient was admitted for mechanical fall found to have right side femoral neck fracture.  Given recent left heart catheterization in August cardiology was consulted and Plavix was held prior to surgery.  Patient had  right hip surgery on 10/05/2023.  Plavix to be resumed after surgery once cleared by ortho.          Interval History:  Patient seen and examined this morning.  Reports pain in the right hip, worsened with movement.  Plan for surgery today.    Review of Systems   Constitutional:  Negative for diaphoresis and fatigue.   Cardiovascular:  Negative for chest pain and palpitations.   Musculoskeletal:         Pain around right hip.   Neurological:  Negative for dizziness and headaches.     Objective:     Vital Signs (Most  Recent):  Temp: 98 °F (36.7 °C) (10/05/23 1350)  Pulse: 80 (10/05/23 1445)  Resp: 20 (10/05/23 1445)  BP: (!) 155/100 (10/05/23 1445)  SpO2: 95 % (10/05/23 1445) Vital Signs (24h Range):  Temp:  [98 °F (36.7 °C)-98.5 °F (36.9 °C)] 98 °F (36.7 °C)  Pulse:  [72-85] 80  Resp:  [16-20] 20  SpO2:  [93 %-98 %] 95 %  BP: (100-155)/() 155/100     Weight: 76.1 kg (167 lb 12.8 oz)  Body mass index is 22.76 kg/m².    Intake/Output Summary (Last 24 hours) at 10/5/2023 1503  Last data filed at 10/5/2023 1401  Gross per 24 hour   Intake 1740 ml   Output 576 ml   Net 1164 ml         Physical Exam  Constitutional:       Appearance: Normal appearance.   Cardiovascular:      Rate and Rhythm: Normal rate.      Heart sounds: Normal heart sounds.   Pulmonary:      Effort: Pulmonary effort is normal. No respiratory distress.   Musculoskeletal:         General: Tenderness (Right hip) present.   Skin:     General: Skin is warm and dry.   Neurological:      Mental Status: He is alert.             Significant Labs: All pertinent labs within the past 24 hours have been reviewed.  CBC:   Recent Labs   Lab 10/03/23  2058 10/04/23  0400 10/05/23  0336   WBC 13.28* 14.38* 17.29*   HGB 11.6* 11.7* 11.7*   HCT 36.7* 38.0* 37.9*    173 174     CMP:   Recent Labs   Lab 10/03/23  2058 10/04/23  0400 10/05/23  0336    136 137   K 4.3 4.4 4.3    107 105   CO2 23 21* 22*   * 171* 151*   BUN 43* 45* 43*   CREATININE 1.7* 1.6* 1.4   CALCIUM 9.8 9.7 10.0   PROT 6.5 6.4 6.3   ALBUMIN 3.9 3.8 3.7   BILITOT 1.2* 1.6* 1.7*   ALKPHOS 109 89 103   AST 18 17 15   ALT 18 14 13   ANIONGAP 7* 8 10       Significant Imaging: I have reviewed all pertinent imaging results/findings within the past 24 hours.      Assessment/Plan:      * Hip fracture  Pending surgery today  Hb is stable  Pain medications as needed      Elevated troponin  Patient without chest pain, recently had catheterization on August 2023   No changes on EKG  We will  continue to monitor  Cardiology following      CAD (coronary artery disease)  Pt with recent CATH- on Aug/2023- on DAPT  Cardio on board and discussed and now plan is to stop plavix and resume aspirin -Plavix to resume after surgery   Orthopedic were made aware  C/w statin and aspirin  Other GDMT    Type 2 diabetes mellitus with hyperglycemia   POCs stable-   SSI and accu checks    HTN (hypertension)  C/w home meds as tolerated      VTE Risk Mitigation (From admission, onward)         Ordered     Reason for No Pharmacological VTE Prophylaxis  Once        Question:  Reasons:  Answer:  Risk of Bleeding    10/03/23 2235     IP VTE HIGH RISK PATIENT  Once         10/03/23 2235     Place sequential compression device  Until discontinued         10/03/23 2235                Discharge Planning   LEE: 10/7/2023     Code Status: Full Code   Is the patient medically ready for discharge?:     Reason for patient still in hospital (select all that apply): Patient trending condition  Discharge Plan A: Home Health                  Harshad Haddad MD  Department of Hospital Medicine   [facility]

## 2023-10-05 NOTE — ANESTHESIA PREPROCEDURE EVALUATION
10/05/2023  Tono Saez is a 78 y.o., male.      Patient Active Problem List   Diagnosis    Tinea cruris    Rectal bleeding    Anal fissure    HTN (hypertension)    Anal fissure    Diarrhea of presumed infectious origin    H/O cachexia    Weight loss, abnormal    Chronic diarrhea    Bronchitis    Laceration of occipital region of scalp    Laceration of forehead, left, complicated    Hematoma of frontal scalp    Edema eyelid, unspecified laterality    Chronic (childhood) granulomatous disease    Actinic keratoses    Mixed hyperlipidemia    Seasonal allergic rhinitis due to pollen    Non-allergic rhinitis    Pansinusitis    Type 2 diabetes mellitus with hyperglycemia     Immunization due    GERD (gastroesophageal reflux disease)    CRI (chronic renal insufficiency), stage 1    Anemia    Fall    Gait instability    Chronic anticoagulation    Contusion of buttock    Diabetes mellitus, type II    Chest pain    Upper GI bleed    CAD (coronary artery disease)    TIA (transient ischemic attack)    Dysfunction of both eustachian tubes    Pain in both lower extremities    Pain    NSTEMI (non-ST elevated myocardial infarction)    Gross hematuria    Compression fracture of L1 lumbar vertebra, with delayed healing, subsequent encounter    Chronic systolic congestive heart failure    SSS (sick sinus syndrome)    Stage 3a chronic kidney disease    B12 neuropathy    Tooth decayed    Cardiac pacemaker in situ    Lumbar spine pain    Impaired mobility and activities of daily living    Syncope and collapse    BMI 23.0-23.9, adult    Orthostatic hypotension    Imbalance    Leg weakness, bilateral    Skin tear of left upper arm without complication    Hip fracture       Past Surgical History:   Procedure Laterality Date    ANGIOGRAM, CORONARY, WITH LEFT HEART  CATHETERIZATION N/A 8/24/2023    Procedure: Angiogram, Coronary, with Left Heart Cath;  Surgeon: Robles Mckoy MD;  Location: [facility] CATH/EP LAB;  Service: Cardiology;  Laterality: N/A;    CARDIAC PACEMAKER PLACEMENT      CARDIAC PACEMAKER PLACEMENT      CARDIAC PACEMAKER PLACEMENT  04/26/2018    replaced    CARDIAC SURGERY      1995   CABG X 5    CHOLECYSTECTOMY      COLON SURGERY      COLONOSCOPY N/A 2/21/2020    Procedure: COLONOSCOPY;  Surgeon: Abe Barragan III, MD;  Location: [facility] ENDO;  Service: Endoscopy;  Laterality: N/A;    COLONOSCOPY N/A 2/23/2020    Procedure: COLONOSCOPY;  Surgeon: Bob Locke Jr., MD;  Location: [facility];  Service: Endoscopy;  Laterality: N/A;    CORONARY ANGIOGRAPHY INCLUDING BYPASS GRAFTS WITH CATHETERIZATION OF LEFT HEART N/A 10/26/2022    Procedure: ANGIOGRAM, CORONARY, INCLUDING BYPASS GRAFT, WITH LEFT HEART CATHETERIZATION;  Surgeon: Robles Mckoy MD;  Location: [facility] CATH/EP LAB;  Service: Cardiology;  Laterality: N/A;    CORONARY ARTERY BYPASS GRAFT  1995    CORONARY BYPASS GRAFT ANGIOGRAPHY  8/24/2023    Procedure: Bypass graft study;  Surgeon: Robles Mckoy MD;  Location: [facility] CATH/EP LAB;  Service: Cardiology;;    CORONARY STENT PLACEMENT      stent x 5    ESOPHAGOGASTRODUODENOSCOPY N/A 2/23/2020    Procedure: EGD (ESOPHAGOGASTRODUODENOSCOPY);  Surgeon: Bob Locke Jr., MD;  Location: [facility];  Service: Endoscopy;  Laterality: N/A;    EYE SURGERY      BILAT CATARACT    head laceration   01/19/2018    HEMORRHOID SURGERY      INJECTION OF ANESTHETIC AGENT AROUND MEDIAL BRANCH NERVES INNERVATING LUMBAR FACET JOINT Bilateral 6/29/2023    Procedure: Block-nerve-medial branch-lumbar;  Surgeon: Maurice Montenegro MD;  Location: [facility];  Service: Pain Management;  Laterality: Bilateral;  L3,4,5 MBB    INJECTION OF ANESTHETIC AGENT AROUND MEDIAL BRANCH NERVES INNERVATING LUMBAR FACET JOINT Bilateral 7/25/2023    Procedure: Block-nerve-medial  branch-lumbar;  Surgeon: Maurice Montenegro MD;  [facility];  Service: Anesthesiology;  Laterality: Bilateral;  L3,4,5 MBB #2    INTRAVASCULAR ULTRASOUND, CORONARY N/A 8/24/2023    Procedure: Ultrasound-coronary;  Surgeon: Robles Mckoy MD;  Location: [facility] CATH/EP LAB;  Service: Cardiology;  Laterality: N/A;    PTCA, SINGLE VESSEL  8/24/2023    Procedure: PTCA, Single Vessel;  Surgeon: Robles Mckoy MD;  Location: [facility] CATH/EP LAB;  Service: Cardiology;;    SMALL BOWEL ENTEROSCOPY N/A 2/21/2020    Procedure: ENTEROSCOPY;  Surgeon: Abe Barragan III, MD;  Location: [facility] ENDO;  Service: Endoscopy;  Laterality: N/A;    STENT, DRUG ELUTING, SINGLE VESSEL, CORONARY  8/24/2023    Procedure: Stent, Drug Eluting, Single Vessel, Coronary;  Surgeon: Robles Mckoy MD;  Location: [facility] CATH/EP LAB;  Service: Cardiology;;    stint          Tobacco Use:  The patient  reports that he has never smoked. He has never used smokeless tobacco.     Results for orders placed or performed during the hospital encounter of 10/03/23   EKG 12-lead    Collection Time: 10/03/23  9:08 PM    Narrative    Test Reason : R29.6,    Vent. Rate : 071 BPM     Atrial Rate : 071 BPM     P-R Int : 210 ms          QRS Dur : 200 ms      QT Int : 470 ms       P-R-T Axes : 107 -85 091 degrees     QTc Int : 510 ms    Atrial-sensed ventricular-paced rhythm with prolonged AV conduction  Abnormal ECG  When compared with ECG of 24-AUG-2023 09:59,  Vent. rate has decreased BY  16 BPM  Confirmed by Zachary Diop MD ([id number]) on 10/4/2023 8:17:09 PM    Referred By: [email or web address]   SELF           Confirmed By:Zachary Diop MD        Imaging Results          CT Cervical Spine Without Contrast (Final result)  Result time 10/03/23 21:44:29    Final result by Corbin Hemphill MD (10/03/23 21:44:29)                 Narrative:    EXAM DESCRIPTION:    CT CERVICAL SPINE WITHOUT CONTRAST    CLINICAL HISTORY:    78 years  Male  Neck trauma (Age >=  65y)    COMPARISON:    CT from 8/16/2023    TECHNIQUE:    CT performed without IV contrast. Sagittal and coronal reconstruction.  This exam was performed according to our departmental dose-optimization program, which includes automated exposure control, adjustment of the mA and/or kV according to patient size and/or use of iterative reconstruction technique.    FINDINGS:    No acute fracture dislocation. No suspicious acute prevertebral soft tissue swelling. Multiple anterior osteophytes. Moderate thickening of the transverse ligament without evidence to suggest the upper cord compression. Multilevel degenerative changes. Mild to moderate central stenosis at C3-4 and C5-6. Multiple levels of foraminal stenosis. Diffuse atherosclerotic disease in the carotid arteries.    Limited images of the upper chest demonstrate questionable small bilateral pleural effusions.    IMPRESSION:  1.  No acute fracture or dislocation.  2.  Multilevel degenerative changes. Mild to moderate central stenosis at C3-4 and C5-6. Multiple levels of foraminal stenosis.  3.  Diffuse atherosclerotic disease in the carotid arteries.  4.  Questionable small bilateral pleural effusions.    Electronically signed by:  Corbin Hemphill MD  10/03/2023 09:44 PM CDT Workstation: [id number]                             CT Head Without Contrast (Final result)  Result time 10/03/23 21:38:25    Final result by Corbin Hemphill MD (10/03/23 21:38:25)                 Narrative:    EXAM DESCRIPTION:    CT HEAD WITHOUT CONTRAST    CLINICAL HISTORY:    78 years  Male  Head trauma, moderate-severe    COMPARISON:    8/16/2023    TECHNIQUE:    CT of the head performed without IV contrast. Sagittal coronal reconstruction. This exam was performed according to our departmental dose-optimization program, which includes automated exposure control, adjustment of the mA and/or kV according to patient size and/or use of iterative reconstruction  technique.    FINDINGS:    Moderate to prominent central and cortical atrophy. Moderate to prominent periventricular white matter small vessel disease and additional true lacunar infarctions. Advanced atherosclerotic disease including distal vertebral and internal carotid arteries. No acute intra-axial or extra-axial abnormality.. Visualized paranasal sinuses, mastoid air cells and bony calvarium are unremarkable.    IMPRESSION:  Advanced atrophy, small vessel disease and atherosclerotic disease. There are no acute intracranial findings.    Electronically signed by:  Corbin Hemphill MD  10/03/2023 09:38 PM CDT Workstation: [id number]                             X-Ray Knee 3 View Right (Final result)  Result time 10/04/23 07:50:09   Procedure changed from X-Ray Knee 3 View Left     Final result by Mann Skinner MD (10/04/23 07:50:09)                 Narrative:    Reason: pain fall    FINDINGS:  3 views of the right knee show no fracture, dislocation, or destructive osseous lesion. Chondrocalcinosis occurs in medial and lateral compartments. Mild medial compartment joint space narrowing is evident. Negative for joint effusion. Arterial vascular calcifications are present. Patellar enthesophyte arises near quadriceps tendon insertion.    IMPRESSION:  No acute right knee abnormality.    Electronically signed by:  Mann Skinner MD  10/04/2023 07:50 AM CDT Workstation: [id number]                             X-Ray Chest AP Portable (Final result)  Result time 10/04/23 07:47:50    Final result by Mann Skinner MD (10/04/23 07:47:50)                 Narrative:    Reason: falls fall    FINDINGS:  Portable chest at 2102 compared with a 20/2/2023 shows normal cardiomediastinal silhouette with postsurgical changes of CABG. Left transvenous pacer unchanged.    Lungs are hypoinflated but clear. Previous central pulmonary vascular prominence has resolved. No pleural effusion or pneumothorax. No acute osseous  abnormality.    IMPRESSION:  Resolution of prior central pulmonary vascular prominence, with no acute cardiopulmonary abnormality.    Electronically signed by:  Mann Skinner MD  10/04/2023 07:47 AM CDT Workstation: [id number]                             X-Ray Hip 2 or 3 views Right (with Pelvis when performed) (Final result)  Result time 10/04/23 07:49:01   Procedure changed from X-Ray Hip 2 or 3 views Left (with Pelvis when performed)     Final result by Mann Skinner MD (10/04/23 07:49:01)                 Narrative:    Reason: pain fall    FINDINGS:  AP pelvis and 2 views right hip compared with 4/14/2023 show acute subcapital right femoral neck fracture with mild to moderate varus angulation. Right femoral head remains concentrically located within the right acetabulum.    No additional fracture or dislocation. Bilateral hip joint spaces are maintained, as is pubic symphysis and sacroiliac joints. Vascular calcifications are present. Soft tissues otherwise unremarkable.    IMPRESSION:  Acute subcapital right femoral neck fracture.    Electronically signed by:  Mann Skinner MD  10/04/2023 07:49 AM CDT Workstation: [id number]                             X-Ray Femur 2 View Right (Final result)  Result time 10/04/23 07:46:41   Procedure changed from X-Ray Femur AP/LAT Left     Final result by Mann Skinner MD (10/04/23 07:46:41)                 Narrative:    Reason: pain fall    FINDINGS:  2 views of right femur show acute subcapital right femoral neck fracture with mild to moderate varus angulation. The right femoral head remains concentrically located within the right acetabulum.    No additional fracture, no dislocation. Arterial vascular calcifications are present.    IMPRESSION:  Acute subcapital right femoral neck fracture.    Electronically signed by:  Mann Skinner MD  10/04/2023 07:46 AM CDT Workstation: [id number]                               Lab Results   Component Value Date    WBC 14.38 (H)  10/04/2023    HGB 11.7 (L) 10/04/2023    HCT 38.0 (L) 10/04/2023    MCV 84 10/04/2023     10/04/2023     BMP  Lab Results   Component Value Date     10/05/2023    K 4.3 10/05/2023     10/05/2023    CO2 22 (L) 10/05/2023    BUN 43 (H) 10/05/2023    CREATININE 1.4 10/05/2023    CALCIUM 10.0 10/05/2023    ANIONGAP 10 10/05/2023     (H) 10/05/2023     (H) 10/04/2023     (H) 10/03/2023       Results for orders placed during the hospital encounter of 08/16/23    Echo    Interpretation Summary    Left Ventricle: The left ventricle is normal in size. Moderately increased ventricular mass. Moderately increased wall thickness. There is moderate concentrict hypertrophy. Moderate global hypokinesis present. See diagram for wall motion findings.  Patient has evidence of akinesis and slight paradoxical motion of the mid inferior septum, distal anterior wall and slight paradoxic motion at the apex This is not significantly different than the echo noted 8/1723 The inferior wall in the lateral wall supplied by the main left stented circumflex appears to move well There is moderately reduced systolic function with a visually estimated ejection fraction of 30 - 40%. 40% EF Grade I diastolic dysfunction. Elevated left ventricular filling pressure. Tissue Doppler velocity is reduced.  Mean left atrial pressure 14    Left Atrium: Left atrium is mildly dilated.    Right Ventricle: Mild right ventricular enlargement. Systolic function is moderately reduced.    Aortic Valve: There is mild aortic valve sclerosis. There is mild to moderate aortic regurgitation.    Mitral Valve: There is no stenosis.    Pulmonary Artery: No pulmonary hypertension. The estimated pulmonary artery systolic pressure is 22 mmHg.    IVC/SVC: Normal venous pressure at 3 mmHg.    Pacer wires are seen            Pre-op Assessment    I have reviewed the Patient Summary Reports.     I have reviewed the Nursing Notes. I have  reviewed the NPO Status.   I have reviewed the Medications.     Review of Systems  Anesthesia Hx:  No problems with previous Anesthesia  Denies Family Hx of Anesthesia complications.   Denies Personal Hx of Anesthesia complications.   Social:  Non-Smoker    Hematology/Oncology:  Hematology Normal       -- Anemia: Hematology Comments: Leukocytosis..  Uses anticoagulation plavix (stopped for surgery) and aspirin    --  Cancer in past history (skin ):    EENT/Dental:EENT/Dental Normal   Cardiovascular:   Pacemaker Hypertension Past MI CAD  CABG/stent  CHF ECG has been reviewed. Elevated troponins   Pulmonary:  Pulmonary Normal    Renal/:   Chronic Renal Disease (stage 3), CKD    Hepatic/GI:   GERD History of GI bleed   Musculoskeletal:  Musculoskeletal Normal Right hip fracture   Neurological:   TIA, Neuromuscular Disease, (B12 neuropathy)    Endocrine:   Diabetes    Psych:  Psychiatric Normal           Physical Exam  General: Well nourished    Airway:  Mallampati: II   Mouth Opening: Normal  TM Distance: Normal  Tongue: Normal  Neck ROM: Normal ROM    Chest/Lungs:  Clear to auscultation, Normal Respiratory Rate    Heart:  Rate: Normal  Rhythm: Regular Rhythm        Anesthesia Plan  Type of Anesthesia, risks & benefits discussed:    Anesthesia Type: Gen ETT  Intra-op Monitoring Plan: Standard ASA Monitors  Post Op Pain Control Plan: IV/PO Opioids PRN and multimodal analgesia  Induction:  IV  Airway Plan: Video  Informed Consent: Informed consent signed with the Patient and all parties understand the risks and agree with anesthesia plan.  All questions answered.   ASA Score: 2  Anesthesia Plan Notes: GETA.  No versed or decadron.  Multimodal analgesia with ofirmev 1000mg, and ketamine 25 mg.  PONV prophylaxis with Pepcid 20 mg IV, and Zofran 4 mg IV.      Ready For Surgery From Anesthesia Perspective.     .

## 2023-10-05 NOTE — ASSESSMENT & PLAN NOTE
Pt with recent CATH- on Aug/2023- on DAPT  Cardio on board and discussed and now plan is to stop plavix and resume aspirin -Plavix to resume after surgery   Orthopedic were made aware  C/w statin and aspirin  Other GDMT

## 2023-10-05 NOTE — ASSESSMENT & PLAN NOTE
Patient without chest pain, recently had catheterization on August 2023   No changes on EKG  We will continue to monitor  Cardiology following

## 2023-10-05 NOTE — PLAN OF CARE
10/04/23 6198   Patient Assessment/Suction   Level of Consciousness (AVPU) alert   Respiratory Effort Normal;Unlabored   All Lung Fields Breath Sounds clear   PRE-TX-O2   Device (Oxygen Therapy) room air   SpO2 (!) 94 %   Pulse Oximetry Type Continuous   $ Pulse Oximetry - Multiple Charge Pulse Oximetry - Multiple   Pulse 80   Resp 18   Education   $ Education 15 min   Respiratory Evaluation   $ Care Plan Tech Time 15 min

## 2023-10-05 NOTE — OP NOTE
[facility]  OrthopedicSurgery  Operative Note    SUMMARY     Date of Procedure: 10/5/2023     Procedure: Procedure(s) (LRB):  HEMIARTHROPLASTY, HIP (Right)       Surgeon(s) and Role:     * Avery Borrero MD - Primary    ASSISTANT: Dread Rodriguez    Pre-Operative Diagnosis: Closed fracture of right hip, initial encounter [S72.001A]    Post-Operative Diagnosis: Post-Op Diagnosis Codes:     * Closed fracture of right hip, initial encounter [S72.001A]    Anesthesia: General    Complications: No    Estimated Blood Loss (EBL): 175 mL           Implants:   Implant Name Type Inv. Item Serial No.  Lot No. LRB No. Used Action   Omnifit HFx Hip Stem    ROLAND [id number] Right 1 Implanted   UHR Universal Head Bipolar Component    ROLAND [id number] Right 1 Implanted   C- Taper Head + 2.5 Offset LFIT Head    ROLAND [id number] Right 1 Implanted       Specimens:   Specimen (24h ago, onward)      None                    Condition: Good    Disposition: PACU - hemodynamically stable.    Attestation: I was present and scrubbed for the entire procedure.    Indications:This is a 78 year old male who had a fall and suffered and a displaced femoral neck fracture. Patient was evaluated and cleared for surgery.      PROCEDURE IN DETAIL: Risks, benefits and alternatives of the procedure were   explained to the patient including, but not limited to damage to nerves,   arteries, blood vessels. Also explained risk of infection, DVT, dislocation,   limp as well as anesthetic complications including seizure, stroke, heart attack  and death. They understood this and signed informed consent. The patient's   Righthip was marked prior to coming to the Operating Room. Once there, a formal   timeout was done in which correct patient, procedure and operative site were all   correctly identified and confirmed by the entire operating team, Ancef was given  prior to surgical incision. General  anesthesia was induced. The   patient was placed in lateral decubitus position with the Right hip towards the   ceiling. Right hip was then prepped and draped in normal sterile fashion from   toes to mid abdomen, a standard posterior approach was made, centered over   the tip of the greater trochanter and this was taken through the skin and soft tissue  until the IT band was identified. A small slit in the IT band was made and then   Ramirez scissors were used to extend this. Blunt dissection was used to move some of   the gluteus josé antonio muscle and  fascia open and the posterior hip area. Charnley retractor was then placed. Some bursa   was resected using the Bovie cautery. [place] elevator was used to reflect the   tissue until the piriformis tendon was seen. A Cobra retractor was used to   retract the gluteus medius and minimus tendons away from the piriformis.   Piriformis was tagged with an 0 Ethibond and then released off the greater   trochanter. We then released the rest of the external rotators and hip capsule   in a single envelope, intact for repair at the end of the case.  Fracture was identified and a corkscrew was used to remove the femoral head.   Femoral head was excised and sized to a 51, so the 51 trial was placed on handle and   then placed in the acetabulum, it had a good fit with good suction fit, so we   prepare to use a 51. We then flexed the hip maximally, internally rotated,   exposing the neck. We then placed a femoral elevator under it and a second Cobra   just below the lesser trochanter. We then measured about 1 finger width proximal   to the lesser troch and marked it and then made an osteotomy cut for the femoral neck.   After this was done, we then prepared the femoral canal. Cookie cutter was used  to enter the canal and then entry reamer was then used.  A lateralizing reamer was used to lateralize and then we began reaming by hand. We reamed up from a 5 to about a 8  and then began  broaching and were able to broach up to a size 8. We used calcar planar and then trialed. It was stable with the +2.5 neck and the head. So, we prepared for final stem implantation. All the trials were removed. The canal was copiously   irrigated with Pulsavac. the final stem size 8 was then   implanted  in place. After this was done, we trialed   one final time, liking the 2.5. The patient had good stability, equal leg  lengths, stability with the position of sleep, flexion and internal rotation,   external rotation with minimal shuck, so we then proceeded with final head   implantation. The trunnion was dried and washed and dried and then the final   head was implanted. We then reduced the hip and we then closed the capsule back  through bone tunnels using 0 Ethibond. Piriformis was closed in to the gluteus  medius tendon using 0 Ethibond as well. The IT band was closed using a StrataFix, deep fat was closed using 0 Vicryl and skin was closed using a 2-0 Vicryl and running 3-0 StrataFix and Dermabond. Sterile dressing was applied. The patient was placed in an abduction pillow, extubated, awakened, transferred from the Operating Room to   the Recovery Room in stable condition.

## 2023-10-05 NOTE — SUBJECTIVE & OBJECTIVE
Interval History:  Patient seen and examined this morning.  Reports pain in the right hip, worsened with movement.  Plan for surgery today.    Review of Systems   Constitutional:  Negative for diaphoresis and fatigue.   Cardiovascular:  Negative for chest pain and palpitations.   Musculoskeletal:         Pain around right hip.   Neurological:  Negative for dizziness and headaches.     Objective:     Vital Signs (Most Recent):  Temp: 98 °F (36.7 °C) (10/05/23 1350)  Pulse: 80 (10/05/23 1445)  Resp: 20 (10/05/23 1445)  BP: (!) 155/100 (10/05/23 1445)  SpO2: 95 % (10/05/23 1445) Vital Signs (24h Range):  Temp:  [98 °F (36.7 °C)-98.5 °F (36.9 °C)] 98 °F (36.7 °C)  Pulse:  [72-85] 80  Resp:  [16-20] 20  SpO2:  [93 %-98 %] 95 %  BP: (100-155)/() 155/100     Weight: 76.1 kg (167 lb 12.8 oz)  Body mass index is 22.76 kg/m².    Intake/Output Summary (Last 24 hours) at 10/5/2023 1503  Last data filed at 10/5/2023 1401  Gross per 24 hour   Intake 1740 ml   Output 576 ml   Net 1164 ml         Physical Exam  Constitutional:       Appearance: Normal appearance.   Cardiovascular:      Rate and Rhythm: Normal rate.      Heart sounds: Normal heart sounds.   Pulmonary:      Effort: Pulmonary effort is normal. No respiratory distress.   Musculoskeletal:         General: Tenderness (Right hip) present.   Skin:     General: Skin is warm and dry.   Neurological:      Mental Status: He is alert.             Significant Labs: All pertinent labs within the past 24 hours have been reviewed.  CBC:   Recent Labs   Lab 10/03/23  2058 10/04/23  0400 10/05/23  0336   WBC 13.28* 14.38* 17.29*   HGB 11.6* 11.7* 11.7*   HCT 36.7* 38.0* 37.9*    173 174     CMP:   Recent Labs   Lab 10/03/23  2058 10/04/23  0400 10/05/23  0336    136 137   K 4.3 4.4 4.3    107 105   CO2 23 21* 22*   * 171* 151*   BUN 43* 45* 43*   CREATININE 1.7* 1.6* 1.4   CALCIUM 9.8 9.7 10.0   PROT 6.5 6.4 6.3   ALBUMIN 3.9 3.8 3.7   BILITOT 1.2*  1.6* 1.7*   ALKPHOS 109 89 103   AST 18 17 15   ALT 18 14 13   ANIONGAP 7* 8 10       Significant Imaging: I have reviewed all pertinent imaging results/findings within the past 24 hours.

## 2023-10-06 LAB
ALBUMIN SERPL BCP-MCNC: 3.3 G/DL (ref 3.5–5.2)
ALP SERPL-CCNC: 90 U/L (ref 55–135)
ALT SERPL W/O P-5'-P-CCNC: 12 U/L (ref 10–44)
ANION GAP SERPL CALC-SCNC: 12 MMOL/L (ref 8–16)
AST SERPL-CCNC: 21 U/L (ref 10–40)
BASOPHILS # BLD AUTO: 0.03 K/UL (ref 0–0.2)
BASOPHILS NFR BLD: 0.2 % (ref 0–1.9)
BILIRUB SERPL-MCNC: 1.1 MG/DL (ref 0.1–1)
BUN SERPL-MCNC: 44 MG/DL (ref 8–23)
CALCIUM SERPL-MCNC: 9.8 MG/DL (ref 8.7–10.5)
CHLORIDE SERPL-SCNC: 107 MMOL/L (ref 95–110)
CO2 SERPL-SCNC: 20 MMOL/L (ref 23–29)
CREAT SERPL-MCNC: 1.5 MG/DL (ref 0.5–1.4)
DIFFERENTIAL METHOD: ABNORMAL
EOSINOPHIL # BLD AUTO: 0.1 K/UL (ref 0–0.5)
EOSINOPHIL NFR BLD: 0.7 % (ref 0–8)
ERYTHROCYTE [DISTWIDTH] IN BLOOD BY AUTOMATED COUNT: 15.8 % (ref 11.5–14.5)
EST. GFR  (NO RACE VARIABLE): 46.8 ML/MIN/1.73 M^2
GLUCOSE SERPL-MCNC: 174 MG/DL (ref 70–110)
GLUCOSE SERPL-MCNC: 179 MG/DL (ref 70–110)
GLUCOSE SERPL-MCNC: 182 MG/DL (ref 70–110)
GLUCOSE SERPL-MCNC: 192 MG/DL (ref 70–110)
GLUCOSE SERPL-MCNC: 239 MG/DL (ref 70–110)
HCT VFR BLD AUTO: 29.7 % (ref 40–54)
HGB BLD-MCNC: 9.4 G/DL (ref 14–18)
IMM GRANULOCYTES # BLD AUTO: 0.05 K/UL (ref 0–0.04)
IMM GRANULOCYTES NFR BLD AUTO: 0.4 % (ref 0–0.5)
LYMPHOCYTES # BLD AUTO: 0.5 K/UL (ref 1–4.8)
LYMPHOCYTES NFR BLD: 4.4 % (ref 18–48)
MCH RBC QN AUTO: 26.4 PG (ref 27–31)
MCHC RBC AUTO-ENTMCNC: 31.6 G/DL (ref 32–36)
MCV RBC AUTO: 83 FL (ref 82–98)
MONOCYTES # BLD AUTO: 0.8 K/UL (ref 0.3–1)
MONOCYTES NFR BLD: 6.4 % (ref 4–15)
NEUTROPHILS # BLD AUTO: 10.7 K/UL (ref 1.8–7.7)
NEUTROPHILS NFR BLD: 87.9 % (ref 38–73)
NRBC BLD-RTO: 0 /100 WBC
PLATELET # BLD AUTO: 170 K/UL (ref 150–450)
PMV BLD AUTO: 11.9 FL (ref 9.2–12.9)
POTASSIUM SERPL-SCNC: 4.1 MMOL/L (ref 3.5–5.1)
PROT SERPL-MCNC: 5.9 G/DL (ref 6–8.4)
RBC # BLD AUTO: 3.56 M/UL (ref 4.6–6.2)
SODIUM SERPL-SCNC: 139 MMOL/L (ref 136–145)
TROPONIN I SERPL HS-MCNC: 134.1 PG/ML (ref 0–14.9)
TROPONIN I SERPL HS-MCNC: 135.3 PG/ML (ref 0–14.9)
TROPONIN I SERPL HS-MCNC: 136.5 PG/ML (ref 0–14.9)
TROPONIN I SERPL HS-MCNC: 146 PG/ML (ref 0–14.9)
WBC # BLD AUTO: 12.16 K/UL (ref 3.9–12.7)

## 2023-10-06 PROCEDURE — 97161 PT EVAL LOW COMPLEX 20 MIN: CPT

## 2023-10-06 PROCEDURE — 84484 ASSAY OF TROPONIN QUANT: CPT | Performed by: INTERNAL MEDICINE

## 2023-10-06 PROCEDURE — 94761 N-INVAS EAR/PLS OXIMETRY MLT: CPT

## 2023-10-06 PROCEDURE — 36415 COLL VENOUS BLD VENIPUNCTURE: CPT | Performed by: INTERNAL MEDICINE

## 2023-10-06 PROCEDURE — 25000003 PHARM REV CODE 250: Performed by: STUDENT IN AN ORGANIZED HEALTH CARE EDUCATION/TRAINING PROGRAM

## 2023-10-06 PROCEDURE — 25000003 PHARM REV CODE 250: Performed by: ORTHOPAEDIC SURGERY

## 2023-10-06 PROCEDURE — 97530 THERAPEUTIC ACTIVITIES: CPT

## 2023-10-06 PROCEDURE — 80053 COMPREHEN METABOLIC PANEL: CPT | Performed by: ORTHOPAEDIC SURGERY

## 2023-10-06 PROCEDURE — 63600175 PHARM REV CODE 636 W HCPCS: Performed by: STUDENT IN AN ORGANIZED HEALTH CARE EDUCATION/TRAINING PROGRAM

## 2023-10-06 PROCEDURE — 85025 COMPLETE CBC W/AUTO DIFF WBC: CPT | Performed by: ORTHOPAEDIC SURGERY

## 2023-10-06 PROCEDURE — 12000002 HC ACUTE/MED SURGE SEMI-PRIVATE ROOM

## 2023-10-06 PROCEDURE — 99231 SBSQ HOSP IP/OBS SF/LOW 25: CPT | Mod: ,,, | Performed by: INTERNAL MEDICINE

## 2023-10-06 PROCEDURE — 99231 PR SUBSEQUENT HOSPITAL CARE,LEVL I: ICD-10-PCS | Mod: ,,, | Performed by: INTERNAL MEDICINE

## 2023-10-06 PROCEDURE — 36415 COLL VENOUS BLD VENIPUNCTURE: CPT | Performed by: STUDENT IN AN ORGANIZED HEALTH CARE EDUCATION/TRAINING PROGRAM

## 2023-10-06 PROCEDURE — 84484 ASSAY OF TROPONIN QUANT: CPT | Mod: 91 | Performed by: STUDENT IN AN ORGANIZED HEALTH CARE EDUCATION/TRAINING PROGRAM

## 2023-10-06 RX ORDER — IBUPROFEN 200 MG
16 TABLET ORAL
Status: DISCONTINUED | OUTPATIENT
Start: 2023-10-06 | End: 2023-10-09 | Stop reason: HOSPADM

## 2023-10-06 RX ORDER — TOLTERODINE 4 MG/1
4 CAPSULE, EXTENDED RELEASE ORAL DAILY
Qty: 90 CAPSULE | Refills: 1 | Status: SHIPPED | OUTPATIENT
Start: 2023-10-06

## 2023-10-06 RX ORDER — CLOPIDOGREL BISULFATE 75 MG/1
75 TABLET ORAL DAILY
Status: DISCONTINUED | OUTPATIENT
Start: 2023-10-06 | End: 2023-10-09 | Stop reason: HOSPADM

## 2023-10-06 RX ORDER — GLUCAGON 1 MG
1 KIT INJECTION
Status: DISCONTINUED | OUTPATIENT
Start: 2023-10-06 | End: 2023-10-09 | Stop reason: HOSPADM

## 2023-10-06 RX ORDER — IBUPROFEN 200 MG
24 TABLET ORAL
Status: DISCONTINUED | OUTPATIENT
Start: 2023-10-06 | End: 2023-10-09 | Stop reason: HOSPADM

## 2023-10-06 RX ORDER — INSULIN ASPART 100 [IU]/ML
0-10 INJECTION, SOLUTION INTRAVENOUS; SUBCUTANEOUS
Status: DISCONTINUED | OUTPATIENT
Start: 2023-10-06 | End: 2023-10-09 | Stop reason: HOSPADM

## 2023-10-06 RX ORDER — ENOXAPARIN SODIUM 100 MG/ML
40 INJECTION SUBCUTANEOUS EVERY 24 HOURS
Status: DISCONTINUED | OUTPATIENT
Start: 2023-10-06 | End: 2023-10-09 | Stop reason: HOSPADM

## 2023-10-06 RX ADMIN — POTASSIUM CHLORIDE 10 MEQ: 750 CAPSULE, EXTENDED RELEASE ORAL at 09:10

## 2023-10-06 RX ADMIN — INSULIN ASPART 1 UNITS: 100 INJECTION, SOLUTION INTRAVENOUS; SUBCUTANEOUS at 09:10

## 2023-10-06 RX ADMIN — FINASTERIDE 5 MG: 5 TABLET, FILM COATED ORAL at 09:10

## 2023-10-06 RX ADMIN — SERTRALINE HYDROCHLORIDE 50 MG: 50 TABLET ORAL at 09:10

## 2023-10-06 RX ADMIN — ATORVASTATIN CALCIUM 40 MG: 40 TABLET, FILM COATED ORAL at 09:10

## 2023-10-06 RX ADMIN — CLOPIDOGREL BISULFATE 75 MG: 75 TABLET, FILM COATED ORAL at 11:10

## 2023-10-06 RX ADMIN — DOCUSATE SODIUM 100 MG: 100 CAPSULE, LIQUID FILLED ORAL at 09:10

## 2023-10-06 RX ADMIN — MIDODRINE HYDROCHLORIDE 5 MG: 2.5 TABLET ORAL at 09:10

## 2023-10-06 RX ADMIN — METOPROLOL TARTRATE 12.5 MG: 25 TABLET, FILM COATED ORAL at 09:10

## 2023-10-06 RX ADMIN — PANTOPRAZOLE SODIUM 40 MG: 40 TABLET, DELAYED RELEASE ORAL at 05:10

## 2023-10-06 RX ADMIN — FLUDROCORTISONE ACETATE 100 MCG: 0.1 TABLET ORAL at 09:10

## 2023-10-06 RX ADMIN — MIDODRINE HYDROCHLORIDE 5 MG: 2.5 TABLET ORAL at 05:10

## 2023-10-06 RX ADMIN — MIDODRINE HYDROCHLORIDE 5 MG: 2.5 TABLET ORAL at 12:10

## 2023-10-06 RX ADMIN — OXYBUTYNIN CHLORIDE 10 MG: 10 TABLET, EXTENDED RELEASE ORAL at 09:10

## 2023-10-06 RX ADMIN — ENOXAPARIN SODIUM 40 MG: 40 INJECTION SUBCUTANEOUS at 05:10

## 2023-10-06 RX ADMIN — HYDROCODONE BITARTRATE AND ACETAMINOPHEN 1 TABLET: 5; 325 TABLET ORAL at 09:10

## 2023-10-06 RX ADMIN — ASPIRIN 81 MG 81 MG: 81 TABLET ORAL at 09:10

## 2023-10-06 RX ADMIN — SPIRONOLACTONE 25 MG: 25 TABLET ORAL at 09:10

## 2023-10-06 NOTE — PLAN OF CARE
Problem: Physical Therapy  Goal: Physical Therapy Goal  Description: Goals to be met by: 10/27/23     Patient will increase functional independence with mobility by performin. Supine to sit with MInimal Assistance  2. Sit to supine with MInimal Assistance  3. Sit to stand transfer with Minimal Assistance  4. Bed to chair transfer with Minimal Assistance using Rolling Walker  5. Gait  x 25 feet with Minimal Assistance using Rolling Walker.     Outcome: Ongoing, Progressing

## 2023-10-06 NOTE — ASSESSMENT & PLAN NOTE
Continue physical therapy.  Weightbearing as tolerated right lower extremity.  Posterior hip precautions.    Pain control.    VTE prophylaxis.    Okay to resume aspirin and Plavix for cardiac stents.    Patient will likely need rehab or skilled nursing upon discharge.

## 2023-10-06 NOTE — PLAN OF CARE
CM spoke with pt and son at bedside regarding PT/OT recommendations for SNF. CM obtained pt choice and referrals sent to [facility], [facility] and [facility]. Pt is not medically clear at this time.       10/06/23 150   Discharge Reassessment   Assessment Type Discharge Planning Reassessment   Did the patient's condition or plan change since previous assessment? Yes   Discharge Plan discussed with: Patient   Discharge Plan A Skilled Nursing Facility   Discharge Plan B Skilled Nursing Facility   DME Needed Upon Discharge  none   Transition of Care Barriers None   Why the patient remains in the hospital Requires continued medical care   Post-Acute Status   Post-Acute Authorization Placement   Post-Acute Placement Status Referrals Sent   Patient choice form signed by patient/caregiver List with quality metrics by geographic area provided

## 2023-10-06 NOTE — SUBJECTIVE & OBJECTIVE
Principal Problem:Hip fracture      Interval History:  Denies chest pain.  Hip with expected postoperative pain.    Review of patient's allergies indicates:   Allergen Reactions    Adhesive Other (See Comments)     SILK TAPE PULL SKIN OFF    Metformin Other (See Comments)     Weight loss cachexia anorexia,diarrhea.    Pcn [penicillins]      Patient states he passed out from this medication when he was 10 years old.        Current Facility-Administered Medications   Medication    acetaminophen tablet 650 mg    acetaminophen tablet 650 mg    aspirin chewable tablet 81 mg    atorvastatin tablet 40 mg    clopidogreL tablet 75 mg    dextrose 50% injection 12.5 g    dextrose 50% injection 25 g    docusate sodium capsule 100 mg    enoxaparin injection 40 mg    finasteride tablet 5 mg    fludrocortisone tablet 100 mcg    fluticasone propionate 50 mcg/actuation nasal spray 50 mcg    glucagon (human recombinant) injection 1 mg    glucose chewable tablet 16 g    glucose chewable tablet 24 g    HYDROcodone-acetaminophen 5-325 mg per tablet 1 tablet    insulin aspart U-100 pen 0-5 Units    loperamide capsule 2 mg    melatonin tablet 6 mg    metoprolol tartrate (LOPRESSOR) split tablet 12.5 mg    midodrine tablet 5 mg    ondansetron injection 4 mg    oxybutynin 24 hr tablet 10 mg    pantoprazole EC tablet 40 mg    polyethylene glycol packet 17 g    potassium chloride CR capsule 10 mEq    pregabalin capsule 50 mg    sertraline tablet 50 mg    sodium chloride 0.9% flush 10 mL    sodium chloride 0.9% flush 10 mL    spironolactone tablet 25 mg     Facility-Administered Medications Ordered in Other Encounters   Medication    lactated ringers infusion    LIDOcaine (PF) 10 mg/ml (1%) injection 10 mg     Objective:     Vital Signs (Most Recent):  Temp: 98 °F (36.7 °C) (10/06/23 1138)  Pulse: 80 (10/06/23 1138)  Resp: 17 (10/06/23 1138)  BP: 118/69 (10/06/23 1138)  SpO2: 96 % (10/06/23 1138) Vital Signs (24h Range):  Temp:  [97.7 °F (36.5  "°C)-98.4 °F (36.9 °C)] 98 °F (36.7 °C)  Pulse:  [76-92] 80  Resp:  [16-20] 17  SpO2:  [94 %-98 %] 96 %  BP: ()/() 118/69     Weight: 76.1 kg (167 lb 12.8 oz)  Height: 6' (182.9 cm)  Body mass index is 22.76 kg/m².      Intake/Output Summary (Last 24 hours) at 10/6/2023 1406  Last data filed at 10/6/2023 0505  Gross per 24 hour   Intake 349.46 ml   Output 125 ml   Net 224.46 ml        General    Nursing note and vitals reviewed.  Constitutional: He is oriented to person, place, and time. He appears well-developed and well-nourished. No distress.   HENT:   Head: Atraumatic.   Eyes: EOM are normal.   Cardiovascular:  Normal rate.            Pulmonary/Chest: Effort normal.   Abdominal: Soft.   Neurological: He is alert and oriented to person, place, and time.   Psychiatric: His behavior is normal.             Right Hip Exam     Inspection   Swelling: present  Erythema: absent    Tenderness   The patient tender to palpation of the trochanteric bursa.    Range of Motion   Abduction:  abnormal   Adduction:  abnormal   Extension:  abnormal   Flexion:  abnormal   External rotation:  abnormal   Internal rotation:  abnormal     Tests   Log Roll: positive    Other   Sensation: normal    Comments:  Dressing is clean dry and intact.  No significant swelling of the hip or thigh.  Left Hip Exam   Left hip exam is normal.          Vascular Exam     Right Pulses  Dorsalis Pedis:      2+             Significant Labs: CBC:   Recent Labs   Lab 10/05/23  0336 10/06/23  0748   WBC 17.29* 12.16   HGB 11.7* 9.4*   HCT 37.9* 29.7*    170     CMP:   Recent Labs   Lab 10/05/23  0336      K 4.3      CO2 22*   *   BUN 43*   CREATININE 1.4   CALCIUM 10.0   PROT 6.3   ALBUMIN 3.7   BILITOT 1.7*   ALKPHOS 103   AST 15   ALT 13   ANIONGAP 10     Coagulation: No results for input(s): "LABPROT", "INR", "APTT" in the last 48 hours.  All pertinent labs within the past 24 hours have been reviewed.    Significant " Imaging: X-Ray: I have reviewed all pertinent results/findings and my personal findings are:  Well-positioned hip hemiarthroplasty without complication

## 2023-10-06 NOTE — NURSING
Pt is removing tele box and taking off strips, instructed pt to keep on tele box, pt is confused on time and situation, will notify MD

## 2023-10-06 NOTE — PT/OT/SLP PROGRESS
Occupational Therapy      Patient Name:  Tono Saez   MRN:  [id number]    Attempted OT evaluation this AM. Patient declined due to RLE pain. Will follow-up tomorrow (10/7).    10/6/2023

## 2023-10-06 NOTE — NURSING
Pt was log rolled throughout the shift, pt stated he is in a comfortable position and refuses to be moved

## 2023-10-06 NOTE — ASSESSMENT & PLAN NOTE
S/p right hemiarthroplasty 10/5/23 with Dr. Borrero  Discussed with Dr. Borrero, will restart plavix  Patient placed on lovenox for dvt ppx   Hb is stable  Pain medications as needed

## 2023-10-06 NOTE — PT/OT/SLP EVAL
Physical Therapy Evaluation    Patient Name:  Tono Saez   MRN:  [id number]    Recommendations:     Discharge Recommendations: nursing facility, skilled   Discharge Equipment Recommendations: other (see comments) (TBD)   Barriers to discharge: None    Assessment:     Tono Saez is a 78 y.o. male admitted with a medical diagnosis of Hip fracture.  He presents with the following impairments/functional limitations: impaired endurance, weakness, impaired functional mobility, gait instability, impaired balance, decreased lower extremity function, pain, decreased ROM, edema, orthopedic precautions .  Patient agreeable to PT evaluation this morning.  Patient presented supine in bed and required max assist to transfer to sitting EOB.  Patient then transferred to standing wit a RW and max assist but was only able to maintain standing for a few seconds.  Patient then had a BM in his shorts so had to return to supine with max assist x 2 to get cleaned up by nursing.  Patient then had to roll back forth several times to assist with nursing cleaning patient.  Bilateral rolling done with max assist.      Rehab Prognosis: Good; patient would benefit from acute skilled PT services to address these deficits and reach maximum level of function.    Recent Surgery: Procedure(s) (LRB):  HEMIARTHROPLASTY, HIP (Right) 1 Day Post-Op    Plan:     During this hospitalization, patient to be seen daily to address the identified rehab impairments via gait training, therapeutic activities, therapeutic exercises and progress toward the following goals:    Plan of Care Expires:  11/09/23    Subjective     Chief Complaint: pain  Patient/Family Comments/goals: none given  Pain/Comfort:  Pain Rating 1: 4/10  Location - Side 1: Right  Location - Orientation 1: lower  Location 1: hip  Pain Addressed 1: Reposition, Cessation of Activity  Pain Rating Post-Intervention 1: 10/10    Patients cultural, spiritual, Adventist conflicts given the current  situation:      Living Environment:  Currently lives alone in a 1 story home.  Prior to admission, patients level of function was modified independent.  Equipment used at home: walker, rolling.  DME owned (not currently used): none.  Upon discharge, patient will have assistance from family.    Objective:     Communicated with nurse prior to session.  Patient found supine with bed alarm, PICC line, telemetry  upon PT entry to room.    General Precautions: Standard, fall  Orthopedic Precautions:RLE weight bearing as tolerated, RLE posterior precautions   Braces:    Respiratory Status: Room air    Exams:  RLE ROM: not tested  RLE Strength: hip not tested, knee and ankle 3/5 overall  LLE ROM: WFL  LLE Strength: Deficits: 4/5 overall    Functional Mobility:  Bed Mobility:     Supine to Sit: maximal assistance  Sit to Supine: maximal assistance  Transfers:     Sit to Stand:  maximal assistance with rolling walker  Balance: sitting EOB intermittent min assist, standing RW mod assist but only a few seconds      AM-PAC 6 CLICK MOBILITY  Total Score:11       Treatment & Education:  Educated on posterior hip precaution  Transfer training supine to/from sit max assist, sit to stand RW max  Bed mobility to scoot HOB total assist  Sitting balance x 15 minutes intermittent min assist  Standing tolerance RW mod assist for several seconds    Patient left supine with call button in reach, bed alarm on, and nurse notified.    GOALS:   Multidisciplinary Problems       Physical Therapy Goals          Problem: Physical Therapy    Goal Priority Disciplines Outcome Goal Variances Interventions   Physical Therapy Goal     PT, PT/OT Ongoing, Progressing     Description: Goals to be met by: 10/27/23     Patient will increase functional independence with mobility by performin. Supine to sit with MInimal Assistance  2. Sit to supine with MInimal Assistance  3. Sit to stand transfer with Minimal Assistance  4. Bed to chair transfer with  Minimal Assistance using Rolling Walker  5. Gait  x 25 feet with Minimal Assistance using Rolling Walker.                          History:     Past Medical History:   Diagnosis Date    Anemia     Anticoagulant long-term use     Arthritis     CAD (coronary artery disease)     CABG, STENTS '97,'99,'01,'05    Cancer     SKIN    Cataract     Diabetes mellitus     Diabetes mellitus type II     GERD (gastroesophageal reflux disease)     GI bleed 2020    Hypertension     Myocardial infarction     Wears glasses        Past Surgical History:   Procedure Laterality Date    ANGIOGRAM, CORONARY, WITH LEFT HEART CATHETERIZATION N/A 08/24/2023    Procedure: Angiogram, Coronary, with Left Heart Cath;  Surgeon: Robles Mckoy MD;  Location: [facility] CATH/EP LAB;  Service: Cardiology;  Laterality: N/A;    CARDIAC PACEMAKER PLACEMENT      CARDIAC PACEMAKER PLACEMENT      CARDIAC PACEMAKER PLACEMENT  04/26/2018    replaced, [employer]    CARDIAC SURGERY      1995   CABG X 5    CHOLECYSTECTOMY      COLON SURGERY      COLONOSCOPY N/A 02/21/2020    Procedure: COLONOSCOPY;  Surgeon: Abe Barragan III, MD;  Location: [facility] ENDO;  Service: Endoscopy;  Laterality: N/A;    COLONOSCOPY N/A 02/23/2020    Procedure: COLONOSCOPY;  Surgeon: Bob Locke Jr., MD;  Location: [facility] ENDO;  Service: Endoscopy;  Laterality: N/A;    CORONARY ANGIOGRAPHY INCLUDING BYPASS GRAFTS WITH CATHETERIZATION OF LEFT HEART N/A 10/26/2022    Procedure: ANGIOGRAM, CORONARY, INCLUDING BYPASS GRAFT, WITH LEFT HEART CATHETERIZATION;  Surgeon: Robles Mckoy MD;  Location: [facility] CATH/EP LAB;  Service: Cardiology;  Laterality: N/A;    CORONARY ARTERY BYPASS GRAFT  1995    CORONARY BYPASS GRAFT ANGIOGRAPHY  08/24/2023    Procedure: Bypass graft study;  Surgeon: Robles Mckoy MD;  Location: [facility] CATH/EP LAB;  Service: Cardiology;;    CORONARY STENT PLACEMENT      stent x 5    ESOPHAGOGASTRODUODENOSCOPY N/A 02/23/2020    Procedure: EGD (ESOPHAGOGASTRODUODENOSCOPY);   Surgeon: Bob Locke Jr., MD;  Location: [facility] ENDO;  Service: Endoscopy;  Laterality: N/A;    EYE SURGERY      BILAT CATARACT    head laceration   01/19/2018    HEMIARTHROPLASTY OF HIP Right 10/5/2023    Procedure: HEMIARTHROPLASTY, HIP;  Surgeon: Avery Borrero MD;  Location: [facility] OR;  Service: Orthopedics;  Laterality: Right;    HEMORRHOID SURGERY      INJECTION OF ANESTHETIC AGENT AROUND MEDIAL BRANCH NERVES INNERVATING LUMBAR FACET JOINT Bilateral 06/29/2023    Procedure: Block-nerve-medial branch-lumbar;  Surgeon: Maurice Montenegro MD;  Location: [facility] OR;  Service: Pain Management;  Laterality: Bilateral;  L3,4,5 MBB    INJECTION OF ANESTHETIC AGENT AROUND MEDIAL BRANCH NERVES INNERVATING LUMBAR FACET JOINT Bilateral 07/25/2023    Procedure: Block-nerve-medial branch-lumbar;  Surgeon: Maurice Montenegro MD;  Location: [facility] OR;  Service: Anesthesiology;  Laterality: Bilateral;  L3,4,5 MBB #2    INTRAVASCULAR ULTRASOUND, CORONARY N/A 08/24/2023    Procedure: Ultrasound-coronary;  Surgeon: Robles Mckoy MD;  Location: [facility] CATH/EP LAB;  Service: Cardiology;  Laterality: N/A;    PTCA, SINGLE VESSEL  08/24/2023    Procedure: PTCA, Single Vessel;  Surgeon: Robles Mckoy MD;  Location: [facility] CATH/EP LAB;  Service: Cardiology;;    SMALL BOWEL ENTEROSCOPY N/A 02/21/2020    Procedure: ENTEROSCOPY;  Surgeon: Abe Barragan III, MD;  Location: [facility] ENDO;  Service: Endoscopy;  Laterality: N/A;    STENT, DRUG ELUTING, SINGLE VESSEL, CORONARY  08/24/2023    Procedure: Stent, Drug Eluting, Single Vessel, Coronary;  Surgeon: Robles Mckoy MD;  Location: [facility] CATH/EP LAB;  Service: Cardiology;;    stint         Time Tracking:     PT Received On: 10/06/23  PT Start Time: 0828     PT Stop Time: 0910  PT Total Time (min): 42 min     Billable Minutes: Evaluation 15 and Therapeutic Activity 27      10/06/2023

## 2023-10-06 NOTE — PROGRESS NOTES
[facility]  Orthopedics  Progress Note    Patient Name: Tono Saez  MRN: [id number]  Admission Date: 10/3/2023  Hospital Length of Stay: 3 days  Attending Provider: Harshad Haddad MD  Primary Care Provider: Scott Staley MD  Follow-up For: Procedure(s) (LRB):  HEMIARTHROPLASTY, HIP (Right)    Post-Operative Day: 1 Day Post-Op  Subjective:     Principal Problem:Hip fracture      Interval History:  Denies chest pain.  Hip with expected postoperative pain.    Review of patient's allergies indicates:   Allergen Reactions    Adhesive Other (See Comments)     SILK TAPE PULL SKIN OFF    Metformin Other (See Comments)     Weight loss cachexia anorexia,diarrhea.    Pcn [penicillins]      Patient states he passed out from this medication when he was 10 years old.        Current Facility-Administered Medications   Medication    acetaminophen tablet 650 mg    acetaminophen tablet 650 mg    aspirin chewable tablet 81 mg    atorvastatin tablet 40 mg    clopidogreL tablet 75 mg    dextrose 50% injection 12.5 g    dextrose 50% injection 25 g    docusate sodium capsule 100 mg    enoxaparin injection 40 mg    finasteride tablet 5 mg    fludrocortisone tablet 100 mcg    fluticasone propionate 50 mcg/actuation nasal spray 50 mcg    glucagon (human recombinant) injection 1 mg    glucose chewable tablet 16 g    glucose chewable tablet 24 g    HYDROcodone-acetaminophen 5-325 mg per tablet 1 tablet    insulin aspart U-100 pen 0-5 Units    loperamide capsule 2 mg    melatonin tablet 6 mg    metoprolol tartrate (LOPRESSOR) split tablet 12.5 mg    midodrine tablet 5 mg    ondansetron injection 4 mg    oxybutynin 24 hr tablet 10 mg    pantoprazole EC tablet 40 mg    polyethylene glycol packet 17 g    potassium chloride CR capsule 10 mEq    pregabalin capsule 50 mg    sertraline tablet 50 mg    sodium chloride 0.9% flush 10 mL    sodium chloride 0.9% flush 10 mL    spironolactone tablet 25 mg      Facility-Administered Medications Ordered in Other Encounters   Medication    lactated ringers infusion    LIDOcaine (PF) 10 mg/ml (1%) injection 10 mg     Objective:     Vital Signs (Most Recent):  Temp: 98 °F (36.7 °C) (10/06/23 1138)  Pulse: 80 (10/06/23 1138)  Resp: 17 (10/06/23 1138)  BP: 118/69 (10/06/23 1138)  SpO2: 96 % (10/06/23 1138) Vital Signs (24h Range):  Temp:  [97.7 °F (36.5 °C)-98.4 °F (36.9 °C)] 98 °F (36.7 °C)  Pulse:  [76-92] 80  Resp:  [16-20] 17  SpO2:  [94 %-98 %] 96 %  BP: ()/() 118/69     Weight: 76.1 kg (167 lb 12.8 oz)  Height: 6' (182.9 cm)  Body mass index is 22.76 kg/m².      Intake/Output Summary (Last 24 hours) at 10/6/2023 1406  Last data filed at 10/6/2023 0505  Gross per 24 hour   Intake 349.46 ml   Output 125 ml   Net 224.46 ml        General    Nursing note and vitals reviewed.  Constitutional: He is oriented to person, place, and time. He appears well-developed and well-nourished. No distress.   HENT:   Head: Atraumatic.   Eyes: EOM are normal.   Cardiovascular:  Normal rate.            Pulmonary/Chest: Effort normal.   Abdominal: Soft.   Neurological: He is alert and oriented to person, place, and time.   Psychiatric: His behavior is normal.             Right Hip Exam     Inspection   Swelling: present  Erythema: absent    Tenderness   The patient tender to palpation of the trochanteric bursa.    Range of Motion   Abduction:  abnormal   Adduction:  abnormal   Extension:  abnormal   Flexion:  abnormal   External rotation:  abnormal   Internal rotation:  abnormal     Tests   Log Roll: positive    Other   Sensation: normal    Comments:  Dressing is clean dry and intact.  No significant swelling of the hip or thigh.  Left Hip Exam   Left hip exam is normal.          Vascular Exam     Right Pulses  Dorsalis Pedis:      2+             Significant Labs: CBC:   Recent Labs   Lab 10/05/23  0336 10/06/23  0748   WBC 17.29* 12.16   HGB 11.7* 9.4*   HCT 37.9* 29.7*   PLT  "174 170     CMP:   Recent Labs   Lab 10/05/23  0336      K 4.3      CO2 22*   *   BUN 43*   CREATININE 1.4   CALCIUM 10.0   PROT 6.3   ALBUMIN 3.7   BILITOT 1.7*   ALKPHOS 103   AST 15   ALT 13   ANIONGAP 10     Coagulation: No results for input(s): "LABPROT", "INR", "APTT" in the last 48 hours.  All pertinent labs within the past 24 hours have been reviewed.    Significant Imaging: X-Ray: I have reviewed all pertinent results/findings and my personal findings are:  Well-positioned hip hemiarthroplasty without complication    Assessment/Plan:     * Hip fracture  Continue physical therapy.  Weightbearing as tolerated right lower extremity.  Posterior hip precautions.    Pain control.    VTE prophylaxis.    Okay to resume aspirin and Plavix for cardiac stents.    Patient will likely need rehab or skilled nursing upon discharge.          Avery Borrero MD  Orthopedics  [facility]  "

## 2023-10-06 NOTE — ASSESSMENT & PLAN NOTE
Patient without chest pain, recently had catheterization on August 2023   No changes on EKG  We will continue to monitor  Discussed with cardiology about elevated troponins

## 2023-10-06 NOTE — PROGRESS NOTES
[facility]  Progress Note    Patient Name: Tono Saez  MRN: [id number]  Patient Class: IP- Inpatient   Admission Date: 10/3/2023  Length of Stay: 3 days  Attending Physician: Harshad Haddad MD  Primary Care Provider: Scott Staley MD        Subjective:     Principal Problem:Hip fracture        HPI:  Tono Saez is a 78 y.o.  male with known history of  CAD w/ stents and post CABG, DM2, HTN, HLD, GERD, GIB who came in today after a mechanical fall.  He states he was standing at his sink and attempted to turn using walker and tripped over it.  He now complains of pain to right thigh and hip area.  CT of head, and c-spine are negative.  Xray shows right side femoral neck fracture. Patient was just discharged from here after a fall and has known orthostatic hypotension.  Patient denies chest pain, shortness of breath, palpitations, abdominal pain, nausea, vomiting, diarrhea, constipation,  hematochezia, hematemesis, fever, cough, congestion, runny nose, changes in vision, hearing, numbness, tingling, headache, focal weakness, dysuria, frequency, hematuria. History was obtained from the patient and ER physician Sign-out.      Overview/Hospital Course:  Patient was admitted for mechanical fall found to have right side femoral neck fracture.  Given recent left heart catheterization in August cardiology was consulted and Plavix was held prior to surgery.  Patient had  right hip surgery on 10/05/2023.  Plavix to be resumed after surgery once cleared by ortho.  Discussed with orthopedics, Plavix resumed.  Troponins elevated during admission, cardiology made aware.          Interval History:  Patient seen and examined this morning, reports some discomfort in right hip.  Hemoglobin 9.4 this morning.  Incision appears clean, no bleeding.    Review of Systems   Constitutional:  Negative for diaphoresis and fatigue.   Cardiovascular:  Negative for chest pain and palpitations.    Musculoskeletal:         Pain around right hip.   Neurological:  Negative for dizziness and headaches.     Objective:     Vital Signs (Most Recent):  Temp: 98.2 °F (36.8 °C) (10/06/23 0714)  Pulse: 92 (10/06/23 0745)  Resp: 17 (10/06/23 0745)  BP: 134/78 (10/06/23 0714)  SpO2: 97 % (10/06/23 0745) Vital Signs (24h Range):  Temp:  [97.7 °F (36.5 °C)-98.4 °F (36.9 °C)] 98.2 °F (36.8 °C)  Pulse:  [76-92] 92  Resp:  [16-20] 17  SpO2:  [94 %-98 %] 97 %  BP: ()/() 134/78     Weight: 76.1 kg (167 lb 12.8 oz)  Body mass index is 22.76 kg/m².    Intake/Output Summary (Last 24 hours) at 10/6/2023 1042  Last data filed at 10/6/2023 0505  Gross per 24 hour   Intake 1849.46 ml   Output 300 ml   Net 1549.46 ml         Physical Exam  Constitutional:       Appearance: Normal appearance.   Cardiovascular:      Rate and Rhythm: Normal rate.      Heart sounds: Normal heart sounds.   Pulmonary:      Effort: Pulmonary effort is normal. No respiratory distress.   Musculoskeletal:         General: Tenderness (Right hip) present.   Skin:     General: Skin is warm and dry.      Comments: Bandage covering right hip incision, appears clean dry and intact.   Neurological:      Mental Status: He is alert.             Significant Labs: All pertinent labs within the past 24 hours have been reviewed.  CBC:   Recent Labs   Lab 10/05/23  0336 10/06/23  0748   WBC 17.29* 12.16   HGB 11.7* 9.4*   HCT 37.9* 29.7*    170     CMP:   Recent Labs   Lab 10/05/23  0336      K 4.3      CO2 22*   *   BUN 43*   CREATININE 1.4   CALCIUM 10.0   PROT 6.3   ALBUMIN 3.7   BILITOT 1.7*   ALKPHOS 103   AST 15   ALT 13   ANIONGAP 10       Significant Imaging: I have reviewed all pertinent imaging results/findings within the past 24 hours.      Assessment/Plan:      * Hip fracture  S/p right hemiarthroplasty 10/5/23 with Dr. Borrero  Discussed with Dr. Borrero, will restart plavix  Patient placed on lovenox for dvt ppx   Hb is  stable  Pain medications as needed      Elevated troponin  Patient without chest pain, recently had catheterization on August 2023   No changes on EKG  We will continue to monitor  Discussed with cardiology about elevated troponins     CAD (coronary artery disease)  Pt with recent CATH- on Aug/2023- on DAPT  Cardio on board and discussed and now plan is to stop plavix and resume aspirin -Plavix to resumed after surgery   Orthopedic were made aware  C/w statin and aspirin  Other GDMT    Type 2 diabetes mellitus with hyperglycemia   POCs stable-   SSI and accu checks    HTN (hypertension)  C/w home meds as tolerated      VTE Risk Mitigation (From admission, onward)           Ordered     enoxaparin injection 40 mg  Every 24 hours         10/06/23 1040     IP VTE LOW RISK PATIENT  Once         10/05/23 1642     Reason for No Pharmacological VTE Prophylaxis  Once        Question:  Reasons:  Answer:  Risk of Bleeding    10/03/23 2235     Place sequential compression device  Until discontinued         10/03/23 2235                    Discharge Planning   LEE: 10/7/2023     Code Status: Full Code   Is the patient medically ready for discharge?:     Reason for patient still in hospital (select all that apply): Patient trending condition  Discharge Plan A: Home Health                  Harshad Haddad MD  Department of Hospital Medicine   [facility]

## 2023-10-06 NOTE — ASSESSMENT & PLAN NOTE
Pt with recent CATH- on Aug/2023- on DAPT  Cardio on board and discussed and now plan is to stop plavix and resume aspirin -Plavix to resumed after surgery   Orthopedic were made aware  C/w statin and aspirin  Other GDMT

## 2023-10-06 NOTE — PROGRESS NOTES
Mr. Saez Troponin went to 134.   We will trend.  Hg dropped to 9.4.  Patient denies CP.  At this time recommend to continue to hold off on plavix.   Will defer this to surgeon on when it is ok to restart plavix  Will be available PRN

## 2023-10-07 PROBLEM — D62 ACUTE POSTOPERATIVE ANEMIA DUE TO EXPECTED BLOOD LOSS: Status: ACTIVE | Noted: 2023-10-07

## 2023-10-07 LAB
ALBUMIN SERPL BCP-MCNC: 3.5 G/DL (ref 3.5–5.2)
ALP SERPL-CCNC: 97 U/L (ref 55–135)
ALT SERPL W/O P-5'-P-CCNC: 16 U/L (ref 10–44)
ANION GAP SERPL CALC-SCNC: 10 MMOL/L (ref 8–16)
AST SERPL-CCNC: 26 U/L (ref 10–40)
BASOPHILS # BLD AUTO: 0.04 K/UL (ref 0–0.2)
BASOPHILS NFR BLD: 0.4 % (ref 0–1.9)
BILIRUB SERPL-MCNC: 1.1 MG/DL (ref 0.1–1)
BUN SERPL-MCNC: 52 MG/DL (ref 8–23)
CALCIUM SERPL-MCNC: 10.2 MG/DL (ref 8.7–10.5)
CHLORIDE SERPL-SCNC: 108 MMOL/L (ref 95–110)
CO2 SERPL-SCNC: 22 MMOL/L (ref 23–29)
CREAT SERPL-MCNC: 1.5 MG/DL (ref 0.5–1.4)
DIFFERENTIAL METHOD: ABNORMAL
EOSINOPHIL # BLD AUTO: 0.2 K/UL (ref 0–0.5)
EOSINOPHIL NFR BLD: 1.8 % (ref 0–8)
ERYTHROCYTE [DISTWIDTH] IN BLOOD BY AUTOMATED COUNT: 16 % (ref 11.5–14.5)
EST. GFR  (NO RACE VARIABLE): 46.8 ML/MIN/1.73 M^2
GLUCOSE SERPL-MCNC: 155 MG/DL (ref 70–110)
GLUCOSE SERPL-MCNC: 159 MG/DL (ref 70–110)
GLUCOSE SERPL-MCNC: 186 MG/DL (ref 70–110)
GLUCOSE SERPL-MCNC: 202 MG/DL (ref 70–110)
GLUCOSE SERPL-MCNC: 243 MG/DL (ref 70–110)
HCT VFR BLD AUTO: 29.7 % (ref 40–54)
HGB BLD-MCNC: 9.1 G/DL (ref 14–18)
IMM GRANULOCYTES # BLD AUTO: 0.06 K/UL (ref 0–0.04)
IMM GRANULOCYTES NFR BLD AUTO: 0.5 % (ref 0–0.5)
LYMPHOCYTES # BLD AUTO: 0.8 K/UL (ref 1–4.8)
LYMPHOCYTES NFR BLD: 7.4 % (ref 18–48)
MCH RBC QN AUTO: 25.9 PG (ref 27–31)
MCHC RBC AUTO-ENTMCNC: 30.6 G/DL (ref 32–36)
MCV RBC AUTO: 85 FL (ref 82–98)
MONOCYTES # BLD AUTO: 1 K/UL (ref 0.3–1)
MONOCYTES NFR BLD: 8.9 % (ref 4–15)
NEUTROPHILS # BLD AUTO: 9 K/UL (ref 1.8–7.7)
NEUTROPHILS NFR BLD: 81 % (ref 38–73)
NRBC BLD-RTO: 0 /100 WBC
PLATELET # BLD AUTO: 184 K/UL (ref 150–450)
PMV BLD AUTO: 11.8 FL (ref 9.2–12.9)
POTASSIUM SERPL-SCNC: 3.8 MMOL/L (ref 3.5–5.1)
PROT SERPL-MCNC: 6.2 G/DL (ref 6–8.4)
RBC # BLD AUTO: 3.51 M/UL (ref 4.6–6.2)
SODIUM SERPL-SCNC: 140 MMOL/L (ref 136–145)
TROPONIN I SERPL HS-MCNC: 138 PG/ML (ref 0–14.9)
WBC # BLD AUTO: 11.09 K/UL (ref 3.9–12.7)

## 2023-10-07 PROCEDURE — 36415 COLL VENOUS BLD VENIPUNCTURE: CPT | Performed by: ORTHOPAEDIC SURGERY

## 2023-10-07 PROCEDURE — 80053 COMPREHEN METABOLIC PANEL: CPT | Performed by: ORTHOPAEDIC SURGERY

## 2023-10-07 PROCEDURE — 84484 ASSAY OF TROPONIN QUANT: CPT | Performed by: STUDENT IN AN ORGANIZED HEALTH CARE EDUCATION/TRAINING PROGRAM

## 2023-10-07 PROCEDURE — 63600175 PHARM REV CODE 636 W HCPCS: Performed by: STUDENT IN AN ORGANIZED HEALTH CARE EDUCATION/TRAINING PROGRAM

## 2023-10-07 PROCEDURE — 12000002 HC ACUTE/MED SURGE SEMI-PRIVATE ROOM

## 2023-10-07 PROCEDURE — 94761 N-INVAS EAR/PLS OXIMETRY MLT: CPT

## 2023-10-07 PROCEDURE — 97530 THERAPEUTIC ACTIVITIES: CPT

## 2023-10-07 PROCEDURE — 25000003 PHARM REV CODE 250: Performed by: ORTHOPAEDIC SURGERY

## 2023-10-07 PROCEDURE — 94760 N-INVAS EAR/PLS OXIMETRY 1: CPT

## 2023-10-07 PROCEDURE — 99900031 HC PATIENT EDUCATION (STAT)

## 2023-10-07 PROCEDURE — 99900035 HC TECH TIME PER 15 MIN (STAT)

## 2023-10-07 PROCEDURE — 25000003 PHARM REV CODE 250: Performed by: STUDENT IN AN ORGANIZED HEALTH CARE EDUCATION/TRAINING PROGRAM

## 2023-10-07 PROCEDURE — 94799 UNLISTED PULMONARY SVC/PX: CPT

## 2023-10-07 PROCEDURE — 85025 COMPLETE CBC W/AUTO DIFF WBC: CPT | Performed by: ORTHOPAEDIC SURGERY

## 2023-10-07 RX ORDER — ACETAMINOPHEN 325 MG/1
650 TABLET ORAL EVERY 4 HOURS PRN
Status: DISCONTINUED | OUTPATIENT
Start: 2023-10-07 | End: 2023-10-09 | Stop reason: HOSPADM

## 2023-10-07 RX ADMIN — ENOXAPARIN SODIUM 40 MG: 40 INJECTION SUBCUTANEOUS at 05:10

## 2023-10-07 RX ADMIN — ASPIRIN 81 MG 81 MG: 81 TABLET ORAL at 08:10

## 2023-10-07 RX ADMIN — PANTOPRAZOLE SODIUM 40 MG: 40 TABLET, DELAYED RELEASE ORAL at 05:10

## 2023-10-07 RX ADMIN — MIDODRINE HYDROCHLORIDE 5 MG: 2.5 TABLET ORAL at 12:10

## 2023-10-07 RX ADMIN — INSULIN ASPART 4 UNITS: 100 INJECTION, SOLUTION INTRAVENOUS; SUBCUTANEOUS at 05:10

## 2023-10-07 RX ADMIN — ATORVASTATIN CALCIUM 40 MG: 40 TABLET, FILM COATED ORAL at 08:10

## 2023-10-07 RX ADMIN — DOCUSATE SODIUM 100 MG: 100 CAPSULE, LIQUID FILLED ORAL at 08:10

## 2023-10-07 RX ADMIN — CLOPIDOGREL BISULFATE 75 MG: 75 TABLET, FILM COATED ORAL at 08:10

## 2023-10-07 RX ADMIN — METOPROLOL TARTRATE 12.5 MG: 25 TABLET, FILM COATED ORAL at 08:10

## 2023-10-07 RX ADMIN — SPIRONOLACTONE 25 MG: 25 TABLET ORAL at 08:10

## 2023-10-07 RX ADMIN — ACETAMINOPHEN 650 MG: 325 TABLET ORAL at 06:10

## 2023-10-07 RX ADMIN — MIDODRINE HYDROCHLORIDE 5 MG: 2.5 TABLET ORAL at 08:10

## 2023-10-07 RX ADMIN — HYDROCODONE BITARTRATE AND ACETAMINOPHEN 1 TABLET: 5; 325 TABLET ORAL at 08:10

## 2023-10-07 RX ADMIN — SERTRALINE HYDROCHLORIDE 50 MG: 50 TABLET ORAL at 08:10

## 2023-10-07 RX ADMIN — METOPROLOL TARTRATE 12.5 MG: 25 TABLET, FILM COATED ORAL at 09:10

## 2023-10-07 RX ADMIN — DOCUSATE SODIUM 100 MG: 100 CAPSULE, LIQUID FILLED ORAL at 09:10

## 2023-10-07 RX ADMIN — POTASSIUM CHLORIDE 10 MEQ: 750 CAPSULE, EXTENDED RELEASE ORAL at 08:10

## 2023-10-07 RX ADMIN — MIDODRINE HYDROCHLORIDE 5 MG: 2.5 TABLET ORAL at 04:10

## 2023-10-07 RX ADMIN — OXYBUTYNIN CHLORIDE 10 MG: 10 TABLET, EXTENDED RELEASE ORAL at 08:10

## 2023-10-07 RX ADMIN — FLUDROCORTISONE ACETATE 100 MCG: 0.1 TABLET ORAL at 08:10

## 2023-10-07 RX ADMIN — ACETAMINOPHEN 650 MG: 325 TABLET ORAL at 02:10

## 2023-10-07 RX ADMIN — INSULIN ASPART 3 UNITS: 100 INJECTION, SOLUTION INTRAVENOUS; SUBCUTANEOUS at 09:10

## 2023-10-07 RX ADMIN — FINASTERIDE 5 MG: 5 TABLET, FILM COATED ORAL at 08:10

## 2023-10-07 NOTE — PT/OT/SLP PROGRESS
Occupational Therapy      Patient Name:  Tono Saez   MRN:  [id number]    Attempted OT evaluation. OT deferred evaluation due to patient's elevated troponin levels and persistent RLE pain. Will follow-up tomorrow (10/8).    10/7/2023

## 2023-10-07 NOTE — PLAN OF CARE
Problem: Adult Inpatient Plan of Care  Goal: Plan of Care Review  Outcome: Ongoing, Progressing  Goal: Patient-Specific Goal (Individualized)  Outcome: Ongoing, Progressing     Problem: Diabetes Comorbidity  Goal: Blood Glucose Level Within Targeted Range  Outcome: Ongoing, Progressing  Intervention: Monitor and Manage Glycemia  Flowsheets (Taken 10/7/2023 0336)  Glycemic Management: blood glucose monitored     Problem: Impaired Wound Healing  Goal: Optimal Wound Healing  Outcome: Ongoing, Progressing  Intervention: Promote Wound Healing  Flowsheets (Taken 10/7/2023 0336)  Oral Nutrition Promotion: calorie-dense foods provided  Sleep/Rest Enhancement:   awakenings minimized   consistent schedule promoted  Pain Management Interventions: pain management plan reviewed with patient/caregiver     Problem: Fall Injury Risk  Goal: Absence of Fall and Fall-Related Injury  Outcome: Ongoing, Progressing  Intervention: Promote Injury-Free Environment  Flowsheets (Taken 10/7/2023 0336)  Safety Promotion/Fall Prevention:   assistive device/personal item within reach   bed alarm set

## 2023-10-07 NOTE — SUBJECTIVE & OBJECTIVE
Interval History:  Patient seen and examined this morning.  Reports some discomfort in right hip overall improving.  Troponin downtrending, we will stop trending troponin.  Patient without any chest pain.    Review of Systems   Constitutional:  Negative for diaphoresis and fatigue.   Cardiovascular:  Negative for chest pain and palpitations.   Musculoskeletal:         Pain around right hip.   Neurological:  Negative for dizziness and headaches.     Objective:     Vital Signs (Most Recent):  Temp: 98.1 °F (36.7 °C) (10/07/23 0715)  Pulse: 76 (10/07/23 0731)  Resp: 18 (10/07/23 0823)  BP: 130/67 (10/07/23 0715)  SpO2: 96 % (10/07/23 0731) Vital Signs (24h Range):  Temp:  [97.7 °F (36.5 °C)-98.1 °F (36.7 °C)] 98.1 °F (36.7 °C)  Pulse:  [74-85] 76  Resp:  [16-20] 18  SpO2:  [96 %-98 %] 96 %  BP: (118-144)/(62-76) 130/67     Weight: 76.1 kg (167 lb 12.8 oz)  Body mass index is 22.76 kg/m².  No intake or output data in the 24 hours ending 10/07/23 1035      Physical Exam  Constitutional:       Appearance: Normal appearance.   Cardiovascular:      Rate and Rhythm: Normal rate.      Heart sounds: Normal heart sounds.   Pulmonary:      Effort: Pulmonary effort is normal. No respiratory distress.   Musculoskeletal:         General: Tenderness (Right hip) present.   Skin:     General: Skin is warm and dry.      Comments: Bandage covering right hip incision, appears clean dry and intact.   Neurological:      Mental Status: He is alert.             Significant Labs: All pertinent labs within the past 24 hours have been reviewed.  CBC:   Recent Labs   Lab 10/06/23  0748 10/07/23  0639   WBC 12.16 11.09   HGB 9.4* 9.1*   HCT 29.7* 29.7*    184     CMP:   Recent Labs   Lab 10/06/23  0748 10/07/23  0639    140   K 4.1 3.8    108   CO2 20* 22*   * 155*   BUN 44* 52*   CREATININE 1.5* 1.5*   CALCIUM 9.8 10.2   PROT 5.9* 6.2   ALBUMIN 3.3* 3.5   BILITOT 1.1* 1.1*   ALKPHOS 90 97   AST 21 26   ALT 12 16    ANIONGAP 12 10       Significant Imaging: I have reviewed all pertinent imaging results/findings within the past 24 hours.

## 2023-10-07 NOTE — CARE UPDATE
10/07/23 0300   Patient Assessment/Suction   Level of Consciousness (AVPU) alert   Respiratory Effort Unlabored;Normal   PRE-TX-O2   Device (Oxygen Therapy) room air   SpO2 96 %   Pulse Oximetry Type Intermittent   $ Pulse Oximetry - Single Charge Pulse Oximetry - Single   $ Pulse Oximetry - Multiple Charge Pulse Oximetry - Multiple   Pulse 74   Resp 18   Education   $ Education 15 min;Other (see comment)   Respiratory Evaluation   $ Care Plan Tech Time 15 min   $ Eval/Re-eval Charges Evaluation

## 2023-10-07 NOTE — ASSESSMENT & PLAN NOTE
Pt with recent CATH- on Aug/2023- on DAPT  Cardio on board, troponins downtrending.  We will stop trending.  Plavix resumed, okay with Orthopedic.  Orthopedic were made aware  C/w statin and aspirin  Other GDMT

## 2023-10-07 NOTE — ASSESSMENT & PLAN NOTE
Patient without chest pain, recently had catheterization on August 2023   No changes on EKG  We will continue to monitor  Troponins downtrending, Plavix restarted

## 2023-10-07 NOTE — ASSESSMENT & PLAN NOTE
S/p right hemiarthroplasty 10/5/23 with Dr. Borrero  Discussed with Dr. Borrero, will restart plavix  Patient placed on lovenox for dvt ppx   Hb is stable  Pain medications as needed  Plan for discharge to rehab facility once medically stable

## 2023-10-07 NOTE — PT/OT/SLP PROGRESS
Physical Therapy Treatment    Patient Name:  Tono Saez   MRN:  [id number]    Recommendations:     Discharge Recommendations: nursing facility, skilled  Discharge Equipment Recommendations:  (TBD at next level of care)  Barriers to discharge: Decreased caregiver support    Assessment:     Tono Saez is a 78 y.o. male admitted with a medical diagnosis of Hip fracture.  He presents with the following impairments/functional limitations: weakness, impaired functional mobility, impaired cognition, decreased safety awareness, impaired endurance, gait instability, pain, impaired skin, edema, decreased ROM, decreased lower extremity function, impaired self care skills, impaired balance, orthopedic precautions Pt with critical troponin levels, minimal trend downward.  requesting patient be seen today. Found sleeping in bed. Daughter reports he had pain meds a few hours ago and and has been sleeping since. Pt requiring frequent cueing to keep aroused. States he is awake but keeps eyes closed.Bed and clothing saturated with urine. Support staff called in to assist with linen and patient care needs during session. Attempted AAROM to R LE prior to mobilizing to EOB. Pt actively resisting movement. Very ridged. Total A to mobilize to EOB. Pt grasping bed rails and unable to keep trunk erect instead remains forward flexed. He was able to stand with RW and MAX A x 2 from elevated bed. Pt unable to stand erect. Flexed forward on RW but maintained static standing with min A x 2 while changing linens. After seated rest, stood again in order to clean patient and change diaper as he had a BM during first trial of standing.    Rehab Prognosis: Fair; patient would benefit from acute skilled PT services to address these deficits and reach maximum level of function.    Recent Surgery: Procedure(s) (LRB):  HEMIARTHROPLASTY, HIP (Right) 2 Days Post-Op    Plan:     During this hospitalization, patient to be seen daily to address  the identified rehab impairments via gait training, therapeutic activities, therapeutic exercises and progress toward the following goals:    Plan of Care Expires:  11/09/23    Subjective     Chief Complaint: pain  Patient/Family Comments/goals: unable to state; daughter wants patient to go to therapy following acute stay  Pain/Comfort:  Pain Rating 1:  (grimmaces, cannot rate)  Location - Side 1: Right  Location 1: leg  Pain Addressed 1: Reposition, Distraction, Cessation of Activity, Nurse notified  Pain Rating Post-Intervention 1:  (cannot rate, grasping R LE)      Objective:     Communicated with nurse prior to session.  Patient found HOB elevated with bed alarm, PICC line, telemetry upon PT entry to room.     General Precautions: Standard, fall, diabetic  Orthopedic Precautions: RLE posterior precautions, RLE weight bearing as tolerated  Braces:  (abduction wedge)  Respiratory Status: Room air     Functional Mobility:  Bed Mobility:     Supine to Sit: total assistance and of 2 persons  Sit to Supine: total assistance and of 2 persons  Transfers:     Sit to Stand:  maximal assistance and of 2 persons with rolling walker  Gait: took 3 very small side steps with RW mod A x 2, difficulty picking R foot off of floor  Balance: fair static sitting balance and standing balance with UE support on RW      AM-PAC 6 CLICK MOBILITY  Turning over in bed (including adjusting bedclothes, sheets and blankets)?: 2  Sitting down on and standing up from a chair with arms (e.g., wheelchair, bedside commode, etc.): 2  Moving from lying on back to sitting on the side of the bed?: 2  Moving to and from a bed to a chair (including a wheelchair)?: 1  Need to walk in hospital room?: 1  Climbing 3-5 steps with a railing?: 1  Basic Mobility Total Score: 9       Treatment & Education:  Attempting R LE TE in bed, pt actively resisting,transfer training, bed mobility. Educated in post precautions and positioning. No evidence of  learning    Patient left HOB elevated with all lines intact, call button in reach, bed alarm on, nurse notified, daughter present, and adb wedge in place ..    GOALS:   Multidisciplinary Problems       Physical Therapy Goals          Problem: Physical Therapy    Goal Priority Disciplines Outcome Goal Variances Interventions   Physical Therapy Goal     PT, PT/OT Ongoing, Progressing     Description: Goals to be met by: 10/27/23     Patient will increase functional independence with mobility by performin. Supine to sit with MInimal Assistance  2. Sit to supine with MInimal Assistance  3. Sit to stand transfer with Minimal Assistance  4. Bed to chair transfer with Minimal Assistance using Rolling Walker  5. Gait  x 25 feet with Minimal Assistance using Rolling Walker.                          Time Tracking:     PT Received On: 10/07/23  PT Start Time: 1340     PT Stop Time: 1412  PT Total Time (min): 32 min     Billable Minutes: Therapeutic Activity 32    Treatment Type: Treatment  PT/PTA: PT     Number of PTA visits since last PT visit: 0     10/07/2023

## 2023-10-07 NOTE — NURSING
Response from Dr. Dickinson regarding patient troponin:    ok, let's just hold on further trop checks at this point unless he has symptoms    D/C every 4 hour troponin per the above written order in messenger.

## 2023-10-07 NOTE — PROGRESS NOTES
Formerly [facility], later [facility]  Progress Note    Patient Name: Tono Saez  MRN: [id number]  Patient Class: IP- Inpatient   Admission Date: 10/3/2023  Length of Stay: 4 days  Attending Physician: Harshad Haddad MD  Primary Care Provider: Scott Staley MD        Subjective:     Principal Problem:Hip fracture        HPI:  Tono Saez is a 78 y.o.  male with known history of  CAD w/ stents and post CABG, DM2, HTN, HLD, GERD, GIB who came in today after a mechanical fall.  He states he was standing at his sink and attempted to turn using walker and tripped over it.  He now complains of pain to right thigh and hip area.  CT of head, and c-spine are negative.  Xray shows right side femoral neck fracture. Patient was just discharged from here after a fall and has known orthostatic hypotension.  Patient denies chest pain, shortness of breath, palpitations, abdominal pain, nausea, vomiting, diarrhea, constipation,  hematochezia, hematemesis, fever, cough, congestion, runny nose, changes in vision, hearing, numbness, tingling, headache, focal weakness, dysuria, frequency, hematuria. History was obtained from the patient and ER physician Sign-out.      Overview/Hospital Course:  Patient was admitted for mechanical fall found to have right side femoral neck fracture.  Given recent left heart catheterization in August cardiology was consulted and Plavix was held prior to surgery.  Patient had  right hip surgery on 10/05/2023.  Plavix to be resumed after surgery once cleared by ortho.  Discussed with orthopedics, Plavix resumed.  Troponins elevated during admission, cardiology made aware. Plan for rehab on discharge.           Interval History:  Patient seen and examined this morning.  Reports some discomfort in right hip overall improving.  Troponin downtrending, we will stop trending troponin.  Patient without any chest pain.    Review of Systems   Constitutional:  Negative for diaphoresis and fatigue.    Cardiovascular:  Negative for chest pain and palpitations.   Musculoskeletal:         Pain around right hip.   Neurological:  Negative for dizziness and headaches.     Objective:     Vital Signs (Most Recent):  Temp: 98.1 °F (36.7 °C) (10/07/23 0715)  Pulse: 76 (10/07/23 0731)  Resp: 18 (10/07/23 0823)  BP: 130/67 (10/07/23 0715)  SpO2: 96 % (10/07/23 0731) Vital Signs (24h Range):  Temp:  [97.7 °F (36.5 °C)-98.1 °F (36.7 °C)] 98.1 °F (36.7 °C)  Pulse:  [74-85] 76  Resp:  [16-20] 18  SpO2:  [96 %-98 %] 96 %  BP: (118-144)/(62-76) 130/67     Weight: 76.1 kg (167 lb 12.8 oz)  Body mass index is 22.76 kg/m².  No intake or output data in the 24 hours ending 10/07/23 1035      Physical Exam  Constitutional:       Appearance: Normal appearance.   Cardiovascular:      Rate and Rhythm: Normal rate.      Heart sounds: Normal heart sounds.   Pulmonary:      Effort: Pulmonary effort is normal. No respiratory distress.   Musculoskeletal:         General: Tenderness (Right hip) present.   Skin:     General: Skin is warm and dry.      Comments: Bandage covering right hip incision, appears clean dry and intact.   Neurological:      Mental Status: He is alert.             Significant Labs: All pertinent labs within the past 24 hours have been reviewed.  CBC:   Recent Labs   Lab 10/06/23  0748 10/07/23  0639   WBC 12.16 11.09   HGB 9.4* 9.1*   HCT 29.7* 29.7*    184     CMP:   Recent Labs   Lab 10/06/23  0748 10/07/23  0639    140   K 4.1 3.8    108   CO2 20* 22*   * 155*   BUN 44* 52*   CREATININE 1.5* 1.5*   CALCIUM 9.8 10.2   PROT 5.9* 6.2   ALBUMIN 3.3* 3.5   BILITOT 1.1* 1.1*   ALKPHOS 90 97   AST 21 26   ALT 12 16   ANIONGAP 12 10       Significant Imaging: I have reviewed all pertinent imaging results/findings within the past 24 hours.      Assessment/Plan:      * Hip fracture  S/p right hemiarthroplasty 10/5/23 with Dr. Borrero  Discussed with Dr. Borrero, will restart plavix  Patient placed on  lovenox for dvt ppx   Hb is stable  Pain medications as needed  Plan for discharge to rehab facility once medically stable      Acute postoperative anemia due to expected blood loss  Hemoglobin stable, we will continue to monitor CBC  No active bleeding.      Elevated troponin  Patient without chest pain, recently had catheterization on August 2023   No changes on EKG  We will continue to monitor  Troponins downtrending, Plavix restarted    CAD (coronary artery disease)  Pt with recent CATH- on Aug/2023- on DAPT  Cardio on board, troponins downtrending.  We will stop trending.  Plavix resumed, okay with Orthopedic.  Orthopedic were made aware  C/w statin and aspirin  Other GDMT    Type 2 diabetes mellitus with hyperglycemia   POCs stable-   SSI and accu checks    HTN (hypertension)  C/w home meds as tolerated      VTE Risk Mitigation (From admission, onward)         Ordered     enoxaparin injection 40 mg  Every 24 hours         10/06/23 1040     IP VTE LOW RISK PATIENT  Once         10/05/23 1642     Reason for No Pharmacological VTE Prophylaxis  Once        Question:  Reasons:  Answer:  Risk of Bleeding    10/03/23 2235     Place sequential compression device  Until discontinued         10/03/23 2235                Discharge Planning   LEE: 10/8/2023     Code Status: Full Code   Is the patient medically ready for discharge?:     Reason for patient still in hospital (select all that apply): Patient trending condition  Discharge Plan A: Skilled Nursing Facility                  Harshad Haddad MD  Department of Hospital Medicine   [facility]

## 2023-10-08 LAB
ALBUMIN SERPL BCP-MCNC: 3.4 G/DL (ref 3.5–5.2)
ALP SERPL-CCNC: 101 U/L (ref 55–135)
ALT SERPL W/O P-5'-P-CCNC: 46 U/L (ref 10–44)
ANION GAP SERPL CALC-SCNC: 10 MMOL/L (ref 8–16)
AST SERPL-CCNC: 57 U/L (ref 10–40)
BACTERIA #/AREA URNS HPF: NEGATIVE /HPF
BASOPHILS # BLD AUTO: 0.04 K/UL (ref 0–0.2)
BASOPHILS NFR BLD: 0.4 % (ref 0–1.9)
BILIRUB SERPL-MCNC: 1 MG/DL (ref 0.1–1)
BILIRUB UR QL STRIP: NEGATIVE
BUN SERPL-MCNC: 58 MG/DL (ref 8–23)
CALCIUM SERPL-MCNC: 9.9 MG/DL (ref 8.7–10.5)
CHLORIDE SERPL-SCNC: 110 MMOL/L (ref 95–110)
CLARITY UR: ABNORMAL
CO2 SERPL-SCNC: 21 MMOL/L (ref 23–29)
COLOR UR: YELLOW
CREAT SERPL-MCNC: 1.4 MG/DL (ref 0.5–1.4)
DIFFERENTIAL METHOD: ABNORMAL
EOSINOPHIL # BLD AUTO: 0.1 K/UL (ref 0–0.5)
EOSINOPHIL NFR BLD: 0.4 % (ref 0–8)
ERYTHROCYTE [DISTWIDTH] IN BLOOD BY AUTOMATED COUNT: 15.9 % (ref 11.5–14.5)
EST. GFR  (NO RACE VARIABLE): 50.8 ML/MIN/1.73 M^2
GLUCOSE SERPL-MCNC: 162 MG/DL (ref 70–110)
GLUCOSE SERPL-MCNC: 163 MG/DL (ref 70–110)
GLUCOSE SERPL-MCNC: 191 MG/DL (ref 70–110)
GLUCOSE SERPL-MCNC: 232 MG/DL (ref 70–110)
GLUCOSE SERPL-MCNC: 238 MG/DL (ref 70–110)
GLUCOSE UR QL STRIP: ABNORMAL
HCT VFR BLD AUTO: 28.8 % (ref 40–54)
HGB BLD-MCNC: 8.9 G/DL (ref 14–18)
HGB UR QL STRIP: NEGATIVE
HYALINE CASTS #/AREA URNS LPF: 6 /LPF
IMM GRANULOCYTES # BLD AUTO: 0.11 K/UL (ref 0–0.04)
IMM GRANULOCYTES NFR BLD AUTO: 1 % (ref 0–0.5)
KETONES UR QL STRIP: ABNORMAL
LEUKOCYTE ESTERASE UR QL STRIP: ABNORMAL
LYMPHOCYTES # BLD AUTO: 0.6 K/UL (ref 1–4.8)
LYMPHOCYTES NFR BLD: 5.6 % (ref 18–48)
MCH RBC QN AUTO: 25.9 PG (ref 27–31)
MCHC RBC AUTO-ENTMCNC: 30.9 G/DL (ref 32–36)
MCV RBC AUTO: 84 FL (ref 82–98)
MICROSCOPIC COMMENT: ABNORMAL
MONOCYTES # BLD AUTO: 0.8 K/UL (ref 0.3–1)
MONOCYTES NFR BLD: 6.8 % (ref 4–15)
NEUTROPHILS # BLD AUTO: 9.7 K/UL (ref 1.8–7.7)
NEUTROPHILS NFR BLD: 85.8 % (ref 38–73)
NITRITE UR QL STRIP: NEGATIVE
NRBC BLD-RTO: 0 /100 WBC
PH UR STRIP: 5 [PH] (ref 5–8)
PLATELET # BLD AUTO: 200 K/UL (ref 150–450)
PMV BLD AUTO: 11.5 FL (ref 9.2–12.9)
POTASSIUM SERPL-SCNC: 4.3 MMOL/L (ref 3.5–5.1)
PROT SERPL-MCNC: 6 G/DL (ref 6–8.4)
PROT UR QL STRIP: ABNORMAL
RBC # BLD AUTO: 3.44 M/UL (ref 4.6–6.2)
RBC #/AREA URNS HPF: 5 /HPF (ref 0–4)
SODIUM SERPL-SCNC: 141 MMOL/L (ref 136–145)
SP GR UR STRIP: 1.02 (ref 1–1.03)
SQUAMOUS #/AREA URNS HPF: 2 /HPF
TROPONIN I SERPL HS-MCNC: 150.9 PG/ML (ref 0–14.9)
TROPONIN I SERPL HS-MCNC: 183.7 PG/ML (ref 0–14.9)
URATE CRY URNS QL MICRO: ABNORMAL
URN SPEC COLLECT METH UR: ABNORMAL
UROBILINOGEN UR STRIP-ACNC: NEGATIVE EU/DL
WBC # BLD AUTO: 11.32 K/UL (ref 3.9–12.7)
WBC #/AREA URNS HPF: 6 /HPF (ref 0–5)
YEAST URNS QL MICRO: ABNORMAL

## 2023-10-08 PROCEDURE — 25000003 PHARM REV CODE 250: Performed by: ORTHOPAEDIC SURGERY

## 2023-10-08 PROCEDURE — 63700000 PHARM REV CODE 250 ALT 637 W/O HCPCS: Performed by: STUDENT IN AN ORGANIZED HEALTH CARE EDUCATION/TRAINING PROGRAM

## 2023-10-08 PROCEDURE — 84484 ASSAY OF TROPONIN QUANT: CPT | Performed by: STUDENT IN AN ORGANIZED HEALTH CARE EDUCATION/TRAINING PROGRAM

## 2023-10-08 PROCEDURE — 85025 COMPLETE CBC W/AUTO DIFF WBC: CPT | Performed by: ORTHOPAEDIC SURGERY

## 2023-10-08 PROCEDURE — 93010 ELECTROCARDIOGRAM REPORT: CPT | Mod: ,,, | Performed by: INTERNAL MEDICINE

## 2023-10-08 PROCEDURE — 36415 COLL VENOUS BLD VENIPUNCTURE: CPT | Performed by: STUDENT IN AN ORGANIZED HEALTH CARE EDUCATION/TRAINING PROGRAM

## 2023-10-08 PROCEDURE — 93005 ELECTROCARDIOGRAM TRACING: CPT | Performed by: INTERNAL MEDICINE

## 2023-10-08 PROCEDURE — 36415 COLL VENOUS BLD VENIPUNCTURE: CPT | Performed by: ORTHOPAEDIC SURGERY

## 2023-10-08 PROCEDURE — 25000003 PHARM REV CODE 250: Performed by: STUDENT IN AN ORGANIZED HEALTH CARE EDUCATION/TRAINING PROGRAM

## 2023-10-08 PROCEDURE — 93010 EKG 12-LEAD: ICD-10-PCS | Mod: ,,, | Performed by: INTERNAL MEDICINE

## 2023-10-08 PROCEDURE — 80053 COMPREHEN METABOLIC PANEL: CPT | Performed by: ORTHOPAEDIC SURGERY

## 2023-10-08 PROCEDURE — 63600175 PHARM REV CODE 636 W HCPCS: Performed by: STUDENT IN AN ORGANIZED HEALTH CARE EDUCATION/TRAINING PROGRAM

## 2023-10-08 PROCEDURE — 12000002 HC ACUTE/MED SURGE SEMI-PRIVATE ROOM

## 2023-10-08 PROCEDURE — 94761 N-INVAS EAR/PLS OXIMETRY MLT: CPT

## 2023-10-08 PROCEDURE — 81001 URINALYSIS AUTO W/SCOPE: CPT | Performed by: STUDENT IN AN ORGANIZED HEALTH CARE EDUCATION/TRAINING PROGRAM

## 2023-10-08 RX ORDER — MORPHINE SULFATE 2 MG/ML
INJECTION, SOLUTION INTRAMUSCULAR; INTRAVENOUS
Status: DISPENSED
Start: 2023-10-08 | End: 2023-10-08

## 2023-10-08 RX ORDER — FLUCONAZOLE 100 MG/1
200 TABLET ORAL DAILY
Status: DISCONTINUED | OUTPATIENT
Start: 2023-10-08 | End: 2023-10-09 | Stop reason: HOSPADM

## 2023-10-08 RX ORDER — MAG HYDROX/ALUMINUM HYD/SIMETH 200-200-20
30 SUSPENSION, ORAL (FINAL DOSE FORM) ORAL ONCE
Status: DISCONTINUED | OUTPATIENT
Start: 2023-10-08 | End: 2023-10-09 | Stop reason: HOSPADM

## 2023-10-08 RX ORDER — MORPHINE SULFATE 2 MG/ML
2 INJECTION, SOLUTION INTRAMUSCULAR; INTRAVENOUS ONCE
Status: COMPLETED | OUTPATIENT
Start: 2023-10-08 | End: 2023-10-08

## 2023-10-08 RX ORDER — LIDOCAINE HYDROCHLORIDE 20 MG/ML
15 SOLUTION OROPHARYNGEAL ONCE
Status: DISCONTINUED | OUTPATIENT
Start: 2023-10-08 | End: 2023-10-09 | Stop reason: HOSPADM

## 2023-10-08 RX ADMIN — METOPROLOL TARTRATE 12.5 MG: 25 TABLET, FILM COATED ORAL at 09:10

## 2023-10-08 RX ADMIN — SERTRALINE HYDROCHLORIDE 50 MG: 50 TABLET ORAL at 09:10

## 2023-10-08 RX ADMIN — ATORVASTATIN CALCIUM 40 MG: 40 TABLET, FILM COATED ORAL at 09:10

## 2023-10-08 RX ADMIN — DOCUSATE SODIUM 100 MG: 100 CAPSULE, LIQUID FILLED ORAL at 08:10

## 2023-10-08 RX ADMIN — MORPHINE SULFATE 2 MG: 2 INJECTION, SOLUTION INTRAMUSCULAR; INTRAVENOUS at 09:10

## 2023-10-08 RX ADMIN — ENOXAPARIN SODIUM 40 MG: 40 INJECTION SUBCUTANEOUS at 05:10

## 2023-10-08 RX ADMIN — ASPIRIN 81 MG 81 MG: 81 TABLET ORAL at 09:10

## 2023-10-08 RX ADMIN — INSULIN ASPART 2 UNITS: 100 INJECTION, SOLUTION INTRAVENOUS; SUBCUTANEOUS at 12:10

## 2023-10-08 RX ADMIN — OXYBUTYNIN CHLORIDE 10 MG: 10 TABLET, EXTENDED RELEASE ORAL at 09:10

## 2023-10-08 RX ADMIN — SPIRONOLACTONE 25 MG: 25 TABLET ORAL at 09:10

## 2023-10-08 RX ADMIN — FLUCONAZOLE 200 MG: 100 TABLET ORAL at 05:10

## 2023-10-08 RX ADMIN — INSULIN ASPART 2 UNITS: 100 INJECTION, SOLUTION INTRAVENOUS; SUBCUTANEOUS at 08:10

## 2023-10-08 RX ADMIN — METOPROLOL TARTRATE 12.5 MG: 25 TABLET, FILM COATED ORAL at 08:10

## 2023-10-08 RX ADMIN — FINASTERIDE 5 MG: 5 TABLET, FILM COATED ORAL at 09:10

## 2023-10-08 RX ADMIN — INSULIN ASPART 4 UNITS: 100 INJECTION, SOLUTION INTRAVENOUS; SUBCUTANEOUS at 05:10

## 2023-10-08 RX ADMIN — FLUDROCORTISONE ACETATE 100 MCG: 0.1 TABLET ORAL at 09:10

## 2023-10-08 RX ADMIN — PREGABALIN 50 MG: 25 CAPSULE ORAL at 08:10

## 2023-10-08 RX ADMIN — Medication 6 MG: at 08:10

## 2023-10-08 RX ADMIN — CLOPIDOGREL BISULFATE 75 MG: 75 TABLET, FILM COATED ORAL at 09:10

## 2023-10-08 RX ADMIN — DOCUSATE SODIUM 100 MG: 100 CAPSULE, LIQUID FILLED ORAL at 09:10

## 2023-10-08 RX ADMIN — ACETAMINOPHEN 650 MG: 325 TABLET ORAL at 02:10

## 2023-10-08 RX ADMIN — POTASSIUM CHLORIDE 10 MEQ: 750 CAPSULE, EXTENDED RELEASE ORAL at 09:10

## 2023-10-08 NOTE — PT/OT/SLP PROGRESS
Occupational Therapy      Patient Name:  Tono Saez   MRN:  [id number]    Patient not seen today secondary to  (Hold per RN.). Will follow-up tomorrow.    10/8/2023

## 2023-10-08 NOTE — SUBJECTIVE & OBJECTIVE
Interval History:  Patient with chest pain this morning, tenderness to palpation in worsening with inhalation.  Resolved after morphine was given.  Troponins pending, EKG unchanged from previous.  Cardiology made aware.    Review of Systems   Constitutional:  Negative for diaphoresis and fatigue.   Cardiovascular:  Positive for chest pain. Negative for palpitations.   Musculoskeletal:         Pain around right hip.   Neurological:  Negative for dizziness and headaches.     Objective:     Vital Signs (Most Recent):  Temp: 98.3 °F (36.8 °C) (10/08/23 0814)  Pulse: 94 (10/08/23 0936)  Resp: (!) 22 (10/08/23 0924)  BP: 136/84 (10/08/23 0936)  SpO2: 100 % (10/08/23 0936) Vital Signs (24h Range):  Temp:  [97.3 °F (36.3 °C)-98.3 °F (36.8 °C)] 98.3 °F (36.8 °C)  Pulse:  [] 94  Resp:  [16-22] 22  SpO2:  [95 %-100 %] 100 %  BP: (125-183)/(62-96) 136/84     Weight: 76.1 kg (167 lb 12.8 oz)  Body mass index is 22.76 kg/m².    Intake/Output Summary (Last 24 hours) at 10/8/2023 1048  Last data filed at 10/8/2023 0447  Gross per 24 hour   Intake --   Output 400 ml   Net -400 ml         Physical Exam  Constitutional:       Appearance: Normal appearance.   Cardiovascular:      Rate and Rhythm: Normal rate.      Heart sounds: Normal heart sounds.   Pulmonary:      Effort: Pulmonary effort is normal. No respiratory distress.   Musculoskeletal:         General: Tenderness (Right hip) present.      Comments: Sternum tenderness to palpation   Skin:     General: Skin is warm and dry.      Comments: Bandage covering right hip incision, appears clean dry and intact.   Neurological:      Mental Status: He is alert.             Significant Labs: All pertinent labs within the past 24 hours have been reviewed.  CBC:   Recent Labs   Lab 10/07/23  0639 10/08/23  0541   WBC 11.09 11.32   HGB 9.1* 8.9*   HCT 29.7* 28.8*    200     CMP:   Recent Labs   Lab 10/07/23  0639 10/08/23  0541    141   K 3.8 4.3    110   CO2 22* 21*    * 162*   BUN 52* 58*   CREATININE 1.5* 1.4   CALCIUM 10.2 9.9   PROT 6.2 6.0   ALBUMIN 3.5 3.4*   BILITOT 1.1* 1.0   ALKPHOS 97 101   AST 26 57*   ALT 16 46*   ANIONGAP 10 10       Significant Imaging: I have reviewed all pertinent imaging results/findings within the past 24 hours.

## 2023-10-08 NOTE — PT/OT/SLP PROGRESS
Speech Language Pathology      Tono Saez  MRN: [id number]    ST attempted evaluation at 9:05 AM this date. Patient not seen today secondary to nursing hold. Staff reports patient complained of chest pain. RN is at bedside at this time. Will follow-up next scheduled visit date 10/09/2023.

## 2023-10-08 NOTE — PROGRESS NOTES
Patient c/o CP this am.  Troponin HS mildly increased to 150.  During examination, CP is aggravated by palpation, deep breathing and cough.  Likely pleuritic/musculoskeletal in nature.  Recommend pain control for chest discomfort.        Love Starr NP   Cardiology   10/8/23

## 2023-10-08 NOTE — PT/OT/SLP PROGRESS
Physical Therapy      Patient Name:  Tono Saez   MRN:  [id number]    Patient not seen today secondary to Patient ill (Comment), Nurse/ ROBERT hold (chest pains, stat EKG, elevated troponin). Will follow-up tomorrow.

## 2023-10-08 NOTE — NURSING
MSO4 2mg administered IV as ordered. Dr. Haddad at bedside  Results of EKG given. He stated the results are unchanged from previous EKG.

## 2023-10-08 NOTE — ASSESSMENT & PLAN NOTE
Recently had catheterization on August 2023   No changes on EKG  We will continue to monitor  Plavix restarted  Follow-up repeat troponin today given chest pain.

## 2023-10-08 NOTE — PLAN OF CARE
Problem: Adult Inpatient Plan of Care  Goal: Plan of Care Review  Outcome: Ongoing, Progressing  Goal: Patient-Specific Goal (Individualized)  Outcome: Ongoing, Progressing  Goal: Absence of Hospital-Acquired Illness or Injury  Outcome: Ongoing, Progressing  Goal: Optimal Comfort and Wellbeing  Outcome: Ongoing, Progressing     Problem: Diabetes Comorbidity  Goal: Blood Glucose Level Within Targeted Range  Outcome: Ongoing, Progressing  Intervention: Monitor and Manage Glycemia  Flowsheets (Taken 10/8/2023 0103)  Glycemic Management: blood glucose monitored     Problem: Fall Injury Risk  Goal: Absence of Fall and Fall-Related Injury  Outcome: Ongoing, Progressing  Intervention: Promote Injury-Free Environment  Flowsheets (Taken 10/8/2023 0103)  Safety Promotion/Fall Prevention:   assistive device/personal item within reach   bed alarm set   side rails raised x 2

## 2023-10-08 NOTE — PROGRESS NOTES
[facility]  Progress Note    Patient Name: Tono Saez  MRN: [id number]  Patient Class: IP- Inpatient   Admission Date: 10/3/2023  Length of Stay: 5 days  Attending Physician: Harshad Haddad MD  Primary Care Provider: Scott Staley MD        Subjective:     Principal Problem:Hip fracture        HPI:  Tono Saez is a 78 y.o.  male with known history of  CAD w/ stents and post CABG, DM2, HTN, HLD, GERD, GIB who came in today after a mechanical fall.  He states he was standing at his sink and attempted to turn using walker and tripped over it.  He now complains of pain to right thigh and hip area.  CT of head, and c-spine are negative.  Xray shows right side femoral neck fracture. Patient was just discharged from here after a fall and has known orthostatic hypotension.  Patient denies chest pain, shortness of breath, palpitations, abdominal pain, nausea, vomiting, diarrhea, constipation,  hematochezia, hematemesis, fever, cough, congestion, runny nose, changes in vision, hearing, numbness, tingling, headache, focal weakness, dysuria, frequency, hematuria. History was obtained from the patient and ER physician Sign-out.      Overview/Hospital Course:  Patient was admitted for mechanical fall found to have right side femoral neck fracture.  Given recent left heart catheterization in August cardiology was consulted and Plavix was held prior to surgery.  Patient had  right hip surgery on 10/05/2023.  Plavix to be resumed after surgery once cleared by ortho.  Discussed with orthopedics, Plavix resumed.  Troponins elevated during admission, cardiology made aware. Plan for rehab on discharge.           Interval History:  Patient with chest pain this morning, tenderness to palpation in worsening with inhalation.  Resolved after morphine was given.  Troponins pending, EKG unchanged from previous.  Cardiology made aware.    Review of Systems   Constitutional:  Negative for diaphoresis and  fatigue.   Cardiovascular:  Positive for chest pain. Negative for palpitations.   Musculoskeletal:         Pain around right hip.   Neurological:  Negative for dizziness and headaches.     Objective:     Vital Signs (Most Recent):  Temp: 98.3 °F (36.8 °C) (10/08/23 0814)  Pulse: 94 (10/08/23 0936)  Resp: (!) 22 (10/08/23 0924)  BP: 136/84 (10/08/23 0936)  SpO2: 100 % (10/08/23 0936) Vital Signs (24h Range):  Temp:  [97.3 °F (36.3 °C)-98.3 °F (36.8 °C)] 98.3 °F (36.8 °C)  Pulse:  [] 94  Resp:  [16-22] 22  SpO2:  [95 %-100 %] 100 %  BP: (125-183)/(62-96) 136/84     Weight: 76.1 kg (167 lb 12.8 oz)  Body mass index is 22.76 kg/m².    Intake/Output Summary (Last 24 hours) at 10/8/2023 1048  Last data filed at 10/8/2023 0447  Gross per 24 hour   Intake --   Output 400 ml   Net -400 ml         Physical Exam  Constitutional:       Appearance: Normal appearance.   Cardiovascular:      Rate and Rhythm: Normal rate.      Heart sounds: Normal heart sounds.   Pulmonary:      Effort: Pulmonary effort is normal. No respiratory distress.   Musculoskeletal:         General: Tenderness (Right hip) present.      Comments: Sternum tenderness to palpation   Skin:     General: Skin is warm and dry.      Comments: Bandage covering right hip incision, appears clean dry and intact.   Neurological:      Mental Status: He is alert.             Significant Labs: All pertinent labs within the past 24 hours have been reviewed.  CBC:   Recent Labs   Lab 10/07/23  0639 10/08/23  0541   WBC 11.09 11.32   HGB 9.1* 8.9*   HCT 29.7* 28.8*    200     CMP:   Recent Labs   Lab 10/07/23  0639 10/08/23  0541    141   K 3.8 4.3    110   CO2 22* 21*   * 162*   BUN 52* 58*   CREATININE 1.5* 1.4   CALCIUM 10.2 9.9   PROT 6.2 6.0   ALBUMIN 3.5 3.4*   BILITOT 1.1* 1.0   ALKPHOS 97 101   AST 26 57*   ALT 16 46*   ANIONGAP 10 10       Significant Imaging: I have reviewed all pertinent imaging results/findings within the past 24  hours.      Assessment/Plan:      * Hip fracture  S/p right hemiarthroplasty 10/5/23 with Dr. Borrero  Discussed with Dr. Borrero, will restart plavix  Patient placed on lovenox for dvt ppx   Hb is stable  Pain medications as needed  Plan for discharge to rehab facility once medically stable      Acute postoperative anemia due to expected blood loss  Hemoglobin stable, we will continue to monitor CBC  No active bleeding.      Elevated troponin  Recently had catheterization on August 2023   No changes on EKG  We will continue to monitor  Plavix restarted  Follow-up repeat troponin today given chest pain.    CAD (coronary artery disease)  Pt with recent CATH- on Aug/2023- on DAPT  Patient went chest pain this morning, troponin pending.  Cardio made aware.  Chest pain appears atypical..  Plavix resumed, okay with Orthopedic.  C/w statin and aspirin  Other GDMT    Type 2 diabetes mellitus with hyperglycemia   POCs stable-   SSI and accu checks    HTN (hypertension)  C/w home meds as tolerated      VTE Risk Mitigation (From admission, onward)         Ordered     enoxaparin injection 40 mg  Every 24 hours         10/06/23 1040     IP VTE LOW RISK PATIENT  Once         10/05/23 1642     Place sequential compression device  Until discontinued         10/03/23 2235                Discharge Planning   LEE: 10/8/2023     Code Status: Full Code   Is the patient medically ready for discharge?:     Reason for patient still in hospital (select all that apply): Patient trending condition  Discharge Plan A: Skilled Nursing Facility                  Harshad Haddad MD  Department of Hospital Medicine   [facility]

## 2023-10-09 VITALS
DIASTOLIC BLOOD PRESSURE: 64 MMHG | SYSTOLIC BLOOD PRESSURE: 135 MMHG | OXYGEN SATURATION: 97 % | HEART RATE: 69 BPM | RESPIRATION RATE: 16 BRPM | TEMPERATURE: 98 F | HEIGHT: 72 IN | BODY MASS INDEX: 22.73 KG/M2 | WEIGHT: 167.81 LBS

## 2023-10-09 PROBLEM — D62 ACUTE POSTOPERATIVE ANEMIA DUE TO EXPECTED BLOOD LOSS: Status: RESOLVED | Noted: 2023-10-07 | Resolved: 2023-10-09

## 2023-10-09 PROBLEM — R79.89 ELEVATED TROPONIN: Status: RESOLVED | Noted: 2023-10-05 | Resolved: 2023-10-09

## 2023-10-09 PROBLEM — S72.009A HIP FRACTURE: Status: RESOLVED | Noted: 2023-10-03 | Resolved: 2023-10-09

## 2023-10-09 LAB
ALBUMIN SERPL BCP-MCNC: 3.2 G/DL (ref 3.5–5.2)
ALP SERPL-CCNC: 94 U/L (ref 55–135)
ALT SERPL W/O P-5'-P-CCNC: 75 U/L (ref 10–44)
ANION GAP SERPL CALC-SCNC: 7 MMOL/L (ref 8–16)
AST SERPL-CCNC: 71 U/L (ref 10–40)
BASOPHILS # BLD AUTO: 0.05 K/UL (ref 0–0.2)
BASOPHILS NFR BLD: 0.6 % (ref 0–1.9)
BILIRUB SERPL-MCNC: 0.9 MG/DL (ref 0.1–1)
BUN SERPL-MCNC: 51 MG/DL (ref 8–23)
CALCIUM SERPL-MCNC: 9.7 MG/DL (ref 8.7–10.5)
CHLORIDE SERPL-SCNC: 108 MMOL/L (ref 95–110)
CO2 SERPL-SCNC: 25 MMOL/L (ref 23–29)
CREAT SERPL-MCNC: 1.2 MG/DL (ref 0.5–1.4)
DIFFERENTIAL METHOD: ABNORMAL
EOSINOPHIL # BLD AUTO: 0.2 K/UL (ref 0–0.5)
EOSINOPHIL NFR BLD: 2.5 % (ref 0–8)
ERYTHROCYTE [DISTWIDTH] IN BLOOD BY AUTOMATED COUNT: 15.8 % (ref 11.5–14.5)
EST. GFR  (NO RACE VARIABLE): >60 ML/MIN/1.73 M^2
GLUCOSE SERPL-MCNC: 140 MG/DL (ref 70–110)
GLUCOSE SERPL-MCNC: 140 MG/DL (ref 70–110)
GLUCOSE SERPL-MCNC: 154 MG/DL (ref 70–110)
GLUCOSE SERPL-MCNC: 179 MG/DL (ref 70–110)
HCT VFR BLD AUTO: 26.4 % (ref 40–54)
HGB BLD-MCNC: 8.2 G/DL (ref 14–18)
IMM GRANULOCYTES # BLD AUTO: 0.12 K/UL (ref 0–0.04)
IMM GRANULOCYTES NFR BLD AUTO: 1.4 % (ref 0–0.5)
LYMPHOCYTES # BLD AUTO: 1.1 K/UL (ref 1–4.8)
LYMPHOCYTES NFR BLD: 12.5 % (ref 18–48)
MCH RBC QN AUTO: 25.9 PG (ref 27–31)
MCHC RBC AUTO-ENTMCNC: 31.1 G/DL (ref 32–36)
MCV RBC AUTO: 84 FL (ref 82–98)
MONOCYTES # BLD AUTO: 0.7 K/UL (ref 0.3–1)
MONOCYTES NFR BLD: 8.4 % (ref 4–15)
NEUTROPHILS # BLD AUTO: 6.6 K/UL (ref 1.8–7.7)
NEUTROPHILS NFR BLD: 74.6 % (ref 38–73)
NRBC BLD-RTO: 0 /100 WBC
PLATELET # BLD AUTO: 188 K/UL (ref 150–450)
PMV BLD AUTO: 11.4 FL (ref 9.2–12.9)
POTASSIUM SERPL-SCNC: 3.7 MMOL/L (ref 3.5–5.1)
PROT SERPL-MCNC: 5.7 G/DL (ref 6–8.4)
RBC # BLD AUTO: 3.16 M/UL (ref 4.6–6.2)
SODIUM SERPL-SCNC: 140 MMOL/L (ref 136–145)
WBC # BLD AUTO: 8.81 K/UL (ref 3.9–12.7)

## 2023-10-09 PROCEDURE — 86580 TB INTRADERMAL TEST: CPT | Performed by: STUDENT IN AN ORGANIZED HEALTH CARE EDUCATION/TRAINING PROGRAM

## 2023-10-09 PROCEDURE — 99900031 HC PATIENT EDUCATION (STAT)

## 2023-10-09 PROCEDURE — 63600175 PHARM REV CODE 636 W HCPCS: Performed by: STUDENT IN AN ORGANIZED HEALTH CARE EDUCATION/TRAINING PROGRAM

## 2023-10-09 PROCEDURE — 85025 COMPLETE CBC W/AUTO DIFF WBC: CPT | Performed by: ORTHOPAEDIC SURGERY

## 2023-10-09 PROCEDURE — 97166 OT EVAL MOD COMPLEX 45 MIN: CPT

## 2023-10-09 PROCEDURE — 92610 EVALUATE SWALLOWING FUNCTION: CPT

## 2023-10-09 PROCEDURE — 97530 THERAPEUTIC ACTIVITIES: CPT

## 2023-10-09 PROCEDURE — 63700000 PHARM REV CODE 250 ALT 637 W/O HCPCS: Performed by: STUDENT IN AN ORGANIZED HEALTH CARE EDUCATION/TRAINING PROGRAM

## 2023-10-09 PROCEDURE — 63600175 PHARM REV CODE 636 W HCPCS: Performed by: ORTHOPAEDIC SURGERY

## 2023-10-09 PROCEDURE — 25000003 PHARM REV CODE 250: Performed by: ORTHOPAEDIC SURGERY

## 2023-10-09 PROCEDURE — 25000003 PHARM REV CODE 250: Performed by: STUDENT IN AN ORGANIZED HEALTH CARE EDUCATION/TRAINING PROGRAM

## 2023-10-09 PROCEDURE — 36415 COLL VENOUS BLD VENIPUNCTURE: CPT | Performed by: ORTHOPAEDIC SURGERY

## 2023-10-09 PROCEDURE — 97535 SELF CARE MNGMENT TRAINING: CPT

## 2023-10-09 PROCEDURE — 30200315 PPD INTRADERMAL TEST REV CODE 302: Performed by: STUDENT IN AN ORGANIZED HEALTH CARE EDUCATION/TRAINING PROGRAM

## 2023-10-09 PROCEDURE — 94760 N-INVAS EAR/PLS OXIMETRY 1: CPT

## 2023-10-09 PROCEDURE — 80053 COMPREHEN METABOLIC PANEL: CPT | Performed by: ORTHOPAEDIC SURGERY

## 2023-10-09 PROCEDURE — 92526 ORAL FUNCTION THERAPY: CPT

## 2023-10-09 RX ORDER — FLUCONAZOLE 200 MG/1
200 TABLET ORAL DAILY
Qty: 13 TABLET | Refills: 0 | Status: ON HOLD
Start: 2023-10-09 | End: 2023-10-27 | Stop reason: HOSPADM

## 2023-10-09 RX ORDER — ENOXAPARIN SODIUM 100 MG/ML
40 INJECTION SUBCUTANEOUS EVERY 24 HOURS
Qty: 10.4 ML | Refills: 0
Start: 2023-10-09 | End: 2023-11-04

## 2023-10-09 RX ORDER — ASPIRIN 81 MG/1
81 TABLET ORAL DAILY
Qty: 120 TABLET | Refills: 0 | Status: ON HOLD | OUTPATIENT
Start: 2023-10-09 | End: 2023-12-26 | Stop reason: HOSPADM

## 2023-10-09 RX ADMIN — FINASTERIDE 5 MG: 5 TABLET, FILM COATED ORAL at 10:10

## 2023-10-09 RX ADMIN — ONDANSETRON 4 MG: 2 INJECTION INTRAMUSCULAR; INTRAVENOUS at 01:10

## 2023-10-09 RX ADMIN — ENOXAPARIN SODIUM 40 MG: 40 INJECTION SUBCUTANEOUS at 04:10

## 2023-10-09 RX ADMIN — SPIRONOLACTONE 25 MG: 25 TABLET ORAL at 10:10

## 2023-10-09 RX ADMIN — ASPIRIN 81 MG 81 MG: 81 TABLET ORAL at 10:10

## 2023-10-09 RX ADMIN — INSULIN ASPART 2 UNITS: 100 INJECTION, SOLUTION INTRAVENOUS; SUBCUTANEOUS at 01:10

## 2023-10-09 RX ADMIN — INSULIN ASPART 2 UNITS: 100 INJECTION, SOLUTION INTRAVENOUS; SUBCUTANEOUS at 04:10

## 2023-10-09 RX ADMIN — HYDROCODONE BITARTRATE AND ACETAMINOPHEN 1 TABLET: 5; 325 TABLET ORAL at 11:10

## 2023-10-09 RX ADMIN — SERTRALINE HYDROCHLORIDE 50 MG: 50 TABLET ORAL at 10:10

## 2023-10-09 RX ADMIN — DOCUSATE SODIUM 100 MG: 100 CAPSULE, LIQUID FILLED ORAL at 10:10

## 2023-10-09 RX ADMIN — METOPROLOL TARTRATE 12.5 MG: 25 TABLET, FILM COATED ORAL at 10:10

## 2023-10-09 RX ADMIN — OXYBUTYNIN CHLORIDE 10 MG: 10 TABLET, EXTENDED RELEASE ORAL at 10:10

## 2023-10-09 RX ADMIN — ATORVASTATIN CALCIUM 40 MG: 40 TABLET, FILM COATED ORAL at 10:10

## 2023-10-09 RX ADMIN — FLUDROCORTISONE ACETATE 100 MCG: 0.1 TABLET ORAL at 10:10

## 2023-10-09 RX ADMIN — FLUCONAZOLE 200 MG: 100 TABLET ORAL at 10:10

## 2023-10-09 RX ADMIN — TUBERCULIN PURIFIED PROTEIN DERIVATIVE 5 UNITS: 5 INJECTION, SOLUTION INTRADERMAL at 01:10

## 2023-10-09 RX ADMIN — CLOPIDOGREL BISULFATE 75 MG: 75 TABLET, FILM COATED ORAL at 10:10

## 2023-10-09 RX ADMIN — POTASSIUM CHLORIDE 10 MEQ: 750 CAPSULE, EXTENDED RELEASE ORAL at 10:10

## 2023-10-09 NOTE — PLAN OF CARE
Patient cleared for discharge from case management standpoint.    Radha pulled orders for [facility] from [facility]. Nurse Anita called report to Nurse Cross at [facility]. Pt's son Jaya present and aware of discharge.    Chart and discharge orders reviewed.  Patient discharged to [facility] with no further case management needs.       10/09/23 1241   Final Note   Assessment Type Final Discharge Note   Anticipated Discharge Disposition SNF   Hospital Resources/Appts/Education Provided Provided patient/caregiver with written discharge plan information;Post-Acute resouces added to AVS   Post-Acute Status   Post-Acute Placement Status Set-up Complete/Auth obtained   Discharge Delays (!) Ambulance Transport/Facility Transport

## 2023-10-09 NOTE — DISCHARGE SUMMARY
Critical access hospital Medicine  Discharge Summary      Patient Name: Tono Saez  MRN: [id number]  BRITTANY: [id number]  Patient Class: IP- Inpatient  Admission Date: 10/3/2023  Hospital Length of Stay: 6 days  Discharge Date and Time:  10/09/2023 6:39 PM  Attending Physician: No att. providers found   Discharging Provider: Harshad Haddad MD  Primary Care Provider: Scott Staley MD    Primary Care Team: Networked reference to record PCT     HPI:   Tono Saez is a 78 y.o.  male with known history of  CAD w/ stents and post CABG, DM2, HTN, HLD, GERD, GIB who came in today after a mechanical fall.  He states he was standing at his sink and attempted to turn using walker and tripped over it.  He now complains of pain to right thigh and hip area.  CT of head, and c-spine are negative.  Xray shows right side femoral neck fracture. Patient was just discharged from here after a fall and has known orthostatic hypotension.  Patient denies chest pain, shortness of breath, palpitations, abdominal pain, nausea, vomiting, diarrhea, constipation,  hematochezia, hematemesis, fever, cough, congestion, runny nose, changes in vision, hearing, numbness, tingling, headache, focal weakness, dysuria, frequency, hematuria. History was obtained from the patient and ER physician Sign-out.      Procedure(s) (LRB):  HEMIARTHROPLASTY, HIP (Right)      Hospital Course:   Patient was admitted for mechanical fall found to have right side femoral neck fracture.  Given recent left heart catheterization in August cardiology was consulted and Plavix was held prior to surgery.  Patient had right hip surgery on 10/05/2023.  Plavix to be resumed after surgery once cleared by ortho. Discussed with orthopedics, Plavix resumed.  Troponins elevated during admission, cardiology made aware.  Cardiology cleared patient for discharge and instructed patient to follow up outpatient.  Patient also instructed to follow up with orthopedic surgery after  discharge.  Plan for rehab on discharge.      Physical Exam  Constitutional:       Appearance: Normal appearance.   Cardiovascular:      Rate and Rhythm: Normal rate.      Heart sounds: Normal heart sounds.   Pulmonary:      Effort: Pulmonary effort is normal. No respiratory distress.   Goals of Care Treatment Preferences:  Code Status: Full Code      Consults:   Consults (From admission, onward)        Status Ordering Provider     IP consult to case management  Once        Provider:  (Not yet assigned)    Completed RAFAQAT, FNU     Inpatient consult to Cardiology  Once        Provider:  Zachary Diop MD    Completed RAFAQAT, FNU     Inpatient consult to Orthopedic Surgery  Once        Provider:  Avery Borrero MD    Completed TAYLOR WRAY          No new Assessment & Plan notes have been filed under this hospital service since the last note was generated.  Service: Hospital Medicine    Final Active Diagnoses:    Diagnosis Date Noted POA    CAD (coronary artery disease) [I25.10] 04/03/2020 Yes    Type 2 diabetes mellitus with hyperglycemia  [E11.65] 02/13/2019 Yes    HTN (hypertension) [I10] 05/21/2012 Yes      Problems Resolved During this Admission:    Diagnosis Date Noted Date Resolved POA    PRINCIPAL PROBLEM:  Hip fracture [S72.009A] 10/03/2023 10/09/2023 Yes    Acute postoperative anemia due to expected blood loss [D62] 10/07/2023 10/09/2023 No    Elevated troponin [R79.89] 10/05/2023 10/09/2023 Yes       Discharged Condition: good    Disposition: Skilled Nursing Facility    Follow Up:   Follow-up Information     Scott Staley MD Follow up in 1 week(s).    Specialty: Family Medicine  Why: hospital discharge, repeat cbc  Contact information:  [address]  [address]  [place] [address]  [phone]             Robles Mckoy MD Follow up in 1 week(s).    Specialties: Interventional Cardiology, Cardiology  Contact information:  [address]  [address]  [place]  [address]  [phone]             Avery Borrero MD Follow up in 2 week(s).    Specialties: Sports Medicine, Orthopedic Surgery  Why: For wound re-check  Contact information:  [address] [place] [address] [address]  [place] [address] [phone]             [facility] Follow up.    Specialty: Telemedicine  Contact information:  [address] [place] [address]  [address]  [place] [address]  [phone]                       Patient Instructions:      Diet Cardiac     Notify your health care provider if you experience any of the following:  persistent nausea and vomiting or diarrhea     Notify your health care provider if you experience any of the following:  temperature >100.4     Notify your health care provider if you experience any of the following:  severe uncontrolled pain     Notify your health care provider if you experience any of the following:  redness, tenderness, or signs of infection (pain, swelling, redness, odor or green/yellow discharge around incision site)     Change dressing (specify)   Order Comments: Change dressing daily with dry gauze till follow with orthopedic     Activity as tolerated       Significant Diagnostic Studies: Labs:   CMP   Recent Labs   Lab 10/08/23  0541 10/09/23  0446    140   K 4.3 3.7    108   CO2 21* 25   * 140*   BUN 58* 51*   CREATININE 1.4 1.2   CALCIUM 9.9 9.7   PROT 6.0 5.7*   ALBUMIN 3.4* 3.2*   BILITOT 1.0 0.9   ALKPHOS 101 94   AST 57* 71*   ALT 46* 75*   ANIONGAP 10 7*    and CBC   Recent Labs   Lab 10/08/23  0541 10/09/23  0446   WBC 11.32 8.81   HGB 8.9* 8.2*   HCT 28.8* 26.4*    188       Pending Diagnostic Studies:     None         Medications:  Reconciled Home Medications:      Medication List      START taking these medications    enoxaparin 40 mg/0.4 mL Syrg  Commonly known as: LOVENOX  Inject 0.4 mLs (40 mg total) into the skin Q24H (prophylaxis, 1700). for 26 days     fluconazole 200 MG Tab  Commonly known as:  DIFLUCAN  Take 1 tablet (200 mg total) by mouth once daily. for 13 days        CHANGE how you take these medications    aspirin 81 MG EC tablet  Commonly known as: ECOTRIN  Take 1 tablet (81 mg total) by mouth once daily.  What changed: how much to take     pantoprazole 40 MG tablet  Commonly known as: PROTONIX  TAKE ONE TABLET BY MOUTH ONCE DAILY  What changed: when to take this     sertraline 50 MG tablet  Commonly known as: ZOLOFT  TAKE ONE TABLET BY MOUTH ONCE DAILY  What changed: when to take this        CONTINUE taking these medications    atorvastatin 40 MG tablet  Commonly known as: LIPITOR  Take 1 tablet by mouth once daily.     betamethasone dipropionate 0.05 % lotion  Commonly known as: DIPROLENE  Apply thin film to scalp bid prn itch     cholecalciferol (vitamin D3) 75 mcg (3,000 unit) Tab  Take by mouth.     clopidogreL 75 mg tablet  Commonly known as: PLAVIX  TAKE ONE TABLET BY MOUTH EVERY DAY     empagliflozin 25 mg tablet  Commonly known as: JARDIANCE  Take 1 tablet (25 mg total) by mouth once daily.     finasteride 5 mg tablet  Commonly known as: PROSCAR  Take 5 mg by mouth once daily.     fludrocortisone 0.1 mg Tab  Commonly known as: FLORINEF  Take 1 tablet (100 mcg total) by mouth once daily.     fluticasone propionate 50 mcg/actuation nasal spray  Commonly known as: FLONASE  1 spray by Each Nostril route as needed for Allergies.     ketoconazole 2 % shampoo  Commonly known as: NIZORAL  Apply 1 application  topically 3 (three) times a week.     MAG-OXIDE ORAL  Take by mouth. 400 mg , once a day     metoprolol tartrate 25 MG tablet  Commonly known as: LOPRESSOR  Take 0.5 tablets (12.5 mg total) by mouth 2 (two) times daily.     midodrine 5 MG Tab  Commonly known as: PROAMATINE  Take 1 tablet (5 mg total) by mouth 3 (three) times daily with meals.     multivitamin capsule  Take 1 capsule by mouth once daily.     nitroGLYCERIN 0.4 MG SL tablet  Commonly known as: NITROSTAT  DISSOLVE 1 TABLET UNDER  THE TONGUE AS NEEDED FOR CHEST PAIN     potassium chloride 10 MEQ Tbsr  Commonly known as: KLOR-CON  Take 1 tablet (10 mEq total) by mouth once daily.     pregabalin 50 MG capsule  Commonly known as: LYRICA  Take 1 capsule (50 mg total) by mouth nightly as needed.     spironolactone 25 MG tablet  Commonly known as: ALDACTONE  Take 1 tablet (25 mg total) by mouth once daily.     tolterodine 4 MG 24 hr capsule  Commonly known as: DETROL LA  Take 1 capsule (4 mg total) by mouth once daily.            Indwelling Lines/Drains at time of discharge:   Lines/Drains/Airways     Drain  Duration           Male External Urinary Catheter 10/07/23 2030 1 day                Time spent on the discharge of patient: 35 minutes         Harshad Haddad MD  Department of Hospital Medicine  [facility]

## 2023-10-09 NOTE — PT/OT/SLP PROGRESS
Physical Therapy Treatment    Patient Name:  Tono Saez   MRN:  [id number]    Recommendations:     Discharge Recommendations: nursing facility, skilled  Discharge Equipment Recommendations: none  Barriers to discharge:  increased assist needed    Assessment:     Tono Saez is a 78 y.o. male admitted with a medical diagnosis of Hip fracture.  He presents with the following impairments/functional limitations: weakness, impaired endurance, impaired self care skills, impaired functional mobility, gait instability, impaired balance, impaired cognition, decreased coordination, decreased lower extremity function, decreased upper extremity function, pain, decreased ROM, impaired skin, impaired cardiopulmonary response to activity, orthopedic precautions.    Rehab Prognosis: Fair; patient would benefit from acute skilled PT services to address these deficits and reach maximum level of function.    Recent Surgery: Procedure(s) (LRB):  HEMIARTHROPLASTY, HIP (Right) 4 Days Post-Op    Plan:     During this hospitalization, patient to be seen daily to address the identified rehab impairments via gait training, therapeutic activities, therapeutic exercises, neuromuscular re-education and progress toward the following goals:    Plan of Care Expires:  11/09/23    Subjective     Chief Complaint: painful movement  Patient/Family Comments/goals: get better  Pain/Comfort:  Pain Rating 1: 10/10  Location - Side 1: Right  Location 1: hip  Pain Addressed 1: Pre-medicate for activity, Reposition, Distraction, Cessation of Activity, Nurse notified  Pain Rating Post-Intervention 1: 10/10      Objective:     Communicated with Erick STERN prior to session.  Patient found HOB elevated with telemetry, hip abduction brace upon PT entry to room.     General Precautions: Standard, fall  Orthopedic Precautions: RLE weight bearing as tolerated, RLE posterior precautions  Braces: Hip abduction brace  Respiratory Status: Room air     Functional  Mobility:  Bed Mobility:     Rolling Left:  maximal assistance and of 2 persons  Rolling Right: maximal assistance and of 2 persons  Supine to Sit: maximal assistance and of 2 persons  Sit to Supine: maximal assistance and of 2 persons  Transfers:     Sit to Stand:  maximal assistance and of 2 persons with rolling walker      AM-PAC 6 CLICK MOBILITY  Turning over in bed (including adjusting bedclothes, sheets and blankets)?: 2  Sitting down on and standing up from a chair with arms (e.g., wheelchair, bedside commode, etc.): 2  Moving from lying on back to sitting on the side of the bed?: 2  Moving to and from a bed to a chair (including a wheelchair)?: 2  Need to walk in hospital room?: 1  Climbing 3-5 steps with a railing?: 1  Basic Mobility Total Score: 10       Treatment & Education:  Pt was educated on the following: call light use, importance of OOB activity and functional mobility to negate the negative effects of prolonged bed rest during this hospitalization, safe transfers/ambulation and discharge planning recommendations/options.     Patient left HOB elevated with all lines intact, call button in reach, bed alarm on, and RN notified..    GOALS:   Multidisciplinary Problems       Physical Therapy Goals          Problem: Physical Therapy    Goal Priority Disciplines Outcome Goal Variances Interventions   Physical Therapy Goal     PT, PT/OT Ongoing, Progressing     Description: Goals to be met by: 10/27/23     Patient will increase functional independence with mobility by performin. Supine to sit with MInimal Assistance  2. Sit to supine with MInimal Assistance  3. Sit to stand transfer with Minimal Assistance  4. Bed to chair transfer with Minimal Assistance using Rolling Walker  5. Gait  x 25 feet with Minimal Assistance using Rolling Walker.                          Time Tracking:     PT Received On: 10/09/23  PT Start Time: 1230     PT Stop Time: 1302  PT Total Time (min): 32 min     Billable  Minutes: Therapeutic Activity 32    Treatment Type: Treatment  PT/PTA: PT     Number of PTA visits since last PT visit: 0     10/09/2023

## 2023-10-09 NOTE — PLAN OF CARE
Problem: Adult Inpatient Plan of Care  Goal: Optimal Comfort and Wellbeing  Outcome: Ongoing, Not Progressing     Problem: Adult Inpatient Plan of Care  Goal: Absence of Hospital-Acquired Illness or Injury  Outcome: Ongoing, Not Progressing

## 2023-10-09 NOTE — PLAN OF CARE
CM s/w pt, daughter Carmen and son in law Jaya. All are in agreement with [facility] for SNF. Radha [facility] liaison arranging transportation and reviewing orders. Radha will notify  when orders are accepted and clear for nursing to call report.       10/09/23 1150   Post-Acute Status   Post-Acute Authorization Placement   Post-Acute Placement Status Set-up Complete/Auth obtained   Discharge Plan   Discharge Plan A Skilled Nursing Facility   Discharge Plan B Skilled Nursing Facility

## 2023-10-09 NOTE — PLAN OF CARE
Pt is accepted at [facility] pending bed. CM s/w Radha this am. If bed is not available CM will pursue additional referral sources for SNF placement today. CM s/w pt at bedside and updated on status of SNF bed and medical clearance for discharge. Pt verbalized understanding.        10/09/23 0916   Discharge Reassessment   Assessment Type Discharge Planning Reassessment   Did the patient's condition or plan change since previous assessment? Yes   Discharge Plan discussed with: Patient   Communicated LEE with patient/caregiver Yes   Discharge Plan A Skilled Nursing Facility   Discharge Plan B Skilled Nursing Facility   DME Needed Upon Discharge  none   Transition of Care Barriers None   Post-Acute Status   Post-Acute Authorization Placement   Post-Acute Placement Status Pending post-acute provider review/more information requested

## 2023-10-09 NOTE — PT/OT/SLP EVAL
Occupational Therapy   Evaluation    Name: Tono Saez  MRN: [id number]  Admitting Diagnosis: Hip fracture  Recent Surgery: Procedure(s) (LRB):  HEMIARTHROPLASTY, HIP (Right) 4 Days Post-Op    Recommendations:     Discharge Recommendations: nursing facility, skilled  Discharge Equipment Recommendations:  none  Barriers to discharge:  Decreased caregiver support    Assessment:     Tono Saez is a 78 y.o. male with a medical diagnosis of Hip fracture.  Performance deficits affecting function: weakness, impaired endurance, impaired self care skills, impaired functional mobility, gait instability, impaired balance, impaired cognition, decreased safety awareness, pain, decreased lower extremity function, orthopedic precautions.      Pt required max to total assistance of two persons for ADLs and functional mobility today.      Rehab Prognosis: Fair; patient would benefit from acute skilled OT services to address these deficits and reach maximum level of function.       Plan:     Patient to be seen 6 x/week to address the above listed problems via self-care/home management, therapeutic activities, therapeutic exercises, wheelchair management/training, cognitive retraining  Plan of Care Expires: 11/08/23  Plan of Care Reviewed with: patient    Subjective     Chief Complaint: R hip pain  Patient/Family Comments/goals: to be able to return to independent living    Occupational Profile:  Living Environment: Lives alone in a one story home.  Previous level of function: Modified Independent with ADLs and functional mobility  Equipment Used at Home: walker, rolling and shower chair  Assistance upon Discharge: Limited family assistance    Pain/Comfort:  Pain Rating 1: 10/10  Location - Side 1: Right  Location - Orientation 1: generalized  Location 1: hip  Pain Addressed 1: Pre-medicate for activity, Reposition, Distraction, Cessation of Activity, Nurse notified  Pain Rating Post-Intervention 1: 10/10    Objective:      Communicated with: nurse prior to session.  Patient found supine with telemetry, hip abduction brace upon OT entry to room.    General Precautions: Standard, fall  Orthopedic Precautions: RLE weight bearing as tolerated, RLE posterior precautions  Braces: Hip abduction brace  Respiratory Status: Room air    Occupational Performance:    Bed Mobility:    Patient completed Rolling/Turning to Left with  maximal assistance and 2 persons  Patient completed Rolling/Turning to Right with maximal assistance and 2 persons  Patient completed Supine to Sit with maximal assistance and 2 persons  Patient completed Sit to Supine with maximal assistance and 2 persons    Functional Mobility/Transfers:  Patient completed Sit <> Stand Transfer with maximal assistance and of 2 persons  with  rolling walker ; max cues to follow hip precautions and for proper technique/safety    Activities of Daily Living:  Toileting: total assistance for hygiene/brief change after pt had BM in standing at EOB    Cognitive/Visual Perceptual:  Cognitive/Psychosocial Skills:     -       Oriented to: Person, Place, and Situation   -       Follows Commands/attention:Follows two-step commands  -       Safety awareness/insight to disability: impaired     Physical Exam:  BUE AROM/strength/coordination WFL    AMPAC 6 Click ADL:  AMPAC Total Score: 12    Treatment & Education:  Pt educated on OT role/POC and d/c recommendation  Prior to initiation of functional mobility, pt was able to verbalize 0/3 posterior hip precautions; OT reviewed hip precautions with pt prior to initiation of functional mobility     Patient left HOB elevated with all lines intact, call button in reach, and bed alarm on    GOALS:   Multidisciplinary Problems       Occupational Therapy Goals          Problem: Occupational Therapy    Goal Priority Disciplines Outcome Interventions   Occupational Therapy Goal     OT, PT/OT     Description: Goals to be met by: 11/9/23     Patient will  increase functional independence with ADLs by performing:    UE Dressing with Set-up Assistance.  LE Dressing with Stand-by Assistance and Assistive Devices as needed.  Grooming while seated with Supervision.  Toileting from toilet with Stand-by Assistance for hygiene and clothing management.   Toilet transfer to toilet with Stand-by Assistance.                         History:     Past Medical History:   Diagnosis Date    Anemia     Anticoagulant long-term use     Arthritis     CAD (coronary artery disease)     CABG, STENTS '97,'99,'01,'05    Cancer     SKIN    Cataract     Diabetes mellitus     Diabetes mellitus type II     GERD (gastroesophageal reflux disease)     GI bleed 2020    Hypertension     Myocardial infarction     Wears glasses          Past Surgical History:   Procedure Laterality Date    ANGIOGRAM, CORONARY, WITH LEFT HEART CATHETERIZATION N/A 08/24/2023    Procedure: Angiogram, Coronary, with Left Heart Cath;  Surgeon: Robles Mckoy MD;  Location: [facility] CATH/EP LAB;  Service: Cardiology;  Laterality: N/A;    CARDIAC PACEMAKER PLACEMENT      CARDIAC PACEMAKER PLACEMENT      CARDIAC PACEMAKER PLACEMENT  04/26/2018    replaced, [employer]    CARDIAC SURGERY      1995   CABG X 5    CHOLECYSTECTOMY      COLON SURGERY      COLONOSCOPY N/A 02/21/2020    Procedure: COLONOSCOPY;  Surgeon: Abe Barragan III, MD;  Location: [facility] ENDO;  Service: Endoscopy;  Laterality: N/A;    COLONOSCOPY N/A 02/23/2020    Procedure: COLONOSCOPY;  Surgeon: Bob Locke Jr., MD;  Location: [facility] ENDO;  Service: Endoscopy;  Laterality: N/A;    CORONARY ANGIOGRAPHY INCLUDING BYPASS GRAFTS WITH CATHETERIZATION OF LEFT HEART N/A 10/26/2022    Procedure: ANGIOGRAM, CORONARY, INCLUDING BYPASS GRAFT, WITH LEFT HEART CATHETERIZATION;  Surgeon: Robles Mckoy MD;  Location: [facility] CATH/EP LAB;  Service: Cardiology;  Laterality: N/A;    CORONARY ARTERY BYPASS GRAFT  1995    CORONARY BYPASS GRAFT ANGIOGRAPHY  08/24/2023    Procedure:  Bypass graft study;  Surgeon: Robles Mckoy MD;  Location: [facility] CATH/EP LAB;  Service: Cardiology;;    CORONARY STENT PLACEMENT      stent x 5    ESOPHAGOGASTRODUODENOSCOPY N/A 02/23/2020    Procedure: EGD (ESOPHAGOGASTRODUODENOSCOPY);  Surgeon: Bob Locke Jr., MD;  Location: [facility] ENDO;  Service: Endoscopy;  Laterality: N/A;    EYE SURGERY      BILAT CATARACT    head laceration   01/19/2018    HEMIARTHROPLASTY OF HIP Right 10/5/2023    Procedure: HEMIARTHROPLASTY, HIP;  Surgeon: Avery Borrero MD;  Location: [facility] OR;  Service: Orthopedics;  Laterality: Right;    HEMORRHOID SURGERY      INJECTION OF ANESTHETIC AGENT AROUND MEDIAL BRANCH NERVES INNERVATING LUMBAR FACET JOINT Bilateral 06/29/2023    Procedure: Block-nerve-medial branch-lumbar;  Surgeon: Maurice Montenegro MD;  Location: [facility] OR;  Service: Pain Management;  Laterality: Bilateral;  L3,4,5 MBB    INJECTION OF ANESTHETIC AGENT AROUND MEDIAL BRANCH NERVES INNERVATING LUMBAR FACET JOINT Bilateral 07/25/2023    Procedure: Block-nerve-medial branch-lumbar;  Surgeon: Maurice Montenegro MD;  Location: [facility] OR;  Service: Anesthesiology;  Laterality: Bilateral;  L3,4,5 MBB #2    INTRAVASCULAR ULTRASOUND, CORONARY N/A 08/24/2023    Procedure: Ultrasound-coronary;  Surgeon: Robles Mckoy MD;  Location: [facility] CATH/EP LAB;  Service: Cardiology;  Laterality: N/A;    PTCA, SINGLE VESSEL  08/24/2023    Procedure: PTCA, Single Vessel;  Surgeon: Robles Mckoy MD;  Location: [facility] CATH/EP LAB;  Service: Cardiology;;    SMALL BOWEL ENTEROSCOPY N/A 02/21/2020    Procedure: ENTEROSCOPY;  Surgeon: Abe Barragan III, MD;  Location: [facility] ENDO;  Service: Endoscopy;  Laterality: N/A;    STENT, DRUG ELUTING, SINGLE VESSEL, CORONARY  08/24/2023    Procedure: Stent, Drug Eluting, Single Vessel, Coronary;  Surgeon: Robles Mckoy MD;  Location: [facility] CATH/EP LAB;  Service: Cardiology;;    stint         Time Tracking:     OT Date of Treatment: 10/09/23  OT Start Time:  1232  OT Stop Time: 1303  OT Total Time (min): 31 min  Co-evaluation/treatment with PT due to pt's limited activity tolerance for multiple sessions today    Billable Minutes:Evaluation 08  Self Care/Home Management 10  Therapeutic Activity 13    10/9/2023

## 2023-10-09 NOTE — PT/OT/SLP EVAL
Speech Language Pathology Evaluation  Bedside Swallow    Patient Name:  Tono Saez   MRN:  [id number]  Admitting Diagnosis: Hip fracture    Recommendations:                 General Recommendations:   Will monitor tolerance x1 day  Diet recommendations:  Regular, Thin   Aspiration Precautions:  assist with meals as needed, HOB elevated with all intake, and 1 bite/sip at a time   General Precautions: Standard, aspiration, fall, diabetic  Communication strategies:  provide increased time to answer    Assessment:     Tono Saez is a 78 y.o. male with an SLP diagnosis of Dysphagia.  Clinical swallowing evaluation completed. Reactive cough noted on initial trial of thin via cup; unable to duplicated. Further, po trials consumed with functional oral phase and no overt s/s airway compromise. Aspiration risk 2° generalized weakness and poor tolerance of HOB elevation. REC Regular (IDDSI 7) textures with thin liquids. Will monitor tolerance x1 day.     History:   PER HPI: Tono Saez is a 78 y.o.  male with known history of  CAD w/ stents and post CABG, DM2, HTN, HLD, GERD, GIB who came in today after a mechanical fall.  He states he was standing at his sink and attempted to turn using walker and tripped over it.  He now complains of pain to right thigh and hip area.  CT of head, and c-spine are negative.  Xray shows right side femoral neck fracture. Patient was just discharged from here after a fall and has known orthostatic hypotension.  Patient denies chest pain, shortness of breath, palpitations, abdominal pain, nausea, vomiting, diarrhea, constipation,  hematochezia, hematemesis, fever, cough, congestion, runny nose, changes in vision, hearing, numbness, tingling, headache, focal weakness, dysuria, frequency, hematuria. History was obtained from the patient and ER physician Sign-out.      Past Medical History:   Diagnosis Date    Anemia     Anticoagulant long-term use     Arthritis     CAD (coronary artery  disease)     CABG, STENTS '97,'99,'01,'05    Cancer     SKIN    Cataract     Diabetes mellitus     Diabetes mellitus type II     GERD (gastroesophageal reflux disease)     GI bleed 2020    Hypertension     Myocardial infarction     Wears glasses        Past Surgical History:   Procedure Laterality Date    ANGIOGRAM, CORONARY, WITH LEFT HEART CATHETERIZATION N/A 08/24/2023    Procedure: Angiogram, Coronary, with Left Heart Cath;  Surgeon: Robles Mckoy MD;  Location: [facility] CATH/EP LAB;  Service: Cardiology;  Laterality: N/A;    CARDIAC PACEMAKER PLACEMENT      CARDIAC PACEMAKER PLACEMENT      CARDIAC PACEMAKER PLACEMENT  04/26/2018    replaced, [employer]    CARDIAC SURGERY      1995   CABG X 5    CHOLECYSTECTOMY      COLON SURGERY      COLONOSCOPY N/A 02/21/2020    Procedure: COLONOSCOPY;  Surgeon: Abe Barragan III, MD;  Location: [facility] ENDO;  Service: Endoscopy;  Laterality: N/A;    COLONOSCOPY N/A 02/23/2020    Procedure: COLONOSCOPY;  Surgeon: Bob Locke Jr., MD;  Location: [facility] ENDO;  Service: Endoscopy;  Laterality: N/A;    CORONARY ANGIOGRAPHY INCLUDING BYPASS GRAFTS WITH CATHETERIZATION OF LEFT HEART N/A 10/26/2022    Procedure: ANGIOGRAM, CORONARY, INCLUDING BYPASS GRAFT, WITH LEFT HEART CATHETERIZATION;  Surgeon: Robles Mckoy MD;  Location: [facility] CATH/EP LAB;  Service: Cardiology;  Laterality: N/A;    CORONARY ARTERY BYPASS GRAFT  1995    CORONARY BYPASS GRAFT ANGIOGRAPHY  08/24/2023    Procedure: Bypass graft study;  Surgeon: Robles Mckoy MD;  Location: [facility] CATH/EP LAB;  Service: Cardiology;;    CORONARY STENT PLACEMENT      stent x 5    ESOPHAGOGASTRODUODENOSCOPY N/A 02/23/2020    Procedure: EGD (ESOPHAGOGASTRODUODENOSCOPY);  Surgeon: Bob Locke Jr., MD;  Location: [facility] ENDO;  Service: Endoscopy;  Laterality: N/A;    EYE SURGERY      BILAT CATARACT    head laceration   01/19/2018    HEMIARTHROPLASTY OF HIP Right 10/5/2023    Procedure: HEMIARTHROPLASTY, HIP;  Surgeon: Adair  Avery Beard MD;  Location: [facility] OR;  Service: Orthopedics;  Laterality: Right;    HEMORRHOID SURGERY      INJECTION OF ANESTHETIC AGENT AROUND MEDIAL BRANCH NERVES INNERVATING LUMBAR FACET JOINT Bilateral 06/29/2023    Procedure: Block-nerve-medial branch-lumbar;  Surgeon: Maurice Montenegro MD;  Location: [facility] OR;  Service: Pain Management;  Laterality: Bilateral;  L3,4,5 MBB    INJECTION OF ANESTHETIC AGENT AROUND MEDIAL BRANCH NERVES INNERVATING LUMBAR FACET JOINT Bilateral 07/25/2023    Procedure: Block-nerve-medial branch-lumbar;  Surgeon: Maurice Montenegro MD;  Location: [facility] OR;  Service: Anesthesiology;  Laterality: Bilateral;  L3,4,5 MBB #2    INTRAVASCULAR ULTRASOUND, CORONARY N/A 08/24/2023    Procedure: Ultrasound-coronary;  Surgeon: Robels Mckoy MD;  Location: [facility] CATH/EP LAB;  Service: Cardiology;  Laterality: N/A;    PTCA, SINGLE VESSEL  08/24/2023    Procedure: PTCA, Single Vessel;  Surgeon: Robles Mckoy MD;  Location: [facility] CATH/EP LAB;  Service: Cardiology;;    SMALL BOWEL ENTEROSCOPY N/A 02/21/2020    Procedure: ENTEROSCOPY;  Surgeon: Abe Barragan III, MD;  Location: [facility] ENDO;  Service: Endoscopy;  Laterality: N/A;    STENT, DRUG ELUTING, SINGLE VESSEL, CORONARY  08/24/2023    Procedure: Stent, Drug Eluting, Single Vessel, Coronary;  Surgeon: Robles Mckoy MD;  Location: [facility] CATH/EP LAB;  Service: Cardiology;;    stint         Social History: Patient lives with alone.    Prior Intubation HX:  s/p R hemiarthroplasty 10/5/23    Modified Barium Swallow: NA    Imaging:  Imaging Results              CT Cervical Spine Without Contrast (Final result)  Result time 10/03/23 21:44:29      Final result by Corbin Hemphill MD (10/03/23 21:44:29)                   Narrative:    EXAM DESCRIPTION:    CT CERVICAL SPINE WITHOUT CONTRAST    CLINICAL HISTORY:    78 years  Male  Neck trauma (Age >= 65y)    COMPARISON:    CT from 8/16/2023    TECHNIQUE:    CT performed without IV contrast. Sagittal and  coronal reconstruction.  This exam was performed according to our departmental dose-optimization program, which includes automated exposure control, adjustment of the mA and/or kV according to patient size and/or use of iterative reconstruction technique.    FINDINGS:    No acute fracture dislocation. No suspicious acute prevertebral soft tissue swelling. Multiple anterior osteophytes. Moderate thickening of the transverse ligament without evidence to suggest the upper cord compression. Multilevel degenerative changes. Mild to moderate central stenosis at C3-4 and C5-6. Multiple levels of foraminal stenosis. Diffuse atherosclerotic disease in the carotid arteries.    Limited images of the upper chest demonstrate questionable small bilateral pleural effusions.    IMPRESSION:  1.  No acute fracture or dislocation.  2.  Multilevel degenerative changes. Mild to moderate central stenosis at C3-4 and C5-6. Multiple levels of foraminal stenosis.  3.  Diffuse atherosclerotic disease in the carotid arteries.  4.  Questionable small bilateral pleural effusions.    Electronically signed by:  Corbin Hemphill MD  10/03/2023 09:44 PM CDT Workstation: [id number]                                     CT Head Without Contrast (Final result)  Result time 10/03/23 21:38:25      Final result by Corbin Hemphill MD (10/03/23 21:38:25)                   Narrative:    EXAM DESCRIPTION:    CT HEAD WITHOUT CONTRAST    CLINICAL HISTORY:    78 years  Male  Head trauma, moderate-severe    COMPARISON:    8/16/2023    TECHNIQUE:    CT of the head performed without IV contrast. Sagittal coronal reconstruction. This exam was performed according to our departmental dose-optimization program, which includes automated exposure control, adjustment of the mA and/or kV according to patient size and/or use of iterative reconstruction technique.    FINDINGS:    Moderate to prominent central and cortical atrophy. Moderate to prominent periventricular  white matter small vessel disease and additional true lacunar infarctions. Advanced atherosclerotic disease including distal vertebral and internal carotid arteries. No acute intra-axial or extra-axial abnormality.. Visualized paranasal sinuses, mastoid air cells and bony calvarium are unremarkable.    IMPRESSION:  Advanced atrophy, small vessel disease and atherosclerotic disease. There are no acute intracranial findings.    Electronically signed by:  Corbin Hemphill MD  10/03/2023 09:38 PM CDT Workstation: [id number]                                     X-Ray Knee 3 View Right (Final result)  Result time 10/04/23 07:50:09   Procedure changed from X-Ray Knee 3 View Left     Final result by Mann Skinner MD (10/04/23 07:50:09)                   Narrative:    Reason: pain fall    FINDINGS:  3 views of the right knee show no fracture, dislocation, or destructive osseous lesion. Chondrocalcinosis occurs in medial and lateral compartments. Mild medial compartment joint space narrowing is evident. Negative for joint effusion. Arterial vascular calcifications are present. Patellar enthesophyte arises near quadriceps tendon insertion.    IMPRESSION:  No acute right knee abnormality.    Electronically signed by:  Mann Skinner MD  10/04/2023 07:50 AM CDT Workstation: [id number]                                     X-Ray Chest AP Portable (Final result)  Result time 10/04/23 07:47:50      Final result by Mann Skinner MD (10/04/23 07:47:50)                   Narrative:    Reason: falls fall    FINDINGS:  Portable chest at 2102 compared with a 20/2/2023 shows normal cardiomediastinal silhouette with postsurgical changes of CABG. Left transvenous pacer unchanged.    Lungs are hypoinflated but clear. Previous central pulmonary vascular prominence has resolved. No pleural effusion or pneumothorax. No acute osseous abnormality.    IMPRESSION:  Resolution of prior central pulmonary vascular prominence, with no acute  cardiopulmonary abnormality.    Electronically signed by:  Mann Skinner MD  10/04/2023 07:47 AM CDT Workstation: [id number]                                     X-Ray Hip 2 or 3 views Right (with Pelvis when performed) (Final result)  Result time 10/04/23 07:49:01   Procedure changed from X-Ray Hip 2 or 3 views Left (with Pelvis when performed)     Final result by Mann Skinner MD (10/04/23 07:49:01)                   Narrative:    Reason: pain fall    FINDINGS:  AP pelvis and 2 views right hip compared with 4/14/2023 show acute subcapital right femoral neck fracture with mild to moderate varus angulation. Right femoral head remains concentrically located within the right acetabulum.    No additional fracture or dislocation. Bilateral hip joint spaces are maintained, as is pubic symphysis and sacroiliac joints. Vascular calcifications are present. Soft tissues otherwise unremarkable.    IMPRESSION:  Acute subcapital right femoral neck fracture.    Electronically signed by:  Mann Skinner MD  10/04/2023 07:49 AM CDT Workstation: [id number]                                     X-Ray Femur 2 View Right (Final result)  Result time 10/04/23 07:46:41   Procedure changed from X-Ray Femur AP/LAT Left     Final result by Mann Skinner MD (10/04/23 07:46:41)                   Narrative:    Reason: pain fall    FINDINGS:  2 views of right femur show acute subcapital right femoral neck fracture with mild to moderate varus angulation. The right femoral head remains concentrically located within the right acetabulum.    No additional fracture, no dislocation. Arterial vascular calcifications are present.    IMPRESSION:  Acute subcapital right femoral neck fracture.    Electronically signed by:  Mann Skinner MD  10/04/2023 07:46 AM CDT Workstation: [id number]                                     Prior diet: Regular/thin.    Occupation/hobbies/homemaking: Pt/family report PLOF as independent with ADLS, independent with IADLS. Pt  "does note drive.     Subjective     Regarding dysphagia over weekend, pt states "It didn't want to go down."     Pain/Comfort:  Pain Rating 1: 0/10    Respiratory Status: Room air    Objective:   Pt seen for clinical swallow evaluation. He is AAOx4 and cooperative. Son in law at bedside and assists with history. He reports significant improvement in overall mental status since Saturday. Pt/son deny prior h/o dysphagia. States he at breakfast without difficulty. Pt only indira to tolerate HOB elevation to 35°    Oral Musculature Evaluation  Oral Musculature: general weakness  Dentition: present and adequate  Secretion Management: adequate  Mucosal Quality: dry  Mandibular Strength and Mobility: WFL  Oral Labial Strength and Mobility: WFL  Lingual Strength and Mobility: WFL  Velar Elevation:  (reduced elevation on L; mild)  Volitional Cough: fair-adequate  Volitional Swallow: able to palpate laryngeal rise  Voice Prior to PO Intake: mildly hoarse; baseline per son in law at bedside    Bedside Swallow Eval:   Consistencies Assessed:  Thin liquids --via tsp, cup, straw, self generated straw/cup sips  Puree --applesauce  Solids --julio cracker      Oral Phase:   WFL    Pharyngeal Phase:   Immediate, reactive cough noted while patient drinking water prior to evaluation. Oral cavity noted to be very dry.   Subsequent PO trials of thin, puree and julio cracker were consumed with no overt s/s airway comprise.     Compensatory Strategies  None    Treatment: Pt/family educated re: results/recs of evaluation, aspiration precautions, s/s penetration/aspiration,  SLP role and POC. Receptive to information provided.       Plan:     Patient to be seen:  1 x/week   Plan of Care expires:  10/10/23  Plan of Care reviewed with:  patient, son   SLP Follow-Up:  Yes       Discharge recommendations:  nursing facility, skilled   Barriers to Discharge:  Level of Skilled Assistance Needed .    Time Tracking:     SLP Treatment Date:   " 10/09/23  Speech Start Time:  1000  Speech Stop Time:  1023     Speech Total Time (min):  23 min    Billable Minutes: Treatment Swallowing Dysfunction 10, Eval Swallow and Oral Function 13, and Total Time 23    10/09/2023

## 2023-10-10 PROBLEM — S72.91XD CLOSED FRACTURE OF RIGHT FEMUR WITH ROUTINE HEALING: Status: ACTIVE | Noted: 2023-10-10

## 2023-10-12 ENCOUNTER — APPOINTMENT (OUTPATIENT)
Dept: RADIOLOGY | Facility: HOSPITAL | Age: 78
End: 2023-10-12
Payer: MEDICARE

## 2023-10-12 PROCEDURE — 70450 CT HEAD/BRAIN W/O DYE: CPT | Mod: 26,,, | Performed by: RADIOLOGY

## 2023-10-12 PROCEDURE — 70450 CT HEAD/BRAIN W/O DYE: CPT | Mod: TC

## 2023-10-12 PROCEDURE — 70450 CT HEAD WITHOUT CONTRAST: ICD-10-PCS | Mod: 26,,, | Performed by: RADIOLOGY

## 2023-10-13 PROBLEM — G93.41 ACUTE METABOLIC ENCEPHALOPATHY: Status: ACTIVE | Noted: 2023-10-13

## 2023-10-27 PROBLEM — N30.00 ACUTE CYSTITIS WITHOUT HEMATURIA: Status: ACTIVE | Noted: 2023-10-27

## 2023-11-02 PROBLEM — N30.00 ACUTE CYSTITIS WITHOUT HEMATURIA: Status: RESOLVED | Noted: 2023-10-27 | Resolved: 2023-11-02

## 2023-11-27 PROBLEM — I21.4 NSTEMI (NON-ST ELEVATED MYOCARDIAL INFARCTION): Status: RESOLVED | Noted: 2022-10-28 | Resolved: 2023-11-27

## 2023-12-12 ENCOUNTER — OFFICE VISIT (OUTPATIENT)
Dept: FAMILY MEDICINE | Facility: CLINIC | Age: 78
End: 2023-12-12
Payer: MEDICARE

## 2023-12-12 VITALS
DIASTOLIC BLOOD PRESSURE: 82 MMHG | BODY MASS INDEX: 22.24 KG/M2 | HEART RATE: 74 BPM | HEIGHT: 72 IN | SYSTOLIC BLOOD PRESSURE: 139 MMHG

## 2023-12-12 DIAGNOSIS — S06.0XAS CONCUSSION WITH UNKNOWN LOSS OF CONSCIOUSNESS STATUS, SEQUELA: ICD-10-CM

## 2023-12-12 DIAGNOSIS — Z91.81 HISTORY OF FALL: ICD-10-CM

## 2023-12-12 DIAGNOSIS — R51.9 WORSENING HEADACHES: ICD-10-CM

## 2023-12-12 DIAGNOSIS — M25.561 ACUTE PAIN OF RIGHT KNEE: Primary | ICD-10-CM

## 2023-12-12 PROCEDURE — 99214 OFFICE O/P EST MOD 30 MIN: CPT | Mod: S$PBB,AQ,GV,ICN | Performed by: INTERNAL MEDICINE

## 2023-12-12 PROCEDURE — 99213 OFFICE O/P EST LOW 20 MIN: CPT | Performed by: INTERNAL MEDICINE

## 2023-12-12 PROCEDURE — 99214 PR OFFICE/OUTPT VISIT, EST, LEVL IV, 30-39 MIN: ICD-10-PCS | Mod: S$PBB,AQ,GV,ICN | Performed by: INTERNAL MEDICINE

## 2023-12-12 NOTE — Clinical Note
Demetri Lambert can this patient be accommodated in the next few days for right knee pain.  He is wheelchair-bound.  History of fall and right hip fracture and seen by and operated by Dr. Avery bass.  Formal referral has been sent to you.  Dr. Abeba NYE

## 2023-12-13 ENCOUNTER — TELEPHONE (OUTPATIENT)
Dept: ORTHOPEDICS | Facility: CLINIC | Age: 78
End: 2023-12-13

## 2023-12-13 ENCOUNTER — HOSPITAL ENCOUNTER (OUTPATIENT)
Dept: RADIOLOGY | Facility: HOSPITAL | Age: 78
Discharge: HOME | End: 2023-12-13
Attending: INTERNAL MEDICINE
Payer: MEDICARE

## 2023-12-13 ENCOUNTER — TELEPHONE (OUTPATIENT)
Dept: FAMILY MEDICINE | Facility: CLINIC | Age: 78
End: 2023-12-13

## 2023-12-13 DIAGNOSIS — Z91.81 HISTORY OF FALL: ICD-10-CM

## 2023-12-13 DIAGNOSIS — R51.9 WORSENING HEADACHES: ICD-10-CM

## 2023-12-13 DIAGNOSIS — M25.561 ACUTE PAIN OF RIGHT KNEE: ICD-10-CM

## 2023-12-13 PROCEDURE — 73562 X-RAY EXAM OF KNEE 3: CPT | Mod: TC,RT

## 2023-12-13 PROCEDURE — 70450 CT HEAD/BRAIN W/O DYE: CPT | Mod: TC

## 2023-12-13 NOTE — TELEPHONE ENCOUNTER
----- Message from Guy Us MD sent at 12/13/2023 12:23 PM CST -----  Please notify patient's daughter that patient's CT scan of head was normal except for changes of aging.  No injury or hemorrhage to explain his headaches.  This would be his 3rd CT scan for the head and unless until there is any other change, no further CT scan is recommended at this point.  Probably needs follow-up with Neurology for constant headaches.

## 2023-12-13 NOTE — TELEPHONE ENCOUNTER
----- Message from GRANT Tam sent at 12/13/2023  8:31 AM CST -----  Thank you Dr. Us for thinking of us,    We are happy to accommodate him.  I have included my patient care manager and scheduling personnel.  We will reach out to the patient with appointment information.  We should be able to get him on the schedule tomorrow with myself and Dr. Bravo.    Respectfully,  ----- Message -----  From: Guy Us MD  Sent: 12/12/2023   6:12 PM CST  To: GRANT Tam- can this patient be accommodated in the next few days for right knee pain.  He is wheelchair-bound.  History of fall and right hip fracture and seen by and operated by Dr. Avery bass.  Formal referral has been sent to you.    Dr. Abeba NYE

## 2023-12-13 NOTE — PROGRESS NOTES
Please notify patient's daughter that Mr. Harris's x-ray of knee did not show any significant arthritis or fracture.  Maybe some fluid accumulation and he may benefit from a cortisone shot given by Orthopedics.

## 2023-12-24 ENCOUNTER — HOSPITAL ENCOUNTER (INPATIENT)
Facility: HOSPITAL | Age: 78
LOS: 1 days | Discharge: HOSPICE | DRG: 300 | End: 2023-12-26
Attending: EMERGENCY MEDICINE | Admitting: INTERNAL MEDICINE
Payer: MEDICARE

## 2023-12-24 DIAGNOSIS — I82.401 ACUTE DEEP VEIN THROMBOSIS (DVT) OF RIGHT LOWER EXTREMITY, UNSPECIFIED VEIN: ICD-10-CM

## 2023-12-24 DIAGNOSIS — R06.02 SHORTNESS OF BREATH: ICD-10-CM
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
## 2023-12-24 DIAGNOSIS — I50.9 ACUTE ON CHRONIC CONGESTIVE HEART FAILURE, UNSPECIFIED HEART FAILURE TYPE: ICD-10-CM

## 2023-12-24 DIAGNOSIS — R07.9 CHEST PAIN: ICD-10-CM

## 2023-12-24 DIAGNOSIS — M79.89 PAIN AND SWELLING OF LOWER EXTREMITY: ICD-10-CM

## 2023-12-24 DIAGNOSIS — E83.42 HYPOMAGNESEMIA: ICD-10-CM

## 2023-12-24 DIAGNOSIS — M79.606 PAIN AND SWELLING OF LOWER EXTREMITY: ICD-10-CM

## 2023-12-24 LAB
ALBUMIN SERPL BCP-MCNC: 3.1 G/DL (ref 3.5–5.2)
ALP SERPL-CCNC: 88 U/L (ref 55–135)
ALT SERPL W/O P-5'-P-CCNC: 12 U/L (ref 10–44)
ANION GAP SERPL CALC-SCNC: 9 MMOL/L (ref 8–16)
APTT PPP: 28.6 SEC (ref 21–32)
AST SERPL-CCNC: 16 U/L (ref 10–40)
BASOPHILS # BLD AUTO: 0.04 K/UL (ref 0–0.2)
BASOPHILS NFR BLD: 0.4 % (ref 0–1.9)
BILIRUB SERPL-MCNC: 0.8 MG/DL (ref 0.1–1)
BNP SERPL-MCNC: 1178 PG/ML (ref 0–99)
BUN SERPL-MCNC: 20 MG/DL (ref 8–23)
CALCIUM SERPL-MCNC: 9.1 MG/DL (ref 8.7–10.5)
CHLORIDE SERPL-SCNC: 103 MMOL/L (ref 95–110)
CO2 SERPL-SCNC: 28 MMOL/L (ref 23–29)
CREAT SERPL-MCNC: 0.8 MG/DL (ref 0.5–1.4)
DIFFERENTIAL METHOD BLD: ABNORMAL
EOSINOPHIL # BLD AUTO: 0.1 K/UL (ref 0–0.5)
EOSINOPHIL NFR BLD: 0.5 % (ref 0–8)
ERYTHROCYTE [DISTWIDTH] IN BLOOD BY AUTOMATED COUNT: 16 % (ref 11.5–14.5)
EST. GFR  (NO RACE VARIABLE): >60 ML/MIN/1.73 M^2
GLUCOSE SERPL-MCNC: 142 MG/DL (ref 70–110)
GLUCOSE SERPL-MCNC: 148 MG/DL (ref 70–110)
HCT VFR BLD AUTO: 31.4 % (ref 40–54)
HGB BLD-MCNC: 9.6 G/DL (ref 14–18)
IMM GRANULOCYTES # BLD AUTO: 0.04 K/UL (ref 0–0.04)
IMM GRANULOCYTES NFR BLD AUTO: 0.4 % (ref 0–0.5)
INFLUENZA A, MOLECULAR: NEGATIVE
INFLUENZA B, MOLECULAR: NEGATIVE
INR PPP: 1.2 (ref 0.8–1.2)
LYMPHOCYTES # BLD AUTO: 0.6 K/UL (ref 1–4.8)
LYMPHOCYTES NFR BLD: 6.1 % (ref 18–48)
MAGNESIUM SERPL-MCNC: 1.4 MG/DL (ref 1.6–2.6)
MCH RBC QN AUTO: 25.5 PG (ref 27–31)
MCHC RBC AUTO-ENTMCNC: 30.6 G/DL (ref 32–36)
MCV RBC AUTO: 84 FL (ref 82–98)
MONOCYTES # BLD AUTO: 0.8 K/UL (ref 0.3–1)
MONOCYTES NFR BLD: 7.4 % (ref 4–15)
NEUTROPHILS # BLD AUTO: 8.9 K/UL (ref 1.8–7.7)
NEUTROPHILS NFR BLD: 85.2 % (ref 38–73)
NRBC BLD-RTO: 0 /100 WBC
PLATELET # BLD AUTO: 299 K/UL (ref 150–450)
PMV BLD AUTO: 10.5 FL (ref 9.2–12.9)
POTASSIUM SERPL-SCNC: 3 MMOL/L (ref 3.5–5.1)
PROT SERPL-MCNC: 5.9 G/DL (ref 6–8.4)
PROTHROMBIN TIME: 13.2 SEC (ref 9–12.5)
RBC # BLD AUTO: 3.76 M/UL (ref 4.6–6.2)
SARS-COV-2 RDRP RESP QL NAA+PROBE: NEGATIVE
SODIUM SERPL-SCNC: 140 MMOL/L (ref 136–145)
SPECIMEN SOURCE: NORMAL
TROPONIN I SERPL HS-MCNC: 113.9 PG/ML (ref 0–14.9)
TROPONIN I SERPL HS-MCNC: 119.7 PG/ML (ref 0–14.9)
WBC # BLD AUTO: 10.49 K/UL (ref 3.9–12.7)

## 2023-12-24 PROCEDURE — 96366 THER/PROPH/DIAG IV INF ADDON: CPT

## 2023-12-24 PROCEDURE — 25000003 PHARM REV CODE 250: Performed by: EMERGENCY MEDICINE

## 2023-12-24 PROCEDURE — 96375 TX/PRO/DX INJ NEW DRUG ADDON: CPT

## 2023-12-24 PROCEDURE — 84484 ASSAY OF TROPONIN QUANT: CPT | Performed by: EMERGENCY MEDICINE

## 2023-12-24 PROCEDURE — 93005 ELECTROCARDIOGRAM TRACING: CPT | Performed by: INTERNAL MEDICINE

## 2023-12-24 PROCEDURE — 99285 EMERGENCY DEPT VISIT HI MDM: CPT | Mod: 25

## 2023-12-24 PROCEDURE — U0002 COVID-19 LAB TEST NON-CDC: HCPCS | Performed by: EMERGENCY MEDICINE

## 2023-12-24 PROCEDURE — 96376 TX/PRO/DX INJ SAME DRUG ADON: CPT

## 2023-12-24 PROCEDURE — 93010 ELECTROCARDIOGRAM REPORT: CPT | Mod: ,,, | Performed by: INTERNAL MEDICINE

## 2023-12-24 PROCEDURE — 83735 ASSAY OF MAGNESIUM: CPT | Performed by: EMERGENCY MEDICINE

## 2023-12-24 PROCEDURE — 83880 ASSAY OF NATRIURETIC PEPTIDE: CPT | Performed by: EMERGENCY MEDICINE

## 2023-12-24 PROCEDURE — 87502 INFLUENZA DNA AMP PROBE: CPT | Performed by: EMERGENCY MEDICINE

## 2023-12-24 PROCEDURE — 85730 THROMBOPLASTIN TIME PARTIAL: CPT | Mod: 91 | Performed by: INTERNAL MEDICINE

## 2023-12-24 PROCEDURE — 96365 THER/PROPH/DIAG IV INF INIT: CPT

## 2023-12-24 PROCEDURE — 63600175 PHARM REV CODE 636 W HCPCS: Performed by: EMERGENCY MEDICINE

## 2023-12-24 PROCEDURE — 80053 COMPREHEN METABOLIC PANEL: CPT | Performed by: EMERGENCY MEDICINE

## 2023-12-24 PROCEDURE — G0378 HOSPITAL OBSERVATION PER HR: HCPCS

## 2023-12-24 PROCEDURE — 84484 ASSAY OF TROPONIN QUANT: CPT | Mod: 91 | Performed by: EMERGENCY MEDICINE

## 2023-12-24 PROCEDURE — 36415 COLL VENOUS BLD VENIPUNCTURE: CPT | Mod: XB | Performed by: INTERNAL MEDICINE

## 2023-12-24 PROCEDURE — 85025 COMPLETE CBC W/AUTO DIFF WBC: CPT | Performed by: EMERGENCY MEDICINE

## 2023-12-24 PROCEDURE — 36415 COLL VENOUS BLD VENIPUNCTURE: CPT | Performed by: EMERGENCY MEDICINE

## 2023-12-24 PROCEDURE — 96367 TX/PROPH/DG ADDL SEQ IV INF: CPT

## 2023-12-24 PROCEDURE — 85730 THROMBOPLASTIN TIME PARTIAL: CPT | Performed by: EMERGENCY MEDICINE

## 2023-12-24 PROCEDURE — 85610 PROTHROMBIN TIME: CPT | Performed by: EMERGENCY MEDICINE

## 2023-12-24 RX ORDER — HEPARIN SODIUM,PORCINE/D5W 25000/250
18 INTRAVENOUS SOLUTION INTRAVENOUS CONTINUOUS
Status: DISCONTINUED | OUTPATIENT
Start: 2023-12-24 | End: 2023-12-24 | Stop reason: SDUPTHER

## 2023-12-24 RX ORDER — FUROSEMIDE 20 MG/1
20 TABLET ORAL DAILY PRN
COMMUNITY

## 2023-12-24 RX ORDER — SERTRALINE HYDROCHLORIDE 50 MG/1
50 TABLET, FILM COATED ORAL DAILY
Status: DISCONTINUED | OUTPATIENT
Start: 2023-12-25 | End: 2023-12-26 | Stop reason: HOSPADM

## 2023-12-24 RX ORDER — PROCHLORPERAZINE MALEATE 10 MG
10 TABLET ORAL EVERY 6 HOURS PRN
COMMUNITY

## 2023-12-24 RX ORDER — ACETAMINOPHEN 650 MG/1
650 SUPPOSITORY RECTAL EVERY 4 HOURS PRN
COMMUNITY

## 2023-12-24 RX ORDER — HEPARIN SODIUM,PORCINE/D5W 25000/250
18 INTRAVENOUS SOLUTION INTRAVENOUS CONTINUOUS
Status: DISCONTINUED | OUTPATIENT
Start: 2023-12-24 | End: 2023-12-25

## 2023-12-24 RX ORDER — BISACODYL 10 MG/1
10 SUPPOSITORY RECTAL DAILY PRN
COMMUNITY

## 2023-12-24 RX ORDER — TRAMADOL HYDROCHLORIDE 50 MG/1
50 TABLET ORAL EVERY 6 HOURS PRN
COMMUNITY

## 2023-12-24 RX ORDER — TIZANIDINE 2 MG/1
4 TABLET ORAL DAILY
COMMUNITY

## 2023-12-24 RX ORDER — CLOPIDOGREL BISULFATE 75 MG/1
75 TABLET ORAL DAILY
Status: DISCONTINUED | OUTPATIENT
Start: 2023-12-25 | End: 2023-12-26 | Stop reason: HOSPADM

## 2023-12-24 RX ORDER — IBUPROFEN 200 MG
24 TABLET ORAL
Status: DISCONTINUED | OUTPATIENT
Start: 2023-12-24 | End: 2023-12-26 | Stop reason: HOSPADM

## 2023-12-24 RX ORDER — IPRATROPIUM BROMIDE AND ALBUTEROL SULFATE 2.5; .5 MG/3ML; MG/3ML
3 SOLUTION RESPIRATORY (INHALATION) EVERY 4 HOURS PRN
COMMUNITY

## 2023-12-24 RX ORDER — MIDODRINE HYDROCHLORIDE 5 MG/1
2.5 TABLET ORAL 3 TIMES DAILY PRN
COMMUNITY

## 2023-12-24 RX ORDER — LANOLIN ALCOHOL/MO/W.PET/CERES
800 CREAM (GRAM) TOPICAL
Status: DISCONTINUED | OUTPATIENT
Start: 2023-12-24 | End: 2023-12-26 | Stop reason: HOSPADM

## 2023-12-24 RX ORDER — AMOXICILLIN 250 MG
1 CAPSULE ORAL DAILY PRN
COMMUNITY

## 2023-12-24 RX ORDER — FUROSEMIDE 10 MG/ML
40 INJECTION INTRAMUSCULAR; INTRAVENOUS DAILY
Status: DISCONTINUED | OUTPATIENT
Start: 2023-12-25 | End: 2023-12-25

## 2023-12-24 RX ORDER — MAGNESIUM SULFATE HEPTAHYDRATE 40 MG/ML
2 INJECTION, SOLUTION INTRAVENOUS ONCE
Status: COMPLETED | OUTPATIENT
Start: 2023-12-24 | End: 2023-12-24

## 2023-12-24 RX ORDER — SIMETHICONE 80 MG
80-160 TABLET,CHEWABLE ORAL EVERY 4 HOURS PRN
COMMUNITY

## 2023-12-24 RX ORDER — MORPHINE SULFATE 20 MG/ML
0.5 SOLUTION ORAL EVERY 4 HOURS PRN
COMMUNITY

## 2023-12-24 RX ORDER — HYOSCYAMINE SULFATE 0.125 MG
125 TABLET ORAL EVERY 4 HOURS PRN
COMMUNITY

## 2023-12-24 RX ORDER — METOPROLOL TARTRATE 25 MG/1
25 TABLET, FILM COATED ORAL 2 TIMES DAILY
COMMUNITY

## 2023-12-24 RX ORDER — OXYBUTYNIN CHLORIDE 10 MG/1
10 TABLET, EXTENDED RELEASE ORAL DAILY
COMMUNITY

## 2023-12-24 RX ORDER — LOPERAMIDE HCL 2 MG
2 TABLET ORAL SEE ADMIN INSTRUCTIONS
COMMUNITY

## 2023-12-24 RX ORDER — SPIRONOLACTONE 25 MG/1
25 TABLET ORAL DAILY
Status: DISCONTINUED | OUTPATIENT
Start: 2023-12-25 | End: 2023-12-26 | Stop reason: HOSPADM

## 2023-12-24 RX ORDER — DEXTROMETHORPHAN HYDROBROMIDE, GUAIFENESIN 5; 100 MG/5ML; MG/5ML
650 LIQUID ORAL EVERY 6 HOURS PRN
COMMUNITY

## 2023-12-24 RX ORDER — FUROSEMIDE 10 MG/ML
40 INJECTION INTRAMUSCULAR; INTRAVENOUS
Status: COMPLETED | OUTPATIENT
Start: 2023-12-24 | End: 2023-12-24

## 2023-12-24 RX ORDER — IBUPROFEN 200 MG
16 TABLET ORAL
Status: DISCONTINUED | OUTPATIENT
Start: 2023-12-24 | End: 2023-12-26 | Stop reason: HOSPADM

## 2023-12-24 RX ORDER — OXYBUTYNIN CHLORIDE 5 MG/1
10 TABLET, EXTENDED RELEASE ORAL DAILY
Status: DISCONTINUED | OUTPATIENT
Start: 2023-12-25 | End: 2023-12-26 | Stop reason: HOSPADM

## 2023-12-24 RX ORDER — GLUCAGON 1 MG
1 KIT INJECTION
Status: DISCONTINUED | OUTPATIENT
Start: 2023-12-24 | End: 2023-12-26 | Stop reason: HOSPADM

## 2023-12-24 RX ORDER — LORAZEPAM 2 MG/ML
0.5 CONCENTRATE ORAL EVERY 4 HOURS PRN
COMMUNITY

## 2023-12-24 RX ORDER — FLUDROCORTISONE ACETATE 0.1 MG/1
100 TABLET ORAL DAILY
COMMUNITY

## 2023-12-24 RX ORDER — SODIUM CHLORIDE 0.9 % (FLUSH) 0.9 %
10 SYRINGE (ML) INJECTION EVERY 12 HOURS PRN
Status: DISCONTINUED | OUTPATIENT
Start: 2023-12-24 | End: 2023-12-26 | Stop reason: HOSPADM

## 2023-12-24 RX ORDER — NALOXONE HCL 0.4 MG/ML
0.02 VIAL (ML) INJECTION
Status: DISCONTINUED | OUTPATIENT
Start: 2023-12-24 | End: 2023-12-26 | Stop reason: HOSPADM

## 2023-12-24 RX ADMIN — FUROSEMIDE 40 MG: 10 INJECTION, SOLUTION INTRAMUSCULAR; INTRAVENOUS at 04:12

## 2023-12-24 RX ADMIN — POTASSIUM BICARBONATE 25 MEQ: 977.5 TABLET, EFFERVESCENT ORAL at 04:12

## 2023-12-24 RX ADMIN — HEPARIN SODIUM 18 UNITS/KG/HR: 10000 INJECTION, SOLUTION INTRAVENOUS at 05:12

## 2023-12-24 RX ADMIN — MAGNESIUM SULFATE HEPTAHYDRATE 2 G: 40 INJECTION, SOLUTION INTRAVENOUS at 04:12

## 2023-12-24 NOTE — H&P
[facility] Medicine History & Physical Examination   Patient Name: Tono Saez  MRN: [id number]  Patient Class: OP- Observation   Admission Date: 12/24/2023 11:21 AM  Length of Stay: 0  Attending Physician: Amor Soto MD  Primary Care Provider: Scott Staley MD  Face-to-Face encounter date: 12/24/2023  Code Status: Full Code  MPOA:  Chief Complaint: Fall (No LOC)        HISTORY OF PRESENT ILLNESS:   This is an 78-year-old male patient with past medical history of CAD with CABG and stents with last 1 placed on 08/23, DM, hypertension, baseline dementia who was brought in by the daughter after being found on the floor by his bed.  I was unable to speak to the daughter and patient is unable to give history due to dementia so most of history was provided by the chart and ER physician.  Patient recently had a right femur fracture in October with repair.  Following this his daughter reported worsening lower extremity swelling since then.  In the emergency room he had a lower extremity duplex found to have occlusive DVT on the right lower extremity.    REVIEW OF SYSTEMS:   10 Point Review of System was performed and was found to be negative except for that mentioned already in the HPI above.     PAST MEDICAL HISTORY:     Past Medical History:   Diagnosis Date    Anemia     Anticoagulant long-term use     Arthritis     CAD (coronary artery disease)     CABG, STENTS '97,'99,'01,'05    Cancer     SKIN    Cataract     Diabetes mellitus     Diabetes mellitus type II     GERD (gastroesophageal reflux disease)     GI bleed 2020    Hypertension     Myocardial infarction     Wears glasses        PAST SURGICAL HISTORY:     Past Surgical History:   Procedure Laterality Date    ANGIOGRAM, CORONARY, WITH LEFT HEART CATHETERIZATION N/A 08/24/2023    Procedure: Angiogram, Coronary, with Left Heart Cath;  Surgeon: Robles Mckoy MD;  Location: [facility] CATH/EP LAB;  Service: Cardiology;  Laterality: N/A;     CARDIAC PACEMAKER PLACEMENT      CARDIAC PACEMAKER PLACEMENT      CARDIAC PACEMAKER PLACEMENT  04/26/2018    replaced, [employer]    CARDIAC SURGERY      1995   CABG X 5    CHOLECYSTECTOMY      COLON SURGERY      COLONOSCOPY N/A 02/21/2020    Procedure: COLONOSCOPY;  Surgeon: Abe Barragan III, MD;  Location: [facility] ENDO;  Service: Endoscopy;  Laterality: N/A;    COLONOSCOPY N/A 02/23/2020    Procedure: COLONOSCOPY;  Surgeon: Bob Locke Jr., MD;  Location: [facility] ENDO;  Service: Endoscopy;  Laterality: N/A;    CORONARY ANGIOGRAPHY INCLUDING BYPASS GRAFTS WITH CATHETERIZATION OF LEFT HEART N/A 10/26/2022    Procedure: ANGIOGRAM, CORONARY, INCLUDING BYPASS GRAFT, WITH LEFT HEART CATHETERIZATION;  Surgeon: Robles Mckoy MD;  Location: [facility] CATH/EP LAB;  Service: Cardiology;  Laterality: N/A;    CORONARY ARTERY BYPASS GRAFT  1995    CORONARY BYPASS GRAFT ANGIOGRAPHY  08/24/2023    Procedure: Bypass graft study;  Surgeon: Robles Mckoy MD;  Location: [facility] CATH/EP LAB;  Service: Cardiology;;    CORONARY STENT PLACEMENT      stent x 5    ESOPHAGOGASTRODUODENOSCOPY N/A 02/23/2020    Procedure: EGD (ESOPHAGOGASTRODUODENOSCOPY);  Surgeon: Bob Locke Jr., MD;  Location: [facility] ENDO;  Service: Endoscopy;  Laterality: N/A;    EYE SURGERY      BILAT CATARACT    head laceration   01/19/2018    HEMIARTHROPLASTY OF HIP Right 10/5/2023    Procedure: HEMIARTHROPLASTY, HIP;  Surgeon: Avery Borrero MD;  Location: [facility] OR;  Service: Orthopedics;  Laterality: Right;    HEMORRHOID SURGERY      INJECTION OF ANESTHETIC AGENT AROUND MEDIAL BRANCH NERVES INNERVATING LUMBAR FACET JOINT Bilateral 06/29/2023    Procedure: Block-nerve-medial branch-lumbar;  Surgeon: Maurice Montenegro MD;  Location: [facility] OR;  Service: Pain Management;  Laterality: Bilateral;  L3,4,5 MBB    INJECTION OF ANESTHETIC AGENT AROUND MEDIAL BRANCH NERVES INNERVATING LUMBAR FACET JOINT Bilateral 07/25/2023    Procedure: Block-nerve-medial branch-lumbar;   Surgeon: Maurice Montenegro MD;  Location: [facility] OR;  Service: Anesthesiology;  Laterality: Bilateral;  L3,4,5 MBB #2    INTRAVASCULAR ULTRASOUND, CORONARY N/A 08/24/2023    Procedure: Ultrasound-coronary;  Surgeon: Robles Mckoy MD;  Location: [facility] CATH/EP LAB;  Service: Cardiology;  Laterality: N/A;    PTCA, SINGLE VESSEL  08/24/2023    Procedure: PTCA, Single Vessel;  Surgeon: Robles Mckoy MD;  Location: [facility] CATH/EP LAB;  Service: Cardiology;;    SMALL BOWEL ENTEROSCOPY N/A 02/21/2020    Procedure: ENTEROSCOPY;  Surgeon: Abe Barragan III, MD;  Location: [facility] ENDO;  Service: Endoscopy;  Laterality: N/A;    STENT, DRUG ELUTING, SINGLE VESSEL, CORONARY  08/24/2023    Procedure: Stent, Drug Eluting, Single Vessel, Coronary;  Surgeon: Robles Mckoy MD;  Location: [facility] CATH/EP LAB;  Service: Cardiology;;    stint         ALLERGIES:   Adhesive, Metformin, and Pcn [penicillins]    FAMILY HISTORY:     Family History   Problem Relation Age of Onset    Heart disease Mother     Heart disease Father     Hyperlipidemia Sister     Hypertension Sister     Heart disease Brother     Heart disease Brother     Heart disease Brother     Heart disease Brother     Heart disease Brother        SOCIAL HISTORY:     Social History     Tobacco Use    Smoking status: Never    Smokeless tobacco: Never   Substance Use Topics    Alcohol use: No        Social History     Substance and Sexual Activity   Sexual Activity Not Currently        HOME MEDICATIONS:     Prior to Admission medications    Medication Sig Start Date End Date Taking? Authorizing Provider   acetaminophen (TYLENOL) 650 MG Supp Place 650 mg rectally every 4 (four) hours as needed. For fever >100   Yes Provider, Historical   acetaminophen (TYLENOL) 650 MG TbSR Take 650 mg by mouth every 6 (six) hours as needed.   Yes Provider, Historical   albuterol-ipratropium (DUO-NEB) 2.5 mg-0.5 mg/3 mL nebulizer solution Take 3 mLs by nebulization every 4 (four) hours as needed for  Wheezing. Rescue   Yes Provider, Historical   atorvastatin (LIPITOR) 40 MG tablet Take 1 tablet by mouth once daily. 1/2/23  Yes Provider, Historical   bisacodyL (DULCOLAX, BISACODYL,) 10 mg Supp Place 10 mg rectally daily as needed.   Yes Provider, Historical   clopidogreL (PLAVIX) 75 mg tablet TAKE ONE TABLET BY MOUTH EVERY DAY  Patient taking differently: Take 75 mg by mouth once daily. 10/3/23  Yes Hema Patel MD   docusate sodium (COLACE) 100 MG capsule Take 1 capsule (100 mg total) by mouth every 12 (twelve) hours.  Patient taking differently: Take 100 mg by mouth 2 (two) times daily as needed for Constipation. 10/27/23  Yes Tristen Penny MD   finasteride (PROSCAR) 5 mg tablet Take 5 mg by mouth once daily. 9/28/22  Yes Provider, Historical   fludrocortisone (FLORINEF) 0.1 mg Tab Take 100 mcg by mouth once daily.   Yes Provider, Historical   furosemide (LASIX) 20 MG tablet Take 20 mg by mouth daily as needed.   Yes Provider, Historical   hyoscyamine (ANASPAZ,LEVSIN) 0.125 mg Tab Take 125 mcg by mouth every 4 (four) hours as needed.   Yes Provider, Historical   loperamide (IMODIUM A-D) 2 mg Tab Take 2 mg by mouth As instructed. 2 tabs after initial loose stool, then 1 tab after each additional loose stool   Yes Provider, Historical   metoprolol tartrate (LOPRESSOR) 25 MG tablet Take 25 mg by mouth 2 (two) times daily.   Yes Provider, Historical   midodrine (PROAMATINE) 5 MG Tab Take 2.5 mg by mouth 3 (three) times daily as needed. SBP<90   Yes Provider, Historical   morphine 100 mg/5 mL (20 mg/mL) concentrated solution Take 0.5 mLs by mouth every 4 (four) hours as needed for Pain.   Yes Provider, Historical   nitroGLYCERIN (NITROSTAT) 0.4 MG SL tablet DISSOLVE 1 TABLET UNDER THE TONGUE AS NEEDED FOR CHEST PAIN  Patient taking differently: Place 0.4 mg under the tongue every 5 (five) minutes as needed for Chest pain. 8/24/22  Yes Hema Patel MD   oxybutynin (DITROPAN-XL) 10 MG 24 hr tablet  Take 10 mg by mouth once daily.   Yes Provider, Historical   pantoprazole (PROTONIX) 40 MG tablet TAKE ONE TABLET BY MOUTH ONCE DAILY  Patient taking differently: Take 40 mg by mouth once daily. 8/11/23  Yes Scott Staley MD   prochlorperazine (COMPAZINE) 10 MG tablet Take 10 mg by mouth every 6 (six) hours as needed.   Yes Provider, Historical   senna-docusate 8.6-50 mg (SENNA WITH DOCUSATE SODIUM) 8.6-50 mg per tablet Take 1 tablet by mouth daily as needed for Constipation.   Yes Provider, Historical   sertraline (ZOLOFT) 50 MG tablet TAKE ONE TABLET BY MOUTH ONCE DAILY  Patient taking differently: Take 50 mg by mouth once daily. 1/23/23  Yes Scott Staley MD   simethicone (MYLICON) 80 MG chewable tablet Take  mg by mouth every 4 (four) hours as needed (heartburn).   Yes Provider, Historical   spironolactone (ALDACTONE) 25 MG tablet Take 1 tablet (25 mg total) by mouth once daily. 8/28/23 12/24/23 Yes Koko Erickson MD   tiZANidine (ZANAFLEX) 2 MG tablet Take 4 mg by mouth once daily.   Yes Provider, Historical   tolterodine (DETROL LA) 4 MG 24 hr capsule Take 1 capsule (4 mg total) by mouth once daily. 10/6/23  Yes Scott Staley MD   traMADoL (ULTRAM) 50 mg tablet Take 50 mg by mouth every 6 (six) hours as needed for Pain.   Yes Provider, Historical   aspirin (ECOTRIN) 81 MG EC tablet Take 1 tablet (81 mg total) by mouth once daily. 10/9/23   Harshad Haddad MD   cholecalciferol, vitamin D3, 75 mcg (3,000 unit) Tab Take 1 tablet by mouth once daily.    Provider, Historical   fluticasone propionate (FLONASE) 50 mcg/actuation nasal spray 1 spray by Each Nostril route as needed for Allergies.    Provider, Historical   LORazepam (ATIVAN) 2 mg/mL Conc Take 0.5 mLs by mouth every 4 (four) hours as needed.    Provider, Historical   magnesium oxide (MAG-OXIDE ORAL) Take 1 tablet by mouth once daily.    Provider, Historical   megestroL (MEGACE) 400 mg/10 mL (10 mL) Susp Take 5 mLs (200 mg total) by mouth  once daily. 10/28/23   Tristen Penny MD   metoprolol tartrate (LOPRESSOR) 25 MG tablet Take 0.5 tablets (12.5 mg total) by mouth 2 (two) times daily. 6/26/23 12/24/23  Kt Lux MD   polyethylene glycol (GLYCOLAX) 17 gram PwPk Take 17 g by mouth once daily. 10/28/23   Tristen Penny MD   ketoconazole (NIZORAL) 2 % shampoo Apply 1 application  topically 3 (three) times a week. 9/20/23 12/24/23  Provider, Historical   multivitamin capsule Take 1 capsule by mouth once daily.  12/24/23  Provider, Historical   potassium chloride (KLOR-CON) 10 MEQ TbSR Take 1 tablet (10 mEq total) by mouth once daily. 9/27/23 12/24/23  Kt Lux MD         PHYSICAL EXAM:   BP (!) 147/72 (BP Location: Right arm, Patient Position: Sitting)   Pulse 68   Temp 98 °F (36.7 °C) (Oral)   Resp 16   Wt 71.2 kg (157 lb)   SpO2 97%   BMI 21.29 kg/m²   Vitals Reviewed    EMERGENCY DEPARTMENT LABS AND IMAGING:     Labs Reviewed   CBC W/ AUTO DIFFERENTIAL - Abnormal; Notable for the following components:       Result Value    RBC 3.76 (*)     Hemoglobin 9.6 (*)     Hematocrit 31.4 (*)     MCH 25.5 (*)     MCHC 30.6 (*)     RDW 16.0 (*)     Gran # (ANC) 8.9 (*)     Lymph # 0.6 (*)     Gran % 85.2 (*)     Lymph % 6.1 (*)     All other components within normal limits   COMPREHENSIVE METABOLIC PANEL - Abnormal; Notable for the following components:    Potassium 3.0 (*)     Glucose 142 (*)     Total Protein 5.9 (*)     Albumin 3.1 (*)     All other components within normal limits   TROPONIN I HIGH SENSITIVITY - Abnormal; Notable for the following components:    Troponin I High Sensitivity 119.7 (*)     All other components within normal limits   B-TYPE NATRIURETIC PEPTIDE - Abnormal; Notable for the following components:    BNP 1,178 (*)     All other components within normal limits   MAGNESIUM - Abnormal; Notable for the following components:    Magnesium 1.4 (*)     All other components within normal limits   PROTIME-INR -  Abnormal; Notable for the following components:    Prothrombin Time 13.2 (*)     All other components within normal limits   TROPONIN I HIGH SENSITIVITY - Abnormal; Notable for the following components:    Troponin I High Sensitivity 113.9 (*)     All other components within normal limits   INFLUENZA A AND B ANTIGEN    Narrative:     Specimen Source->Nasopharyngeal Swab   SARS-COV-2 RNA AMPLIFICATION, QUAL   APTT   APTT       US Lower Extremity Veins Right   Final Result      X-Ray Chest AP Portable   Final Result      CT Cervical Spine Without Contrast   Final Result      CT Head Without Contrast   Final Result        Constitutional: He appears well-developed and well-nourished.   HENT:   Head: Normocephalic and atraumatic.   Eyes: Pupils are equal, round, and reactive to light.   Cardiovascular:  Normal rate.           Pulmonary/Chest: Breath sounds normal.   Abdominal: Abdomen is soft.   Musculoskeletal:         General: No tenderness or edema.      Comments: swelling noted to the right lower extremity distal pulses are intact      ASSESSMENT & PLAN:       Provoked Occlusive DVT of the right lower extremity, pulses intact   History of CAD status post CABG, stents in 8/23  Systolic CHF exacerbation, EF 30-40% in 8/23  Baseline dementia   Hypertension   -patient started on heparin drip in the emergency room.  Will continue.  Can transition to NOAC tomorrow.   -patient noted to have some pulmonary edema on imaging with elevated BNP.  Mild crackles on my exam.  Given a dose of Lasix.  We will order another dose in the a.m.. Fluid restircion.     ________________________________________________________________________________    INPATIENT LIST OF MEDICATIONS     Current Facility-Administered Medications:     dextrose 50% injection 12.5 g, 12.5 g, Intravenous, PRNLeda Julio, MD    dextrose 50% injection 25 g, 25 g, Intravenous, PRNLeda Julio, MD    glucagon (human recombinant) injection 1 mg, 1 mg,  Intramuscular, PRN, Amor Soto MD    glucose chewable tablet 16 g, 16 g, Oral, PRN, Amor Soto MD    glucose chewable tablet 24 g, 24 g, Oral, PRN, Amor Soto MD    heparin 25,000 units in dextrose 5% (100 units/ml) IV bolus from bag - ADDITIONAL PRN BOLUS - 30 units/kg, 30 Units/kg (Adjusted), Intravenous, PRN, Peyroux, Jenn, FNP    heparin 25,000 units in dextrose 5% (100 units/ml) IV bolus from bag - ADDITIONAL PRN BOLUS - 60 units/kg, 60 Units/kg (Adjusted), Intravenous, PRN, Peyroux, Jenn, FNP    heparin 25,000 units in dextrose 5% 250 mL (100 units/mL) infusion HIGH INTENSITY nomogram - [facility], 18 Units/kg/hr (Adjusted), Intravenous, Continuous, Peyroux, Jenn, FNP    magnesium oxide tablet 800 mg, 800 mg, Oral, PRN, Amor Soto MD    magnesium oxide tablet 800 mg, 800 mg, Oral, PRN, Amor Soto MD    magnesium sulfate 2g in water 50mL IVPB (premix), 2 g, Intravenous, Once, Abe Lakhani MD, Last Rate: 25 mL/hr at 12/24/23 1604, 2 g at 12/24/23 1604    naloxone 0.4 mg/mL injection 0.02 mg, 0.02 mg, Intravenous, PRN, Amor Soto MD    potassium bicarbonate disintegrating tablet 35 mEq, 35 mEq, Oral, PRNLeda Julio, MD    potassium bicarbonate disintegrating tablet 50 mEq, 50 mEq, Oral, PRLeda SPRAGUE Julio, MD    potassium bicarbonate disintegrating tablet 60 mEq, 60 mEq, Oral, PRLeda SPRAGUE Julio, MD    sodium chloride 0.9% flush 10 mL, 10 mL, Intravenous, Q12H PRDARCIE, Amor Soto MD    Current Outpatient Medications:     acetaminophen (TYLENOL) 650 MG Supp, Place 650 mg rectally every 4 (four) hours as needed. For fever >100, Disp: , Rfl:     acetaminophen (TYLENOL) 650 MG TbSR, Take 650 mg by mouth every 6 (six) hours as needed., Disp: , Rfl:     albuterol-ipratropium (DUO-NEB) 2.5 mg-0.5 mg/3 mL nebulizer solution, Take 3 mLs by nebulization every 4 (four) hours as needed for Wheezing. Rescue, Disp: , Rfl:     atorvastatin (LIPITOR) 40 MG tablet, Take  1 tablet by mouth once daily., Disp: , Rfl:     bisacodyL (DULCOLAX, BISACODYL,) 10 mg Supp, Place 10 mg rectally daily as needed., Disp: , Rfl:     clopidogreL (PLAVIX) 75 mg tablet, TAKE ONE TABLET BY MOUTH EVERY DAY (Patient taking differently: Take 75 mg by mouth once daily.), Disp: 90 tablet, Rfl: 3    docusate sodium (COLACE) 100 MG capsule, Take 1 capsule (100 mg total) by mouth every 12 (twelve) hours. (Patient taking differently: Take 100 mg by mouth 2 (two) times daily as needed for Constipation.), Disp: , Rfl:     finasteride (PROSCAR) 5 mg tablet, Take 5 mg by mouth once daily., Disp: , Rfl:     fludrocortisone (FLORINEF) 0.1 mg Tab, Take 100 mcg by mouth once daily., Disp: , Rfl:     furosemide (LASIX) 20 MG tablet, Take 20 mg by mouth daily as needed., Disp: , Rfl:     hyoscyamine (ANASPAZ,LEVSIN) 0.125 mg Tab, Take 125 mcg by mouth every 4 (four) hours as needed., Disp: , Rfl:     loperamide (IMODIUM A-D) 2 mg Tab, Take 2 mg by mouth As instructed. 2 tabs after initial loose stool, then 1 tab after each additional loose stool, Disp: , Rfl:     metoprolol tartrate (LOPRESSOR) 25 MG tablet, Take 25 mg by mouth 2 (two) times daily., Disp: , Rfl:     midodrine (PROAMATINE) 5 MG Tab, Take 2.5 mg by mouth 3 (three) times daily as needed. SBP<90, Disp: , Rfl:     morphine 100 mg/5 mL (20 mg/mL) concentrated solution, Take 0.5 mLs by mouth every 4 (four) hours as needed for Pain., Disp: , Rfl:     nitroGLYCERIN (NITROSTAT) 0.4 MG SL tablet, DISSOLVE 1 TABLET UNDER THE TONGUE AS NEEDED FOR CHEST PAIN (Patient taking differently: Place 0.4 mg under the tongue every 5 (five) minutes as needed for Chest pain.), Disp: 100 tablet, Rfl: 3    oxybutynin (DITROPAN-XL) 10 MG 24 hr tablet, Take 10 mg by mouth once daily., Disp: , Rfl:     pantoprazole (PROTONIX) 40 MG tablet, TAKE ONE TABLET BY MOUTH ONCE DAILY (Patient taking differently: Take 40 mg by mouth once daily.), Disp: 90 tablet, Rfl: 1    prochlorperazine  (COMPAZINE) 10 MG tablet, Take 10 mg by mouth every 6 (six) hours as needed., Disp: , Rfl:     senna-docusate 8.6-50 mg (SENNA WITH DOCUSATE SODIUM) 8.6-50 mg per tablet, Take 1 tablet by mouth daily as needed for Constipation., Disp: , Rfl:     sertraline (ZOLOFT) 50 MG tablet, TAKE ONE TABLET BY MOUTH ONCE DAILY (Patient taking differently: Take 50 mg by mouth once daily.), Disp: 90 tablet, Rfl: 1    simethicone (MYLICON) 80 MG chewable tablet, Take  mg by mouth every 4 (four) hours as needed (heartburn)., Disp: , Rfl:     spironolactone (ALDACTONE) 25 MG tablet, Take 1 tablet (25 mg total) by mouth once daily., Disp: 90 tablet, Rfl: 0    tiZANidine (ZANAFLEX) 2 MG tablet, Take 4 mg by mouth once daily., Disp: , Rfl:     tolterodine (DETROL LA) 4 MG 24 hr capsule, Take 1 capsule (4 mg total) by mouth once daily., Disp: 90 capsule, Rfl: 1    traMADoL (ULTRAM) 50 mg tablet, Take 50 mg by mouth every 6 (six) hours as needed for Pain., Disp: , Rfl:     aspirin (ECOTRIN) 81 MG EC tablet, Take 1 tablet (81 mg total) by mouth once daily., Disp: 120 tablet, Rfl: 0    cholecalciferol, vitamin D3, 75 mcg (3,000 unit) Tab, Take 1 tablet by mouth once daily., Disp: , Rfl:     fluticasone propionate (FLONASE) 50 mcg/actuation nasal spray, 1 spray by Each Nostril route as needed for Allergies., Disp: , Rfl:     LORazepam (ATIVAN) 2 mg/mL Conc, Take 0.5 mLs by mouth every 4 (four) hours as needed., Disp: , Rfl:     magnesium oxide (MAG-OXIDE ORAL), Take 1 tablet by mouth once daily., Disp: , Rfl:     megestroL (MEGACE) 400 mg/10 mL (10 mL) Susp, Take 5 mLs (200 mg total) by mouth once daily., Disp: , Rfl:     metoprolol tartrate (LOPRESSOR) 25 MG tablet, Take 0.5 tablets (12.5 mg total) by mouth 2 (two) times daily., Disp: 60 tablet, Rfl: 0    polyethylene glycol (GLYCOLAX) 17 gram PwPk, Take 17 g by mouth once daily., Disp: , Rfl:     Facility-Administered Medications Ordered in Other Encounters:     lactated ringers  infusion, , Intravenous, Continuous, Maurice Montenegro MD, Last Rate: 25 mL/hr at 07/25/23 1013, New Bag at 07/25/23 1013    LIDOcaine (PF) 10 mg/ml (1%) injection 10 mg, 1 mL, Intradermal, Once, Maurice Montenegro MD      Scheduled Meds:   magnesium sulfate IVPB  2 g Intravenous Once     Continuous Infusions:   heparin (porcine) in D5W       PRN Meds:.dextrose 50%, dextrose 50%, glucagon (human recombinant), glucose, glucose, heparin (PORCINE), heparin (PORCINE), magnesium oxide, magnesium oxide, naloxone, potassium bicarbonate, potassium bicarbonate, potassium bicarbonate, sodium chloride 0.9%      Amor Flores  [facility]ist  12/24/2023

## 2023-12-24 NOTE — ED PROVIDER NOTES
Encounter Date: 12/24/2023       History     Chief Complaint   Patient presents with    Fall     No LOC     78-year-old male presents emergency department past medical history includes diabetes, hypertension, previous MI, CABG with stents, currently on Plavix and baby aspirin patient resides currently in [place] has baseline dementia daughters at the bedside reports that he is currently baseline.  According to the daughter patient was found on the floor they on unsure if he tried to get up to do something or he fell out of his bed the patient had a hip fracture on the right hip in October daughter reports that he stayed in the hospital for approximately 1 month and was discharged to summer feel he did not get a rehab she states that he has not really ambulated at all in the last month and a half he has had chronic right lower extremity swelling since his hip fracture.  Patient has a small skin tear noted to the left elbow      Review of patient's allergies indicates:   Allergen Reactions    Adhesive Other (See Comments)     SILK TAPE PULL SKIN OFF    Metformin Other (See Comments)     Weight loss cachexia anorexia,diarrhea.    Pcn [penicillins]      Patient states he passed out from this medication when he was 10 years old.      Past Medical History:   Diagnosis Date    Anemia     Anticoagulant long-term use     Arthritis     CAD (coronary artery disease)     CABG, STENTS '97,'99,'01,'05    Cancer     SKIN    Cataract     Diabetes mellitus     Diabetes mellitus type II     GERD (gastroesophageal reflux disease)     GI bleed 2020    Hypertension     Myocardial infarction     Wears glasses      Past Surgical History:   Procedure Laterality Date    ANGIOGRAM, CORONARY, WITH LEFT HEART CATHETERIZATION N/A 08/24/2023    Procedure: Angiogram, Coronary, with Left Heart Cath;  Surgeon: Robles Mckoy MD;  Location: [facility] CATH/EP LAB;  Service: Cardiology;  Laterality: N/A;    CARDIAC PACEMAKER PLACEMENT      CARDIAC  PACEMAKER PLACEMENT      CARDIAC PACEMAKER PLACEMENT  04/26/2018    replaced, [employer]    CARDIAC SURGERY      1995   CABG X 5    CHOLECYSTECTOMY      COLON SURGERY      COLONOSCOPY N/A 02/21/2020    Procedure: COLONOSCOPY;  Surgeon: Abe Barragan III, MD;  Location: [facility] ENDO;  Service: Endoscopy;  Laterality: N/A;    COLONOSCOPY N/A 02/23/2020    Procedure: COLONOSCOPY;  Surgeon: Bob Locke Jr., MD;  Location: [facility] ENDO;  Service: Endoscopy;  Laterality: N/A;    CORONARY ANGIOGRAPHY INCLUDING BYPASS GRAFTS WITH CATHETERIZATION OF LEFT HEART N/A 10/26/2022    Procedure: ANGIOGRAM, CORONARY, INCLUDING BYPASS GRAFT, WITH LEFT HEART CATHETERIZATION;  Surgeon: Robles Mckoy MD;  Location: [facility] CATH/EP LAB;  Service: Cardiology;  Laterality: N/A;    CORONARY ARTERY BYPASS GRAFT  1995    CORONARY BYPASS GRAFT ANGIOGRAPHY  08/24/2023    Procedure: Bypass graft study;  Surgeon: Robles Mckoy MD;  Location: [facility] CATH/EP LAB;  Service: Cardiology;;    CORONARY STENT PLACEMENT      stent x 5    ESOPHAGOGASTRODUODENOSCOPY N/A 02/23/2020    Procedure: EGD (ESOPHAGOGASTRODUODENOSCOPY);  Surgeon: Bob Locke Jr., MD;  Location: [facility] ENDO;  Service: Endoscopy;  Laterality: N/A;    EYE SURGERY      BILAT CATARACT    head laceration   01/19/2018    HEMIARTHROPLASTY OF HIP Right 10/5/2023    Procedure: HEMIARTHROPLASTY, HIP;  Surgeon: Avery Borrero MD;  Location: [facility] OR;  Service: Orthopedics;  Laterality: Right;    HEMORRHOID SURGERY      INJECTION OF ANESTHETIC AGENT AROUND MEDIAL BRANCH NERVES INNERVATING LUMBAR FACET JOINT Bilateral 06/29/2023    Procedure: Block-nerve-medial branch-lumbar;  Surgeon: Maurice Montenegro MD;  Location: [facility] OR;  Service: Pain Management;  Laterality: Bilateral;  L3,4,5 MBB    INJECTION OF ANESTHETIC AGENT AROUND MEDIAL BRANCH NERVES INNERVATING LUMBAR FACET JOINT Bilateral 07/25/2023    Procedure: Block-nerve-medial branch-lumbar;  Surgeon: Maurice Montenegro MD;  Location: [facility]  OR;  Service: Anesthesiology;  Laterality: Bilateral;  L3,4,5 MBB #2    INTRAVASCULAR ULTRASOUND, CORONARY N/A 08/24/2023    Procedure: Ultrasound-coronary;  Surgeon: Robles Mckoy MD;  Location: [facility] CATH/EP LAB;  Service: Cardiology;  Laterality: N/A;    PTCA, SINGLE VESSEL  08/24/2023    Procedure: PTCA, Single Vessel;  Surgeon: Robles Mckoy MD;  Location: [facility] CATH/EP LAB;  Service: Cardiology;;    SMALL BOWEL ENTEROSCOPY N/A 02/21/2020    Procedure: ENTEROSCOPY;  Surgeon: Abe Barragan III, MD;  Location: [facility] ENDO;  Service: Endoscopy;  Laterality: N/A;    STENT, DRUG ELUTING, SINGLE VESSEL, CORONARY  08/24/2023    Procedure: Stent, Drug Eluting, Single Vessel, Coronary;  Surgeon: Robles Mckoy MD;  Location: [facility] CATH/EP LAB;  Service: Cardiology;;    stint       Family History   Problem Relation Age of Onset    Heart disease Mother     Heart disease Father     Hyperlipidemia Sister     Hypertension Sister     Heart disease Brother     Heart disease Brother     Heart disease Brother     Heart disease Brother     Heart disease Brother      Social History     Tobacco Use    Smoking status: Never    Smokeless tobacco: Never   Substance Use Topics    Alcohol use: No    Drug use: No     Review of Systems   Unable to perform ROS: Dementia       Physical Exam     Initial Vitals [12/24/23 1134]   BP Pulse Resp Temp SpO2   (!) 147/72 68 16 98 °F (36.7 °C) 97 %      MAP       --         Physical Exam    Nursing note and vitals reviewed.  Constitutional: He appears well-developed and well-nourished.   HENT:   Head: Normocephalic and atraumatic.   Eyes: Pupils are equal, round, and reactive to light.   Cardiovascular:  Normal rate.           Pulmonary/Chest: Breath sounds normal.   Abdominal: Abdomen is soft.   Musculoskeletal:         General: No tenderness or edema.      Comments: Very minimal swelling noted to the right lower extremity distal pulses are intact     Neurological: He is alert.   Skin:    Small skin tear noted to the left elbow         ED Course   Procedures  Labs Reviewed   CBC W/ AUTO DIFFERENTIAL - Abnormal; Notable for the following components:       Result Value    RBC 3.76 (*)     Hemoglobin 9.6 (*)     Hematocrit 31.4 (*)     MCH 25.5 (*)     MCHC 30.6 (*)     RDW 16.0 (*)     Gran # (ANC) 8.9 (*)     Lymph # 0.6 (*)     Gran % 85.2 (*)     Lymph % 6.1 (*)     All other components within normal limits   COMPREHENSIVE METABOLIC PANEL - Abnormal; Notable for the following components:    Potassium 3.0 (*)     Glucose 142 (*)     Total Protein 5.9 (*)     Albumin 3.1 (*)     All other components within normal limits   TROPONIN I HIGH SENSITIVITY - Abnormal; Notable for the following components:    Troponin I High Sensitivity 119.7 (*)     All other components within normal limits   B-TYPE NATRIURETIC PEPTIDE - Abnormal; Notable for the following components:    BNP 1,178 (*)     All other components within normal limits   MAGNESIUM - Abnormal; Notable for the following components:    Magnesium 1.4 (*)     All other components within normal limits   PROTIME-INR - Abnormal; Notable for the following components:    Prothrombin Time 13.2 (*)     All other components within normal limits   TROPONIN I HIGH SENSITIVITY - Abnormal; Notable for the following components:    Troponin I High Sensitivity 113.9 (*)     All other components within normal limits   INFLUENZA A AND B ANTIGEN    Narrative:     Specimen Source->Nasopharyngeal Swab   SARS-COV-2 RNA AMPLIFICATION, QUAL   APTT   APTT        ECG Results              EKG 12-lead (In process)  Result time 12/24/23 15:25:12      In process by Interface, Lab In [facility] (12/24/23 15:25:12)                   Narrative:    Test Reason : R06.02,    Vent. Rate : 069 BPM     Atrial Rate : 069 BPM     P-R Int : 124 ms          QRS Dur : 212 ms      QT Int : 526 ms       P-R-T Axes : -15 -85 091 degrees     QTc Int : 563 ms    Atrial-sensed ventricular-paced  rhythm  Abnormal ECG  When compared with ECG of 08-OCT-2023 09:01,  Vent. rate has decreased BY  28 BPM    Referred By: [email or web address]   SELF           Confirmed By:                                   Imaging Results              US Lower Extremity Veins Right (Final result)  Result time 12/24/23 13:49:53      Final result by Jagdish Clark MD (12/24/23 13:49:53)                   Narrative:    US LOWER EXTREMITY VEINS LIMITED FOLLOW UP RIGHT    CLINICAL HISTORY:  78 years Male right leg pain    FINDINGS: Grayscale, color and spectral Doppler analysis of the right lower extremity deep venous system was performed.    Right common femoral vein is compressible and color Doppler patent. Profunda femoris veins color Doppler patent. Occlusive thrombus is present within the proximal and mid femoral vein, with partially occlusive thrombus in the femoral vein distally. Popliteal vein is patent. Calf vessels are color Doppler patent.    IMPRESSION:    Positive for deep vein thrombosis involving the femoral vein, with occlusive DVT involving the proximal and mid portions, and partially occlusive DVT distally.    CORE team activated to notify the clinical team.    Electronically signed by:  Jagdish Clark MD  12/24/2023 01:49 PM CST Workstation: [id number]                                     X-Ray Chest AP Portable (Final result)  Result time 12/24/23 12:59:27      Final result by Jagdish Clark MD (12/24/23 12:59:27)                   Narrative:    XR CHEST 1 VIEW    CLINICAL HISTORY:  78 years Male fall    COMPARISON: October 3, 2022    FINDINGS: Cardiopericardial silhouette is stable compared to prior. Patient is status post median sternotomy. Atherosclerotic calcification of the aorta. Increased interstitial markings bilaterally, asymmetrically greater on the right. No large pleural effusion or pneumothorax. No acute osseous abnormality.    IMPRESSION:    Asymmetric interstitial markings bilaterally, right greater than  left, potentially representing pulmonary edema.    Electronically signed by:  Jagdish Clark MD  12/24/2023 12:59 PM CST Workstation: [id number]                                     CT Cervical Spine Without Contrast (Final result)  Result time 12/24/23 12:10:45      Final result by Jagdish Clark MD (12/24/23 12:10:45)                   Narrative:    CMS MANDATED QUALITY DATA - CT RADIATION  436    All CT scans at this facility utilize dose modulation, iterative reconstruction, and/or weight based dosing when appropriate to reduce radiation dose to as low as reasonably achievable.    CT CERVICAL SPINE WITHOUT IV CONTRAST    CLINICAL HISTORY:  78 years Male fall    COMPARISON: CT cervical spine October 3, 2023    FINDINGS: Craniocervical junction is intact. Mild atlantodental degenerative change. Cervical spine alignment is within normal limits. No acute fracture of the cervical spine. Multilevel degenerative disc space narrowing and osteophyte formation. Facet arthropathy on the left at C2-3 and C4-5.    Imaging through the chest demonstrates groundglass infiltrate in the right lung, incompletely visualized. Evaluation of cervical soft tissues shows bilateral carotid artery atherosclerotic calcification.    IMPRESSION:    No acute osseous abnormality involving the cervical spine.    Cervical spondylosis.    Ground glass infiltrate in the right lung. Consider chest radiography correlation.    Electronically signed by:  Jagdish Clark MD  12/24/2023 12:10 PM CST Workstation: [id number]                                     CT Head Without Contrast (Final result)  Result time 12/24/23 12:07:45      Final result by Jagdish Clark MD (12/24/23 12:07:45)                   Narrative:    CMS MANDATED QUALITY DATA - CT RADIATION  436    All CT scans at this facility utilize dose modulation, iterative reconstruction, and/or weight based dosing when appropriate to reduce radiation dose to as low as reasonably  achievable.    CT HEAD WITHOUT IV CONTRAST    CLINICAL HISTORY:  78 years Male Fall    COMPARISON: CT head December 13, 2023    FINDINGS: Negative for acute intracranial hemorrhage, midline shift, or mass effect. Mild cerebral atrophy with associated ventricular and sulcal enlargement. Extensive periventricular and deep white matter hypoattenuation compatible with microangiopathic change. Cerebellar hemispheres and brainstem are unremarkable. Atherosclerotic calcification of intracranial carotid and vertebral arteries.    No calvarial lesion or fracture. Mastoid air cells are clear. Paranasal sinuses are clear.    IMPRESSION:    No CT evidence of acute intracranial pathology.    Advanced white matter microangiopathic changes.    Electronically signed by:  Jagdish Clark MD  12/24/2023 12:07 PM [facility] Workstation: [id number]                                     Medications   sodium chloride 0.9% flush 10 mL (has no administration in time range)   naloxone 0.4 mg/mL injection 0.02 mg (has no administration in time range)   glucose chewable tablet 16 g (has no administration in time range)   glucose chewable tablet 24 g (has no administration in time range)   dextrose 50% injection 12.5 g (has no administration in time range)   dextrose 50% injection 25 g (has no administration in time range)   glucagon (human recombinant) injection 1 mg (has no administration in time range)   potassium bicarbonate disintegrating tablet 50 mEq (has no administration in time range)   potassium bicarbonate disintegrating tablet 35 mEq (has no administration in time range)   potassium bicarbonate disintegrating tablet 60 mEq (60 mEq Oral Not Given 12/25/23 0902)   magnesium oxide tablet 800 mg (has no administration in time range)   magnesium oxide tablet 800 mg (has no administration in time range)   clopidogreL tablet 75 mg (75 mg Oral Given 12/25/23 0909)   spironolactone tablet 25 mg (25 mg Oral Given 12/25/23 0909)   oxybutynin 24 hr  tablet 10 mg (10 mg Oral Given 12/25/23 0909)   sertraline tablet 50 mg (50 mg Oral Given 12/25/23 0909)   nitroGLYCERIN SL tablet 0.4 mg (has no administration in time range)   insulin aspart U-100 pen 0-10 Units (has no administration in time range)   apixaban tablet 10 mg (10 mg Oral Given 12/25/23 1743)   magnesium sulfate 2g in water 50mL IVPB (premix) (0 g Intravenous Stopped 12/24/23 1757)   heparin 25,000 units in dextrose 5% (100 units/ml) IV bolus from bag INITIAL BOLUS (5,696 Units Intravenous Bolus from Bag 12/24/23 1632)   furosemide injection 40 mg (40 mg Intravenous Given 12/24/23 1605)   potassium bicarbonate disintegrating tablet 25 mEq (25 mEq Oral Given 12/24/23 1604)     Medical Decision Making  78-year-old male presents emergency department past medical history includes diabetes, hypertension, previous MI, CABG with stents, currently on Plavix and baby aspirin patient resides currently in [place] has baseline dementia daughters at the bedside reports that he is currently baseline.  According to the daughter patient was found on the floor they on unsure if he tried to get up to do something or he fell out of his bed the patient had a hip fracture on the right hip in October daughter reports that he stayed in the hospital for approximately 1 month and was discharged to summer feel he did not get a rehab she states that he has not really ambulated at all in the last month and a half he has had chronic right lower extremity swelling since his hip fracture.  Patient has a small skin tear noted to the left elbow    Considerations include but not limited to, electrolyte abnormalities, ACS, CHI, skull fracture, ICH, DVT, pneumonia, CHF exacerbation    78-year-old male presents to the emergency department after being found on the floor outside of his bed unsure of what may have happened.  According to the patient's daughter he lives in [place] and he does not ambulate and has not ambulated since  having a hip replacement/repair of femur fracture in October she reports the patient has had chronic right lower leg swelling since that time he has been seen by his primary care provider and ortho for the swelling he is currently wearing a stocking in his right lower extremity his pulse fact, he has a history of dementia currently baseline per his daughter he has no signs of trauma other than a small skin tear to the left elbow he is not on blood thinners, CT imaging of the head and C-spine are unremarkable for any acute traumatic findings patient did have evidence of ground-glass opacity in the right lower field chest x-ray reveals pulmonary edema, BNP is elevated patient was given Lasix IV, has no significant lower extremity edema.  Patient's troponin was noted to be elevated however on review of chart he has chronically elevated troponins Patient also noted to have a low magnesium, and low potassium he was given supplementations by IV for both.  Ultrasound performed of the right lower extremity given swelling on physical exam with extensive DVT patient was started on heparin patient will be admitted by Hospital Medicine team for further evaluation Dr Rubio     Amount and/or Complexity of Data Reviewed  Independent Historian: EMS     Details: Daughter   External Data Reviewed: labs, radiology and ECG.  Labs: ordered. Decision-making details documented in ED Course.  Radiology: ordered. Decision-making details documented in ED Course.  ECG/medicine tests: ordered.     Details: AV paced rate 69 No changes from previous EKG     Risk  Prescription drug management.              Attending Attestation:     Physician Attestation Statement for NP/PA:       Other NP/PA Attestation Additions:    History of Present Illness: Patient presents with minor fall.  Patient with right hip fracture a proximally 2 months ago with ORIF.  Patient with chronic right leg pain and swelling since that time.   Physical Exam: Most of  history is given by family member at bedside.  Right leg is edematous but neurovascular intact with 2+ dorsalis pedis and posterior tibial pulses.  Sensation intact.  Capillary refill less than 2 seconds.   Medical Decision Making: Patient presents with right lower extremity swelling and pain after hip surgery.  Patient has extensive DVT.  Patient does have femoral DVT.  I suspect iliac DVT as well.  Unfractionated heparin initiated.  Patient has elevated BNP.  Furosemide initiated.  Electrolytes will be replaced including potassium and magnesium.  Hospitalist consulted for admission.                                    Clinical Impression:  Final diagnoses:  [R06.02] Shortness of breath  [M79.606, M79.89] Pain and swelling of lower extremity  [W19.XXXA] Fall, initial encounter (Primary)  [I82.401] Acute deep vein thrombosis (DVT) of right lower extremity, unspecified vein  [I50.9] Acute on chronic congestive heart failure, unspecified heart failure type  [E83.42] Hypomagnesemia          ED Disposition Condition    Observation                 Jenn Black FNP  12/25/23 0936       Abe Lakhani MD  12/25/23 1937

## 2023-12-25 LAB
ALBUMIN SERPL BCP-MCNC: 3.2 G/DL (ref 3.5–5.2)
ALP SERPL-CCNC: 93 U/L (ref 55–135)
ALT SERPL W/O P-5'-P-CCNC: 14 U/L (ref 10–44)
ANION GAP SERPL CALC-SCNC: 13 MMOL/L (ref 8–16)
APTT PPP: 107.4 SEC (ref 21–32)
APTT PPP: 120 SEC (ref 21–32)
APTT PPP: 31.9 SEC (ref 21–32)
APTT PPP: 53 SEC (ref 21–32)
AST SERPL-CCNC: 19 U/L (ref 10–40)
BASOPHILS # BLD AUTO: 0.07 K/UL (ref 0–0.2)
BASOPHILS # BLD AUTO: 0.07 K/UL (ref 0–0.2)
BASOPHILS NFR BLD: 0.6 % (ref 0–1.9)
BASOPHILS NFR BLD: 0.6 % (ref 0–1.9)
BILIRUB SERPL-MCNC: 0.8 MG/DL (ref 0.1–1)
BUN SERPL-MCNC: 19 MG/DL (ref 8–23)
CALCIUM SERPL-MCNC: 9.4 MG/DL (ref 8.7–10.5)
CHLORIDE SERPL-SCNC: 100 MMOL/L (ref 95–110)
CO2 SERPL-SCNC: 26 MMOL/L (ref 23–29)
CREAT SERPL-MCNC: 0.8 MG/DL (ref 0.5–1.4)
DIFFERENTIAL METHOD BLD: ABNORMAL
DIFFERENTIAL METHOD BLD: ABNORMAL
EOSINOPHIL # BLD AUTO: 0.2 K/UL (ref 0–0.5)
EOSINOPHIL # BLD AUTO: 0.2 K/UL (ref 0–0.5)
EOSINOPHIL NFR BLD: 1.4 % (ref 0–8)
EOSINOPHIL NFR BLD: 1.4 % (ref 0–8)
ERYTHROCYTE [DISTWIDTH] IN BLOOD BY AUTOMATED COUNT: 15.9 % (ref 11.5–14.5)
ERYTHROCYTE [DISTWIDTH] IN BLOOD BY AUTOMATED COUNT: 15.9 % (ref 11.5–14.5)
EST. GFR  (NO RACE VARIABLE): >60 ML/MIN/1.73 M^2
ESTIMATED AVG GLUCOSE: 146 MG/DL (ref 68–131)
GLUCOSE SERPL-MCNC: 130 MG/DL (ref 70–110)
GLUCOSE SERPL-MCNC: 131 MG/DL (ref 70–110)
GLUCOSE SERPL-MCNC: 131 MG/DL (ref 70–110)
GLUCOSE SERPL-MCNC: 162 MG/DL (ref 70–110)
GLUCOSE SERPL-MCNC: 182 MG/DL (ref 70–110)
HBA1C MFR BLD: 6.7 % (ref 4.5–6.2)
HCT VFR BLD AUTO: 29.6 % (ref 40–54)
HCT VFR BLD AUTO: 29.6 % (ref 40–54)
HGB BLD-MCNC: 9.3 G/DL (ref 14–18)
HGB BLD-MCNC: 9.3 G/DL (ref 14–18)
IMM GRANULOCYTES # BLD AUTO: 0.04 K/UL (ref 0–0.04)
IMM GRANULOCYTES # BLD AUTO: 0.04 K/UL (ref 0–0.04)
IMM GRANULOCYTES NFR BLD AUTO: 0.4 % (ref 0–0.5)
IMM GRANULOCYTES NFR BLD AUTO: 0.4 % (ref 0–0.5)
LYMPHOCYTES # BLD AUTO: 1.2 K/UL (ref 1–4.8)
LYMPHOCYTES # BLD AUTO: 1.2 K/UL (ref 1–4.8)
LYMPHOCYTES NFR BLD: 10.5 % (ref 18–48)
LYMPHOCYTES NFR BLD: 10.5 % (ref 18–48)
MCH RBC QN AUTO: 25.8 PG (ref 27–31)
MCH RBC QN AUTO: 25.8 PG (ref 27–31)
MCHC RBC AUTO-ENTMCNC: 31.4 G/DL (ref 32–36)
MCHC RBC AUTO-ENTMCNC: 31.4 G/DL (ref 32–36)
MCV RBC AUTO: 82 FL (ref 82–98)
MCV RBC AUTO: 82 FL (ref 82–98)
MONOCYTES # BLD AUTO: 0.7 K/UL (ref 0.3–1)
MONOCYTES # BLD AUTO: 0.7 K/UL (ref 0.3–1)
MONOCYTES NFR BLD: 6.6 % (ref 4–15)
MONOCYTES NFR BLD: 6.6 % (ref 4–15)
NEUTROPHILS # BLD AUTO: 8.9 K/UL (ref 1.8–7.7)
NEUTROPHILS # BLD AUTO: 8.9 K/UL (ref 1.8–7.7)
NEUTROPHILS NFR BLD: 80.5 % (ref 38–73)
NEUTROPHILS NFR BLD: 80.5 % (ref 38–73)
NRBC BLD-RTO: 0 /100 WBC
NRBC BLD-RTO: 0 /100 WBC
PLATELET # BLD AUTO: 313 K/UL (ref 150–450)
PLATELET # BLD AUTO: 313 K/UL (ref 150–450)
PMV BLD AUTO: 10.2 FL (ref 9.2–12.9)
PMV BLD AUTO: 10.2 FL (ref 9.2–12.9)
POTASSIUM SERPL-SCNC: 2.7 MMOL/L (ref 3.5–5.1)
POTASSIUM SERPL-SCNC: 3.8 MMOL/L (ref 3.5–5.1)
PROT SERPL-MCNC: 6 G/DL (ref 6–8.4)
RBC # BLD AUTO: 3.6 M/UL (ref 4.6–6.2)
RBC # BLD AUTO: 3.6 M/UL (ref 4.6–6.2)
SODIUM SERPL-SCNC: 139 MMOL/L (ref 136–145)
WBC # BLD AUTO: 11.1 K/UL (ref 3.9–12.7)
WBC # BLD AUTO: 11.1 K/UL (ref 3.9–12.7)

## 2023-12-25 PROCEDURE — 36415 COLL VENOUS BLD VENIPUNCTURE: CPT | Performed by: EMERGENCY MEDICINE

## 2023-12-25 PROCEDURE — 96374 THER/PROPH/DIAG INJ IV PUSH: CPT | Mod: 59

## 2023-12-25 PROCEDURE — 80053 COMPREHEN METABOLIC PANEL: CPT | Performed by: INTERNAL MEDICINE

## 2023-12-25 PROCEDURE — 25000003 PHARM REV CODE 250: Performed by: INTERNAL MEDICINE

## 2023-12-25 PROCEDURE — 36415 COLL VENOUS BLD VENIPUNCTURE: CPT | Performed by: INTERNAL MEDICINE

## 2023-12-25 PROCEDURE — 63600175 PHARM REV CODE 636 W HCPCS: Performed by: EMERGENCY MEDICINE

## 2023-12-25 PROCEDURE — 85730 THROMBOPLASTIN TIME PARTIAL: CPT | Mod: 91 | Performed by: INTERNAL MEDICINE

## 2023-12-25 PROCEDURE — 84132 ASSAY OF SERUM POTASSIUM: CPT | Performed by: INTERNAL MEDICINE

## 2023-12-25 PROCEDURE — 63600175 PHARM REV CODE 636 W HCPCS: Performed by: INTERNAL MEDICINE

## 2023-12-25 PROCEDURE — 85730 THROMBOPLASTIN TIME PARTIAL: CPT | Performed by: EMERGENCY MEDICINE

## 2023-12-25 PROCEDURE — 21400001 HC TELEMETRY ROOM

## 2023-12-25 PROCEDURE — 83036 HEMOGLOBIN GLYCOSYLATED A1C: CPT | Performed by: STUDENT IN AN ORGANIZED HEALTH CARE EDUCATION/TRAINING PROGRAM

## 2023-12-25 PROCEDURE — 96366 THER/PROPH/DIAG IV INF ADDON: CPT

## 2023-12-25 PROCEDURE — 85025 COMPLETE CBC W/AUTO DIFF WBC: CPT | Performed by: EMERGENCY MEDICINE

## 2023-12-25 RX ORDER — HYDROCODONE BITARTRATE AND ACETAMINOPHEN 5; 325 MG/1; MG/1
1 TABLET ORAL ONCE
Status: COMPLETED | OUTPATIENT
Start: 2023-12-25 | End: 2023-12-25

## 2023-12-25 RX ORDER — IBUPROFEN 200 MG
16 TABLET ORAL
Status: DISCONTINUED | OUTPATIENT
Start: 2023-12-25 | End: 2023-12-25

## 2023-12-25 RX ORDER — INSULIN ASPART 100 [IU]/ML
0-10 INJECTION, SOLUTION INTRAVENOUS; SUBCUTANEOUS
Status: DISCONTINUED | OUTPATIENT
Start: 2023-12-25 | End: 2023-12-26 | Stop reason: HOSPADM

## 2023-12-25 RX ORDER — GLUCAGON 1 MG
1 KIT INJECTION
Status: DISCONTINUED | OUTPATIENT
Start: 2023-12-25 | End: 2023-12-25

## 2023-12-25 RX ORDER — IBUPROFEN 200 MG
24 TABLET ORAL
Status: DISCONTINUED | OUTPATIENT
Start: 2023-12-25 | End: 2023-12-25

## 2023-12-25 RX ORDER — NITROGLYCERIN 0.4 MG/1
0.4 TABLET SUBLINGUAL EVERY 5 MIN PRN
Status: DISCONTINUED | OUTPATIENT
Start: 2023-12-25 | End: 2023-12-26 | Stop reason: HOSPADM

## 2023-12-25 RX ADMIN — SPIRONOLACTONE 25 MG: 25 TABLET ORAL at 09:12

## 2023-12-25 RX ADMIN — APIXABAN 10 MG: 5 TABLET, FILM COATED ORAL at 05:12

## 2023-12-25 RX ADMIN — FUROSEMIDE 40 MG: 10 INJECTION, SOLUTION INTRAVENOUS at 09:12

## 2023-12-25 RX ADMIN — POTASSIUM BICARBONATE 60 MEQ: 782 TABLET, EFFERVESCENT ORAL at 07:12

## 2023-12-25 RX ADMIN — SERTRALINE HYDROCHLORIDE 50 MG: 50 TABLET ORAL at 09:12

## 2023-12-25 RX ADMIN — CLOPIDOGREL BISULFATE 75 MG: 75 TABLET, FILM COATED ORAL at 09:12

## 2023-12-25 RX ADMIN — HYDROCODONE BITARTRATE AND ACETAMINOPHEN 1 TABLET: 5; 325 TABLET ORAL at 10:12

## 2023-12-25 RX ADMIN — HEPARIN SODIUM 14 UNITS/KG/HR: 10000 INJECTION, SOLUTION INTRAVENOUS at 04:12

## 2023-12-25 RX ADMIN — POTASSIUM BICARBONATE 60 MEQ: 782 TABLET, EFFERVESCENT ORAL at 04:12

## 2023-12-25 RX ADMIN — OXYBUTYNIN CHLORIDE 10 MG: 5 TABLET, EXTENDED RELEASE ORAL at 09:12

## 2023-12-25 NOTE — PLAN OF CARE
Problem: Adult Inpatient Plan of Care  Goal: Plan of Care Review  Outcome: Ongoing, Progressing  Goal: Readiness for Transition of Care  Outcome: Ongoing, Progressing     Problem: Diabetes Comorbidity  Goal: Blood Glucose Level Within Targeted Range  Outcome: Ongoing, Progressing     Problem: Fall Injury Risk  Goal: Absence of Fall and Fall-Related Injury  Outcome: Ongoing, Progressing     Problem: Skin Injury Risk Increased  Goal: Skin Health and Integrity  Outcome: Ongoing, Progressing

## 2023-12-25 NOTE — PROGRESS NOTES
Subjective: Patient seen and examined today.  No overnight events.  He is doing well has no questions or complaints.  Family is at the bedside.    Constitutional: He appears well-developed and well-nourished.   HENT:   Head: Normocephalic and atraumatic.   Eyes: Pupils are equal, round, and reactive to light.   Cardiovascular:  Normal rate.           Pulmonary/Chest: Breath sounds normal.   Abdominal: Abdomen is soft.   Musculoskeletal:         General: No tenderness or edema.      Comments: swelling noted to the right lower extremity distal pulses are intact       Provoked Occlusive DVT of the right lower extremity, pulses intact   History of CAD status post CABG, stents in 8/23  Systolic CHF exacerbation, EF 30-40% in 8/23  Baseline dementia   Hypertension                 -will transition to NOAC today.                 -patient doing well respiratory wise so can DC diuretics.

## 2023-12-26 VITALS
SYSTOLIC BLOOD PRESSURE: 187 MMHG | BODY MASS INDEX: 21.14 KG/M2 | HEIGHT: 72 IN | OXYGEN SATURATION: 95 % | RESPIRATION RATE: 16 BRPM | HEART RATE: 88 BPM | WEIGHT: 156.06 LBS | DIASTOLIC BLOOD PRESSURE: 95 MMHG | TEMPERATURE: 98 F

## 2023-12-26 PROBLEM — I82.409 ACUTE DVT (DEEP VENOUS THROMBOSIS): Status: ACTIVE | Noted: 2023-12-26

## 2023-12-26 LAB
GLUCOSE SERPL-MCNC: 131 MG/DL (ref 70–110)
GLUCOSE SERPL-MCNC: 133 MG/DL (ref 70–110)

## 2023-12-26 PROCEDURE — 25000003 PHARM REV CODE 250: Performed by: INTERNAL MEDICINE

## 2023-12-26 RX ADMIN — SERTRALINE HYDROCHLORIDE 50 MG: 50 TABLET ORAL at 08:12

## 2023-12-26 RX ADMIN — OXYBUTYNIN CHLORIDE 10 MG: 5 TABLET, EXTENDED RELEASE ORAL at 08:12

## 2023-12-26 RX ADMIN — CLOPIDOGREL BISULFATE 75 MG: 75 TABLET, FILM COATED ORAL at 08:12

## 2023-12-26 RX ADMIN — APIXABAN 10 MG: 5 TABLET, FILM COATED ORAL at 08:12

## 2023-12-26 RX ADMIN — SPIRONOLACTONE 25 MG: 25 TABLET ORAL at 08:12

## 2023-12-26 NOTE — PLAN OF CARE
DC orders and chart reviewed. No discharge needs noted.  Patient cleared for discharge from .    Patient is discharging back to his residence at [facility] in [place] where he will resume hospice services with [facility].  CM spoke to Rody at [facility] who stated patient must arrive back to the facility by 4 pm.  Ambulance transport ordered and time noted.      CM spoke to Aiyana with [facility] regarding patient resuming hospice services.  Discharge summary sent to [facility] via [employer].         12/26/23 1456   Final Note   Assessment Type Final Discharge Note   Anticipated Discharge Disposition [facility]   What phone number can be called within the next 1-3 days to see how you are doing after discharge? [phone]   Hospital Resources/Appts/Education Provided Post-Acute resouces added to AVS   Post-Acute Status   Post-Acute Authorization Hospice   Hospice Status Set-up Complete/Auth obtained   Patient choice form signed by patient/caregiver List with quality metrics by geographic area provided   Discharge Delays (!) Ambulance Transport/Facility Transport

## 2023-12-26 NOTE — NURSING
Lt. Lateral Recumbant position, arouses easily. Skin Tear to Lt. Elbow noted and covered with Band-Aid.No S/Sx of infection noted, no drainage. No further orders needed.

## 2023-12-26 NOTE — PLAN OF CARE
"ARIANNA contacted by nurses station regarding patient's ambulance transport back to facility.  Per Keira with [facility], patient "does not require ambulance transport due to the fact he was not lying flat in the bed and because no one living at an assisted living facility is able to be bed bound."      CM, nurse, and charge nurse all observed patient's transfer from bed to stretcher with maximum assistance.  Patient on stretcher with head up at 30 degree angle and back supported by stretcher.  Patient did not have the postural stability to sit upright in a wheelchair for transport.      Also, see note below from previous admission: per chart review, patient transported by [facility] ambulance back to [facility].       "

## 2023-12-26 NOTE — DISCHARGE SUMMARY
Critical access hospital  Discharge Summary  Patient Name: Tono Saez MRN: [id number]   Patient Class: IP- Inpatient  Length of Stay: 1   Admission Date: 12/24/2023 11:21 AM Attending Physician: Amor Soto MD   Primary Care Provider: Scott Staley MD Face-to-Face encounter date: 12/26/2023   Chief Complaint: Fall (No LOC)    Date of Discharge: 12/26/2023  Discharge Disposition:  Assisted living  Condition: Stable       Reason for Hospitalization   DVT      Patient Active Problem List   Diagnosis    Tinea cruris    Rectal bleeding    Anal fissure    HTN (hypertension)    Anal fissure    Diarrhea of presumed infectious origin    H/O cachexia    Weight loss, abnormal    Chronic diarrhea    Bronchitis    Laceration of occipital region of scalp    Laceration of forehead, left, complicated    Hematoma of frontal scalp    Edema eyelid, unspecified laterality    Chronic (childhood) granulomatous disease    Actinic keratoses    Mixed hyperlipidemia    Seasonal allergic rhinitis due to pollen    Non-allergic rhinitis    Pansinusitis    Type 2 diabetes mellitus with hyperglycemia     Immunization due    GERD (gastroesophageal reflux disease)    CRI (chronic renal insufficiency), stage 1    Anemia    Fall    Gait instability    Chronic anticoagulation    Contusion of buttock    Diabetes mellitus, type II    Chest pain    Upper GI bleed    CAD (coronary artery disease)    TIA (transient ischemic attack)    Dysfunction of both eustachian tubes    Pain in both lower extremities    Pain    Gross hematuria    Compression fracture of L1 lumbar vertebra, with delayed healing, subsequent encounter    Chronic systolic congestive heart failure    SSS (sick sinus syndrome)    Stage 3a chronic kidney disease    B12 neuropathy    Tooth decayed    Cardiac pacemaker in situ    Lumbar spine pain    Impaired mobility and activities of daily living    Syncope and collapse    BMI 23.0-23.9, adult    Orthostatic hypotension    Imbalance  pt cut right hand ring finger. laceration noted    Leg weakness, bilateral    Skin tear of left upper arm without complication    Closed fracture of right femur with routine healing    Acute metabolic encephalopathy    Acute DVT (deep venous thrombosis)       Brief History of Present Illness   This is an 78-year-old male patient with past medical history of CAD with CABG and stents with last 1 placed on 08/23, DM, hypertension, baseline dementia who was brought in by the daughter after being found on the floor by his bed.  I was unable to speak to the daughter and patient is unable to give history due to dementia so most of history was provided by the chart and ER physician.  Patient recently had a right femur fracture in October with repair.  Following this his daughter reported worsening lower extremity swelling since then.  In the emergency room he had a lower extremity duplex found to have occlusive DVT on the right lower extremity.     Hospital Course By Problem with Pertinent Findings     Patient was admitted and started on IV heparin for occlusive DVT.  He remained to have palpable pulses and had no evidence of vascular compromise.  He was transitioned to NOAC yesterday.  Of note, patient recently had stent placed earlier this year so he was on dual antiplatelet therapy.  Given started him on anticoagulation I have decided aspirin as it increases his already high odds of bleeding.  Patient is at higher risk for bleeding due to having falls in the past however I did speak with his daughter and explained the risk of this.  We both feel risk of DVT traveling to lungs outweighs bleeding risk so she agreed with anticoagulation.  We will be discharged with 3 months of Eliquis.      Patient was seen and examined on the date of discharge and determined to be suitable for discharge.    Physical Exam  BP (!) 187/95 (BP Location: Right arm, Patient Position: Lying)   Pulse 88   Temp 98 °F (36.7 °C) (Oral)   Resp 16   Ht 6' (1.829 m)   Wt 70.8 kg (156 lb 1.4 oz)   " SpO2 95%   BMI 21.17 kg/m²   Vitals reviewed.    Constitutional: No distress.   HENT: Atraumatic.   Cardiovascular: Normal rate, regular rhythm and normal heart sounds.   Pulmonary/Chest: Effort normal. Clear to auscultation bilaterally. No wheezes.   Abdominal: Soft. Bowel sounds are normal. Exhibits no distension and no mass. No tenderness  Neurological: Alert.   Skin: Skin is warm and dry.     Following labs were Reviewed   No results for input(s): "WBC", "HGB", "HCT", "PLT", "GLUCOSE", "CALCIUM", "ALBUMIN", "PROT", "NA", "K", "CO2", "CL", "BUN", "CREATININE", "ALKPHOS", "ALT", "AST", "BILITOT" in the last 24 hours.  No results found for: "POCTGLUCOSE"         Microbiology Results (last 7 days)       ** No results found for the last 168 hours. **          US Lower Extremity Veins Right   Final Result      X-Ray Chest AP Portable   Final Result      CT Cervical Spine Without Contrast   Final Result      CT Head Without Contrast   Final Result          No results found for this or any previous visit.      Consultants and Procedures   Consultants:          Discharge Information:       Physical Activity:  Activity as tolerated    Instructions:  1. Take all medications as prescribed  2. Keep all follow-up appointments      Follow-Up Appointments:  Please call your primary care physician to schedule an appointment in 1 week time.            Pending laboratory work/Tests to be performed/followed by the Primary care Physician:    The patient was discharged in the care of her parents//wife/family/caregiver, with discharge instructions were reviewed in written and verbal form. All pertinent questions were discussed and prescriptions were provided. The importance of making follow up appointments and compliance of medications has been stressed repeatedly. The patient will follow up in 1 week or sooner as needed with the PCP, and the patient is on board with the plan. Upon discharge, patient needs to be on following " medications.    Discharge Medications:     Medication List        START taking these medications      apixaban 5 mg Tab  Commonly known as: ELIQUIS  Take 2 tablets (10 mg total) by mouth 2 (two) times daily. for 6 days            CHANGE how you take these medications      nitroGLYCERIN 0.4 MG SL tablet  Commonly known as: NITROSTAT  DISSOLVE 1 TABLET UNDER THE TONGUE AS NEEDED FOR CHEST PAIN  What changed: See the new instructions.     pantoprazole 40 MG tablet  Commonly known as: PROTONIX  TAKE ONE TABLET BY MOUTH ONCE DAILY  What changed: when to take this     sertraline 50 MG tablet  Commonly known as: ZOLOFT  TAKE ONE TABLET BY MOUTH ONCE DAILY  What changed: when to take this            CONTINUE taking these medications      * acetaminophen 650 MG Tbsr  Commonly known as: TYLENOL     * acetaminophen 650 MG Supp  Commonly known as: TYLENOL     albuterol-ipratropium 2.5 mg-0.5 mg/3 mL nebulizer solution  Commonly known as: DUO-NEB     atorvastatin 40 MG tablet  Commonly known as: LIPITOR     cholecalciferol (vitamin D3) 75 mcg (3,000 unit) Tab     clopidogreL 75 mg tablet  Commonly known as: PLAVIX  TAKE ONE TABLET BY MOUTH EVERY DAY     docusate sodium 100 MG capsule  Commonly known as: COLACE  Take 1 capsule (100 mg total) by mouth every 12 (twelve) hours.     DULCOLAX (BISACODYL) 10 mg Supp  Generic drug: bisacodyL     finasteride 5 mg tablet  Commonly known as: PROSCAR     fludrocortisone 0.1 mg Tab  Commonly known as: FLORINEF     fluticasone propionate 50 mcg/actuation nasal spray  Commonly known as: FLONASE     furosemide 20 MG tablet  Commonly known as: LASIX     hyoscyamine 0.125 mg Tab  Commonly known as: ANASPAZ,LEVSIN     loperamide 2 mg Tab  Commonly known as: IMODIUM A-D     LORazepam 2 mg/mL Conc  Commonly known as: ATIVAN     MAG-OXIDE ORAL     megestroL 400 mg/10 mL (10 mL) Susp  Commonly known as: MEGACE  Take 5 mLs (200 mg total) by mouth once daily.     * metoprolol tartrate 25 MG  tablet  Commonly known as: LOPRESSOR     * metoprolol tartrate 25 MG tablet  Commonly known as: LOPRESSOR  Take 0.5 tablets (12.5 mg total) by mouth 2 (two) times daily.     midodrine 5 MG Tab  Commonly known as: PROAMATINE     morphine 100 mg/5 mL (20 mg/mL) concentrated solution     oxybutynin 10 MG 24 hr tablet  Commonly known as: DITROPAN-XL     polyethylene glycol 17 gram Pwpk  Commonly known as: GLYCOLAX  Take 17 g by mouth once daily.     prochlorperazine 10 MG tablet  Commonly known as: COMPAZINE     SENNA WITH DOCUSATE SODIUM 8.6-50 mg per tablet  Generic drug: senna-docusate 8.6-50 mg     simethicone 80 MG chewable tablet  Commonly known as: MYLICON     spironolactone 25 MG tablet  Commonly known as: ALDACTONE  Take 1 tablet (25 mg total) by mouth once daily.     tiZANidine 2 MG tablet  Commonly known as: ZANAFLEX     tolterodine 4 MG 24 hr capsule  Commonly known as: DETROL LA  Take 1 capsule (4 mg total) by mouth once daily.     traMADoL 50 mg tablet  Commonly known as: ULTRAM           * This list has 4 medication(s) that are the same as other medications prescribed for you. Read the directions carefully, and ask your doctor or other care provider to review them with you.                STOP taking these medications      aspirin 81 MG EC tablet  Commonly known as: ECOTRIN               Where to Get Your Medications        These medications were sent to [facility] [place] [facility] [address]  [address] [address]      Phone: [phone]   apixaban 5 mg Tab           I spent 31 minutes preparing the discharge including reviewing records from previous encounters, preparation of discharge summary, assessing and final examination of the patient, discharge medicine reconciliation, discussing plan of care, follow up and education and prescriptions.       Amor Flores  [facility] Hospitalist  12/26/2023

## 2023-12-26 NOTE — PLAN OF CARE
[facility]  Initial Discharge Assessment       Primary Care Provider: Scott Staley MD    Admission Diagnosis: Fall, initial encounter [W19.XXXA]    Admission Date: 12/24/2023    DC assessment completed with patient at bedside.  Information verified as correct on facesheet.  HPOA is daughter, Carmen.  Denies HH/HD/Coumadin. Patient uses wheelchair and shower chair.  Lives at [facility]. Per Carmen, patient is active with [facility].  DC plan is to return to [place] and resume hospice services.  Patient will require ambulance transport on discharge.        Transition of Care Barriers: None    Payor: MEDICARE / Plan: MEDICARE PART A & B / Product Type: Government /     Extended Emergency Contact Information  Primary Emergency Contact: Carmen Pederson  Mobile Phone: [phone]  Relation: Daughter  Preferred language: English   needed? No  Secondary Emergency Contact: Sid Saez  Mobile Phone: [phone]  Relation: Son  Preferred language: English   needed? No    Discharge Plan A: Hospice/home  Discharge Plan B: Assisted Living      [facility] [place] - [address]  [address] [address] [facility] [address]  Phone: [phone] Fax: [phone]    [facility] - [facility] - [facility] AT Portal to Registered [facility] Sites  [facility]  [place] [address]  Phone: [phone] Fax: [phone]    [facility] - [place] - [address]  [address] [facility]  [address]  [place] [address]  Phone: [phone] Fax: [phone]      Initial Assessment (most recent)       Adult Discharge Assessment - 12/26/23 1421          Discharge Assessment    Assessment Type Discharge Planning Assessment     Confirmed/corrected address, phone number and insurance Yes     Confirmed Demographics Correct on Facesheet     Source of Information patient;family     Communicated LEE with  patient/caregiver Yes     Reason For Admission acute DVT     People in Home facility resident     Facility Arrived From: [facility]     Do you expect to return to your current living situation? Yes     Do you have help at home or someone to help you manage your care at home? Yes     Who are your caregiver(s) and their phone number(s)? facility staff and [facility]     Current cognitive status: Alert/Oriented     Equipment Currently Used at Home wheelchair;shower chair     Readmission within 30 days? No     Patient currently being followed by outpatient case management? No     Do you currently have service(s) that help you manage your care at home? Yes     Name and Contact number of agency [facility]     Is the pt/caregiver preference to resume services with current agency Yes     Do you take prescription medications? Yes     Do you have prescription coverage? Yes     Do you have any problems affording any of your prescribed medications? No     Is the patient taking medications as prescribed? yes     Who is going to help you get home at discharge? ambulance transport     Are you on dialysis? No     Do you take coumadin? No     Discharge Plan A Hospice/home     Discharge Plan B Assisted Living     DME Needed Upon Discharge  none     Discharge Plan discussed with: Patient;Adult children     Transition of Care Barriers None

## 2024-01-22 ENCOUNTER — TELEPHONE (OUTPATIENT)
Dept: FAMILY MEDICINE | Facility: CLINIC | Age: 79
End: 2024-01-22
Payer: MEDICARE

## 2024-11-20 NOTE — TELEPHONE ENCOUNTER
----- Message from Britton Sinclair sent at 2/23/2023  4:10 PM CST -----  Regarding: sooner appt  Type:  Sooner Appointment Request    Caller is requesting a sooner appointment.  Caller declined first available appointment listed below.  Caller will not accept being placed on the waitlist and is requesting a message be sent to doctor.    Name of Caller:  Tono    When is the first available appointment?  4/25    Symptoms:  ed f/u    Best Call Back Number:  [phone]    Additional Information:         Addended by: ZEUS CONTRERAS on: 11/20/2024 05:30 PM     Modules accepted: Orders

## 2024-11-21 NOTE — Clinical Note
Patient signed over to me from Dr. Traore.  On reassessment at 5 PM he is feeling a lot better and ready for discharge home.  He has been sent in prescriptions for IM epinephrine, prednisone, clindamycin for dental procedure prophylaxis.  He is agreeable to disposition home and we advised primary care follow-up.     Jorje Blakely MD  11/21/24 2158     The catheter was inserted into the left ventricle. Hemodynamics were performed.  and Pullback was recorded.

## 2024-12-18 NOTE — NURSING
0700: report received, in bed with no complaints  1200: patient had a bath, had to be turned, medicated for pain  1450: patient was medicated for nausea, says he has no pain or nausea at this time  1605: report called to [place] [facility]  1730:packet sent with patient, removed saline lock, placed cardiac monitor with , [facility]   Home

## 2025-05-24 NOTE — TELEPHONE ENCOUNTER
----- Message from Asia Coe sent at 3/6/2020 10:29 AM CST -----  Contact: [phone]   PT wife called regarding Mr Saez having severe diarrhea due to a colonoscopy that was done.    She is asking for a return call to discuss    Thank you, Asia  
Continue with the Pepto-Bismol for now if the diarrhea persists through the weekend, I will put in an order for some stool studies to be done and may need some antibiotics  
Patient called with c/o Diarrhea x 5 days 20 to 30 stools (Diarrhea with each meal per patient) no blood or mucus patient took Pepto Bismol with anti diarrhea today and symptoms seem to have improved. Should he be concerned? Patient had colonoscopy 1 week ago   
Patient notified to go to ER if symptoms worsen or patient has blood in stool.  Advised patient to  containers for stool collection at [facility] for samples.  Collect samples and return to [facility] for testing.  Patent gave verbal understanding of the instructions   
No

## (undated) DEVICE — CATHETER DIAGNOSTIC DXTERITY 5FR JL3.5

## (undated) DEVICE — SUTURE SILK 0 30 A306H

## (undated) DEVICE — SHEATH PINNACLE 5FRX10CM W/GUIDEWIRE

## (undated) DEVICE — GUIDEWIRE RUNTHROUGH 180CM

## (undated) DEVICE — TOWEL OR DISP STRL BLUE 4/PK

## (undated) DEVICE — INTRODUCER KIT STICK MINI MAX 5F X 10

## (undated) DEVICE — SOLUTION NACL 0.9% 3000ML

## (undated) DEVICE — GLOVE SURG ULTRA TOUCH 7.5

## (undated) DEVICE — CATHETER DIAGNOSTIC DXTERITY 5FR MPA

## (undated) DEVICE — BLADE SAGITTAL 1.27 4125-127-090

## (undated) DEVICE — CATHETER DIAGNOSTIC DXTERITY 5FR JR4.0

## (undated) DEVICE — CATHETER GUIDE LAUNCHER EBU3.5 6X110

## (undated) DEVICE — NDL SPINAL SPINOCAN 22GX3.5

## (undated) DEVICE — CATHETER GUIDE LAUNCHER JR4 SH 6FX110CM

## (undated) DEVICE — BASIN BLUE 32OZ 1232

## (undated) DEVICE — CONNECTOR 5-IN-1 CON2001

## (undated) DEVICE — DRESSING POST OP MEPILEX  AG 4X10

## (undated) DEVICE — SHEATH INTRODUCER SLENDER 6FX10CM

## (undated) DEVICE — Device

## (undated) DEVICE — SUTURE STRATAFIX SPIRAL 3-0 60CM PS-2

## (undated) DEVICE — TRAY TOTAL HIP

## (undated) DEVICE — SHEATH PINNACLE 6FRX10CM W/GUIDEWIRE

## (undated) DEVICE — CATHETER GUIDING GUIDEZILLA 6FR L150CM OD0.067IN ID0.057

## (undated) DEVICE — GUIDEWIRE J TIP EXCHANGE FIXED CORE 260

## (undated) DEVICE — CATHETER EAGLE EYE 5FR 85900P

## (undated) DEVICE — SYS LABEL CORRECT MED

## (undated) DEVICE — GLOVE BIOGELPI GOLD SIZE 7.5

## (undated) DEVICE — SHEATH INTRODUCER PRECISION ACCESS 6FX10

## (undated) DEVICE — TIP BOVIE 6.5 0014

## (undated) DEVICE — SUTURE STRATFIX TISSUE 1 PDO CTX 36 X 36

## (undated) DEVICE — NDL SPINAL 25GX3.5 SPINOCAN

## (undated) DEVICE — ADHESIVE DERMABOND SKIN DNX12

## (undated) DEVICE — DRAPE U-SLOT 1019

## (undated) DEVICE — STRAP OR TABLE 5IN X 72IN

## (undated) DEVICE — GUIDEWIRE STIFF ANGLED 035X260 LAUREATE

## (undated) DEVICE — UNDERGLOVE BIOGEL PI MICRO BLUE SZ 8

## (undated) DEVICE — SOLUTION IRRI NS BOTTLE 1000ML R5200-01

## (undated) DEVICE — UNDERGLOVE BIOGEL PI MICRO BLUE SZ 7.5

## (undated) DEVICE — DRAPE IMPERVIOUS SPLIT 89331

## (undated) DEVICE — CATHETER DIAGNOSTIC DXTERITY 5FR IMA

## (undated) DEVICE — SYR LUER LOCK STERILE 10ML

## (undated) DEVICE — DRAPE SPLIT W/ ADHESIVE

## (undated) DEVICE — PILLOW HIP ABDUCTION MEDIUM

## (undated) DEVICE — GLOVE BIOGEL PI   GOLD SIZE 8

## (undated) DEVICE — PAD BOVIE ADULT

## (undated) DEVICE — GUIDEWIRE PTCA FIELDER XT STR TIP 300CM

## (undated) DEVICE — SYR DISP LL 5CC

## (undated) DEVICE — TOGA FLYTE  XL KRIEGER  0408-830-100

## (undated) DEVICE — PAD PREP ALCOHOL 5110

## (undated) DEVICE — CHLORAPREP 10.5 ML APPLICATOR

## (undated) DEVICE — GUIDEWIRE DOUBLE ENDED .035 DIA. 150CML

## (undated) DEVICE — GUIDEWIRE WHOLEY 260CMX0.035IN

## (undated) DEVICE — KIT INFLATION MERIT (IN8152, HP9200E)

## (undated) DEVICE — GLIDECATH ANGLED TAPER 5FR 100CM CG508

## (undated) DEVICE — CATHETER DIAGNOSTIC DXTERITY 5FR JL4.0

## (undated) DEVICE — SUCTION YANKAUER 34970

## (undated) DEVICE — NDL HYPODERMIC SAF 25G 1.5IN

## (undated) DEVICE — SPONGE BULKEE II ABSRB 6X6.75

## (undated) DEVICE — GUIDEWIRE REG. ANGLED .035X260 LAUREATE

## (undated) DEVICE — PULSAEVAC 0210-158-000

## (undated) DEVICE — CONTAINER SPECIMEN OR STER 4OZ

## (undated) DEVICE — BLLN SAPPHIRE SC 2.0 X 15

## (undated) DEVICE — APPLICATOR CHLORAPREP ORN 26ML

## (undated) DEVICE — NDL BLUNT W/O FILTER 18GX1.5IN

## (undated) DEVICE — SUTURE FIBERWIRE #5 38 AR-7211

## (undated) DEVICE — SUTURE ETHIBOND 2 V-37 30 MX69G

## (undated) DEVICE — BLLN SAPPHIRE SC 2.5 X 15

## (undated) DEVICE — SUTURE VICRYL 2-0 MO-4 18 CR J700D